# Patient Record
Sex: FEMALE | Race: WHITE | NOT HISPANIC OR LATINO | Employment: OTHER | URBAN - METROPOLITAN AREA
[De-identification: names, ages, dates, MRNs, and addresses within clinical notes are randomized per-mention and may not be internally consistent; named-entity substitution may affect disease eponyms.]

---

## 2017-03-30 ENCOUNTER — TRANSCRIBE ORDERS (OUTPATIENT)
Dept: ADMINISTRATIVE | Facility: HOSPITAL | Age: 72
End: 2017-03-30

## 2017-03-30 DIAGNOSIS — Z12.31 OTHER SCREENING MAMMOGRAM: Primary | ICD-10-CM

## 2017-04-19 ENCOUNTER — HOSPITAL ENCOUNTER (OUTPATIENT)
Dept: RADIOLOGY | Facility: HOSPITAL | Age: 72
Discharge: HOME | End: 2017-04-19
Payer: MEDICARE

## 2017-04-19 DIAGNOSIS — Z12.31 OTHER SCREENING MAMMOGRAM: ICD-10-CM

## 2017-04-19 PROCEDURE — G0202 SCR MAMMO BI INCL CAD: HCPCS

## 2017-05-15 ENCOUNTER — TRANSCRIBE ORDERS (OUTPATIENT)
Dept: LAB | Facility: HOSPITAL | Age: 72
End: 2017-05-15

## 2017-05-15 ENCOUNTER — APPOINTMENT (OUTPATIENT)
Dept: LAB | Facility: HOSPITAL | Age: 72
End: 2017-05-15
Attending: INTERNAL MEDICINE
Payer: MEDICARE

## 2017-05-15 DIAGNOSIS — E03.9 UNSPECIFIED HYPOTHYROIDISM: Primary | ICD-10-CM

## 2017-05-15 DIAGNOSIS — E55.9 UNSPECIFIED VITAMIN D DEFICIENCY: ICD-10-CM

## 2017-05-15 DIAGNOSIS — E78.5 HYPERLIPIDEMIA, UNSPECIFIED HYPERLIPIDEMIA TYPE: ICD-10-CM

## 2017-05-15 DIAGNOSIS — E03.9 UNSPECIFIED HYPOTHYROIDISM: ICD-10-CM

## 2017-05-15 LAB
25(OH)D3 SERPL-MCNC: 34.1 NG/ML (ref 30–100)
ALBUMIN SERPL BCP-MCNC: 3.5 G/DL (ref 3.5–5)
ALP SERPL-CCNC: 85 U/L (ref 46–116)
ALT SERPL W P-5'-P-CCNC: 25 U/L (ref 12–78)
ANION GAP SERPL CALCULATED.3IONS-SCNC: 5 MMOL/L (ref 4–13)
AST SERPL W P-5'-P-CCNC: 18 U/L (ref 5–45)
BILIRUB SERPL-MCNC: 0.54 MG/DL (ref 0.2–1)
BUN SERPL-MCNC: 18 MG/DL (ref 5–25)
CALCIUM SERPL-MCNC: 9.2 MG/DL (ref 8.3–10.1)
CHLORIDE SERPL-SCNC: 104 MMOL/L (ref 100–108)
CHOLEST SERPL-MCNC: 202 MG/DL (ref 50–200)
CO2 SERPL-SCNC: 31 MMOL/L (ref 21–32)
CREAT SERPL-MCNC: 0.76 MG/DL (ref 0.6–1.3)
GFR SERPL CREATININE-BSD FRML MDRD: >60 ML/MIN/1.73SQ M
GLUCOSE P FAST SERPL-MCNC: 94 MG/DL (ref 65–99)
HDLC SERPL-MCNC: 93 MG/DL (ref 40–60)
LDLC SERPL CALC-MCNC: 90 MG/DL (ref 0–100)
POTASSIUM SERPL-SCNC: 4.4 MMOL/L (ref 3.5–5.3)
PROT SERPL-MCNC: 7.4 G/DL (ref 6.4–8.2)
SODIUM SERPL-SCNC: 140 MMOL/L (ref 136–145)
TRIGL SERPL-MCNC: 93 MG/DL
TSH SERPL DL<=0.05 MIU/L-ACNC: 4.85 UIU/ML (ref 0.36–3.74)

## 2017-05-15 PROCEDURE — 82306 VITAMIN D 25 HYDROXY: CPT

## 2017-05-15 PROCEDURE — 36415 COLL VENOUS BLD VENIPUNCTURE: CPT

## 2017-05-15 PROCEDURE — 80053 COMPREHEN METABOLIC PANEL: CPT

## 2017-05-15 PROCEDURE — 80061 LIPID PANEL: CPT

## 2017-05-15 PROCEDURE — 84443 ASSAY THYROID STIM HORMONE: CPT

## 2017-05-30 ENCOUNTER — ALLSCRIPTS OFFICE VISIT (OUTPATIENT)
Dept: OTHER | Facility: OTHER | Age: 72
End: 2017-05-30

## 2017-05-30 ENCOUNTER — APPOINTMENT (OUTPATIENT)
Dept: LAB | Facility: HOSPITAL | Age: 72
End: 2017-05-30
Attending: SURGERY
Payer: MEDICARE

## 2017-05-30 ENCOUNTER — TRANSCRIBE ORDERS (OUTPATIENT)
Dept: LAB | Facility: HOSPITAL | Age: 72
End: 2017-05-30

## 2017-05-30 DIAGNOSIS — E04.1 THYROID NODULE: Primary | ICD-10-CM

## 2017-05-30 PROCEDURE — 88173 CYTOPATH EVAL FNA REPORT: CPT

## 2017-05-30 PROCEDURE — 88172 CYTP DX EVAL FNA 1ST EA SITE: CPT

## 2017-06-02 ENCOUNTER — GENERIC CONVERSION - ENCOUNTER (OUTPATIENT)
Dept: OTHER | Facility: OTHER | Age: 72
End: 2017-06-02

## 2017-11-09 ENCOUNTER — HOSPITAL ENCOUNTER (OUTPATIENT)
Dept: RADIOLOGY | Facility: HOSPITAL | Age: 72
Discharge: HOME | End: 2017-11-09
Attending: INTERNAL MEDICINE
Payer: MEDICARE

## 2017-11-09 ENCOUNTER — TRANSCRIBE ORDERS (OUTPATIENT)
Dept: RADIOLOGY | Facility: HOSPITAL | Age: 72
End: 2017-11-09

## 2017-11-09 DIAGNOSIS — M25.862 IMPINGEMENT OF LEFT KNEE JOINT: Primary | ICD-10-CM

## 2017-11-09 DIAGNOSIS — M25.862 IMPINGEMENT OF LEFT KNEE JOINT: ICD-10-CM

## 2017-11-09 PROCEDURE — 73562 X-RAY EXAM OF KNEE 3: CPT

## 2017-12-05 ENCOUNTER — ALLSCRIPTS OFFICE VISIT (OUTPATIENT)
Dept: OTHER | Facility: OTHER | Age: 72
End: 2017-12-05

## 2017-12-06 NOTE — PROGRESS NOTES
Assessment    1  Primary localized osteoarthritis of knees, bilateral (715 16) (M17 0)  2  Pain in both knees, unspecified chronicity (719 46) (M25 561,M25 562)    Plan  Pain in both knees, unspecified chronicity    · Administered: MethylPREDNISolone Acetate 40 MG/ML Injection Suspension    Discussion/Summary    72-year-old female with bilateral primary knee degenerative arthritis  decrease inflammation we did provide the patient with bilateral knee steroid injections  also provide the patient with strengthening exercises for bilateral knees  The patient will incorporate this into her daily lower back exercises  as neededFollow-up in 3 months time for repeat evaluation and possible injections  Chief Complaint    1  Knee Pain  Bilateral knee pain      History of Present Illness  HPI: 72-year-old female who presents for initial evaluation of bilateral knees  Patient reports having knee pain and arthritis for years  Most recently nearly 3 months ago the patient states she was going up a step and her knees gave out on her patient denied having trauma to her knees but states that exacerbated her pain  Patient is reporting sharp pain worse with going up stairs and after sitting for long periods of time  After that fall the patient did sustain a great toe fracture which is currently being treated by her podiatrist  She has not had any formal treatment for her knees  She denies any numbness or tingling  She does have a history of being on alendronate for osteoporosis which she states she's been on for years  She is not had any formal treatment otherwise she has no other complaints  The patient does report that daily she performs exercises regarding her lower back and is very active she walks weekly      Review of Systems   Constitutional: No fever, no chills, feels well, no tiredness, no recent weight gain or loss  Eyes: No complaints of eyesight problems, no red eyes    ENT: no loss of hearing, no nosebleeds, no sore throat  Cardiovascular: No complaints of chest pain, no palpitations, no leg claudication or lower extremity edema  Respiratory: no compliants of shortness of breath, no wheezing, no cough  Gastrointestinal: no complaints of abdominal pain, no constipation, no nausea or diarrhea, no vomiting, no bloody stools  Musculoskeletal: as noted in HPI  Integumentary: no complaints of skin rash or lesion, no itching or dry skin, no skin wounds  Neurological: no complaints of headache, no confusion, no numbness or tingling, no dizziness  Endocrine: No complaints of muscle weakness, no feelings of weakness, no frequent urination, no excessive thirst   Psychiatric: No suicidal thoughts, no anxiety, no feelings of depression  ROS reviewed  Active Problems  1  Left thyroid nodule (241 0) (E04 1)    Past Medical History   · History of High cholesterol (272 0) (E78 00)   · History of arthritis (V13 4) (Z87 39)    The active problems and past medical history were reviewed and updated today  Surgical History   · History of Hammertoe Operation (Each Toe)   · History of Oral Surgery Tooth Extraction [place] Tooth   · History of Simple Bunion Exostectomy (Silver Procedure)    The surgical history was reviewed and updated today         Family History  Mother    · Family history of arthritis (V17 7) (Z82 61)   · Family history of cerebrovascular accident (CVA) (V17 1) (Z82 3)   · Family history of hypertension (V17 49) (Z82 49)   · Family history of malignant neoplasm (V16 9) (Z80 9)  Father    · Family history of malignant neoplasm (V16 9) (Z80 9)  Maternal Grandmother    · Family history of arthritis (V17 7) (Z82 61)   · Family history of cerebrovascular accident (CVA) (V17 1) (Z82 3)   · Family history of hypertension (V17 49) (Z82 49)   · Family history of malignant neoplasm (V16 9) (Z80 9)  Maternal Aunt    · Family history of arthritis (V17 7) (Z82 61)   · Family history of hypertension (V17 49) (Z82 49)   · Family history of malignant neoplasm (V16 9) (Z80 9)    The family history was reviewed and updated today  Social History     · Denied: History of Alcohol use   ·    · Denied: History of Smoker  The social history was reviewed and is unchanged  Current Meds  1  Alendronate Sodium 70 MG Oral Tablet; TAKE AS DIRECTED; Therapy: (Recorded:30May2017) to Recorded  2  Biotin 1000 MCG Oral Tablet; TAKE AS DIRECTED; Therapy: (Recorded:30May2017) to Recorded  3  Caltrate 600 1500 (600 Ca) MG Oral Tablet; TAKE 1 TABLET DAILY; Therapy: (Recorded:30May2017) to Recorded  4  Cosamin DS CAPS; TAKE AS DIRECTED; Therapy: (Recorded:30May2017) to Recorded  5  Eye Drops SOLN; INSTILL 1-2 DROPS INTO AFFECTED EYE(S)  4 TIMES DAILY AS DIRECTED; Therapy: (Recorded:30May2017) to Recorded  6  Levothroid 25 MCG TABS; TAKE 1 TABLET DAILY; Therapy: (Recorded:30May2017) to Recorded  7  Simvastatin 10 MG Oral Tablet; TAKE 1 TABLET DAILY; Therapy: (Recorded:30May2017) to Recorded  8  Tylenol 325 MG Oral Tablet Recorded    The medication list was reviewed and updated today  Allergies  1  indomethacin  2  OxyCODONE HCl TABS    Vitals  Signs     Heart Rate: 65  Respiration: 20  Systolic: 291  Diastolic: 89  Height: 5 ft   Weight: 132 lb   BMI Calculated: 25 78  BSA Calculated: 1 56    Physical Exam   Constitutional - General appearance: Normal   Musculoskeletal - Gait and station: Normal -- (Minimally Antalgic gait)-- Digits and nails: Normal   Respiratory - Lungs - Clear to auscultation bilaterally, no rales, no rhonci, no wheezes  -- (Nonlabored breathing normal chest excursion)    Skin - Skin and subcutaneous tissue: Normal -- Palpation of skin and subcutaneous tissue: Normal   Neurologic - Sensation: Normal   Psychiatric - Orientation to person, place, and time: Normal -- Mood and affect: Normal   Eyes  Conjunctiva and lids: Normal    Pupils and irises: Normal    Free Text - Bilateral knees have no obvious effusion or increase in warmth  does have slight varus alignment  There is joint line tenderness medially  Patient does have bony enlargement along the medial joint line  There is crepitus with range of motion  Patient has full extension and flexion beyond 120Â° bilaterally  Patient has no khalif instability with collateral testing  Anterior posterior drawer both negative  There is no popliteal fullness  Patient has a warm sensate extremity  Appropriate muscle tone  Palpable pulses distally strength normal       Results/Data  I personally reviewed the films/images/results in the office today  My interpretation follows  X-ray Review X-rays bilateral knees demonstrate mild to moderate tricompartmental arthritis worse in the medial compartment  No evidence of fractures  Procedure    Procedure: Potential complications include bleeding  Risk were discussed with the patient  Verbal consent was obtained prior to the procedure  Alcohol was used to prep the area  ethyl chloride spray was used as a topical anesthetic  Using sterile technique, the aspiration/injection needle was then directed from a Anterolateral aspect  A 22-gauge was used to inject 2 mL of 1% Lidocaine,-- 2 mL of 0 25% Bupivacaine-- and-- 2 mL of 40mg/mL methylprednisolone  A bandage was applied  the patient tolerated the procedure well  Complications: none  Follow-up in the office in 3 month(s)  Attending Note  43-year-old female, very active, who presents at this time with bilateral knee pain over the right greater than left  Patient states the pain over the medial aspect  States most of pain is when she is coming down steps or down the hill  Denies any numbness tingling fevers chills  Denies any blunt trauma    Has not had any treatment for these arthritic changes before  Physical exam significant for medial joint line tenderness, full range of motion, stable to coronal sagittal plane stress, neurologically and vascularly intact distally this is involving bilateral knees  Radiographs do show mild osteoarthritic changes with subchondral sclerosing osteophyte formation and flattening of the condyle  Plan is as follows:  Weightbearing as tolerated  Steroid injections to bilateral knees  Physical therapy exercises which patient was given schematics for  Follow-up in 3 months  Discussed treatment plan with patient and resident and all in agreement treatment plan  Thank you      Future Appointments    Date/Time Provider Specialty Site   03/13/2018 01:10 PM GUS Meza   Orthopedic Surgery [facility]       Signatures   Electronically signed by : GUS Corbin ; Dec  5 2017  6:16PM EST                       (Author)

## 2018-01-10 NOTE — RESULT NOTES
Verified Results  (1) FINE NEEDLE ASPIRATION [id number] 02:39PM Jessica He     Test Name Result Flag Reference   LAB AP CASE REPORT (Report)     Non-gynecologic Cytology             Case: [id number]                  Authorizing Provider: Ulysess Ormond, MD    Collected:      05/30/2017 1439        Ordering Location:   [address] [place] [address]   Received:      05/30/2017 [facility]                              Pathologist:      Neisha Nation MD                              Specimens:  A) - Thyroid, Left                                           B) - Thyroid, Left, slides   LAB AP CYTO FINAL DIAGNOSIS (Report)     A - B  Thyroid, Left (Thin-prep and smear preparations):  Negative for malignancy (Waldorf Category II) - See note  Groups of unremarkable follicular cells  Colloid  Cytologic findings are compatible with a hyperplastic/adenomatoid nodule  Satisfactory for evaluation  Note: As reported in the [facility] for Reporting Thyroid   Cytopathology*, the diagnostic category of benign/negative for   malignancy carries 0% to 3% risk of malignancy being found in subsequent   resections (in the few studies of patients with benign FNA results that   were followed long-term, a falsenegative rate of <1% was reported), with   the usual management being clinical follow-up supplemented by  ultrasonography (US) as indicated  Repeat FNA may be indicated for nodules   showing significant growth or developing US abnormalities  Ultimately,   clinical/imaging correlation for this patient is needed in arriving at the   actual management plan  *The [place] System for Reporting Thyroid Cytopathology, Nancy Woodard ), 2010 (1st ed )    Electronically signed by Neisha Nation MD on 6/1/2017 at 3:49 PM   LAB AP GROSS DESCRIPTION      A  Thyroid, Left, : 20 ml bloody received in CytoLyt    B   Thyroid, Left, slides: 2 diff quik slides   LAB AP NONGYN ADDITIONAL INFORMATION (Report)     [employer]'s FDA approved ,  and ThinPrep Imaging System are   utilized with strict adherence to the 's instruction manual to   prepare gynecologic and non-gynecologic cytology specimens for the   production of ThinPrep slides as well as for gynecologic ThinPrep imaging  These processes have been validated by our laboratory and/or by the     These tests were developed and their performance characteristics   determined by [facility] or [address] [place] [address]  They may not be cleared or approved by the [place]  Food and   Drug Administration  The FDA has determined that such clearance or   approval is not necessary  These tests are used for clinical purposes  They should not be regarded as investigational or for research  This   laboratory has been approved by [facility], designated as a high-complexity   laboratory and is qualified to perform these tests      - Interpretation performed at [address]   [address]

## 2018-01-13 VITALS — BODY MASS INDEX: 25.23 KG/M2 | HEIGHT: 60 IN | WEIGHT: 128.5 LBS

## 2018-01-17 NOTE — MISCELLANEOUS
Message   Recorded as Task   Date: 06/02/2017 11:21 AM, Created By: Daquan Bolton   Task Name: Call Patient with results   Assigned To: Daquan Bolton   Regarding Patient: Maria Snell, Status: Active   CommentNelson Resendiz - 02 Jun 2017 11:21 AM     Patient Phone: [phone]    let her know this is ok   Prabhakar Mitchell - 07 Jun 2017 10:35 AM     TASK EDITED  Patient notified of thyroid biopsy results, will send copy to Dr Henry Alfonso  Active Problems    1  Left thyroid nodule (241 0) (E04 1)    Current Meds   1  Alendronate Sodium 70 MG Oral Tablet; TAKE AS DIRECTED; Therapy: (Recorded:[id number]) to Recorded   2  Biotin 1000 MCG Oral Tablet; TAKE AS DIRECTED; Therapy: (Recorded:19Gzd4280) to Recorded   3  Caltrate 600 1500 (600 Ca) MG Oral Tablet; TAKE 1 TABLET DAILY; Therapy: (Recorded:28Bpd8217) to Recorded   4  Cosamin DS CAPS; TAKE AS DIRECTED; Therapy: (Recorded:74Gli9398) to Recorded   5  Eye Drops SOLN; INSTILL 1-2 DROPS INTO AFFECTED EYE(S)  4 TIMES DAILY AS   DIRECTED; Therapy: (Recorded:32Gvt9144) to Recorded   6  Levothroid 25 MCG TABS; TAKE 1 TABLET DAILY; Therapy: (Recorded:40Qbn1905) to Recorded   7  Simvastatin 10 MG Oral Tablet; TAKE 1 TABLET DAILY; Therapy: (Recorded:87Kux1563) to Recorded    Allergies    1  indomethacin   2   OxyCODONE HCl TABS    Signatures   Electronically signed by : Sid Klein, ; Jun 7 2017 10:36AM EST                       (Author)

## 2018-01-23 VITALS
RESPIRATION RATE: 20 BRPM | SYSTOLIC BLOOD PRESSURE: 168 MMHG | WEIGHT: 132 LBS | HEART RATE: 65 BPM | HEIGHT: 60 IN | BODY MASS INDEX: 25.91 KG/M2 | DIASTOLIC BLOOD PRESSURE: 89 MMHG

## 2018-03-12 RX ORDER — SIMVASTATIN 10 MG
10 TABLET ORAL DAILY
Refills: 3 | COMMUNITY
Start: 2017-12-08 | End: 2018-05-17 | Stop reason: SDUPTHER

## 2018-03-12 RX ORDER — LEVOTHYROXINE SODIUM 0.03 MG/1
1 TABLET ORAL DAILY
COMMUNITY
End: 2021-08-27

## 2018-03-12 RX ORDER — SODIUM PHOSPHATE,MONO-DIBASIC 19G-7G/118
ENEMA (ML) RECTAL
COMMUNITY

## 2018-03-12 RX ORDER — ALENDRONATE SODIUM 70 MG/1
TABLET ORAL
COMMUNITY
End: 2020-09-23

## 2018-03-12 RX ORDER — LEVOTHYROXINE SODIUM 0.03 MG/1
25 TABLET ORAL DAILY
Refills: 3 | COMMUNITY
Start: 2017-12-14 | End: 2018-05-17 | Stop reason: SDUPTHER

## 2018-03-12 RX ORDER — ACETAMINOPHEN 325 MG/1
TABLET ORAL
COMMUNITY

## 2018-03-12 RX ORDER — SIMVASTATIN 10 MG
1 TABLET ORAL DAILY
COMMUNITY
End: 2021-08-27

## 2018-03-12 RX ORDER — BIOTIN 1 MG
TABLET ORAL
COMMUNITY

## 2018-03-13 ENCOUNTER — OFFICE VISIT (OUTPATIENT)
Dept: OBGYN CLINIC | Facility: HOSPITAL | Age: 73
End: 2018-03-13
Payer: MEDICARE

## 2018-03-13 VITALS
BODY MASS INDEX: 26.44 KG/M2 | RESPIRATION RATE: 18 BRPM | DIASTOLIC BLOOD PRESSURE: 83 MMHG | WEIGHT: 135.4 LBS | HEART RATE: 63 BPM | SYSTOLIC BLOOD PRESSURE: 165 MMHG

## 2018-03-13 DIAGNOSIS — M17.11 ARTHRITIS OF RIGHT KNEE: ICD-10-CM

## 2018-03-13 DIAGNOSIS — M17.12 ARTHRITIS OF LEFT KNEE: Primary | ICD-10-CM

## 2018-03-13 PROCEDURE — 99213 OFFICE O/P EST LOW 20 MIN: CPT | Performed by: ORTHOPAEDIC SURGERY

## 2018-03-13 NOTE — PROGRESS NOTES
71 y o female with bilateral knee arthritis presenting for followup  At her last visit in December she was given bilateral knee steroid injections  This gave her significant relief but over the past month she has had recurrence of symptoms  This includes medially based knee pain that is worse with activity and relieved by rest   She also describes pain when going down stairs  Has had no trauma to the area or numbness/tingling  She also complains of mid back pain that radiates around her rib to the abdomen for the past 2 weeks  This occurred without injury and has been improving  Pain is worse when cough or sneezing and relieved by rest       Review of Systems  Review of systems negative unless otherwise specified in HPI    Past Medical History  No past medical history on file  Past Surgical History  No past surgical history on file  Current Medications  No current outpatient prescriptions on file prior to visit  No current facility-administered medications on file prior to visit  Recent Labs [facility])    0  Lab Value Date/Time   GLUCOSE 93 03/31/2016 0857   GLUCOSE 95 04/16/2015 0917         Physical exam  · General: Awake, Alert, Oriented  · Eyes: Pupils equal, round and reactive to light  · Heart: regular rate and rhythm  · Lungs: No audible wheezing  · Abdomen: soft  Bilateral lower extremity  · Varus alignment  · Skin intact  · Extension 0°, flexion full  · Tender palpation over medial joint line  · No knee effusion  · Knee stable in sagittal and coronal planes  · 5 out of 5 quad and hamstring strength  · Sensation intact L1-S1  Imaging  Previous x rays of the knees shows joint space narrowing and osteophytic changes      Assessment/Plan:   71 y  o female with bilateral knee arthritis and an intercostal muscle strain    · Weight bearing as tolerated bilateral lower extremities  · Continue VMO strengthening exercises  · Oral anti inflammatories for muscle strain  · Follow up in  2 months

## 2018-05-08 ENCOUNTER — TRANSCRIBE ORDERS (OUTPATIENT)
Dept: ADMINISTRATIVE | Facility: HOSPITAL | Age: 73
End: 2018-05-08

## 2018-05-08 DIAGNOSIS — R10.9 STOMACH ACHE: Primary | ICD-10-CM

## 2018-05-08 DIAGNOSIS — E03.9 MYXEDEMA HEART DISEASE: ICD-10-CM

## 2018-05-08 DIAGNOSIS — I51.9 MYXEDEMA HEART DISEASE: ICD-10-CM

## 2018-05-11 ENCOUNTER — APPOINTMENT (OUTPATIENT)
Dept: LAB | Facility: HOSPITAL | Age: 73
End: 2018-05-11
Attending: INTERNAL MEDICINE
Payer: MEDICARE

## 2018-05-11 DIAGNOSIS — E03.9 MYXEDEMA HEART DISEASE: ICD-10-CM

## 2018-05-11 DIAGNOSIS — R10.9 STOMACH ACHE: ICD-10-CM

## 2018-05-11 DIAGNOSIS — I51.9 MYXEDEMA HEART DISEASE: ICD-10-CM

## 2018-05-11 LAB
ALBUMIN SERPL BCP-MCNC: 3.5 G/DL (ref 3.5–5)
ALP SERPL-CCNC: 80 U/L (ref 46–116)
ALT SERPL W P-5'-P-CCNC: 19 U/L (ref 12–78)
ANION GAP SERPL CALCULATED.3IONS-SCNC: 5 MMOL/L (ref 4–13)
AST SERPL W P-5'-P-CCNC: 17 U/L (ref 5–45)
BILIRUB SERPL-MCNC: 0.47 MG/DL (ref 0.2–1)
BUN SERPL-MCNC: 20 MG/DL (ref 5–25)
CALCIUM SERPL-MCNC: 9.1 MG/DL (ref 8.3–10.1)
CHLORIDE SERPL-SCNC: 105 MMOL/L (ref 100–108)
CHOLEST SERPL-MCNC: 170 MG/DL (ref 50–200)
CO2 SERPL-SCNC: 30 MMOL/L (ref 21–32)
CREAT SERPL-MCNC: 0.82 MG/DL (ref 0.6–1.3)
GFR SERPL CREATININE-BSD FRML MDRD: 72 ML/MIN/1.73SQ M
GLUCOSE P FAST SERPL-MCNC: 92 MG/DL (ref 65–99)
HDLC SERPL-MCNC: 80 MG/DL (ref 40–60)
LDLC SERPL CALC-MCNC: 75 MG/DL (ref 0–100)
NONHDLC SERPL-MCNC: 90 MG/DL
POTASSIUM SERPL-SCNC: 3.7 MMOL/L (ref 3.5–5.3)
PROT SERPL-MCNC: 7.2 G/DL (ref 6.4–8.2)
SODIUM SERPL-SCNC: 140 MMOL/L (ref 136–145)
T4 FREE SERPL-MCNC: 1.11 NG/DL (ref 0.76–1.46)
TRIGL SERPL-MCNC: 77 MG/DL
TSH SERPL DL<=0.05 MIU/L-ACNC: 3.13 UIU/ML (ref 0.36–3.74)

## 2018-05-11 PROCEDURE — 36415 COLL VENOUS BLD VENIPUNCTURE: CPT

## 2018-05-11 PROCEDURE — 84443 ASSAY THYROID STIM HORMONE: CPT

## 2018-05-11 PROCEDURE — 84439 ASSAY OF FREE THYROXINE: CPT

## 2018-05-11 PROCEDURE — 80053 COMPREHEN METABOLIC PANEL: CPT

## 2018-05-11 PROCEDURE — 80061 LIPID PANEL: CPT

## 2018-05-14 ENCOUNTER — HOSPITAL ENCOUNTER (OUTPATIENT)
Dept: RADIOLOGY | Facility: HOSPITAL | Age: 73
Discharge: HOME | End: 2018-05-14
Attending: INTERNAL MEDICINE
Payer: MEDICARE

## 2018-05-14 ENCOUNTER — HOSPITAL ENCOUNTER (OUTPATIENT)
Dept: RADIOLOGY | Facility: HOSPITAL | Age: 73
Discharge: HOME | End: 2018-05-14
Payer: MEDICARE

## 2018-05-14 DIAGNOSIS — R10.9 STOMACH ACHE: ICD-10-CM

## 2018-05-14 PROCEDURE — 76700 US EXAM ABDOM COMPLETE: CPT

## 2018-05-14 PROCEDURE — 76856 US EXAM PELVIC COMPLETE: CPT

## 2018-05-17 ENCOUNTER — OFFICE VISIT (OUTPATIENT)
Dept: OBGYN CLINIC | Facility: HOSPITAL | Age: 73
End: 2018-05-17
Payer: MEDICARE

## 2018-05-17 VITALS
DIASTOLIC BLOOD PRESSURE: 79 MMHG | HEART RATE: 69 BPM | WEIGHT: 134.6 LBS | SYSTOLIC BLOOD PRESSURE: 129 MMHG | RESPIRATION RATE: 18 BRPM | BODY MASS INDEX: 26.29 KG/M2

## 2018-05-17 DIAGNOSIS — M17.0 PRIMARY OSTEOARTHRITIS OF BOTH KNEES: Primary | ICD-10-CM

## 2018-05-17 PROCEDURE — 99213 OFFICE O/P EST LOW 20 MIN: CPT | Performed by: PHYSICIAN ASSISTANT

## 2018-05-17 PROCEDURE — 20610 DRAIN/INJ JOINT/BURSA W/O US: CPT | Performed by: PHYSICIAN ASSISTANT

## 2018-05-17 RX ORDER — BUPIVACAINE HYDROCHLORIDE 2.5 MG/ML
2 INJECTION, SOLUTION INFILTRATION; PERINEURAL
Status: COMPLETED | OUTPATIENT
Start: 2018-05-17 | End: 2018-05-17

## 2018-05-17 RX ORDER — METHYLPREDNISOLONE ACETATE 40 MG/ML
2 INJECTION, SUSPENSION INTRA-ARTICULAR; INTRALESIONAL; INTRAMUSCULAR; SOFT TISSUE
Status: COMPLETED | OUTPATIENT
Start: 2018-05-17 | End: 2018-05-17

## 2018-05-17 RX ADMIN — BUPIVACAINE HYDROCHLORIDE 2 ML: 2.5 INJECTION, SOLUTION INFILTRATION; PERINEURAL at 14:49

## 2018-05-17 RX ADMIN — BUPIVACAINE HYDROCHLORIDE 2 ML: 2.5 INJECTION, SOLUTION INFILTRATION; PERINEURAL at 14:48

## 2018-05-17 RX ADMIN — METHYLPREDNISOLONE ACETATE 2 ML: 40 INJECTION, SUSPENSION INTRA-ARTICULAR; INTRALESIONAL; INTRAMUSCULAR; SOFT TISSUE at 14:49

## 2018-05-17 RX ADMIN — METHYLPREDNISOLONE ACETATE 2 ML: 40 INJECTION, SUSPENSION INTRA-ARTICULAR; INTRALESIONAL; INTRAMUSCULAR; SOFT TISSUE at 14:48

## 2018-05-17 NOTE — PROGRESS NOTES
Orthopedics          Collin Cullen 71 y o  female MRN: [id number]      Chief Complaint:   bilateral knee pain    HPI:   71 y  o female complaining of bilateral knee pain and osteoarthritis  Patient states she has had increasing pain in her left knee  Patient states the pain is aching nature localized her left knee  She has had significant pain relief following her last injection 5 months ago  However the pain is limiting her function  She is becoming more active in regard over the past 2 weeks time  Review Of Systems:   · Skin: Normal  · Neuro: See HPI  · Musculoskeletal: See HPI  · 14 point review of systems negative except as stated above     Past Medical History:   History reviewed  No pertinent past medical history  Past Surgical History:   History reviewed  No pertinent surgical history  Family History:  Family history reviewed and non-contributory  History reviewed  No pertinent family history  Social History:  Social History     Social History    Marital status: Single     Spouse name: N/A    Number of children: N/A    Years of education: N/A     Social History Main Topics    Smoking status: Never Smoker    Smokeless tobacco: Never Used    Alcohol use None    Drug use: Unknown    Sexual activity: Not Asked     Other Topics Concern    None     Social History Narrative    None       Allergies:    Allergies   Allergen Reactions    Indomethacin      Other reaction(s): GI upset    Oxycodone Dizziness and Nausea Only     Other reaction(s): GI upset       Labs:  No results found for: HCT, HGB, PT, INR, WBC, ESR, CRP    Meds:    Current Outpatient Prescriptions:     acetaminophen (TYLENOL) 325 mg tablet, Take by mouth, Disp: , Rfl:     alendronate (FOSAMAX) 70 mg tablet, Take by mouth, Disp: , Rfl:     Biotin 1000 MCG tablet, Take by mouth, Disp: , Rfl:     Calcium Carbonate (CALTRATE 600) 1500 (600 Ca) MG TABS, Take 1 tablet by mouth daily, Disp: , Rfl:    Glucosamine-Chondroitin (COSAMIN DS) 500-400 MG CAPS, Take by mouth, Disp: , Rfl:     levothyroxine 25 mcg tablet, Take 1 tablet by mouth daily, Disp: , Rfl:     Naphazoline-Polyethyl Glycol 0 012-0 2 % SOLN, Apply 1-2 drops to eye 4 (four) times a day, Disp: , Rfl:     simvastatin (ZOCOR) 10 mg tablet, Take 1 tablet by mouth daily, Disp: , Rfl:       Physical Exam:     General Appearance:    Alert, cooperative, no distress, appears stated age   Head:    Normocephalic, without obvious abnormality, atraumatic   Eyes:    conjunctiva/corneas clear, both eyes        Nose:   Nares normal, septum midline, no drainage    Throat:   Lips normal; teeth and gums normal   Neck:    symmetrical, trachea midline, ;     thyroid:  no enlargement/   Back:     Symmetric, no curvature, ROM normal   Lungs:   No audible wheezing or labored breathing   Chest Wall:    No tenderness or deformity    Heart:    Regular rate and rhythm               Pulses:   2+ and symmetric all extremities   Skin:   Skin color, texture, turgor normal, no rashes or lesions   Neurologic:   normal strength, sensation and reflexes     throughout       Musculoskeletal: bilateral lower extremity  · On examination of the left knee there is no effusion, no erythema  Range of motion to full active extension and flexion to greater than 120°  Pain on palpation medial and lateral joint lines  There is crepitus with range of motion, no warmth to palpation, bony enlargement noted  No pain on palpation pes anserine bursa region or distal iliotibial band  Stable to varus and valgus stress without pain or gapping  Negative anterior and posterior drawer testing  Sensation intact distal pulses present  · On examination of the right knee there is no effusion, no erythema  Range of motion to full active extension and flexion to greater than 120°  Pain on palpation medial and lateral joint lines    There is crepitus with range of motion, no warmth to palpation, bony enlargement noted  No pain on palpation pes anserine bursa region or distal iliotibial band  Stable to varus and valgus stress without pain or gapping  Negative anterior and posterior drawer testing  Sensation intact distal pulses present  Large joint arthrocentesis  Date/Time: 5/17/2018 2:48 PM  Consent given by: patient  Supporting Documentation  Indications: pain   Procedure Details  Location: knee - R knee  Needle size: 22 G  Ultrasound guidance: no  Approach: anterolateral  Medications administered: 2 mL bupivacaine 0 25 %; 2 mL methylPREDNISolone acetate 40 mg/mL      Large joint arthrocentesis  Date/Time: 5/17/2018 2:49 PM  Consent given by: patient  Site marked: site marked  Supporting Documentation  Indications: pain   Procedure Details  Location: knee - L knee  Needle size: 22 G  Ultrasound guidance: no  Approach: anterolateral  Medications administered: 2 mL bupivacaine 0 25 %; 2 mL methylPREDNISolone acetate 40 mg/mL            _*_*_*_*_*_*_*_*_*_*_*_*_*_*_*_*_*_*_*_*_*_*_*_*_*_*_*_*_*_*_*_*_*_*_*_*_*_*_*_*_*    Assessment:  71 y  o female with bilateral knee pain osteoarthritis    Plan:   · Weight bearing as tolerated  bilateral lower extremity  · Patient advised should they develop any increasing pain, redness, drainage, numbness, tingling or swelling surrounding the injection sight, they are to contact our office or present to the emergency department    · Continue home exercises  · Follow up in 3 months or sooner if needed      Wes Mai PA-C

## 2018-06-15 ENCOUNTER — TRANSCRIBE ORDERS (OUTPATIENT)
Dept: ADMINISTRATIVE | Facility: HOSPITAL | Age: 73
End: 2018-06-15

## 2018-06-15 DIAGNOSIS — Z12.39 SCREENING BREAST EXAMINATION: Primary | ICD-10-CM

## 2018-07-09 ENCOUNTER — HOSPITAL ENCOUNTER (OUTPATIENT)
Dept: RADIOLOGY | Facility: HOSPITAL | Age: 73
Discharge: HOME | End: 2018-07-09
Payer: MEDICARE

## 2018-07-09 DIAGNOSIS — Z12.39 SCREENING BREAST EXAMINATION: ICD-10-CM

## 2018-07-09 PROCEDURE — 77067 SCR MAMMO BI INCL CAD: CPT

## 2018-08-23 ENCOUNTER — OFFICE VISIT (OUTPATIENT)
Dept: OBGYN CLINIC | Facility: HOSPITAL | Age: 73
End: 2018-08-23
Payer: MEDICARE

## 2018-08-23 VITALS
WEIGHT: 133 LBS | HEART RATE: 78 BPM | SYSTOLIC BLOOD PRESSURE: 143 MMHG | BODY MASS INDEX: 25.97 KG/M2 | DIASTOLIC BLOOD PRESSURE: 80 MMHG

## 2018-08-23 DIAGNOSIS — M17.0 BILATERAL PRIMARY OSTEOARTHRITIS OF KNEE: Primary | ICD-10-CM

## 2018-08-23 PROCEDURE — 99213 OFFICE O/P EST LOW 20 MIN: CPT | Performed by: ORTHOPAEDIC SURGERY

## 2018-08-23 NOTE — PROGRESS NOTES
Orthopedics          Zohraalan Luis 68 y o  female MRN: [id number]      Chief Complaint:   bilateral knee pain    HPI:   68 y  o female complaining of bilateral knee pain  Patient presents office today regarding bilateral knee pain  Patient has been diagnosed with bilateral knee osteoarthritis  Patient also states having left hip laterally based pain over the past weeks time  She states that her bilateral knee pain has been present for several months he did receive injections 9 months ago with significant pain relief however the injection she received 3 months ago did not give her the same relief  She states the pain is aching in nature worse with stairs worse after sitting for long periods time localized knees aching in nature  She is also limited activities due to her knee pain  She takes anti-inflammatory medication minimally alleviate her pain she is also limited in her bowling activities due to her bilateral knee pain  Over the past weeks time she has noticed left laterally based hip pain  States the pain is worse when laying on her left side and only present at night she denies any numbness tingling lower extremities                Review Of Systems:   · Skin: Normal  · Neuro: See HPI  · Musculoskeletal: See HPI  · All other systems reviewed and are negative    Past Medical History:   History reviewed  No pertinent past medical history  Past Surgical History:   History reviewed  No pertinent surgical history  Family History:  Family history reviewed and non-contributory  History reviewed  No pertinent family history        Social History:  Social History     Social History    Marital status: Single     Spouse name: N/A    Number of children: N/A    Years of education: N/A     Social History Main Topics    Smoking status: Never Smoker    Smokeless tobacco: Never Used    Alcohol use None    Drug use: Unknown    Sexual activity: Not Asked     Other Topics Concern    None     Social History Narrative    None       Allergies:    Allergies   Allergen Reactions    Indomethacin GI Intolerance     Other reaction(s): GI upset  Other reaction(s): GI upset    Oxycodone Dizziness, Nausea Only, GI Intolerance and Other (See Comments)     Other reaction(s): GI upset  Other reaction(s): GI upset       Labs:  No results found for: HCT, HGB, PT, INR, WBC, ESR, CRP    Meds:    Current Outpatient Prescriptions:     acetaminophen (TYLENOL) 325 mg tablet, Take by mouth, Disp: , Rfl:     alendronate (FOSAMAX) 70 mg tablet, Take by mouth, Disp: , Rfl:     Biotin 1000 MCG tablet, Take by mouth, Disp: , Rfl:     Calcium Carbonate (CALTRATE 600) 1500 (600 Ca) MG TABS, Take 1 tablet by mouth daily, Disp: , Rfl:     Glucosamine-Chondroitin (COSAMIN DS) 500-400 MG CAPS, Take by mouth, Disp: , Rfl:     levothyroxine 25 mcg tablet, Take 1 tablet by mouth daily, Disp: , Rfl:     Naphazoline-Polyethyl Glycol 0 012-0 2 % SOLN, Apply 1-2 drops to eye 4 (four) times a day, Disp: , Rfl:     simvastatin (ZOCOR) 10 mg tablet, Take 1 tablet by mouth daily, Disp: , Rfl:       Physical Exam:     General Appearance:    Alert, cooperative, no distress, appears stated age   Head:    Normocephalic, without obvious abnormality, atraumatic   Eyes:    conjunctiva/corneas clear, both eyes        Nose:   Nares normal, septum midline, no drainage    Throat:   Lips normal; teeth and gums normal   Neck:    symmetrical, trachea midline, ;     thyroid:  no enlargement/   Back:     Symmetric, no curvature, ROM normal   Lungs:   No audible wheezing or labored breathing   Chest Wall:    No tenderness or deformity    Heart:    Regular rate and rhythm               Pulses:   2+ and symmetric all extremities   Skin:   Skin color, texture, turgor normal, no rashes or lesions   Neurologic:   normal strength, sensation and reflexes     throughout       Musculoskeletal: bilateral lower extremity  · On examination of the right knee there is no effusion, no erythema  Range of motion to full active extension and flexion to greater than 120°  Pain on palpation medial and lateral joint lines  There is crepitus with range of motion, no warmth to palpation, bony enlargement noted  No pain on palpation pes anserine bursa region or distal iliotibial band  Stable to varus and valgus stress without pain or gapping  Negative anterior and posterior drawer testing  Sensation intact distal pulses present  · On examination of the left knee there is no effusion, no erythema  Range of motion to full active extension and flexion to greater than 120°  Pain on palpation medial and lateral joint lines  There is crepitus with range of motion, no warmth to palpation, bony enlargement noted  No pain on palpation pes anserine bursa region or distal iliotibial band  Stable to varus and valgus stress without pain or gapping  Negative anterior and posterior drawer testing  Sensation intact distal pulses present  · Examination patient's left hip pain palpation over left greater trochanteric bursa region negative Stinchfield test hip flexion greater than 90° no pain with internal-external rotation to 30° hip flexion adduction abduction strength 5/5 sensation intact distal pulses present        _*_*_*_*_*_*_*_*_*_*_*_*_*_*_*_*_*_*_*_*_*_*_*_*_*_*_*_*_*_*_*_*_*_*_*_*_*_*_*_*_*    Assessment:  73 y  o female with bilateral knee pain and osteoarthritis, left hip greater trochanteric bursitis    Plan:   · Weight bearing as tolerated  bilateral lower extremity  · Home range of motion stretching exercises bilateral knees left hip  · Prior authorization bilateral viscosupplementation injections  · Should the patient's left hip bursitis persist we may consider steroid injection  · Follow-up pending prior authorization for viscosupplementation injections      Donna Grover PA-C

## 2018-10-09 ENCOUNTER — OFFICE VISIT (OUTPATIENT)
Dept: OBGYN CLINIC | Facility: HOSPITAL | Age: 73
End: 2018-10-09
Payer: MEDICARE

## 2018-10-09 VITALS
HEART RATE: 63 BPM | SYSTOLIC BLOOD PRESSURE: 155 MMHG | BODY MASS INDEX: 26 KG/M2 | WEIGHT: 132.4 LBS | DIASTOLIC BLOOD PRESSURE: 79 MMHG | HEIGHT: 60 IN

## 2018-10-09 DIAGNOSIS — M17.0 BILATERAL PRIMARY OSTEOARTHRITIS OF KNEE: Primary | ICD-10-CM

## 2018-10-09 PROCEDURE — 20610 DRAIN/INJ JOINT/BURSA W/O US: CPT | Performed by: ORTHOPAEDIC SURGERY

## 2018-10-09 RX ORDER — HYALURONATE SODIUM 10 MG/ML
20 SYRINGE (ML) INTRAARTICULAR
Status: COMPLETED | OUTPATIENT
Start: 2018-10-09 | End: 2018-10-09

## 2018-10-09 RX ORDER — KETOCONAZOLE 20 MG/G
CREAM TOPICAL
Refills: 4 | COMMUNITY
Start: 2018-07-25 | End: 2018-10-09

## 2018-10-09 RX ADMIN — Medication 20 MG: at 12:12

## 2018-10-09 RX ADMIN — Medication 20 MG: at 12:13

## 2018-10-09 NOTE — PROGRESS NOTES
Orthopedics          Linda Ina 68 y o  female MRN: [id number]      Chief Complaint:   bilateral knee pain    HPI:   68 y  o female complaining of bilateral knee pain  Patient has been diagnosed with bilateral knee osteoarthritis  Patient has had steroid injections in the past without significant pain relief with her most recent injections  She states the pain is aching in nature localized to her bilateral knees  States the pain is worse with activity  She states her bowling score suffered due to recent increase in knee pain  She denies any instability clicking popping catching in her bilateral knees  Review Of Systems:   · Skin: Normal  · Neuro: See HPI  · Musculoskeletal: See HPI  · All other systems reviewed and are negative    Past Medical History:   History reviewed  No pertinent past medical history  Past Surgical History:   History reviewed  No pertinent surgical history  Family History:  Family history reviewed and non-contributory  History reviewed  No pertinent family history  Social History:  Social History     Social History    Marital status: Single     Spouse name: N/A    Number of children: N/A    Years of education: N/A     Social History Main Topics    Smoking status: Never Smoker    Smokeless tobacco: Never Used    Alcohol use None    Drug use: Unknown    Sexual activity: Not Asked     Other Topics Concern    None     Social History Narrative    None       Allergies:    Allergies   Allergen Reactions    Indomethacin GI Intolerance     Other reaction(s): GI upset  Other reaction(s): GI upset    Oxycodone Dizziness, Nausea Only, GI Intolerance and Other (See Comments)     Other reaction(s): GI upset  Other reaction(s): GI upset       Labs:  No results found for: HCT, HGB, PT, INR, WBC, ESR, CRP    Meds:    Current Outpatient Prescriptions:     acetaminophen (TYLENOL) 325 mg tablet, Take by mouth, Disp: , Rfl:     alendronate (FOSAMAX) 70 mg tablet, Take by mouth, Disp: , Rfl:     Biotin 1000 MCG tablet, Take by mouth, Disp: , Rfl:     Calcium Carbonate (CALTRATE 600) 1500 (600 Ca) MG TABS, Take 1 tablet by mouth daily, Disp: , Rfl:     Glucosamine-Chondroitin (COSAMIN DS) 500-400 MG CAPS, Take by mouth, Disp: , Rfl:     levothyroxine 25 mcg tablet, Take 1 tablet by mouth daily, Disp: , Rfl:     Naphazoline-Polyethyl Glycol 0 012-0 2 % SOLN, Apply 1-2 drops to eye 4 (four) times a day, Disp: , Rfl:     simvastatin (ZOCOR) 10 mg tablet, Take 1 tablet by mouth daily, Disp: , Rfl:       Physical Exam:     General Appearance:    Alert, cooperative, no distress, appears stated age   Head:    Normocephalic, without obvious abnormality, atraumatic   Eyes:    conjunctiva/corneas clear, both eyes        Nose:   Nares normal, septum midline, no drainage    Throat:   Lips normal; teeth and gums normal   Neck:    symmetrical, trachea midline, ;     thyroid:  no enlargement/   Back:     Symmetric, no curvature, ROM normal   Lungs:   No audible wheezing or labored breathing   Chest Wall:    No tenderness or deformity    Heart:    Regular rate and rhythm               Pulses:   2+ and symmetric all extremities   Skin:   Skin color, texture, turgor normal, no rashes or lesions   Neurologic:   normal strength, sensation and reflexes     throughout       Musculoskeletal: bilateral lower extremity  · On examination of the right knee there is no effusion, no erythema  Range of motion to full active extension and flexion to greater than 120°  Pain on palpation medial and lateral joint lines  There is crepitus with range of motion, no warmth to palpation, bony enlargement noted  No pain on palpation pes anserine bursa region or distal iliotibial band  Stable to varus and valgus stress without pain or gapping  Negative anterior and posterior drawer testing  Sensation intact distal pulses present  · On examination of the left knee there is no effusion, no erythema    Range of motion to full active extension and flexion to greater than 120°  Pain on palpation medial and lateral joint lines  There is crepitus with range of motion, no warmth to palpation, bony enlargement noted  No pain on palpation pes anserine bursa region or distal iliotibial band  Stable to varus and valgus stress without pain or gapping  Negative anterior and posterior drawer testing  Sensation intact distal pulses present  Large joint arthrocentesis  Date/Time: 10/9/2018 12:12 PM  Consent given by: patient  Supporting Documentation  Indications: pain   Procedure Details  Location: knee - R knee  Needle size: 22 G  Approach: anterolateral  Medications administered: 20 mg Sodium Hyaluronate 20 MG/2ML      Large joint arthrocentesis  Date/Time: 10/9/2018 12:13 PM  Consent given by: patient  Supporting Documentation  Indications: pain   Procedure Details  Location: knee - L knee  Needle size: 22 G  Ultrasound guidance: no  Approach: anterolateral  Medications administered: 20 mg Sodium Hyaluronate 20 MG/2ML            _*_*_*_*_*_*_*_*_*_*_*_*_*_*_*_*_*_*_*_*_*_*_*_*_*_*_*_*_*_*_*_*_*_*_*_*_*_*_*_*_*    Assessment:  68 y  o female with bilateral knee pain osteoarthritis    Plan:   · Weight bearing as tolerated  bilateral lower extremity  · Bilateral intra-articular Euflexxa injections given as noted above  · Repeat bilateral intra-articular Euflexxa injections in 1 weeks time  · Advised no significant activity or increased ambulation over the next 24-48 hours and warm compresses to the knee no greater 20 minutes 3-4 times 24 hours following the injection  Patient advised should they develop any increasing pain, redness, drainage, numbness, tingling or swelling surrounding the injection sight, they are to contact our office or present to the emergency department    · Patient may continue home range of motion stretching strengthening exercises 1 day following injection  · Follow up in 1 week or sooner if needed        Kirk Grimaldo PA-C

## 2018-10-16 ENCOUNTER — OFFICE VISIT (OUTPATIENT)
Dept: OBGYN CLINIC | Facility: HOSPITAL | Age: 73
End: 2018-10-16
Payer: MEDICARE

## 2018-10-16 VITALS
BODY MASS INDEX: 26.25 KG/M2 | DIASTOLIC BLOOD PRESSURE: 76 MMHG | SYSTOLIC BLOOD PRESSURE: 147 MMHG | HEART RATE: 67 BPM | WEIGHT: 134.4 LBS

## 2018-10-16 DIAGNOSIS — M17.0 OSTEOARTHRITIS OF BOTH KNEES, UNSPECIFIED OSTEOARTHRITIS TYPE: Primary | ICD-10-CM

## 2018-10-16 PROCEDURE — 20610 DRAIN/INJ JOINT/BURSA W/O US: CPT | Performed by: ORTHOPAEDIC SURGERY

## 2018-10-16 RX ORDER — HYALURONATE SODIUM 10 MG/ML
20 SYRINGE (ML) INTRAARTICULAR
Status: COMPLETED | OUTPATIENT
Start: 2018-10-16 | End: 2018-10-16

## 2018-10-16 RX ADMIN — Medication 20 MG: at 11:54

## 2018-10-16 RX ADMIN — Medication 20 MG: at 11:55

## 2018-10-16 NOTE — PROGRESS NOTES
Orthopedics          Remington Lechuga 68 y o  female MRN: [id number]      Chief Complaint:   bilateral knee pain    HPI:   68 y  o female complaining of bilateral knee pain  Patient presents regarding 2nd of 3 series injections in her bilateral knees of Euflexxa  Patient is noted mild decrease in pain over the past few days time  Patient does have aching pain or bilateral knees  She states the pain is worse with activity mildly relieved with rest   She states she has been doing better at bowling been able to improving her form following her last injections  She denies any clicking popping instability numbness tingling or bilateral lower extremities                Review Of Systems:   · Skin: Normal  · Neuro: See HPI  · Musculoskeletal: See HPI  · All other systems reviewed and are negative    Past Medical History:   History reviewed  No pertinent past medical history  Past Surgical History:   History reviewed  No pertinent surgical history  Family History:  Family history reviewed and non-contributory  History reviewed  No pertinent family history  Social History:  Social History     Social History    Marital status: Single     Spouse name: N/A    Number of children: N/A    Years of education: N/A     Social History Main Topics    Smoking status: Never Smoker    Smokeless tobacco: Never Used    Alcohol use None    Drug use: Unknown    Sexual activity: Not Asked     Other Topics Concern    None     Social History Narrative    None       Allergies:    Allergies   Allergen Reactions    Indomethacin GI Intolerance     Other reaction(s): GI upset  Other reaction(s): GI upset    Oxycodone Dizziness, Nausea Only, GI Intolerance and Other (See Comments)     Other reaction(s): GI upset  Other reaction(s): GI upset       Labs:  No results found for: HCT, HGB, PT, INR, WBC, ESR, CRP    Meds:    Current Outpatient Prescriptions:     acetaminophen (TYLENOL) 325 mg tablet, Take by mouth, Disp: , Rfl:   alendronate (FOSAMAX) 70 mg tablet, Take by mouth, Disp: , Rfl:     Biotin 1000 MCG tablet, Take by mouth, Disp: , Rfl:     Calcium Carbonate (CALTRATE 600) 1500 (600 Ca) MG TABS, Take 1 tablet by mouth daily, Disp: , Rfl:     Glucosamine-Chondroitin (COSAMIN DS) 500-400 MG CAPS, Take by mouth, Disp: , Rfl:     levothyroxine 25 mcg tablet, Take 1 tablet by mouth daily, Disp: , Rfl:     Naphazoline-Polyethyl Glycol 0 012-0 2 % SOLN, Apply 1-2 drops to eye 4 (four) times a day, Disp: , Rfl:     simvastatin (ZOCOR) 10 mg tablet, Take 1 tablet by mouth daily, Disp: , Rfl:       Physical Exam:     General Appearance:    Alert, cooperative, no distress, appears stated age   Head:    Normocephalic, without obvious abnormality, atraumatic   Eyes:    conjunctiva/corneas clear, both eyes        Nose:   Nares normal, septum midline, no drainage    Throat:   Lips normal; teeth and gums normal   Neck:    symmetrical, trachea midline, ;     thyroid:  no enlargement/   Back:     Symmetric, no curvature, ROM normal   Lungs:   No audible wheezing or labored breathing   Chest Wall:    No tenderness or deformity    Heart:    Regular rate and rhythm               Pulses:   2+ and symmetric all extremities   Skin:   Skin color, texture, turgor normal, no rashes or lesions   Neurologic:   normal strength, sensation and reflexes     throughout       Musculoskeletal: bilateral lower extremity  · On examination of the right knee there is no effusion, no erythema  Range of motion to full active extension and flexion to greater than 120°  Pain on palpation medial and lateral joint lines  There is crepitus with range of motion, no warmth to palpation, bony enlargement noted  No pain on palpation pes anserine bursa region or distal iliotibial band  Stable to varus and valgus stress without pain or gapping  Negative anterior and posterior drawer testing  Sensation intact distal pulses present    · On examination of the left knee there is no effusion, no erythema  Range of motion to full active extension and flexion to greater than 120°  Pain on palpation medial and lateral joint lines  There is crepitus with range of motion, no warmth to palpation, bony enlargement noted  No pain on palpation pes anserine bursa region or distal iliotibial band  Stable to varus and valgus stress without pain or gapping  Negative anterior and posterior drawer testing  Sensation intact distal pulses present  Large joint arthrocentesis  Date/Time: 10/16/2018 11:54 AM  Consent given by: patient  Supporting Documentation  Indications: pain   Procedure Details  Location: knee - R knee  Needle size: 22 G  Approach: anterolateral  Medications administered: 20 mg Sodium Hyaluronate 20 MG/2ML      Large joint arthrocentesis  Date/Time: 10/16/2018 11:55 AM  Consent given by: patient  Supporting Documentation  Indications: pain   Procedure Details  Location: knee - L knee  Needle size: 22 G  Ultrasound guidance: no  Approach: anterolateral  Medications administered: 20 mg Sodium Hyaluronate 20 MG/2ML            _*_*_*_*_*_*_*_*_*_*_*_*_*_*_*_*_*_*_*_*_*_*_*_*_*_*_*_*_*_*_*_*_*_*_*_*_*_*_*_*_*    Assessment:  68 y  o female with bilateral knee pain osteoarthritis    Plan:   · Weight bearing as tolerated  bilateral lower extremity  · Advised no significant activity or increased ambulation over the next 24-48 hours and warm compresses to the knee no greater 20 minutes 3-4 times 24 hours following the injection  Patient advised should they develop any increasing pain, redness, drainage, numbness, tingling or swelling surrounding the injection sight, they are to contact our office or present to the emergency department    · Follow-up in 1 weeks time for final bilateral knee Euflexxa injections  · Follow up in 3 months or sooner if needed      Maria Del Carmen Bal PA-C

## 2018-10-23 ENCOUNTER — OFFICE VISIT (OUTPATIENT)
Dept: OBGYN CLINIC | Facility: HOSPITAL | Age: 73
End: 2018-10-23
Payer: MEDICARE

## 2018-10-23 VITALS
HEART RATE: 63 BPM | DIASTOLIC BLOOD PRESSURE: 89 MMHG | BODY MASS INDEX: 26.13 KG/M2 | SYSTOLIC BLOOD PRESSURE: 151 MMHG | WEIGHT: 133.8 LBS

## 2018-10-23 DIAGNOSIS — M17.0 PRIMARY OSTEOARTHRITIS OF BOTH KNEES: Primary | ICD-10-CM

## 2018-10-23 PROCEDURE — 20610 DRAIN/INJ JOINT/BURSA W/O US: CPT | Performed by: ORTHOPAEDIC SURGERY

## 2018-10-23 RX ORDER — HYALURONATE SODIUM 10 MG/ML
20 SYRINGE (ML) INTRAARTICULAR
Status: COMPLETED | OUTPATIENT
Start: 2018-10-23 | End: 2018-10-23

## 2018-10-23 RX ADMIN — Medication 20 MG: at 11:34

## 2018-10-23 RX ADMIN — Medication 20 MG: at 11:33

## 2018-10-23 NOTE — PROGRESS NOTES
Orthopedics          Moises Frey 68 y o  female MRN: [id number]      Chief Complaint:   bilateral knee pain    HPI:   68 y  o female complaining of bilateral knee pain  Review Of Systems:   · Skin: Normal  · Neuro: See HPI  · Musculoskeletal: See HPI  · All other systems reviewed and are negative    Past Medical History:   History reviewed  No pertinent past medical history  Past Surgical History:   History reviewed  No pertinent surgical history  Family History:  Family history reviewed and non-contributory  History reviewed  No pertinent family history  Social History:  Social History     Social History    Marital status: Single     Spouse name: N/A    Number of children: N/A    Years of education: N/A     Social History Main Topics    Smoking status: Never Smoker    Smokeless tobacco: Never Used    Alcohol use None    Drug use: Unknown    Sexual activity: Not Asked     Other Topics Concern    None     Social History Narrative    None       Allergies:    Allergies   Allergen Reactions    Indomethacin GI Intolerance     Other reaction(s): GI upset  Other reaction(s): GI upset    Oxycodone Dizziness, Nausea Only, GI Intolerance and Other (See Comments)     Other reaction(s): GI upset  Other reaction(s): GI upset       Labs:  No results found for: HCT, HGB, PT, INR, WBC, ESR, CRP    Meds:    Current Outpatient Prescriptions:     acetaminophen (TYLENOL) 325 mg tablet, Take by mouth, Disp: , Rfl:     alendronate (FOSAMAX) 70 mg tablet, Take by mouth, Disp: , Rfl:     Biotin 1000 MCG tablet, Take by mouth, Disp: , Rfl:     Calcium Carbonate (CALTRATE 600) 1500 (600 Ca) MG TABS, Take 1 tablet by mouth daily, Disp: , Rfl:     Glucosamine-Chondroitin (COSAMIN DS) 500-400 MG CAPS, Take by mouth, Disp: , Rfl:     levothyroxine 25 mcg tablet, Take 1 tablet by mouth daily, Disp: , Rfl:     Naphazoline-Polyethyl Glycol 0 012-0 2 % SOLN, Apply 1-2 drops to eye 4 (four) times a day, Disp: , Rfl:     simvastatin (ZOCOR) 10 mg tablet, Take 1 tablet by mouth daily, Disp: , Rfl:       Physical Exam:     General Appearance:    Alert, cooperative, no distress, appears stated age   Head:    Normocephalic, without obvious abnormality, atraumatic   Eyes:    conjunctiva/corneas clear, both eyes        Nose:   Nares normal, septum midline, no drainage    Throat:   Lips normal; teeth and gums normal   Neck:    symmetrical, trachea midline, ;     thyroid:  no enlargement/   Back:     Symmetric, no curvature, ROM normal   Lungs:   No audible wheezing or labored breathing   Chest Wall:    No tenderness or deformity    Heart:    Regular rate and rhythm               Pulses:   2+ and symmetric all extremities   Skin:   Skin color, texture, turgor normal, no rashes or lesions   Neurologic:   normal strength, sensation and reflexes     throughout       Musculoskeletal: bilateral lower extremity  · On examination of the right knee there is no effusion, no erythema  Range of motion to full active extension and flexion to greater than 120°  Pain on palpation medial and lateral joint lines  There is crepitus with range of motion, no warmth to palpation, bony enlargement noted  No pain on palpation pes anserine bursa region or distal iliotibial band  Stable to varus and valgus stress without pain or gapping  Negative anterior and posterior drawer testing  Sensation intact distal pulses present  · On examination of the left knee there is no effusion, no erythema  Range of motion to full active extension and flexion to greater than 120°  Pain on palpation medial and lateral joint lines  There is crepitus with range of motion, no warmth to palpation, bony enlargement noted  No pain on palpation pes anserine bursa region or distal iliotibial band  Stable to varus and valgus stress without pain or gapping  Negative anterior and posterior drawer testing  Sensation intact distal pulses present      Large joint arthrocentesis  Date/Time: 10/23/2018 11:33 AM  Consent given by: patient  Site marked: site marked  Supporting Documentation  Indications: pain   Procedure Details  Location: knee - R knee  Needle size: 22 G  Ultrasound guidance: no  Approach: anterolateral  Medications administered: 20 mg Sodium Hyaluronate 20 MG/2ML      Large joint arthrocentesis  Date/Time: 10/23/2018 11:34 AM  Consent given by: patient  Site marked: site marked  Supporting Documentation  Indications: pain   Procedure Details  Location: knee - L knee  Needle size: 22 G  Approach: anterolateral  Medications administered: 20 mg Sodium Hyaluronate 20 MG/2ML            _*_*_*_*_*_*_*_*_*_*_*_*_*_*_*_*_*_*_*_*_*_*_*_*_*_*_*_*_*_*_*_*_*_*_*_*_*_*_*_*_*    Assessment:  68 y  o female with bilateral knee pain, osteoarthritis    Plan:   · Weight bearing as tolerated  bilateral lower extremity  · Final bilateral intra-articular Euflexxa injections given as noted above  · Advised no significant activity or increased ambulation over the next 24-48 hours and warm compresses to the knee no greater 20 minutes 3-4 times 24 hours following the injection  Patient advised should they develop any increasing pain, redness, drainage, numbness, tingling or swelling surrounding the injection sight, they are to contact our office or present to the emergency department    · Continue home range of motion stretching strengthening exercises  · Continue conservative management bilateral knee osteoarthritis  · Follow up in 3 months or sooner if needed      Rosy Song PA-C

## 2019-01-29 ENCOUNTER — OFFICE VISIT (OUTPATIENT)
Dept: OBGYN CLINIC | Facility: HOSPITAL | Age: 74
End: 2019-01-29
Payer: MEDICARE

## 2019-01-29 VITALS
SYSTOLIC BLOOD PRESSURE: 151 MMHG | WEIGHT: 137 LBS | DIASTOLIC BLOOD PRESSURE: 83 MMHG | HEART RATE: 58 BPM | BODY MASS INDEX: 26.76 KG/M2

## 2019-01-29 DIAGNOSIS — G89.29 CHRONIC PAIN OF LEFT KNEE: ICD-10-CM

## 2019-01-29 DIAGNOSIS — M25.561 CHRONIC PAIN OF RIGHT KNEE: Primary | ICD-10-CM

## 2019-01-29 DIAGNOSIS — M25.562 CHRONIC PAIN OF LEFT KNEE: ICD-10-CM

## 2019-01-29 DIAGNOSIS — G89.29 CHRONIC PAIN OF RIGHT KNEE: Primary | ICD-10-CM

## 2019-01-29 PROCEDURE — 99213 OFFICE O/P EST LOW 20 MIN: CPT | Performed by: ORTHOPAEDIC SURGERY

## 2019-01-29 NOTE — PROGRESS NOTES
Assessment:  1  Chronic pain of right knee     2  Chronic pain of left knee         Plan:   Weight Bearing  as Tolerated to Bilateral lower extremities   Continue at home physical therapy  Add the two exercises that were given to in the office into your home routine   Make appointment for 3 months for possible gel injection and will evaluate pain at that time  If patient is feeling well then may cancel her appointment  The above stated was discussed in layman's terms and the patient expressed understanding  All questions were answered to the patient's satisfaction  Subjective:   Senia Espinosa is a 68 y o  female who presents for her bilateral knee pain  She stated that at her last appointment, 3 months ago, that she got the gel injections and is very happy with them  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 able to hand the stance position that you bowl in and her scores were low  Since she had the injections she is able to bowl like before, able to hold the stance and her score improving  She is also able to tolerate stairs again  She stated that her Right is worse than her Left  She stated that she does get intermittent pain that is stabbing in nature in the lateral aspect of her right knee when she has been sitting for long periods of time  But she is not interested in a steroid injection at this time because the knees are over all doing well  She stated that she also does daily physical therapy exercises at home  She preforms a 45 min work out twice a week and a 20 min routine the rest of the days, as well as walking 2 miles daily  She denies the use of over the counter medicines for pain  Review of systems negative unless otherwise specified in HPI    History reviewed  No pertinent past medical history  History reviewed  No pertinent surgical history  History reviewed  No pertinent family history  Social History     Occupational History    Not on file  Social History Main Topics    Smoking status: Never Smoker    Smokeless tobacco: Never Used    Alcohol use Not on file    Drug use: Unknown    Sexual activity: Not on file         Current Outpatient Prescriptions:     acetaminophen (TYLENOL) 325 mg tablet, Take by mouth, Disp: , Rfl:     alendronate (FOSAMAX) 70 mg tablet, Take by mouth, Disp: , Rfl:     Biotin 1000 MCG tablet, Take by mouth, Disp: , Rfl:     Calcium Carbonate (CALTRATE 600) 1500 (600 Ca) MG TABS, Take 1 tablet by mouth daily, Disp: , Rfl:     Glucosamine-Chondroitin (COSAMIN DS) 500-400 MG CAPS, Take by mouth, Disp: , Rfl:     levothyroxine 25 mcg tablet, Take 1 tablet by mouth daily, Disp: , Rfl:     Naphazoline-Polyethyl Glycol 0 012-0 2 % SOLN, Apply 1-2 drops to eye 4 (four) times a day, Disp: , Rfl:     simvastatin (ZOCOR) 10 mg tablet, Take 1 tablet by mouth daily, Disp: , Rfl:     Allergies   Allergen Reactions    Indomethacin GI Intolerance     Other reaction(s): GI upset  Other reaction(s): GI upset    Oxycodone Dizziness, Nausea Only, GI Intolerance and Other (See Comments)     Other reaction(s): GI upset  Other reaction(s): GI upset            Vitals:    01/29/19 1132   BP: 151/83   Pulse: 58       Objective:            Physical Exam  · General: Awake, Alert, Oriented  · Eyes: Pupils equal, round and reactive to light  · Heart: regular rate and rhythm  · Lungs: No audible wheezing                    Ortho Exam   Right Knee:  No obvious deformity, erythema, or effusion present    Mild TTP of the lateral joint line  Full active and passive ROM with mild crepitus   Knee is stable on varus and valgus stress    Left Knee:  No obvious deformity, erythema, or effusion present  Mild TTP of the joint line  Full active and passive ROM with mild crepitus   Knee is stable on varus and valgus stress    Neurovascularly Intact Distally     Diagnostics, reviewed and taken today if performed as documented:    None performed      The attending physician has personally reviewed the pertinent films in PACS and interpretation is as follows:      Procedures, if performed today:    Procedures     None preformed at this time

## 2019-04-01 ENCOUNTER — TRANSCRIBE ORDERS (OUTPATIENT)
Dept: LAB | Facility: HOSPITAL | Age: 74
End: 2019-04-01

## 2019-04-01 ENCOUNTER — APPOINTMENT (OUTPATIENT)
Dept: LAB | Facility: HOSPITAL | Age: 74
End: 2019-04-01
Attending: INTERNAL MEDICINE
Payer: MEDICARE

## 2019-04-01 DIAGNOSIS — E03.9 MYXEDEMA HEART DISEASE: Primary | ICD-10-CM

## 2019-04-01 DIAGNOSIS — I51.9 MYXEDEMA HEART DISEASE: Primary | ICD-10-CM

## 2019-04-01 DIAGNOSIS — E78.5 HYPERLIPIDEMIA, UNSPECIFIED HYPERLIPIDEMIA TYPE: ICD-10-CM

## 2019-04-01 DIAGNOSIS — I10 ESSENTIAL HYPERTENSION, MALIGNANT: ICD-10-CM

## 2019-04-01 LAB
ALBUMIN SERPL BCP-MCNC: 3.6 G/DL (ref 3.5–5)
ALP SERPL-CCNC: 81 U/L (ref 46–116)
ALT SERPL W P-5'-P-CCNC: 19 U/L (ref 12–78)
ANION GAP SERPL CALCULATED.3IONS-SCNC: 1 MMOL/L (ref 4–13)
AST SERPL W P-5'-P-CCNC: 18 U/L (ref 5–45)
BACTERIA UR QL AUTO: NORMAL /HPF
BILIRUB SERPL-MCNC: 0.45 MG/DL (ref 0.2–1)
BILIRUB UR QL STRIP: NEGATIVE
BUN SERPL-MCNC: 16 MG/DL (ref 5–25)
CALCIUM SERPL-MCNC: 9.2 MG/DL (ref 8.3–10.1)
CHLORIDE SERPL-SCNC: 106 MMOL/L (ref 100–108)
CHOLEST SERPL-MCNC: 192 MG/DL (ref 50–200)
CLARITY UR: CLEAR
CO2 SERPL-SCNC: 32 MMOL/L (ref 21–32)
COLOR UR: YELLOW
CREAT SERPL-MCNC: 0.77 MG/DL (ref 0.6–1.3)
ERYTHROCYTE [DISTWIDTH] IN BLOOD BY AUTOMATED COUNT: 14.6 % (ref 11.6–15.1)
GFR SERPL CREATININE-BSD FRML MDRD: 77 ML/MIN/1.73SQ M
GLUCOSE P FAST SERPL-MCNC: 88 MG/DL (ref 65–99)
GLUCOSE UR STRIP-MCNC: NEGATIVE MG/DL
HCT VFR BLD AUTO: 48 % (ref 34.8–46.1)
HDLC SERPL-MCNC: 79 MG/DL (ref 40–60)
HGB BLD-MCNC: 15.2 G/DL (ref 11.5–15.4)
HGB UR QL STRIP.AUTO: NEGATIVE
HYALINE CASTS #/AREA URNS LPF: NORMAL /LPF
KETONES UR STRIP-MCNC: NEGATIVE MG/DL
LDLC SERPL CALC-MCNC: 96 MG/DL (ref 0–100)
LEUKOCYTE ESTERASE UR QL STRIP: ABNORMAL
MCH RBC QN AUTO: 28.4 PG (ref 26.8–34.3)
MCHC RBC AUTO-ENTMCNC: 31.7 G/DL (ref 31.4–37.4)
MCV RBC AUTO: 90 FL (ref 82–98)
NITRITE UR QL STRIP: NEGATIVE
NON-SQ EPI CELLS URNS QL MICRO: NORMAL /HPF
NONHDLC SERPL-MCNC: 113 MG/DL
PH UR STRIP.AUTO: 7 [PH]
PLATELET # BLD AUTO: 283 THOUSANDS/UL (ref 149–390)
PMV BLD AUTO: 12 FL (ref 8.9–12.7)
POTASSIUM SERPL-SCNC: 4.1 MMOL/L (ref 3.5–5.3)
PROT SERPL-MCNC: 7.3 G/DL (ref 6.4–8.2)
PROT UR STRIP-MCNC: NEGATIVE MG/DL
RBC # BLD AUTO: 5.36 MILLION/UL (ref 3.81–5.12)
RBC #/AREA URNS AUTO: NORMAL /HPF
SODIUM SERPL-SCNC: 139 MMOL/L (ref 136–145)
SP GR UR STRIP.AUTO: 1.01 (ref 1–1.03)
T4 FREE SERPL-MCNC: 1.07 NG/DL (ref 0.76–1.46)
TRIGL SERPL-MCNC: 86 MG/DL
TSH SERPL DL<=0.05 MIU/L-ACNC: 2.91 UIU/ML (ref 0.36–3.74)
UROBILINOGEN UR QL STRIP.AUTO: 0.2 E.U./DL
WBC # BLD AUTO: 5.72 THOUSAND/UL (ref 4.31–10.16)
WBC #/AREA URNS AUTO: NORMAL /HPF

## 2019-04-01 PROCEDURE — 36415 COLL VENOUS BLD VENIPUNCTURE: CPT

## 2019-04-01 PROCEDURE — 84443 ASSAY THYROID STIM HORMONE: CPT

## 2019-04-01 PROCEDURE — 80053 COMPREHEN METABOLIC PANEL: CPT

## 2019-04-01 PROCEDURE — 84439 ASSAY OF FREE THYROXINE: CPT

## 2019-04-01 PROCEDURE — 80061 LIPID PANEL: CPT

## 2019-04-01 PROCEDURE — 81001 URINALYSIS AUTO W/SCOPE: CPT

## 2019-04-01 PROCEDURE — 85027 COMPLETE CBC AUTOMATED: CPT

## 2019-04-05 ENCOUNTER — TRANSCRIBE ORDERS (OUTPATIENT)
Dept: ADMINISTRATIVE | Facility: HOSPITAL | Age: 74
End: 2019-04-05

## 2019-04-05 DIAGNOSIS — Z12.11 SPECIAL SCREENING FOR MALIGNANT NEOPLASMS, COLON: Primary | ICD-10-CM

## 2019-04-05 DIAGNOSIS — M85.80 OSTEOPENIA, UNSPECIFIED LOCATION: Primary | ICD-10-CM

## 2019-04-05 DIAGNOSIS — M85.80 OSTEOPENIA, UNSPECIFIED LOCATION: ICD-10-CM

## 2019-04-11 ENCOUNTER — TELEPHONE (OUTPATIENT)
Dept: OBGYN CLINIC | Facility: HOSPITAL | Age: 74
End: 2019-04-11

## 2019-04-12 DIAGNOSIS — M17.0 BILATERAL PRIMARY OSTEOARTHRITIS OF KNEE: Primary | ICD-10-CM

## 2019-04-30 ENCOUNTER — OFFICE VISIT (OUTPATIENT)
Dept: OBGYN CLINIC | Facility: HOSPITAL | Age: 74
End: 2019-04-30
Payer: MEDICARE

## 2019-04-30 VITALS
WEIGHT: 132 LBS | HEART RATE: 62 BPM | DIASTOLIC BLOOD PRESSURE: 88 MMHG | BODY MASS INDEX: 25.78 KG/M2 | SYSTOLIC BLOOD PRESSURE: 178 MMHG

## 2019-04-30 DIAGNOSIS — G89.29 CHRONIC PAIN OF RIGHT KNEE: ICD-10-CM

## 2019-04-30 DIAGNOSIS — M25.562 CHRONIC PAIN OF LEFT KNEE: ICD-10-CM

## 2019-04-30 DIAGNOSIS — M17.12 PRIMARY OSTEOARTHRITIS OF LEFT KNEE: Primary | ICD-10-CM

## 2019-04-30 DIAGNOSIS — M25.561 CHRONIC PAIN OF RIGHT KNEE: ICD-10-CM

## 2019-04-30 DIAGNOSIS — M17.11 PRIMARY OSTEOARTHRITIS OF RIGHT KNEE: ICD-10-CM

## 2019-04-30 DIAGNOSIS — G89.29 CHRONIC PAIN OF LEFT KNEE: ICD-10-CM

## 2019-04-30 PROCEDURE — 20610 DRAIN/INJ JOINT/BURSA W/O US: CPT | Performed by: ORTHOPAEDIC SURGERY

## 2019-04-30 RX ORDER — HYALURONATE SODIUM 10 MG/ML
20 SYRINGE (ML) INTRAARTICULAR
Status: COMPLETED | OUTPATIENT
Start: 2019-04-30 | End: 2019-04-30

## 2019-04-30 RX ORDER — DIMENHYDRINATE 50 MG/1
50 TABLET ORAL
COMMUNITY
End: 2019-05-07

## 2019-04-30 RX ADMIN — Medication 20 MG: at 12:05

## 2019-04-30 RX ADMIN — Medication 20 MG: at 12:06

## 2019-05-03 ENCOUNTER — HOSPITAL ENCOUNTER (OUTPATIENT)
Dept: RADIOLOGY | Age: 74
Discharge: HOME | End: 2019-05-03
Payer: MEDICARE

## 2019-05-03 DIAGNOSIS — M85.80 OSTEOPENIA, UNSPECIFIED LOCATION: ICD-10-CM

## 2019-05-03 PROCEDURE — 77080 DXA BONE DENSITY AXIAL: CPT

## 2019-05-04 ENCOUNTER — APPOINTMENT (OUTPATIENT)
Dept: LAB | Age: 74
End: 2019-05-04
Payer: MEDICARE

## 2019-05-04 DIAGNOSIS — Z12.11 SPECIAL SCREENING FOR MALIGNANT NEOPLASMS, COLON: ICD-10-CM

## 2019-05-04 LAB — HEMOCCULT STL QL IA: POSITIVE

## 2019-05-04 PROCEDURE — G0328 FECAL BLOOD SCRN IMMUNOASSAY: HCPCS

## 2019-05-07 ENCOUNTER — OFFICE VISIT (OUTPATIENT)
Dept: OBGYN CLINIC | Facility: HOSPITAL | Age: 74
End: 2019-05-07
Payer: MEDICARE

## 2019-05-07 VITALS
WEIGHT: 132.8 LBS | HEART RATE: 67 BPM | BODY MASS INDEX: 25.94 KG/M2 | DIASTOLIC BLOOD PRESSURE: 80 MMHG | SYSTOLIC BLOOD PRESSURE: 160 MMHG

## 2019-05-07 DIAGNOSIS — M17.0 PRIMARY OSTEOARTHRITIS OF BOTH KNEES: Primary | ICD-10-CM

## 2019-05-07 PROCEDURE — 20610 DRAIN/INJ JOINT/BURSA W/O US: CPT | Performed by: ORTHOPAEDIC SURGERY

## 2019-05-07 RX ORDER — HYALURONATE SODIUM 10 MG/ML
20 SYRINGE (ML) INTRAARTICULAR
Status: COMPLETED | OUTPATIENT
Start: 2019-05-07 | End: 2019-05-07

## 2019-05-07 RX ADMIN — Medication 20 MG: at 13:46

## 2019-05-14 ENCOUNTER — OFFICE VISIT (OUTPATIENT)
Dept: OBGYN CLINIC | Facility: HOSPITAL | Age: 74
End: 2019-05-14
Payer: MEDICARE

## 2019-05-14 VITALS
DIASTOLIC BLOOD PRESSURE: 84 MMHG | WEIGHT: 134.2 LBS | HEART RATE: 67 BPM | SYSTOLIC BLOOD PRESSURE: 159 MMHG | BODY MASS INDEX: 26.21 KG/M2

## 2019-05-14 DIAGNOSIS — M17.11 PRIMARY OSTEOARTHRITIS OF RIGHT KNEE: ICD-10-CM

## 2019-05-14 DIAGNOSIS — M17.12 PRIMARY OSTEOARTHRITIS OF LEFT KNEE: Primary | ICD-10-CM

## 2019-05-14 PROCEDURE — 20610 DRAIN/INJ JOINT/BURSA W/O US: CPT | Performed by: ORTHOPAEDIC SURGERY

## 2019-05-14 RX ORDER — HYALURONATE SODIUM 10 MG/ML
20 SYRINGE (ML) INTRAARTICULAR
Status: COMPLETED | OUTPATIENT
Start: 2019-05-14 | End: 2019-05-14

## 2019-05-14 RX ADMIN — Medication 20 MG: at 16:38

## 2019-05-14 RX ADMIN — Medication 20 MG: at 16:39

## 2019-06-03 ENCOUNTER — ANESTHESIA EVENT (OUTPATIENT)
Dept: GASTROENTEROLOGY | Facility: HOSPITAL | Age: 74
End: 2019-06-03

## 2019-06-04 ENCOUNTER — HOSPITAL ENCOUNTER (OUTPATIENT)
Dept: GASTROENTEROLOGY | Facility: HOSPITAL | Age: 74
Setting detail: OUTPATIENT SURGERY
Discharge: HOME | End: 2019-06-04
Attending: SURGERY | Admitting: SURGERY
Payer: MEDICARE

## 2019-06-04 ENCOUNTER — ANESTHESIA (OUTPATIENT)
Dept: GASTROENTEROLOGY | Facility: HOSPITAL | Age: 74
End: 2019-06-04

## 2019-06-04 VITALS
RESPIRATION RATE: 16 BRPM | HEIGHT: 61 IN | HEART RATE: 63 BPM | OXYGEN SATURATION: 98 % | DIASTOLIC BLOOD PRESSURE: 89 MMHG | TEMPERATURE: 97 F | BODY MASS INDEX: 24.55 KG/M2 | SYSTOLIC BLOOD PRESSURE: 149 MMHG | WEIGHT: 130 LBS

## 2019-06-04 DIAGNOSIS — K92.1 MELENA: ICD-10-CM

## 2019-06-04 PROCEDURE — G0121 COLON CA SCRN NOT HI RSK IND: HCPCS | Performed by: SURGERY

## 2019-06-04 RX ORDER — SODIUM CHLORIDE 9 MG/ML
125 INJECTION, SOLUTION INTRAVENOUS CONTINUOUS
Status: DISCONTINUED | OUTPATIENT
Start: 2019-06-04 | End: 2019-06-08 | Stop reason: HOSPADM

## 2019-06-04 RX ORDER — PROPOFOL 10 MG/ML
INJECTION, EMULSION INTRAVENOUS AS NEEDED
Status: DISCONTINUED | OUTPATIENT
Start: 2019-06-04 | End: 2019-06-04 | Stop reason: SURG

## 2019-06-04 RX ORDER — PROPOFOL 10 MG/ML
INJECTION, EMULSION INTRAVENOUS CONTINUOUS PRN
Status: DISCONTINUED | OUTPATIENT
Start: 2019-06-04 | End: 2019-06-04 | Stop reason: SURG

## 2019-06-04 RX ORDER — LIDOCAINE HYDROCHLORIDE 10 MG/ML
INJECTION, SOLUTION INFILTRATION; PERINEURAL AS NEEDED
Status: DISCONTINUED | OUTPATIENT
Start: 2019-06-04 | End: 2019-06-04 | Stop reason: SURG

## 2019-06-04 RX ORDER — SODIUM CHLORIDE 9 MG/ML
20 INJECTION, SOLUTION INTRAVENOUS CONTINUOUS
Status: CANCELLED | OUTPATIENT
Start: 2019-06-04

## 2019-06-04 RX ADMIN — LIDOCAINE HYDROCHLORIDE 50 MG: 10 INJECTION, SOLUTION INFILTRATION; PERINEURAL at 09:51

## 2019-06-04 RX ADMIN — SODIUM CHLORIDE: 0.9 INJECTION, SOLUTION INTRAVENOUS at 09:40

## 2019-06-04 RX ADMIN — PROPOFOL 100 MG: 10 INJECTION, EMULSION INTRAVENOUS at 09:51

## 2019-06-04 RX ADMIN — PROPOFOL 110 MCG/KG/MIN: 10 INJECTION, EMULSION INTRAVENOUS at 09:53

## 2019-06-18 ENCOUNTER — TRANSCRIBE ORDERS (OUTPATIENT)
Dept: ADMINISTRATIVE | Facility: HOSPITAL | Age: 74
End: 2019-06-18

## 2019-06-18 DIAGNOSIS — Z12.31 ENCOUNTER FOR SCREENING MAMMOGRAM FOR MALIGNANT NEOPLASM OF BREAST: Primary | ICD-10-CM

## 2019-07-11 ENCOUNTER — HOSPITAL ENCOUNTER (OUTPATIENT)
Dept: RADIOLOGY | Facility: HOSPITAL | Age: 74
Discharge: HOME | End: 2019-07-11
Payer: MEDICARE

## 2019-07-11 VITALS — HEIGHT: 61 IN | BODY MASS INDEX: 24.35 KG/M2 | WEIGHT: 129 LBS

## 2019-07-11 DIAGNOSIS — Z12.31 ENCOUNTER FOR SCREENING MAMMOGRAM FOR MALIGNANT NEOPLASM OF BREAST: ICD-10-CM

## 2019-07-11 PROCEDURE — 77067 SCR MAMMO BI INCL CAD: CPT

## 2020-05-13 ENCOUNTER — TRANSCRIBE ORDERS (OUTPATIENT)
Dept: ADMINISTRATIVE | Age: 75
End: 2020-05-13

## 2020-05-13 ENCOUNTER — LAB (OUTPATIENT)
Dept: LAB | Age: 75
End: 2020-05-13
Payer: MEDICARE

## 2020-05-13 DIAGNOSIS — E03.9 HYPOTHYROIDISM, UNSPECIFIED TYPE: ICD-10-CM

## 2020-05-13 DIAGNOSIS — I10 ESSENTIAL HYPERTENSION: ICD-10-CM

## 2020-05-13 DIAGNOSIS — E03.9 HYPOTHYROIDISM, UNSPECIFIED TYPE: Primary | ICD-10-CM

## 2020-05-13 LAB
ALBUMIN SERPL BCP-MCNC: 3.7 G/DL (ref 3.5–5)
ALP SERPL-CCNC: 77 U/L (ref 46–116)
ALT SERPL W P-5'-P-CCNC: 23 U/L (ref 12–78)
ANION GAP SERPL CALCULATED.3IONS-SCNC: 4 MMOL/L (ref 4–13)
AST SERPL W P-5'-P-CCNC: 19 U/L (ref 5–45)
BACTERIA UR QL AUTO: ABNORMAL /HPF
BILIRUB SERPL-MCNC: 0.57 MG/DL (ref 0.2–1)
BILIRUB UR QL STRIP: NEGATIVE
BUN SERPL-MCNC: 20 MG/DL (ref 5–25)
CALCIUM SERPL-MCNC: 9.4 MG/DL (ref 8.3–10.1)
CHLORIDE SERPL-SCNC: 106 MMOL/L (ref 100–108)
CHOLEST SERPL-MCNC: 215 MG/DL (ref 50–200)
CLARITY UR: CLEAR
CO2 SERPL-SCNC: 29 MMOL/L (ref 21–32)
COLOR UR: YELLOW
CREAT SERPL-MCNC: 0.86 MG/DL (ref 0.6–1.3)
GFR SERPL CREATININE-BSD FRML MDRD: 67 ML/MIN/1.73SQ M
GLUCOSE P FAST SERPL-MCNC: 92 MG/DL (ref 65–99)
GLUCOSE UR STRIP-MCNC: NEGATIVE MG/DL
HCV AB SER QL: NORMAL
HDLC SERPL-MCNC: 82 MG/DL
HGB UR QL STRIP.AUTO: NEGATIVE
HYALINE CASTS #/AREA URNS LPF: ABNORMAL /LPF
KETONES UR STRIP-MCNC: NEGATIVE MG/DL
LDLC SERPL CALC-MCNC: 114 MG/DL (ref 0–100)
LEUKOCYTE ESTERASE UR QL STRIP: ABNORMAL
NITRITE UR QL STRIP: NEGATIVE
NON-SQ EPI CELLS URNS QL MICRO: ABNORMAL /HPF
NONHDLC SERPL-MCNC: 133 MG/DL
PH UR STRIP.AUTO: 7 [PH]
POTASSIUM SERPL-SCNC: 3.8 MMOL/L (ref 3.5–5.3)
PROT SERPL-MCNC: 7.4 G/DL (ref 6.4–8.2)
PROT UR STRIP-MCNC: NEGATIVE MG/DL
RBC #/AREA URNS AUTO: ABNORMAL /HPF
SODIUM SERPL-SCNC: 139 MMOL/L (ref 136–145)
SP GR UR STRIP.AUTO: 1.02 (ref 1–1.03)
T4 FREE SERPL-MCNC: 1.1 NG/DL (ref 0.76–1.46)
TRIGL SERPL-MCNC: 97 MG/DL
TSH SERPL DL<=0.05 MIU/L-ACNC: 3.75 UIU/ML (ref 0.36–3.74)
UROBILINOGEN UR QL STRIP.AUTO: 0.2 E.U./DL
WBC #/AREA URNS AUTO: ABNORMAL /HPF

## 2020-05-13 PROCEDURE — 81001 URINALYSIS AUTO W/SCOPE: CPT | Performed by: INTERNAL MEDICINE

## 2020-05-13 PROCEDURE — 84443 ASSAY THYROID STIM HORMONE: CPT

## 2020-05-13 PROCEDURE — 84439 ASSAY OF FREE THYROXINE: CPT

## 2020-05-13 PROCEDURE — 36415 COLL VENOUS BLD VENIPUNCTURE: CPT

## 2020-05-13 PROCEDURE — 80053 COMPREHEN METABOLIC PANEL: CPT

## 2020-05-13 PROCEDURE — 86803 HEPATITIS C AB TEST: CPT

## 2020-05-13 PROCEDURE — 80061 LIPID PANEL: CPT

## 2020-06-15 ENCOUNTER — TRANSCRIBE ORDERS (OUTPATIENT)
Dept: ADMINISTRATIVE | Facility: HOSPITAL | Age: 75
End: 2020-06-15

## 2020-06-15 DIAGNOSIS — Z12.31 SCREENING MAMMOGRAM FOR HIGH-RISK PATIENT: Primary | ICD-10-CM

## 2020-09-23 DIAGNOSIS — M85.80 OSTEOPENIA AFTER MENOPAUSE: Primary | ICD-10-CM

## 2020-09-23 DIAGNOSIS — Z78.0 OSTEOPENIA AFTER MENOPAUSE: Primary | ICD-10-CM

## 2020-09-23 RX ORDER — ALENDRONATE SODIUM 70 MG/1
TABLET ORAL
Qty: 12 TABLET | Refills: 4 | Status: SHIPPED | OUTPATIENT
Start: 2020-09-23 | End: 2021-12-05

## 2020-10-07 ENCOUNTER — OFFICE VISIT (OUTPATIENT)
Dept: INTERNAL MEDICINE CLINIC | Facility: CLINIC | Age: 75
End: 2020-10-07
Payer: MEDICARE

## 2020-10-07 VITALS
HEIGHT: 63 IN | DIASTOLIC BLOOD PRESSURE: 88 MMHG | WEIGHT: 128 LBS | SYSTOLIC BLOOD PRESSURE: 158 MMHG | BODY MASS INDEX: 22.68 KG/M2 | TEMPERATURE: 97.3 F | HEART RATE: 87 BPM

## 2020-10-07 DIAGNOSIS — R07.9 CHEST PAIN AT REST: Primary | ICD-10-CM

## 2020-10-07 DIAGNOSIS — E78.49 OTHER HYPERLIPIDEMIA: ICD-10-CM

## 2020-10-07 PROCEDURE — 99214 OFFICE O/P EST MOD 30 MIN: CPT | Performed by: INTERNAL MEDICINE

## 2020-10-12 ENCOUNTER — HOSPITAL ENCOUNTER (OUTPATIENT)
Dept: RADIOLOGY | Age: 75
Discharge: HOME | End: 2020-10-12
Payer: MEDICARE

## 2020-10-12 VITALS — BODY MASS INDEX: 24.17 KG/M2 | WEIGHT: 128 LBS | HEIGHT: 61 IN

## 2020-10-12 DIAGNOSIS — Z12.31 SCREENING MAMMOGRAM FOR HIGH-RISK PATIENT: ICD-10-CM

## 2020-10-12 PROCEDURE — 77063 BREAST TOMOSYNTHESIS BI: CPT

## 2020-10-12 PROCEDURE — 77067 SCR MAMMO BI INCL CAD: CPT

## 2020-10-15 ENCOUNTER — HOSPITAL ENCOUNTER (OUTPATIENT)
Dept: NON INVASIVE DIAGNOSTICS | Facility: CLINIC | Age: 75
Discharge: HOME | End: 2020-10-15
Payer: MEDICARE

## 2020-10-15 DIAGNOSIS — E78.49 OTHER HYPERLIPIDEMIA: ICD-10-CM

## 2020-10-15 DIAGNOSIS — R07.9 CHEST PAIN AT REST: ICD-10-CM

## 2020-10-15 LAB
CHEST PAIN STATEMENT: NORMAL
MAX DIASTOLIC BP: 94 MMHG
MAX HEART RATE: 141 BPM
MAX PREDICTED HEART RATE: 146 BPM
MAX. SYSTOLIC BP: 190 MMHG
PROTOCOL NAME: NORMAL
TARGET HR FORMULA: NORMAL
TEST INDICATION: NORMAL
TIME IN EXERCISE PHASE: NORMAL

## 2020-10-15 PROCEDURE — 93018 CV STRESS TEST I&R ONLY: CPT | Performed by: INTERNAL MEDICINE

## 2020-10-15 PROCEDURE — 93017 CV STRESS TEST TRACING ONLY: CPT

## 2020-10-15 PROCEDURE — 93016 CV STRESS TEST SUPVJ ONLY: CPT | Performed by: INTERNAL MEDICINE

## 2020-11-11 ENCOUNTER — TELEPHONE (OUTPATIENT)
Dept: INTERNAL MEDICINE CLINIC | Facility: CLINIC | Age: 75
End: 2020-11-11

## 2021-03-02 DIAGNOSIS — Z23 ENCOUNTER FOR IMMUNIZATION: ICD-10-CM

## 2021-03-05 ENCOUNTER — IMMUNIZATIONS (OUTPATIENT)
Dept: FAMILY MEDICINE CLINIC | Facility: HOSPITAL | Age: 76
End: 2021-03-05

## 2021-03-05 DIAGNOSIS — Z23 ENCOUNTER FOR IMMUNIZATION: Primary | ICD-10-CM

## 2021-03-05 PROCEDURE — 91300 SARS-COV-2 / COVID-19 MRNA VACCINE (PFIZER-BIONTECH) 30 MCG: CPT

## 2021-03-05 PROCEDURE — 0001A SARS-COV-2 / COVID-19 MRNA VACCINE (PFIZER-BIONTECH) 30 MCG: CPT

## 2021-03-26 ENCOUNTER — IMMUNIZATIONS (OUTPATIENT)
Dept: FAMILY MEDICINE CLINIC | Facility: HOSPITAL | Age: 76
End: 2021-03-26

## 2021-03-26 DIAGNOSIS — Z23 ENCOUNTER FOR IMMUNIZATION: Primary | ICD-10-CM

## 2021-03-26 PROCEDURE — 0002A SARS-COV-2 / COVID-19 MRNA VACCINE (PFIZER-BIONTECH) 30 MCG: CPT

## 2021-03-26 PROCEDURE — 91300 SARS-COV-2 / COVID-19 MRNA VACCINE (PFIZER-BIONTECH) 30 MCG: CPT

## 2021-04-07 ENCOUNTER — OFFICE VISIT (OUTPATIENT)
Dept: INTERNAL MEDICINE CLINIC | Facility: CLINIC | Age: 76
End: 2021-04-07
Payer: MEDICARE

## 2021-04-07 VITALS
SYSTOLIC BLOOD PRESSURE: 160 MMHG | DIASTOLIC BLOOD PRESSURE: 82 MMHG | BODY MASS INDEX: 24.55 KG/M2 | TEMPERATURE: 98.3 F | WEIGHT: 130 LBS | HEIGHT: 61 IN | HEART RATE: 88 BPM

## 2021-04-07 DIAGNOSIS — Z00.00 MEDICARE ANNUAL WELLNESS VISIT, INITIAL: ICD-10-CM

## 2021-04-07 DIAGNOSIS — Z00.00 MEDICARE ANNUAL WELLNESS VISIT, SUBSEQUENT: ICD-10-CM

## 2021-04-07 DIAGNOSIS — M25.559 HIP PAIN: ICD-10-CM

## 2021-04-07 DIAGNOSIS — M54.50 CHRONIC BILATERAL LOW BACK PAIN WITHOUT SCIATICA: Primary | ICD-10-CM

## 2021-04-07 DIAGNOSIS — G89.29 CHRONIC BILATERAL LOW BACK PAIN WITHOUT SCIATICA: Primary | ICD-10-CM

## 2021-04-07 DIAGNOSIS — E78.5 HYPERLIPIDEMIA, UNSPECIFIED HYPERLIPIDEMIA TYPE: ICD-10-CM

## 2021-04-07 DIAGNOSIS — M81.0 AGE RELATED OSTEOPOROSIS, UNSPECIFIED PATHOLOGICAL FRACTURE PRESENCE: ICD-10-CM

## 2021-04-07 PROCEDURE — 1123F ACP DISCUSS/DSCN MKR DOCD: CPT | Performed by: INTERNAL MEDICINE

## 2021-04-07 PROCEDURE — 99214 OFFICE O/P EST MOD 30 MIN: CPT | Performed by: INTERNAL MEDICINE

## 2021-04-07 PROCEDURE — G0439 PPPS, SUBSEQ VISIT: HCPCS | Performed by: INTERNAL MEDICINE

## 2021-04-07 NOTE — PROGRESS NOTES
Assessment/Plan:    No problem-specific Assessment & Plan notes found for this encounter  Diagnoses and all orders for this visit:    Hip pain  -     FL arthrogram hip right; Future    Medicare annual wellness visit, initial    Age related osteoporosis, unspecified pathological fracture presence  -     VITAMIN D, 25 HYDROXY, TOTAL; Future    Hyperlipidemia, unspecified hyperlipidemia type  -     Lipid panel; Future  -     Comprehensive metabolic panel; Future    Other orders  -     Calcium Carb-Cholecalciferol (CALTRATE 600+D3 PO); Caltrate 600-D Plus Minerals 600 mg calcium-800 unit-50 mg tablet   Take by oral route  Subjective:      Patient ID: Sonal Giang is a 77 y o  female  Follow up   Hypertension,  Hypothyroidism,  Osteoporosis  The following portions of the patient's history were reviewed and updated as appropriate: allergies, current medications, past family history, past medical history, past social history, past surgical history, and problem list     Review of Systems   Constitutional: Negative  HENT: Negative for dental problem, drooling, ear discharge and ear pain  Eyes: Negative for discharge, redness and itching  Respiratory: Negative for apnea, cough and wheezing  Cardiovascular: Negative for chest pain and palpitations  Gastrointestinal: Negative for abdominal pain, blood in stool, diarrhea and vomiting  Endocrine: Negative for polydipsia, polyphagia and polyuria  Genitourinary: Negative for decreased urine volume, dysuria and frequency  Musculoskeletal: Negative for arthralgias, myalgias and neck stiffness  Skin: Negative for pallor and wound  Allergic/Immunologic: Negative for environmental allergies and food allergies  Neurological: Negative for facial asymmetry, light-headedness, numbness and headaches  Hematological: Negative for adenopathy  Does not bruise/bleed easily     Psychiatric/Behavioral: Negative for agitation, behavioral problems and confusion  Objective:      /82   Pulse 88   Temp 98 3 °F (36 8 °C)   Ht 5' 1" (1 549 m)   Wt 59 kg (130 lb)   BMI 24 56 kg/m²          Physical Exam  Constitutional:       Appearance: Normal appearance  HENT:      Head: Normocephalic  Nose: Nose normal       Mouth/Throat:      Mouth: Mucous membranes are moist    Eyes:      Pupils: Pupils are equal, round, and reactive to light  Neck:      Musculoskeletal: Neck supple  Cardiovascular:      Rate and Rhythm: Regular rhythm  Heart sounds: Normal heart sounds  Pulmonary:      Breath sounds: Normal breath sounds  Abdominal:      Palpations: Abdomen is soft  Musculoskeletal:         General: No swelling  Skin:     General: Skin is warm  Neurological:      General: No focal deficit present  Mental Status: She is alert and oriented to person, place, and time  Psychiatric:         Mood and Affect: Mood normal        Hypothyroidism ,staable on synthroi  Osteoporosis  On  Alendronate  Blood  Work  Ordered  Fup  6 charles Richardson MD

## 2021-04-07 NOTE — PROGRESS NOTES
Assessment and Plan:     Problem List Items Addressed This Visit     None           Preventive health issues were discussed with patient, and age appropriate screening tests were ordered as noted in patient's After Visit Summary  Personalized health advice and appropriate referrals for health education or preventive services given if needed, as noted in patient's After Visit Summary       History of Present Illness:     Patient presents for Medicare Annual Wellness visit    Patient Care Team:  Kriss Hunt MD as PCP - General (Internal Medicine)     Problem List:     Patient Active Problem List   Diagnosis    Primary osteoarthritis of right knee    Primary osteoarthritis of left knee      Past Medical and Surgical History:     Past Medical History:   Diagnosis Date    Disease of thyroid gland     hypothyroid    Glaucoma, bilateral     Hyperlipidemia     Osteoporosis      Past Surgical History:   Procedure Laterality Date    BUNIONECTOMY      COLONOSCOPY  06/04/2019    CORRECTION HAMMER TOE      MAMMO (HISTORICAL)  07/09/2018    NEUROMA EXCISION      OTHER SURGICAL HISTORY      Birth kwasi removed    WISDOM TOOTH EXTRACTION        Family History:     Family History   Problem Relation Age of Onset    No Known Problems Mother     Stomach cancer Father 46    No Known Problems Maternal Grandmother     No Known Problems Maternal Grandfather     No Known Problems Paternal Grandmother     No Known Problems Paternal Grandfather     No Known Problems Maternal Aunt     No Known Problems Maternal Aunt     No Known Problems Paternal Aunt     No Known Problems Brother     Osteoporosis Family     Hyperlipidemia Family     Hypertension Family       Social History:        Social History     Socioeconomic History    Marital status: Single     Spouse name: None    Number of children: None    Years of education: None    Highest education level: None   Occupational History    None   Social Needs    Financial resource strain: None    Food insecurity     Worry: None     Inability: None    Transportation needs     Medical: None     Non-medical: None   Tobacco Use    Smoking status: Never Smoker    Smokeless tobacco: Never Used   Substance and Sexual Activity    Alcohol use: Yes     Frequency: Monthly or less     Comment: No - as per [employer]    Drug use: Yes    Sexual activity: None   Lifestyle    Physical activity     Days per week: None     Minutes per session: None    Stress: None   Relationships    Social connections     Talks on phone: None     Gets together: None     Attends [employer] service: None     Active member of club or organization: None     Attends meetings of clubs or organizations: None     Relationship status: None    Intimate partner violence     Fear of current or ex partner: None     Emotionally abused: None     Physically abused: None     Forced sexual activity: None   Other Topics Concern    None   Social History Narrative    None      Medications and Allergies:     Current Outpatient Medications   Medication Sig Dispense Refill    acetaminophen (TYLENOL) 325 mg tablet Take by mouth      alendronate (FOSAMAX) 70 mg tablet TAKE 1 TABLET BY MOUTH WEEKLY 12 tablet 4    Biotin 1000 MCG tablet Take by mouth      Calcium Carb-Cholecalciferol (CALTRATE 600+D3 PO) Caltrate 600-D Plus Minerals 600 mg calcium-800 unit-50 mg tablet   Take by oral route   Glucosamine-Chondroitin (COSAMIN DS) 500-400 MG CAPS Take by mouth      levothyroxine 25 mcg tablet Take 1 tablet by mouth daily      Naphazoline-Polyethyl Glycol 0 012-0 2 % SOLN Apply 1-2 drops to eye 4 (four) times a day      simvastatin (ZOCOR) 10 mg tablet Take 1 tablet by mouth daily      Calcium Carbonate (CALTRATE 600) 1500 (600 Ca) MG TABS Take 1 tablet by mouth daily       No current facility-administered medications for this visit        Allergies   Allergen Reactions    Indomethacin GI Intolerance     Other reaction(s): GI upset  Other reaction(s): GI upset    Oxycodone Dizziness, Nausea Only, GI Intolerance and Other (See Comments)     Other reaction(s): GI upset  Other reaction(s): GI upset      Immunizations:     Immunization History   Administered Date(s) Administered    DTaP 05/11/2017    INFLUENZA 11/06/2018, 10/08/2019, 10/26/2020    Influenza, high dose seasonal 0 7 mL 10/26/2020    Pneumococcal 04/01/2011    Pneumococcal Conjugate 13-Valent 04/22/2015    Pneumococcal Conjugate PCV 7 04/01/2011    Pneumococcal Polysaccharide PPV23 04/01/2011    SARS-CoV-2 / COVID-19 mRNA IM (Pfizer-BioNTech) 03/05/2021, 03/26/2021    Tdap 05/11/2017    Zoster 05/04/2012      Health Maintenance:         Topic Date Due    MAMMOGRAM  10/12/2021    Colorectal Cancer Screening  06/04/2029    Hepatitis C Screening  Completed         Topic Date Due    Pneumococcal Vaccine: 65+ Years (2 of 2 - PPSV23) 04/22/2016      Medicare Health Risk Assessment:     Ht 5' 1" (1 549 m)   Wt 59 kg (130 lb)   BMI 24 56 kg/m²      Graciela Stubbs is here for her Initial Wellness visit  Health Risk Assessment:   Patient rates overall health as good  Patient feels that their physical health rating is same  Patient is satisfied with their life  Eyesight was rated as slightly worse  Hearing was rated as same  Patient feels that their emotional and mental health rating is same  Patients states they are never, rarely angry  Patient states they are sometimes unusually tired/fatigued  Pain experienced in the last 7 days has been none  Patient states that she has experienced no weight loss or gain in last 6 months  Depression Screening:   PHQ-2 Score: 0      Fall Risk Screening: In the past year, patient has experienced: no history of falling in past year      Urinary Incontinence Screening:   Patient has leaked urine accidently in the last six months       Home Safety:  Patient does not have trouble with stairs inside or outside of their home  Patient has working smoke alarms and has working carbon monoxide detector  Home safety hazards include: none  Nutrition:   Current diet is Regular  Medications:   Patient is currently taking over-the-counter supplements  OTC medications include: see medication list  Patient is able to manage medications  Activities of Daily Living (ADLs)/Instrumental Activities of Daily Living (IADLs):   Walk and transfer into and out of bed and chair?: Yes  Dress and groom yourself?: Yes    Bathe or shower yourself?: Yes    Feed yourself? Yes  Do your laundry/housekeeping?: Yes  Manage your money, pay your bills and track your expenses?: Yes  Make your own meals?: Yes    Do your own shopping?: Yes    Previous Hospitalizations:   Any hospitalizations or ED visits within the last 12 months?: No      Advance Care Planning:   Living will: Yes    Advanced directive: Yes      PREVENTIVE SCREENINGS      Cardiovascular Screening:    General: Screening Not Indicated and History Lipid Disorder      Diabetes Screening:     General: Screening Current      Colorectal Cancer Screening:     General: Screening Current      Breast Cancer Screening:     General: Screening Current      Cervical Cancer Screening:    General: Screening Not Indicated      Lung Cancer Screening:     General: Screening Not Indicated      Hepatitis C Screening:    General: Screening Current    Screening, Brief Intervention, and Referral to Treatment (SBIRT)    Screening  Typical number of drinks in a day: 0  Typical number of drinks in a week: 0  Interpretation: Low risk drinking behavior      Single Item Drug Screening:  How often have you used an illegal drug (including marijuana) or a prescription medication for non-medical reasons in the past year? never    Single Item Drug Screen Score: 0  Interpretation: Negative screen for possible drug use disorder      My Stanford MD

## 2021-04-07 NOTE — PATIENT INSTRUCTIONS
Medicare Preventive Visit Patient Instructions  Thank you for completing your Welcome to Medicare Visit or Medicare Annual Wellness Visit today  Your next wellness visit will be due in one year (4/8/2022)  The screening/preventive services that you may require over the next 5-10 years are detailed below  Some tests may not apply to you based off risk factors and/or age  Screening tests ordered at today's visit but not completed yet may show as past due  Also, please note that scanned in results may not display below  Preventive Screenings:  Service Recommendations Previous Testing/Comments   Colorectal Cancer Screening  * Colonoscopy    * Fecal Occult Blood Test (FOBT)/Fecal Immunochemical Test (FIT)  * Fecal DNA/Cologuard Test  * Flexible Sigmoidoscopy Age: 54-65 years old   Colonoscopy: every 10 years (may be performed more frequently if at higher risk)  OR  FOBT/FIT: every 1 year  OR  Cologuard: every 3 years  OR  Sigmoidoscopy: every 5 years  Screening may be recommended earlier than age 48 if at higher risk for colorectal cancer  Also, an individualized decision between you and your healthcare provider will decide whether screening between the ages of 74-80 would be appropriate  Colonoscopy: 06/04/2019  FOBT/FIT: 05/04/2019  Cologuard: Not on file  Sigmoidoscopy: Not on file    Screening Current     Breast Cancer Screening Age: 36 years old  Frequency: every 1-2 years  Not required if history of left and right mastectomy Mammogram: 10/12/2020    Screening Current   Cervical Cancer Screening Between the ages of 21-29, pap smear recommended once every 3 years  Between the ages of 33-67, can perform pap smear with HPV co-testing every 5 years     Recommendations may differ for women with a history of total hysterectomy, cervical cancer, or abnormal pap smears in past  Pap Smear: Not on file    Screening Not Indicated   Hepatitis C Screening Once for adults born between 1945 and 1965  More frequently in patients at high risk for Hepatitis C Hep C Antibody: 05/13/2020    Screening Current   Diabetes Screening 1-2 times per year if you're at risk for diabetes or have pre-diabetes Fasting glucose: 92 mg/dL   A1C: No results in last 5 years    Screening Current   Cholesterol Screening Once every 5 years if you don't have a lipid disorder  May order more often based on risk factors  Lipid panel: 05/13/2020    Screening Not Indicated  History Lipid Disorder     Other Preventive Screenings Covered by Medicare:  1  Abdominal Aortic Aneurysm (AAA) Screening: covered once if your at risk  You're considered to be at risk if you have a family history of AAA  2  Lung Cancer Screening: covers low dose CT scan once per year if you meet all of the following conditions: (1) Age 50-69; (2) No signs or symptoms of lung cancer; (3) Current smoker or have quit smoking within the last 15 years; (4) You have a tobacco smoking history of at least 30 pack years (packs per day multiplied by number of years you smoked); (5) You get a written order from a healthcare provider  3  Glaucoma Screening: covered annually if you're considered high risk: (1) You have diabetes OR (2) Family history of glaucoma OR (3)  aged 48 and older OR (3)  American aged 72 and older  3  Osteoporosis Screening: covered every 2 years if you meet one of the following conditions: (1) You're estrogen deficient and at risk for osteoporosis based off medical history and other findings; (2) Have a vertebral abnormality; (3) On glucocorticoid therapy for more than 3 months; (4) Have primary hyperparathyroidism; (5) On osteoporosis medications and need to assess response to drug therapy  · Last bone density test (DXA Scan): 05/03/2019   5  HIV Screening: covered annually if you're between the age of 15-65  Also covered annually if you are younger than 13 and older than 72 with risk factors for HIV infection   For pregnant patients, it is covered up to 3 times per pregnancy  Immunizations:  Immunization Recommendations   Influenza Vaccine Annual influenza vaccination during flu season is recommended for all persons aged >= 6 months who do not have contraindications   Pneumococcal Vaccine (Prevnar and Pneumovax)  * Prevnar = PCV13  * Pneumovax = PPSV23   Adults 25-60 years old: 1-3 doses may be recommended based on certain risk factors  Adults 72 years old: Prevnar (PCV13) vaccine recommended followed by Pneumovax (PPSV23) vaccine  If already received PPSV23 since turning 65, then PCV13 recommended at least one year after PPSV23 dose  Hepatitis B Vaccine 3 dose series if at intermediate or high risk (ex: diabetes, end stage renal disease, liver disease)   Tetanus (Td) Vaccine - COST NOT COVERED BY MEDICARE PART B Following completion of primary series, a booster dose should be given every 10 years to maintain immunity against tetanus  Td may also be given as tetanus wound prophylaxis  Tdap Vaccine - COST NOT COVERED BY MEDICARE PART B Recommended at least once for all adults  For pregnant patients, recommended with each pregnancy  Shingles Vaccine (Shingrix) - COST NOT COVERED BY MEDICARE PART B  2 shot series recommended in those aged 48 and above     Health Maintenance Due:      Topic Date Due    MAMMOGRAM  10/12/2021    Colorectal Cancer Screening  06/04/2029    Hepatitis C Screening  Completed     Immunizations Due:      Topic Date Due    Pneumococcal Vaccine: 65+ Years (2 of 2 - PPSV23) 04/22/2016     Advance Directives   What are advance directives? Advance directives are legal documents that state your wishes and plans for medical care  These plans are made ahead of time in case you lose your ability to make decisions for yourself  Advance directives can apply to any medical decision, such as the treatments you want, and if you want to donate organs  What are the types of advance directives?   There are many types of advance directives, and each state has rules about how to use them  You may choose a combination of any of the following:  · Living will: This is a written record of the treatment you want  You can also choose which treatments you do not want, which to limit, and which to stop at a certain time  This includes surgery, medicine, IV fluid, and tube feedings  · Durable power of  for healthcare [facility]): This is a written record that states who you want to make healthcare choices for you when you are unable to make them for yourself  This person, called a proxy, is usually a family member or a friend  You may choose more than 1 proxy  · Do not resuscitate (DNR) order:  A DNR order is used in case your heart stops beating or you stop breathing  It is a request not to have certain forms of treatment, such as CPR  A DNR order may be included in other types of advance directives  · Medical directive: This covers the care that you want if you are in a coma, near death, or unable to make decisions for yourself  You can list the treatments you want for each condition  Treatment may include pain medicine, surgery, blood transfusions, dialysis, IV or tube feedings, and a ventilator (breathing machine)  · Values history: This document has questions about your views, beliefs, and how you feel and think about life  This information can help others choose the care that you would choose  Why are advance directives important? An advance directive helps you control your care  Although spoken wishes may be used, it is better to have your wishes written down  Spoken wishes can be misunderstood, or not followed  Treatments may be given even if you do not want them  An advance directive may make it easier for your family to make difficult choices about your care  Urinary Incontinence   Urinary incontinence (UI)  is when you lose control of your bladder  UI develops because your bladder cannot store or empty urine properly   The 3 most common types of UI are stress incontinence, urge incontinence, or both  Medicines:   · May be given to help strengthen your bladder control  Report any side effects of medication to your healthcare provider  Do pelvic muscle exercises often:  Your pelvic muscles help you stop urinating  Squeeze these muscles tight for 5 seconds, then relax for 5 seconds  Gradually work up to squeezing for 10 seconds  Do 3 sets of 15 repetitions a day, or as directed  This will help strengthen your pelvic muscles and improve bladder control  Train your bladder:  Go to the bathroom at set times, such as every 2 hours, even if you do not feel the urge to go  You can also try to hold your urine when you feel the urge to go  For example, hold your urine for 5 minutes when you feel the urge to go  As that becomes easier, hold your urine for 10 minutes  Self-care:   · Keep a UI record  Write down how often you leak urine and how much you leak  Make a note of what you were doing when you leaked urine  · Drink liquids as directed  You may need to limit the amount of liquid you drink to help control your urine leakage  Do not drink any liquid right before you go to bed  Limit or do not have drinks that contain caffeine or alcohol  · Prevent constipation  Eat a variety of high-fiber foods  Good examples are high-fiber cereals, beans, vegetables, and whole-grain breads  Walking is the best way to trigger your intestines to have a bowel movement  · Exercise regularly and maintain a healthy weight  Weight loss and exercise will decrease pressure on your bladder and help you control your leakage  · Use a catheter as directed  to help empty your bladder  A catheter is a tiny, plastic tube that is put into your bladder to drain your urine  · Go to behavior therapy as directed  Behavior therapy may be used to help you learn to control your urge to urinate         © Copyright [employer] 2018 Information is for End User's use only and may not be sold, redistributed or otherwise used for commercial purposes   All illustrations and images included in CareNotes® are the copyrighted property of A D A M , Inc  or [facility]– [place]

## 2021-04-13 ENCOUNTER — APPOINTMENT (OUTPATIENT)
Dept: LAB | Facility: HOSPITAL | Age: 76
End: 2021-04-13
Attending: INTERNAL MEDICINE
Payer: MEDICARE

## 2021-04-13 ENCOUNTER — TELEPHONE (OUTPATIENT)
Dept: INTERNAL MEDICINE CLINIC | Facility: CLINIC | Age: 76
End: 2021-04-13

## 2021-04-13 ENCOUNTER — HOSPITAL ENCOUNTER (OUTPATIENT)
Dept: RADIOLOGY | Facility: HOSPITAL | Age: 76
Discharge: HOME | End: 2021-04-13
Attending: INTERNAL MEDICINE
Payer: MEDICARE

## 2021-04-13 ENCOUNTER — TRANSCRIBE ORDERS (OUTPATIENT)
Dept: ADMINISTRATIVE | Facility: HOSPITAL | Age: 76
End: 2021-04-13

## 2021-04-13 DIAGNOSIS — E78.5 HYPERLIPIDEMIA, UNSPECIFIED HYPERLIPIDEMIA TYPE: ICD-10-CM

## 2021-04-13 DIAGNOSIS — M54.50 CHRONIC BILATERAL LOW BACK PAIN WITHOUT SCIATICA: ICD-10-CM

## 2021-04-13 DIAGNOSIS — G89.29 CHRONIC BILATERAL LOW BACK PAIN WITHOUT SCIATICA: ICD-10-CM

## 2021-04-13 LAB
25(OH)D3 SERPL-MCNC: 36.9 NG/ML (ref 30–100)
ALBUMIN SERPL BCP-MCNC: 3.5 G/DL (ref 3.5–5)
ALP SERPL-CCNC: 82 U/L (ref 46–116)
ALT SERPL W P-5'-P-CCNC: 19 U/L (ref 12–78)
ANION GAP SERPL CALCULATED.3IONS-SCNC: 3 MMOL/L (ref 4–13)
AST SERPL W P-5'-P-CCNC: 14 U/L (ref 5–45)
BILIRUB SERPL-MCNC: 0.49 MG/DL (ref 0.2–1)
BUN SERPL-MCNC: 16 MG/DL (ref 5–25)
CALCIUM SERPL-MCNC: 9.1 MG/DL (ref 8.3–10.1)
CHLORIDE SERPL-SCNC: 107 MMOL/L (ref 100–108)
CHOLEST SERPL-MCNC: 194 MG/DL (ref 50–200)
CO2 SERPL-SCNC: 31 MMOL/L (ref 21–32)
CREAT SERPL-MCNC: 0.82 MG/DL (ref 0.6–1.3)
GFR SERPL CREATININE-BSD FRML MDRD: 71 ML/MIN/1.73SQ M
GLUCOSE P FAST SERPL-MCNC: 97 MG/DL (ref 65–99)
HDLC SERPL-MCNC: 83 MG/DL
LDLC SERPL CALC-MCNC: 89 MG/DL (ref 0–100)
NONHDLC SERPL-MCNC: 111 MG/DL
POTASSIUM SERPL-SCNC: 3.7 MMOL/L (ref 3.5–5.3)
PROT SERPL-MCNC: 7.3 G/DL (ref 6.4–8.2)
SODIUM SERPL-SCNC: 141 MMOL/L (ref 136–145)
TRIGL SERPL-MCNC: 110 MG/DL

## 2021-04-13 PROCEDURE — 36415 COLL VENOUS BLD VENIPUNCTURE: CPT

## 2021-04-13 PROCEDURE — 80061 LIPID PANEL: CPT

## 2021-04-13 PROCEDURE — 80053 COMPREHEN METABOLIC PANEL: CPT

## 2021-04-13 PROCEDURE — 82306 VITAMIN D 25 HYDROXY: CPT

## 2021-04-13 PROCEDURE — 72100 X-RAY EXAM L-S SPINE 2/3 VWS: CPT

## 2021-05-06 ENCOUNTER — TELEPHONE (OUTPATIENT)
Dept: INTERNAL MEDICINE CLINIC | Facility: CLINIC | Age: 76
End: 2021-05-06

## 2021-05-06 DIAGNOSIS — M81.0 AGE-RELATED OSTEOPOROSIS WITHOUT CURRENT PATHOLOGICAL FRACTURE: Primary | ICD-10-CM

## 2021-07-03 ENCOUNTER — HOSPITAL ENCOUNTER (OUTPATIENT)
Dept: RADIOLOGY | Age: 76
Discharge: HOME | End: 2021-07-03
Payer: MEDICARE

## 2021-07-03 DIAGNOSIS — M81.0 AGE-RELATED OSTEOPOROSIS WITHOUT CURRENT PATHOLOGICAL FRACTURE: ICD-10-CM

## 2021-07-03 PROCEDURE — 77080 DXA BONE DENSITY AXIAL: CPT

## 2021-08-27 DIAGNOSIS — E78.5 HYPERLIPIDEMIA, UNSPECIFIED HYPERLIPIDEMIA TYPE: ICD-10-CM

## 2021-08-27 DIAGNOSIS — E03.9 ACQUIRED HYPOTHYROIDISM: Primary | ICD-10-CM

## 2021-08-27 RX ORDER — SIMVASTATIN 10 MG
TABLET ORAL
Qty: 90 TABLET | Refills: 3 | Status: SHIPPED | OUTPATIENT
Start: 2021-08-27

## 2021-08-27 RX ORDER — LEVOTHYROXINE SODIUM 0.03 MG/1
TABLET ORAL
Qty: 90 TABLET | Refills: 3 | Status: SHIPPED | OUTPATIENT
Start: 2021-08-27

## 2021-09-22 ENCOUNTER — TELEPHONE (OUTPATIENT)
Dept: OBGYN CLINIC | Facility: HOSPITAL | Age: 76
End: 2021-09-22

## 2021-09-22 NOTE — TELEPHONE ENCOUNTER
Patient sees Dr Tamara Iniguez  She called and left a voicemail stating that she would like the process started for Visco injections in both knees

## 2021-09-30 ENCOUNTER — OFFICE VISIT (OUTPATIENT)
Dept: OBGYN CLINIC | Facility: HOSPITAL | Age: 76
End: 2021-09-30
Payer: MEDICARE

## 2021-09-30 VITALS
HEART RATE: 73 BPM | BODY MASS INDEX: 25.34 KG/M2 | SYSTOLIC BLOOD PRESSURE: 158 MMHG | DIASTOLIC BLOOD PRESSURE: 88 MMHG | HEIGHT: 61 IN | WEIGHT: 134.2 LBS

## 2021-09-30 DIAGNOSIS — M17.11 PRIMARY OSTEOARTHRITIS OF RIGHT KNEE: Primary | ICD-10-CM

## 2021-09-30 DIAGNOSIS — M17.12 PRIMARY OSTEOARTHRITIS OF LEFT KNEE: ICD-10-CM

## 2021-09-30 PROCEDURE — 20610 DRAIN/INJ JOINT/BURSA W/O US: CPT | Performed by: PHYSICIAN ASSISTANT

## 2021-09-30 NOTE — PROGRESS NOTES
[address] 77 y o  female MRN: [id number]      Chief Complaint:   bilateral knee pain    HPI:   77 y  o female complaining of bilateral knee pain  Patient presents office today for bilateral knee viscosupplementation injections  Patient has been diagnosed with bilateral knee pain due to osteoarthritis  Patient states the pain has since returned from her previous injections given 2 years ago viscosupplementation  Patient states pain is aching nature worse weight-bearing mildly relieved with rest   She denies instability clicking popping catching bilateral knees  States he is having troubles with bowling this time                  Review Of Systems:   · Skin: Normal  · Neuro: See HPI  · Musculoskeletal: See HPI  · All other systems reviewed and are negative    Past Medical History:   Past Medical History:   Diagnosis Date    Closed nondisplaced fracture of fifth metatarsal bone of right foot with routine healing     Disease of thyroid gland     hypothyroid    Glaucoma, bilateral     Hyperlipidemia     Osteoporosis        Past Surgical History:   Past Surgical History:   Procedure Laterality Date    BUNIONECTOMY      COLONOSCOPY  06/04/2019    CORRECTION HAMMER TOE      MAMMO (HISTORICAL)  07/09/2018    NEUROMA EXCISION      OTHER SURGICAL HISTORY      Birth kwasi removed    WISDOM TOOTH EXTRACTION         Family History:  Family history reviewed and non-contributory  Family History   Problem Relation Age of Onset    No Known Problems Mother     Stomach cancer Father 46    No Known Problems Maternal Grandmother     No Known Problems Maternal Grandfather     No Known Problems Paternal Grandmother     No Known Problems Paternal Grandfather     No Known Problems Maternal Aunt     No Known Problems Maternal Aunt     No Known Problems Paternal Aunt     No Known Problems Brother     Osteoporosis Family     Hyperlipidemia Family     Hypertension Family          Social History:  Social History     Socioeconomic History    Marital status: Single     Spouse name: None    Number of children: None    Years of education: None    Highest education level: None   Occupational History    None   Tobacco Use    Smoking status: Never Smoker    Smokeless tobacco: Never Used   Substance and Sexual Activity    Alcohol use: Yes     Comment: No - as per [employer]    Drug use: Yes    Sexual activity: None   Other Topics Concern    None   Social History Narrative    None     Social Determinants of Health     Financial Resource Strain:     Difficulty of Paying Living Expenses:    Food Insecurity:     Worried About Running Out of Food in the Last Year:     [address] in the Last Year:    Transportation Needs:     Lack of Transportation (Medical):  Lack of Transportation (Non-Medical):    Physical Activity:     Days of Exercise per Week:     Minutes of Exercise per Session:    Stress:     Feeling of Stress :    Social Connections:     Frequency of Communication with Friends and Family:     Frequency of Social Gatherings with Friends and Family:     Attends [employer] Services:     Active Member of Clubs or Organizations:     Attends Club or Organization Meetings:     Marital Status:    Intimate Partner Violence:     Fear of Current or Ex-Partner:     Emotionally Abused:     Physically Abused:     Sexually Abused: Allergies:    Allergies   Allergen Reactions    Indomethacin GI Intolerance     Other reaction(s): GI upset  Other reaction(s): GI upset    Oxycodone Dizziness, Nausea Only, GI Intolerance and Other (See Comments)     Other reaction(s): GI upset  Other reaction(s): GI upset       Labs:  0   Lab Value Date/Time    HCT 48 0 (H) 04/01/2019 0934    HGB 15 2 04/01/2019 0934    WBC 5 72 04/01/2019 0934       Meds:    Current Outpatient Medications:     acetaminophen (TYLENOL) 325 mg tablet, Take by mouth, Disp: , Rfl:     alendronate (FOSAMAX) 70 mg tablet, TAKE 1 TABLET BY MOUTH WEEKLY, Disp: 12 tablet, Rfl: 4    Biotin 1000 MCG tablet, Take by mouth, Disp: , Rfl:     Calcium Carb-Cholecalciferol (CALTRATE 600+D3 PO), Caltrate 600-D Plus Minerals 600 mg calcium-800 unit-50 mg tablet  Take by oral route , Disp: , Rfl:     Glucosamine-Chondroitin (COSAMIN DS) 500-400 MG CAPS, Take by mouth, Disp: , Rfl:     levothyroxine 25 mcg tablet, TAKE 1 TABLET BY MOUTH EVERY DAY, Disp: 90 tablet, Rfl: 3    Naphazoline-Polyethyl Glycol 0 012-0 2 % SOLN, Apply 1-2 drops to eye 4 (four) times a day, Disp: , Rfl:     simvastatin (ZOCOR) 10 mg tablet, TAKE 1 TABLET DAILY ONCE DAILY, Disp: 90 tablet, Rfl: 3    Calcium Carbonate (CALTRATE 600) 1500 (600 Ca) MG TABS, Take 1 tablet by mouth daily, Disp: , Rfl:       Physical Exam:     General Appearance:    Alert, cooperative, no distress, appears stated age   Head:    Normocephalic, without obvious abnormality, atraumatic   Eyes:    conjunctiva/corneas clear, both eyes        Nose:   Nares normal, septum midline, no drainage    Throat:   Lips normal; teeth and gums normal   Neck:    symmetrical, trachea midline, ;     thyroid:  no enlargement/   Back:     Symmetric, no curvature, ROM normal   Lungs:   No audible wheezing or labored breathing   Chest Wall:    No tenderness or deformity    Heart:    Regular rate and rhythm               Pulses:   2+ and symmetric all extremities   Skin:   Skin color, texture, turgor normal, no rashes or lesions   Neurologic:   normal strength, sensation and reflexes     throughout       Musculoskeletal: bilateral lower extremity  · On examination of the right knee there is no effusion, no erythema  Range of motion to full active extension and flexion to greater than 120°  Pain on palpation medial and lateral joint lines  There is crepitus with range of motion, no warmth to palpation, bony enlargement noted  No pain on palpation pes anserine bursa region or distal iliotibial band  Stable to varus and valgus stress without pain or gapping  Negative anterior and posterior drawer testing  Sensation intact distal pulses present  · On examination of the left knee there is no effusion, no erythema  Range of motion to full active extension and flexion to greater than 120°  Pain on palpation medial and lateral joint lines  There is crepitus with range of motion, no warmth to palpation, bony enlargement noted  No pain on palpation pes anserine bursa region or distal iliotibial band  Stable to varus and valgus stress without pain or gapping  Negative anterior and posterior drawer testing  Sensation intact distal pulses present  Large joint arthrocentesis: R knee  Universal Protocol:  Consent given by: patient  Patient understanding: patient states understanding of the procedure being performed  Site marked: the operative site was marked  Patient identity confirmed: verbally with patient    Supporting Documentation  Indications: pain   Procedure Details  Location: knee - R knee  Needle size: 22 G  Medications administered: 3 mL sodium hyaluronate 60 MG/3ML    Patient tolerance: patient tolerated the procedure well with no immediate complications    Large joint arthrocentesis: L knee  Universal Protocol:  Consent given by: patient  Patient understanding: patient states understanding of the procedure being performed  Site marked: the operative site was marked  Patient identity confirmed: verbally with patient    Supporting Documentation  Indications: pain   Procedure Details  Location: knee - L knee  Needle size: 22 G  Approach: superior  Medications administered: 3 mL sodium hyaluronate 60 MG/3ML            _*_*_*_*_*_*_*_*_*_*_*_*_*_*_*_*_*_*_*_*_*_*_*_*_*_*_*_*_*_*_*_*_*_*_*_*_*_*_*_*_*    Assessment:  77 y  o female with bilateral knee   Chronic pain due to osteoarthritis    Plan:   · Weight bearing as tolerated  bilateral lower extremity  · Case discussed with Dr Seema Camarillo   · bilateral knee intra-articular durolane injections given as noted above   · Advised no significant activity or increased ambulation over the next 24-48 hours and warm compresses to the knee no greater 20 minutes 3-4 times 24 hours following the injection  Patient advised should they develop any increasing pain, redness, drainage, numbness, tingling or swelling surrounding the injection sight, they are to contact our office or present to the emergency department    · Follow up in 3 months or sooner if needed      Rita Peter PA-C

## 2021-10-06 ENCOUNTER — OFFICE VISIT (OUTPATIENT)
Dept: INTERNAL MEDICINE CLINIC | Facility: CLINIC | Age: 76
End: 2021-10-06
Payer: MEDICARE

## 2021-10-06 VITALS
BODY MASS INDEX: 24.92 KG/M2 | DIASTOLIC BLOOD PRESSURE: 40 MMHG | HEIGHT: 61 IN | SYSTOLIC BLOOD PRESSURE: 138 MMHG | WEIGHT: 132 LBS | HEART RATE: 76 BPM | TEMPERATURE: 98.1 F | OXYGEN SATURATION: 98 %

## 2021-10-06 DIAGNOSIS — M35.00 SJOGREN'S SYNDROME (HCC): ICD-10-CM

## 2021-10-06 DIAGNOSIS — I10 HYPERTENSION: Primary | ICD-10-CM

## 2021-10-06 DIAGNOSIS — R07.89 CHEST PRESSURE: ICD-10-CM

## 2021-10-06 DIAGNOSIS — Z23 FLU VACCINE NEED: ICD-10-CM

## 2021-10-06 PROCEDURE — 99214 OFFICE O/P EST MOD 30 MIN: CPT | Performed by: INTERNAL MEDICINE

## 2021-10-06 PROCEDURE — 90662 IIV NO PRSV INCREASED AG IM: CPT

## 2021-10-06 PROCEDURE — G0008 ADMIN INFLUENZA VIRUS VAC: HCPCS

## 2021-10-08 ENCOUNTER — APPOINTMENT (OUTPATIENT)
Dept: LAB | Facility: HOSPITAL | Age: 76
End: 2021-10-08
Attending: INTERNAL MEDICINE
Payer: MEDICARE

## 2021-10-08 DIAGNOSIS — I10 HYPERTENSION: ICD-10-CM

## 2021-10-08 LAB
ALBUMIN SERPL BCP-MCNC: 3.4 G/DL (ref 3.5–5)
ALP SERPL-CCNC: 80 U/L (ref 46–116)
ALT SERPL W P-5'-P-CCNC: 20 U/L (ref 12–78)
ANION GAP SERPL CALCULATED.3IONS-SCNC: 3 MMOL/L (ref 4–13)
AST SERPL W P-5'-P-CCNC: 17 U/L (ref 5–45)
BILIRUB SERPL-MCNC: 0.5 MG/DL (ref 0.2–1)
BUN SERPL-MCNC: 19 MG/DL (ref 5–25)
CALCIUM ALBUM COR SERPL-MCNC: 10.1 MG/DL (ref 8.3–10.1)
CALCIUM SERPL-MCNC: 9.6 MG/DL (ref 8.3–10.1)
CHLORIDE SERPL-SCNC: 106 MMOL/L (ref 100–108)
CHOLEST SERPL-MCNC: 203 MG/DL (ref 50–200)
CO2 SERPL-SCNC: 30 MMOL/L (ref 21–32)
CREAT SERPL-MCNC: 0.76 MG/DL (ref 0.6–1.3)
GFR SERPL CREATININE-BSD FRML MDRD: 77 ML/MIN/1.73SQ M
GLUCOSE P FAST SERPL-MCNC: 95 MG/DL (ref 65–99)
HDLC SERPL-MCNC: 74 MG/DL
LDLC SERPL CALC-MCNC: 108 MG/DL (ref 0–100)
NONHDLC SERPL-MCNC: 129 MG/DL
POTASSIUM SERPL-SCNC: 3.8 MMOL/L (ref 3.5–5.3)
PROT SERPL-MCNC: 7.2 G/DL (ref 6.4–8.2)
SODIUM SERPL-SCNC: 139 MMOL/L (ref 136–145)
TRIGL SERPL-MCNC: 107 MG/DL

## 2021-10-08 PROCEDURE — 36415 COLL VENOUS BLD VENIPUNCTURE: CPT

## 2021-10-08 PROCEDURE — 80061 LIPID PANEL: CPT

## 2021-10-08 PROCEDURE — 80053 COMPREHEN METABOLIC PANEL: CPT

## 2021-10-14 ENCOUNTER — HOSPITAL ENCOUNTER (OUTPATIENT)
Dept: RADIOLOGY | Age: 76
Discharge: HOME | End: 2021-10-14
Payer: MEDICARE

## 2021-10-14 VITALS — HEIGHT: 61 IN | WEIGHT: 132 LBS | BODY MASS INDEX: 24.92 KG/M2

## 2021-10-14 DIAGNOSIS — Z12.31 ENCOUNTER FOR SCREENING MAMMOGRAM FOR MALIGNANT NEOPLASM OF BREAST: ICD-10-CM

## 2021-10-14 PROCEDURE — 77067 SCR MAMMO BI INCL CAD: CPT

## 2021-10-14 PROCEDURE — 77063 BREAST TOMOSYNTHESIS BI: CPT

## 2021-10-15 ENCOUNTER — HOSPITAL ENCOUNTER (OUTPATIENT)
Dept: NON INVASIVE DIAGNOSTICS | Facility: CLINIC | Age: 76
Discharge: HOME | End: 2021-10-15
Payer: MEDICARE

## 2021-10-15 DIAGNOSIS — R07.89 CHEST PRESSURE: ICD-10-CM

## 2021-10-15 LAB
CHEST PAIN STATEMENT: NORMAL
MAX DIASTOLIC BP: 78 MMHG
MAX HEART RATE: 148 BPM
MAX HR PERCENT: 102 %
MAX HR: 123 BPM
MAX PREDICTED HEART RATE: 145 BPM
MAX. SYSTOLIC BP: 164 MMHG
PROTOCOL NAME: NORMAL
REASON FOR TERMINATION: NORMAL
SL CV STRESS RECOVERY BP: NORMAL MMHG
SL CV STRESS RECOVERY HR: 89 BPM
SL CV STRESS RECOVERY O2 SAT: 98 %
SL CV STRESS STAGE REACHED: 3
STRESS ANGINA INDEX: 0
STRESS BASELINE BP: NORMAL MMHG
STRESS BASELINE HR: 77 BPM
STRESS O2 SAT REST: 98 %
STRESS PEAK HR: 148 BPM
STRESS PERCENT HR: 102 %
STRESS POST ESTIMATED WORKLOAD: 10.1 METS
STRESS POST EXERCISE DUR MIN: 7 MIN
STRESS POST EXERCISE DUR SEC: 0 SEC
STRESS POST O2 SAT PEAK: 97 %
STRESS POST PEAK BP: NORMAL MMHG
STRESS TARGET HR: 147.9 BPM
TARGET HR FORMULA: NORMAL
TEST INDICATION: NORMAL
TIME IN EXERCISE PHASE: NORMAL

## 2021-10-15 PROCEDURE — 93016 CV STRESS TEST SUPVJ ONLY: CPT | Performed by: INTERNAL MEDICINE

## 2021-10-15 PROCEDURE — 93017 CV STRESS TEST TRACING ONLY: CPT

## 2021-10-15 PROCEDURE — 93018 CV STRESS TEST I&R ONLY: CPT | Performed by: INTERNAL MEDICINE

## 2021-11-06 ENCOUNTER — IMMUNIZATIONS (OUTPATIENT)
Dept: FAMILY MEDICINE CLINIC | Facility: HOSPITAL | Age: 76
End: 2021-11-06

## 2021-11-06 DIAGNOSIS — Z23 ENCOUNTER FOR IMMUNIZATION: Primary | ICD-10-CM

## 2021-11-06 PROCEDURE — 91300 COVID-19 PFIZER VACC 0.3 ML: CPT

## 2021-11-06 PROCEDURE — 0001A COVID-19 PFIZER VACC 0.3 ML: CPT

## 2021-12-05 DIAGNOSIS — Z78.0 OSTEOPENIA AFTER MENOPAUSE: ICD-10-CM

## 2021-12-05 DIAGNOSIS — M85.80 OSTEOPENIA AFTER MENOPAUSE: ICD-10-CM

## 2021-12-05 RX ORDER — ALENDRONATE SODIUM 70 MG/1
TABLET ORAL
Qty: 12 TABLET | Refills: 4 | Status: SHIPPED | OUTPATIENT
Start: 2021-12-05

## 2022-04-06 ENCOUNTER — OFFICE VISIT (OUTPATIENT)
Dept: INTERNAL MEDICINE CLINIC | Facility: CLINIC | Age: 77
End: 2022-04-06
Payer: MEDICARE

## 2022-04-06 VITALS
WEIGHT: 136 LBS | HEART RATE: 94 BPM | TEMPERATURE: 97.5 F | BODY MASS INDEX: 25.68 KG/M2 | SYSTOLIC BLOOD PRESSURE: 148 MMHG | DIASTOLIC BLOOD PRESSURE: 90 MMHG | HEIGHT: 61 IN | OXYGEN SATURATION: 98 %

## 2022-04-06 DIAGNOSIS — E03.9 HYPOTHYROIDISM (ACQUIRED): Primary | ICD-10-CM

## 2022-04-06 DIAGNOSIS — E78.5 HYPERLIPIDEMIA, UNSPECIFIED HYPERLIPIDEMIA TYPE: ICD-10-CM

## 2022-04-06 PROCEDURE — 99214 OFFICE O/P EST MOD 30 MIN: CPT | Performed by: INTERNAL MEDICINE

## 2022-04-06 NOTE — PROGRESS NOTES
Assessment/Plan:    Follow up  Hypothyroidism, Hyperlipidemia     Diagnoses and all orders for this visit:    Hypothyroidism (acquired)  -     TSH + Free T4; Future    Hyperlipidemia, unspecified hyperlipidemia type  -     CBC (Includes Diff/Plt) (Refl); Future          Subjective: Mild  Arthritic  Pains  Patient ID: Crow Hammond is a 77 y o  female  HPI    The following portions of the patient's history were reviewed and updated as appropriate: allergies, current medications, past family history, past medical history, past social history, past surgical history, and problem list     Review of Systems   Constitutional: Negative  HENT: Negative for dental problem, drooling, ear discharge and ear pain  Eyes: Negative for discharge, redness and itching  Respiratory: Negative for apnea, cough and wheezing  Cardiovascular: Negative for chest pain and palpitations  Gastrointestinal: Negative for abdominal pain, blood in stool, diarrhea and vomiting  Endocrine: Negative for polydipsia, polyphagia and polyuria  Genitourinary: Negative for decreased urine volume, dysuria and frequency  Musculoskeletal: Negative for arthralgias, myalgias and neck stiffness  Skin: Negative for pallor and wound  Allergic/Immunologic: Negative for environmental allergies and food allergies  Neurological: Negative for facial asymmetry, light-headedness, numbness and headaches  Hematological: Negative for adenopathy  Does not bruise/bleed easily  Psychiatric/Behavioral: Negative for agitation, behavioral problems and confusion  Objective:      /90 (BP Location: Left arm, Patient Position: Sitting, Cuff Size: Adult)   Pulse 94   Temp 97 5 °F (36 4 °C) (Temporal)   Ht 5' 1" (1 549 m)   Wt 61 7 kg (136 lb)   SpO2 98%   BMI 25 70 kg/m²          Physical Exam  Constitutional:       Appearance: Normal appearance  HENT:      Head: Normocephalic        Nose: Nose normal       Mouth/Throat: Mouth: Mucous membranes are moist    Eyes:      Pupils: Pupils are equal, round, and reactive to light  Cardiovascular:      Rate and Rhythm: Regular rhythm  Heart sounds: Normal heart sounds  Pulmonary:      Breath sounds: Normal breath sounds  Abdominal:      Palpations: Abdomen is soft  Musculoskeletal:         General: No swelling  Cervical back: Neck supple  Skin:     General: Skin is warm  Findings: Erythema:      Neurological:      General: No focal deficit present  Mental Status: She is alert and oriented to person, place, and time  Psychiatric:         Mood and Affect: Mood normal        Problem #1   Hyperlipidemia    Continue  Statin    Problem  #2  Hypothyroidism    Check  TSH and Free T4    To  See if she needs  Any  Dose  Adjustment in the thyroid medication      Fup  6 months     ARIK Richardson MD                  t

## 2022-04-07 ENCOUNTER — APPOINTMENT (OUTPATIENT)
Dept: LAB | Facility: HOSPITAL | Age: 77
End: 2022-04-07
Attending: INTERNAL MEDICINE
Payer: MEDICARE

## 2022-04-07 DIAGNOSIS — E78.5 HYPERLIPIDEMIA, UNSPECIFIED HYPERLIPIDEMIA TYPE: ICD-10-CM

## 2022-04-07 DIAGNOSIS — E03.9 HYPOTHYROIDISM (ACQUIRED): ICD-10-CM

## 2022-04-07 LAB
BASOPHILS # BLD AUTO: 0.04 THOUSANDS/ΜL (ref 0–0.1)
BASOPHILS NFR BLD AUTO: 1 % (ref 0–1)
EOSINOPHIL # BLD AUTO: 0.1 THOUSAND/ΜL (ref 0–0.61)
EOSINOPHIL NFR BLD AUTO: 1 % (ref 0–6)
ERYTHROCYTE [DISTWIDTH] IN BLOOD BY AUTOMATED COUNT: 14.8 % (ref 11.6–15.1)
HCT VFR BLD AUTO: 47.8 % (ref 34.8–46.1)
HGB BLD-MCNC: 15 G/DL (ref 11.5–15.4)
IMM GRANULOCYTES # BLD AUTO: 0.03 THOUSAND/UL (ref 0–0.2)
IMM GRANULOCYTES NFR BLD AUTO: 0 % (ref 0–2)
LYMPHOCYTES # BLD AUTO: 2.68 THOUSANDS/ΜL (ref 0.6–4.47)
LYMPHOCYTES NFR BLD AUTO: 30 % (ref 14–44)
MCH RBC QN AUTO: 28.2 PG (ref 26.8–34.3)
MCHC RBC AUTO-ENTMCNC: 31.4 G/DL (ref 31.4–37.4)
MCV RBC AUTO: 90 FL (ref 82–98)
MONOCYTES # BLD AUTO: 0.89 THOUSAND/ΜL (ref 0.17–1.22)
MONOCYTES NFR BLD AUTO: 10 % (ref 4–12)
NEUTROPHILS # BLD AUTO: 5.11 THOUSANDS/ΜL (ref 1.85–7.62)
NEUTS SEG NFR BLD AUTO: 58 % (ref 43–75)
NRBC BLD AUTO-RTO: 0 /100 WBCS
PLATELET # BLD AUTO: 330 THOUSANDS/UL (ref 149–390)
PMV BLD AUTO: 11.6 FL (ref 8.9–12.7)
RBC # BLD AUTO: 5.31 MILLION/UL (ref 3.81–5.12)
T4 FREE SERPL-MCNC: 1.03 NG/DL (ref 0.76–1.46)
TSH SERPL DL<=0.05 MIU/L-ACNC: 3.72 UIU/ML (ref 0.45–4.5)
WBC # BLD AUTO: 8.85 THOUSAND/UL (ref 4.31–10.16)

## 2022-04-07 PROCEDURE — 85025 COMPLETE CBC W/AUTO DIFF WBC: CPT

## 2022-04-07 PROCEDURE — 84443 ASSAY THYROID STIM HORMONE: CPT

## 2022-04-07 PROCEDURE — 84439 ASSAY OF FREE THYROXINE: CPT

## 2022-04-07 PROCEDURE — 36415 COLL VENOUS BLD VENIPUNCTURE: CPT

## 2022-08-22 DIAGNOSIS — E78.5 HYPERLIPIDEMIA, UNSPECIFIED HYPERLIPIDEMIA TYPE: ICD-10-CM

## 2022-08-22 DIAGNOSIS — E03.9 ACQUIRED HYPOTHYROIDISM: ICD-10-CM

## 2022-08-22 RX ORDER — LEVOTHYROXINE SODIUM 0.03 MG/1
25 TABLET ORAL DAILY
Qty: 90 TABLET | Refills: 3 | Status: SHIPPED | OUTPATIENT
Start: 2022-08-22

## 2022-08-22 RX ORDER — SIMVASTATIN 10 MG
TABLET ORAL
Qty: 90 TABLET | Refills: 3 | Status: SHIPPED | OUTPATIENT
Start: 2022-08-22

## 2022-08-22 NOTE — TELEPHONE ENCOUNTER
Refill Request (90 day supply)     Levothyroxine 25 mcg   Simvastatin 10 mg     Pharmacy: [facility] [address]     [place]: 4/6/22  [place]: 10/12/22

## 2022-10-12 ENCOUNTER — OFFICE VISIT (OUTPATIENT)
Dept: INTERNAL MEDICINE CLINIC | Facility: CLINIC | Age: 77
End: 2022-10-12
Payer: MEDICARE

## 2022-10-12 VITALS
HEART RATE: 83 BPM | SYSTOLIC BLOOD PRESSURE: 148 MMHG | HEIGHT: 61 IN | OXYGEN SATURATION: 98 % | WEIGHT: 131 LBS | TEMPERATURE: 98.1 F | DIASTOLIC BLOOD PRESSURE: 78 MMHG | BODY MASS INDEX: 24.73 KG/M2

## 2022-10-12 DIAGNOSIS — Z23 NEEDS FLU SHOT: ICD-10-CM

## 2022-10-12 DIAGNOSIS — Z00.00 ENCOUNTER FOR MEDICARE ANNUAL WELLNESS EXAM: ICD-10-CM

## 2022-10-12 DIAGNOSIS — M35.00 SJOGREN'S SYNDROME (HCC): ICD-10-CM

## 2022-10-12 DIAGNOSIS — R07.9 CHEST PAIN: Primary | ICD-10-CM

## 2022-10-12 PROCEDURE — 90662 IIV NO PRSV INCREASED AG IM: CPT

## 2022-10-12 PROCEDURE — G0438 PPPS, INITIAL VISIT: HCPCS | Performed by: INTERNAL MEDICINE

## 2022-10-12 PROCEDURE — G0008 ADMIN INFLUENZA VIRUS VAC: HCPCS

## 2022-10-12 NOTE — PROGRESS NOTES
Assessment and Plan:     Problem List Items Addressed This Visit    None     Visit Diagnoses     Encounter for Medicare annual wellness exam    -  Primary           Preventive health issues were discussed with patient, and age appropriate screening tests were ordered as noted in patient's After Visit Summary  Personalized health advice and appropriate referrals for health education or preventive services given if needed, as noted in patient's After Visit Summary       History of Present Illness:     Patient presents for a Medicare Wellness Visit    HPI   Patient Care Team:  Sharri Valentine MD as PCP - General (Internal Medicine)     Review of Systems:     Review of Systems     Problem List:     Patient Active Problem List   Diagnosis   • Primary osteoarthritis of right knee   • Primary osteoarthritis of left knee   • Sjogren's syndrome [facility])      Past Medical and Surgical History:     Past Medical History:   Diagnosis Date   • Arthritis 2005   • Closed nondisplaced fracture of fifth metatarsal bone of right foot with routine healing    • Disease of thyroid gland     hypothyroid   • Glaucoma, bilateral    • Hyperlipidemia    • Osteoporosis    • Scoliosis 12/12/12     Past Surgical History:   Procedure Laterality Date   • BUNIONECTOMY     • COLONOSCOPY  06/04/2019   • CORRECTION HAMMER TOE     • MAMMO (HISTORICAL)  07/09/2018   • NEUROMA EXCISION     • OTHER SURGICAL HISTORY      Birth kwasi removed   • WISDOM TOOTH EXTRACTION        Family History:     Family History   Problem Relation Age of Onset   • Hypertension Mother    • Thyroid disease Mother    • Arthritis Mother    • Glaucoma Mother    • Stomach cancer Father 46   • Cancer Father    • No Known Problems Maternal Grandmother    • No Known Problems Maternal Grandfather    • No Known Problems Paternal Grandmother    • No Known Problems Paternal Grandfather    • Dementia Maternal Aunt    • No Known Problems Maternal Aunt    • No Known Problems Paternal Aunt    • Hypertension Brother    • Osteoporosis Family    • Hyperlipidemia Family    • Hypertension Family    • Dementia Maternal Aunt    • Hypertension Maternal Aunt       Social History:     Social History     Socioeconomic History   • Marital status: Single     Spouse name: Not on file   • Number of children: Not on file   • Years of education: Not on file   • Highest education level: Not on file   Occupational History   • Not on file   Tobacco Use   • Smoking status: Never Smoker   • Smokeless tobacco: Never Used   Vaping Use   • Vaping Use: Never used   Substance and Sexual Activity   • Alcohol use: Yes     Comment: Glass of wine a few times a year   • Drug use: Yes   • Sexual activity: Never   Other Topics Concern   • Not on file   Social History Narrative   • Not on file     Social Determinants of Health     Financial Resource Strain: Low Risk    • Difficulty of Paying Living Expenses: Not hard at all   Food Insecurity: Not on file   Transportation Needs: No Transportation Needs   • Lack of Transportation (Medical): No   • Lack of Transportation (Non-Medical): No   Physical Activity: Not on file   Stress: Not on file   Social Connections: Not on file   Intimate Partner Violence: Not on file   Housing Stability: Not on file      Medications and Allergies:     Current Outpatient Medications   Medication Sig Dispense Refill   • acetaminophen (TYLENOL) 325 mg tablet Take by mouth     • alendronate (FOSAMAX) 70 mg tablet TAKE 1 TABLET BY MOUTH WEEKLY 12 tablet 4   • Biotin 1000 MCG tablet Take by mouth     • Calcium Carb-Cholecalciferol (CALTRATE 600+D3 PO) Caltrate 600-D Plus Minerals 600 mg calcium-800 unit-50 mg tablet   Take by oral route       • Carboxymethylcellul-Glycerin 0 5-0 9 % SOLN Refresh Dry Eye Therapy SOLN  Bid    Refills: 0       Active     • Glucosamine-Chondroitin 500-400 MG CAPS Take by mouth     • levothyroxine 25 mcg tablet Take 1 tablet (25 mcg total) by mouth daily 90 tablet 3   • Naphazoline-Polyethyl Glycol 0 012-0 2 % SOLN Apply 1-2 drops to eye 4 (four) times a day     • simvastatin (ZOCOR) 10 mg tablet 1 tablet once daily 90 tablet 3     No current facility-administered medications for this visit  Allergies   Allergen Reactions   • Indomethacin GI Intolerance     Other reaction(s): GI upset  Other reaction(s): GI upset   • Oxycodone Dizziness, Nausea Only, GI Intolerance and Other (See Comments)     Other reaction(s): GI upset  Other reaction(s): GI upset      Immunizations:     Immunization History   Administered Date(s) Administered   • COVID-19 PFIZER VACCINE 0 3 ML IM 03/05/2021, 03/26/2021, 11/06/2021, 07/29/2022   • COVID-19 Pfizer vac (Gold-sucrose, gray cap) 12 yr+ IM 07/29/2022   • DTaP 05/11/2017, 05/11/2017   • INFLUENZA 11/06/2018, 10/08/2019, 10/26/2020   • Influenza Split High Dose Preservative Free IM 10/26/2020   • Influenza, high dose seasonal 0 7 mL 10/26/2020, 10/06/2021   • Pneumococcal 04/01/2011   • Pneumococcal Conjugate 13-Valent 04/22/2015   • Pneumococcal Conjugate PCV 7 04/01/2011   • Pneumococcal Polysaccharide PPV23 04/01/2011   • Tdap 05/11/2017   • Zoster 05/04/2012   • Zoster Vaccine Recombinant 04/22/2022, 04/22/2022, 08/23/2022   • influenza, trivalent, adjuvanted 10/26/2020, 10/06/2021      Health Maintenance:         Topic Date Due   • Breast Cancer Screening: Mammogram  10/14/2022   • Hepatitis C Screening  Completed         Topic Date Due   • Pneumococcal Vaccine: 65+ Years (2 - PPSV23 or PCV20) 04/22/2016   • Influenza Vaccine (1) 09/01/2022      Medicare Screening Tests and Risk Assessments:     Kasey Hein is here for her Subsequent Wellness visit  Last Medicare Wellness visit information reviewed, patient interviewed and updates made to the record today  Health Risk Assessment:   Patient rates overall health as very good  Patient feels that their physical health rating is same  Patient is very satisfied with their life   Eyesight was rated as same  Hearing was rated as same  Patient feels that their emotional and mental health rating is same  Patients states they are never, rarely angry  Patient states they are never, rarely unusually tired/fatigued  Pain experienced in the last 7 days has been some  Patient's pain rating has been 2/10  Patient states that she has experienced no weight loss or gain in last 6 months  Fall Risk Screening: In the past year, patient has experienced: no history of falling in past year      Urinary Incontinence Screening:   Patient has leaked urine accidently in the last six months  Home Safety:  Patient does not have trouble with stairs inside or outside of their home  Patient has working smoke alarms and has working carbon monoxide detector  Home safety hazards include: none  Nutrition:   Current diet is Regular and Low Saturated Fat  Medications:   Patient is currently taking over-the-counter supplements  OTC medications include: Caltrate, Biotin, Cosamin Ds, Refresh Eye Drops, occasional Tylenol  Patient is able to manage medications  Activities of Daily Living (ADLs)/Instrumental Activities of Daily Living (IADLs):   Walk and transfer into and out of bed and chair?: Yes  Dress and groom yourself?: Yes    Bathe or shower yourself?: Yes    Feed yourself? Yes  Do your laundry/housekeeping?: Yes  Manage your money, pay your bills and track your expenses?: Yes  Make your own meals?: Yes    Do your own shopping?: Yes    Previous Hospitalizations:   Any hospitalizations or ED visits within the last 12 months?: No      Advance Care Planning:   Living will: Yes    Durable POA for healthcare:  Yes    Advanced directive: Yes      PREVENTIVE SCREENINGS      Cardiovascular Screening:    General: Screening Not Indicated and History Lipid Disorder      Breast Cancer Screening:     General: Screening Current      Cervical Cancer Screening:    General: Screening Not Indicated      Lung Cancer Screening: General: Screening Not Indicated      Hepatitis C Screening:    General: Screening Current    Screening, Brief Intervention, and Referral to Treatment (SBIRT)    Screening  Typical number of drinks in a day: 0  Typical number of drinks in a week: 0  Interpretation: Low risk drinking behavior  AUDIT-C Screenin) How often did you have a drink containing alcohol in the past year? monthly or less  2) How many drinks did you have on a typical day when you were drinking in the past year? 1 to 2  3) How often did you have 6 or more drinks on one occasion in the past year? never    AUDIT-C Score: 1  Interpretation: Score 0-2 (female): Negative screen for alcohol misuse    Single Item Drug Screening:  How often have you used an illegal drug (including marijuana) or a prescription medication for non-medical reasons in the past year? never    Single Item Drug Screen Score: 0  Interpretation: Negative screen for possible drug use disorder    No exam data present     Physical Exam:     There were no vitals taken for this visit      Physical Exam     Alexandria Orellana MD

## 2022-10-18 ENCOUNTER — HOSPITAL ENCOUNTER (OUTPATIENT)
Dept: RADIOLOGY | Age: 77
Discharge: HOME | End: 2022-10-18
Payer: MEDICARE

## 2022-10-18 VITALS — BODY MASS INDEX: 24.73 KG/M2 | HEIGHT: 61 IN | WEIGHT: 131 LBS

## 2022-10-18 DIAGNOSIS — Z12.31 ENCOUNTER FOR SCREENING MAMMOGRAM FOR MALIGNANT NEOPLASM OF BREAST: ICD-10-CM

## 2022-10-18 PROCEDURE — 77067 SCR MAMMO BI INCL CAD: CPT

## 2022-10-18 PROCEDURE — 77063 BREAST TOMOSYNTHESIS BI: CPT

## 2022-10-19 ENCOUNTER — TELEPHONE (OUTPATIENT)
Dept: OBGYN CLINIC | Facility: HOSPITAL | Age: 77
End: 2022-10-19

## 2022-10-19 NOTE — TELEPHONE ENCOUNTER
Caller: Patient  Doctor/office: Dali  CB#:       Patient called to start auth for VISCO/EUFLEXXA/Durolane injections  She had good results with Euflexxa and is requesting Euflexxa  Body Part: B/L knees  Date of last Gel Injection: 9/30/2021    Educated patient on Visco procedure   Medications must first be authorized and delivered to our office BEFORE we can schedule

## 2022-10-20 DIAGNOSIS — M17.12 PRIMARY OSTEOARTHRITIS OF LEFT KNEE: ICD-10-CM

## 2022-10-20 DIAGNOSIS — M17.11 PRIMARY OSTEOARTHRITIS OF RIGHT KNEE: Primary | ICD-10-CM

## 2022-10-28 ENCOUNTER — PROCEDURE VISIT (OUTPATIENT)
Dept: OBGYN CLINIC | Facility: CLINIC | Age: 77
End: 2022-10-28

## 2022-10-28 VITALS
BODY MASS INDEX: 24.77 KG/M2 | SYSTOLIC BLOOD PRESSURE: 177 MMHG | DIASTOLIC BLOOD PRESSURE: 75 MMHG | HEART RATE: 66 BPM | WEIGHT: 131.2 LBS | HEIGHT: 61 IN

## 2022-10-28 DIAGNOSIS — M70.61 GREATER TROCHANTERIC BURSITIS OF RIGHT HIP: ICD-10-CM

## 2022-10-28 DIAGNOSIS — M17.12 PRIMARY OSTEOARTHRITIS OF LEFT KNEE: ICD-10-CM

## 2022-10-28 DIAGNOSIS — M17.11 PRIMARY OSTEOARTHRITIS OF RIGHT KNEE: Primary | ICD-10-CM

## 2022-10-28 RX ORDER — BUPIVACAINE HYDROCHLORIDE 2.5 MG/ML
2 INJECTION, SOLUTION INFILTRATION; PERINEURAL
Status: COMPLETED | OUTPATIENT
Start: 2022-10-28 | End: 2022-10-28

## 2022-10-28 RX ORDER — HYALURONATE SODIUM 10 MG/ML
20 SYRINGE (ML) INTRAARTICULAR
Status: COMPLETED | OUTPATIENT
Start: 2022-10-28 | End: 2022-10-28

## 2022-10-28 RX ORDER — METHYLPREDNISOLONE ACETATE 40 MG/ML
2 INJECTION, SUSPENSION INTRA-ARTICULAR; INTRALESIONAL; INTRAMUSCULAR; SOFT TISSUE
Status: COMPLETED | OUTPATIENT
Start: 2022-10-28 | End: 2022-10-28

## 2022-10-28 RX ADMIN — BUPIVACAINE HYDROCHLORIDE 2 ML: 2.5 INJECTION, SOLUTION INFILTRATION; PERINEURAL at 12:29

## 2022-10-28 RX ADMIN — METHYLPREDNISOLONE ACETATE 2 ML: 40 INJECTION, SUSPENSION INTRA-ARTICULAR; INTRALESIONAL; INTRAMUSCULAR; SOFT TISSUE at 12:29

## 2022-10-28 RX ADMIN — Medication 20 MG: at 12:29

## 2022-10-28 NOTE — PROGRESS NOTES
Orthopedics          [place] 69 y o  female MRN: [id number]      Chief Complaint:   bilateral knee pain, right laterally based hip pain    HPI:   69 y  o female complaining of bilateral knee pain  Patient has been diagnosed with bilateral knee pain due to osteoarthritis she is had Euflexxa injections past significant pain relief from presents today for repeat injections bilateral knees  Patient also experiencing pain right hip and lateral aspect  Patient has been diagnosed with right hip greater trochanteric bursitis in the past received injection with significant pain relief however the past few weeks time she notes increasing pain lateral aspect of her hip after sitting for long time laying on her right side    Denies any right hip groin pain denies any radiation of pain past her right knee denies any changes numbness tingling right lower extremity                Review Of Systems:   · Skin: Normal  · Neuro: See HPI  · Musculoskeletal: See HPI  · All other systems reviewed and are negative    Past Medical History:   Past Medical History:   Diagnosis Date   • Arthritis 2005   • Closed nondisplaced fracture of fifth metatarsal bone of right foot with routine healing    • Disease of thyroid gland     hypothyroid   • Glaucoma, bilateral    • Hyperlipidemia    • Osteoporosis    • Scoliosis 12/12/12       Past Surgical History:   Past Surgical History:   Procedure Laterality Date   • BUNIONECTOMY     • COLONOSCOPY  06/04/2019   • CORRECTION HAMMER TOE     • MAMMO (HISTORICAL)  07/09/2018   • NEUROMA EXCISION     • OTHER SURGICAL HISTORY      Birth kwasi removed   • WISDOM TOOTH EXTRACTION         Family History:  Family history reviewed and non-contributory  Family History   Problem Relation Age of Onset   • Hypertension Mother    • Thyroid disease Mother    • Arthritis Mother    • Glaucoma Mother    • Stomach cancer Father 46   • Cancer Father    • No Known Problems Maternal Grandmother    • No Known Problems Maternal Grandfather    • No Known Problems Paternal Grandmother    • No Known Problems Paternal Grandfather    • Dementia Maternal Aunt    • No Known Problems Maternal Aunt    • No Known Problems Paternal Aunt    • Hypertension Brother    • Osteoporosis Family    • Hyperlipidemia Family    • Hypertension Family    • Dementia Maternal Aunt    • Hypertension Maternal Aunt          Social History:  Social History     Socioeconomic History   • Marital status: Single     Spouse name: None   • Number of children: None   • Years of education: None   • Highest education level: None   Occupational History   • None   Tobacco Use   • Smoking status: Never Smoker   • Smokeless tobacco: Never Used   Vaping Use   • Vaping Use: Never used   Substance and Sexual Activity   • Alcohol use: Yes     Comment: Glass of wine a few times a year   • Drug use: Yes   • Sexual activity: Never   Other Topics Concern   • None   Social History Narrative   • None     Social Determinants of Health     Financial Resource Strain: Low Risk    • Difficulty of Paying Living Expenses: Not hard at all   Food Insecurity: Not on file   Transportation Needs: No Transportation Needs   • Lack of Transportation (Medical): No   • Lack of Transportation (Non-Medical): No   Physical Activity: Not on file   Stress: Not on file   Social Connections: Not on file   Intimate Partner Violence: Not on file   Housing Stability: Not on file       Allergies:    Allergies   Allergen Reactions   • Indomethacin GI Intolerance     Other reaction(s): GI upset  Other reaction(s): GI upset   • Oxycodone Dizziness, Nausea Only, GI Intolerance and Other (See Comments)     Other reaction(s): GI upset  Other reaction(s): GI upset       Labs:  0   Lab Value Date/Time    HCT 47 8 (H) 04/07/2022 0837    HCT 48 0 (H) 04/01/2019 0934    HGB 15 0 04/07/2022 0837    HGB 15 2 04/01/2019 0934    WBC 8 85 04/07/2022 0837    WBC 5 72 04/01/2019 0934       Meds:    Current Outpatient Medications: •  acetaminophen (TYLENOL) 325 mg tablet, Take by mouth, Disp: , Rfl:   •  alendronate (FOSAMAX) 70 mg tablet, TAKE 1 TABLET BY MOUTH WEEKLY, Disp: 12 tablet, Rfl: 4  •  Biotin 1000 MCG tablet, Take by mouth, Disp: , Rfl:   •  Calcium Carb-Cholecalciferol (CALTRATE 600+D3 PO), Caltrate 600-D Plus Minerals 600 mg calcium-800 unit-50 mg tablet  Take by oral route , Disp: , Rfl:   •  Carboxymethylcellul-Glycerin 0 5-0 9 % SOLN, Refresh Dry Eye Therapy SOLN Bid   Refills: 0     Active, Disp: , Rfl:   •  Glucosamine-Chondroitin 500-400 MG CAPS, Take by mouth, Disp: , Rfl:   •  levothyroxine 25 mcg tablet, Take 1 tablet (25 mcg total) by mouth daily, Disp: 90 tablet, Rfl: 3  •  Naphazoline-Polyethyl Glycol 0 012-0 2 % SOLN, Apply 1-2 drops to eye 4 (four) times a day, Disp: , Rfl:   •  simvastatin (ZOCOR) 10 mg tablet, 1 tablet once daily, Disp: 90 tablet, Rfl: 3      Physical Exam:   Vitals:    10/28/22 1121   BP: (!) 177/75   Pulse: 66       General Appearance:    Alert, cooperative, no distress, appears stated age   Head:    Normocephalic, without obvious abnormality, atraumatic   Eyes:    conjunctiva/corneas clear, both eyes        Nose:   Nares normal, septum midline, no drainage    Throat:   Lips normal; teeth and gums normal   Neck:    symmetrical, trachea midline, ;     thyroid:  no enlargement/   Back:     Symmetric, no curvature, ROM normal   Lungs:   No audible wheezing or labored breathing   Chest Wall:    No tenderness or deformity    Heart:    Regular rate and rhythm               Pulses:   2+ and symmetric all extremities   Skin:   Skin color, texture, turgor normal, no rashes or lesions   Neurologic:   normal strength, sensation and reflexes     throughout       Musculoskeletal: bilateral lower extremity  · On examination of the right knee there is no effusion, no erythema  Range of motion to full active extension and flexion to greater than 120°  Pain on palpation medial and lateral joint lines  There is crepitus with range of motion, no warmth to palpation, bony enlargement noted  No pain on palpation pes anserine bursa region or distal iliotibial band  Stable to varus and valgus stress without pain or gapping  Negative anterior and posterior drawer testing  Sensation intact distal pulses present  · On examination of the left knee there is no effusion, no erythema  Range of motion to full active extension and flexion to greater than 120°  Pain on palpation medial and lateral joint lines  There is crepitus with range of motion, no warmth to palpation, bony enlargement noted  No pain on palpation pes anserine bursa region or distal iliotibial band  Stable to varus and valgus stress without pain or gapping  Negative anterior and posterior drawer testing  Sensation intact distal pulses present    · Examination patient's right hip pain palpation right hip greater trochanteric region no effusion no erythema active hip flexion 90° no pain with internal-external rotation of the right hip hip flexion adduction abduction strength 5/5 positive Jeromy's test negative straight leg raise test active ankle dorsiflexion sensation intact distal pulses present    Large joint arthrocentesis: R knee  Universal Protocol:  Consent given by: patient  Patient understanding: patient states understanding of the procedure being performed  Site marked: the operative site was marked  Patient identity confirmed: verbally with patient    Supporting Documentation  Indications: pain   Procedure Details  Location: knee - R knee  Needle size: 22 G  Approach: superior  Medications administered: 20 mg Sodium Hyaluronate 20 MG/2ML    Patient tolerance: patient tolerated the procedure well with no immediate complications    Large joint arthrocentesis: L knee  Universal Protocol:  Consent given by: patient  Patient understanding: patient states understanding of the procedure being performed  Site marked: the operative site was marked  Patient identity confirmed: verbally with patient    Supporting Documentation  Indications: pain   Procedure Details  Location: knee - L knee  Needle size: 22 G  Approach: superior  Medications administered: 20 mg Sodium Hyaluronate 20 MG/2ML    Patient tolerance: patient tolerated the procedure well with no immediate complications    Large joint arthrocentesis: R greater trochanteric bursa  Universal Protocol:  Consent given by: patient  Patient understanding: patient states understanding of the procedure being performed  Site marked: the operative site was marked  Patient identity confirmed: verbally with patient    Supporting Documentation  Indications: pain   Procedure Details  Location: hip - R greater trochanteric bursa  Needle size: 22 G  Approach: lateral  Medications administered: 2 mL bupivacaine 0 25 %; 2 mL methylPREDNISolone acetate 40 mg/mL    Patient tolerance: patient tolerated the procedure well with no immediate complications            _*_*_*_*_*_*_*_*_*_*_*_*_*_*_*_*_*_*_*_*_*_*_*_*_*_*_*_*_*_*_*_*_*_*_*_*_*_*_*_*_*    Assessment:  69 y  o female with bilateral knee chronic pain due to osteoarthritis right hip greater trochanteric bursitis    Plan:   · Weight bearing as tolerated  bilateral lower extremity  · Bilateral knee intra-articular Euflexxa 1 in series of 3 injections administered as noted above  · Right hip greater trochanteric corticosteroid injection administered as noted above  · Advised no significant activity or increased ambulation over the next 24-48 hours and warm compresses to the knee no greater 20 minutes 3-4 times 24 hours following the injection  Patient advised should they develop any increasing pain, redness, drainage, numbness, tingling or swelling surrounding the injection sight, they are to contact our office or present to the emergency department    · Follow up in 1 weeks time for 2 and a series of 3 Euflexxa bilateral knee injections      Georges Parkinson LORI

## 2022-10-31 ENCOUNTER — OFFICE VISIT (OUTPATIENT)
Dept: CARDIOLOGY CLINIC | Facility: CLINIC | Age: 77
End: 2022-10-31

## 2022-10-31 VITALS
SYSTOLIC BLOOD PRESSURE: 140 MMHG | WEIGHT: 129.8 LBS | BODY MASS INDEX: 24.53 KG/M2 | DIASTOLIC BLOOD PRESSURE: 90 MMHG | HEART RATE: 63 BPM

## 2022-10-31 DIAGNOSIS — R07.9 CHEST PAIN: ICD-10-CM

## 2022-10-31 DIAGNOSIS — R07.9 CHEST PAIN SYNDROME: Primary | ICD-10-CM

## 2022-10-31 DIAGNOSIS — R94.31 ABNORMAL ELECTROCARDIOGRAM (ECG) (EKG): ICD-10-CM

## 2022-10-31 NOTE — PROGRESS NOTES
Cardiology Follow Up    Ortiz Cordova  1945  [id number]  [address] [facility] [place]  [place] [address]  [phone]    1  Chest pain syndrome     2  Chest pain  Ambulatory Referral to Cardiology       Interval History:  Cardiology consultation  Most pleasant 50-year-old female has no previous cardiac history  Multiple records reviewed, imaging was reviewed when available  Patient complains of episode of chest discomfort, the patient is good historian  She describes a discomfort that starts in the back interscapular area radiates up to the midsternal area sometimes up to the neck and jaw area  Symptoms are short lived for about 10-15 minutes, the longest episode about 20 minutes, in happens intermittently, no particular triggering factors including exercise, she states to be under lot of stress although this does not trigger her symptoms this is been going for 3 years, there is no crescendo pattern  There is no associated dyspnea or diaphoresis      history dyslipidemia, lipids last year total cholesterol 203, HDL 74,   She is on low intensity statin therapy  The patient underwent stress test last year, she did 7 Mets and a John protocol achieving target heart rate 100 2% of maximum predicted heart rate for age, there was no EKG criteria for ischemia or symptoms      Patient Active Problem List   Diagnosis   • Primary osteoarthritis of right knee   • Primary osteoarthritis of left knee   • Sjogren's syndrome ([facility] [address] )   • Chest pain syndrome     Past Medical History:   Diagnosis Date   • Arthritis 2005   • Closed nondisplaced fracture of fifth metatarsal bone of right foot with routine healing    • Disease of thyroid gland     hypothyroid   • Glaucoma, bilateral    • Hyperlipidemia    • Osteoporosis    • Scoliosis 12/12/12     Social History     Socioeconomic History   • Marital status: Single     Spouse name: Not on file   • Number of children: Not on file   • Years of education: Not on file   • Highest education level: Not on file   Occupational History   • Not on file   Tobacco Use   • Smoking status: Never Smoker   • Smokeless tobacco: Never Used   Vaping Use   • Vaping Use: Never used   Substance and Sexual Activity   • Alcohol use: Yes     Comment: Glass of wine a few times a year   • Drug use: Yes   • Sexual activity: Never   Other Topics Concern   • Not on file   Social History Narrative   • Not on file     Social Determinants of Health     Financial Resource Strain: Low Risk    • Difficulty of Paying Living Expenses: Not hard at all   Food Insecurity: Not on file   Transportation Needs: No Transportation Needs   • Lack of Transportation (Medical): No   • Lack of Transportation (Non-Medical):  No   Physical Activity: Not on file   Stress: Not on file   Social Connections: Not on file   Intimate Partner Violence: Not on file   Housing Stability: Not on file      Family History   Problem Relation Age of Onset   • Hypertension Mother    • Thyroid disease Mother    • Arthritis Mother    • Glaucoma Mother    • Stomach cancer Father 46   • Cancer Father    • No Known Problems Maternal Grandmother    • No Known Problems Maternal Grandfather    • No Known Problems Paternal Grandmother    • No Known Problems Paternal Grandfather    • Dementia Maternal Aunt    • No Known Problems Maternal Aunt    • No Known Problems Paternal Aunt    • Hypertension Brother    • Osteoporosis Family    • Hyperlipidemia Family    • Hypertension Family    • Dementia Maternal Aunt    • Hypertension Maternal Aunt      Past Surgical History:   Procedure Laterality Date   • BUNIONECTOMY     • COLONOSCOPY  06/04/2019   • CORRECTION HAMMER TOE     • MAMMO (HISTORICAL)  07/09/2018   • NEUROMA EXCISION     • OTHER SURGICAL HISTORY      Birth kwasi removed   • WISDOM TOOTH EXTRACTION         Current Outpatient Medications:   •  acetaminophen (TYLENOL) 325 mg tablet, Take by mouth, Disp: , Rfl:   •  alendronate (FOSAMAX) 70 mg tablet, TAKE 1 TABLET BY MOUTH WEEKLY, Disp: 12 tablet, Rfl: 4  •  Biotin 1000 MCG tablet, Take by mouth, Disp: , Rfl:   •  Calcium Carb-Cholecalciferol (CALTRATE 600+D3 PO), Caltrate 600-D Plus Minerals 600 mg calcium-800 unit-50 mg tablet  Take by oral route , Disp: , Rfl:   •  Carboxymethylcellul-Glycerin 0 5-0 9 % SOLN, Refresh Dry Eye Therapy SOLN Bid   Refills: 0     Active, Disp: , Rfl:   •  Glucosamine-Chondroitin 500-400 MG CAPS, Take by mouth, Disp: , Rfl:   •  levothyroxine 25 mcg tablet, Take 1 tablet (25 mcg total) by mouth daily, Disp: 90 tablet, Rfl: 3  •  Naphazoline-Polyethyl Glycol 0 012-0 2 % SOLN, Apply 1-2 drops to eye 4 (four) times a day, Disp: , Rfl:   •  simvastatin (ZOCOR) 10 mg tablet, 1 tablet once daily, Disp: 90 tablet, Rfl: 3  Allergies   Allergen Reactions   • Indomethacin GI Intolerance     Other reaction(s): GI upset  Other reaction(s): GI upset   • Oxycodone Dizziness, Nausea Only, GI Intolerance and Other (See Comments)     Other reaction(s): GI upset  Other reaction(s): GI upset       Labs:  No visits with results within 6 Month(s) from this visit     Latest known visit with results is:   Appointment on 04/07/2022   Component Date Value   • WBC 04/07/2022 8 85    • RBC 04/07/2022 5 31 (A)   • Hemoglobin 04/07/2022 15 0    • Hematocrit 04/07/2022 47 8 (A)   • MCV 04/07/2022 90    • MCH 04/07/2022 28 2    • MCHC 04/07/2022 31 4    • RDW 04/07/2022 14 8    • MPV 04/07/2022 11 6    • Platelets 07/78/1188 330    • nRBC 04/07/2022 0    • Neutrophils Relative 04/07/2022 58    • Immat GRANS % 04/07/2022 0    • Lymphocytes Relative 04/07/2022 30    • Monocytes Relative 04/07/2022 10    • Eosinophils Relative 04/07/2022 1    • Basophils Relative 04/07/2022 1    • Neutrophils Absolute 04/07/2022 5 11    • Immature Grans Absolute 04/07/2022 0 03    • Lymphocytes Absolute 04/07/2022 2 68    • Monocytes Absolute 04/07/2022 0 89    • Eosinophils Absolute 04/07/2022 0 10    • Basophils Absolute 04/07/2022 0 04    • TSH 3RD GENERATON 04/07/2022 3 720    • Free T4 04/07/2022 1 03      Imaging: Mammo screening bilateral w 3d & cad    Result Date: 10/21/2022  Narrative: DIAGNOSIS: Encounter for screening mammogram for malignant neoplasm of breast TECHNIQUE: Digital screening mammography was performed  Computer Aided Detection (CAD) analyzed all applicable images  COMPARISONS: Prior breast imaging dated: 10/14/2021, 10/12/2020, 07/11/2019, 07/09/2018, 04/19/2017, 04/15/2016, 04/15/2015, and 04/11/2014 RELEVANT HISTORY: Family Breast Cancer History: No known family history of breast cancer  Family Medical History: No known relevant family medical history  Personal History: No known relevant hormone history  No known relevant surgical history  No known relevant medical history  The patient is scheduled in a reminder system for screening mammography  8-10% of cancers will be missed on mammography  Management of a palpable abnormality must be based on clinical grounds  Patients will be notified of their results via letter from our facility  Accredited by [employer] of [employer] and FDA  RISK ASSESSMENT: 5 Year Tyrer-Cuzick: 1 17 % 10 Year Tyrer-Cuzick: No Score Lifetime Tyrer-Cuzick: 2 2 % TISSUE DENSITY: There are scattered areas of fibroglandular density  INDICATION: Edenilson Choi is a 69 y o  female presenting for screening mammography  FINDINGS: Bilateral There are no suspicious masses, grouped microcalcifications or areas of unexplained architectural distortion  The skin and nipple areolar complex are unremarkable  Stable circumscribed partially obscured nodule right outer lower quadrant CC view     Impression: No mammographic evidence of malignancy   ASSESSMENT/BI-RADS CATEGORY: Left: 2 - Benign Right: 2 - Benign Overall: 2 - Benign RECOMMENDATION:      - Routine screening mammogram in 1 year for both [employer]  Workstation ID: [id number]       Review of Systems:  Review of Systems   Constitutional: Negative for activity change, diaphoresis, fatigue and fever  HENT: Negative for hearing loss and nosebleeds  Eyes: Positive for visual disturbance  Respiratory: Negative for apnea, shortness of breath, wheezing and stridor  Cardiovascular: Positive for chest pain  Negative for palpitations and leg swelling  Gastrointestinal: Negative for abdominal pain, anal bleeding, blood in stool, constipation and diarrhea  Endocrine: Negative for cold intolerance and heat intolerance  Genitourinary: Negative for difficulty urinating, flank pain, frequency and hematuria  Musculoskeletal: Positive for arthralgias  Negative for gait problem and myalgias  Skin: Negative for pallor and rash  Allergic/Immunologic: Negative for immunocompromised state  Neurological: Negative for dizziness, syncope, speech difficulty and weakness  Hematological: Does not bruise/bleed easily  Psychiatric/Behavioral: Negative for sleep disturbance  The patient is not nervous/anxious  Physical Exam:  Physical Exam  Vitals reviewed  Constitutional:       General: She is not in acute distress  Appearance: Normal appearance  She is normal weight  She is not ill-appearing, toxic-appearing or diaphoretic  HENT:      Head: Normocephalic  Eyes:      General: No scleral icterus  Conjunctiva/sclera: Conjunctivae normal    Neck:      Vascular: No carotid bruit  Cardiovascular:      Rate and Rhythm: Normal rate and regular rhythm  Pulses: Normal pulses  Heart sounds: Murmur ( soft systolic ejection murmur at the base) heard  No friction rub  No gallop  Pulmonary:      Effort: Pulmonary effort is normal  No respiratory distress  Breath sounds: Normal breath sounds  No stridor  No wheezing, rhonchi or rales  Abdominal:      General: Abdomen is flat   Bowel sounds are normal       Palpations: Abdomen is soft  Tenderness: There is no abdominal tenderness  Musculoskeletal:      Right lower leg: No edema  Left lower leg: No edema  Skin:     General: Skin is warm and dry  Capillary Refill: Capillary refill takes less than 2 seconds  Coloration: Skin is not jaundiced (Soft systolic ejection murmur at the base) or pale  Findings: No bruising or erythema  Neurological:      General: No focal deficit present  Mental Status: She is alert and oriented to person, place, and time  Psychiatric:         Mood and Affect: Mood normal          Behavior: Behavior normal          Thought Content: Thought content normal          Judgment: Judgment normal          Discussion/Summary:  Chest pain syndrome with some typical and atypical features, EKG is borderline abnormal   distressing to the patient, no crescendo pattern  Stress test last year suggested no ischemia although was a simple stress test, will increase sensitivity to the test pattern stress echocardiogram as well as an echocardiogram to assess her murmur appears to be a aortic sclerosis  No restrictions medication at the present time, further recommendations pending the results of the testing  Consideration for invasive evaluation if symptoms persist or worsen  This note was completed in part utilizing [employer] direct voice recognition software  Grammatical errors, random word insertion, spelling mistakes, and incomplete sentences may be an occasional consequence of the system secondary to software limitations, ambient noise and hardware issues  At the time of dictation, efforts were made to edit, clarify and /or correct errors  Please read the chart carefully and recognize, using context, where substitutions have occurred  If you have any questions or concerns about the context, text or information contained within the body of this dictation, please contact myself, the provider, for further clarification

## 2022-11-04 ENCOUNTER — PROCEDURE VISIT (OUTPATIENT)
Dept: OBGYN CLINIC | Facility: CLINIC | Age: 77
End: 2022-11-04

## 2022-11-04 VITALS — WEIGHT: 129 LBS | HEIGHT: 61 IN | BODY MASS INDEX: 24.35 KG/M2

## 2022-11-04 DIAGNOSIS — M17.11 PRIMARY OSTEOARTHRITIS OF RIGHT KNEE: Primary | ICD-10-CM

## 2022-11-04 DIAGNOSIS — M17.12 PRIMARY OSTEOARTHRITIS OF LEFT KNEE: ICD-10-CM

## 2022-11-04 RX ORDER — HYALURONATE SODIUM 10 MG/ML
20 SYRINGE (ML) INTRAARTICULAR
Status: COMPLETED | OUTPATIENT
Start: 2022-11-04 | End: 2022-11-04

## 2022-11-04 RX ADMIN — Medication 20 MG: at 11:48

## 2022-11-04 NOTE — PROGRESS NOTES
Orthopedics          Sarwat Gains 69 y o  female MRN: [id number]      Chief Complaint:   bilateral knee pain    HPI:   69 y  o female complaining of bilateral knee pain  Patient presents office today for viscosupplementation injections bilateral knees  Patient notes some mild pain relief in her bilateral knees when going up and down stairs otherwise she needs to have aching pain localized her bilateral knees without radiation she has had no issues with her previous injections                  Review Of Systems:   · Skin: Normal  · Neuro: See HPI  · Musculoskeletal: See HPI  · All other systems reviewed and are negative    Past Medical History:   Past Medical History:   Diagnosis Date   • Arthritis 2005   • Closed nondisplaced fracture of fifth metatarsal bone of right foot with routine healing    • Disease of thyroid gland     hypothyroid   • Glaucoma, bilateral    • Hyperlipidemia    • Osteoporosis    • Scoliosis 12/12/12       Past Surgical History:   Past Surgical History:   Procedure Laterality Date   • BUNIONECTOMY     • COLONOSCOPY  06/04/2019   • CORRECTION HAMMER TOE     • MAMMO (HISTORICAL)  07/09/2018   • NEUROMA EXCISION     • OTHER SURGICAL HISTORY      Birth kwasi removed   • WISDOM TOOTH EXTRACTION         Family History:  Family history reviewed and non-contributory  Family History   Problem Relation Age of Onset   • Hypertension Mother    • Thyroid disease Mother    • Arthritis Mother    • Glaucoma Mother    • Stomach cancer Father 46   • Cancer Father    • No Known Problems Maternal Grandmother    • No Known Problems Maternal Grandfather    • No Known Problems Paternal Grandmother    • No Known Problems Paternal Grandfather    • Dementia Maternal Aunt    • No Known Problems Maternal Aunt    • No Known Problems Paternal Aunt    • Hypertension Brother    • Osteoporosis Family    • Hyperlipidemia Family    • Hypertension Family    • Dementia Maternal Aunt    • Hypertension Maternal Aunt Social History:  Social History     Socioeconomic History   • Marital status: Single     Spouse name: None   • Number of children: None   • Years of education: None   • Highest education level: None   Occupational History   • None   Tobacco Use   • Smoking status: Never Smoker   • Smokeless tobacco: Never Used   Vaping Use   • Vaping Use: Never used   Substance and Sexual Activity   • Alcohol use: Yes     Comment: Glass of wine a few times a year   • Drug use: Yes   • Sexual activity: Never   Other Topics Concern   • None   Social History Narrative   • None     Social Determinants of Health     Financial Resource Strain: Low Risk    • Difficulty of Paying Living Expenses: Not hard at all   Food Insecurity: Not on file   Transportation Needs: No Transportation Needs   • Lack of Transportation (Medical): No   • Lack of Transportation (Non-Medical): No   Physical Activity: Not on file   Stress: Not on file   Social Connections: Not on file   Intimate Partner Violence: Not on file   Housing Stability: Not on file       Allergies:    Allergies   Allergen Reactions   • Indomethacin GI Intolerance     Other reaction(s): GI upset  Other reaction(s): GI upset   • Oxycodone Dizziness, Nausea Only, GI Intolerance and Other (See Comments)     Other reaction(s): GI upset  Other reaction(s): GI upset       Labs:  0   Lab Value Date/Time    HCT 47 8 (H) 04/07/2022 0837    HCT 48 0 (H) 04/01/2019 0934    HGB 15 0 04/07/2022 0837    HGB 15 2 04/01/2019 0934    WBC 8 85 04/07/2022 0837    WBC 5 72 04/01/2019 0934       Meds:    Current Outpatient Medications:   •  acetaminophen (TYLENOL) 325 mg tablet, Take by mouth, Disp: , Rfl:   •  alendronate (FOSAMAX) 70 mg tablet, TAKE 1 TABLET BY MOUTH WEEKLY, Disp: 12 tablet, Rfl: 4  •  Biotin 1000 MCG tablet, Take by mouth, Disp: , Rfl:   •  Calcium Carb-Cholecalciferol (CALTRATE 600+D3 PO), Caltrate 600-D Plus Minerals 600 mg calcium-800 unit-50 mg tablet  Take by oral route , Disp: , Rfl:   •  Carboxymethylcellul-Glycerin 0 5-0 9 % SOLN, Refresh Dry Eye Therapy SOLN Bid   Refills: 0     Active, Disp: , Rfl:   •  Glucosamine-Chondroitin 500-400 MG CAPS, Take by mouth, Disp: , Rfl:   •  levothyroxine 25 mcg tablet, Take 1 tablet (25 mcg total) by mouth daily, Disp: 90 tablet, Rfl: 3  •  Naphazoline-Polyethyl Glycol 0 012-0 2 % SOLN, Apply 1-2 drops to eye 4 (four) times a day, Disp: , Rfl:   •  simvastatin (ZOCOR) 10 mg tablet, 1 tablet once daily, Disp: 90 tablet, Rfl: 3      Physical Exam:   Vitals:       General Appearance:    Alert, cooperative, no distress, appears stated age   Head:    Normocephalic, without obvious abnormality, atraumatic   Eyes:    conjunctiva/corneas clear, both eyes        Nose:   Nares normal, septum midline, no drainage    Throat:   Lips normal; teeth and gums normal   Neck:    symmetrical, trachea midline, ;     thyroid:  no enlargement/   Back:     Symmetric, no curvature, ROM normal   Lungs:   No audible wheezing or labored breathing   Chest Wall:    No tenderness or deformity    Heart:    Regular rate and rhythm               Pulses:   2+ and symmetric all extremities   Skin:   Skin color, texture, turgor normal, no rashes or lesions   Neurologic:   normal strength, sensation and reflexes     throughout       Musculoskeletal: bilateral lower extremity  · On examination of the right knee there is no effusion, no erythema  Range of motion to full active extension and flexion to greater than 120°  Pain on palpation medial and lateral joint lines  There is crepitus with range of motion, no warmth to palpation, bony enlargement noted  No pain on palpation pes anserine bursa region or distal iliotibial band  Stable to varus and valgus stress without pain or gapping  Negative anterior and posterior drawer testing  Sensation intact distal pulses present  · On examination of the left knee there is no effusion, no erythema    Range of motion to full active extension and flexion to greater than 120°  Pain on palpation medial and lateral joint lines  There is crepitus with range of motion, no warmth to palpation, bony enlargement noted  No pain on palpation pes anserine bursa region or distal iliotibial band  Stable to varus and valgus stress without pain or gapping  Negative anterior and posterior drawer testing  Sensation intact distal pulses present  Large joint arthrocentesis: R knee  Universal Protocol:  Consent given by: patient  Patient understanding: patient states understanding of the procedure being performed  Site marked: the operative site was marked  Patient identity confirmed: verbally with patient    Supporting Documentation  Indications: pain   Procedure Details  Location: knee - R knee  Needle size: 22 G  Approach: superior  Medications administered: 20 mg Sodium Hyaluronate 20 MG/2ML    Patient tolerance: patient tolerated the procedure well with no immediate complications    Large joint arthrocentesis: L knee  Universal Protocol:  Consent given by: patient  Patient understanding: patient states understanding of the procedure being performed  Site marked: the operative site was marked  Patient identity confirmed: verbally with patient    Supporting Documentation  Indications: pain   Procedure Details  Location: knee - L knee  Needle size: 22 G  Approach: superior  Medications administered: 20 mg Sodium Hyaluronate 20 MG/2ML    Patient tolerance: patient tolerated the procedure well with no immediate complications            _*_*_*_*_*_*_*_*_*_*_*_*_*_*_*_*_*_*_*_*_*_*_*_*_*_*_*_*_*_*_*_*_*_*_*_*_*_*_*_*_*    Assessment:  69 y  o female with bilateral knee chronic pain due to osteoarthritis    Plan:   · Weight bearing as tolerated  bilateral lower extremity  · Bilateral knee Euflexxa 2 and a series of 3 injections administered as noted above  · In Advised no significant activity or increased ambulation over the next 24-48 hours and warm compresses to the knee no greater 20 minutes 3-4 times 24 hours following the injection  Patient advised should they develop any increasing pain, redness, drainage, numbness, tingling or swelling surrounding the injection sight, they are to contact our office or present to the emergency department    · Follow up 1 week for final bilateral knee intra-articular Euflexxa injections      Sharon Zarate PA-C

## 2022-11-11 ENCOUNTER — PROCEDURE VISIT (OUTPATIENT)
Dept: OBGYN CLINIC | Facility: CLINIC | Age: 77
End: 2022-11-11

## 2022-11-11 VITALS
BODY MASS INDEX: 24.17 KG/M2 | SYSTOLIC BLOOD PRESSURE: 160 MMHG | HEART RATE: 66 BPM | HEIGHT: 61 IN | WEIGHT: 128 LBS | DIASTOLIC BLOOD PRESSURE: 74 MMHG

## 2022-11-11 DIAGNOSIS — M17.12 PRIMARY OSTEOARTHRITIS OF LEFT KNEE: ICD-10-CM

## 2022-11-11 DIAGNOSIS — M17.11 PRIMARY OSTEOARTHRITIS OF RIGHT KNEE: Primary | ICD-10-CM

## 2022-11-11 RX ORDER — HYALURONATE SODIUM 10 MG/ML
20 SYRINGE (ML) INTRAARTICULAR
Status: COMPLETED | OUTPATIENT
Start: 2022-11-11 | End: 2022-11-11

## 2022-11-11 RX ADMIN — Medication 20 MG: at 12:43

## 2022-11-11 NOTE — PROGRESS NOTES
Orthopedics          Morena Navarro 69 y o  female MRN: [id number]      Chief Complaint:   bilateral knee pain    HPI:   69 y  o female complaining of bilateral knee pain  Patient presents for her final in series of 3 intra-articular knee injections she states having no change in pain having only aching pain or bilateral knees worse activity mildly relieved with rest denies any changes numbness tingling                  Review Of Systems:   · Skin: Normal  · Neuro: See HPI  · Musculoskeletal: See HPI  · All other systems reviewed and are negative    Past Medical History:   Past Medical History:   Diagnosis Date   • Arthritis 2005   • Closed nondisplaced fracture of fifth metatarsal bone of right foot with routine healing    • Disease of thyroid gland     hypothyroid   • Glaucoma, bilateral    • Hyperlipidemia    • Osteoporosis    • Scoliosis 12/12/12       Past Surgical History:   Past Surgical History:   Procedure Laterality Date   • BUNIONECTOMY     • COLONOSCOPY  06/04/2019   • CORRECTION HAMMER TOE     • MAMMO (HISTORICAL)  07/09/2018   • NEUROMA EXCISION     • OTHER SURGICAL HISTORY      Birth kwasi removed   • WISDOM TOOTH EXTRACTION         Family History:  Family history reviewed and non-contributory  Family History   Problem Relation Age of Onset   • Hypertension Mother    • Thyroid disease Mother    • Arthritis Mother    • Glaucoma Mother    • Stomach cancer Father 46   • Cancer Father    • No Known Problems Maternal Grandmother    • No Known Problems Maternal Grandfather    • No Known Problems Paternal Grandmother    • No Known Problems Paternal Grandfather    • Dementia Maternal Aunt    • No Known Problems Maternal Aunt    • No Known Problems Paternal Aunt    • Hypertension Brother    • Osteoporosis Family    • Hyperlipidemia Family    • Hypertension Family    • Dementia Maternal Aunt    • Hypertension Maternal Aunt          Social History:  Social History     Socioeconomic History   • Marital status: Single     Spouse name: None   • Number of children: None   • Years of education: None   • Highest education level: None   Occupational History   • None   Tobacco Use   • Smoking status: Never Smoker   • Smokeless tobacco: Never Used   Vaping Use   • Vaping Use: Never used   Substance and Sexual Activity   • Alcohol use: Yes     Comment: Glass of wine a few times a year   • Drug use: Yes   • Sexual activity: Never   Other Topics Concern   • None   Social History Narrative   • None     Social Determinants of Health     Financial Resource Strain: Low Risk    • Difficulty of Paying Living Expenses: Not hard at all   Food Insecurity: Not on file   Transportation Needs: No Transportation Needs   • Lack of Transportation (Medical): No   • Lack of Transportation (Non-Medical): No   Physical Activity: Not on file   Stress: Not on file   Social Connections: Not on file   Intimate Partner Violence: Not on file   Housing Stability: Not on file       Allergies:    Allergies   Allergen Reactions   • Indomethacin GI Intolerance     Other reaction(s): GI upset  Other reaction(s): GI upset   • Oxycodone Dizziness, Nausea Only, GI Intolerance and Other (See Comments)     Other reaction(s): GI upset  Other reaction(s): GI upset       Labs:  0   Lab Value Date/Time    HCT 47 8 (H) 04/07/2022 0837    HCT 48 0 (H) 04/01/2019 0934    HGB 15 0 04/07/2022 0837    HGB 15 2 04/01/2019 0934    WBC 8 85 04/07/2022 0837    WBC 5 72 04/01/2019 0934       Meds:    Current Outpatient Medications:   •  acetaminophen (TYLENOL) 325 mg tablet, Take by mouth, Disp: , Rfl:   •  alendronate (FOSAMAX) 70 mg tablet, TAKE 1 TABLET BY MOUTH WEEKLY, Disp: 12 tablet, Rfl: 4  •  Biotin 1000 MCG tablet, Take by mouth, Disp: , Rfl:   •  Calcium Carb-Cholecalciferol (CALTRATE 600+D3 PO), Caltrate 600-D Plus Minerals 600 mg calcium-800 unit-50 mg tablet  Take by oral route , Disp: , Rfl:   •  Carboxymethylcellul-Glycerin 0 5-0 9 % SOLN, Refresh Dry Eye Therapy SOLN Bid   Refills: 0     Active, Disp: , Rfl:   •  Glucosamine-Chondroitin 500-400 MG CAPS, Take by mouth, Disp: , Rfl:   •  levothyroxine 25 mcg tablet, Take 1 tablet (25 mcg total) by mouth daily, Disp: 90 tablet, Rfl: 3  •  Naphazoline-Polyethyl Glycol 0 012-0 2 % SOLN, Apply 1-2 drops to eye 4 (four) times a day, Disp: , Rfl:   •  simvastatin (ZOCOR) 10 mg tablet, 1 tablet once daily, Disp: 90 tablet, Rfl: 3      Physical Exam:   Vitals:    11/11/22 1213   BP: 160/74   Pulse: 66       General Appearance:    Alert, cooperative, no distress, appears stated age   Head:    Normocephalic, without obvious abnormality, atraumatic   Eyes:    conjunctiva/corneas clear, both eyes        Nose:   Nares normal, septum midline, no drainage    Throat:   Lips normal; teeth and gums normal   Neck:    symmetrical, trachea midline, ;     thyroid:  no enlargement/   Back:     Symmetric, no curvature, ROM normal   Lungs:   No audible wheezing or labored breathing   Chest Wall:    No tenderness or deformity    Heart:    Regular rate and rhythm               Pulses:   2+ and symmetric all extremities   Skin:   Skin color, texture, turgor normal, no rashes or lesions   Neurologic:   normal strength, sensation and reflexes     throughout       Musculoskeletal: bilateral lower extremity  · On examination of the right knee there is no effusion, no erythema  Range of motion to full active extension and flexion to greater than 120°  Pain on palpation medial and lateral joint lines  There is crepitus with range of motion, no warmth to palpation, bony enlargement noted  No pain on palpation pes anserine bursa region or distal iliotibial band  Stable to varus and valgus stress without pain or gapping  Negative anterior and posterior drawer testing  Sensation intact distal pulses present  · On examination of the left knee there is no effusion, no erythema  Range of motion to full active extension and flexion to greater than 120°    Pain on palpation medial and lateral joint lines  There is crepitus with range of motion, no warmth to palpation, bony enlargement noted  No pain on palpation pes anserine bursa region or distal iliotibial band  Stable to varus and valgus stress without pain or gapping  Negative anterior and posterior drawer testing  Sensation intact distal pulses present  Large joint arthrocentesis: R knee  Universal Protocol:  Consent given by: patient  Patient understanding: patient states understanding of the procedure being performed  Site marked: the operative site was marked  Patient identity confirmed: verbally with patient    Supporting Documentation  Indications: pain   Procedure Details  Location: knee - R knee  Needle size: 22 G  Approach: superior  Medications administered: 20 mg Sodium Hyaluronate 20 MG/2ML    Patient tolerance: patient tolerated the procedure well with no immediate complications    Large joint arthrocentesis: L knee  Universal Protocol:  Consent given by: patient  Patient understanding: patient states understanding of the procedure being performed  Site marked: the operative site was marked  Patient identity confirmed: verbally with patient    Supporting Documentation  Indications: pain   Procedure Details  Location: knee - L knee  Needle size: 22 G  Approach: superior  Medications administered: 20 mg Sodium Hyaluronate 20 MG/2ML    Patient tolerance: patient tolerated the procedure well with no immediate complications          _*_*_*_*_*_*_*_*_*_*_*_*_*_*_*_*_*_*_*_*_*_*_*_*_*_*_*_*_*_*_*_*_*_*_*_*_*_*_*_*_*    Assessment:  69 y  o female with bilateral knee chronic pain due to osteoarthritis    Plan:   · Weight bearing as tolerated  bilateral lower extremity  · Three in a series of 3 intra-articular bilateral knee Euflexxa injections  · Advised no significant activity or increased ambulation over the next 24-48 hours and warm compresses to the knee no greater 20 minutes 3-4 times 24 hours following the injection  Patient advised should they develop any increasing pain, redness, drainage, numbness, tingling or swelling surrounding the injection sight, they are to contact our office or present to the emergency department    · Follow up in 3 months or sooner if needed      Yousuf Barragan PA-C

## 2022-11-23 ENCOUNTER — HOSPITAL ENCOUNTER (OUTPATIENT)
Dept: NON INVASIVE DIAGNOSTICS | Facility: CLINIC | Age: 77
Discharge: HOME | End: 2022-11-23

## 2022-11-23 VITALS
SYSTOLIC BLOOD PRESSURE: 160 MMHG | WEIGHT: 128 LBS | BODY MASS INDEX: 24.17 KG/M2 | HEIGHT: 61 IN | HEART RATE: 63 BPM | DIASTOLIC BLOOD PRESSURE: 74 MMHG

## 2022-11-23 VITALS
OXYGEN SATURATION: 99 % | DIASTOLIC BLOOD PRESSURE: 88 MMHG | BODY MASS INDEX: 24.17 KG/M2 | HEIGHT: 61 IN | WEIGHT: 128 LBS | SYSTOLIC BLOOD PRESSURE: 150 MMHG | HEART RATE: 73 BPM

## 2022-11-23 DIAGNOSIS — R94.31 ABNORMAL ELECTROCARDIOGRAM (ECG) (EKG): ICD-10-CM

## 2022-11-23 DIAGNOSIS — R07.9 CHEST PAIN: ICD-10-CM

## 2022-11-23 DIAGNOSIS — R07.9 CHEST PAIN SYNDROME: ICD-10-CM

## 2022-11-23 LAB
AORTIC ROOT: 3 CM
APICAL FOUR CHAMBER EJECTION FRACTION: 62 %
ASCENDING AORTA: 3 CM
BASELINE ST DEPRESSION: 0 MM
E WAVE DECELERATION TIME: 212 MS
FRACTIONAL SHORTENING: 33 % (ref 28–44)
INTERVENTRICULAR SEPTUM IN DIASTOLE (PARASTERNAL SHORT AXIS VIEW): 1.2 CM
INTERVENTRICULAR SEPTUM: 1.2 CM (ref 0.6–1.1)
LAAS-AP2: 9.8 CM2
LAAS-AP4: 12.1 CM2
LEFT ATRIUM AREA SYSTOLE SINGLE PLANE A4C: 11.9 CM2
LEFT ATRIUM SIZE: 2.9 CM
LEFT INTERNAL DIMENSION IN SYSTOLE: 2 CM (ref 2.1–4)
LEFT VENTRICLE DIASTOLIC VOLUME (MOD BIPLANE): 68 ML
LEFT VENTRICLE SYSTOLIC VOLUME (MOD BIPLANE): 27 ML
LEFT VENTRICULAR INTERNAL DIMENSION IN DIASTOLE: 3 CM (ref 3.5–6)
LEFT VENTRICULAR POSTERIOR WALL IN END DIASTOLE: 1.1 CM
LEFT VENTRICULAR STROKE VOLUME: 23 ML
LV EF: 61 %
LVSV (TEICH): 23 ML
MAX HR PERCENT: 92 %
MAX HR: 133 BPM
MV E'TISSUE VEL-SEP: 8 CM/S
MV PEAK A VEL: 0.74 M/S
MV PEAK E VEL: 64 CM/S
MV STENOSIS PRESSURE HALF TIME: 61 MS
MV VALVE AREA P 1/2 METHOD: 3.61 CM2
RATE PRESSURE PRODUCT: NORMAL
RIGHT ATRIUM AREA SYSTOLE A4C: 13.6 CM2
RIGHT VENTRICLE ID DIMENSION: 3.1 CM
SL CV LEFT ATRIUM LENGTH A2C: 3.7 CM
SL CV LV EF: 65
SL CV LV EF: 65
SL CV PED ECHO LEFT VENTRICLE DIASTOLIC VOLUME (MOD BIPLANE) 2D: 35 ML
SL CV PED ECHO LEFT VENTRICLE SYSTOLIC VOLUME (MOD BIPLANE) 2D: 12 ML
SL CV STRESS RECOVERY BP: 150 MMHG
SL CV STRESS RECOVERY HR: 78 BPM
SL CV STRESS RECOVERY O2 SAT: 98 %
SL CV STRESS STAGE REACHED: 2
STRESS ANGINA INDEX: 0
STRESS BASELINE BP: NORMAL MMHG
STRESS BASELINE HR: 73 BPM
STRESS DUKE TREADMILL SCORE: 6
STRESS O2 SAT REST: 99 %
STRESS PEAK HR: 129 BPM
STRESS POST ESTIMATED WORKLOAD: 7 METS
STRESS POST EXERCISE DUR MIN: 6 MIN
STRESS POST EXERCISE DUR SEC: 0 SEC
STRESS POST O2 SAT PEAK: 96 %
STRESS POST PEAK BP: 148 MMHG
STRESS ST DEPRESSION: 0 MM
TR MAX PG: 27 MMHG
TR PEAK VELOCITY: 2.6 M/S
TRICUSPID VALVE PEAK REGURGITATION VELOCITY: 2.6 M/S

## 2022-11-25 LAB
CHEST PAIN STATEMENT: NORMAL
MAX DIASTOLIC BP: 90 MMHG
MAX HEART RATE: 133 BPM
MAX PREDICTED HEART RATE: 144 BPM
MAX. SYSTOLIC BP: 160 MMHG
PROTOCOL NAME: NORMAL
REASON FOR TERMINATION: NORMAL
TARGET HR FORMULA: NORMAL
TEST INDICATION: NORMAL
TIME IN EXERCISE PHASE: NORMAL

## 2022-11-29 ENCOUNTER — TELEPHONE (OUTPATIENT)
Dept: INTERNAL MEDICINE CLINIC | Facility: CLINIC | Age: 77
End: 2022-11-29

## 2022-11-29 ENCOUNTER — TELEPHONE (OUTPATIENT)
Dept: CARDIOLOGY CLINIC | Facility: CLINIC | Age: 77
End: 2022-11-29

## 2022-11-29 NOTE — TELEPHONE ENCOUNTER
Patient called to let you know she saw her cardiologist and was sent for a couple tests  She did receive a call back from her cardiologist that her tests came back normal  She wanted to pass this along to you

## 2023-02-02 ENCOUNTER — APPOINTMENT (EMERGENCY)
Dept: RADIOLOGY | Facility: HOSPITAL | Age: 78
End: 2023-02-02

## 2023-02-02 ENCOUNTER — HOSPITAL ENCOUNTER (INPATIENT)
Facility: HOSPITAL | Age: 78
LOS: 7 days | End: 2023-02-09
Attending: EMERGENCY MEDICINE | Admitting: SURGERY

## 2023-02-02 DIAGNOSIS — S72.91XA FEMUR FRACTURE, RIGHT (HCC): Primary | ICD-10-CM

## 2023-02-02 DIAGNOSIS — S72.21XA CLOSED DISPLACED SUBTROCHANTERIC FRACTURE OF RIGHT FEMUR, INITIAL ENCOUNTER (HCC): ICD-10-CM

## 2023-02-02 PROBLEM — S72.90XA FEMUR FRACTURE (HCC): Status: ACTIVE | Noted: 2023-02-02

## 2023-02-02 LAB
ABO GROUP BLD: NORMAL
ABO GROUP BLD: NORMAL
ANION GAP SERPL CALCULATED.3IONS-SCNC: 6 MMOL/L (ref 4–13)
APTT PPP: 18 SECONDS (ref 23–37)
BASOPHILS # BLD AUTO: 0.07 THOUSANDS/ÂΜL (ref 0–0.1)
BASOPHILS NFR BLD AUTO: 0 % (ref 0–1)
BLD GP AB SCN SERPL QL: NEGATIVE
BUN SERPL-MCNC: 25 MG/DL (ref 5–25)
CALCIUM SERPL-MCNC: 9.3 MG/DL (ref 8.3–10.1)
CHLORIDE SERPL-SCNC: 108 MMOL/L (ref 96–108)
CO2 SERPL-SCNC: 27 MMOL/L (ref 21–32)
CREAT SERPL-MCNC: 0.95 MG/DL (ref 0.6–1.3)
EOSINOPHIL # BLD AUTO: 0.04 THOUSAND/ÂΜL (ref 0–0.61)
EOSINOPHIL NFR BLD AUTO: 0 % (ref 0–6)
ERYTHROCYTE [DISTWIDTH] IN BLOOD BY AUTOMATED COUNT: 14.3 % (ref 11.6–15.1)
ERYTHROCYTE [DISTWIDTH] IN BLOOD BY AUTOMATED COUNT: 14.4 % (ref 11.6–15.1)
GFR SERPL CREATININE-BSD FRML MDRD: 58 ML/MIN/1.73SQ M
GLUCOSE SERPL-MCNC: 135 MG/DL (ref 65–140)
HCT VFR BLD AUTO: 41.4 % (ref 34.8–46.1)
HCT VFR BLD AUTO: 43.6 % (ref 34.8–46.1)
HGB BLD-MCNC: 13.5 G/DL (ref 11.5–15.4)
HGB BLD-MCNC: 14.3 G/DL (ref 11.5–15.4)
IMM GRANULOCYTES # BLD AUTO: 0.11 THOUSAND/UL (ref 0–0.2)
IMM GRANULOCYTES NFR BLD AUTO: 1 % (ref 0–2)
INR PPP: 0.95 (ref 0.84–1.19)
LYMPHOCYTES # BLD AUTO: 1.23 THOUSANDS/ÂΜL (ref 0.6–4.47)
LYMPHOCYTES NFR BLD AUTO: 6 % (ref 14–44)
MCH RBC QN AUTO: 28.6 PG (ref 26.8–34.3)
MCH RBC QN AUTO: 28.8 PG (ref 26.8–34.3)
MCHC RBC AUTO-ENTMCNC: 32.6 G/DL (ref 31.4–37.4)
MCHC RBC AUTO-ENTMCNC: 32.8 G/DL (ref 31.4–37.4)
MCV RBC AUTO: 87 FL (ref 82–98)
MCV RBC AUTO: 89 FL (ref 82–98)
MONOCYTES # BLD AUTO: 1.17 THOUSAND/ÂΜL (ref 0.17–1.22)
MONOCYTES NFR BLD AUTO: 6 % (ref 4–12)
NEUTROPHILS # BLD AUTO: 16.62 THOUSANDS/ÂΜL (ref 1.85–7.62)
NEUTS SEG NFR BLD AUTO: 87 % (ref 43–75)
NRBC BLD AUTO-RTO: 0 /100 WBCS
PLATELET # BLD AUTO: 260 THOUSANDS/UL (ref 149–390)
PLATELET # BLD AUTO: 301 THOUSANDS/UL (ref 149–390)
PMV BLD AUTO: 11.3 FL (ref 8.9–12.7)
PMV BLD AUTO: 11.7 FL (ref 8.9–12.7)
POTASSIUM SERPL-SCNC: 4.2 MMOL/L (ref 3.5–5.3)
PROTHROMBIN TIME: 12.8 SECONDS (ref 11.6–14.5)
RBC # BLD AUTO: 4.68 MILLION/UL (ref 3.81–5.12)
RBC # BLD AUTO: 5 MILLION/UL (ref 3.81–5.12)
RH BLD: POSITIVE
RH BLD: POSITIVE
SODIUM SERPL-SCNC: 141 MMOL/L (ref 135–147)
SPECIMEN EXPIRATION DATE: NORMAL
WBC # BLD AUTO: 19.24 THOUSAND/UL (ref 4.31–10.16)
WBC # BLD AUTO: 19.66 THOUSAND/UL (ref 4.31–10.16)

## 2023-02-02 RX ORDER — ONDANSETRON 2 MG/ML
1 INJECTION INTRAMUSCULAR; INTRAVENOUS ONCE
Status: COMPLETED | OUTPATIENT
Start: 2023-02-02 | End: 2023-02-02

## 2023-02-02 RX ORDER — FENTANYL CITRATE 50 UG/ML
1 INJECTION, SOLUTION INTRAMUSCULAR; INTRAVENOUS ONCE
Status: COMPLETED | OUTPATIENT
Start: 2023-02-02 | End: 2023-02-02

## 2023-02-02 RX ORDER — HYDROMORPHONE HCL/PF 1 MG/ML
0.5 SYRINGE (ML) INJECTION
Status: DISCONTINUED | OUTPATIENT
Start: 2023-02-02 | End: 2023-02-09 | Stop reason: HOSPADM

## 2023-02-02 RX ORDER — CEFAZOLIN SODIUM 2 G/50ML
2000 SOLUTION INTRAVENOUS
Status: DISCONTINUED | OUTPATIENT
Start: 2023-02-03 | End: 2023-02-03 | Stop reason: HOSPADM

## 2023-02-02 RX ORDER — ENOXAPARIN SODIUM 100 MG/ML
30 INJECTION SUBCUTANEOUS EVERY 12 HOURS SCHEDULED
Status: DISCONTINUED | OUTPATIENT
Start: 2023-02-02 | End: 2023-02-09 | Stop reason: HOSPADM

## 2023-02-02 RX ORDER — OXYCODONE HYDROCHLORIDE 5 MG/1
2.5 TABLET ORAL EVERY 4 HOURS PRN
Status: DISCONTINUED | OUTPATIENT
Start: 2023-02-02 | End: 2023-02-09 | Stop reason: HOSPADM

## 2023-02-02 RX ORDER — ACETAMINOPHEN 325 MG/1
650 TABLET ORAL EVERY 6 HOURS PRN
Status: DISCONTINUED | OUTPATIENT
Start: 2023-02-02 | End: 2023-02-04

## 2023-02-02 RX ORDER — SODIUM CHLORIDE, SODIUM LACTATE, POTASSIUM CHLORIDE, CALCIUM CHLORIDE 600; 310; 30; 20 MG/100ML; MG/100ML; MG/100ML; MG/100ML
75 INJECTION, SOLUTION INTRAVENOUS CONTINUOUS
Status: DISCONTINUED | OUTPATIENT
Start: 2023-02-03 | End: 2023-02-04

## 2023-02-02 RX ORDER — OXYCODONE HYDROCHLORIDE 5 MG/1
5 TABLET ORAL EVERY 4 HOURS PRN
Status: DISCONTINUED | OUTPATIENT
Start: 2023-02-02 | End: 2023-02-02

## 2023-02-02 RX ORDER — OXYCODONE HYDROCHLORIDE 5 MG/1
5 TABLET ORAL EVERY 4 HOURS PRN
Status: DISCONTINUED | OUTPATIENT
Start: 2023-02-02 | End: 2023-02-09 | Stop reason: HOSPADM

## 2023-02-02 RX ADMIN — ENOXAPARIN SODIUM 30 MG: 30 INJECTION SUBCUTANEOUS at 21:46

## 2023-02-02 RX ADMIN — SODIUM CHLORIDE, SODIUM LACTATE, POTASSIUM CHLORIDE, AND CALCIUM CHLORIDE 75 ML/HR: .6; .31; .03; .02 INJECTION, SOLUTION INTRAVENOUS at 23:42

## 2023-02-02 RX ADMIN — OXYCODONE HYDROCHLORIDE 5 MG: 5 TABLET ORAL at 21:46

## 2023-02-02 NOTE — ED PROVIDER NOTES
History  Chief Complaint   Patient presents with   • Hip Injury     Has one step out of bathroom and felt right hip pop when stepping down, had to crawl to phone to call ems for help  Given 100mcg of fentanyl and 4 of zofran due to 2 episodes of vomiting after pain medication   Did not fall, lowered herself to floor, no head strike or thinners or LOC     43-year-old female with history of osteoarthritis presents with right hip pain  Patient states earlier today she was attempting to step over a landing when she felt a sharp pain and heard a crack in her right hip  Patient was able to lower herself to the ground and dragged herself to the telephone  Denies falls, loss of consciousness, or paresthesias  Denies prior injuries or surgeries to affected hip  Denies chronic steroid use  Prior to Admission Medications   Prescriptions Last Dose Informant Patient Reported? Taking? Biotin 1000 MCG tablet   Yes No   Sig: Take by mouth   Calcium Carb-Cholecalciferol (CALTRATE 600+D3 PO)   Yes No   Sig: Caltrate 600-D Plus Minerals 600 mg calcium-800 unit-50 mg tablet   Take by oral route     Carboxymethylcellul-Glycerin 0 5-0 9 % SOLN   Yes No   Sig: Refresh Dry Eye Therapy SOLN  Bid    Refills: 0       Active   Glucosamine-Chondroitin 500-400 MG CAPS   Yes No   Sig: Take by mouth   Naphazoline-Polyethyl Glycol 0 012-0 2 % SOLN   Yes No   Sig: Apply 1-2 drops to eye 4 (four) times a day   acetaminophen (TYLENOL) 325 mg tablet   Yes No   Sig: Take by mouth   alendronate (FOSAMAX) 70 mg tablet   No No   Sig: TAKE 1 TABLET BY MOUTH WEEKLY   levothyroxine 25 mcg tablet   No No   Sig: Take 1 tablet (25 mcg total) by mouth daily   simvastatin (ZOCOR) 10 mg tablet   No No   Si tablet once daily      Facility-Administered Medications: None       Past Medical History:   Diagnosis Date   • Arthritis    • Closed nondisplaced fracture of fifth metatarsal bone of right foot with routine healing    • Disease of thyroid gland     hypothyroid   • Glaucoma, bilateral    • Hyperlipidemia    • Osteoporosis    • Scoliosis 12/12/12       Past Surgical History:   Procedure Laterality Date   • BUNIONECTOMY     • COLONOSCOPY  06/04/2019   • CORRECTION HAMMER TOE     • MAMMO (HISTORICAL)  07/09/2018   • NEUROMA EXCISION     • OTHER SURGICAL HISTORY      Birth kwasi removed   • WISDOM TOOTH EXTRACTION         Family History   Problem Relation Age of Onset   • Hypertension Mother    • Thyroid disease Mother    • Arthritis Mother    • Glaucoma Mother    • Stomach cancer Father 46   • Cancer Father    • No Known Problems Maternal Grandmother    • No Known Problems Maternal Grandfather    • No Known Problems Paternal Grandmother    • No Known Problems Paternal Grandfather    • Dementia Maternal Aunt    • No Known Problems Maternal Aunt    • No Known Problems Paternal Aunt    • Hypertension Brother    • Osteoporosis Family    • Hyperlipidemia Family    • Hypertension Family    • Dementia Maternal Aunt    • Hypertension Maternal Aunt      I have reviewed and agree with the history as documented  E-Cigarette/Vaping   • E-Cigarette Use Never User      E-Cigarette/Vaping Substances   • Nicotine No    • THC No    • CBD No    • Flavoring No    • Other No    • Unknown No      Social History     Tobacco Use   • Smoking status: Never   • Smokeless tobacco: Never   Vaping Use   • Vaping Use: Never used   Substance Use Topics   • Alcohol use: Yes     Comment: Glass of wine a few times a year   • Drug use: Yes        Review of Systems   Constitutional: Negative for chills and fever  HENT: Negative for ear pain and sore throat  Eyes: Negative for pain and visual disturbance  Respiratory: Negative for cough and shortness of breath  Cardiovascular: Negative for chest pain and palpitations  Gastrointestinal: Negative for abdominal pain and vomiting  Genitourinary: Negative for dysuria and hematuria  Musculoskeletal: Positive for gait problem  Negative for back pain  Skin: Negative for color change and rash  Neurological: Negative for seizures and syncope  All other systems reviewed and are negative  Physical Exam  ED Triage Vitals [02/02/23 1450]   Temperature Pulse Respirations Blood Pressure SpO2   98 2 °F (36 8 °C) 70 18 148/70 96 %      Temp Source Heart Rate Source Patient Position - Orthostatic VS BP Location FiO2 (%)   Oral Monitor Lying Right arm --      Pain Score       2             Orthostatic Vital Signs  Vitals:    02/02/23 1450 02/02/23 1757 02/02/23 1802   BP: 148/70 168/87 168/87   Pulse: 70 74 76   Patient Position - Orthostatic VS: Lying         Physical Exam  Vitals and nursing note reviewed  Constitutional:       General: She is not in acute distress  Appearance: Normal appearance  She is normal weight  She is not ill-appearing, toxic-appearing or diaphoretic  HENT:      Head: Normocephalic and atraumatic  Right Ear: External ear normal       Left Ear: External ear normal       Nose: Nose normal       Mouth/Throat:      Mouth: Mucous membranes are moist       Pharynx: Oropharynx is clear  Eyes:      General:         Right eye: No discharge  Left eye: No discharge  Conjunctiva/sclera: Conjunctivae normal    Cardiovascular:      Rate and Rhythm: Normal rate and regular rhythm  Pulses: Normal pulses  Heart sounds: Normal heart sounds  No murmur heard  No friction rub  Pulmonary:      Effort: Pulmonary effort is normal  No respiratory distress  Breath sounds: Normal breath sounds  No wheezing or rales  Abdominal:      General: Abdomen is flat  Bowel sounds are normal  There is no distension  Palpations: Abdomen is soft  Tenderness: There is no abdominal tenderness  There is no guarding  Musculoskeletal:         General: Tenderness and deformity present  Normal range of motion  Cervical back: Normal range of motion and neck supple        Comments: Right leg shortened, internally rotated  Obvious deformity right proximal femur  No overlying skin changes  Skin:     General: Skin is warm and dry  Capillary Refill: Capillary refill takes less than 2 seconds  Coloration: Skin is not pale  Neurological:      Mental Status: She is alert and oriented to person, place, and time  Sensory: No sensory deficit     Psychiatric:         Mood and Affect: Mood normal          Behavior: Behavior normal          ED Medications  Medications   lactated ringers infusion (has no administration in time range)   tranexamic Acid 580 mg in sodium chloride 0 9 % 100 mL IVPB (has no administration in time range)   acetaminophen (TYLENOL) tablet 650 mg (has no administration in time range)   oxyCODONE (ROXICODONE) IR tablet 2 5 mg (has no administration in time range)   HYDROmorphone (DILAUDID) injection 0 5 mg (has no administration in time range)   enoxaparin (LOVENOX) subcutaneous injection 30 mg (has no administration in time range)   oxyCODONE (ROXICODONE) IR tablet 5 mg (has no administration in time range)   fentanyl citrate (PF) (FOR EMS ONLY) 100 mcg/2 mL injection 100 mcg (0 mcg Does not apply Given to EMS 2/2/23 1455)   ondansetron (FOR EMS ONLY) (ZOFRAN) 4 mg/2 mL injection 4 mg (0 mg Does not apply Given to EMS 2/2/23 1455)       Diagnostic Studies  Results Reviewed     Procedure Component Value Units Date/Time    Basic metabolic panel [[id number]] Collected: 02/02/23 1605    Lab Status: Final result Specimen: Blood from Arm, Right Updated: 02/02/23 1630     Sodium 141 mmol/L      Potassium 4 2 mmol/L      Chloride 108 mmol/L      CO2 27 mmol/L      ANION GAP 6 mmol/L      BUN 25 mg/dL      Creatinine 0 95 mg/dL      Glucose 135 mg/dL      Calcium 9 3 mg/dL      eGFR 58 ml/min/1 73sq m     Narrative:      [place] guidelines for Chronic Kidney Disease (CKD):   •  Stage 1 with normal or high GFR (GFR > 90 mL/min/1 73 square meters)  •  Stage 2 Mild CKD (GFR = 60-89 mL/min/1 73 square meters)  •  Stage 3A Moderate CKD (GFR = 45-59 mL/min/1 73 square meters)  •  Stage 3B Moderate CKD (GFR = 30-44 mL/min/1 73 square meters)  •  Stage 4 Severe CKD (GFR = 15-29 mL/min/1 73 square meters)  •  Stage 5 End Stage CKD (GFR <15 mL/min/1 73 square meters)  Note: GFR calculation is accurate only with a steady state creatinine    CBC and differential [[id number]]  (Abnormal) Collected: 02/02/23 1605    Lab Status: Final result Specimen: Blood from Arm, Right Updated: 02/02/23 1625     WBC 19 24 Thousand/uL      RBC 4 68 Million/uL      Hemoglobin 13 5 g/dL      Hematocrit 41 4 %      MCV 89 fL      MCH 28 8 pg      MCHC 32 6 g/dL      RDW 14 4 %      MPV 11 7 fL      Platelets 538 Thousands/uL      nRBC 0 /100 WBCs      Neutrophils Relative 87 %      Immat GRANS % 1 %      Lymphocytes Relative 6 %      Monocytes Relative 6 %      Eosinophils Relative 0 %      Basophils Relative 0 %      Neutrophils Absolute 16 62 Thousands/µL      Immature Grans Absolute 0 11 Thousand/uL      Lymphocytes Absolute 1 23 Thousands/µL      Monocytes Absolute 1 17 Thousand/µL      Eosinophils Absolute 0 04 Thousand/µL      Basophils Absolute 0 07 Thousands/µL                  XR chest 1 view   ED Interpretation by Ana Holman MD (02/02 3907)   The CXR was interpreted by me independently  On my read, it appears without acute abnormalities:  - The  cardiomediastinal  silhouette  is  unremarkable     - The  lungs  are  clear  - No  pleural  effusions   - No  pneumothorax    - The  pulmonary  vasculature  is  within  normal  limits     - The  trachea  is  midline       - Similar to previous CXR       XR femur 2 views RIGHT   ED Interpretation by Ana Holman MD (02/02 0273)   Displaced midshaft femur fracture      XR pelvis ap only 1 or 2 vw    (Results Pending)   XR hip/pelv 1 vw right if performed    (Results Pending)         Procedures  Procedures      ED Course SBIRT 22yo+    Flowsheet Row Most Recent Value   SBIRT (23 yo +)    In order to provide better care to our patients, we are screening all of our patients for alcohol and drug use  Would it be okay to ask you these screening questions? Unable to answer at this time Filed at: 02/02/2023 1452                Medical Decision Making  71-year-old female presents with signs and symptoms suggestive of right hip dislocation versus femur fracture  No other MSK injuries on exam   Plan: X-rays    X-rays consistent with femur fracture  Admit to trauma with orthopedic consult  Femur fracture, right ([facility] [address] ): acute illness or injury  Amount and/or Complexity of Data Reviewed  Labs: ordered  Radiology: ordered and independent interpretation performed  ECG/medicine tests: ordered  Risk  Prescription drug management  Decision regarding hospitalization  Disposition  Final diagnoses:   Femur fracture, right ([facility] [address] )     Time reflects when diagnosis was documented in both MDM as applicable and the Disposition within this note     Time User Action Codes Description Comment    2/2/2023  4:08 PM uJju Mc Add [S72 [id number]] Femur fracture, right ([facility] [address] )     2/2/2023  4:44 PM Dore Kayser Add [S72 [id number]] Closed displaced subtrochanteric fracture of right femur, initial encounter [facility])       ED Disposition     ED Disposition   Admit    Condition   Stable    Date/Time   u Feb 2, 2023  5:08 PM    Comment   Case was discussed with Dr Tobi Boston and the patient's admission status was agreed to be Admission Status: inpatient status to the service of Dr Tobi Boston              Follow-up Information    None         Current Discharge Medication List      CONTINUE these medications which have NOT CHANGED    Details   acetaminophen (TYLENOL) 325 mg tablet Take by mouth      alendronate (FOSAMAX) 70 mg tablet TAKE 1 TABLET BY MOUTH WEEKLY  Qty: 12 tablet, Refills: 4    Associated Diagnoses: Osteopenia after menopause Biotin 1000 MCG tablet Take by mouth      Calcium Carb-Cholecalciferol (CALTRATE 600+D3 PO) Caltrate 600-D Plus Minerals 600 mg calcium-800 unit-50 mg tablet   Take by oral route  Carboxymethylcellul-Glycerin 0 5-0 9 % SOLN Refresh Dry Eye Therapy SOLN  Bid    Refills: 0       Active      Glucosamine-Chondroitin 500-400 MG CAPS Take by mouth      levothyroxine 25 mcg tablet Take 1 tablet (25 mcg total) by mouth daily  Qty: 90 tablet, Refills: 3    Associated Diagnoses: Acquired hypothyroidism      Naphazoline-Polyethyl Glycol 0 012-0 2 % SOLN Apply 1-2 drops to eye 4 (four) times a day      simvastatin (ZOCOR) 10 mg tablet 1 tablet once daily  Qty: 90 tablet, Refills: 3    Associated Diagnoses: Hyperlipidemia, unspecified hyperlipidemia type           Outpatient Discharge Orders   Comprehensive metabolic panel   Standing Status: Future Standing Exp  Date: 02/02/24     Sedimentation rate, automated   Standing Status: Future Standing Exp  Date: 02/02/24     TSH, 3rd generation   Standing Status: Future Standing Exp  Date: 02/02/24     PTH, intact   Standing Status: Future Standing Exp  Date: 02/02/24       PDMP Review     None           ED Provider  Attending physically available and evaluated Quincy Kyle I managed the patient along with the ED Attending      Electronically Signed by         Guille Armstrong MD  02/02/23 2028

## 2023-02-03 ENCOUNTER — ANESTHESIA EVENT (INPATIENT)
Dept: PERIOP | Facility: HOSPITAL | Age: 78
End: 2023-02-03

## 2023-02-03 ENCOUNTER — ANESTHESIA (INPATIENT)
Dept: PERIOP | Facility: HOSPITAL | Age: 78
End: 2023-02-03

## 2023-02-03 ENCOUNTER — APPOINTMENT (INPATIENT)
Dept: RADIOLOGY | Facility: HOSPITAL | Age: 78
End: 2023-02-03

## 2023-02-03 PROBLEM — G89.11 ACUTE PAIN DUE TO TRAUMA: Status: ACTIVE | Noted: 2023-02-03

## 2023-02-03 PROBLEM — E03.9 HYPOTHYROIDISM: Status: ACTIVE | Noted: 2023-02-03
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
## 2023-02-03 LAB
ANION GAP SERPL CALCULATED.3IONS-SCNC: 8 MMOL/L (ref 4–13)
APTT PPP: 29 SECONDS (ref 23–37)
ATRIAL RATE: 72 BPM
BASOPHILS # BLD AUTO: 0.03 THOUSANDS/ÂΜL (ref 0–0.1)
BASOPHILS NFR BLD AUTO: 0 % (ref 0–1)
BUN SERPL-MCNC: 19 MG/DL (ref 5–25)
CALCIUM SERPL-MCNC: 8.8 MG/DL (ref 8.3–10.1)
CHLORIDE SERPL-SCNC: 107 MMOL/L (ref 96–108)
CO2 SERPL-SCNC: 25 MMOL/L (ref 21–32)
CREAT SERPL-MCNC: 0.73 MG/DL (ref 0.6–1.3)
EOSINOPHIL # BLD AUTO: 0 THOUSAND/ÂΜL (ref 0–0.61)
EOSINOPHIL NFR BLD AUTO: 0 % (ref 0–6)
ERYTHROCYTE [DISTWIDTH] IN BLOOD BY AUTOMATED COUNT: 14.7 % (ref 11.6–15.1)
GFR SERPL CREATININE-BSD FRML MDRD: 80 ML/MIN/1.73SQ M
GLUCOSE SERPL-MCNC: 115 MG/DL (ref 65–140)
HCT VFR BLD AUTO: 39.8 % (ref 34.8–46.1)
HGB BLD-MCNC: 13 G/DL (ref 11.5–15.4)
IMM GRANULOCYTES # BLD AUTO: 0.05 THOUSAND/UL (ref 0–0.2)
IMM GRANULOCYTES NFR BLD AUTO: 0 % (ref 0–2)
INR PPP: 1.05 (ref 0.84–1.19)
LYMPHOCYTES # BLD AUTO: 2.11 THOUSANDS/ÂΜL (ref 0.6–4.47)
LYMPHOCYTES NFR BLD AUTO: 19 % (ref 14–44)
MCH RBC QN AUTO: 28.4 PG (ref 26.8–34.3)
MCHC RBC AUTO-ENTMCNC: 32.7 G/DL (ref 31.4–37.4)
MCV RBC AUTO: 87 FL (ref 82–98)
MONOCYTES # BLD AUTO: 1.59 THOUSAND/ÂΜL (ref 0.17–1.22)
MONOCYTES NFR BLD AUTO: 14 % (ref 4–12)
NEUTROPHILS # BLD AUTO: 7.39 THOUSANDS/ÂΜL (ref 1.85–7.62)
NEUTS SEG NFR BLD AUTO: 67 % (ref 43–75)
NRBC BLD AUTO-RTO: 0 /100 WBCS
P AXIS: 74 DEGREES
PLATELET # BLD AUTO: 279 THOUSANDS/UL (ref 149–390)
PMV BLD AUTO: 11.8 FL (ref 8.9–12.7)
POTASSIUM SERPL-SCNC: 3.8 MMOL/L (ref 3.5–5.3)
PR INTERVAL: 142 MS
PROTHROMBIN TIME: 13.9 SECONDS (ref 11.6–14.5)
QRS AXIS: 205 DEGREES
QRSD INTERVAL: 80 MS
QT INTERVAL: 410 MS
QTC INTERVAL: 448 MS
RBC # BLD AUTO: 4.57 MILLION/UL (ref 3.81–5.12)
SODIUM SERPL-SCNC: 140 MMOL/L (ref 135–147)
T WAVE AXIS: 68 DEGREES
VENTRICULAR RATE: 72 BPM
WBC # BLD AUTO: 11.17 THOUSAND/UL (ref 4.31–10.16)

## 2023-02-03 PROCEDURE — 0QS636Z REPOSITION RIGHT UPPER FEMUR WITH INTRAMEDULLARY INTERNAL FIXATION DEVICE, PERCUTANEOUS APPROACH: ICD-10-PCS | Performed by: ORTHOPAEDIC SURGERY

## 2023-02-03 DEVICE — 5.0MM TI LOCKING SCREW W/T25 STARDRIVE 48MM F/IM NAIL-STER: Type: IMPLANTABLE DEVICE | Status: FUNCTIONAL

## 2023-02-03 DEVICE — 6.5MM TI RECON SCREW WITH T25 STARDRIVE 85MM-STERILE: Type: IMPLANTABLE DEVICE | Status: FUNCTIONAL

## 2023-02-03 DEVICE — 6.5MM TI RECON SCREW WITH T25 STARDRIVE 95MM-STERILE: Type: IMPLANTABLE DEVICE | Status: FUNCTIONAL

## 2023-02-03 DEVICE — 5.0MM TI LOCKING SCREW W/T25 STARDRIVE 42MM F/IM NAIL-STER: Type: IMPLANTABLE DEVICE | Status: FUNCTIONAL

## 2023-02-03 RX ORDER — ONDANSETRON 2 MG/ML
4 INJECTION INTRAMUSCULAR; INTRAVENOUS ONCE AS NEEDED
Status: DISCONTINUED | OUTPATIENT
Start: 2023-02-03 | End: 2023-02-03 | Stop reason: HOSPADM

## 2023-02-03 RX ORDER — CEFAZOLIN SODIUM 2 G/50ML
2000 SOLUTION INTRAVENOUS EVERY 8 HOURS
Status: COMPLETED | OUTPATIENT
Start: 2023-02-03 | End: 2023-02-04

## 2023-02-03 RX ORDER — LABETALOL HYDROCHLORIDE 5 MG/ML
INJECTION, SOLUTION INTRAVENOUS AS NEEDED
Status: DISCONTINUED | OUTPATIENT
Start: 2023-02-03 | End: 2023-02-03

## 2023-02-03 RX ORDER — HYDROMORPHONE HCL IN WATER/PF 6 MG/30 ML
0.2 PATIENT CONTROLLED ANALGESIA SYRINGE INTRAVENOUS
Status: DISCONTINUED | OUTPATIENT
Start: 2023-02-03 | End: 2023-02-03 | Stop reason: HOSPADM

## 2023-02-03 RX ORDER — OXYCODONE HYDROCHLORIDE 5 MG/1
5 TABLET ORAL EVERY 6 HOURS PRN
Qty: 30 TABLET | Refills: 0 | Status: ON HOLD | OUTPATIENT
Start: 2023-02-03 | End: 2023-02-13

## 2023-02-03 RX ORDER — CEFAZOLIN SODIUM 1 G/3ML
INJECTION, POWDER, FOR SOLUTION INTRAMUSCULAR; INTRAVENOUS AS NEEDED
Status: DISCONTINUED | OUTPATIENT
Start: 2023-02-03 | End: 2023-02-03

## 2023-02-03 RX ORDER — SODIUM CHLORIDE, SODIUM LACTATE, POTASSIUM CHLORIDE, CALCIUM CHLORIDE 600; 310; 30; 20 MG/100ML; MG/100ML; MG/100ML; MG/100ML
INJECTION, SOLUTION INTRAVENOUS CONTINUOUS PRN
Status: DISCONTINUED | OUTPATIENT
Start: 2023-02-03 | End: 2023-02-03

## 2023-02-03 RX ORDER — LEVOTHYROXINE SODIUM 0.03 MG/1
25 TABLET ORAL DAILY
Status: DISCONTINUED | OUTPATIENT
Start: 2023-02-03 | End: 2023-02-09 | Stop reason: HOSPADM

## 2023-02-03 RX ORDER — FENTANYL CITRATE 50 UG/ML
INJECTION, SOLUTION INTRAMUSCULAR; INTRAVENOUS AS NEEDED
Status: DISCONTINUED | OUTPATIENT
Start: 2023-02-03 | End: 2023-02-03

## 2023-02-03 RX ORDER — DEXAMETHASONE SODIUM PHOSPHATE 10 MG/ML
INJECTION, SOLUTION INTRAMUSCULAR; INTRAVENOUS AS NEEDED
Status: DISCONTINUED | OUTPATIENT
Start: 2023-02-03 | End: 2023-02-03

## 2023-02-03 RX ORDER — SODIUM CHLORIDE, SODIUM LACTATE, POTASSIUM CHLORIDE, CALCIUM CHLORIDE 600; 310; 30; 20 MG/100ML; MG/100ML; MG/100ML; MG/100ML
20 INJECTION, SOLUTION INTRAVENOUS CONTINUOUS
Status: DISCONTINUED | OUTPATIENT
Start: 2023-02-03 | End: 2023-02-04

## 2023-02-03 RX ORDER — ASPIRIN 81 MG/1
81 TABLET ORAL 2 TIMES DAILY
Qty: 56 TABLET | Refills: 0 | Status: ON HOLD | OUTPATIENT
Start: 2023-02-03 | End: 2023-03-03

## 2023-02-03 RX ORDER — FENTANYL CITRATE/PF 50 MCG/ML
25 SYRINGE (ML) INJECTION
Status: DISCONTINUED | OUTPATIENT
Start: 2023-02-03 | End: 2023-02-03 | Stop reason: HOSPADM

## 2023-02-03 RX ORDER — ONDANSETRON 2 MG/ML
INJECTION INTRAMUSCULAR; INTRAVENOUS AS NEEDED
Status: DISCONTINUED | OUTPATIENT
Start: 2023-02-03 | End: 2023-02-03

## 2023-02-03 RX ORDER — PRAVASTATIN SODIUM 20 MG
20 TABLET ORAL
Status: DISCONTINUED | OUTPATIENT
Start: 2023-02-03 | End: 2023-02-09 | Stop reason: HOSPADM

## 2023-02-03 RX ORDER — KETAMINE HCL IN NACL, ISO-OSM 100MG/10ML
SYRINGE (ML) INJECTION AS NEEDED
Status: DISCONTINUED | OUTPATIENT
Start: 2023-02-03 | End: 2023-02-03

## 2023-02-03 RX ORDER — LIDOCAINE HYDROCHLORIDE 10 MG/ML
INJECTION, SOLUTION EPIDURAL; INFILTRATION; INTRACAUDAL; PERINEURAL AS NEEDED
Status: DISCONTINUED | OUTPATIENT
Start: 2023-02-03 | End: 2023-02-03

## 2023-02-03 RX ORDER — PROPOFOL 10 MG/ML
INJECTION, EMULSION INTRAVENOUS AS NEEDED
Status: DISCONTINUED | OUTPATIENT
Start: 2023-02-03 | End: 2023-02-03

## 2023-02-03 RX ADMIN — Medication 10 MG: at 14:42

## 2023-02-03 RX ADMIN — FENTANYL CITRATE 25 MCG: 50 INJECTION, SOLUTION INTRAMUSCULAR; INTRAVENOUS at 14:16

## 2023-02-03 RX ADMIN — PROPOFOL 100 MG: 10 INJECTION, EMULSION INTRAVENOUS at 13:55

## 2023-02-03 RX ADMIN — FENTANYL CITRATE 50 MCG: 50 INJECTION, SOLUTION INTRAMUSCULAR; INTRAVENOUS at 14:42

## 2023-02-03 RX ADMIN — DEXAMETHASONE SODIUM PHOSPHATE 10 MG: 10 INJECTION, SOLUTION INTRAMUSCULAR; INTRAVENOUS at 13:58

## 2023-02-03 RX ADMIN — SODIUM CHLORIDE, SODIUM LACTATE, POTASSIUM CHLORIDE, AND CALCIUM CHLORIDE 20 ML/HR: .6; .31; .03; .02 INJECTION, SOLUTION INTRAVENOUS at 23:29

## 2023-02-03 RX ADMIN — LEVOTHYROXINE SODIUM 25 MCG: 25 TABLET ORAL at 23:29

## 2023-02-03 RX ADMIN — ENOXAPARIN SODIUM 30 MG: 30 INJECTION SUBCUTANEOUS at 08:29

## 2023-02-03 RX ADMIN — Medication 10 MG: at 15:08

## 2023-02-03 RX ADMIN — ACETAMINOPHEN 325 MG: 325 TABLET ORAL at 08:29

## 2023-02-03 RX ADMIN — SODIUM CHLORIDE, SODIUM LACTATE, POTASSIUM CHLORIDE, AND CALCIUM CHLORIDE 20 ML/HR: .6; .31; .03; .02 INJECTION, SOLUTION INTRAVENOUS at 16:54

## 2023-02-03 RX ADMIN — FENTANYL CITRATE 25 MCG: 50 INJECTION, SOLUTION INTRAMUSCULAR; INTRAVENOUS at 13:55

## 2023-02-03 RX ADMIN — ENOXAPARIN SODIUM 30 MG: 30 INJECTION SUBCUTANEOUS at 20:51

## 2023-02-03 RX ADMIN — CEFAZOLIN 2000 MG: 330 INJECTION, POWDER, FOR SOLUTION INTRAMUSCULAR; INTRAVENOUS at 14:03

## 2023-02-03 RX ADMIN — LABETALOL HYDROCHLORIDE 5 MG: 5 INJECTION, SOLUTION INTRAVENOUS at 15:07

## 2023-02-03 RX ADMIN — SODIUM CHLORIDE, SODIUM LACTATE, POTASSIUM CHLORIDE, AND CALCIUM CHLORIDE 75 ML/HR: .6; .31; .03; .02 INJECTION, SOLUTION INTRAVENOUS at 11:43

## 2023-02-03 RX ADMIN — CEFAZOLIN SODIUM 2000 MG: 2 SOLUTION INTRAVENOUS at 20:51

## 2023-02-03 RX ADMIN — ONDANSETRON 4 MG: 2 INJECTION INTRAMUSCULAR; INTRAVENOUS at 15:20

## 2023-02-03 RX ADMIN — OXYCODONE HYDROCHLORIDE 5 MG: 5 TABLET ORAL at 06:10

## 2023-02-03 RX ADMIN — LIDOCAINE HYDROCHLORIDE 50 MG: 10 INJECTION, SOLUTION EPIDURAL; INFILTRATION; INTRACAUDAL; PERINEURAL at 13:55

## 2023-02-03 RX ADMIN — TRANEXAMIC ACID 580 MG: 100 INJECTION, SOLUTION INTRAVENOUS at 01:19

## 2023-02-03 RX ADMIN — OXYCODONE HYDROCHLORIDE 5 MG: 5 TABLET ORAL at 16:58

## 2023-02-03 RX ADMIN — SODIUM CHLORIDE, SODIUM LACTATE, POTASSIUM CHLORIDE, AND CALCIUM CHLORIDE: .6; .31; .03; .02 INJECTION, SOLUTION INTRAVENOUS at 13:47

## 2023-02-03 NOTE — PLAN OF CARE
Problem: Potential for Falls  Goal: Patient will remain free of falls  Description: INTERVENTIONS:  - Educate patient/family on patient safety including physical limitations  - Instruct patient to call for assistance with activity   - Consult OT/PT to assist with strengthening/mobility   - Keep Call bell within reach  - Keep bed low and locked with side rails adjusted as appropriate  - Keep care items and personal belongings within reach  - Initiate and maintain comfort rounds  - Make Fall Risk Sign visible to staff  - Offer Toileting every  Hours, in advance of need  - Initiate/Maintain alarm  - Obtain necessary fall risk management equipment:   - Apply yellow socks and bracelet for high fall risk patients  - Consider moving patient to room near nurses station  Outcome: Progressing     Problem: MOBILITY - ADULT  Goal: Maintain or return to baseline ADL function  Description: INTERVENTIONS:  -  Assess patient's ability to carry out ADLs; assess patient's baseline for ADL function and identify physical deficits which impact ability to perform ADLs (bathing, care of mouth/teeth, toileting, grooming, dressing, etc )  - Assess/evaluate cause of self-care deficits   - Assess range of motion  - Assess patient's mobility; develop plan if impaired  - Assess patient's need for assistive devices and provide as appropriate  - Encourage maximum independence but intervene and supervise when necessary  - Involve family in performance of ADLs  - Assess for home care needs following discharge   - Consider OT consult to assist with ADL evaluation and planning for discharge  - Provide patient education as appropriate  Outcome: Progressing  Goal: Maintains/Returns to pre admission functional level  Description: INTERVENTIONS:  - Perform BMAT or MOVE assessment daily    - Set and communicate daily mobility goal to care team and patient/family/caregiver     - Collaborate with rehabilitation services on mobility goals if consulted  - Perform Range of Motion  times a day  - Reposition patient every hours    - Dangle patient  times a day  - Stand patient  times a day  - Ambulate patient  times a day  - Out of bed to chair times a day   - Out of bed for meals  times a day  - Out of bed for toileting  - Record patient progress and toleration of activity level   Outcome: Progressing     Problem: Prexisting or High Potential for Compromised Skin Integrity  Goal: Skin integrity is maintained or improved  Description: INTERVENTIONS:  - Identify patients at risk for skin breakdown  - Assess and monitor skin integrity  - Assess and monitor nutrition and hydration status  - Monitor labs   - Assess for incontinence   - Turn and reposition patient  - Assist with mobility/ambulation  - Relieve pressure over bony prominences  - Avoid friction and shearing  - Provide appropriate hygiene as needed including keeping skin clean and dry  - Evaluate need for skin moisturizer/barrier cream  - Collaborate with interdisciplinary team   - Patient/family teaching  - Consider wound care consult   Outcome: Progressing     Problem: PAIN - ADULT  Goal: Verbalizes/displays adequate comfort level or baseline comfort level  Description: Interventions:  - Encourage patient to monitor pain and request assistance  - Assess pain using appropriate pain scale  - Administer analgesics based on type and severity of pain and evaluate response  - Implement non-pharmacological measures as appropriate and evaluate response  - Consider cultural and social influences on pain and pain management  - Notify physician/advanced practitioner if interventions unsuccessful or patient reports new pain  Outcome: Progressing     Problem: SAFETY ADULT  Goal: Patient will remain free of falls  Description: INTERVENTIONS:  - Educate patient/family on patient safety including physical limitations  - Instruct patient to call for assistance with activity   - Consult OT/PT to assist with strengthening/mobility   - Keep Call bell within reach  - Keep bed low and locked with side rails adjusted as appropriate  - Keep care items and personal belongings within reach  - Initiate and maintain comfort rounds  - Make Fall Risk Sign visible to staff  - Offer Toileting every  Hours, in advance of need  - Initiate/Maintain alarm  - Obtain necessary fall risk management equipment:  - Apply yellow socks and bracelet for high fall risk patients  - Consider moving patient to room near nurses station  Outcome: Progressing  Goal: Maintain or return to baseline ADL function  Description: INTERVENTIONS:  -  Assess patient's ability to carry out ADLs; assess patient's baseline for ADL function and identify physical deficits which impact ability to perform ADLs (bathing, care of mouth/teeth, toileting, grooming, dressing, etc )  - Assess/evaluate cause of self-care deficits   - Assess range of motion  - Assess patient's mobility; develop plan if impaired  - Assess patient's need for assistive devices and provide as appropriate  - Encourage maximum independence but intervene and supervise when necessary  - Involve family in performance of ADLs  - Assess for home care needs following discharge   - Consider OT consult to assist with ADL evaluation and planning for discharge  - Provide patient education as appropriate  Outcome: Progressing  Goal: Maintains/Returns to pre admission functional level  Description: INTERVENTIONS:  - Perform BMAT or MOVE assessment daily    - Set and communicate daily mobility goal to care team and patient/family/caregiver  - Collaborate with rehabilitation services on mobility goals if consulted  - Perform Range of Motion  times a day  - Reposition patient every  hours    - Dangle patient times a day  - Stand patient  times a day  - Ambulate patient  times a day  - Out of bed to chair  times a day   - Out of bed for meals  times a day  - Out of bed for toileting  - Record patient progress and toleration of activity level   Outcome: Progressing     Problem: GENITOURINARY - ADULT  Goal: Maintains or returns to baseline urinary function  Description: INTERVENTIONS:  - Assess urinary function  - Encourage oral fluids to ensure adequate hydration if ordered  - Administer IV fluids as ordered to ensure adequate hydration  - Administer ordered medications as needed  - Offer frequent toileting  - Follow urinary retention protocol if ordered  Outcome: Progressing  Goal: Absence of urinary retention  Description: INTERVENTIONS:  - Assess patient’s ability to void and empty bladder  - Monitor I/O  - Bladder scan as needed  - Discuss with physician/AP medications to alleviate retention as needed  - Discuss catheterization for long term situations as appropriate  Outcome: Progressing  Goal: Urinary catheter remains patent  Description: INTERVENTIONS:  - Assess patency of urinary catheter  - If patient has a chronic ferguson, consider changing catheter if non-functioning  - Follow guidelines for intermittent irrigation of non-functioning urinary catheter  Outcome: Progressing     Problem: SKIN/TISSUE INTEGRITY - ADULT  Goal: Skin Integrity remains intact(Skin Breakdown Prevention)  Description: Assess:  -Perform Solitario assessment every  -Clean and moisturize skin every   -Inspect skin when repositioning, toileting, and assisting with ADLS  -Assess under medical devices such as  every   -Assess extremities for adequate circulation and sensation     Bed Management:  -Have minimal linens on bed & keep smooth, unwrinkled  -Change linens as needed when moist or perspiring  -Avoid sitting or lying in one position for more than hours while in bed  -Keep HOB at degrees     Toileting:  -Offer bedside commode  -Assess for incontinence every   -Use incontinent care products after each incontinent episode such as    Activity:  -Mobilize patient  times a day  -Encourage activity and walks on unit  -Encourage or provide ROM exercises -Turn and reposition patient every Hours  -Use appropriate equipment to lift or move patient in bed  -Instruct/ Assist with weight shifting every  when out of bed in chair  -Consider limitation of chair time  hour intervals    Skin Care:  -Avoid use of baby powder, tape, friction and shearing, hot water or constrictive clothing  -Relieve pressure over bony prominences using   -Do not massage red bony areas    Next Steps:  -Teach patient strategies to minimize risks such as   -Consider consults to  interdisciplinary teams such as   Outcome: Progressing  Goal: Incision(s), wounds(s) or drain site(s) healing without S/S of infection  Description: INTERVENTIONS  - Assess and document dressing, incision, wound bed, drain sites and surrounding tissue  - Provide patient and family education  - Perform skin care/dressing changes every   Outcome: Progressing  Goal: Pressure injury heals and does not worsen  Description: Interventions:  - Implement low air loss mattress or specialty surface (Criteria met)  - Apply silicone foam dressing  - Instruct/assist with weight shifting every  minutes when in chair   - Limit chair time to  hour intervals  - Use special pressure reducing interventions such as  when in chair   - Apply fecal or urinary incontinence containment device   - Perform passive or active ROM every  - Turn and reposition patient & offload bony prominences every  hours   - Utilize friction reducing device or surface for transfers   - Consider consults to  interdisciplinary teams such as   - Use incontinent care products after each incontinent episode such as   - Consider nutrition services referral as needed  Outcome: Progressing     Problem: MUSCULOSKELETAL - ADULT  Goal: Maintain or return mobility to safest level of function  Description: INTERVENTIONS:  - Assess patient's ability to carry out ADLs; assess patient's baseline for ADL function and identify physical deficits which impact ability to perform ADLs (bathing, care of mouth/teeth, toileting, grooming, dressing, etc )  - Assess/evaluate cause of self-care deficits   - Assess range of motion  - Assess patient's mobility  - Assess patient's need for assistive devices and provide as appropriate  - Encourage maximum independence but intervene and supervise when necessary  - Involve family in performance of ADLs  - Assess for home care needs following discharge   - Consider OT consult to assist with ADL evaluation and planning for discharge  - Provide patient education as appropriate  Outcome: Progressing  Goal: Maintain proper alignment of affected body part  Description: INTERVENTIONS:  - Support, maintain and protect limb and body alignment  - Provide patient/ family with appropriate education  Outcome: Progressing

## 2023-02-03 NOTE — ASSESSMENT & PLAN NOTE
- R subtrochanteric femur fracture, present on admission   - Status post traction placement on 2/2  - Appreciate Orthopedic surgery evaluation, recommendations and interventions as noted  - Maintain non weightbearing status on the right lower extremity in traction  - OR today, 2/3 for ORIF RLE    - Monitor right lower extremity neurovascular exam   - Continue multimodal analgesic regimen   - Continue DVT prophylaxis with lovenox   - PT and OT evaluation and treatment as indicated  - Outpatient follow up with Orthopedic surgery for re-evaluation

## 2023-02-03 NOTE — ANESTHESIA PREPROCEDURE EVALUATION
Procedure:  INSERTION NAIL IM FEMUR ANTEGRADE (TROCHANTERIC) (Right: Leg Upper)    Relevant Problems   CARDIO   (+) Chest pain      MUSCULOSKELETAL   (+) Primary osteoarthritis of left knee   (+) Primary osteoarthritis of right knee      Musculoskeletal and Integument   (+) Femur fracture (HCC)      Other   (+) Sjogren's syndrome (HCC)        Physical Exam    Airway    Mallampati score: III  TM Distance: >3 FB  Neck ROM: full     Dental       Cardiovascular      Pulmonary      Other Findings  Small torus on the roof of the mouth      Anesthesia Plan  ASA Score- 2     Anesthesia Type- general with ASA Monitors  Additional Monitors:   Airway Plan: ETT and LMA  Plan Factors-Exercise tolerance (METS): >4 METS  Chart reviewed  EKG reviewed  Imaging results reviewed  Existing labs reviewed  Patient summary reviewed  Induction- intravenous  Postoperative Plan- Plan for postoperative opioid use  Planned trial extubation    Informed Consent- Anesthetic plan and risks discussed with patient  I personally reviewed this patient with the CRNA  Discussed and agreed on the Anesthesia Plan with the CRNA  Luci Oliveira       VITALS  /79   Pulse 87   Temp 99 2 °F (37 3 °C)   Resp 17   SpO2 94%   BP Readings from Last 3 Encounters:   02/03/23 150/79   11/23/22 160/74   11/23/22 150/88     LABS  Results from Last 12 Months   Lab Units 02/03/23  0550 02/02/23  1605   SODIUM mmol/L 140 141   POTASSIUM mmol/L 3 8 4 2   CHLORIDE mmol/L 107 108   CO2 mmol/L 25 27   ANION GAP mmol/L 8 6   BUN mg/dL 19 25   CREATININE mg/dL 0 73 0 95   CALCIUM mg/dL 8 8 9 3   GLUCOSE RANDOM mg/dL 115 135     Results from Last 12 Months   Lab Units 02/03/23  0550 02/02/23  1849   WBC Thousand/uL 11 17* 19 66*   HEMOGLOBIN g/dL 13 0 14 3   HEMATOCRIT % 39 8 43 6   PLATELETS Thousands/uL 279 301     Results from Last 12 Months   Lab Units 02/03/23  0550 02/02/23  1849   INR  1 05 0 95   PTT seconds 29 18*       ANESTHESIA RISK-BENEFIT DISCUSSION  • BENEFITS INCLUDE (Darlin Kaur 81 [id number], PMID [id number]): (1) The anesthesia team reduces mortality for major surgeries, (2) The team provides as much sedation/amnesia as is reasonably possible, and (3) The team strives to reduce pain and discomfort  • RISKS INCLUDE THE FOLLOWING (PMID [id number]):  Neurologic Risks: IntraOp awareness (Risk is ~1:1,000 - 1:14,000; PMID [id number]), Stroke (Risk ~<0 1-2% for most cases; PMID 51090616), and POCD  Airway and Pulmonary Risks: Dental or mouth injury, throat pain, critical hypoxia, pneumothorax, prolonged intubation, post-op respiratory compromise  • Airway/Intubation factors: No prior advanced airway notes in [facility]  • Risk of pulmonary complications based on a simplified ARISCAT score (Major RFs: none): Score 0-2= Low, 1 6%  Cardiovascular Risks: Liliam-op cardiac injury (MACE)  If specialized vascular access is needed, risk of bleeding, infection, or injury to adjacent structures  If a bypass circuit is needed, risk of stroke, blood clot, vasoplegia  • EKG:   Encounter Date: 02/02/23   ECG 12 lead   Result Value    Ventricular Rate 72    Atrial Rate 72    KY Interval 142    QRSD Interval 80    QT Interval 410    QTC Interval 448    P Axis 74    QRS Axis 205    T Wave Axis 68    Narrative    Normal sinus rhythm  Indeterminate axis  Borderline ECG  No previous ECGs available  Confirmed by Vanessa Norris ([id number]) on 2/3/2023 8:50:07 AM     No results found for this or any previous visit  • Echo or other cardiac testing:   •  Overall, the patient's exercise capacity was normal for their age  The patient reached stage 2 0 of the protocol after exercising for 6 min and 0 sec and had a maximal HR of 133 bpm (92 % of MPHR) and 7 0 METS  The patient experienced no angina during the test  Blood pressure was elevated at the baseline and demonstrated a normal response to the stress  Heart rate demonstrated a normal response to stress    •  Stress ECG: The stress ECG is negative for ischemia after maximal exercise, without reproduction of symptoms  •  Left Ventricle: Left ventricular cavity size is normal  Wall thickness is mildly increased  The left ventricular ejection fraction is 65%  Systolic function is normal  Wall motion is normal   •  Peak Stress Echo: Left ventricle cavity has normal reduction in size at peak stress  The left ventricle systolic function is normal at peak stress  The peak stress echo showed normal wall motion  •  Echo Post Impression: This study shows a low prognostic risk  The study is normal, after maximal exercise  • Signs of severe cardiac instability: none  • Isidro's RCRI score and estimate risk of MACE (Major RFs: none): A score of 0= 0 4%  • Are hua-op or intra-op beta blockers indicated?: no   FEN/GI Risks: Aspiration (Approximately 0 5% risk per the IRIS trial) and PONV (10-80% depending on Apfel criteria)  • Patient meets ASA NPO guidelines: yes   Adverse drug reaction risks: Allergic reaction, overdose,risk of injury or accident if using machinery or performing physically or mentally hazardous tasks for 24 hrs after anesthesia    • Recent notable medications (including AC medications): see MAR  • Personal or family history of anesthesia complications: no  • Pregnancy Status: Not applicable   Mortality risks associated with anesthesia based on ASA-PS (PMID [id number])  - ASA-PS II: Estimated risk  1:20,000

## 2023-02-03 NOTE — DISCHARGE INSTR - AVS FIRST PAGE
Discharge Instructions - Orthopedics  Blayne Patino 69 y o  female MRN: [id number]  Unit/Bed#: PACU 06    Weight Bearing Status: You may bear weight as tolerated on the right leg  You have no specific restrictions, just whatever activity you can tolerate  DVT prophylaxis  Take aspirin 81 mg twice a day for 28 days after surgery  Pain:  Take over-the-counter Tylenol as instructed on the bottle for mild or moderate pain  Save the oxycodone for severe pain  Dressing Instructions:   Please keep clean, dry and intact until follow up     Appt Instructions: If you do not have your appointment, please call the clinic at [phone]  Follow-up should be with Dr Javi Spicer in 2 weeks  Otherwise followup as scheduled     Contact the office sooner if you experience any increased numbness/tingling in the extremities  Miscellaneous:  None    Please follow up on the incidental findings on your CT scans below:    CTA head and neck w wo contrast: CT brain: No acute intracranial abnormality  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 , CT angiography: There is a 2 5 x 2 0 cm focal outpouching at the expected location of the posterior communicating artery on the right  There is no vessel extending from this abnormality, suspicious for small aneurysm  Alternatively an infundibulum , could have this appearance  Recommend follow-up consultation with the [facility], a division of [address] [place], [place] [address]  for Neuroscience at [phone] , Incidental thyroid nodule(s) for which nonemergent thyroid ultrasound is recommended    Please let your primary physician know about those and ensure appropriate follow up

## 2023-02-03 NOTE — CASE MANAGEMENT
Case Management Assessment & Discharge Planning Note    Patient name Herbert Kennedy  Location [facility] [id number] MRN [id number]  : 1945 Date 2/3/2023       Current Admission Date: 2023  Current Admission Diagnosis:Femur fracture [facility])   Patient Active Problem List    Diagnosis Date Noted   • Femur fracture ([facility] [address] ) 2023   • Chest pain 10/31/2022   • Abnormal electrocardiogram (ECG) (EKG) 10/31/2022   • [facility]'s [facility] ) 10/06/2021   • Primary osteoarthritis of right knee 2019   • Primary osteoarthritis of left knee 2019      LOS (days): 1  Geometric Mean LOS (GMLOS) (days): 2 70  Days to GMLOS:2     OBJECTIVE:    Risk of Unplanned Readmission Score: 8 38     Current admission status: Inpatient       Preferred Pharmacy:   [address] [facility], [address]  [address] [facility]  Phone: [phone] Fax: [phone]    Primary Care Provider: Sacha Schwarz MD    Primary Insurance: MEDICARE  Secondary Insurance: [facility]    ASSESSMENT:  [address] [facility], [address] [facility] - Brother   Primary Phone: [phone] (Mobile)  Home Phone: [phone]               Advance Directives  Does patient have a [address]?: Yes  Does patient have Advance Directives?: Yes  Advance Directives: Living will, Power of  for health care  Primary Contact: Rody Police    Patient Information  Admitted from[de-identified] Home  Mental Status: Alert  During Assessment patient was accompanied by: Not accompanied during assessment  Assessment information provided by[de-identified] Patient  Primary Caregiver: Self  Support Systems: Self, Friend, Family members  [place] of Residence: [address] do you live in?: [facility] entry access options   Select all that apply : Stairs  Number of steps to enter home : 2  Do the steps have railings?: Yes  Type of Current Residence: [address] [place] home  Upon entering residence, is there a bedroom on the main floor (no further steps)?: No  A bedroom is located on the following floor levels of residence (select all that apply):: 2nd Floor  Upon entering residence, is there a bathroom on the main floor (no further steps)?: Yes  Number of steps to 2nd floor from main floor: One Flight  In the last 12 months, was there a time when you were not able to pay the mortgage or rent on time?: No  In the last 12 months, how many places have you lived?: 1  In the last 12 months, was there a time when you did not have a steady place to sleep or slept in a shelter (including now)?: No  Homeless/housing insecurity resource given?: N/A  Living Arrangements: Lives Alone  Is patient a ?: No    Activities of Daily Living Prior to Admission  Functional Status: Independent  Completes ADLs independently?: Yes  Ambulates independently?: Yes  Does patient currently own DME?: Yes  What DME does the patient currently own?: Allie Castillo  Does patient have a history of Outpatient Therapy (PT/OT)?: Yes (OP PT in past when experiencing severe back pain)  Does the patient have a history of Short-Term Rehab?: No  Does patient have a history of HHC?: No  Does patient currently have [address]?: No    Patient Information Continued  Income Source: Pension/prison  Does patient have prescription coverage?: Yes  Within the past 12 months, you worried that your food would run out before you got the money to buy more : Never true  Within the past 12 months, the food you bought just didn't last and you didn't have money to get more : Never true  Food insecurity resource given?: N/A  Does patient receive dialysis treatments?: No  Does patient have a history of substance abuse?: No  Does patient have a history of Mental Health Diagnosis?: No    Means of Transportation  Means of Transport to Appts[de-identified] Drives Self  In the past 12 months, has lack of transportation kept you from medical appointments or from getting medications?: No  In the past 12 months, has lack of transportation kept you from meetings, work, or from getting things needed for daily living?: No  Was application for public transport provided?: N/A        DISCHARGE DETAILS:    Discharge planning discussed with[de-identified] Patient  Freedom of Choice: Yes  Comments - Freedom of Choice: Discussed FOC  CM contacted family/caregiver?: No- see comments (Declined, patient is A & O)     CM reviewed d/c planning process including the following: identifying help at home, patient preference for d/c planning needs, Discharge Lounge, [facility] Meds to Bed program, availability of treatment team to discuss questions or concerns patient and/or family may have regarding understanding medications and recognizing signs and symptoms once discharged  CM also encouraged patient to follow up with all recommended appointments after discharge  Patient advised of importance for patient and family to participate in managing patient’s medical well being  Information obtained from patient  Lives alone in 2 story home, 2 [address], was IADLS, drives self, has walker at home (does not use at this time)  No falls in the past 3 months besides this fall  Has a supportive friend who lives across the street from her  Patient is a retired  for [employer]   Patient belongs to a [employer] league that plays every Monday afternoon, and enjoys walks with friends  Patient is a volunteer at [employer], volunteering 1x monthly  Has has COVID vaccines including 2 boosters, but has not had most recent booster

## 2023-02-03 NOTE — ASSESSMENT & PLAN NOTE
- mechanical in nature - felt a pop in her hip when she took a step and lowered herself to the ground  Did not strike her head     - Sustained the below stated injury  - PT/OT and case management

## 2023-02-03 NOTE — ASSESSMENT & PLAN NOTE
- Acute pain secondary to traumatic injuries  - Multi modal analgesic regimen  - Bowel regimen as long as using opioids   - Continue to monitor pain and adjust regimen as indicated

## 2023-02-03 NOTE — PROGRESS NOTES
Pastoral Care Progress Note    2/3/2023  Patient: Sam Reyes :   Admission Date & Time: 2023 1446  MRN: [id number] [facility]: [id number]         23 1400   Clinical Encounter Type   Visited With Patient   Sacramental Encounters   Sacrament of Sick-Anointing Visiting  anointed patient     Geetha Guzman conducted a pastoral visit with the pt and noted that the pt had been anointed by her [place]   No further needs were expressed at this time  Chaplains still remain available

## 2023-02-03 NOTE — PLAN OF CARE
Problem: Potential for Falls  Goal: Patient will remain free of falls  Description: INTERVENTIONS:  - Educate patient/family on patient safety including physical limitations  - Instruct patient to call for assistance with activity   - Consult OT/PT to assist with strengthening/mobility   - Keep Call bell within reach  - Keep bed low and locked with side rails adjusted as appropriate  - Keep care items and personal belongings within reach  - Initiate and maintain comfort rounds  - Make Fall Risk Sign visible to staff  - Offer Toileting every 1 Hours, in advance of need  - Initiate/Maintain alarm  - Obtain necessary fall risk management equipment  - Apply yellow socks and bracelet for high fall risk patients  - Consider moving patient to room near nurses station  Outcome: Progressing     Problem: MOBILITY - ADULT  Goal: Maintain or return to baseline ADL function  Description: INTERVENTIONS:  -  Assess patient's ability to carry out ADLs; assess patient's baseline for ADL function and identify physical deficits which impact ability to perform ADLs (bathing, care of mouth/teeth, toileting, grooming, dressing, etc )  - Assess/evaluate cause of self-care deficits   - Assess range of motion  - Assess patient's mobility; develop plan if impaired  - Assess patient's need for assistive devices and provide as appropriate  - Encourage maximum independence but intervene and supervise when necessary  - Involve family in performance of ADLs  - Assess for home care needs following discharge   - Consider OT consult to assist with ADL evaluation and planning for discharge  - Provide patient education as appropriate  Outcome: Progressing  Goal: Maintains/Returns to pre admission functional level  Description: INTERVENTIONS:  - Perform BMAT or MOVE assessment daily    - Set and communicate daily mobility goal to care team and patient/family/caregiver     - Collaborate with rehabilitation services on mobility goals if consulted  - Perform Range of Motion 3 times a day  - Reposition patient every 2 hours    - Dangle patient 3 times a day  - Stand patient 3 times a day  - Ambulate patient 3 times a day  - Out of bed to chair 3 times a day   - Out of bed for meals 3 times a day  - Out of bed for toileting  - Record patient progress and toleration of activity level   Outcome: Progressing     Problem: Prexisting or High Potential for Compromised Skin Integrity  Goal: Skin integrity is maintained or improved  Description: INTERVENTIONS:  - Identify patients at risk for skin breakdown  - Assess and monitor skin integrity  - Assess and monitor nutrition and hydration status  - Monitor labs   - Assess for incontinence   - Turn and reposition patient  - Assist with mobility/ambulation  - Relieve pressure over bony prominences  - Avoid friction and shearing  - Provide appropriate hygiene as needed including keeping skin clean and dry  - Evaluate need for skin moisturizer/barrier cream  - Collaborate with interdisciplinary team   - Patient/family teaching  - Consider wound care consult   Outcome: Progressing     Problem: PAIN - ADULT  Goal: Verbalizes/displays adequate comfort level or baseline comfort level  Description: Interventions:  - Encourage patient to monitor pain and request assistance  - Assess pain using appropriate pain scale  - Administer analgesics based on type and severity of pain and evaluate response  - Implement non-pharmacological measures as appropriate and evaluate response  - Consider cultural and social influences on pain and pain management  - Notify physician/advanced practitioner if interventions unsuccessful or patient reports new pain  Outcome: Progressing     Problem: SAFETY ADULT  Goal: Patient will remain free of falls  Description: INTERVENTIONS:  - Educate patient/family on patient safety including physical limitations  - Instruct patient to call for assistance with activity   - Consult OT/PT to assist with strengthening/mobility   - Keep Call bell within reach  - Keep bed low and locked with side rails adjusted as appropriate  - Keep care items and personal belongings within reach  - Initiate and maintain comfort rounds  - Make Fall Risk Sign visible to staff  - Offer Toileting every 1 Hours, in advance of need  - Initiate/Maintain alarm  - Obtain necessary fall risk management equipment  - Apply yellow socks and bracelet for high fall risk patients  - Consider moving patient to room near nurses station  Outcome: Progressing  Goal: Maintain or return to baseline ADL function  Description: INTERVENTIONS:  -  Assess patient's ability to carry out ADLs; assess patient's baseline for ADL function and identify physical deficits which impact ability to perform ADLs (bathing, care of mouth/teeth, toileting, grooming, dressing, etc )  - Assess/evaluate cause of self-care deficits   - Assess range of motion  - Assess patient's mobility; develop plan if impaired  - Assess patient's need for assistive devices and provide as appropriate  - Encourage maximum independence but intervene and supervise when necessary  - Involve family in performance of ADLs  - Assess for home care needs following discharge   - Consider OT consult to assist with ADL evaluation and planning for discharge  - Provide patient education as appropriate  Outcome: Progressing  Goal: Maintains/Returns to pre admission functional level  Description: INTERVENTIONS:  - Perform BMAT or MOVE assessment daily    - Set and communicate daily mobility goal to care team and patient/family/caregiver  - Collaborate with rehabilitation services on mobility goals if consulted  - Perform Range of Motion 3 times a day  - Reposition patient every 2 hours    - Dangle patient 3 times a day  - Stand patient 3 times a day  - Ambulate patient 3 times a day  - Out of bed to chair 3 times a day   - Out of bed for meals 3 times a day  - Out of bed for toileting  - Record patient progress and toleration of activity level   Outcome: Progressing     Problem: GENITOURINARY - ADULT  Goal: Maintains or returns to baseline urinary function  Description: INTERVENTIONS:  - Assess urinary function  - Encourage oral fluids to ensure adequate hydration if ordered  - Administer IV fluids as ordered to ensure adequate hydration  - Administer ordered medications as needed  - Offer frequent toileting  - Follow urinary retention protocol if ordered  Outcome: Progressing  Goal: Absence of urinary retention  Description: INTERVENTIONS:  - Assess patient’s ability to void and empty bladder  - Monitor I/O  - Bladder scan as needed  - Discuss with physician/AP medications to alleviate retention as needed  - Discuss catheterization for long term situations as appropriate  Outcome: Progressing  Goal: Urinary catheter remains patent  Description: INTERVENTIONS:  - Assess patency of urinary catheter  - If patient has a chronic ferguson, consider changing catheter if non-functioning  - Follow guidelines for intermittent irrigation of non-functioning urinary catheter  Outcome: Progressing     Problem: SKIN/TISSUE INTEGRITY - ADULT  Goal: Skin Integrity remains intact(Skin Breakdown Prevention)  Description: Assess:  -Perform Solitario assessment every shift  -Clean and moisturize skin every day  -Inspect skin when repositioning, toileting, and assisting with ADLS  -Assess under medical devices such as buck's traction every shift  -Assess extremities for adequate circulation and sensation     Bed Management:  -Have minimal linens on bed & keep smooth, unwrinkled  -Change linens as needed when moist or perspiring  -Avoid sitting or lying in one position for more than 2 hours while in bed  -Keep HOB at 30 degrees     Toileting:  -Offer bedside commode  -Assess for incontinence  -Use incontinent care products after each incontinent episode     Activity:  -Mobilize patient 3 times a day  -Encourage activity and walks on unit  -Encourage or provide ROM exercises   -Turn and reposition patient every 2 Hours  -Use appropriate equipment to lift or move patient in bed  -Instruct/ Assist with weight shifting every 15 min when out of bed in chair  -Consider limitation of chair time 1 hour intervals    Skin Care:  -Avoid use of baby powder, tape, friction and shearing, hot water or constrictive clothing  -Relieve pressure over bony prominences  -Do not massage red bony areas    Next Steps:  -Teach patient strategies to minimize risks    -Consider consults to  interdisciplinary teams  Outcome: Progressing  Goal: Incision(s), wounds(s) or drain site(s) healing without S/S of infection  Description: INTERVENTIONS  - Assess and document dressing, incision, wound bed, drain sites and surrounding tissue  - Provide patient and family education  - Perform skin care/dressing changes  Outcome: Progressing  Goal: Pressure injury heals and does not worsen  Description: Interventions:  - Implement low air loss mattress or specialty surface (Criteria met)  - Apply silicone foam dressing  - Instruct/assist with weight shifting every 15 minutes when in chair   - Limit chair time to 2 hour intervals  - Use special pressure reducing interventions such as soft-care when in chair   - Apply fecal or urinary incontinence containment device   - Perform passive or active ROM every 2 hours  - Turn and reposition patient & offload bony prominences every 2 hours   - Utilize friction reducing device or surface for transfers   - Consider consults to  interdisciplinary teams such as wound care if needed  - Use incontinent care products after each incontinent episode  - Consider nutrition services referral as needed  Outcome: Progressing     Problem: MUSCULOSKELETAL - ADULT  Goal: Maintain or return mobility to safest level of function  Description: INTERVENTIONS:  - Assess patient's ability to carry out ADLs; assess patient's baseline for ADL function and identify physical deficits which impact ability to perform ADLs (bathing, care of mouth/teeth, toileting, grooming, dressing, etc )  - Assess/evaluate cause of self-care deficits   - Assess range of motion  - Assess patient's mobility  - Assess patient's need for assistive devices and provide as appropriate  - Encourage maximum independence but intervene and supervise when necessary  - Involve family in performance of ADLs  - Assess for home care needs following discharge   - Consider OT consult to assist with ADL evaluation and planning for discharge  - Provide patient education as appropriate  Outcome: Progressing  Goal: Maintain proper alignment of affected body part  Description: INTERVENTIONS:  - Support, maintain and protect limb and body alignment  - Provide patient/ family with appropriate education  Outcome: Progressing

## 2023-02-03 NOTE — PROGRESS NOTES
Progress Note - Orthopedics   Jorge Ruvalcaba 69 y o  female MRN: [id number]  Unit/Bed#: [facility] 625-01      Subjective:    69 y  o female No acute events, no new complaints  Patient doing well  Pain well controlled  Denies fevers, chills, CP, SOB, N/V, numbness or tingling  Labs:  0   Lab Value Date/Time    HCT 43 6 02/02/2023 1849    HCT 41 4 02/02/2023 1605    HCT 47 8 (H) 04/07/2022 0837    HGB 14 3 02/02/2023 1849    HGB 13 5 02/02/2023 1605    HGB 15 0 04/07/2022 0837    INR 0 95 02/02/2023 1849    WBC 19 66 (H) 02/02/2023 1849    WBC 19 24 (H) 02/02/2023 1605    WBC 8 85 04/07/2022 0837       Meds:    Current Facility-Administered Medications:   •  acetaminophen (TYLENOL) tablet 650 mg, 650 mg, Oral, Q6H PRN, Anthony Maurice MD  •  ceFAZolin (ANCEF) IVPB (premix in dextrose) 2,000 mg 50 mL, 2,000 mg, Intravenous, On Call To OR, Shahnaz Mendoza MD  •  enoxaparin (LOVENOX) subcutaneous injection 30 mg, 30 mg, Subcutaneous, Q12H [address], Anthony Maurice MD, 30 mg at 02/02/23 2146  •  HYDROmorphone (DILAUDID) injection 0 5 mg, 0 5 mg, Intravenous, Q3H PRN, Anthony Maurice MD  •  lactated ringers infusion, 75 mL/hr, Intravenous, Continuous, Shahnaz Mendoza MD, Last Rate: 75 mL/hr at 02/02/23 2342, 75 mL/hr at 02/02/23 2342  •  oxyCODONE (ROXICODONE) IR tablet 2 5 mg, 2 5 mg, Oral, Q4H PRN, Anthony Maurice MD  •  oxyCODONE (ROXICODONE) IR tablet 5 mg, 5 mg, Oral, Q4H PRN, Anthony Maurice MD, 5 mg at 02/03/23 0175    Blood Culture:   No results found for: BLOODCX    Wound Culture:   No results found for: WOUNDCULT    Ins and Outs:  I/O last 24 hours:   In: -   Out: 550 [Urine:550]          Physical:  Vitals:    02/02/23 2229   BP: 148/80   Pulse: 90   Resp: 18   Temp: 99 7 °F (37 6 °C)   SpO2: 96%     Musculoskeletal: right Lower Extremity  · Skin intact  · Guffey Traction in place  · TTP over hip  · Sensation intact to saphenous, sural, tibial, superficial peroneal nerve, and deep peroneal  · Motor intact to +FHL/EHL, +ankle dorsi/plantar flexion  · 2+ DP pulse, symmetric bilaterally  · Digits warm and well perfused  · Capillary refill < 2 seconds    Assessment:    69 y  o female with subtrochanteric femur fracture  Plan for OR today       Plan:  · NWB RLE  · Plan for OR today  · PT/OT  · Pain control  · DVT ppx   · Dispo: Ortho will follow    Blossom Keller MD

## 2023-02-03 NOTE — H&P
H&P - Trauma   Varsha Gonzalez 69 y o  female MRN: [id number]  Unit/Bed#: [facility] 625-01 Encounter: [id number]    Trauma Alert: Other consult   Model of Arrival: Ambulance    Trauma Team: Attending Xochitl  Consultants:     Orthopedics: routine consult; Epic consult order placed; Assessment/Plan   Active Problems / Assessment/ Plan:  · Right subtrochanteric femur fracture  · TXA  · lovenox given isolated fracture  · Ortho consult  · Will keep NPO  · Osteoporosis  · Hypothyroid  · History of chest pain  · Worked up by cardiology, stress echo Nov 2022 normal ejection fraction, negative stress EKG  · Able to walk over 2 miles at baseline, over 4 metabolic equivalents   · Very low risk of perioperative cardiac complications per the revised cardiac risk index    History of Present Illness     Chief Complaint: right leg pain  Mechanism:Other: walking     HPI:    Varsha Gonzalez is a 69 y o  female with PMH of osteoporosis and hypothyroidism who took a step at home today and heard a "pop" in her right groin and had significant right leg pain  She was then able to lower herself to the ground with her left leg and called the ambulance  Xray workup significant for right subtrochanteric femur fracture  On my exam patient complaining of right leg pain  Denies head strike, loss of consciousness, or other pain  Review of Systems   Constitutional: Negative for activity change, fatigue and fever  Eyes: Negative for discharge  Respiratory: Negative for apnea, chest tightness and shortness of breath  Cardiovascular: Negative for chest pain  Gastrointestinal: Negative for abdominal distention, abdominal pain, nausea and vomiting  Genitourinary: Negative for difficulty urinating  Musculoskeletal: Positive for joint swelling  Negative for neck stiffness  Skin: Negative for wound  Neurological: Negative for dizziness and numbness       12-point, complete review of systems was reviewed and negative except as stated above      Historical Information     Past Medical History:   Diagnosis Date   • Arthritis 2005   • Closed nondisplaced fracture of fifth metatarsal bone of right foot with routine healing    • Disease of thyroid gland     hypothyroid   • Glaucoma, bilateral    • Hyperlipidemia    • Osteoporosis    • Scoliosis 12/12/12     Past Surgical History:   Procedure Laterality Date   • BUNIONECTOMY     • COLONOSCOPY  06/04/2019   • CORRECTION HAMMER TOE     • MAMMO (HISTORICAL)  07/09/2018   • NEUROMA EXCISION     • OTHER SURGICAL HISTORY      Birth kwasi removed   • WISDOM TOOTH EXTRACTION          Social History     Tobacco Use   • Smoking status: Never   • Smokeless tobacco: Never   Vaping Use   • Vaping Use: Never used   Substance Use Topics   • Alcohol use: Yes     Comment: Glass of wine a few times a year   • Drug use: Yes     Immunization History   Administered Date(s) Administered   • COVID-19 PFIZER VACCINE 0 3 ML IM 03/05/2021, 03/26/2021, 11/06/2021, 07/29/2022   • COVID-19 Pfizer vac (Gold-sucrose, gray cap) 12 yr+ IM 07/29/2022   • DTaP 05/11/2017, 05/11/2017   • INFLUENZA 11/06/2018, 10/08/2019, 10/26/2020   • Influenza Split High Dose Preservative Free IM 10/26/2020   • Influenza, high dose seasonal 0 7 mL 10/26/2020, 10/06/2021, 10/12/2022   • Pneumococcal 04/01/2011   • Pneumococcal Conjugate 13-Valent 04/22/2015   • Pneumococcal Conjugate PCV 7 04/01/2011   • Pneumococcal Polysaccharide PPV23 04/01/2011   • Tdap 05/11/2017   • Zoster 05/04/2012   • Zoster Vaccine Recombinant 04/22/2022, 04/22/2022, 08/23/2022   • influenza, trivalent, adjuvanted 10/26/2020, 10/06/2021     Last Tetanus: n/a  Family History: Non-contributory    1  Before the illness or injury that brought you to the Emergency, did you need someone to help you on a regular basis? 0=No   2  Since the illness or injury that brought you to the Emergency, have you needed more help than usual to take care of yourself? 0=No   3   Have you been hospitalized for one or more nights during the past 6 months (excluding a stay in the Emergency Department)? 0=No   4  In general, do you see well? 0=Yes   5  In general, do you have serious problems with your memory? 0=No   6  Do you take more than three different medications everyday? 1=Yes   TOTAL   1     Did you order a geriatric consult if the score was 2 or greater?: n/a     Meds/Allergies   all current active meds have been reviewed     Allergies   Allergen Reactions   • Indomethacin GI Intolerance     Other reaction(s): GI upset  Other reaction(s): GI upset   • Oxycodone Dizziness, Nausea Only, GI Intolerance and Other (See Comments)     Other reaction(s): GI upset  Other reaction(s): GI upset       Objective   Initial Vitals:   Temperature: 98 2 °F (36 8 °C) (02/02/23 1450)  Pulse: 70 (02/02/23 1450)  Respirations: 18 (02/02/23 1450)  Blood Pressure: 148/70 (02/02/23 1450)    Primary Survey:   Airway:        Status: patent;                  Breathing:                      Right breath sounds: normal       Left breath sounds: normal  Circulation:        Rhythm: regular       Rate: regular   Right Pulses Left Pulses    R radial: 2+  R femoral: 2+       L radial: 2+  L femoral: 2+         Disability:        GCS: Eye: 4; Verbal: 5 Motor: 6 Total: 15       Right Pupil:       Left Pupil:     R Motor Strength L Motor Strength               Exposure:       Completed: Yes      Secondary Survey:  Physical Exam  Constitutional:       General: She is not in acute distress  Appearance: She is not toxic-appearing  HENT:      Head: Normocephalic and atraumatic  Right Ear: External ear normal       Left Ear: External ear normal       Nose: Nose normal       Mouth/Throat:      Mouth: Mucous membranes are moist    Eyes:      Pupils: Pupils are equal, round, and reactive to light  Cardiovascular:      Rate and Rhythm: Normal rate  Pulses: Normal pulses     Pulmonary:      Effort: Pulmonary effort is normal  No respiratory distress  Abdominal:      General: Abdomen is flat  Musculoskeletal:         General: No swelling or tenderness  Cervical back: Normal range of motion  No tenderness  Comments: Obvious deformity right lower extremity, left leg shortened, neuro intact    Skin:     General: Skin is warm  Neurological:      General: No focal deficit present  Mental Status: She is alert and oriented to person, place, and time  Psychiatric:         Mood and Affect: Mood normal          Invasive Devices     Peripheral Intravenous Line  Duration           Peripheral IV 02/02/23 Right Antecubital <1 day          Drain  Duration           Urethral Catheter Straight-tip 16 Fr  <1 day              Lab Results: Results: I have personally reviewed all pertinent laboratory/tests results    Imaging Results: I have personally reviewed pertinent reports  Chest Xray(s): pending   FAST exam(s): N/A   CT Scan(s): N/A   Additional Xray(s): positive for acute findings: see assessment     Other Studies:     Code Status: Level 1 - Full Code  Advance Directive and Living Will:      Power of :    POLST:    I have spent 20 minutes with Patient  today in which greater than 50% of this time was spent in counseling/coordination of care regarding Diagnostic results

## 2023-02-03 NOTE — PHYSICAL THERAPY NOTE
Physical Therapy Cancellation Note    PT orders received chart review completed  Pt is currently off the floor in the OR and not appropriate to participate in skilled PT at this time  PT will follow and eval as medically appropriate  02/03/23 1300   Note Type   Note type Cancelled Session; Evaluation   Cancel Reasons Patient to operating room       Umesh Dacosta, PT

## 2023-02-03 NOTE — PROGRESS NOTES
[address] [facility]  Progress Note - Kirti Wright 1945, 69 y o  female MRN: [id number]  Unit/Bed#: OR POOL Encounter: [id number]  Primary Care Provider: Garfield Robert MD   Date and time admitted to hospital: 2/2/2023  2:46 PM    Fall  Assessment & Plan  - mechanical in nature - felt a pop in her hip when she took a step and lowered herself to the ground  Did not strike her head  - Sustained the below stated injury  - PT/OT and case management     * Femur fracture [facility])  Assessment & Plan  - R subtrochanteric femur fracture, present on admission   - Status post traction placement on 2/2  - Appreciate Orthopedic surgery evaluation, recommendations and interventions as noted  - Maintain non weightbearing status on the right lower extremity in traction  - OR today, 2/3 for ORIF RLE    - Monitor right lower extremity neurovascular exam   - Continue multimodal analgesic regimen   - Continue DVT prophylaxis with lovenox   - PT and OT evaluation and treatment as indicated  - Outpatient follow up with Orthopedic surgery for re-evaluation  Acute pain due to trauma  Assessment & Plan  - Acute pain secondary to traumatic injuries  - Multi modal analgesic regimen  - Bowel regimen as long as using opioids   - Continue to monitor pain and adjust regimen as indicated        Hypothyroidism  Assessment & Plan  - continue home levothyroxine        TRAUMA TERTIARY SURVEY NOTE    VTE Prophylaxis:Sequential compression device (Venodyne)  and Enoxaparin (Lovenox)     Disposition: continue med-surg status    Code status:  Level 1 - Full Code    Consultants: IP CONSULT TO ORTHOPEDIC SURGERY    Subjective   Transfer from: N/A    Mechanism of Injury:Other: Felt a pop in her hip and lowered herself to the ground     Chief Complaint: "I'm feeling sore"    HPI/Last 24 hour events: Patient was found to have a right femur fracture after she felt a pop in her hip at home when she took a step and lowered herself to the ground  She reports that the pain medications are helping  She has no other areas of pain  She did not hit her head and denies pain elsewhere  Objective   Vitals:   Temp:  [99 2 °F (37 3 °C)-99 7 °F (37 6 °C)] 99 2 °F (37 3 °C)  HR:  [74-90] 87  Resp:  [17-18] 17  BP: (148-168)/(79-87) 150/79    I/O       02/01 0701 02/02 0700 02/02 0701 02/03 0700 02/03 0701 02/04 0700    I V    1100    IV Piggyback  100     Total Intake  100 1100    Urine  550 875    Total Output  550 875    Net  -450 +225                  Physical Exam:   GENERAL APPEARANCE: NAD  NEURO: GCS 15,non-focal  HEENT: NCAT  CV: RRR, no MGR  LUNGS: CTA bilaterally  GI: soft,non-tender,non-distended  : voiding  MSK: + R thigh swelling, soft in all compartments  RLE externally rotated in traction  NVI distally in RLE  SKIN: pink, warm, dry    Invasive Devices     Peripheral Intravenous Line  Duration           Peripheral IV 02/02/23 Right Antecubital 1 day          Drain  Duration           Urethral Catheter Straight-tip 16 Fr  <1 day          Airway  Duration           Supraglottic Airway LMA 3 <1 day                   Lab Results:   Results: I have personally reviewed all pertinent laboratory/tests results, BMP/CMP:   Lab Results   Component Value Date    SODIUM 140 02/03/2023    K 3 8 02/03/2023     02/03/2023    CO2 25 02/03/2023    BUN 19 02/03/2023    CREATININE 0 73 02/03/2023    CALCIUM 8 8 02/03/2023    EGFR 80 02/03/2023    and CBC:   Lab Results   Component Value Date    WBC 11 17 (H) 02/03/2023    HGB 13 0 02/03/2023    HCT 39 8 02/03/2023    MCV 87 02/03/2023     02/03/2023    MCH 28 4 02/03/2023    MCHC 32 7 02/03/2023    RDW 14 7 02/03/2023    MPV 11 8 02/03/2023    NRBC 0 02/03/2023       Imaging Results: I have personally reviewed pertinent reports      XR hip/pelv 1 vw right if performed    Result Date: 2/3/2023  Impression: Right hip fracture Workstation performed: [id number]     XR femur 2 views RIGHT    Result Date: 2/3/2023  Impression: Acute transverse substantially displaced proximal femoral shaft fracture Workstation performed: [id number]     XR chest 1 view    Result Date: 2/3/2023  Impression: No acute cardiopulmonary disease   Findings are stable Workstation performed: [id number]     XR pelvis ap only 1 or 2 vw    Result Date: 2/3/2023  Impression: Acute displaced proximal right femoral diaphyseal fracture Workstation performed: [id number]     Other Studies: none

## 2023-02-03 NOTE — ANESTHESIA POSTPROCEDURE EVALUATION
Post-Op Assessment Note    CV Status:  Stable    Pain management: adequate     Mental Status:  Alert and awake   Hydration Status:  Euvolemic   PONV Controlled:  Controlled   Airway Patency:  Patent      Post Op Vitals Reviewed: Yes      Staff: CRNA         No notable events documented      BP   144/62   Temp   99   Pulse  66   Resp   18   SpO2   99

## 2023-02-03 NOTE — OCCUPATIONAL THERAPY NOTE
Occupational Therapy         Patient Name: Saturnino De La Cruz  QVUTK'E Date: 2/3/2023         02/03/23 0800   OT Last Visit   OT Visit Date 02/03/23   Note Type   Note type Evaluation; Cancelled Session   Cancel Reasons Patient to operating room   Additional Comments Pending sx fixation of fx - will defer and address OT needs postop       [facility]

## 2023-02-04 ENCOUNTER — APPOINTMENT (INPATIENT)
Dept: RADIOLOGY | Facility: HOSPITAL | Age: 78
End: 2023-02-04

## 2023-02-04 LAB
ANION GAP SERPL CALCULATED.3IONS-SCNC: 8 MMOL/L (ref 4–13)
BASOPHILS # BLD AUTO: 0.01 THOUSANDS/ÂΜL (ref 0–0.1)
BASOPHILS NFR BLD AUTO: 0 % (ref 0–1)
BUN SERPL-MCNC: 22 MG/DL (ref 5–25)
CALCIUM SERPL-MCNC: 8.3 MG/DL (ref 8.3–10.1)
CHLORIDE SERPL-SCNC: 106 MMOL/L (ref 96–108)
CO2 SERPL-SCNC: 25 MMOL/L (ref 21–32)
CREAT SERPL-MCNC: 0.86 MG/DL (ref 0.6–1.3)
EOSINOPHIL # BLD AUTO: 0 THOUSAND/ÂΜL (ref 0–0.61)
EOSINOPHIL NFR BLD AUTO: 0 % (ref 0–6)
ERYTHROCYTE [DISTWIDTH] IN BLOOD BY AUTOMATED COUNT: 14.8 % (ref 11.6–15.1)
GFR SERPL CREATININE-BSD FRML MDRD: 65 ML/MIN/1.73SQ M
GLUCOSE SERPL-MCNC: 145 MG/DL (ref 65–140)
HCT VFR BLD AUTO: 36 % (ref 34.8–46.1)
HGB BLD-MCNC: 11.9 G/DL (ref 11.5–15.4)
IMM GRANULOCYTES # BLD AUTO: 0.05 THOUSAND/UL (ref 0–0.2)
IMM GRANULOCYTES NFR BLD AUTO: 0 % (ref 0–2)
LYMPHOCYTES # BLD AUTO: 1.03 THOUSANDS/ÂΜL (ref 0.6–4.47)
LYMPHOCYTES NFR BLD AUTO: 9 % (ref 14–44)
MCH RBC QN AUTO: 29.2 PG (ref 26.8–34.3)
MCHC RBC AUTO-ENTMCNC: 33.1 G/DL (ref 31.4–37.4)
MCV RBC AUTO: 89 FL (ref 82–98)
MONOCYTES # BLD AUTO: 1.41 THOUSAND/ÂΜL (ref 0.17–1.22)
MONOCYTES NFR BLD AUTO: 12 % (ref 4–12)
NEUTROPHILS # BLD AUTO: 9.38 THOUSANDS/ÂΜL (ref 1.85–7.62)
NEUTS SEG NFR BLD AUTO: 79 % (ref 43–75)
NRBC BLD AUTO-RTO: 0 /100 WBCS
PLATELET # BLD AUTO: 284 THOUSANDS/UL (ref 149–390)
PMV BLD AUTO: 11.8 FL (ref 8.9–12.7)
POTASSIUM SERPL-SCNC: 4 MMOL/L (ref 3.5–5.3)
RBC # BLD AUTO: 4.07 MILLION/UL (ref 3.81–5.12)
SODIUM SERPL-SCNC: 139 MMOL/L (ref 135–147)
WBC # BLD AUTO: 11.88 THOUSAND/UL (ref 4.31–10.16)

## 2023-02-04 RX ORDER — ONDANSETRON 2 MG/ML
4 INJECTION INTRAMUSCULAR; INTRAVENOUS ONCE
Status: COMPLETED | OUTPATIENT
Start: 2023-02-04 | End: 2023-02-04

## 2023-02-04 RX ORDER — AMOXICILLIN 250 MG
1 CAPSULE ORAL
Status: DISCONTINUED | OUTPATIENT
Start: 2023-02-04 | End: 2023-02-09 | Stop reason: HOSPADM

## 2023-02-04 RX ORDER — ACETAMINOPHEN 325 MG/1
975 TABLET ORAL EVERY 6 HOURS
Status: DISCONTINUED | OUTPATIENT
Start: 2023-02-04 | End: 2023-02-09 | Stop reason: HOSPADM

## 2023-02-04 RX ORDER — MINERAL OIL AND PETROLATUM 150; 830 MG/G; MG/G
OINTMENT OPHTHALMIC
Status: DISCONTINUED | OUTPATIENT
Start: 2023-02-04 | End: 2023-02-09 | Stop reason: HOSPADM

## 2023-02-04 RX ADMIN — SENNOSIDES AND DOCUSATE SODIUM 1 TABLET: 8.6; 5 TABLET ORAL at 21:11

## 2023-02-04 RX ADMIN — LEVOTHYROXINE SODIUM 25 MCG: 25 TABLET ORAL at 21:11

## 2023-02-04 RX ADMIN — ONDANSETRON 4 MG: 2 INJECTION INTRAMUSCULAR; INTRAVENOUS at 19:29

## 2023-02-04 RX ADMIN — ENOXAPARIN SODIUM 30 MG: 30 INJECTION SUBCUTANEOUS at 08:44

## 2023-02-04 RX ADMIN — PRAVASTATIN SODIUM 20 MG: 20 TABLET ORAL at 16:23

## 2023-02-04 RX ADMIN — ACETAMINOPHEN 975 MG: 325 TABLET ORAL at 21:11

## 2023-02-04 RX ADMIN — ACETAMINOPHEN 975 MG: 325 TABLET ORAL at 08:44

## 2023-02-04 RX ADMIN — ACETAMINOPHEN 975 MG: 325 TABLET ORAL at 14:31

## 2023-02-04 RX ADMIN — ENOXAPARIN SODIUM 30 MG: 30 INJECTION SUBCUTANEOUS at 21:11

## 2023-02-04 RX ADMIN — CEFAZOLIN SODIUM 2000 MG: 2 SOLUTION INTRAVENOUS at 04:58

## 2023-02-04 NOTE — PLAN OF CARE
Problem: Potential for Falls  Goal: Patient will remain free of falls  Description: INTERVENTIONS:  - Educate patient/family on patient safety including physical limitations  - Instruct patient to call for assistance with activity   - Consult OT/PT to assist with strengthening/mobility   - Keep Call bell within reach  - Keep bed low and locked with side rails adjusted as appropriate  - Keep care items and personal belongings within reach  - Initiate and maintain comfort rounds  - Make Fall Risk Sign visible to staff  - Offer Toileting every 2 Hours, in advance of need  - Initiate/Maintain bed alarm  - Obtain necessary fall risk management equipment:   - Apply yellow socks and bracelet for high fall risk patients  - Consider moving patient to room near nurses station  Outcome: Progressing     Problem: MOBILITY - ADULT  Goal: Maintain or return to baseline ADL function  Description: INTERVENTIONS:  -  Assess patient's ability to carry out ADLs; assess patient's baseline for ADL function and identify physical deficits which impact ability to perform ADLs (bathing, care of mouth/teeth, toileting, grooming, dressing, etc )  - Assess/evaluate cause of self-care deficits   - Assess range of motion  - Assess patient's mobility; develop plan if impaired  - Assess patient's need for assistive devices and provide as appropriate  - Encourage maximum independence but intervene and supervise when necessary  - Involve family in performance of ADLs  - Assess for home care needs following discharge   - Consider OT consult to assist with ADL evaluation and planning for discharge  - Provide patient education as appropriate  Outcome: Progressing  Goal: Maintains/Returns to pre admission functional level  Description: INTERVENTIONS:  - Perform BMAT or MOVE assessment daily    - Set and communicate daily mobility goal to care team and patient/family/caregiver     - Collaborate with rehabilitation services on mobility goals if consulted  - Perform Range of Motion 3 times a day  - Reposition patient every 2 hours    - Dangle patient 3 times a day  - Stand patient 3 times a day  - Ambulate patient 3 times a day  - Out of bed to chair 3 times a day   - Out of bed for meals 3 times a day  - Out of bed for toileting  - Record patient progress and toleration of activity level   Outcome: Progressing     Problem: Prexisting or High Potential for Compromised Skin Integrity  Goal: Skin integrity is maintained or improved  Description: INTERVENTIONS:  - Identify patients at risk for skin breakdown  - Assess and monitor skin integrity  - Assess and monitor nutrition and hydration status  - Monitor labs   - Assess for incontinence   - Turn and reposition patient  - Assist with mobility/ambulation  - Relieve pressure over bony prominences  - Avoid friction and shearing  - Provide appropriate hygiene as needed including keeping skin clean and dry  - Evaluate need for skin moisturizer/barrier cream  - Collaborate with interdisciplinary team   - Patient/family teaching  - Consider wound care consult   Outcome: Progressing     Problem: PAIN - ADULT  Goal: Verbalizes/displays adequate comfort level or baseline comfort level  Description: Interventions:  - Encourage patient to monitor pain and request assistance  - Assess pain using appropriate pain scale  - Administer analgesics based on type and severity of pain and evaluate response  - Implement non-pharmacological measures as appropriate and evaluate response  - Consider cultural and social influences on pain and pain management  - Notify physician/advanced practitioner if interventions unsuccessful or patient reports new pain  Outcome: Progressing     Problem: SAFETY ADULT  Goal: Patient will remain free of falls  Description: INTERVENTIONS:  - Educate patient/family on patient safety including physical limitations  - Instruct patient to call for assistance with activity   - Consult OT/PT to assist with strengthening/mobility   - Keep Call bell within reach  - Keep bed low and locked with side rails adjusted as appropriate  - Keep care items and personal belongings within reach  - Initiate and maintain comfort rounds  - Make Fall Risk Sign visible to staff  - Offer Toileting every 2 Hours, in advance of need  - Initiate/Maintain bed alarm  - Obtain necessary fall risk management equipment:   - Apply yellow socks and bracelet for high fall risk patients  - Consider moving patient to room near nurses station  Outcome: Progressing  Goal: Maintain or return to baseline ADL function  Description: INTERVENTIONS:  -  Assess patient's ability to carry out ADLs; assess patient's baseline for ADL function and identify physical deficits which impact ability to perform ADLs (bathing, care of mouth/teeth, toileting, grooming, dressing, etc )  - Assess/evaluate cause of self-care deficits   - Assess range of motion  - Assess patient's mobility; develop plan if impaired  - Assess patient's need for assistive devices and provide as appropriate  - Encourage maximum independence but intervene and supervise when necessary  - Involve family in performance of ADLs  - Assess for home care needs following discharge   - Consider OT consult to assist with ADL evaluation and planning for discharge  - Provide patient education as appropriate  Outcome: Progressing  Goal: Maintains/Returns to pre admission functional level  Description: INTERVENTIONS:  - Perform BMAT or MOVE assessment daily    - Set and communicate daily mobility goal to care team and patient/family/caregiver  - Collaborate with rehabilitation services on mobility goals if consulted  - Perform Range of Motion 3 times a day  - Reposition patient every 2 hours    - Dangle patient 3 times a day  - Stand patient 3 times a day  - Ambulate patient 3 times a day  - Out of bed to chair 3 times a day   - Out of bed for meals 3 times a day  - Out of bed for toileting  - Record patient progress and toleration of activity level   Outcome: Progressing     Problem: GENITOURINARY - ADULT  Goal: Maintains or returns to baseline urinary function  Description: INTERVENTIONS:  - Assess urinary function  - Encourage oral fluids to ensure adequate hydration if ordered  - Administer IV fluids as ordered to ensure adequate hydration  - Administer ordered medications as needed  - Offer frequent toileting  - Follow urinary retention protocol if ordered  Outcome: Progressing  Goal: Absence of urinary retention  Description: INTERVENTIONS:  - Assess patient’s ability to void and empty bladder  - Monitor I/O  - Bladder scan as needed  - Discuss with physician/AP medications to alleviate retention as needed  - Discuss catheterization for long term situations as appropriate  Outcome: Progressing  Goal: Urinary catheter remains patent  Description: INTERVENTIONS:  - Assess patency of urinary catheter  - If patient has a chronic ferguson, consider changing catheter if non-functioning  - Follow guidelines for intermittent irrigation of non-functioning urinary catheter  Outcome: Progressing     Goal: Incision(s), wounds(s) or drain site(s) healing without S/S of infection  Description: INTERVENTIONS  - Assess and document dressing, incision, wound bed, drain sites and surrounding tissue  - Provide patient and family education  - Perform skin care/dressing changes every shift  Outcome: Progressing    Problem: MUSCULOSKELETAL - ADULT  Goal: Maintain or return mobility to safest level of function  Description: INTERVENTIONS:  - Assess patient's ability to carry out ADLs; assess patient's baseline for ADL function and identify physical deficits which impact ability to perform ADLs (bathing, care of mouth/teeth, toileting, grooming, dressing, etc )  - Assess/evaluate cause of self-care deficits   - Assess range of motion  - Assess patient's mobility  - Assess patient's need for assistive devices and provide as appropriate  - Encourage maximum independence but intervene and supervise when necessary  - Involve family in performance of ADLs  - Assess for home care needs following discharge   - Consider OT consult to assist with ADL evaluation and planning for discharge  - Provide patient education as appropriate  Outcome: Progressing  Goal: Maintain proper alignment of affected body part  Description: INTERVENTIONS:  - Support, maintain and protect limb and body alignment  - Provide patient/ family with appropriate education  Outcome: Progressing

## 2023-02-04 NOTE — ASSESSMENT & PLAN NOTE
- R subtrochanteric femur fracture, present on admission   - Status post traction placement on 2/2  - Appreciate Orthopedic surgery evaluation, recommendations and interventions as noted  - OR, 2/3 for ORIF RLE    - Monitor right lower extremity neurovascular exam   - Continue multimodal analgesic regimen   - Continue DVT prophylaxis with lovenox   - PT and OT evaluation and treatment as indicated  - Outpatient follow up with Orthopedic surgery for re-evaluation

## 2023-02-04 NOTE — PROGRESS NOTES
[address] [facility]  Progress Note - Merritt Richter 1945, 69 y o  female MRN: [id number]  Unit/Bed#: [facility] 625-01 Encounter: [id number]  Primary Care Provider: Vick Peabody, MD   Date and time admitted to hospital: 2/2/2023  2:46 PM    Hypothyroidism  Assessment & Plan  - continue home levothyroxine    Acute pain due to trauma  Assessment & Plan  - Acute pain secondary to traumatic injuries  - Multi modal analgesic regimen  - Bowel regimen as long as using opioids   - Continue to monitor pain and adjust regimen as indicated  Fall  Assessment & Plan  - mechanical in nature - felt a pop in her hip when she took a step and lowered herself to the ground  Did not strike her head  - Sustained the below stated injury  - PT/OT and case management     * Femur fracture [facility])  Assessment & Plan  - R subtrochanteric femur fracture, present on admission   - Status post traction placement on 2/2  - Appreciate Orthopedic surgery evaluation, recommendations and interventions as noted  - OR, 2/3 for ORIF RLE    - Monitor right lower extremity neurovascular exam   - Continue multimodal analgesic regimen   - Continue DVT prophylaxis with lovenox   - PT and OT evaluation and treatment as indicated  - Outpatient follow up with Orthopedic surgery for re-evaluation  Bowel Regimen: senokot  VTE Prophylaxis:Enoxaparin (Lovenox)     Disposition: pending ortho work up    Subjective   Chief Complaint: Right hip pain    Subjective: Pain controlled this morning        Objective   Vitals:   Temp:  [98 3 °F (36 8 °C)-99 3 °F (37 4 °C)] 99 °F (37 2 °C)  HR:  [68-95] 83  Resp:  [14-18] 18  BP: (123-147)/(62-87) 129/65    I/O       02/02 0701  02/03 0700 02/03 0701  02/04 0700 02/04 0701  02/05 0700    I V   1822 3     IV Piggyback 100      Total Intake 100 1822 3     Urine 550 1325 100    Blood  100     Total Output 550 1425 100    Net -450 +397 3 -100                  Physical Exam: GENERAL APPEARANCE:  NAD, non-toxic appearing  NEURO:  Awake, GCS 15, no focal deficits  HEENT:  Moist mucous membranes, NC/AT  CV:  Regular rate and rhythm  LUNGS:  Clear and equal breath sounds to auscultation bilaterally  GI:  Soft, nontender, and nondistended  MSK:  Dressing dry right hip  SKIN:  Warm, dry, and intact      Invasive Devices     Peripheral Intravenous Line  Duration           Peripheral IV 02/04/23 Left Antecubital <1 day                      Lab Results: Results: I have personally reviewed all pertinent laboratory/tests results  Imaging: I have personally reviewed pertinent reports       Other Studies:

## 2023-02-04 NOTE — PLAN OF CARE
Problem: PHYSICAL THERAPY ADULT  Goal: Performs mobility at highest level of function for planned discharge setting  See evaluation for individualized goals  Description: Treatment/Interventions: Functional transfer training, LE strengthening/ROM, Elevations, Endurance training, Therapeutic exercise, Patient/family training, Equipment eval/education, Bed mobility, Gait training, Spoke to nursing, OT  Equipment Recommended: Selam Hardin       See flowsheet documentation for full assessment, interventions and recommendations  Note: Prognosis: Good  Problem List: Decreased strength, Decreased range of motion, Decreased endurance, Impaired balance, Decreased mobility, Pain  Assessment: Pt is 69 y o  female seen for PT evaluation s/p admit to [facility] on 2/2/2023  Two pt identifiers were used to confirm  Pt presented s/p " feeling a pop in her R hip" as she was walking  Pt noted increased R hip pain  Pt was admitted with a primary dx of: R femur fx, and other active problems including hypothyroidism, acute pain due to trauma  Pt underwent Right - INSERTION NAIL IM FEMUR ANTEGRADE (TROCHANTERIC) which was performed on 2/3/2023  PT now consulted for assessment of mobility and d/c needs  Pts current co morbidities affecting treatment include:  has a past medical history of Arthritis, Closed nondisplaced fracture of fifth metatarsal bone of right foot with routine healing, Disease of thyroid gland, Glaucoma, bilateral, Hyperlipidemia, Osteoporosis, and Scoliosis    Pts current clinical presentation is Unstable/ Unpredictable (high complexity) due to Ongoing medical management for primary dx, Increased reliance on more restrictive AD compared to baseline, Decreased activity tolerance compared to baseline, Fall risk, Increased assistance needed from caregiver at current time, Continuous pulse oximetry monitoring , s/p surgical intervention      Upon evaluation, pt currently is requiring Mod Ax1 for bed mobility; Mod Ax1 for transfers and Min Ax1 for ambulation w/ RW  Pt presents at PT eval functioning below baseline and currently w/ overall mobility deficits 2* to: RLE weakness, decreased ROM, impaired balance, decreased endurance, gait deviations, pain, decreased activity tolerance compared to baseline, fall risk  Pt currently at a fall risk 2* to impairments listed above  Based on the aforementioned PT evaluation, pt will continue to benefit from skilled Acute PT interventions to address stated impairments; to maximize functional mobility; for ongoing pt/ family training; and DME needs  At conclusion of PT session pt returned back in chair with phone and call bell within reach  Pt denies any further questions at this time  PT is currently recommending Rehab  PT will continue to follow during hospital stay  PT Discharge Recommendation: Post acute rehabilitation services    See flowsheet documentation for full assessment

## 2023-02-04 NOTE — PROGRESS NOTES
Progress Note - Orthopedics   Swathi Guerra 69 y o  female MRN: [id number]  Unit/Bed#: [facility] 625-01      Subjective:    69 y  o female No acute events, no new complaints  Patient doing well  Pain well controlled  Denies fevers, chills, CP, SOB, N/V, numbness or tingling  Labs:  0   Lab Value Date/Time    HCT 36 0 02/04/2023 0511    HCT 39 8 02/03/2023 0550    HCT 43 6 02/02/2023 1849    HGB 11 9 02/04/2023 0511    HGB 13 0 02/03/2023 0550    HGB 14 3 02/02/2023 1849    INR 1 05 02/03/2023 0550    WBC 11 88 (H) 02/04/2023 0511    WBC 11 17 (H) 02/03/2023 0550    WBC 19 66 (H) 02/02/2023 1849       Meds:    Current Facility-Administered Medications:   •  acetaminophen (TYLENOL) tablet 650 mg, 650 mg, Oral, Q6H PRN, Olive Espinoza PA-C, 325 mg at 02/03/23 0506  •  enoxaparin (LOVENOX) subcutaneous injection 30 mg, 30 mg, Subcutaneous, Q12H [facility], Mary Ann Hernandez PA-C, 30 mg at 02/03/23 2051  •  HYDROmorphone (DILAUDID) injection 0 5 mg, 0 5 mg, Intravenous, Q3H PRN, Olive Espinoza PA-C  •  lactated ringers infusion, 75 mL/hr, Intravenous, Continuous, Mary Ann Hernandez PA-C, Last Rate: 75 mL/hr at 02/03/23 1143, 75 mL/hr at 02/03/23 1143  •  lactated ringers infusion, 20 mL/hr, Intravenous, Continuous, Cristiana Villela CRNA, Last Rate: 20 mL/hr at 02/03/23 2329, 20 mL/hr at 02/03/23 2329  •  levothyroxine tablet 25 mcg, 25 mcg, Oral, Daily, Olive Espinoza PA-C, 25 mcg at 02/03/23 2329  •  oxyCODONE (ROXICODONE) IR tablet 2 5 mg, 2 5 mg, Oral, Q4H PRN, Olive Espinoza PA-C  •  oxyCODONE (ROXICODONE) IR tablet 5 mg, 5 mg, Oral, Q4H PRN, Olive Espinoza PA-C, 5 mg at 02/03/23 1658  •  pravastatin (PRAVACHOL) tablet 20 mg, 20 mg, Oral, Daily With Dinner, Olive Espinoza PA-C    Blood Culture:   No results found for: BLOODCX    Wound Culture:   No results found for: WOUNDCULT    Ins and Outs:  I/O last 24 hours:   In: 1822 3 [I V :1822 3]  Out: 9454 [Urine:1325; Blood:100]          Physical:  Vitals: 02/04/23 0700   BP: 129/65   Pulse: 83   Resp:    Temp: 99 °F (37 2 °C)   SpO2: 97%     Musculoskeletal: right Lower Extremity  · Skin intact  · Dressings to right hip CDI  · Mild TTP over hip  · Sensation intact to saphenous, sural, tibial, superficial peroneal nerve, and deep peroneal  · Motor intact to +FHL/EHL, +ankle dorsi/plantar flexion  · 2+ DP pulse, symmetric bilaterally  Able to flex extend knee full extension to about 30 deg flexion limited by pain  · Toes warm and well perfused  · Capillary refill < 2 seconds    Assessment:    69 y  o female s/p right FRN for atypical subtrochanteric femur fracture    Plan:  · WBAT RLE  · Multimodal pain control  · PT/OT eval today  · Melo out today  · DVT ppx - 28 days to continue thru discharge  · Leave dressings on until followup  · Patient should not continue bisphosphonate use at this time given radiographic changes to left femur  She needs to follow up immediately w ortho if she develops ambulatory pain in left thigh    · Dispo pending left femur films  · Dispo: Ortho will follow    Goldy Lazcano MD

## 2023-02-04 NOTE — OP NOTE
OPERATIVE REPORT  PATIENT NAME: Quincy Kyle    :    MRN: [id number]  Pt Location:  OR ROOM 08    SURGERY DATE: 2/3/2023    Surgeon(s) and Role:     * Bj Levy MD - Primary     * Nyla Carrion MD - Assisting     * Shola Winston PA-C - Assisting    Preop Diagnosis:  Closed displaced subtrochanteric fracture of right femur, initial encounter ([place] [address] ) Oris Sleight    Post-Op Diagnosis Codes:     * Closed displaced subtrochanteric fracture of right femur, initial encounter ([place] [address] ) Oris Sleight    Procedure(s):  Right - INSERTION NAIL IM FEMUR ANTEGRADE (TROCHANTERIC)    Specimen(s):  * No specimens in log *    Estimated Blood Loss:   Minimal    Drains:  Urethral Catheter Straight-tip 16 Fr  (Active)   Reasons to continue Urinary Catheter  Post-operative urological requirements 23 192   Goal for Removal Remove POD#1 23 1920   Site Assessment Clean;Skin intact 23 192   Melo Care Done 23 0900   Collection Container Standard drainage bag 23 1920   Securement Method Securing device (Describe) 23 192   Securing Device Change Date 23 1806   Output (mL) 875 mL 23 1245   Number of days: 1       Anesthesia Type:   Choice    Operative Indications:  Closed displaced subtrochanteric fracture of right femur, initial encounter ([place] [address] ) [S72 21XA]  Atypical subtrochanteric fracture related to bisphosphonate use  Operative Findings:  Successful reduction and fixation of subtrochanteric femur fracture with femoral recon nail, 10 x 849 mm    Complications:   None    Procedure and Technique: In brief, patient is 23-year-old female with history of bisphosphonate use who sustained an atypical subtrochanteric femur fracture on her right side  Based on the displaced nature of the fracture and to promote early mobility and pain control, patient was recommended for operative fixation    Risks and benefits were discussed with the patient, informed consent was obtained  Patient was brought to the operating room placed lateral on the OR table utilizing a beanbag  Patient was prepped and draped in the usual sterile fashion  A timeout was performed confirming correct patient, location, surgical procedure  A 2 cm incision was made superior to the greater trochanter to facilitate placement of guidewire  Guidewire was placed just medial to the tip of the greater trochanter using fluoroscopic guidance  Opening reamer was used down to the level of the lesser trochanter to open the proximal canal   An intramedullary reduction aid was then placed through the proximal femur past the fracture site into the distal femoral shaft  At this time it was noted there was still some residual deformity, so an anterior to posterior directed Schanz pin was placed in the medial aspect of the proximal fragment     This is used as a joystick to help reduce the fracture, and is also served as a medial blocking screw to prevent valgus deformity  The beaded tip guidewire was then placed through the intramedullary reduction aid down to the level of the physeal scar  Once appropriate position of the guidewire was confirmed on C arm images, the length of the nail was measured, which was found to be 380 mm  The canal was sequentially reamed to accommodate for a 10 mm nail  This was then impacted down the shaft of the femur into appropriate position with fluoroscopy guidance  Utilizing the aiming arm, a 2 cm incision was made over the lateral femur to accommodate for 2 screws going into the femoral head  Guidepins were placed through the aiming arm into appropriate position in the subchondral bone of the femoral head  Position was confirmed on both AP and lateral images  The wires were then overdrilled and 2 screws were then placed into the femoral head, securing the proximal fragment    Rotation of the femur was then assessed, and proper rotation was confirmed based off of C-arm images, and was found to match the inherent anteversion set by the nail  There was a small amount of fracture gap at this point, so the proximal aspect of the nail was impacted, closing down the fracture gap resulting in appropriate reduction of the fracture with good cortical contact  2 distal interlocking screws were then placed utilizing perfect Akhiok technique  Final x-rays were taken confirming appropriate reduction of fracture and placement of orthopedic hardware  Wounds were irrigated and closed in layered fashion  Sterile dressing was applied  Patient was transferred back onto the stretcher and taken to PACU in stable condition  Postoperatively, patient will be weightbearing as tolerated to the right lower extremity  She will require 28 days of DVT prophylaxis  She will follow-up in the office in 2 weeks for staple removal and x-rays of the right femur at that time      Dr Tangela Solomon was present for the entire procedure    Patient Disposition:  PACU         SIGNATURE: Calixto Malave MD  DATE: February 3, 2023  TIME: 8:26 PM

## 2023-02-04 NOTE — PLAN OF CARE
Problem: OCCUPATIONAL THERAPY ADULT  Goal: Performs self-care activities at highest level of function for planned discharge setting  See evaluation for individualized goals  Description: Treatment Interventions: ADL retraining, Functional transfer training, Endurance training, Cognitive reorientation, Patient/family training, Equipment evaluation/education, Compensatory technique education, Energy conservation, Activityengagement          See flowsheet documentation for full assessment, interventions and recommendations  Note: Limitation: Decreased ADL status, Decreased UE strength, Decreased Safe judgement during ADL, Decreased cognition, Decreased endurance, Decreased high-level ADLs, Decreased self-care trans  Prognosis: Good  Assessment: Pt is a 69 y o  female admitted 2/2/23 with R LE pain after hearing a 'pop' in the her R groin  Pt found to have R subtrochanteric femur fracture  Pt underwent ORIF RLE, is WBAT in RLE w active OT eval and treat orders at this time  PMH includes  has a past medical history of Arthritis, Closed nondisplaced fracture of fifth metatarsal bone of right foot with routine healing, Disease of thyroid gland, Glaucoma, bilateral, Hyperlipidemia, Osteoporosis, and Scoliosis  Pt lives alone in a 2  with 2 SHANDA, reports tub shower with standard toilet, has commode  Pta, pt was independent w/ ADL/IADL and functional mobility, was  driving and was not using any DME at baseline  Currently, pt is Min Ax1 for UB ADL, Max Ax1 for LB ADL, and completed transfers/FM w Mod Ax1 w RW  Pt is limited at this time 2* decreased endurance/activtiy tolerance,  decreased ADL/High-level ADL status, decreased self-care trans, decreased safety awareness, limited home support and is a fall risk  This impacts pt's ability to complete UB and LB dressing and bathing, toileting, transfers, functional mobility, community mobility, home and health maintenance, and safe engagement in typical daily routine   The patient's raw score on the AM-PAC Daily Activity inpatient short form is 16, standardized score is 35 96, less than 39 4  Patients at this level are likely to benefit from discharge to post-acute rehabilitation services  Please refer to the recommendation of the Occupational Therapist for safe discharge planning  From OT standpoint, pt should D/C to STR when medically stable  Pt will benefit from continued acute OT services 2-3 x/wk for 10-14 days to meet goals       OT Discharge Recommendation: Post acute rehabilitation services

## 2023-02-04 NOTE — PROGRESS NOTES
Surgery Post-Op Check  Genette Shelter 69 y o  female MRN: [id number]  Unit/Bed#: [facility] 625-01 Encounter: [id number]     S: Pain controlled, no acute complaints    O:   Vitals:    02/03/23 2154   BP: 135/62   Pulse: 81   Resp: 18   Temp: 98 4 °F (36 9 °C)   SpO2: 96%     I/O       02/02 0701 02/03 0700 02/03 0701 02/04 0700    I V   1600    IV Piggyback 100     Total Intake 100 1600    Urine 550 1075    Blood  100    Total Output 550 1175    Net -450 +425              PE:  Gen:  NAD  HENT: MMM  CV:  warm, well-perfused  Lung:  normal effort  Abd:  soft, NT/ND  Ext:  Surgical site CDI RUE  Neuro:  A&Ox3, M/S grossly intact     Lab Results   Component Value Date    WBC 11 17 (H) 02/03/2023    HGB 13 0 02/03/2023    HCT 39 8 02/03/2023    MCV 87 02/03/2023     02/03/2023     Lab Results   Component Value Date    GLUCOSE 95 04/16/2015    CALCIUM 8 8 02/03/2023     04/16/2015    K 3 8 02/03/2023    CO2 25 02/03/2023     02/03/2023    BUN 19 02/03/2023    CREATININE 0 73 02/03/2023         A/P: 69 y o  female Day of Surgery s/p Procedure(s) (LRB):  INSERTION NAIL IM FEMUR ANTEGRADE (TROCHANTERIC) (Right)  • Continue inpatient management for rehab/placement      Abhishek Reynolds MD

## 2023-02-04 NOTE — PHYSICAL THERAPY NOTE
PHYSICAL THERAPY EVALUATION  NAME:  Brandyn Jack  DATE: 02/04/23    AGE:   69 y o  Mrn:   [id number]  ADMIT DX:  Hip injury [S79 919A]  Femur fracture, right ([facility] [address] ) [S72 91XA]  Closed displaced subtrochanteric fracture of right femur, initial encounter ([address] ) [S72 21XA]    Past Medical History:   Diagnosis Date    Arthritis 2005    Closed nondisplaced fracture of fifth metatarsal bone of right foot with routine healing     Disease of thyroid gland     hypothyroid    Glaucoma, bilateral     Hyperlipidemia     Osteoporosis     Scoliosis 12/12/12       Past Surgical History:   Procedure Laterality Date    BUNIONECTOMY      COLONOSCOPY  06/04/2019    CORRECTION HAMMER TOE      MAMMO (HISTORICAL)  07/09/2018    NEUROMA EXCISION      OTHER SURGICAL HISTORY      Birth kwasi removed    WISDOM TOOTH EXTRACTION         Length Of Stay: 2    PHYSICAL THERAPY EVALUATION:        02/04/23 1020   Note Type   Note type Evaluation   Pain Assessment   Pain Assessment Tool 0-10   Pain Score 4   Pain Location/Orientation Orientation: Right;Location: Hip   Pain Onset/Description Onset: Ongoing;Frequency: Constant/Continuous; Descriptor: Aching   Effect of Pain on Daily Activities increased pain with activity   Patient's Stated Pain Goal No pain   Hospital Pain Intervention(s) Ambulation/increased activity;Repositioned   Restrictions/Precautions   Weight Bearing Precautions Per Order Yes   RLE Weight Bearing Per Order WBAT   Other Precautions Cognitive; Chair Alarm; Bed Alarm;WBS;Multiple lines; Fall Risk;Pain   Home Living   Type of [address] [place] [address] Two level;Stairs to enter with rails;Bed/bath upstairs  (2 SHANDA)   Home Equipment Walker   Additional Comments Pt reports living alone, but states she has supportive friends and family who could assist if needed   Prior Function   Level of [place] Independent with functional mobility   Lives With [address] in the last 6 months 1 to 4   Comments Pt denies the use of an AD for ambulation PTA   General   Family/Caregiver Present No   Cognition   Overall Cognitive Status WFL   Arousal/Participation Alert   Attention Within functional limits   Orientation Level Oriented X4   Memory Decreased recall of precautions   Following Commands Follows one step commands without difficulty   RUE Assessment   RUE Assessment WFL   LUE Assessment   LUE Assessment WFL   RLE Assessment   RLE Assessment X   Strength RLE   RLE Overall Strength 3-/5   LLE Assessment   LLE Assessment WFL   Strength LLE   LLE Overall Strength 4/5   Bed Mobility   Supine to Sit 3  Moderate assistance   Additional items Assist x 1; Increased time required;Verbal cues   Transfers   Sit to Stand 3  Moderate assistance   Additional items Assist x 1; Increased time required;Verbal cues   Stand to Sit 3  Moderate assistance   Additional items Assist x 1; Increased time required;Verbal cues   Additional Comments cues needed for hand placement during transfers   Ambulation/Elevation   Gait pattern Excessively slow; Short stride; Foward flexed; Inconsistent grupo   Gait Assistance 4  Minimal assist   Additional items Assist x 1   Assistive Device Rolling walker   Distance 20ft   Balance   Static Sitting Good   Static Standing Fair   Ambulatory Poor +   Endurance Deficit   Endurance Deficit Yes   Endurance Deficit Description fatigue   Activity Tolerance   Activity Tolerance Patient limited by fatigue   Medical Staff Made Aware Didi Harris OT; OT present for co evaluation due to pts current medical presentation   Nurse Made Aware Pt appropriate to be seen and mobilize per nsg   Assessment   Prognosis Good   Problem List Decreased strength;Decreased range of motion;Decreased endurance; Impaired balance;Decreased mobility;Pain   Assessment Pt is 69 y o  female seen for PT evaluation s/p admit to [facility] on 2/2/2023  Two pt identifiers were used to confirm   Pt presented s/p " feeling a pop in her R hip" as she was walking  Pt noted increased R hip pain  Pt was admitted with a primary dx of: R femur fx, and other active problems including hypothyroidism, acute pain due to trauma  Pt underwent Right - INSERTION NAIL IM FEMUR ANTEGRADE (TROCHANTERIC) which was performed on 2/3/2023  PT now consulted for assessment of mobility and d/c needs  Pts current co morbidities affecting treatment include:  has a past medical history of Arthritis, Closed nondisplaced fracture of fifth metatarsal bone of right foot with routine healing, Disease of thyroid gland, Glaucoma, bilateral, Hyperlipidemia, Osteoporosis, and Scoliosis    Pts current clinical presentation is Unstable/ Unpredictable (high complexity) due to Ongoing medical management for primary dx, Increased reliance on more restrictive AD compared to baseline, Decreased activity tolerance compared to baseline, Fall risk, Increased assistance needed from caregiver at current time, Continuous pulse oximetry monitoring , s/p surgical intervention    Upon evaluation, pt currently is requiring Mod Ax1 for bed mobility; Mod Ax1 for transfers and Min Ax1 for ambulation w/ RW  Pt presents at PT eval functioning below baseline and currently w/ overall mobility deficits 2* to: RLE weakness, decreased ROM, impaired balance, decreased endurance, gait deviations, pain, decreased activity tolerance compared to baseline, fall risk  Pt currently at a fall risk 2* to impairments listed above  Based on the aforementioned PT evaluation, pt will continue to benefit from skilled Acute PT interventions to address stated impairments; to maximize functional mobility; for ongoing pt/ family training; and DME needs  At conclusion of PT session pt returned back in chair with phone and call bell within reach  Pt denies any further questions at this time  PT is currently recommending Rehab  PT will continue to follow during hospital stay     Goals   Patient Goals " to get better"   STG Expiration Date 02/14/23   Short Term Goal #1 In 10 days pt will complete: 1) Bed mobility skills with mod I to increase safety and independence as well as decrease caregiver burden  2) Functional transfers with mod I to promote increased independence, safety, and QOL  3) Ambulate 150' using least restrictive AD with mod I without LOB and stable vitals so that pt can negotiate previous living environment safely and promote independence with functional mobility and return to PLOF  4) Stair training up/ down 2 step/s using rail/s with mod I so that pt can enter/negotiate previous living environment safely and decrease fall risk  5) Improve balance grades by 1/2 grade to increase safety with all mobility and decrease fall risk  6) Improve BLE strength by 1/2 grade to help increase overall functional mobility and decrease fall risk  Plan   Treatment/Interventions Functional transfer training;LE strengthening/ROM; Elevations; Endurance training; Therapeutic exercise;Patient/family training;Equipment eval/education; Bed mobility;Gait training;Spoke to nursing;OT   PT Frequency 3-5x/wk   Recommendation   PT Discharge Recommendation Post acute rehabilitation services   Equipment Recommended [facility] Recommended Wheeled walker   AM-PAC Basic Mobility Inpatient   Turning in Flat Bed Without Bedrails 3   Lying on Back to Sitting on Edge of Flat Bed Without Bedrails 2   Moving Bed to Chair 2   Standing Up From Chair Using Arms 2   Walk in Room 3   Climb 3-5 Stairs With Railing 2   Basic Mobility Inpatient Raw Score 14   Basic Mobility Standardized Score 35 55   Highest Level Of Mobility   -[facility] Goal 4: Move to chair/commode   -HLM Achieved 6: Walk 10 steps or more   Modified Neftali Scale   Modified Neftali Scale 4   Barthel Index   Feeding 10   Bathing 0   Grooming Score 5   Dressing Score 5   Bladder Score 10   Bowels Score 10   Toilet Use Score 5   Transfers (Bed/Chair) Score 5   Mobility (Level Surface) Score 0   Stairs Score 0   Barthel Index Score 50   Portions of the documentation may have been created using voice recognition software  Occasional wrong word or sound alike substitutions may have occurred due to the inherent limitations of the voice recognition software  Read the chart carefully and recognize, using context, where substitutions have occurred      [place] Sushil PT, DPT

## 2023-02-04 NOTE — OCCUPATIONAL THERAPY NOTE
Occupational Therapy Evaluation     Patient Name: Pepito Sifuentes  TRJBC'T Date: 2/4/2023  Problem List  Principal Problem:    Femur fracture [facility])  Active Problems:    Fall    Acute pain due to trauma    Hypothyroidism    Past Medical History  Past Medical History:   Diagnosis Date    Arthritis 2005    Closed nondisplaced fracture of fifth metatarsal bone of right foot with routine healing     Disease of thyroid gland     hypothyroid    Glaucoma, bilateral     Hyperlipidemia     Osteoporosis     Scoliosis 12/12/12     Past Surgical History  Past Surgical History:   Procedure Laterality Date    BUNIONECTOMY      COLONOSCOPY  06/04/2019    CORRECTION HAMMER TOE      MAMMO (HISTORICAL)  07/09/2018    NEUROMA EXCISION      OTHER SURGICAL HISTORY      Birth kwasi removed    WISDOM TOOTH EXTRACTION           02/04/23 1015   OT Last Visit   OT Visit Date 02/04/23   Note Type   Note type Evaluation   Pain Assessment   Pain Assessment Tool 0-10   Pain Score 4   Pain Location/Orientation Orientation: Right;Location: Hip   Patient's Stated Pain Goal No pain   Hospital Pain Intervention(s) Emotional support;Repositioned   Restrictions/Precautions   Weight Bearing Precautions Per Order Yes   RLE Weight Bearing Per Order WBAT   Other Precautions Cognitive; Chair Alarm; Bed Alarm;WBS;Multiple lines; Fall Risk;Pain   Home Living   Type of [address] Two level;Stairs to enter with rails;Bed/bath upstairs  (2 yesica)   Bathroom Shower/Tub Tub/shower unit   Bathroom Toilet Standard   Bathroom Equipment Commode   [address] Walker   Prior Function   Level of [address] with ADLs; Independent with functional mobility; Independent with IADLS   Lives With Alone   Receives Help From Family   IADLs Independent with meal prep; Independent with medication management; Independent with driving   Falls in the last 6 months 1 to 4  (1)   Vocational Retired   Lifestyle   Autonomy pta, pt was I W ADL/IADL, no AD Mobility  +    Reciprocal Relationships supportive family who can assist as needed   Service to Others retired   Intrinsic Gratification keeping active   Subjective   Subjective "I'm doing better than I thought"   ADL   Where Assessed Edge of bed   Eating Assistance 6  Modified independent   Grooming Assistance 4  Minimal Assistance   UB Bathing Assistance 2  Maximal Assistance   LB [address] 4  Minimal Assistance   [address] 2  Maximal Assistance    [facility] 2  Maximal [address]  2  Maximal [address] 3  Moderate Assistance   Bed Mobility   Supine to Sit 3  Moderate assistance   Additional items Assist x 1; Increased time required;Verbal cues   Sit to Supine 3  Moderate assistance   Additional items Assist x 1; Increased time required;Verbal cues   Additional Comments c rw, vc for hand placement and inc overall safety awareness   Transfers   Sit to Stand 3  Moderate assistance   Additional items Assist x 1; Increased time required;Verbal cues   Stand to Sit 3  Moderate assistance   Additional items Assist x 1; Increased time required;Verbal cues   Additional Comments c rw vc for hand placement   Functional Mobility   Functional Mobility 4  Minimal assistance   Additional Comments ax1 c rw   Additional items Rolling walker   Balance   Static Sitting Good   Dynamic Sitting Fair +   Static Standing Fair   Dynamic Standing Fair -   Ambulatory Poor +   Activity Tolerance   Activity Tolerance Patient tolerated treatment well   Medical Staff Made Aware DPT Macrina Pleva 2' pts med complexity, comorbidities and regression from baseline   Nurse Made Aware ok per RN   RUE Assessment   RUE Assessment WFL   LUE Assessment   LUE Assessment WFL   Hand Function   Gross Motor Coordination Functional   Fine Motor Coordination Functional   Psychosocial   Psychosocial (WDL) WDL   Cognition   Overall Cognitive Status [facility] Arousal/Participation Alert; Responsive; Cooperative   Attention Within functional limits   Orientation Level Oriented X4   Memory Decreased recall of precautions   Following Commands Follows one step commands without difficulty   Comments pt very pleasant and cooperative, overall G safety awareness and insight to condition  A bit tangential at times  Assessment   Limitation Decreased ADL status; Decreased UE strength;Decreased Safe judgement during ADL;Decreased cognition;Decreased endurance;Decreased high-level ADLs; Decreased self-care trans   Prognosis Good   Assessment Pt is a 69 y o  female admitted 2/2/23 with R LE pain after hearing a 'pop' in the her R groin  Pt found to have R subtrochanteric femur fracture  Pt underwent ORIF RLE, is WBAT in RLE w active OT eval and treat orders at this time  PMH includes  has a past medical history of Arthritis, Closed nondisplaced fracture of fifth metatarsal bone of right foot with routine healing, Disease of thyroid gland, Glaucoma, bilateral, Hyperlipidemia, Osteoporosis, and Scoliosis  Pt lives alone in a 2  with 2 SHANDA, reports tub shower with standard toilet, has commode  Pta, pt was independent w/ ADL/IADL and functional mobility, was  driving and was not using any DME at baseline  Currently, pt is Min Ax1 for UB ADL, Max Ax1 for LB ADL, and completed transfers/FM w Mod Ax1 w RW  Pt is limited at this time 2* decreased endurance/activtiy tolerance,  decreased ADL/High-level ADL status, decreased self-care trans, decreased safety awareness, limited home support and is a fall risk  This impacts pt's ability to complete UB and LB dressing and bathing, toileting, transfers, functional mobility, community mobility, home and health maintenance, and safe engagement in typical daily routine  The patient's raw score on the AM-PAC Daily Activity inpatient short form is 16, standardized score is 35 96, less than 39 4   Patients at this level are likely to benefit from discharge to post-acute rehabilitation services  Please refer to the recommendation of the Occupational Therapist for safe discharge planning  From OT standpoint, pt should D/C to STR when medically stable  Pt will benefit from continued acute OT services 2-3 x/wk for 10-14 days to meet goals  Goals   Patient Goals to get better   LTG Time Frame 10-14   Long Term Goal #1 see below   Plan   Treatment Interventions ADL retraining;Functional transfer training; Endurance training;Cognitive reorientation;Patient/family training;Equipment evaluation/education; Compensatory technique education; Energy conservation; Activityengagement   Goal Expiration Date 02/18/23   OT Frequency 2-3x/wk   Recommendation   OT Discharge Recommendation Post acute rehabilitation services   AM-PAC Daily Activity Inpatient   Lower Body Dressing 2   Bathing 2   Toileting 2   Upper Body Dressing 3   Grooming 3   Eating 4   Daily Activity Raw Score 16   Daily Activity Standardized Score (Calc for Raw Score >=11) 35 96   AM-PAC Applied Cognition Inpatient   Following a Speech/Presentation 4   Understanding Ordinary Conversation 4   Taking Medications 4   Remembering Where Things Are Placed or Put Away 4   Remembering List of 4-5 Errands 3   Taking Care of Complicated Tasks 3   Applied Cognition Raw Score 22   Applied Cognition Standardized Score 47 83   End of Consult   Education Provided Yes   Patient Position at End of Consult Bedside chair;Bed/Chair alarm activated; All needs within reach   Nurse Communication Nurse aware of consult       Pt will complete functional mobility with Mod I using appropriate DME as needed  Pt will complete UB dressing and bathing with Mod I using appropriate DME as needed  Pt will complete LB dressing and bathing with Mod I using appropriate DME as needed  Pt will complete transfers with Mod I using appropriate DME as needed  Pt will complete toileting with Mod I using appropriate DME as needed       Pt will utilize energy conservation techniques throughout functional activity/ADL s/p skilled education  Pt will demonstrate increased safety awareness during functional tasks/ADL's s/p skilled education  Pt will increase activity tolerance to 30 minutes in order to complete ADL's/ functional tasks, using appropriate DME as needed  Pt will engage in ongoing cog assessment prn w/ G participation to assist with safe d/c planning  Pt will inc UE strength +1 MMT in order to increase overall ability to engage in typical daily routine           Yareli Myers, RUTH, OTR/L

## 2023-02-05 PROBLEM — D62 ACUTE BLOOD LOSS ANEMIA: Status: ACTIVE | Noted: 2023-02-05

## 2023-02-05 LAB
ATRIAL RATE: 88 BPM
ATRIAL RATE: 90 BPM
BASE EXCESS BLDA CALC-SCNC: 0 MMOL/L (ref -2–3)
BASOPHILS # BLD AUTO: 0.03 THOUSANDS/ÂΜL (ref 0–0.1)
BASOPHILS NFR BLD AUTO: 0 % (ref 0–1)
CA-I BLD-SCNC: 1.18 MMOL/L (ref 1.12–1.32)
DS:DELIVERY SYSTEM: 23
EOSINOPHIL # BLD AUTO: 0.02 THOUSAND/ÂΜL (ref 0–0.61)
EOSINOPHIL NFR BLD AUTO: 0 % (ref 0–6)
ERYTHROCYTE [DISTWIDTH] IN BLOOD BY AUTOMATED COUNT: 14.7 % (ref 11.6–15.1)
ERYTHROCYTE [DISTWIDTH] IN BLOOD BY AUTOMATED COUNT: 14.7 % (ref 11.6–15.1)
GLUCOSE SERPL-MCNC: 125 MG/DL (ref 65–140)
GLUCOSE SERPL-MCNC: 145 MG/DL (ref 65–140)
HCO3 BLDA-SCNC: 22.6 MMOL/L (ref 22–28)
HCT VFR BLD AUTO: 27.1 % (ref 34.8–46.1)
HCT VFR BLD AUTO: 30.4 % (ref 34.8–46.1)
HCT VFR BLD AUTO: 30.9 % (ref 34.8–46.1)
HCT VFR BLD CALC: 27 % (ref 34.8–46.1)
HGB BLD-MCNC: 10.1 G/DL (ref 11.5–15.4)
HGB BLD-MCNC: 8.6 G/DL (ref 11.5–15.4)
HGB BLD-MCNC: 9.8 G/DL (ref 11.5–15.4)
HGB BLDA-MCNC: 9.2 G/DL (ref 11.5–15.4)
IMM GRANULOCYTES # BLD AUTO: 0.05 THOUSAND/UL (ref 0–0.2)
IMM GRANULOCYTES NFR BLD AUTO: 0 % (ref 0–2)
LYMPHOCYTES # BLD AUTO: 2.75 THOUSANDS/ÂΜL (ref 0.6–4.47)
LYMPHOCYTES NFR BLD AUTO: 22 % (ref 14–44)
MCH RBC QN AUTO: 28.7 PG (ref 26.8–34.3)
MCH RBC QN AUTO: 29 PG (ref 26.8–34.3)
MCHC RBC AUTO-ENTMCNC: 31.7 G/DL (ref 31.4–37.4)
MCHC RBC AUTO-ENTMCNC: 32.2 G/DL (ref 31.4–37.4)
MCV RBC AUTO: 90 FL (ref 82–98)
MCV RBC AUTO: 90 FL (ref 82–98)
MONOCYTES # BLD AUTO: 1.88 THOUSAND/ÂΜL (ref 0.17–1.22)
MONOCYTES NFR BLD AUTO: 15 % (ref 4–12)
NEUTROPHILS # BLD AUTO: 7.98 THOUSANDS/ÂΜL (ref 1.85–7.62)
NEUTS SEG NFR BLD AUTO: 63 % (ref 43–75)
NRBC BLD AUTO-RTO: 0 /100 WBCS
P AXIS: 34 DEGREES
P AXIS: 49 DEGREES
PCO2 BLD: 24 MMOL/L (ref 21–32)
PCO2 BLD: 30.1 MM HG (ref 36–44)
PH BLD: 7.48 [PH] (ref 7.35–7.45)
PLATELET # BLD AUTO: 230 THOUSANDS/UL (ref 149–390)
PLATELET # BLD AUTO: 238 THOUSANDS/UL (ref 149–390)
PMV BLD AUTO: 11.5 FL (ref 8.9–12.7)
PMV BLD AUTO: 11.9 FL (ref 8.9–12.7)
PO2 BLD: 62 MM HG (ref 75–129)
POTASSIUM BLD-SCNC: 3.4 MMOL/L (ref 3.5–5.3)
PR INTERVAL: 120 MS
PR INTERVAL: 120 MS
QRS AXIS: -11 DEGREES
QRS AXIS: -14 DEGREES
QRSD INTERVAL: 76 MS
QRSD INTERVAL: 76 MS
QT INTERVAL: 290 MS
QT INTERVAL: 366 MS
QTC INTERVAL: 342 MS
QTC INTERVAL: 442 MS
RBC # BLD AUTO: 3 MILLION/UL (ref 3.81–5.12)
RBC # BLD AUTO: 3.38 MILLION/UL (ref 3.81–5.12)
SAO2 % BLD FROM PO2: 94 % (ref 60–85)
SODIUM BLD-SCNC: 139 MMOL/L (ref 136–145)
SPECIMEN SOURCE: ABNORMAL
T WAVE AXIS: 34 DEGREES
T WAVE AXIS: 42 DEGREES
VENTRICULAR RATE: 84 BPM
VENTRICULAR RATE: 88 BPM
WBC # BLD AUTO: 12.71 THOUSAND/UL (ref 4.31–10.16)
WBC # BLD AUTO: 13.27 THOUSAND/UL (ref 4.31–10.16)

## 2023-02-05 RX ORDER — ONDANSETRON 2 MG/ML
4 INJECTION INTRAMUSCULAR; INTRAVENOUS ONCE
Status: COMPLETED | OUTPATIENT
Start: 2023-02-05 | End: 2023-02-05

## 2023-02-05 RX ORDER — ONDANSETRON 2 MG/ML
INJECTION INTRAMUSCULAR; INTRAVENOUS
Status: COMPLETED
Start: 2023-02-05 | End: 2023-02-05

## 2023-02-05 RX ADMIN — ENOXAPARIN SODIUM 30 MG: 30 INJECTION SUBCUTANEOUS at 08:37

## 2023-02-05 RX ADMIN — PRAVASTATIN SODIUM 20 MG: 20 TABLET ORAL at 16:18

## 2023-02-05 RX ADMIN — ACETAMINOPHEN 975 MG: 325 TABLET ORAL at 11:23

## 2023-02-05 RX ADMIN — SENNOSIDES AND DOCUSATE SODIUM 1 TABLET: 8.6; 5 TABLET ORAL at 21:26

## 2023-02-05 RX ADMIN — LEVOTHYROXINE SODIUM 25 MCG: 25 TABLET ORAL at 21:26

## 2023-02-05 RX ADMIN — ACETAMINOPHEN 975 MG: 325 TABLET ORAL at 21:26

## 2023-02-05 RX ADMIN — ONDANSETRON 4 MG: 2 INJECTION INTRAMUSCULAR; INTRAVENOUS at 14:52

## 2023-02-05 RX ADMIN — ENOXAPARIN SODIUM 30 MG: 30 INJECTION SUBCUTANEOUS at 21:26

## 2023-02-05 RX ADMIN — ACETAMINOPHEN 975 MG: 325 TABLET ORAL at 05:36

## 2023-02-05 NOTE — PLAN OF CARE
Problem: Potential for Falls  Goal: Patient will remain free of falls  Description: INTERVENTIONS:  - Educate patient/family on patient safety including physical limitations  - Instruct patient to call for assistance with activity   - Consult OT/PT to assist with strengthening/mobility   - Keep Call bell within reach  - Keep bed low and locked with side rails adjusted as appropriate  - Keep care items and personal belongings within reach  - Initiate and maintain comfort rounds  - Make Fall Risk Sign visible to staff  - Offer Toileting every 2 Hours, in advance of need  - Initiate/Maintain bed/chair alarm  - Obtain necessary fall risk management equipment:   - Apply yellow socks and bracelet for high fall risk patients  - Consider moving patient to room near nurses station  Outcome: Progressing     Problem: MOBILITY - ADULT  Goal: Maintain or return to baseline ADL function  Description: INTERVENTIONS:  -  Assess patient's ability to carry out ADLs; assess patient's baseline for ADL function and identify physical deficits which impact ability to perform ADLs (bathing, care of mouth/teeth, toileting, grooming, dressing, etc )  - Assess/evaluate cause of self-care deficits   - Assess range of motion  - Assess patient's mobility; develop plan if impaired  - Assess patient's need for assistive devices and provide as appropriate  - Encourage maximum independence but intervene and supervise when necessary  - Involve family in performance of ADLs  - Assess for home care needs following discharge   - Consider OT consult to assist with ADL evaluation and planning for discharge  - Provide patient education as appropriate  Outcome: Progressing     Problem: PAIN - ADULT  Goal: Verbalizes/displays adequate comfort level or baseline comfort level  Description: Interventions:  - Encourage patient to monitor pain and request assistance  - Assess pain using appropriate pain scale  - Administer analgesics based on type and severity of pain and evaluate response  - Implement non-pharmacological measures as appropriate and evaluate response  - Consider cultural and social influences on pain and pain management  - Notify physician/advanced practitioner if interventions unsuccessful or patient reports new pain  Outcome: Progressing     Problem: SAFETY ADULT  Goal: Patient will remain free of falls  Description: INTERVENTIONS:  - Educate patient/family on patient safety including physical limitations  - Instruct patient to call for assistance with activity   - Consult OT/PT to assist with strengthening/mobility   - Keep Call bell within reach  - Keep bed low and locked with side rails adjusted as appropriate  - Keep care items and personal belongings within reach  - Initiate and maintain comfort rounds  - Make Fall Risk Sign visible to staff  - Offer Toileting every 2 Hours, in advance of need  - Initiate/Maintain bed/chair alarm  - Obtain necessary fall risk management equipment:   - Apply yellow socks and bracelet for high fall risk patients  - Consider moving patient to room near nurses station  Outcome: Progressing     Problem: SKIN/TISSUE INTEGRITY - ADULT  Goal: Skin Integrity remains intact(Skin Breakdown Prevention)  Description: Assess:  -Perform Solitario assessment   -Clean and moisturize skin   -Inspect skin when repositioning, toileting, and assisting with ADLS  -Assess under medical devices such as walker   -Assess extremities for adequate circulation and sensation     Bed Management:  -Have minimal linens on bed & keep smooth, unwrinkled  -Change linens as needed when moist or perspiring        Toileting:  -Offer bedside commode  -Assess for incontinence     Activity:    -Encourage activity and walks on unit  -Encourage or provide ROM exercises   -Turn and reposition patient every 2 Hours  -Use appropriate equipment to lift or move patient in bed  -Instruct/ Assist with weight shifting every when out of bed in chair  -Consider limitation of chair time 2 hour intervals    Skin Care:  -Avoid use of baby powder, tape, friction and shearing, hot water or constrictive clothing  -Relieve pressure over bony prominences using cushion   -Do not massage red bony areas    Next Steps:  -Teach patient strategies to minimize risks such as mobility   -Consider consults to  interdisciplinary teams such as PT/OT  Outcome: Progressing     Problem: MUSCULOSKELETAL - ADULT  Goal: Maintain or return mobility to safest level of function  Description: INTERVENTIONS:  - Assess patient's ability to carry out ADLs; assess patient's baseline for ADL function and identify physical deficits which impact ability to perform ADLs (bathing, care of mouth/teeth, toileting, grooming, dressing, etc )  - Assess/evaluate cause of self-care deficits   - Assess range of motion  - Assess patient's mobility  - Assess patient's need for assistive devices and provide as appropriate  - Encourage maximum independence but intervene and supervise when necessary  - Involve family in performance of ADLs  - Assess for home care needs following discharge   - Consider OT consult to assist with ADL evaluation and planning for discharge  - Provide patient education as appropriate  Outcome: Progressing

## 2023-02-05 NOTE — ASSESSMENT & PLAN NOTE
Hb dropped from 11 9 to 9 8 overnight  Trend H/H  Transfuse if < 7  Reports some light headedness, may be related  Continue to monitor

## 2023-02-05 NOTE — PROGRESS NOTES
Patient finished using the toilet and was  standing at the sink brushing teeth when she stated she felt dizzy  Patient was standing in the bathroom with the walker and this RN when patient LOC  Patient lowered to the floor and a rapid was called

## 2023-02-05 NOTE — PROGRESS NOTES
[facility]  Progress Note - Merritt Richter 1945, 69 y o  female MRN: [id number]  Unit/Bed#: [facility] 625-01 Encounter: [id number]  Primary Care Provider: Vick Peabody, MD   Date and time admitted to hospital: 2/2/2023  2:46 PM    Acute blood loss anemia  Assessment & Plan  Hb dropped from 11 9 to 9 8 overnight  Trend H/H  Transfuse if < 7  Reports some light headedness, may be related  Continue to monitor      Hypothyroidism  Assessment & Plan  - continue home levothyroxine    Acute pain due to trauma  Assessment & Plan  - Acute pain secondary to traumatic injuries  - Multi modal analgesic regimen  - Bowel regimen as long as using opioids   - Continue to monitor pain and adjust regimen as indicated  Fall  Assessment & Plan  - mechanical in nature - felt a pop in her hip when she took a step and lowered herself to the ground  Did not strike her head  - Sustained the below stated injury  - PT/OT and case management     * Femur fracture [facility])  Assessment & Plan  - R subtrochanteric femur fracture, present on admission   - Status post traction placement on 2/2  - Appreciate Orthopedic surgery evaluation, recommendations and interventions as noted  - OR, 2/3 for ORIF RLE    - Monitor right lower extremity neurovascular exam   - Continue multimodal analgesic regimen   - Continue DVT prophylaxis with lovenox   - PT and OT evaluation and treatment as indicated  - Outpatient follow up with Orthopedic surgery for re-evaluation  Bowel Regimen: Senokot  VTE Prophylaxis:Enoxaparin (Lovenox)     Disposition: Continue inpatient care for H/H monitoring    Subjective   Chief Complaint: Lightheadedness    Subjective: Reports some lightheadedness worse with standing  Denies any CP, n/v  No syncope   Pain controled, tolerating PO     Objective   Vitals:   Temp:  [97 8 °F (36 6 °C)-98 7 °F (37 1 °C)] 97 8 °F (36 6 °C)  HR:  [71-89] 84  Resp:  [14-21] 18  BP: (108-134)/(63-69) 108/63    I/O       02/03 0701  02/04 0700 02/04 0701  02/05 0700 02/05 0701  02/06 0700    I V  1822 3      IV Piggyback       Total Intake 1822 3      Urine 1325 100     Blood 100      Total Output 1425 100     Net +397 3 -100            Unmeasured Urine Occurrence  2 x            Physical Exam:   GENERAL APPEARANCE: Alert  NEURO: No focal deficits  HEENT: MMM  CV: RRR  LUNGS: Normal WOB  GI: Soft  : Voiding  MSK: RUE incision CDI  SKIN: Warm, dry    Invasive Devices     Peripheral Intravenous Line  Duration           Peripheral IV 02/04/23 Left Antecubital 1 day                      Lab Results: Results: I have personally reviewed all pertinent laboratory/tests results  Imaging: I have personally reviewed pertinent reports       Other Studies: N/A

## 2023-02-05 NOTE — PROGRESS NOTES
The pt did experience an acute loss of consciousness and a rapid response was called  She was reportedly going to use the bathroom and was straining on the toilet Then she got up, was brushing her teeth, and experienced an acute loss of consciousness, lasting for approximately 1 minute  This was witnessed, she was gently lowered to the ground  There was no head strike or seizure-like activity or rigors  She has been having intermittent lightheadedness over the last couple of days and a consistent history of vertigo which may be related  Currently she is mentating appropriately and her symptoms are resolved  Denies any history of heart dysrhythmias, MIs, chest pain, shortness of breath, nausea, vomiting, severe headache, cough, hemoptysis, or leg swelling  Stat EKG was ordered which shows nonspecific ST changes in addition to a mildly prolonged QTC at 460 ms  Given this, we will start telemetry monitoring, additionally obtaining repeat laboratory testing although last hemoglobin uptrending at 10 1 from 9 8 likely unrelated to acute blood loss  Regardless will obtain repeat H/H for now and several hours from now  Likely related to orthostatic versus vasovagal syncope however we will continue to monitor closely and reassess

## 2023-02-05 NOTE — RAPID RESPONSE
Rapid Response Note  Mortimer Mitten 69 y o  female MRN: [id number]  [facility] Encounter: [id number]    Rapid Response Notification(s):   Response called date/time:  2/5/2023 2:42 PM  Response team arrival date/time:  2/5/2023 2:43 PM  Response end date/time:  2/5/2023 2:50 PM  Level of care:  [facility]  Rapid response location:  [facility] unit  Primary reason for rapid response call:  Acute change in neuro status    Rapid Response Intervention(s):   Airway:  None  Breathing:  None  Circulation:  None  Fluids administered:  None  Medications administered:  None       Assessment:   · Arrived to rapid response  Pt  Lying on floor with nursing staff next to her  She is awake and alert and talking  Per nursing staff, pt  Was up brushing her teeth when she became dizzy and altered  Nursing staff lowered her to the floor, where she became unresponsive briefly, but recovered spontaneously  Pt  States that she has a history of vertigo and dizziness  Vitals stable  Pt  Placed back in bed  12 lead EKG with no signs of ischemia or dysrhythmia  Will check I-stat and trend hemoglobin in light of recent femur surgery  Will place on tele for 24 hours  Yulisa Perez from trauma art bedside and aware of rapid response  Plan:   · 12 lead EKG  · I stat  · 24 hours of Tele     Rapid Response Outcome:   Transfer:  Remain on floor  Primary service notified of transfer: No    Code Status: Level 1 (Full Code)      Family notified of transfer: no  Family member contacted: No     Background/Situation:   Mortimer Mitten is a 69 y o  female with PMH of osteoporosis and hypothyroidism who took a step at home today and heard a "pop" in her right groin and had significant right leg pain  She was then able to lower herself to the ground with her left leg and called the ambulance  Xray workup significant for right subtrochanteric femur fracture    Review of Systems   Constitutional: Negative  HENT: Negative  Eyes: Negative  Respiratory: Negative  Cardiovascular: Negative  Gastrointestinal: Positive for nausea  Endocrine: Negative  Genitourinary: Negative  Musculoskeletal: Negative  Skin: Negative  Allergic/Immunologic: Negative  Neurological: Positive for dizziness  Hematological: Negative  Psychiatric/Behavioral: Negative  Objective:   Vitals:    02/05/23 1445 02/05/23 1445 02/05/23 1448 02/05/23 1452   BP:  151/79  151/81   Pulse: 89  103 100   Resp:       Temp:    98 1 °F (36 7 °C)   TempSrc:       SpO2: 98%  98% 99%     Physical Exam    Portions of the record may have been created with voice recognition software  Occasional wrong word or "sound a like" substitutions may have occurred due to the inherent limitations of voice recognition software  Read the chart carefully and recognize, using context, where substitutions have occurred      Romeo Rain

## 2023-02-05 NOTE — PROGRESS NOTES
Pastoral Care Progress Note    2023  Patient: Kirti Wright :   Admission Date & Time: 2023 1446  MRN: [id number] [facility]: [id number]                     Chaplaincy Interventions Utilized:      23 1400   Clinical Encounter Type   Visited With Patient   Crisis Visit Critical Care  (RRT)     RRT- Patient passed out and currently is fully alert  No family members  Provided emotional support to medical staff

## 2023-02-05 NOTE — PROGRESS NOTES
Progress Note - Orthopedics   Alexey Dickinson 69 y o  female MRN: [id number]  Unit/Bed#: [facility] 625-01      Subjective:    69 y  o female Did have episode of dizziness nausea w/o vomiting yesterday after standing up from commode, right thigh soreness still present with motion and ambulation  Did get up and walk with PT yesterday  Otherwise patient doing well  Pain well controlled  Denies fevers, chills, CP, SOB, Vomiting, numbness or tingling      Labs:  0   Lab Value Date/Time    HCT 30 4 (L) 02/05/2023 0513    HCT 36 0 02/04/2023 0511    HCT 39 8 02/03/2023 0550    HGB 9 8 (L) 02/05/2023 0513    HGB 11 9 02/04/2023 0511    HGB 13 0 02/03/2023 0550    INR 1 05 02/03/2023 0550    WBC 12 71 (H) 02/05/2023 0513    WBC 11 88 (H) 02/04/2023 0511    WBC 11 17 (H) 02/03/2023 0550       Meds:    Current Facility-Administered Medications:   •  acetaminophen (TYLENOL) tablet 975 mg, 975 mg, Oral, Q6H, Virginia Salinas MD, 975 mg at 02/05/23 0536  •  artificial tear (LUBRIFRESH P M ) ophthalmic ointment, , Both Eyes, HS PRN, Devin Davis MD  •  enoxaparin (LOVENOX) subcutaneous injection 30 mg, 30 mg, Subcutaneous, Q12H [address], Mary Ann Hernandez PA-C, 30 mg at 02/04/23 2111  •  HYDROmorphone (DILAUDID) injection 0 5 mg, 0 5 mg, Intravenous, Q3H PRN, Elizabeth Richards PA-C  •  levothyroxine tablet 25 mcg, 25 mcg, Oral, Daily, Elizabeth Richards PA-C, 25 mcg at 02/04/23 2111  •  oxyCODONE (ROXICODONE) IR tablet 2 5 mg, 2 5 mg, Oral, Q4H PRN, Elizabeth Richards PA-C  •  oxyCODONE (ROXICODONE) IR tablet 5 mg, 5 mg, Oral, Q4H PRN, Elizabeth Points, PA-C, 5 mg at 02/03/23 1658  •  pravastatin (PRAVACHOL) tablet 20 mg, 20 mg, Oral, Daily With Dinner, Mary Ann Hernandez PA-C, 20 mg at 02/04/23 1623  •  senna-docusate sodium (SENOKOT S) 8 6-50 mg per tablet 1 tablet, 1 tablet, Oral, HS, Devin Davis MD, 1 tablet at 02/04/23 2111    Blood Culture:   No results found for: BLOODCX    Wound Culture:   No results found for: WOUNDCULT    Ins and Outs:  I/O last 24 hours: In: 222 3 [I V :222 3]  Out: 350 [Urine:350]          Physical:  Vitals:    02/05/23 0203   BP: 134/69   Pulse: 71   Resp: 18   Temp:    SpO2: 95%     Musculoskeletal: right Lower Extremity  · Skin with mild ecchymosis around distal interlocks  No erythema   · Dressings to right hip mild area of strikethrough on superior dressings about the size of a dime  · Mild TTP over right thigh and distal knee  · Sensation intact to saphenous, sural, tibial, superficial peroneal nerve, and deep peroneal  · Motor intact to +FHL/EHL, +ankle dorsi/plantar flexion  · 2+ DP pulse, symmetric bilaterally  Able to flex extend knee full extension to about 30 deg flexion limited by pain  · Toes warm and well perfused  · Capillary refill < 2 seconds    Assessment:    69 y  o female s/p right FRN for atypical subtrochanteric femur fracture    Plan:  · WBAT RLE  · Multimodal pain control  · PT/OT eval today  · DVT ppx - 28 days to continue thru discharge  · Leave dressings on until followup  · Patient should not continue bisphosphonate use at this time given radiographic changes to left femur including lateral cortical thickening without obvious stress fracture/beaking  She needs to follow up immediately w ortho if she develops ambulatory pain in left thigh  I discussed this with her today in person    · Dispo per primary  · OK for discharge from ortho perspective    Moshe Martinez MD

## 2023-02-05 NOTE — QUICK NOTE
[facility] and stated that the patient became diaphoretic, short of breath, and dizzy when she got up from the chair to go to the bathroom  Went to patient's room, patient stated that her dizziness went away however she still feels nauseous  Patient stated that she has had this occur before due to her pain in her right hip  Pain increased with walking to the bathroom  On arrival, patient's pain has decreased and is not as anxious per patient  Exam unremarkable  Ordered EKG, however feel unlikely cause was cardiac  Zofran 4 mg IV ordered for nausea

## 2023-02-05 NOTE — RESTORATIVE TECHNICIAN NOTE
Restorative Technician Note      Patient Name: Dru Simpson     Restorative Tech Visit Date: 02/05/23  Note Type: Mobility  Patient Position Upon Consult: Supine  Activity Performed: Ambulated (to the br)  Assistive Device: Roller walker  Patient Position at End of Consult: Bedside chair; All needs within reach    Pt reported feeling dizzy while walking back from the br  Dizziness resolved once seated and reclined back in the chair; /63      Navneet Sampson, Restorative Technician

## 2023-02-06 ENCOUNTER — APPOINTMENT (INPATIENT)
Dept: RADIOLOGY | Facility: HOSPITAL | Age: 78
End: 2023-02-06

## 2023-02-06 PROBLEM — E78.5 HLD (HYPERLIPIDEMIA): Status: ACTIVE | Noted: 2023-02-06

## 2023-02-06 PROBLEM — R42 LIGHTHEADEDNESS: Status: ACTIVE | Noted: 2023-02-06

## 2023-02-06 LAB
ANION GAP SERPL CALCULATED.3IONS-SCNC: 6 MMOL/L (ref 4–13)
ATRIAL RATE: 79 BPM
ATRIAL RATE: 80 BPM
BASOPHILS # BLD AUTO: 0.04 THOUSANDS/ÂΜL (ref 0–0.1)
BASOPHILS NFR BLD AUTO: 0 % (ref 0–1)
BUN SERPL-MCNC: 22 MG/DL (ref 5–25)
CALCIUM SERPL-MCNC: 8.4 MG/DL (ref 8.3–10.1)
CHLORIDE SERPL-SCNC: 108 MMOL/L (ref 96–108)
CO2 SERPL-SCNC: 26 MMOL/L (ref 21–32)
CREAT SERPL-MCNC: 0.79 MG/DL (ref 0.6–1.3)
EOSINOPHIL # BLD AUTO: 0.07 THOUSAND/ÂΜL (ref 0–0.61)
EOSINOPHIL NFR BLD AUTO: 1 % (ref 0–6)
ERYTHROCYTE [DISTWIDTH] IN BLOOD BY AUTOMATED COUNT: 14.8 % (ref 11.6–15.1)
GFR SERPL CREATININE-BSD FRML MDRD: 72 ML/MIN/1.73SQ M
GLUCOSE SERPL-MCNC: 174 MG/DL (ref 65–140)
HCT VFR BLD AUTO: 26.3 % (ref 34.8–46.1)
HGB BLD-MCNC: 8.5 G/DL (ref 11.5–15.4)
IMM GRANULOCYTES # BLD AUTO: 0.1 THOUSAND/UL (ref 0–0.2)
IMM GRANULOCYTES NFR BLD AUTO: 1 % (ref 0–2)
LYMPHOCYTES # BLD AUTO: 2.27 THOUSANDS/ÂΜL (ref 0.6–4.47)
LYMPHOCYTES NFR BLD AUTO: 19 % (ref 14–44)
MCH RBC QN AUTO: 29.4 PG (ref 26.8–34.3)
MCHC RBC AUTO-ENTMCNC: 32.3 G/DL (ref 31.4–37.4)
MCV RBC AUTO: 91 FL (ref 82–98)
MONOCYTES # BLD AUTO: 1.08 THOUSAND/ÂΜL (ref 0.17–1.22)
MONOCYTES NFR BLD AUTO: 9 % (ref 4–12)
NEUTROPHILS # BLD AUTO: 8.36 THOUSANDS/ÂΜL (ref 1.85–7.62)
NEUTS SEG NFR BLD AUTO: 70 % (ref 43–75)
NRBC BLD AUTO-RTO: 0 /100 WBCS
P AXIS: 53 DEGREES
P AXIS: 54 DEGREES
PLATELET # BLD AUTO: 262 THOUSANDS/UL (ref 149–390)
PMV BLD AUTO: 11.7 FL (ref 8.9–12.7)
POTASSIUM SERPL-SCNC: 3.6 MMOL/L (ref 3.5–5.3)
PR INTERVAL: 116 MS
PR INTERVAL: 118 MS
QRS AXIS: -40 DEGREES
QRS AXIS: -42 DEGREES
QRSD INTERVAL: 78 MS
QRSD INTERVAL: 80 MS
QT INTERVAL: 390 MS
QT INTERVAL: 392 MS
QTC INTERVAL: 447 MS
QTC INTERVAL: 452 MS
RBC # BLD AUTO: 2.89 MILLION/UL (ref 3.81–5.12)
SODIUM SERPL-SCNC: 140 MMOL/L (ref 135–147)
T WAVE AXIS: 60 DEGREES
T WAVE AXIS: 62 DEGREES
VENTRICULAR RATE: 79 BPM
VENTRICULAR RATE: 80 BPM
WBC # BLD AUTO: 11.92 THOUSAND/UL (ref 4.31–10.16)

## 2023-02-06 RX ORDER — MECLIZINE HCL 12.5 MG/1
12.5 TABLET ORAL EVERY 8 HOURS PRN
Status: DISCONTINUED | OUTPATIENT
Start: 2023-02-06 | End: 2023-02-09 | Stop reason: HOSPADM

## 2023-02-06 RX ORDER — BISACODYL 10 MG
10 SUPPOSITORY, RECTAL RECTAL DAILY PRN
Status: DISCONTINUED | OUTPATIENT
Start: 2023-02-06 | End: 2023-02-09 | Stop reason: HOSPADM

## 2023-02-06 RX ORDER — POLYETHYLENE GLYCOL 3350 17 G/17G
17 POWDER, FOR SOLUTION ORAL DAILY
Status: DISCONTINUED | OUTPATIENT
Start: 2023-02-06 | End: 2023-02-09 | Stop reason: HOSPADM

## 2023-02-06 RX ORDER — SODIUM CHLORIDE, SODIUM GLUCONATE, SODIUM ACETATE, POTASSIUM CHLORIDE, MAGNESIUM CHLORIDE, SODIUM PHOSPHATE, DIBASIC, AND POTASSIUM PHOSPHATE .53; .5; .37; .037; .03; .012; .00082 G/100ML; G/100ML; G/100ML; G/100ML; G/100ML; G/100ML; G/100ML
1000 INJECTION, SOLUTION INTRAVENOUS ONCE
Status: COMPLETED | OUTPATIENT
Start: 2023-02-06 | End: 2023-02-06

## 2023-02-06 RX ADMIN — ACETAMINOPHEN 975 MG: 325 TABLET ORAL at 22:48

## 2023-02-06 RX ADMIN — SENNOSIDES AND DOCUSATE SODIUM 1 TABLET: 8.6; 5 TABLET ORAL at 21:46

## 2023-02-06 RX ADMIN — IOHEXOL 85 ML: 350 INJECTION, SOLUTION INTRAVENOUS at 13:12

## 2023-02-06 RX ADMIN — ACETAMINOPHEN 975 MG: 325 TABLET ORAL at 02:21

## 2023-02-06 RX ADMIN — ACETAMINOPHEN 975 MG: 325 TABLET ORAL at 17:22

## 2023-02-06 RX ADMIN — SODIUM CHLORIDE, SODIUM GLUCONATE, SODIUM ACETATE, POTASSIUM CHLORIDE, MAGNESIUM CHLORIDE, SODIUM PHOSPHATE, DIBASIC, AND POTASSIUM PHOSPHATE 1000 ML: .53; .5; .37; .037; .03; .012; .00082 INJECTION, SOLUTION INTRAVENOUS at 12:02

## 2023-02-06 RX ADMIN — POLYETHYLENE GLYCOL 3350 17 G: 17 POWDER, FOR SOLUTION ORAL at 09:43

## 2023-02-06 RX ADMIN — PRAVASTATIN SODIUM 20 MG: 20 TABLET ORAL at 17:22

## 2023-02-06 RX ADMIN — ENOXAPARIN SODIUM 30 MG: 30 INJECTION SUBCUTANEOUS at 09:13

## 2023-02-06 RX ADMIN — ENOXAPARIN SODIUM 30 MG: 30 INJECTION SUBCUTANEOUS at 21:46

## 2023-02-06 RX ADMIN — ACETAMINOPHEN 975 MG: 325 TABLET ORAL at 09:13

## 2023-02-06 RX ADMIN — LEVOTHYROXINE SODIUM 25 MCG: 25 TABLET ORAL at 21:46

## 2023-02-06 NOTE — PROGRESS NOTES
Progress Note - Orthopedics   Alexey Dickinson 69 y o  female MRN: [id number]  Unit/Bed#: [facility] 625-01      Subjective:    69 y  o female POD 3 R fem CMN  Doing well, continues to have occasional episodes of dizziness/light headedness  She states that these were occasionally occurring prior to her fall as well  Labs:  0   Lab Value Date/Time    HCT 27 1 (L) 02/05/2023 1958    HCT 27 (L) 02/05/2023 1503    HCT 30 9 (L) 02/05/2023 1138    HCT 30 4 (L) 02/05/2023 0513    HGB 8 6 (L) 02/05/2023 1958    HGB 9 2 (L) 02/05/2023 1503    HGB 10 1 (L) 02/05/2023 1138    HGB 9 8 (L) 02/05/2023 0513    INR 1 05 02/03/2023 0550    WBC 13 27 (H) 02/05/2023 1958    WBC 12 71 (H) 02/05/2023 0513    WBC 11 88 (H) 02/04/2023 0511       Meds:    Current Facility-Administered Medications:   •  acetaminophen (TYLENOL) tablet 975 mg, 975 mg, Oral, Q6H, Virginia Salinas MD, 975 mg at 02/06/23 0221  •  artificial tear (LUBRIFRESH P M ) ophthalmic ointment, , Both Eyes, HS PRN, Devin Davis MD  •  enoxaparin (LOVENOX) subcutaneous injection 30 mg, 30 mg, Subcutaneous, Q12H [address], Mary Ann Hernandez PA-C, 30 mg at 02/05/23 2126  •  HYDROmorphone (DILAUDID) injection 0 5 mg, 0 5 mg, Intravenous, Q3H PRN, Elizabeth Richards PA-C  •  levothyroxine tablet 25 mcg, 25 mcg, Oral, Daily, Mary Ann Hernandez PA-C, 25 mcg at 02/05/23 2126  •  oxyCODONE (ROXICODONE) IR tablet 2 5 mg, 2 5 mg, Oral, Q4H PRN, Elizabeth Richards PA-C  •  oxyCODONE (ROXICODONE) IR tablet 5 mg, 5 mg, Oral, Q4H PRN, Elizabeth Richards PA-C, 5 mg at 02/03/23 1658  •  pravastatin (PRAVACHOL) tablet 20 mg, 20 mg, Oral, Daily With Dinner, Mary Ann Hernandez PA-C, 20 mg at 02/05/23 1618  •  senna-docusate sodium (SENOKOT S) 8 6-50 mg per tablet 1 tablet, 1 tablet, Oral, HS, Devin Davis MD, 1 tablet at 02/05/23 2126    Blood Culture:   No results found for: BLOODCX    Wound Culture:   No results found for: WOUNDCULT    Ins and Outs:  I/O last 24 hours:   In: 5 [I V :5]  Out: 150 [Urine:150]          Physical:  Vitals:    02/06/23 0646   BP: 117/63   Pulse: 85   Resp: 18   Temp: 98 8 °F (37 1 °C)   SpO2: 98%     Musculoskeletal: right Lower Extremity  · Dressing in place without significant drainage  · TTP hip  · Sensation intact to light touch alvarez/saph/sp/dp/tib  · Motor intact EHL/FHL, ankle DF/PF  · 2+ DP pulse    Assessment:    69 y  o female POD 3 R fem CMN  Doing well        Plan:  · WBAT RLE  · Greater than 2 gram drop which qualifies for diagnosis of acute blood loss anemia, will monitor and administer IVF/prbc as indicated  · PT/OT  · Pain control  · DVT PPX: lovenox x 28 days post op  · Dispo: Ortho will follow    Britt White MD

## 2023-02-06 NOTE — OCCUPATIONAL THERAPY NOTE
Occupational Therapy Progress Note     Patient Name: Tuyet Cortez  RVGDX'B Date: 2/6/2023  Problem List  Principal Problem:    Femur fracture [facility])  Active Problems:    Fall    Acute pain due to trauma    Hypothyroidism    Acute blood loss anemia    HLD (hyperlipidemia)    Lightheadedness        02/06/23 1015   OT Last Visit   OT Visit Date 02/06/23   Note Type   Note Type Treatment   Pain Assessment   Pain Assessment Tool 0-10   Pain Score 4   Pain Location/Orientation Orientation: Right;Location: Hip   Hospital Pain Intervention(s) Repositioned; Ambulation/increased activity; Emotional support;Relaxation technique   Restrictions/Precautions   Weight Bearing Precautions Per Order Yes   RUE Weight Bearing Per Order WBAT   LUE Weight Bearing Per Order WBAT   RLE Weight Bearing Per Order WBAT   LLE Weight Bearing Per Order WBAT   Other Precautions Cognitive; Chair Alarm; Bed Alarm;WBS;Pain   Lifestyle   Autonomy I adls and mobiltiy - i iadls   Reciprocal Relationships supportive family who is able to assist PRN   Service to Others retired   Intrinsic Gratification active pta - reports she walked 2 5 miles several times/wk   ADL   Eating Assistance 7  Independent   Grooming Assistance 5  [address] [place] [address] [address]  Supervision/Setup   LB Bathing Assistance 4  Minimal Assistance   [address] 5  Supervision/Setup    [facility] 4  [facility] [address]  4  Minimal Assistance   Bed Mobility   Supine to Sit 4  Minimal assistance   Transfers   Sit to Stand 4  Minimal assistance   Stand to Sit 4  Minimal assistance   Stand pivot 4  Minimal assistance   Functional Mobility   Functional Mobility 4  Minimal assistance   Additional items Rolling walker   Subjective   Subjective "I feel better"   Cognition   Overall Cognitive Status WFL   Assessment   Assessment Pt seen for OT session focusing on self care, functional transfers and activity tolerance - pt set up on EOB to wash - required min assist in stance for dynamic balance - transfers and mobility with min a - limited tolerance 2* drop in BP with mobility- continue to recommend inpt rehab as pt lives alone  - OT to continue to follow to address goals as stated on eval   Plan   Treatment Interventions ADL retraining;Functional transfer training; Endurance training;Patient/family training;Equipment evaluation/education; Compensatory technique education; Activityengagement   Goal Expiration Date 02/18/23   OT Treatment Day 1   OT Frequency 2-3x/wk   Recommendation   OT Discharge Recommendation Post acute rehabilitation services   AM-PAC Daily Activity Inpatient   Lower Body Dressing 3   Bathing 3   Toileting 3   Upper Body Dressing 3   Grooming 4   Eating 4   Daily Activity Raw Score 20   Daily Activity Standardized Score (Calc for Raw Score >=11) 42 03   AM-PAC Applied Cognition Inpatient   Following a Speech/Presentation 4   Understanding Ordinary Conversation 4   Taking Medications 4   Remembering Where Things Are Placed or Put Away 4   Remembering List of 4-5 Errands 3   Taking Care of Complicated Tasks 3   Applied Cognition Raw Score 22   Applied Cognition Standardized Score 47 83   End of Consult   Education Provided Yes   Patient Position at End of Consult Bedside chair; All needs within reach   Nurse Communication Nurse aware of consult         The patient's raw score on the AM-PAC Daily Activity Inpatient Short Form is 20  A raw score of greater than or equal to 19 suggests the patient may benefit from discharge to home however pt has limited support available as she lives alone  Please refer to the recommendation of the Occupational Therapist for safe discharge planning        [facility]

## 2023-02-06 NOTE — [facility] ADMISSION
Referral received for consideration of patient for inpatient acute rehab  Will review patient's case with [facility] physician and update CM as able

## 2023-02-06 NOTE — CASE MANAGEMENT
Case Management Discharge Planning Note    Patient name Varsha Gonzalez  Location [facility] 625/[facility] 259-14 MRN [id number]  : 1945 Date 2023       Current Admission Date: 2023  Current Admission Diagnosis:Femur fracture [facility])   Patient Active Problem List    Diagnosis Date Noted   • HLD (hyperlipidemia) 2023   • Lightheadedness 2023   • Acute blood loss anemia 2023   • Fall 2023   • Acute pain due to trauma 2023   • Hypothyroidism 2023   • Femur fracture ([facility] [address] ) 2023   • Chest pain 10/31/2022   • Abnormal electrocardiogram (ECG) (EKG) 10/31/2022   • Sjogren's syndrome ([facility] [address] ) 10/06/2021   • Primary osteoarthritis of right knee 2019   • Primary osteoarthritis of left knee 2019      LOS (days): 4  Geometric Mean LOS (GMLOS) (days): 2 70  Days to GMLOS:-1 2     OBJECTIVE:  Risk of Unplanned Readmission Score: 10 41         Current admission status: Inpatient   Preferred Pharmacy:   [address] [facility], [address] [facility]  [address] [facility]  Phone: [phone] Fax: [phone]    Primary Care Provider: Samira Mancilla MD    Primary Insurance: MEDICARE  Secondary Insurance: AARP    DISCHARGE DETAILS:    Discharge planning discussed with[de-identified] Patient  Freedom of Choice: Yes  Comments - Freedom of Choice: Discussed FOC  CM contacted family/caregiver?: No- see comments (Declined)  Were Treatment Team discharge recommendations reviewed with patient/caregiver?: Yes  Did patient/caregiver verbalize understanding of patient care needs?: Yes  Were patient/caregiver advised of the risks associated with not following Treatment Team discharge recommendations?: Yes    Other Referral/Resources/Interventions Provided:  Interventions: Short Term Rehab  Referral Comments: Patient requesting referral to [facility], would like [facility] location, and [address] [place] [address], if [facility] unable to accept    [facility] [address]

## 2023-02-06 NOTE — PROGRESS NOTES
[address] [facility]  Progress Note - Saturnino De La Cruz 1945, 69 y o  female MRN: [id number]  Unit/Bed#: [facility] 625-01 Encounter: [id number]  Primary Care Provider: Jamison Farrell MD   Date and time admitted to hospital: 2/2/2023  2:46 PM    Lightheadedness  Assessment & Plan  - Rapid Response called 2/5 for syncope following episode of lightheadedness  - Orthostatics ordered  - Pt reporting history of lightheadedness with certain movements  - Continue to monitor vitals  - CTA H/N for vertiginous symptoms  - Echo ordered    HLD (hyperlipidemia)  Assessment & Plan  - Continue home statin therapy    Acute blood loss anemia  Assessment & Plan  -Trend H/H  -Transfuse if < 7  -Reports some light headedness, may be related  -Continue to monitor      Hypothyroidism  Assessment & Plan  - continue home levothyroxine    Acute pain due to trauma  Assessment & Plan  - Acute pain secondary to traumatic injuries  - Multi modal analgesic regimen  - Bowel regimen as long as using opioids   - Continue to monitor pain and adjust regimen as indicated  Fall  Assessment & Plan  - mechanical in nature - felt a pop in her hip when she took a step and lowered herself to the ground  Did not strike her head  - Sustained the below stated injury  - PT/OT and case management     * Femur fracture [facility])  Assessment & Plan  - R subtrochanteric femur fracture, present on admission   - Status post traction placement on 2/2  - Appreciate Orthopedic surgery evaluation, recommendations and interventions as noted  - OR, 2/3 for ORIF RLE    - Monitor right lower extremity neurovascular exam   - Continue multimodal analgesic regimen   - Continue DVT prophylaxis with lovenox   - PT and OT evaluation and treatment as indicated  - Outpatient follow up with Orthopedic surgery for re-evaluation          Bowel Regimen: Senakot    VTE Prophylaxis:Enoxaparin (Lovenox)     Disposition: Awaiting placement    Subjective Chief Complaint: Lightheadedness    Subjective: Pt was a Rapid Response yesterday for syncopal episode  EKG and labs were checked that showed acute drop in hgb but not low enough to require transfusion  No other significant overnight events  Pt reports she is feeling okay right now  She does report history of lightheadedness at home with certain movements, particularly turning  She states that this has happened throughout her life but has become more frequent since December  Pt also reporting no BM since surgery  No other complaints this AM       Objective   Vitals:   Temp:  [97 2 °F (36 2 °C)-98 9 °F (37 2 °C)] 97 2 °F (36 2 °C)  HR:  [] 87  Resp:  [16-19] 16  BP: (114-151)/(60-81) 117/61    I/O       02/04 0701  02/05 0700 02/05 0701 02/06 0700 02/06 0701 02/07 0700    I  V   5     Total Intake  5     Urine 100 150     Blood       Total Output 100 150     Net -100 -145            Unmeasured Urine Occurrence 2 x 1 x            Physical Exam:   General: No acute distress  Neuro: A&Ox3; Following commands; Moving all extremities  HEENT: Moist mucus membranes  CV: Regular rate  Pulm: No respiratory distress  GI: No abdominal tenderness  MSK: ROM intact  Skin: Warm and dry    Invasive Devices     Peripheral Intravenous Line  Duration           Peripheral IV 02/04/23 Left Antecubital 2 days                      Lab Results:   BMP/CMP:   Lab Results   Component Value Date    SODIUM 140 02/06/2023    K 3 6 02/06/2023     02/06/2023    CO2 26 02/06/2023    CO2 24 02/05/2023    BUN 22 02/06/2023    CREATININE 0 79 02/06/2023    GLUCOSE 145 (H) 02/05/2023    CALCIUM 8 4 02/06/2023    EGFR 72 02/06/2023    and CBC:   Lab Results   Component Value Date    WBC 11 92 (H) 02/06/2023    HGB 8 5 (L) 02/06/2023    HCT 26 3 (L) 02/06/2023    MCV 91 02/06/2023     02/06/2023    MCH 29 4 02/06/2023    MCHC 32 3 02/06/2023    RDW 14 8 02/06/2023    MPV 11 7 02/06/2023    NRBC 0 02/06/2023     Imaging: I have personally reviewed pertinent reports

## 2023-02-06 NOTE — PLAN OF CARE
Problem: Potential for Falls  Goal: Patient will remain free of falls  Description: INTERVENTIONS:  - Educate patient/family on patient safety including physical limitations  - Instruct patient to call for assistance with activity   - Consult OT/PT to assist with strengthening/mobility   - Keep Call bell within reach  - Keep bed low and locked with side rails adjusted as appropriate  - Keep care items and personal belongings within reach  - Initiate and maintain comfort rounds  - Make Fall Risk Sign visible to staff  - Offer Toileting every 2 Hours, in advance of need  - Initiate/Maintain on alarm  - Obtain necessary fall risk management equipment:   Problem: MOBILITY - ADULT  Goal: Maintain or return to baseline ADL function  Description: INTERVENTIONS:  -  Assess patient's ability to carry out ADLs; assess patient's baseline for ADL function and identify physical deficits which impact ability to perform ADLs (bathing, care of mouth/teeth, toileting, grooming, dressing, etc )  - Assess/evaluate cause of self-care deficits   - Assess range of motion  - Assess patient's mobility; develop plan if impaired  - Assess patient's need for assistive devices and provide as appropriate  - Encourage maximum independence but intervene and supervise when necessary  - Involve family in performance of ADLs  - Assess for home care needs following discharge   - Consider OT consult to assist with ADL evaluation and planning for discharge  - Provide patient education as appropriate  Outcome: Progressing  Goal: Maintains/Returns to pre admission functional level  Description: INTERVENTIONS:  - Perform BMAT or MOVE assessment daily    - Set and communicate daily mobility goal to care team and patient/family/caregiver  - Collaborate with rehabilitation services on mobility goals if consulted  - Perform Range of Motion 3 times a day  - Reposition patient every 2 hours    - Dangle patient 3 times a day  - Stand patient 3 times a day  - Ambulate patient 3 times a day  - Out of bed to chair 3 times a day   - Out of bed for meals 3 times a day  - Out of bed for toileting  - Record patient progress and toleration of activity level   Outcome: Progressing     Problem: Prexisting or High Potential for Compromised Skin Integrity  Goal: Skin integrity is maintained or improved  Description: INTERVENTIONS:  - Identify patients at risk for skin breakdown  - Assess and monitor skin integrity  - Assess and monitor nutrition and hydration status  - Monitor labs   - Assess for incontinence   - Turn and reposition patient  - Assist with mobility/ambulation  - Relieve pressure over bony prominences  - Avoid friction and shearing  - Provide appropriate hygiene as needed including keeping skin clean and dry  - Evaluate need for skin moisturizer/barrier cream  - Collaborate with interdisciplinary team   - Patient/family teaching  - Consider wound care consult   Outcome: Progressing     Problem: PAIN - ADULT  Goal: Verbalizes/displays adequate comfort level or baseline comfort level  Description: Interventions:  - Encourage patient to monitor pain and request assistance  - Assess pain using appropriate pain scale  - Administer analgesics based on type and severity of pain and evaluate response  - Implement non-pharmacological measures as appropriate and evaluate response  - Consider cultural and social influences on pain and pain management  - Notify physician/advanced practitioner if interventions unsuccessful or patient reports new pain  Outcome: Progressing     Problem: SAFETY ADULT  Goal: Patient will remain free of falls  Description: INTERVENTIONS:  - Educate patient/family on patient safety including physical limitations  - Instruct patient to call for assistance with activity   - Consult OT/PT to assist with strengthening/mobility   - Keep Call bell within reach  - Keep bed low and locked with side rails adjusted as appropriate  - Keep care items and personal belongings within reach  - Initiate and maintain comfort rounds  - Make Fall Risk Sign visible to staff  - Offer Toileting every 2 Hours, in advance of need  - Initiate/Maintain on alarm  - Obtain necessary fall risk management equipment:   - Apply yellow socks and bracelet for high fall risk patients  - Consider moving patient to room near nurses station  Outcome: Progressing  Goal: Maintain or return to baseline ADL function  Description: INTERVENTIONS:  -  Assess patient's ability to carry out ADLs; assess patient's baseline for ADL function and identify physical deficits which impact ability to perform ADLs (bathing, care of mouth/teeth, toileting, grooming, dressing, etc )  - Assess/evaluate cause of self-care deficits   - Assess range of motion  - Assess patient's mobility; develop plan if impaired  - Assess patient's need for assistive devices and provide as appropriate  - Encourage maximum independence but intervene and supervise when necessary  - Involve family in performance of ADLs  - Assess for home care needs following discharge   - Consider OT consult to assist with ADL evaluation and planning for discharge  - Provide patient education as appropriate  Outcome: Progressing  Goal: Maintains/Returns to pre admission functional level  Description: INTERVENTIONS:  - Perform BMAT or MOVE assessment daily    - Set and communicate daily mobility goal to care team and patient/family/caregiver  - Collaborate with rehabilitation services on mobility goals if consulted  - Perform Range of Motion 3 times a day  - Reposition patient every 2 hours    - Dangle patient 3 times a day  - Stand patient 3 times a day  - Ambulate patient 3 times a day  - Out of bed to chair 3 times a day   - Out of bed for meals 3 times a day  - Out of bed for toileting  - Record patient progress and toleration of activity level   Outcome: Progressing     - Apply yellow socks and bracelet for high fall risk patients  - Consider moving patient to room near nurses station  Outcome: Progressing

## 2023-02-06 NOTE — PLAN OF CARE
Problem: PHYSICAL THERAPY ADULT  Goal: Performs mobility at highest level of function for planned discharge setting  See evaluation for individualized goals  Description: Treatment/Interventions: Functional transfer training, LE strengthening/ROM, Elevations, Endurance training, Therapeutic exercise, Patient/family training, Equipment eval/education, Bed mobility, Gait training, Spoke to nursing, OT  Equipment Recommended: Doyne Gosselin       See flowsheet documentation for full assessment, interventions and recommendations  Outcome: Progressing  Note: Prognosis: Good  Problem List: Decreased strength, Decreased range of motion, Decreased endurance, Impaired balance, Decreased mobility, Pain  Assessment: Patient resting in bed at time of PT treatment session  Patient reports slight right hip pain however is willing and motivated to participate with PT  Patient is perform all bed ability with min a x1 was improved compared to previous session although increased time still required to complete  Patient performed transfers with min a x1 which is also improved, but slight cueing still needed for proper hand placement  Prior to ambulation patient's BP was taken 124/60  patient was able to tolerate increased ambulation distances with min a x1 and the use of rolling walker which is also improved although distances remain limited by fatigue and lightheadedness  During second ambulation attempt patient reported significant increase in lightheadedness  Patient was assisted into a seated position and BP was taken in sittin/36  Patient's bilateral lower extremities were elevated in recliner chair and patient was instructed to perform ankle pumps  Patient's BP was taken again in seated position 1 minute later with bilateral lower extremity's elevated 92/51  Patient reported feeling much better in seated position    Patient's BP was then again taken 3 minutes later with bilateral lower extremities elevated and was noted to be: 119/61  Nursing and trauma team was made aware of patient's BP changes with ambulation  Once seated in chair patient was able to tolerate perform a lower extremity TherEX without any increase in complaints  At conclusion of PT treatment session patient was assisted back to chair with all needs within reach  PT will continue to follow, D/C recommendation when medically cleared his rehab        PT Discharge Recommendation: Post acute rehabilitation services    See flowsheet documentation for full assessment

## 2023-02-06 NOTE — PLAN OF CARE
Problem: Potential for Falls  Goal: Patient will remain free of falls  Description: INTERVENTIONS:  - Educate patient/family on patient safety including physical limitations  - Instruct patient to call for assistance with activity   - Consult OT/PT to assist with strengthening/mobility   - Keep Call bell within reach  - Keep bed low and locked with side rails adjusted as appropriate  - Keep care items and personal belongings within reach  - Initiate and maintain comfort rounds  - Make Fall Risk Sign visible to staff  - Offer Toileting every 2 Hours, in advance of need  - Initiate/Maintain bed/chair alarm  - Obtain necessary fall risk management equipment:   - Apply yellow socks and bracelet for high fall risk patients  - Consider moving patient to room near nurses station  Outcome: Progressing     Problem: MOBILITY - ADULT  Goal: Maintain or return to baseline ADL function  Description: INTERVENTIONS:  -  Assess patient's ability to carry out ADLs; assess patient's baseline for ADL function and identify physical deficits which impact ability to perform ADLs (bathing, care of mouth/teeth, toileting, grooming, dressing, etc )  - Assess/evaluate cause of self-care deficits   - Assess range of motion  - Assess patient's mobility; develop plan if impaired  - Assess patient's need for assistive devices and provide as appropriate  - Encourage maximum independence but intervene and supervise when necessary  - Involve family in performance of ADLs  - Assess for home care needs following discharge   - Consider OT consult to assist with ADL evaluation and planning for discharge  - Provide patient education as appropriate  Outcome: Progressing     Problem: MOBILITY - ADULT  Goal: Maintains/Returns to pre admission functional level  Description: INTERVENTIONS:  - Perform BMAT or MOVE assessment daily    - Set and communicate daily mobility goal to care team and patient/family/caregiver     - Collaborate with rehabilitation services on mobility goals if consulted  - Record patient progress and toleration of activity level   Outcome: Progressing     Problem: Prexisting or High Potential for Compromised Skin Integrity  Goal: Skin integrity is maintained or improved  Description: INTERVENTIONS:  - Identify patients at risk for skin breakdown  - Assess and monitor skin integrity  - Assess and monitor nutrition and hydration status  - Monitor labs   - Assess for incontinence   - Turn and reposition patient  - Assist with mobility/ambulation  - Relieve pressure over bony prominences  - Avoid friction and shearing  - Provide appropriate hygiene as needed including keeping skin clean and dry  - Evaluate need for skin moisturizer/barrier cream  - Collaborate with interdisciplinary team   - Patient/family teaching  - Consider wound care consult   Outcome: Progressing     Problem: PAIN - ADULT  Goal: Verbalizes/displays adequate comfort level or baseline comfort level  Description: Interventions:  - Encourage patient to monitor pain and request assistance  - Assess pain using appropriate pain scale  - Administer analgesics based on type and severity of pain and evaluate response  - Implement non-pharmacological measures as appropriate and evaluate response  - Consider cultural and social influences on pain and pain management  - Notify physician/advanced practitioner if interventions unsuccessful or patient reports new pain  Outcome: Progressing     Problem: SAFETY ADULT  Goal: Patient will remain free of falls  Description: INTERVENTIONS:  - Educate patient/family on patient safety including physical limitations  - Instruct patient to call for assistance with activity   - Consult OT/PT to assist with strengthening/mobility   - Keep Call bell within reach  - Keep bed low and locked with side rails adjusted as appropriate  - Keep care items and personal belongings within reach  - Initiate and maintain comfort rounds  - Make Fall Risk Sign visible to staff  - Offer Toileting every 2 Hours, in advance of need  - Initiate/Maintain bed/chair alarm  - Obtain necessary fall risk management equipment:  - Apply yellow socks and bracelet for high fall risk patients  - Consider moving patient to room near nurses station  Outcome: Progressing

## 2023-02-06 NOTE — ASSESSMENT & PLAN NOTE
- Rapid Response called 2/5 for syncope following episode of lightheadedness  - Orthostatics ordered  - Pt reporting history of lightheadedness with certain movements  - Continue to monitor vitals  - CTA H/N for vertiginous symptoms  - Echo ordered Encounter Date: 8/15/2022       History     Chief Complaint   Patient presents with    Abscess     Face left side     Patient on doxycycline for several days for a small left cheek infection versus  abscess.  Minimal drainage the present time.  The size of the wound is approximately size of a dime.  Slightly raise left cheek.  She has no complaints otherwise.        Review of patient's allergies indicates:   Allergen Reactions    Penicillins Nausea And Vomiting     Past Medical History:   Diagnosis Date    Allergy     Anxiety     Headache(784.0)     Preeclampsia     Varicella      Past Surgical History:   Procedure Laterality Date     SECTION      face debridment       Family History   Problem Relation Age of Onset    Diabetes Maternal Grandmother     Colon cancer Neg Hx     Breast cancer Neg Hx     Ovarian cancer Neg Hx      Social History     Tobacco Use    Smoking status: Current Every Day Smoker     Packs/day: 0.50     Years: 7.00     Pack years: 3.50     Types: Cigarettes     Last attempt to quit: 2013     Years since quittin.1    Smokeless tobacco: Never Used    Tobacco comment: less than 4 a day per pt   Substance Use Topics    Alcohol use: No     Comment: social    Drug use: Yes     Types: Marijuana     Review of Systems   Constitutional: Negative for chills and fever.   HENT: Negative for ear pain, rhinorrhea and sore throat.    Eyes: Negative for pain and visual disturbance.   Respiratory: Negative for cough and shortness of breath.    Cardiovascular: Negative for chest pain and palpitations.   Gastrointestinal: Negative for abdominal pain, constipation, diarrhea, nausea and vomiting.   Genitourinary: Negative for dysuria, frequency, hematuria and urgency.   Musculoskeletal: Negative for back pain, joint swelling and myalgias.   Skin: Negative for rash.        Facial swelling   Neurological: Negative for dizziness, seizures, weakness and headaches.    Psychiatric/Behavioral: Negative for dysphoric mood. The patient is not nervous/anxious.        Physical Exam     Initial Vitals [08/15/22 0115]   BP Pulse Resp Temp SpO2   (!) 135/93 93 16 98 °F (36.7 °C) 98 %      MAP       --         Physical Exam    Nursing note and vitals reviewed.  Constitutional: She appears well-developed and well-nourished.   HENT:   Head: Normocephalic and atraumatic.   Eyes: Conjunctivae, EOM and lids are normal. Pupils are equal, round, and reactive to light.   Neck: Trachea normal. Neck supple. No thyroid mass and no thyromegaly present.   Normal range of motion.  Cardiovascular: Normal rate, regular rhythm and normal heart sounds.   Pulmonary/Chest: Effort normal and breath sounds normal.   Abdominal: Abdomen is soft. Bowel sounds are normal. There is no abdominal tenderness.   Musculoskeletal:         General: Normal range of motion.      Cervical back: Normal range of motion and neck supple.     Neurological: She is alert and oriented to person, place, and time. She has normal strength and normal reflexes. No cranial nerve deficit or sensory deficit.   Skin: Skin is warm and dry.   Patient with dime-size swelling left cheek firm to touch.   Psychiatric: She has a normal mood and affect. Her speech is normal and behavior is normal. Judgment and thought content normal.         ED Course   I & D - Incision and Drainage    Date/Time: 8/15/2022 2:49 AM  Location procedure was performed: [facility] EMERGENCY DEPARTMENT  Performed by: Mikaela Clinton MD  Authorized by: Mikaela Clinton MD   Comments: Patient given 1 cc of 1% without lidocaine as a local anesthetic.  The wounds cleaned with dilute Betadine prior to giving the anesthetic.  An 18 gauge needle was used to puncture the skin and open the wound and all directions.  I then was able to squeeze out some pus and blood.  With good success.  The patient tolerated the procedure well.  Patient will be discharged on Bactroban.  She is  taking doxycycline at the present time.        Labs Reviewed - No data to display       Imaging Results    None          Medications   mupirocin 2 % ointment ( Topical (Top) Not Given 8/15/22 0300)   LIDOcaine (PF) 10 mg/ml (1%) injection 10 mg (10 mg Other Given 8/15/22 0249)   mupirocin 2 % ointment 22 g (22 g Topical (Top) Given 8/15/22 4965)     Medical Decision Making:   ED Management:  Patient wound opened and drained with an 18 gauge needle.  Patient tolerated the procedure well patient to be discharged on antibiotics as well as pain medicines and follow-up                      Clinical Impression:   Final diagnoses:  [L02.91] Abscess (Primary)          ED Disposition Condition    Discharge Stable        ED Prescriptions     Medication Sig Dispense Start Date End Date Auth. Provider    mupirocin (BACTROBAN) 2 % ointment Apply topically 3 (three) times daily. 30 g 8/15/2022  Mikaela Clinton MD    HYDROcodone-acetaminophen (NORCO)  mg per tablet Take 1 tablet by mouth every 6 (six) hours as needed for Pain. 12 tablet 8/15/2022 8/18/2022 Mikaela Clinton MD        Follow-up Information    None          Mikaela Clinton MD  08/15/22 5778

## 2023-02-06 NOTE — PHYSICAL THERAPY NOTE
PHYSICAL THERAPY NOTE          Patient Name: Jose Briscoe  [id number] Date: 2/6/2023 02/06/23 1040   Note Type   Note Type Treatment   Pain Assessment   Pain Assessment Tool 0-10   Pain Score 4   Pain Location/Orientation Orientation: Right;Location: Hip   Pain Onset/Description Onset: Ongoing;Frequency: Constant/Continuous; Descriptor: Aching   Effect of Pain on Daily Activities increased pain with activity   Patient's Stated Pain Goal No pain   Hospital Pain Intervention(s) Ambulation/increased activity;Repositioned   Restrictions/Precautions   Weight Bearing Precautions Per Order Yes   RLE Weight Bearing Per Order WBAT   Other Precautions Cognitive; Chair Alarm; Bed Alarm;WBS;Multiple lines; Fall Risk;Pain   General   Chart Reviewed Yes   Response to Previous Treatment Patient with no complaints from previous session  Family/Caregiver Present No   Cognition   Overall Cognitive Status WFL   Arousal/Participation Alert   Attention Within functional limits   Orientation Level Oriented X4   Memory Decreased recall of precautions   Following Commands Follows one step commands without difficulty   Subjective   Subjective Pt willing to participate in PT treatment session   Bed Mobility   Supine to Sit 4  Minimal assistance   Additional items Assist x 1; Increased time required;Verbal cues   Transfers   Sit to Stand 4  Minimal assistance   Additional items Assist x 1; Increased time required;Verbal cues   Stand to Sit 4  Minimal assistance   Additional items Assist x 1; Increased time required;Verbal cues   Additional Comments cues needed for hand placement during transfers   Ambulation/Elevation   Gait pattern Excessively slow; Short stride; Foward flexed; Inconsistent grupo   Gait Assistance 4  Minimal assist   Assistive Device Rolling walker   Distance 25ft, 20ft  (seated rest break required)   Balance   Static Sitting Fair +   Static Standing Fair -   Ambulatory Poor +   Endurance Deficit   Endurance Deficit Yes   Endurance Deficit Description fatigue, pain   Activity Tolerance   Activity Tolerance Patient limited by fatigue;Patient limited by pain   Medical Staff Made Aware Rivka Mayorga OT   Nurse Made Aware Pt appropriate to be seen and mobilize per nsg   Exercises   Knee AROM Long Arc Quad Sitting;15 reps;AROM; Right  (x 2 sets)   Ankle Pumps Sitting;15 reps;AROM; Bilateral  (x 2 sets)   Marching Sitting;15 reps;AROM; Right  (x 2 sets)   Assessment   Prognosis Good   Problem List Decreased strength;Decreased range of motion;Decreased endurance; Impaired balance;Decreased mobility;Pain   Assessment Patient resting in bed at time of PT treatment session  Patient reports slight right hip pain however is willing and motivated to participate with PT  Patient is perform all bed ability with min a x1 was improved compared to previous session although increased time still required to complete  Patient performed transfers with min a x1 which is also improved, but slight cueing still needed for proper hand placement  Prior to ambulation patient's BP was taken 124/60  patient was able to tolerate increased ambulation distances with min a x1 and the use of rolling walker which is also improved although distances remain limited by fatigue and lightheadedness  During second ambulation attempt patient reported significant increase in lightheadedness  Patient was assisted into a seated position and BP was taken in sittin/36  Patient's bilateral lower extremities were elevated in recliner chair and patient was instructed to perform ankle pumps  Patient's BP was taken again in seated position 1 minute later with bilateral lower extremity's elevated 92/51  Patient reported feeling much better in seated position  Patient's BP was then again taken 3 minutes later with bilateral lower extremities elevated and was noted to be: 119/61    Nursing and trauma team was made aware of patient's BP changes with ambulation  Once seated in chair patient was able to tolerate perform a lower extremity TherEX without any increase in complaints  At conclusion of PT treatment session patient was assisted back to chair with all needs within reach  PT will continue to follow, D/C recommendation when medically cleared his rehab   Goals   Patient Goals " to get stronger"   [facility] Expiration Date 02/14/23   Plan   Treatment/Interventions Functional transfer training;LE strengthening/ROM; Elevations; Therapeutic exercise; Endurance training;Patient/family training;Equipment eval/education; Bed mobility;Gait training;Spoke to nursing;OT   Progress Progressing toward goals   PT Frequency 3-5x/wk   Recommendation   PT Discharge Recommendation Post acute rehabilitation services   Equipment Recommended [facility] Recommended Wheeled walker   AM-PAC Basic Mobility Inpatient   Turning in Flat Bed Without Bedrails 3   Lying on Back to Sitting on Edge of Flat Bed Without Bedrails 3   Moving Bed to Chair 3   Standing Up From Chair Using Arms 2   Walk in Room 2   Climb 3-5 Stairs With Railing 2   Basic Mobility Inpatient Raw Score 15   Basic Mobility Standardized Score 36 97   Highest Level Of Mobility   JH-HLM Goal 4: Move to chair/commode   JH-HLM Achieved 6: Walk 10 steps or more   Portions of the documentation may have been created using voice recognition software  Occasional wrong word or sound alike substitutions may have occurred due to the inherent limitations of the voice recognition software  Read the chart carefully and recognize, using context, where substitutions have occurred      Nader Low, PT, DPT

## 2023-02-06 NOTE — PROGRESS NOTES
-- Patient: Dorinda Sport  -- MRN: [id number]  -- Aidin Request ID: [id number]  -- Level of care reserved: Koby Yao  -- Partner Reserved: [address] [place] [address] [phone]  -- Clinical needs requested:  -- Geography searched: [place] miles around [facility]  -- Start of Service:  -- Request sent: 2:21pm EST on 2/6/2023 by Danni Moralez  -- Partner reserved: 3:36pm EST on 2/6/2023 by Danni Moralez  -- Choice list shared:

## 2023-02-06 NOTE — PLAN OF CARE
Problem: OCCUPATIONAL THERAPY ADULT  Goal: Performs self-care activities at highest level of function for planned discharge setting  See evaluation for individualized goals  Description: Treatment Interventions: ADL retraining, Functional transfer training, Endurance training, Cognitive reorientation, Patient/family training, Equipment evaluation/education, Compensatory technique education, Energy conservation, Activityengagement          See flowsheet documentation for full assessment, interventions and recommendations     Outcome: Progressing  Note: Limitation: Decreased ADL status, Decreased UE strength, Decreased Safe judgement during ADL, Decreased cognition, Decreased endurance, Decreased high-level ADLs, Decreased self-care trans  Prognosis: Good  Assessment: Pt seen for OT session focusing on self care, functional transfers and activity tolerance - pt set up on EOB to wash - required min assist in stance for dynamic balance - transfers and mobility with min a - limited tolerance 2* drop in BP with mobility- continue to recommend inpt rehab as pt lives alone  - OT to continue to follow to address goals as stated on eval     OT Discharge Recommendation: Post acute rehabilitation services

## 2023-02-07 ENCOUNTER — APPOINTMENT (INPATIENT)
Dept: NON INVASIVE DIAGNOSTICS | Facility: HOSPITAL | Age: 78
End: 2023-02-07

## 2023-02-07 LAB
ANION GAP SERPL CALCULATED.3IONS-SCNC: 5 MMOL/L (ref 4–13)
AORTIC ROOT: 3 CM
AORTIC VALVE MEAN VELOCITY: 11.1 M/S
APICAL FOUR CHAMBER EJECTION FRACTION: 57 %
ASCENDING AORTA: 3.1 CM
AV AREA BY CONTINUOUS VTI: 3 CM2
AV AREA PEAK VELOCITY: 2.8 CM2
AV LVOT MEAN GRADIENT: 5 MMHG
AV LVOT PEAK GRADIENT: 9 MMHG
AV MEAN GRADIENT: 5 MMHG
AV PEAK GRADIENT: 9 MMHG
AV VALVE AREA: 2.95 CM2
AV VELOCITY RATIO: 0.98
BASOPHILS # BLD AUTO: 0.03 THOUSANDS/ÂΜL (ref 0–0.1)
BASOPHILS NFR BLD AUTO: 0 % (ref 0–1)
BUN SERPL-MCNC: 19 MG/DL (ref 5–25)
CALCIUM SERPL-MCNC: 8 MG/DL (ref 8.3–10.1)
CHLORIDE SERPL-SCNC: 110 MMOL/L (ref 96–108)
CO2 SERPL-SCNC: 27 MMOL/L (ref 21–32)
CREAT SERPL-MCNC: 0.5 MG/DL (ref 0.6–1.3)
DOP CALC AO PEAK VEL: 1.52 M/S
DOP CALC AO VTI: 28.45 CM
DOP CALC LVOT AREA: 2.83 CM2
DOP CALC LVOT DIAMETER: 1.9 CM
DOP CALC LVOT PEAK VEL VTI: 29.65 CM
DOP CALC LVOT PEAK VEL: 1.49 M/S
DOP CALC LVOT STROKE INDEX: 51.3 ML/M2
DOP CALC LVOT STROKE VOLUME: 84.02 CM3
E WAVE DECELERATION TIME: 272 MS
EOSINOPHIL # BLD AUTO: 0.2 THOUSAND/ÂΜL (ref 0–0.61)
EOSINOPHIL NFR BLD AUTO: 2 % (ref 0–6)
ERYTHROCYTE [DISTWIDTH] IN BLOOD BY AUTOMATED COUNT: 14.9 % (ref 11.6–15.1)
FRACTIONAL SHORTENING: 43 % (ref 28–44)
GFR SERPL CREATININE-BSD FRML MDRD: 94 ML/MIN/1.73SQ M
GLUCOSE SERPL-MCNC: 105 MG/DL (ref 65–140)
HCT VFR BLD AUTO: 23.6 % (ref 34.8–46.1)
HCT VFR BLD AUTO: 24.4 % (ref 34.8–46.1)
HGB BLD-MCNC: 7.3 G/DL (ref 11.5–15.4)
HGB BLD-MCNC: 7.6 G/DL (ref 11.5–15.4)
IMM GRANULOCYTES # BLD AUTO: 0.17 THOUSAND/UL (ref 0–0.2)
IMM GRANULOCYTES NFR BLD AUTO: 2 % (ref 0–2)
INTERVENTRICULAR SEPTUM IN DIASTOLE (PARASTERNAL SHORT AXIS VIEW): 0.9 CM
INTERVENTRICULAR SEPTUM: 0.9 CM (ref 0.6–1.1)
LAAS-AP2: 14.4 CM2
LAAS-AP4: 14.4 CM2
LEFT ATRIUM SIZE: 3.4 CM
LEFT INTERNAL DIMENSION IN SYSTOLE: 2.3 CM (ref 2.1–4)
LEFT VENTRICULAR INTERNAL DIMENSION IN DIASTOLE: 4 CM (ref 3.5–6)
LEFT VENTRICULAR POSTERIOR WALL IN END DIASTOLE: 0.9 CM
LEFT VENTRICULAR STROKE VOLUME: 51 ML
LVSV (TEICH): 51 ML
LYMPHOCYTES # BLD AUTO: 3.11 THOUSANDS/ÂΜL (ref 0.6–4.47)
LYMPHOCYTES NFR BLD AUTO: 30 % (ref 14–44)
MCH RBC QN AUTO: 29 PG (ref 26.8–34.3)
MCHC RBC AUTO-ENTMCNC: 31.1 G/DL (ref 31.4–37.4)
MCV RBC AUTO: 93 FL (ref 82–98)
MONOCYTES # BLD AUTO: 1.36 THOUSAND/ÂΜL (ref 0.17–1.22)
MONOCYTES NFR BLD AUTO: 13 % (ref 4–12)
MV E'TISSUE VEL-SEP: 7 CM/S
MV PEAK A VEL: 0.85 M/S
MV PEAK E VEL: 77 CM/S
MV STENOSIS PRESSURE HALF TIME: 79 MS
MV VALVE AREA P 1/2 METHOD: 2.78 CM2
NEUTROPHILS # BLD AUTO: 5.46 THOUSANDS/ÂΜL (ref 1.85–7.62)
NEUTS SEG NFR BLD AUTO: 53 % (ref 43–75)
NRBC BLD AUTO-RTO: 1 /100 WBCS
PLATELET # BLD AUTO: 252 THOUSANDS/UL (ref 149–390)
PMV BLD AUTO: 11.3 FL (ref 8.9–12.7)
POTASSIUM SERPL-SCNC: 3.7 MMOL/L (ref 3.5–5.3)
RA PRESSURE ESTIMATED: 3 MMHG
RBC # BLD AUTO: 2.62 MILLION/UL (ref 3.81–5.12)
RIGHT ATRIUM AREA SYSTOLE A4C: 17.2 CM2
RIGHT VENTRICLE ID DIMENSION: 3.5 CM
RV PSP: 22 MMHG
SL CV LEFT ATRIUM LENGTH A2C: 5.3 CM
SL CV LV EF: 65
SL CV PED ECHO LEFT VENTRICLE DIASTOLIC VOLUME (MOD BIPLANE) 2D: 70 ML
SL CV PED ECHO LEFT VENTRICLE SYSTOLIC VOLUME (MOD BIPLANE) 2D: 19 ML
SODIUM SERPL-SCNC: 142 MMOL/L (ref 135–147)
TR MAX PG: 19 MMHG
TR PEAK VELOCITY: 2.2 M/S
TRICUSPID ANNULAR PLANE SYSTOLIC EXCURSION: 3.2 CM
TRICUSPID VALVE PEAK REGURGITATION VELOCITY: 2.18 M/S
WBC # BLD AUTO: 10.33 THOUSAND/UL (ref 4.31–10.16)

## 2023-02-07 RX ADMIN — ACETAMINOPHEN 975 MG: 325 TABLET ORAL at 05:58

## 2023-02-07 RX ADMIN — ACETAMINOPHEN 975 MG: 325 TABLET ORAL at 11:33

## 2023-02-07 RX ADMIN — PRAVASTATIN SODIUM 20 MG: 20 TABLET ORAL at 17:04

## 2023-02-07 RX ADMIN — ACETAMINOPHEN 975 MG: 325 TABLET ORAL at 17:04

## 2023-02-07 RX ADMIN — ENOXAPARIN SODIUM 30 MG: 30 INJECTION SUBCUTANEOUS at 23:11

## 2023-02-07 RX ADMIN — ENOXAPARIN SODIUM 30 MG: 30 INJECTION SUBCUTANEOUS at 08:18

## 2023-02-07 RX ADMIN — LEVOTHYROXINE SODIUM 25 MCG: 25 TABLET ORAL at 23:11

## 2023-02-07 RX ADMIN — ACETAMINOPHEN 975 MG: 325 TABLET ORAL at 23:11

## 2023-02-07 RX ADMIN — POLYETHYLENE GLYCOL 3350 17 G: 17 POWDER, FOR SOLUTION ORAL at 08:18

## 2023-02-07 RX ADMIN — SENNOSIDES AND DOCUSATE SODIUM 1 TABLET: 8.6; 5 TABLET ORAL at 23:11

## 2023-02-07 NOTE — PLAN OF CARE
Problem: Potential for Falls  Goal: Patient will remain free of falls  Description: INTERVENTIONS:  - Educate patient/family on patient safety including physical limitations  - Instruct patient to call for assistance with activity   - Consult OT/PT to assist with strengthening/mobility   - Keep Call bell within reach  - Keep bed low and locked with side rails adjusted as appropriate  - Keep care items and personal belongings within reach  - Initiate and maintain comfort rounds  - Make Fall Risk Sign visible to staff  - Offer Toileting every  Hours, in advance of need  - Initiate/Maintain alarm  - Obtain necessary fall risk management equipment:   - Apply yellow socks and bracelet for high fall risk patients  - Consider moving patient to room near nurses station  Outcome: Progressing     Problem: MOBILITY - ADULT  Goal: Maintain or return to baseline ADL function  Description: INTERVENTIONS:  -  Assess patient's ability to carry out ADLs; assess patient's baseline for ADL function and identify physical deficits which impact ability to perform ADLs (bathing, care of mouth/teeth, toileting, grooming, dressing, etc )  - Assess/evaluate cause of self-care deficits   - Assess range of motion  - Assess patient's mobility; develop plan if impaired  - Assess patient's need for assistive devices and provide as appropriate  - Encourage maximum independence but intervene and supervise when necessary  - Involve family in performance of ADLs  - Assess for home care needs following discharge   - Consider OT consult to assist with ADL evaluation and planning for discharge  - Provide patient education as appropriate  Outcome: Progressing  Goal: Maintains/Returns to pre admission functional level  Description: INTERVENTIONS:  - Perform BMAT or MOVE assessment daily    - Set and communicate daily mobility goal to care team and patient/family/caregiver     - Collaborate with rehabilitation services on mobility goals if consulted  - Perform Range of Motion  times a day  - Reposition patient every  hours    - Dangle patient  times a day  - Stand patient  times a day  - Ambulate patient  times a day  - Out of bed to chair  times a day   - Out of bed for meals  times a day  - Out of bed for toileting  - Record patient progress and toleration of activity level   Outcome: Progressing     Problem: Prexisting or High Potential for Compromised Skin Integrity  Goal: Skin integrity is maintained or improved  Description: INTERVENTIONS:  - Identify patients at risk for skin breakdown  - Assess and monitor skin integrity  - Assess and monitor nutrition and hydration status  - Monitor labs   - Assess for incontinence   - Turn and reposition patient  - Assist with mobility/ambulation  - Relieve pressure over bony prominences  - Avoid friction and shearing  - Provide appropriate hygiene as needed including keeping skin clean and dry  - Evaluate need for skin moisturizer/barrier cream  - Collaborate with interdisciplinary team   - Patient/family teaching  - Consider wound care consult   Outcome: Progressing     Problem: PAIN - ADULT  Goal: Verbalizes/displays adequate comfort level or baseline comfort level  Description: Interventions:  - Encourage patient to monitor pain and request assistance  - Assess pain using appropriate pain scale  - Administer analgesics based on type and severity of pain and evaluate response  - Implement non-pharmacological measures as appropriate and evaluate response  - Consider cultural and social influences on pain and pain management  - Notify physician/advanced practitioner if interventions unsuccessful or patient reports new pain  Outcome: Progressing     Problem: SAFETY ADULT  Goal: Patient will remain free of falls  Description: INTERVENTIONS:  - Educate patient/family on patient safety including physical limitations  - Instruct patient to call for assistance with activity   - Consult OT/PT to assist with strengthening/mobility   - Keep Call bell within reach  - Keep bed low and locked with side rails adjusted as appropriate  - Keep care items and personal belongings within reach  - Initiate and maintain comfort rounds  - Make Fall Risk Sign visible to staff  - Offer Toileting every Hours, in advance of need  - Initiate/Maintain alarm  - Obtain necessary fall risk management equipment:   - Apply yellow socks and bracelet for high fall risk patients  - Consider moving patient to room near nurses station  Outcome: Progressing  Goal: Maintain or return to baseline ADL function  Description: INTERVENTIONS:  -  Assess patient's ability to carry out ADLs; assess patient's baseline for ADL function and identify physical deficits which impact ability to perform ADLs (bathing, care of mouth/teeth, toileting, grooming, dressing, etc )  - Assess/evaluate cause of self-care deficits   - Assess range of motion  - Assess patient's mobility; develop plan if impaired  - Assess patient's need for assistive devices and provide as appropriate  - Encourage maximum independence but intervene and supervise when necessary  - Involve family in performance of ADLs  - Assess for home care needs following discharge   - Consider OT consult to assist with ADL evaluation and planning for discharge  - Provide patient education as appropriate  Outcome: Progressing  Goal: Maintains/Returns to pre admission functional level  Description: INTERVENTIONS:  - Perform BMAT or MOVE assessment daily    - Set and communicate daily mobility goal to care team and patient/family/caregiver  - Collaborate with rehabilitation services on mobility goals if consulted  - Perform Range of Motion  times a day  - Reposition patient every  hours    - Dangle patient  times a day  - Stand patient  times a day  - Ambulate patient  times a day  - Out of bed to chair  times a day   - Out of bed for meals  times a day  - Out of bed for toileting  - Record patient progress and toleration of activity level   Outcome: Progressing     Problem: GENITOURINARY - ADULT  Goal: Maintains or returns to baseline urinary function  Description: INTERVENTIONS:  - Assess urinary function  - Encourage oral fluids to ensure adequate hydration if ordered  - Administer IV fluids as ordered to ensure adequate hydration  - Administer ordered medications as needed  - Offer frequent toileting  - Follow urinary retention protocol if ordered  Outcome: Progressing  Goal: Absence of urinary retention  Description: INTERVENTIONS:  - Assess patient’s ability to void and empty bladder  - Monitor I/O  - Bladder scan as needed  - Discuss with physician/AP medications to alleviate retention as needed  - Discuss catheterization for long term situations as appropriate  Outcome: Progressing  Goal: Urinary catheter remains patent  Description: INTERVENTIONS:  - Assess patency of urinary catheter  - If patient has a chronic ferguson, consider changing catheter if non-functioning  - Follow guidelines for intermittent irrigation of non-functioning urinary catheter  Outcome: Progressing     Problem: SKIN/TISSUE INTEGRITY - ADULT  Goal: Skin Integrity remains intact(Skin Breakdown Prevention)  Description: Assess:  -Perform Soltiario assessment every   -Clean and moisturize skin every   -Inspect skin when repositioning, toileting, and assisting with ADLS  -Assess under medical devices such as  every   -Assess extremities for adequate circulation and sensation     Bed Management:  -Have minimal linens on bed & keep smooth, unwrinkled  -Change linens as needed when moist or perspiring  -Avoid sitting or lying in one position for more than  hours while in bed  -Keep HOB at degrees     Toileting:  -Offer bedside commode  -Assess for incontinence every   -Use incontinent care products after each incontinent episode such as     Activity:  -Mobilize patient  times a day  -Encourage activity and walks on unit  -Encourage or provide ROM exercises   -Turn and reposition patient every  Hours  -Use appropriate equipment to lift or move patient in bed  -Instruct/ Assist with weight shifting every  when out of bed in chair  -Consider limitation of chair time  hour intervals    Skin Care:  -Avoid use of baby powder, tape, friction and shearing, hot water or constrictive clothing  -Relieve pressure over bony prominences using   -Do not massage red bony areas    Next Steps:  -Teach patient strategies to minimize risks such as    -Consider consults to  interdisciplinary teams such as   Outcome: Progressing  Goal: Incision(s), wounds(s) or drain site(s) healing without S/S of infection  Description: INTERVENTIONS  - Assess and document dressing, incision, wound bed, drain sites and surrounding tissue  - Provide patient and family education  - Perform skin care/dressing changes every   Outcome: Progressing  Goal: Pressure injury heals and does not worsen  Description: Interventions:  - Implement low air loss mattress or specialty surface (Criteria met)  - Apply silicone foam dressing  - Instruct/assist with weight shifting every  minutes when in chair   - Limit chair time to  hour intervals  - Use special pressure reducing interventions such as  when in chair   - Apply fecal or urinary incontinence containment device   - Perform passive or active ROM every   - Turn and reposition patient & offload bony prominences every  hours   - Utilize friction reducing device or surface for transfers   - Consider consults to  interdisciplinary teams such as   - Use incontinent care products after each incontinent episode such as   - Consider nutrition services referral as needed  Outcome: Progressing     Problem: MUSCULOSKELETAL - ADULT  Goal: Maintain or return mobility to safest level of function  Description: INTERVENTIONS:  - Assess patient's ability to carry out ADLs; assess patient's baseline for ADL function and identify physical deficits which impact ability to perform ADLs (bathing, care of mouth/teeth, toileting, grooming, dressing, etc )  - Assess/evaluate cause of self-care deficits   - Assess range of motion  - Assess patient's mobility  - Assess patient's need for assistive devices and provide as appropriate  - Encourage maximum independence but intervene and supervise when necessary  - Involve family in performance of ADLs  - Assess for home care needs following discharge   - Consider OT consult to assist with ADL evaluation and planning for discharge  - Provide patient education as appropriate  Outcome: Progressing  Goal: Maintain proper alignment of affected body part  Description: INTERVENTIONS:  - Support, maintain and protect limb and body alignment  - Provide patient/ family with appropriate education  Outcome: Progressing

## 2023-02-07 NOTE — ASSESSMENT & PLAN NOTE
-Trend H/H 7 6 this AM (from 8 5)  -Transfuse if < 7  -Reports some light headedness, may be related  -Continue to closely monitor

## 2023-02-07 NOTE — ASSESSMENT & PLAN NOTE
- mechanical in nature - felt a pop in her hip when she took a step and lowered herself to the ground   Did not strike her head    - PT/OT and case management

## 2023-02-07 NOTE — PROGRESS NOTES
-- Patient: Ino Serrano  -- MRN: [id number]  -- Aidin Request ID: [id number]  -- Level of care reserved: Inpatient [facility]  -- Partner Reserved: [facility] ([place]), [facility] [phone]  -- Clinical needs requested:  -- Geography searched: 10 miles around [facility]  -- Start of Service:  -- Request sent: 2:20pm EST on 2/6/2023 by [employer]  -- Partner reserved: 11:24am EST on 2/7/2023 by [employer]  -- Choice list shared:

## 2023-02-07 NOTE — PROGRESS NOTES
PHYSICAL MEDICINE AND REHABILITATION   PREADMISSION ASSESSMENT     Projected [facility] Diagnoses:  Impairment of mobility, safety and Activities of Daily Living (ADLs) due to Orthopedic Disorders:  08 2  Femur (Shaft) Fracture  Etiologic Dx: Right Proximal Femoral Shaft Fracture  Date of Onset: 2/2/2023   Date of surgery: 2/3/2023 Right - INSERTION NAIL IM FEMUR ANTEGRADE (TROCHANTERIC)    PATIENT INFORMATION  Name: Darren Gonzalez Phone #: [phone] (home)   Address: [address] [place] [id number]  YOB: 1945 Age: 69 y o  SS#   Marital Status: Single  Ethnicity:   Employment Status: retired  Extended Emergency Contact Information  Primary Emergency Contact: [address] [place] [address]  Address: [facility]  [address] [place] [address] Phone: [phone]  Mobile Phone: [phone]  Relation: Brother  Secondary Emergency Contact: C/ Paul David  Phone: [phone]  Mobile Phone: [phone]  Relation: Friend  Advance Directive: Level 1 Full Code - unknown advanced directive    INSURANCE/COVERAGE:     Primary Payor: MEDICARE / Plan: MEDICARE A AND B / Product Type: Medicare A & B Fee for Service /   Secondary Payer: [facility]   Payer Contact:  Payer Contact:   Contact Phone:  Contact Phone:     Authorization #:   Coverage Dates:  LCD:   MEDICARE #: [id number]  Medicare Days: 60/30/60  Medical Record #: [id number]    REFERRAL SOURCE:   Referring provider: Freedom Stubbs MD  Referring facility: [address] [place] [address]  Room: [place] [address]/[place] [address]  PCP: Rosie Arellano MD PCP phone number: [phone]    MEDICAL INFORMATION  HPI: Patient is [facility] 69[facility] with complaints of right hip pain after feeling a "pop" in her right hip when stepping down a stair  She lowered herself to the floor and crawled to phone to call EMS   Exam revealed RLE shortening and rotation  Right femur x-ray showed acute transverse substantially displaced proximal femoral shaft fracture  Orthopedics was consulted, and patient was recommended for NWB to RLE with Mane's traction, with plans for surgical intervention  On 2/3, patient went to the OR with Orthopedics for right insertion IM nail; EBL minimal  Postoperatively, she was cleared for WBAT to RLE, and was initiated on Lovenox SQ for DVT prophylaxis  Patient developed dizziness and diaphoresis with ambulation, requiring IV fluid boluses  Orthostatic blood pressures were negative  On 2/5, RRT was called due to patient being lowered to floor due to dizziness  CTA of head/neck was obtained, which showed no acute findings  However, it did show outpouching of right PCA, and Neurovascular follow-up is to be completed outpatient  ECHO showed an EF of 65%, mild TVR and trace PVR  On 2/8, patient was transfused 1 unit PRBCs for Hgb of 7 3 given ongoing lightheadedness  Hgb improved to 9 4 on 2/9, with patient's symptoms overall improved  Patient is overall hemodynamically stable and medically cleared for discharge to [facility]  PT/OT therapies were consulted, as well as patient's case reviewed with [facility] physician, and they are recommending patient for inpatient Acute Rehab  She has demonstrated that she can tolerate and participate in 3 hours of therapy per day  Fully COVID fransisco Mar; KIRAN test has been requested  Past Medical History:   Past Surgical History:    Allergies:     Past Medical History:   Diagnosis Date   • Arthritis 2005   • Closed nondisplaced fracture of fifth metatarsal bone of right foot with routine healing    • Disease of thyroid gland     hypothyroid   • Glaucoma, bilateral    • Hyperlipidemia    • Osteoporosis    • Scoliosis 12/12/12    Past Surgical History:   Procedure Laterality Date   • BUNIONECTOMY     • COLONOSCOPY  06/04/2019   • CORRECTION HAMMER TOE     • MAMMO (HISTORICAL)  07/09/2018   • NEUROMA EXCISION     • OTHER SURGICAL HISTORY      Birth kwasi removed   • AK OPTX FEM SHFT FX W/INSJ IMED IMPLT W/WO SCREW Right 2/3/2023    Procedure: INSERTION NAIL IM FEMUR ANTEGRADE (TROCHANTERIC);   Surgeon: Ranjan Watkins MD;  Location: [facility];  Service: Orthopedics   • WISDOM TOOTH EXTRACTION       Allergies   Allergen Reactions   • Indomethacin GI Intolerance     Other reaction(s): GI upset  Other reaction(s): GI upset   • Oxycodone Dizziness, Nausea Only, GI Intolerance and Other (See Comments)     Other reaction(s): GI upset  Other reaction(s): GI upset         Medical/functional conditions requiring inpatient rehabilitation: s/p "fall", right proximal femoral shaft fracture s/p insertion IM nail, acute pain, ABLA, leukocytosis, vertigo, impaired mobility and self care    Risk for medical/clinical complications: risk for falls, risk for uncontrolled pain, risk for skin breakdown/wound dehiscence, risk for DVT/PE, risk for infection    Comorbidities/Surgeries in the last 100 days: osteoarthritis, hypothyroid, glaucoma, HLD, osteoporosis, s/p IM nail insertion right on 2/3/2023    CURRENT VITAL SIGNS:   Temp:  [98 3 °F (36 8 °C)-99 2 °F (37 3 °C)] 98 3 °F (36 8 °C)  HR:  [68-89] 68  Resp:  [16-18] 16  BP: (128-137)/(68-73) 128/68   Intake/Output Summary (Last 24 hours) at 2/9/2023 1101  Last data filed at 2/9/2023 0901  Gross per 24 hour   Intake 1195 ml   Output --   Net 1195 ml        LABORATORY RESULTS:      Lab Results   Component Value Date    HGB 9 4 (L) 02/09/2023    HCT 29 1 (L) 02/09/2023    WBC 9 77 02/08/2023     Lab Results   Component Value Date    BUN 19 02/07/2023    BUN 18 04/16/2015     04/16/2015    K 3 7 02/07/2023    K 4 1 04/16/2015     (H) 02/07/2023     04/16/2015    GLUCOSE 145 (H) 02/05/2023    GLUCOSE 95 04/16/2015    CREATININE 0 50 (L) 02/07/2023    CREATININE 0 58 (L) 04/16/2015     Lab Results   Component Value Date    PROTIME 13 9 02/03/2023    INR 1 05 02/03/2023        DIAGNOSTIC STUDIES:  CTA head and neck w wo contrast    Result Date: 2/6/2023  Impression: CT brain: No acute intracranial abnormality  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  CT angiography: There is a 2 5 x 2 0 cm focal outpouching at the expected location of the posterior communicating artery on the right  There is no vessel extending from this abnormality, suspicious for small aneurysm  Alternatively an infundibulum could have this appearance  Recommend follow-up consultation with the [facility], a division of [address] [place] [address] Neuroscience at [phone]  Incidental thyroid nodule(s) for which nonemergent thyroid ultrasound is recommended  This examination was marked "immediate notification" in Epic in order to begin the standard process by which the radiology reading room liaison alerts the referring practitioner  Workstation performed: [id number]     XR hip/pelv 1 vw right if performed    Result Date: 2/3/2023  Impression: Right hip fracture Workstation performed: [id number]     XR femur 2 vw left    Result Date: 2/4/2023  Impression: No acute osseous abnormality  Workstation performed: [id number]     XR femur 2 vw right    Result Date: 2/4/2023  Impression: Intraoperative fluoroscopic study as discussed above  Workstation performed: [id number]     XR femur 2 views RIGHT    Result Date: 2/3/2023  Impression: Acute transverse substantially displaced proximal femoral shaft fracture Workstation performed: [id number]     XR chest 1 view    Result Date: 2/3/2023  Impression: No acute cardiopulmonary disease   Findings are stable Workstation performed: [id number]     XR pelvis ap only 1 or 2 vw    Result Date: 2/3/2023  Impression: Acute displaced proximal right femoral diaphyseal fracture Workstation performed: [id number]       PRECAUTIONS/SPECIAL NEEDS:  Tobacco:   Social History     Tobacco Use   Smoking Status Never   Smokeless Tobacco Never   , Alcohol:    Social History     Substance and Sexual Activity   Alcohol Use Yes    Comment: Glass of wine a few times a year   , Weight Bearing Precautions:  weight bearing as tolerated, Anticoagulation:  Lovenox SQ, Edema Management, Safety Concerns, Pain Management, Language Preference: English and Fall Precautions    MEDICATIONS:     Current Facility-Administered Medications:   •  acetaminophen (TYLENOL) tablet 975 mg, 975 mg, Oral, Q6H, Renetta Larsen MD, 975 mg at 02/09/23 2670  •  artificial tear (LUBRIFRESH P M ) ophthalmic ointment, , Both Eyes, HS PRN, Tone Dickinson MD  •  bisacodyl (DULCOLAX) rectal suppository 10 mg, 10 mg, Rectal, Daily PRN, Ada Ayala MD  •  enoxaparin (LOVENOX) subcutaneous injection 30 mg, 30 mg, Subcutaneous, Q12H [address], Mary Ann Hernandez PA-C, 30 mg at 02/09/23 8450  •  HYDROmorphone (DILAUDID) injection 0 5 mg, 0 5 mg, Intravenous, Q3H PRN, Zachary Chan PA-C  •  levothyroxine tablet 25 mcg, 25 mcg, Oral, Daily, Mary Ann Hernandez PA-C, 25 mcg at 02/08/23 2012  •  meclizine (ANTIVERT) tablet 12 5 mg, 12 5 mg, Oral, Q8H PRN, Ada Ayala MD  •  oxyCODONE (ROXICODONE) IR tablet 2 5 mg, 2 5 mg, Oral, Q4H PRN, Zachary Chan PA-C  •  oxyCODONE (ROXICODONE) IR tablet 5 mg, 5 mg, Oral, Q4H PRN, Zachary Chan PA-C, 5 mg at 02/03/23 1658  •  polyethylene glycol (MIRALAX) packet 17 g, 17 g, Oral, Daily, Ada Ayala MD, 17 g at 02/07/23 0818  •  pravastatin (PRAVACHOL) tablet 20 mg, 20 mg, Oral, Daily With Dinner, Mary Ann Hernandez PA-C, 20 mg at 02/08/23 1632  •  senna-docusate sodium (SENOKOT S) 8 6-50 mg per tablet 1 tablet, 1 tablet, Oral, HS, Tone Dickinson MD, 1 tablet at 02/07/23 9728    SKIN INTEGRITY:   ecchymoses - scattered, Incision: healing well, no significant drainage, no dehiscence, no significant erythema, right hip incision with silver dressing    PRIOR LEVEL OF FUNCTION:  She lives in [facility]) single family home  Teddy Eloisa is single and lives alone  Self Care: Independent, Indoor Mobility: Independent, Stairs (in/outdoor): Independent and Cognition: Independent  Prior to patient's admission, patient was fully Independent with ADLs and IADLs, including driving and volunteering at [facility]  She was Independent without use of AD for mobility  FALLS IN THE LAST 6 MONTHS: none    HOME ENVIRONMENT:  The living area: bedroom on 2nd floor and bathroom on 2nd floor  There are 2 steps to enter the home with full flight to 2nd floor of home  The patient will not have 24 hour supervision/physical assistance available upon discharge  Patient has supportive friends and family that are able to assist as needed  PREVIOUS DME:  Equipment in home (previous DME): Commode and Rolling Walker    FUNCTIONAL STATUS:  Physical Therapy Occupational Therapy Speech Therapy   2/8/2023, per PT    Cognition   Overall Cognitive Status WFL   Arousal/Participation Alert   Attention Within functional limits   Orientation Level Oriented X4   Memory Decreased recall of precautions   Following Commands Follows all commands and directions without difficulty   Subjective   Subjective Pt willing and motivated to participate in PT treatment session   Bed Mobility   Additional Comments NA, Pt seated OOB in chair at time of PT treatment session   Transfers   Sit to Stand 4  Minimal assistance   Additional items Assist x 1; Increased time required;Verbal cues   Stand to Sit 4  Minimal assistance   Additional items Assist x 1; Increased time required;Verbal cues   Additional Comments Cues needed for hand placement   Ambulation/Elevation   Gait pattern Short stride; Foward flexed;Decreased R stance   Gait Assistance 4  Minimal assist   Additional items Assist x 1   Assistive Device Rolling walker   Distance 35ft, 35ft  (seated rest break required)   Balance   Static Sitting Fair +   Static Standing Fair -   Ambulatory Poor +   Endurance Deficit   Endurance Deficit Yes   Endurance Deficit Description fatigue, pain   Activity Tolerance   Activity Tolerance Patient limited by fatigue;Patient limited by pain   Medical Staff Made Aware DIMITRIS Nunes   Nurse Made Aware Pt appropriate to be seen and mobilize per nsg   Exercises   Hip Abduction Sitting;15 reps;AAROM; Right  (x 2 sets)   Knee AROM Long Arc Quad Sitting;15 reps;AROM; Right  (x 2 sets)   Ankle Pumps Sitting;15 reps;AROM; Bilateral   Marching Sitting;15 reps;AROM; Bilateral  (x 2 sets)   Assessment   Prognosis Good   Problem List Decreased strength;Decreased range of motion;Decreased endurance; Impaired balance;Decreased mobility;Pain   Assessment Patient resting out of bed in chair at time of PT treatment session  Patient continues to report slight right hip pain however is very motivated and willing to participate with PT  Patient is able perform all transfers with min a x1 was approximately same level of assistance required compared to previous session  Patient tolerated increase ambulation distances with min a x1 with use of rolling walker which has improved although distances remain limited by fatigue and pain  During ambulation patient reported slight lightheadedness/ feelin "washed out" however patient states that there was no increase in lightheadedness with ambulation compared to sitting  Pt states she feels " much better" than she did during last PT treatment session  Patient required seated rest break during ambulation due to fatigue  No gross loss of balance was noted with gait training however patient was noted to have forward flexed posture, decrease step length bilaterally, decreased stance time on right  Additionally, patient was able to tolerate a perform all lower extremity TherEX seated out of bed in chair without any increased complaints  Slight cueing was required for proper form pacing as well as slight physical assistance to perform some of the exercises    Had conclusion of PT treatment session patient was assisted back to chair with all needs within reach  D/C recommendation when medically cleared his rehab       2/9/2023, per [facility]    ADL   Grooming Assistance 5  Supervision/Setup  (Standing at sink)   [address] 5  Supervision/Setup   LB [address] [facility] 5  Supervision/Setup   LB Dressing Assistance 5  Supervision/Setup   LB Dressing Deficit Increased time to complete   [address]  4  Minimal Assistance   Toileting Deficit Increased time to complete;Grab bar use   Functional Standing Tolerance   Time 5 min   Activity standing at sink, pt uses counter for unilateral support   Comments Fair Balance   Bed Mobility   Supine to Sit 5  Supervision   Additional items HOB elevated   Transfers   Sit to Stand 4  Minimal assistance   Additional items Assist x 1;Verbal cues   Stand to Sit 4  Minimal assistance   Additional items Assist x 1;Verbal cues   Additional Comments cue needed for energy conservation and breathing techniques   Functional Mobility   Functional Mobility 4  Minimal assistance   Additional Comments ax1 c RW   Additional items Rolling walker   Toilet Transfers   Toilet Transfer From Rolling walker   Toilet Transfer Type To and from   Toilet Transfer to Standard toilet  (side rail)   Toilet Transfers Minimal assistance  (x 1)   Cognition   Overall Cognitive Status WFL   Arousal/Participation Alert; Cooperative   Attention Within functional limits   Following Commands Follows all commands and directions without difficulty   Comments Pt very pleasant to work with  Pt needed verbal cues for energy conservation  Activity Tolerance   Activity Tolerance Patient tolerated treatment well   Medical Staff Made Aware Spoke with Nurse, pt didn't need a chair alarm  Assessment   Assessment Pt participated in AM OT session focusing on functional mobiliy, transfers and ADLS  Overall pt is at supervision/set up for grooming and UB ADL tasks   While sitting pt is min assist for LB ADLS and toileting  Pt is overal min assist x1 for transfer and functional mobility w/ RW  Limited ROM on R LE 2* swelling, pain & discomfort when bending  Pt is cooperative and motivated to particpate in therapy session  Pt left in bedside chair, with all needs in reach  N/A     CARE SCORES:  Self Care:  Eatin: Not applicable  Oral hygiene: 04: Supervision or touching  assistance  Toilet hygiene: 04: Supervision or touching  assistance  Shower/bathing self: 04: Supervision or touching  assistance  Upper body dressin: Supervision or touching  assistance  Lower body dressin: Supervision or touching  assistance  Putting on/taking off footwear: 09: Not applicable  Transfers:  Roll left and right: 09: Not applicable  Sit to lyin: Not applicable  Lying to sitting on side of bed: 09: Not applicable  Sit to stand: 04: Supervision or touching  assistance  Chair/bed to chair transfer: 04: Supervision or touching  assistance  Toilet transfer: 09: Not applicable  Mobility:  Walk 10 ft: 04: Supervision or touching  assistance  Walk 50 ft with two turns: 10: Not attempted due to environmental limitations  Walk 150ft: 88: Not attempted due to medical conditions or safety concerns    CURRENT GAP IN FUNCTION  Prior to Admission: Functional Status: Patient was independent with mobility/ambulation, transfers, ADL's, IADL's  Estimated length of stay: 10 to 14 days    Anticipated Post-Discharge Disposition/Treatment  Disposition: Return to previous home/apartment    Outpatient Services: Physical Therapy (PT) and Occupational Therapy (OT)    BARRIERS TO DISCHARGE  Lovenox, Weakness, Pain, Balance Difficulty, Dizziness, Fatigue, Home Accessibility, Financial Resources, Equipment Needs, Resource Availability and Lives Alone    INTERVENTIONS FOR DISCHARGE  Adaptive equipment, Patient/Family/Caregiver Education, [employer], Financial Assistance, Arrange DME needs, Medication Changes as per MD recommendations, Therapy exercises, Center of balance support  and Energy conservation education     REQUIRED THERAPY:  Patient will require PT and OT 90 minutes each per day, five days per week to achieve rehab goals  REQUIRED FUNCTIONAL AND MEDICAL MANAGEMENT FOR INPATIENT REHABILITATION:  Skin:  There are no pressure sores currently, scattered ecchymosis, RLE incision with silver dressing, Pain Management: Overall pain is well controlled, Deep Vein Thrombosis (DVT) Prophylaxis:  Lovenox SQ, further IM management of additional medical conditions while on ARC, she needs PT/OT intervention, patient/family education and training, possible Neuropsych and Orthopedics consults with any other needed consults prn, nursing medication review and management of bowel/bladder function  RECOMMENDED LEVEL OF CARE:   Patient is a 69year old female that presented to [place] [address] on 2/2/2023 with complaints of right hip pain after feeling a "pop" in her right hip when stepping down a stair  She lowered herself to the floor and crawled to phone to call EMS  Exam revealed RLE shortening and rotation  Right femur x-ray showed acute transverse substantially displaced proximal femoral shaft fracture  Orthopedics was consulted, and patient was recommended for NWB to RLE with Mane's traction, with plans for surgical intervention  On 2/3, patient went to the OR with Orthopedics for right insertion IM nail; EBL minimal  Postoperatively, she was cleared for WBAT to RLE, and was initiated on Lovenox SQ for DVT prophylaxis  Patient developed dizziness and diaphoresis with ambulation, requiring IV fluid boluses  Orthostatic blood pressures were negative  On 2/5, RRT was called due to patient being lowered to floor due to dizziness  CTA of head/neck was obtained, which showed no acute findings   However, it did show outpouching of right PCA, and Neurovascular follow-up is to be completed outpatient  ECHO showed an EF of 65%, mild TVR and trace PVR  On 2/8, patient was transfused 1 unit PRBCs for Hgb of 7 3 given ongoing lightheadedness  Hgb improved to 9 4 on 2/9, with patient's symptoms overall improved  Prior to patient's admission, patient was fully Independent with ADLs and IADLs, including driving and volunteering at [facility]  She was Independent without use of AD for mobility  Currently, patient is Min assist with use of RW for gait and transfers, and Supervision for UB ADLs, Min assist for LB ADLs  Close medical management and PM&R management is recommended at this time while patient is on the [facility]  Inpatient acute rehab is recommended for patient to maximize overall strength and mobility upon discharge to home with support of family

## 2023-02-07 NOTE — PLAN OF CARE
Problem: Potential for Falls  Goal: Patient will remain free of falls  Description: INTERVENTIONS:  - Educate patient/family on patient safety including physical limitations  - Instruct patient to call for assistance with activity   - Consult OT/PT to assist with strengthening/mobility   - Keep Call bell within reach  - Keep bed low and locked with side rails adjusted as appropriate  - Keep care items and personal belongings within reach  - Initiate and maintain comfort rounds  - Make Fall Risk Sign visible to staff  - Offer Toileting   - Obtain necessary fall risk management equipment  - Apply yellow socks and bracelet for high fall risk patients  - Consider moving patient to room near nurses station  Outcome: Progressing     Problem: MOBILITY - ADULT  Goal: Maintain or return to baseline ADL function  Description: INTERVENTIONS:  -  Assess patient's ability to carry out ADLs; assess patient's baseline for ADL function and identify physical deficits which impact ability to perform ADLs (bathing, care of mouth/teeth, toileting, grooming, dressing, etc )  - Assess/evaluate cause of self-care deficits   - Assess range of motion  - Assess patient's mobility; develop plan if impaired  - Assess patient's need for assistive devices and provide as appropriate  - Encourage maximum independence but intervene and supervise when necessary  - Involve family in performance of ADLs  - Assess for home care needs following discharge   - Consider OT consult to assist with ADL evaluation and planning for discharge  - Provide patient education as appropriate  Outcome: Progressing  Goal: Maintains/Returns to pre admission functional level  Description: INTERVENTIONS:  - Perform BMAT or MOVE assessment daily    - Set and communicate daily mobility goal to care team and patient/family/caregiver     - Collaborate with rehabilitation services on mobility goals if consulted  - Out of bed for toileting  - Record patient progress and toleration of activity level   Outcome: Progressing     Problem: Prexisting or High Potential for Compromised Skin Integrity  Goal: Skin integrity is maintained or improved  Description: INTERVENTIONS:  - Identify patients at risk for skin breakdown  - Assess and monitor skin integrity  - Assess and monitor nutrition and hydration status  - Monitor labs   - Assess for incontinence   - Turn and reposition patient  - Assist with mobility/ambulation  - Relieve pressure over bony prominences  - Avoid friction and shearing  - Provide appropriate hygiene as needed including keeping skin clean and dry  - Evaluate need for skin moisturizer/barrier cream  - Collaborate with interdisciplinary team   - Patient/family teaching  - Consider wound care consult   Outcome: Progressing     Problem: PAIN - ADULT  Goal: Verbalizes/displays adequate comfort level or baseline comfort level  Description: Interventions:  - Encourage patient to monitor pain and request assistance  - Assess pain using appropriate pain scale  - Administer analgesics based on type and severity of pain and evaluate response  - Implement non-pharmacological measures as appropriate and evaluate response  - Consider cultural and social influences on pain and pain management  - Notify physician/advanced practitioner if interventions unsuccessful or patient reports new pain  Outcome: Progressing     Problem: SAFETY ADULT  Goal: Patient will remain free of falls  Description: INTERVENTIONS:  - Educate patient/family on patient safety including physical limitations  - Instruct patient to call for assistance with activity   - Consult OT/PT to assist with strengthening/mobility   - Keep Call bell within reach  - Keep bed low and locked with side rails adjusted as appropriate  - Keep care items and personal belongings within reach  - Initiate and maintain comfort rounds  - Make Fall Risk Sign visible to staff  - Offer Toileting   - Obtain necessary fall risk management equipment  - Apply yellow socks and bracelet for high fall risk patients  - Consider moving patient to room near nurses station  Outcome: Progressing  Goal: Maintain or return to baseline ADL function  Description: INTERVENTIONS:  -  Assess patient's ability to carry out ADLs; assess patient's baseline for ADL function and identify physical deficits which impact ability to perform ADLs (bathing, care of mouth/teeth, toileting, grooming, dressing, etc )  - Assess/evaluate cause of self-care deficits   - Assess range of motion  - Assess patient's mobility; develop plan if impaired  - Assess patient's need for assistive devices and provide as appropriate  - Encourage maximum independence but intervene and supervise when necessary  - Involve family in performance of ADLs  - Assess for home care needs following discharge   - Consider OT consult to assist with ADL evaluation and planning for discharge  - Provide patient education as appropriate  Outcome: Progressing  Goal: Maintains/Returns to pre admission functional level  Description: INTERVENTIONS:  - Perform BMAT or MOVE assessment daily    - Set and communicate daily mobility goal to care team and patient/family/caregiver     - Collaborate with rehabilitation services on mobility goals if consulted  - Out of bed for toileting  - Record patient progress and toleration of activity level   Outcome: Progressing     Problem: GENITOURINARY - ADULT  Goal: Maintains or returns to baseline urinary function  Description: INTERVENTIONS:  - Assess urinary function  - Encourage oral fluids to ensure adequate hydration if ordered  - Administer IV fluids as ordered to ensure adequate hydration  - Administer ordered medications as needed  - Offer frequent toileting  - Follow urinary retention protocol if ordered  Outcome: Progressing  Goal: Absence of urinary retention  Description: INTERVENTIONS:  - Assess patient’s ability to void and empty bladder  - Monitor I/O  - Bladder scan as needed  - Discuss with physician/AP medications to alleviate retention as needed  - Discuss catheterization for long term situations as appropriate  Outcome: Progressing  Goal: Urinary catheter remains patent  Description: INTERVENTIONS:  - Assess patency of urinary catheter  - If patient has a chronic ferguson, consider changing catheter if non-functioning  - Follow guidelines for intermittent irrigation of non-functioning urinary catheter  Outcome: Progressing     Problem: SKIN/TISSUE INTEGRITY - ADULT  Goal: Skin Integrity remains intact(Skin Breakdown Prevention)  Description: Assess:  -Perform Solitario assessment   -Clean and moisturize skin   -Inspect skin when repositioning, toileting, and assisting with ADLS  -Assess under medical devices   -Assess extremities for adequate circulation and sensation     Bed Management:  -Have minimal linens on bed & keep smooth, unwrinkled  -Change linens as needed when moist or perspiring  -Avoid sitting or lying in one position     Toileting:  -Offer bedside commode  -Assess for incontinence   -Use incontinent care products after each incontinent episode     Activity:  -Mobilize patient   -Encourage activity and walks on unit  -Encourage or provide ROM exercises   -Turn and reposition patient  -Use appropriate equipment to lift or move patient in bed  -Instruct/ Assist with weight shifting   Skin Care:  -Avoid use of baby powder, tape, friction and shearing, hot water or constrictive clothing  -Relieve pressure over bony prominences   -Do not massage red bony areas    Next Steps:  -Teach patient strategies to minimize risks   -Consider consults to  interdisciplinary teams   Outcome: Progressing  Goal: Incision(s), wounds(s) or drain site(s) healing without S/S of infection  Description: INTERVENTIONS  - Assess and document dressing, incision, wound bed, drain sites and surrounding tissue  - Provide patient and family education  - Perform skin care/dressing changes  Outcome: Progressing  Goal: Pressure injury heals and does not worsen  Description: Interventions:  - Implement low air loss mattress or specialty surface (Criteria met)  - Apply silicone foam dressing  - Instruct/assist with weight shifting   - Use special pressure reducing interventions   - Apply fecal or urinary incontinence containment device   - Perform passive or active ROM   - Turn and reposition patient & offload bony prominences   - Utilize friction reducing device or surface for transfers   - Consider consults to  interdisciplinary teams   - Use incontinent care products after each incontinent episode   - Consider nutrition services referral as needed  Outcome: Progressing     Problem: MUSCULOSKELETAL - ADULT  Goal: Maintain or return mobility to safest level of function  Description: INTERVENTIONS:  - Assess patient's ability to carry out ADLs; assess patient's baseline for ADL function and identify physical deficits which impact ability to perform ADLs (bathing, care of mouth/teeth, toileting, grooming, dressing, etc )  - Assess/evaluate cause of self-care deficits   - Assess range of motion  - Assess patient's mobility  - Assess patient's need for assistive devices and provide as appropriate  - Encourage maximum independence but intervene and supervise when necessary  - Involve family in performance of ADLs  - Assess for home care needs following discharge   - Consider OT consult to assist with ADL evaluation and planning for discharge  - Provide patient education as appropriate  Outcome: Progressing  Goal: Maintain proper alignment of affected body part  Description: INTERVENTIONS:  - Support, maintain and protect limb and body alignment  - Provide patient/ family with appropriate education  Outcome: Progressing

## 2023-02-07 NOTE — PROGRESS NOTES
[address] [facility]  Progress Note - Kirti Wright 1945, 69 y o  female MRN: [id number]  Unit/Bed#: [facility] 625-01 Encounter: [id number]  Primary Care Provider: Garfield Robert MD   Date and time admitted to hospital: 2/2/2023  2:46 PM    Lightheadedness  Assessment & Plan  - Rapid Response called 2/5 for syncope following episode of lightheadedness  - Orthostatics ordered  - Pt reporting history of lightheadedness with certain movements  - Continue to monitor vitals  - CTA shows 2 5 x 2 0 cm focal outpouching at the expected location of the posterior communicating artery on the right, aneurysm vs infundibulum (?)  Requires neurovascular follow up  - Echo pending    HLD (hyperlipidemia)  Assessment & Plan  - Continue home statin therapy    Acute blood loss anemia  Assessment & Plan  -Trend H/H 7 6 this AM (from 8 5)  -Transfuse if < 7  -Reports some light headedness, may be related  -Continue to closely monitor      Hypothyroidism  Assessment & Plan  - continue home levothyroxine    Acute pain due to trauma  Assessment & Plan  - Acute pain secondary to traumatic injuries  - Multi modal analgesic regimen  - Bowel regimen as long as using opioids   - Continue to monitor pain and adjust regimen as indicated  Fall  Assessment & Plan  - mechanical in nature - felt a pop in her hip when she took a step and lowered herself to the ground  Did not strike her head    - PT/OT and case management     * Femur fracture ([facility] [address] )  Assessment & Plan  - R subtrochanteric femur fracture, present on admission   - Status post traction placement on 2/2  - Appreciate Orthopedic surgery evaluation, recommendations and interventions as noted  - OR, 2/3 for ORIF RLE    - Monitor right lower extremity neurovascular exam   - Continue multimodal analgesic regimen   - Continue DVT prophylaxis with lovenox   - PT and OT evaluation and treatment as indicated    - Outpatient follow up with Orthopedic surgery for re-evaluation  Bowel Regimen: Senokot  VTE Prophylaxis:Enoxaparin (Lovenox)     Disposition: Continue inpatient care    Subjective   Chief Complaint: Hip pain    Subjective: Reports mild hip pain this AM, and some lightheadedness worse with movement  Mild decreased appetite as well  No events overnight, no other complaints     Objective   Vitals:   Temp:  [97 2 °F (36 2 °C)-99 7 °F (37 6 °C)] 98 3 °F (36 8 °C)  HR:  [71-95] 78  Resp:  [14-18] 14  BP: (114-121)/(60-65) 121/64    I/O       02/05 0701  02/06 0700 02/06 0701  02/07 0700 02/07 0701  02/08 0700    I  V  5 1000     Total Intake 5 1000     Urine 150 400     Total Output 150 400     Net -145 +600            Unmeasured Urine Occurrence 1 x             Physical Exam:   GENERAL APPEARANCE: Ill appearing  NEURO: No focal defcitis  HEENT: MMM  CV: RRR  LUNGS: Normal WOB  GI: Soft  MSK: Mild R hip pain, swelling, incision CDI  SKIN: Warm, dry    Invasive Devices     Peripheral Intravenous Line  Duration           Peripheral IV 02/04/23 Left Antecubital 3 days    Peripheral IV 02/06/23 Right Antecubital <1 day                      Lab Results: Results: I have personally reviewed all pertinent laboratory/tests results  Imaging: I have personally reviewed pertinent reports       Other Studies: CTA - as discussed above

## 2023-02-07 NOTE — PROGRESS NOTES
Pastoral Care Progress Note    2023  Patient: Jorge Ruvalcaba : [id number]  Admission Date & Time: 2023 1446  MRN: [id number] [facility]: [id number]         23 1400   Clinical Encounter Type   Visited With Patient   [facility] Encounters   [facility] Needs Prayer   Sacramental Encounters   Communion Given Indicator Yes       Kenny Gann visited with the pt and provided prayers, blessings, and the Todd BRUCE [address]  Chaplains still remain available

## 2023-02-07 NOTE — ARC ADMISSION
Reviewed patient's case with [facility] physician - patient is approved for [facility] pending medical stability, functional progress and bed availability  CM has been updated  Will continue to follow at this time

## 2023-02-07 NOTE — ASSESSMENT & PLAN NOTE
- Rapid Response called 2/5 for syncope following episode of lightheadedness  - Orthostatics ordered  - Pt reporting history of lightheadedness with certain movements  - Continue to monitor vitals  - CTA shows 2 5 x 2 0 cm focal outpouching at the expected location of the posterior communicating artery on the right, aneurysm vs infundibulum (?)   Requires neurovascular follow up  - Echo pending

## 2023-02-07 NOTE — CASE MANAGEMENT
Case Management Discharge Planning Note    Patient name Pepito Sifuentes  Location [facility] 702[address]64 MRN [id number]  : 1945 Date 2023       Current Admission Date: 2023  Current Admission Diagnosis:Femur fracture [facility])   Patient Active Problem List    Diagnosis Date Noted   • HLD (hyperlipidemia) 2023   • Lightheadedness 2023   • Acute blood loss anemia 2023   • Fall 2023   • Acute pain due to trauma 2023   • Hypothyroidism 2023   • Femur fracture ([facility] [address] ) 2023   • Chest pain 10/31/2022   • Abnormal electrocardiogram (ECG) (EKG) 10/31/2022   • Sjogren's syndrome ([facility] [address] ) 10/06/2021   • Primary osteoarthritis of right knee 2019   • Primary osteoarthritis of left knee 2019      LOS (days): 5  Geometric Mean LOS (GMLOS) (days): 2 70  Days to GMLOS:-2 2     OBJECTIVE:  Risk of Unplanned Readmission Score: 12 38         Current admission status: Inpatient   Preferred Pharmacy:   [address], [address] [facility] [address] [facility] [address]  Phone: [phone] Fax: [phone]    Primary Care Provider: Jessica Jung MD    Primary Insurance: MEDICARE  Secondary Insurance: AAR    DISCHARGE DETAILS:    Pt accepted to both [facility] and  [facility]  Cm met with pt to discuss her preference  Pt prefers to d/c to [address] [facility] as she feels this would be the best option for her

## 2023-02-07 NOTE — RESTORATIVE TECHNICIAN NOTE
Restorative Technician Note      Patient Name: Pamela Mora     Restorative Tech Visit Date: 02/07/23  Note Type: Mobility  Patient Position Upon Consult: -- (seated in the br)  Activity Performed: Ambulated  Assistive Device: Roller walker  Patient Position at End of Consult: Supine;  All needs within reach; Bed/Chair alarm activated      Claudia Alvarez Restorative Technician

## 2023-02-08 LAB
ABO GROUP BLD: NORMAL
BLD GP AB SCN SERPL QL: NEGATIVE
ERYTHROCYTE [DISTWIDTH] IN BLOOD BY AUTOMATED COUNT: 15.3 % (ref 11.6–15.1)
HCT VFR BLD AUTO: 23 % (ref 34.8–46.1)
HCT VFR BLD AUTO: 29.6 % (ref 34.8–46.1)
HGB BLD-MCNC: 7.3 G/DL (ref 11.5–15.4)
HGB BLD-MCNC: 9.6 G/DL (ref 11.5–15.4)
MCH RBC QN AUTO: 29.2 PG (ref 26.8–34.3)
MCHC RBC AUTO-ENTMCNC: 31.7 G/DL (ref 31.4–37.4)
MCV RBC AUTO: 92 FL (ref 82–98)
PLATELET # BLD AUTO: 266 THOUSANDS/UL (ref 149–390)
PMV BLD AUTO: 11.3 FL (ref 8.9–12.7)
RBC # BLD AUTO: 2.5 MILLION/UL (ref 3.81–5.12)
RH BLD: POSITIVE
SPECIMEN EXPIRATION DATE: NORMAL
WBC # BLD AUTO: 9.77 THOUSAND/UL (ref 4.31–10.16)

## 2023-02-08 RX ADMIN — LEVOTHYROXINE SODIUM 25 MCG: 25 TABLET ORAL at 20:12

## 2023-02-08 RX ADMIN — ACETAMINOPHEN 975 MG: 325 TABLET ORAL at 11:31

## 2023-02-08 RX ADMIN — ACETAMINOPHEN 975 MG: 325 TABLET ORAL at 05:34

## 2023-02-08 RX ADMIN — ENOXAPARIN SODIUM 30 MG: 30 INJECTION SUBCUTANEOUS at 08:41

## 2023-02-08 RX ADMIN — ACETAMINOPHEN 975 MG: 325 TABLET ORAL at 22:04

## 2023-02-08 RX ADMIN — ACETAMINOPHEN 325 MG: 325 TABLET ORAL at 16:32

## 2023-02-08 RX ADMIN — ENOXAPARIN SODIUM 30 MG: 30 INJECTION SUBCUTANEOUS at 20:12

## 2023-02-08 RX ADMIN — PRAVASTATIN SODIUM 20 MG: 20 TABLET ORAL at 16:32

## 2023-02-08 NOTE — PLAN OF CARE
Problem: Potential for Falls  Goal: Patient will remain free of falls  Description: INTERVENTIONS:  - Educate patient/family on patient safety including physical limitations  - Instruct patient to call for assistance with activity   - Consult OT/PT to assist with strengthening/mobility   - Keep Call bell within reach  - Keep bed low and locked with side rails adjusted as appropriate  - Keep care items and personal belongings within reach  - Initiate and maintain comfort rounds  - Make Fall Risk Sign visible to staff  - Offer Toileting every  Hours, in advance of need  - Initiate/Maintain alarm  - Obtain necessary fall risk management equipment:   - Apply yellow socks and bracelet for high fall risk patients  - Consider moving patient to room near nurses station  Outcome: Progressing     Problem: MOBILITY - ADULT  Goal: Maintain or return to baseline ADL function  Description: INTERVENTIONS:  -  Assess patient's ability to carry out ADLs; assess patient's baseline for ADL function and identify physical deficits which impact ability to perform ADLs (bathing, care of mouth/teeth, toileting, grooming, dressing, etc )  - Assess/evaluate cause of self-care deficits   - Assess range of motion  - Assess patient's mobility; develop plan if impaired  - Assess patient's need for assistive devices and provide as appropriate  - Encourage maximum independence but intervene and supervise when necessary  - Involve family in performance of ADLs  - Assess for home care needs following discharge   - Consider OT consult to assist with ADL evaluation and planning for discharge  - Provide patient education as appropriate  Outcome: Progressing  Goal: Maintains/Returns to pre admission functional level  Description: INTERVENTIONS:  - Perform BMAT or MOVE assessment daily    - Set and communicate daily mobility goal to care team and patient/family/caregiver     - Collaborate with rehabilitation services on mobility goals if consulted  - Perform Range of Motion  times a day  - Reposition patient every  hours    - Dangle patient  times a day  - Stand patient  times a day  - Ambulate patient  times a day  - Out of bed to chair  times a day   - Out of bed for meals  times a day  - Out of bed for toileting  - Record patient progress and toleration of activity level   Outcome: Progressing     Problem: Prexisting or High Potential for Compromised Skin Integrity  Goal: Skin integrity is maintained or improved  Description: INTERVENTIONS:  - Identify patients at risk for skin breakdown  - Assess and monitor skin integrity  - Assess and monitor nutrition and hydration status  - Monitor labs   - Assess for incontinence   - Turn and reposition patient  - Assist with mobility/ambulation  - Relieve pressure over bony prominences  - Avoid friction and shearing  - Provide appropriate hygiene as needed including keeping skin clean and dry  - Evaluate need for skin moisturizer/barrier cream  - Collaborate with interdisciplinary team   - Patient/family teaching  - Consider wound care consult   Outcome: Progressing     Problem: PAIN - ADULT  Goal: Verbalizes/displays adequate comfort level or baseline comfort level  Description: Interventions:  - Encourage patient to monitor pain and request assistance  - Assess pain using appropriate pain scale  - Administer analgesics based on type and severity of pain and evaluate response  - Implement non-pharmacological measures as appropriate and evaluate response  - Consider cultural and social influences on pain and pain management  - Notify physician/advanced practitioner if interventions unsuccessful or patient reports new pain  Outcome: Progressing     Problem: SAFETY ADULT  Goal: Patient will remain free of falls  Description: INTERVENTIONS:  - Educate patient/family on patient safety including physical limitations  - Instruct patient to call for assistance with activity   - Consult OT/PT to assist with strengthening/mobility   - Keep Call bell within reach  - Keep bed low and locked with side rails adjusted as appropriate  - Keep care items and personal belongings within reach  - Initiate and maintain comfort rounds  - Make Fall Risk Sign visible to staff  - Offer Toileting every  Hours, in advance of need  - Initiate/Maintain alarm  - Obtain necessary fall risk management equipment:   - Apply yellow socks and bracelet for high fall risk patients  - Consider moving patient to room near nurses station  Outcome: Progressing  Goal: Maintain or return to baseline ADL function  Description: INTERVENTIONS:  -  Assess patient's ability to carry out ADLs; assess patient's baseline for ADL function and identify physical deficits which impact ability to perform ADLs (bathing, care of mouth/teeth, toileting, grooming, dressing, etc )  - Assess/evaluate cause of self-care deficits   - Assess range of motion  - Assess patient's mobility; develop plan if impaired  - Assess patient's need for assistive devices and provide as appropriate  - Encourage maximum independence but intervene and supervise when necessary  - Involve family in performance of ADLs  - Assess for home care needs following discharge   - Consider OT consult to assist with ADL evaluation and planning for discharge  - Provide patient education as appropriate  Outcome: Progressing  Goal: Maintains/Returns to pre admission functional level  Description: INTERVENTIONS:  - Perform BMAT or MOVE assessment daily    - Set and communicate daily mobility goal to care team and patient/family/caregiver  - Collaborate with rehabilitation services on mobility goals if consulted  - Perform Range of Motion  times a day  - Reposition patient every  hours    - Dangle patient  times a day  - Stand patient  times a day  - Ambulate patient  times a day  - Out of bed to chair  times a day   - Out of bed for meals  times a day  - Out of bed for toileting  - Record patient progress and toleration of activity level   Outcome: Progressing     Problem: GENITOURINARY - ADULT  Goal: Maintains or returns to baseline urinary function  Description: INTERVENTIONS:  - Assess urinary function  - Encourage oral fluids to ensure adequate hydration if ordered  - Administer IV fluids as ordered to ensure adequate hydration  - Administer ordered medications as needed  - Offer frequent toileting  - Follow urinary retention protocol if ordered  Outcome: Progressing  Goal: Absence of urinary retention  Description: INTERVENTIONS:  - Assess patient’s ability to void and empty bladder  - Monitor I/O  - Bladder scan as needed  - Discuss with physician/AP medications to alleviate retention as needed  - Discuss catheterization for long term situations as appropriate  Outcome: Progressing  Goal: Urinary catheter remains patent  Description: INTERVENTIONS:  - Assess patency of urinary catheter  - If patient has a chronic ferguson, consider changing catheter if non-functioning  - Follow guidelines for intermittent irrigation of non-functioning urinary catheter  Outcome: Progressing     Problem: SKIN/TISSUE INTEGRITY - ADULT  Goal: Skin Integrity remains intact(Skin Breakdown Prevention)  Description: Assess:  -Perform Solitario assessment every   -Clean and moisturize skin every   -Inspect skin when repositioning, toileting, and assisting with ADLS  -Assess under medical devices such as  every   -Assess extremities for adequate circulation and sensation     Bed Management:  -Have minimal linens on bed & keep smooth, unwrinkled  -Change linens as needed when moist or perspiring  -Avoid sitting or lying in one position for more than  hours while in bed  -Keep HOB at degrees     Toileting:  -Offer bedside commode  -Assess for incontinence every   -Use incontinent care products after each incontinent episode such as     Activity:  -Mobilize patient  times a day  -Encourage activity and walks on unit  -Encourage or provide ROM exercises   -Turn and reposition patient every  Hours  -Use appropriate equipment to lift or move patient in bed  -Instruct/ Assist with weight shifting every  when out of bed in chair  -Consider limitation of chair time  hour intervals    Skin Care:  -Avoid use of baby powder, tape, friction and shearing, hot water or constrictive clothing  -Relieve pressure over bony prominences using   -Do not massage red bony areas    Next Steps:  -Teach patient strategies to minimize risks such as    -Consider consults to  interdisciplinary teams such as   Outcome: Progressing  Goal: Incision(s), wounds(s) or drain site(s) healing without S/S of infection  Description: INTERVENTIONS  - Assess and document dressing, incision, wound bed, drain sites and surrounding tissue  - Provide patient and family education  - Perform skin care/dressing changes every   Outcome: Progressing  Goal: Pressure injury heals and does not worsen  Description: Interventions:  - Implement low air loss mattress or specialty surface (Criteria met)  - Apply silicone foam dressing  - Instruct/assist with weight shifting every  minutes when in chair   - Limit chair time t hour intervals  - Use special pressure reducing interventions such as  when in chair   - Apply fecal or urinary incontinence containment device   - Perform passive or active ROM every   - Turn and reposition patient & offload bony prominences every  hours   - Utilize friction reducing device or surface for transfers   - Consider consults to  interdisciplinary teams such as   - Use incontinent care products after each incontinent episode such as   - Consider nutrition services referral as needed  Outcome: Progressing

## 2023-02-08 NOTE — PLAN OF CARE
Problem: Potential for Falls  Goal: Patient will remain free of falls  Description: INTERVENTIONS:  - Educate patient/family on patient safety including physical limitations  - Instruct patient to call for assistance with activity   - Consult OT/PT to assist with strengthening/mobility   - Keep Call bell within reach  - Keep bed low and locked with side rails adjusted as appropriate  - Keep care items and personal belongings within reach  - Initiate and maintain comfort rounds  - Make Fall Risk Sign visible to staff  - Offer Toileting every 2 Hours, in advance of need  - Initiate/Maintain alarm  - Obtain necessary fall risk management equipment:   - Apply yellow socks and bracelet for high fall risk patients  - Consider moving patient to room near nurses station  Outcome: Progressing     Problem: MOBILITY - ADULT  Goal: Maintain or return to baseline ADL function  Description: INTERVENTIONS:  -  Assess patient's ability to carry out ADLs; assess patient's baseline for ADL function and identify physical deficits which impact ability to perform ADLs (bathing, care of mouth/teeth, toileting, grooming, dressing, etc )  - Assess/evaluate cause of self-care deficits   - Assess range of motion  - Assess patient's mobility; develop plan if impaired  - Assess patient's need for assistive devices and provide as appropriate  - Encourage maximum independence but intervene and supervise when necessary  - Involve family in performance of ADLs  - Assess for home care needs following discharge   - Consider OT consult to assist with ADL evaluation and planning for discharge  - Provide patient education as appropriate  Outcome: Progressing  Goal: Maintains/Returns to pre admission functional level  Description: INTERVENTIONS:  - Perform BMAT or MOVE assessment daily    - Set and communicate daily mobility goal to care team and patient/family/caregiver     - Collaborate with rehabilitation services on mobility goals if consulted  - Perform Range of Motion  times a day  - Reposition patient every  hours    - Dangle patient  times a day  - Stand patient  times a day  - Ambulate patient  times a day  - Out of bed to chair  times a day   - Out of bed for meals  times a day  - Out of bed for toileting  - Record patient progress and toleration of activity level   Outcome: Progressing     Problem: Prexisting or High Potential for Compromised Skin Integrity  Goal: Skin integrity is maintained or improved  Description: INTERVENTIONS:  - Identify patients at risk for skin breakdown  - Assess and monitor skin integrity  - Assess and monitor nutrition and hydration status  - Monitor labs   - Assess for incontinence   - Turn and reposition patient  - Assist with mobility/ambulation  - Relieve pressure over bony prominences  - Avoid friction and shearing  - Provide appropriate hygiene as needed including keeping skin clean and dry  - Evaluate need for skin moisturizer/barrier cream  - Collaborate with interdisciplinary team   - Patient/family teaching  - Consider wound care consult   Outcome: Progressing     Problem: PAIN - ADULT  Goal: Verbalizes/displays adequate comfort level or baseline comfort level  Description: Interventions:  - Encourage patient to monitor pain and request assistance  - Assess pain using appropriate pain scale  - Administer analgesics based on type and severity of pain and evaluate response  - Implement non-pharmacological measures as appropriate and evaluate response  - Consider cultural and social influences on pain and pain management  - Notify physician/advanced practitioner if interventions unsuccessful or patient reports new pain  Outcome: Progressing     Problem: SAFETY ADULT  Goal: Patient will remain free of falls  Description: INTERVENTIONS:  - Educate patient/family on patient safety including physical limitations  - Instruct patient to call for assistance with activity   - Consult OT/PT to assist with strengthening/mobility   - Keep Call bell within reach  - Keep bed low and locked with side rails adjusted as appropriate  - Keep care items and personal belongings within reach  - Initiate and maintain comfort rounds  - Make Fall Risk Sign visible to staff  - Offer Toileting every  Hours, in advance of need  - Initiate/Maintain alarm  - Obtain necessary fall risk management equipment:   - Apply yellow socks and bracelet for high fall risk patients  - Consider moving patient to room near nurses station  Outcome: Progressing  Goal: Maintain or return to baseline ADL function  Description: INTERVENTIONS:  -  Assess patient's ability to carry out ADLs; assess patient's baseline for ADL function and identify physical deficits which impact ability to perform ADLs (bathing, care of mouth/teeth, toileting, grooming, dressing, etc )  - Assess/evaluate cause of self-care deficits   - Assess range of motion  - Assess patient's mobility; develop plan if impaired  - Assess patient's need for assistive devices and provide as appropriate  - Encourage maximum independence but intervene and supervise when necessary  - Involve family in performance of ADLs  - Assess for home care needs following discharge   - Consider OT consult to assist with ADL evaluation and planning for discharge  - Provide patient education as appropriate  Outcome: Progressing  Goal: Maintains/Returns to pre admission functional level  Description: INTERVENTIONS:  - Perform BMAT or MOVE assessment daily    - Set and communicate daily mobility goal to care team and patient/family/caregiver  - Collaborate with rehabilitation services on mobility goals if consulted  - Perform Range of Motion  times a day  - Reposition patient every  hours    - Dangle patient  times a day  - Stand patient  times a day  - Ambulate patient times a day  - Out of bed to chair 2 times a day   - Out of bed for meals 3 times a day  - Out of bed for toileting  - Record patient progress and toleration of activity level   Outcome: Progressing     Problem: GENITOURINARY - ADULT  Goal: Maintains or returns to baseline urinary function  Description: INTERVENTIONS:  - Assess urinary function  - Encourage oral fluids to ensure adequate hydration if ordered  - Administer IV fluids as ordered to ensure adequate hydration  - Administer ordered medications as needed  - Offer frequent toileting  - Follow urinary retention protocol if ordered  Outcome: Progressing  Goal: Absence of urinary retention  Description: INTERVENTIONS:  - Assess patient’s ability to void and empty bladder  - Monitor I/O  - Bladder scan as needed  - Discuss with physician/AP medications to alleviate retention as needed  - Discuss catheterization for long term situations as appropriate  Outcome: Progressing  Goal: Urinary catheter remains patent  Description: INTERVENTIONS:  - Assess patency of urinary catheter  - If patient has a chronic ferguson, consider changing catheter if non-functioning  - Follow guidelines for intermittent irrigation of non-functioning urinary catheter  Outcome: Progressing     Problem: SKIN/TISSUE INTEGRITY - ADULT  Goal: Skin Integrity remains intact(Skin Breakdown Prevention)  Description: Assess:  -Perform Solitario assessment every   -Clean and moisturize skin every   -Inspect skin when repositioning, toileting, and assisting with ADLS  -Assess under medical devices such as  every   -Assess extremities for adequate circulation and sensation     Bed Management:  -Have minimal linens on bed & keep smooth, unwrinkled  -Change linens as needed when moist or perspiring  -Avoid sitting or lying in one position for more than hours while in bed  -Keep HOB at 30 degrees     Toileting:  -Offer bedside commode  -Assess for incontinence every   -Use incontinent care products after each incontinent episode such as     Activity:  -Mobilize patient  times a day  -Encourage activity and walks on unit  -Encourage or provide ROM exercises   -Turn and reposition patient every  Hours  -Use appropriate equipment to lift or move patient in bed  -Instruct/ Assist with weight shifting every  when out of bed in chair  -Consider limitation of chair time  hour intervals    Skin Care:  -Avoid use of baby powder, tape, friction and shearing, hot water or constrictive clothing  -Relieve pressure over bony prominences using   -Do not massage red bony areas    Next Steps:  -Teach patient strategies to minimize risks such as   -Consider consults to  interdisciplinary teams such as   Outcome: Progressing  Goal: Incision(s), wounds(s) or drain site(s) healing without S/S of infection  Description: INTERVENTIONS  - Assess and document dressing, incision, wound bed, drain sites and surrounding tissue  - Provide patient and family education  - Perform skin care/dressing changes every  Outcome: Progressing  Goal: Pressure injury heals and does not worsen  Description: Interventions:  - Implement low air loss mattress or specialty surface (Criteria met)  - Apply silicone foam dressing  - Instruct/assist with weight shifting every  minutes when in chair   - Limit chair time to  hour intervals  - Use special pressure reducing interventions such as  when in chair   - Apply fecal or urinary incontinence containment device   - Perform passive or active ROM every   - Turn and reposition patient & offload bony prominences every hours   - Utilize friction reducing device or surface for transfers   - Consider consults to  interdisciplinary teams such as   - Use incontinent care products after each incontinent episode such as   - Consider nutrition services referral as needed  Outcome: Progressing     Problem: MUSCULOSKELETAL - ADULT  Goal: Maintain or return mobility to safest level of function  Description: INTERVENTIONS:  - Assess patient's ability to carry out ADLs; assess patient's baseline for ADL function and identify physical deficits which impact ability to perform ADLs (bathing, care of mouth/teeth, toileting, grooming, dressing, etc )  - Assess/evaluate cause of self-care deficits   - Assess range of motion  - Assess patient's mobility  - Assess patient's need for assistive devices and provide as appropriate  - Encourage maximum independence but intervene and supervise when necessary  - Involve family in performance of ADLs  - Assess for home care needs following discharge   - Consider OT consult to assist with ADL evaluation and planning for discharge  - Provide patient education as appropriate  Outcome: Progressing  Goal: Maintain proper alignment of affected body part  Description: INTERVENTIONS:  - Support, maintain and protect limb and body alignment  - Provide patient/ family with appropriate education  Outcome: Progressing

## 2023-02-08 NOTE — PLAN OF CARE
Problem: PHYSICAL THERAPY ADULT  Goal: Performs mobility at highest level of function for planned discharge setting  See evaluation for individualized goals  Description: Treatment/Interventions: Functional transfer training, LE strengthening/ROM, Elevations, Endurance training, Therapeutic exercise, Patient/family training, Equipment eval/education, Bed mobility, Gait training, Spoke to nursing, OT  Equipment Recommended: Tonie Norwood       See flowsheet documentation for full assessment, interventions and recommendations  Outcome: Progressing  Note: Prognosis: Good  Problem List: Decreased strength, Decreased range of motion, Decreased endurance, Impaired balance, Decreased mobility, Pain  Assessment: Patient resting out of bed in chair at time of PT treatment session  Patient continues to report slight right hip pain however is very motivated and willing to participate with PT  Patient is able perform all transfers with min a x1 was approximately same level of assistance required compared to previous session  Patient tolerated increase ambulation distances with min a x1 with use of rolling walker which has improved although distances remain limited by fatigue and pain  During ambulation patient reported slight lightheadedness/ feelin "washed out" however patient states that there was no increase in lightheadedness with ambulation compared to sitting  Pt states she feels " much better" than she did during last PT treatment session  Patient required seated rest break during ambulation due to fatigue  No gross loss of balance was noted with gait training however patient was noted to have forward flexed posture, decrease step length bilaterally, decreased stance time on right  Additionally, patient was able to tolerate a perform all lower extremity TherEX seated out of bed in chair without any increased complaints    Slight cueing was required for proper form pacing as well as slight physical assistance to perform some of the exercises  Had conclusion of PT treatment session patient was assisted back to chair with all needs within reach  D/C recommendation when medically cleared his rehab  PT Discharge Recommendation: Post acute rehabilitation services    See flowsheet documentation for full assessment

## 2023-02-08 NOTE — PHYSICAL THERAPY NOTE
PHYSICAL THERAPY NOTE          Patient Name: Sam Reyes  DUNJQ'[id number] Date: 2/8/2023 02/08/23 1140   Note Type   Note Type Treatment   Pain Assessment   Pain Assessment Tool 0-10   Pain Score 2   Pain Location/Orientation Orientation: Right;Location: Leg   Pain Onset/Description Onset: Ongoing;Frequency: Constant/Continuous; Descriptor: Aching   Effect of Pain on Daily Activities increased pain with activity   Patient's Stated Pain Goal No pain   Hospital Pain Intervention(s) Ambulation/increased activity;Repositioned   Restrictions/Precautions   Weight Bearing Precautions Per Order Yes   RLE Weight Bearing Per Order WBAT   LLE Weight Bearing Per Order WBAT   Other Precautions Cognitive; Chair Alarm; Bed Alarm; Fall Risk;Pain   General   Chart Reviewed Yes   Response to Previous Treatment Patient with no complaints from previous session  Family/Caregiver Present No   Cognition   Overall Cognitive Status WFL   Arousal/Participation Alert   Attention Within functional limits   Orientation Level Oriented X4   Memory Decreased recall of precautions   Following Commands Follows all commands and directions without difficulty   Subjective   Subjective Pt willing and motivated to participate in PT treatment session   Bed Mobility   Additional Comments NA, Pt seated OOB in chair at time of PT treatment session   Transfers   Sit to Stand 4  Minimal assistance   Additional items Assist x 1; Increased time required;Verbal cues   Stand to Sit 4  Minimal assistance   Additional items Assist x 1; Increased time required;Verbal cues   Additional Comments Cues needed for hand placement   Ambulation/Elevation   Gait pattern Short stride; Foward flexed;Decreased R stance   Gait Assistance 4  Minimal assist   Additional items Assist x 1   Assistive Device Rolling walker   Distance 35ft, 35ft  (seated rest break required)   Balance   Static Sitting Fair +   Static Standing Fair -   Ambulatory Poor +   Endurance Deficit   Endurance Deficit Yes   Endurance Deficit Description fatigue, pain   Activity Tolerance   Activity Tolerance Patient limited by fatigue;Patient limited by pain   Medical Staff Made Aware DIMITRIS Nunes   Nurse Made Aware Pt appropriate to be seen and mobilize per nsg   Exercises   Hip Abduction Sitting;15 reps;AAROM; Right  (x 2 sets)   Knee AROM Long Arc Quad Sitting;15 reps;AROM; Right  (x 2 sets)   Ankle Pumps Sitting;15 reps;AROM; Bilateral   Marching Sitting;15 reps;AROM; Bilateral  (x 2 sets)   Assessment   Prognosis Good   Problem List Decreased strength;Decreased range of motion;Decreased endurance; Impaired balance;Decreased mobility;Pain   Assessment Patient resting out of bed in chair at time of PT treatment session  Patient continues to report slight right hip pain however is very motivated and willing to participate with PT  Patient is able perform all transfers with min a x1 was approximately same level of assistance required compared to previous session  Patient tolerated increase ambulation distances with min a x1 with use of rolling walker which has improved although distances remain limited by fatigue and pain  During ambulation patient reported slight lightheadedness/ feelin "washed out" however patient states that there was no increase in lightheadedness with ambulation compared to sitting  Pt states she feels " much better" than she did during last PT treatment session  Patient required seated rest break during ambulation due to fatigue  No gross loss of balance was noted with gait training however patient was noted to have forward flexed posture, decrease step length bilaterally, decreased stance time on right  Additionally, patient was able to tolerate a perform all lower extremity TherEX seated out of bed in chair without any increased complaints    Slight cueing was required for proper form pacing as well as slight physical assistance to perform some of the exercises  Had conclusion of PT treatment session patient was assisted back to chair with all needs within reach  D/C recommendation when medically cleared his rehab  Goals   Patient Goals " to go to rehab"   STG Expiration Date 02/14/23   PT Treatment Day 2   Plan   Treatment/Interventions Functional transfer training;LE strengthening/ROM; Therapeutic exercise; Endurance training;Elevations; Patient/family training;Equipment eval/education; Bed mobility;Gait training;Spoke to nursing;OT   Progress Progressing toward goals   PT Frequency 3-5x/wk   Recommendation   PT Discharge Recommendation Post acute rehabilitation services   Equipment Recommended [facility] Recommended Wheeled walker   AM-PAC Basic Mobility Inpatient   Turning in Flat Bed Without Bedrails 3   Lying on Back to Sitting on Edge of Flat Bed Without Bedrails 3   Moving Bed to Chair 3   Standing Up From Chair Using Arms 3   Walk in Room 2   Climb 3-5 Stairs With Railing 2   Basic Mobility Inpatient Raw Score 16   Basic Mobility Standardized Score 38 32   Highest Level Of Mobility   JH-HLM Goal 5: Stand one or more mins   JH-HLM Achieved 7: Walk 25 feet or more   Portions of the documentation may have been created using voice recognition software  Occasional wrong word or sound alike substitutions may have occurred due to the inherent limitations of the voice recognition software  Read the chart carefully and recognize, using context, where substitutions have occurred      Jill Rueda, PT, DPT

## 2023-02-08 NOTE — RESTORATIVE TECHNICIAN NOTE
Restorative Technician Note      Patient Name: [facility] Tech Visit Date: 02/08/23  Note Type: Mobility  Patient Position Upon Consult: Bedside chair  Activity Performed: Ambulated (to the br)  Assistive Device: Roller walker  Patient Position at End of Consult: Bedside chair;  All needs within reach    Marielle Peña Restorative Technician

## 2023-02-08 NOTE — ASSESSMENT & PLAN NOTE
-Trend H/H   -7 3 this AM, will transfuse 1U PRBC given her lightheadedness which is likely related to her anemia  -Transfuse if < 7  -Continue to closely monitor

## 2023-02-08 NOTE — PROGRESS NOTES
[facility]  Progress Note - Yue Coleman 1945, 69 y o  female MRN: [id number]  Unit/Bed#: [facility] 625-01 Encounter: [id number]  Primary Care Provider: Fran Kehr, MD   Date and time admitted to hospital: 2/2/2023  2:46 PM    Lightheadedness  Assessment & Plan  - Rapid Response called 2/5 for syncope following episode of lightheadedness  - Orthostatics ordered  - Pt reporting history of lightheadedness with certain movements  - Continue to monitor vitals  - CTA shows 2 5 x 2 0 mm focal outpouching at the expected location of the posterior communicating artery on the right, aneurysm vs infundibulum (?)  Requires neurovascular follow up  - Echo pending    HLD (hyperlipidemia)  Assessment & Plan  - Continue home statin therapy    Acute blood loss anemia  Assessment & Plan  -Trend H/H   -7 3 this AM, will transfuse 1U PRBC given her lightheadedness which is likely related to her anemia  -Transfuse if < 7  -Continue to closely monitor      Hypothyroidism  Assessment & Plan  - continue home levothyroxine    Acute pain due to trauma  Assessment & Plan  - Acute pain secondary to traumatic injuries  - Multi modal analgesic regimen  - Bowel regimen as long as using opioids   - Continue to monitor pain and adjust regimen as indicated  Fall  Assessment & Plan  - mechanical in nature - felt a pop in her hip when she took a step and lowered herself to the ground  Did not strike her head    - PT/OT and case management     * Femur fracture ([facility] [address] )  Assessment & Plan  - R subtrochanteric femur fracture, present on admission   - Status post traction placement on 2/2  - Appreciate Orthopedic surgery evaluation, recommendations and interventions as noted  - OR, 2/3 for ORIF RLE    - Monitor right lower extremity neurovascular exam   - Continue multimodal analgesic regimen   - Continue DVT prophylaxis with lovenox   - PT and OT evaluation and treatment as indicated    - Outpatient follow up with Orthopedic surgery for re-evaluation  Bowel Regimen: Sennokot  VTE Prophylaxis:Enoxaparin (Lovenox)     Disposition: Continue inpatient care    Subjective   Chief Complaint: Lightheaded    Subjective: Still reports some light headedness, worse with standing  Decreased appetite as well however still tolerating PO, no other complaints, NAEO     Objective   Vitals:   Temp:  [98 °F (36 7 °C)-99 2 °F (37 3 °C)] 98 3 °F (36 8 °C)  HR:  [] 79  Resp:  [16-18] 16  BP: (117-137)/(60-68) 137/68    I/O       02/06 0701  02/07 0700 02/07 0701  02/08 0700 02/08 0701  02/09 0700    P  O   720     I V  (mL/kg) 1000      Total Intake(mL/kg) 1000 720 (12 4)     Urine (mL/kg/hr) 400 800 (0 6)     Stool  0     Total Output 400 800     Net +600 -80            Unmeasured Urine Occurrence  4 x     Unmeasured Stool Occurrence  2 x            Physical Exam:   GENERAL APPEARANCE: Ill appearing  NEURO: No focal defcitis  HEENT: MMM  CV: RRR  LUNGS: Normal WOB  GI: Soft  MSK: Moving extremities, pain in RLE, incision CDI  SKIN: Warm, dry    Invasive Devices     Peripheral Intravenous Line  Duration           Peripheral IV 02/04/23 Left Antecubital 4 days    Peripheral IV 02/06/23 Right Antecubital 1 day                      Lab Results: Results: I have personally reviewed all pertinent laboratory/tests results  Imaging: I have personally reviewed pertinent reports       Other Studies:

## 2023-02-08 NOTE — ASSESSMENT & PLAN NOTE
- Rapid Response called 2/5 for syncope following episode of lightheadedness  - Orthostatics ordered  - Pt reporting history of lightheadedness with certain movements  - Continue to monitor vitals  - CTA shows 2 5 x 2 0 mm focal outpouching at the expected location of the posterior communicating artery on the right, aneurysm vs infundibulum (?)   Requires neurovascular follow up  - Echo pending

## 2023-02-08 NOTE — ARC ADMISSION
Notified by CM that patient is currently preferring [address] [facility] for slower paced program  However, [facility] continues to follow patient's case at this time, per CM request  Will update as able

## 2023-02-09 ENCOUNTER — HOSPITAL ENCOUNTER (INPATIENT)
Facility: HOSPITAL | Age: 78
LOS: 14 days | Discharge: HOME | End: 2023-02-23
Attending: PHYSICAL MEDICINE & REHABILITATION | Admitting: PHYSICAL MEDICINE & REHABILITATION

## 2023-02-09 VITALS
SYSTOLIC BLOOD PRESSURE: 132 MMHG | RESPIRATION RATE: 16 BRPM | BODY MASS INDEX: 24.17 KG/M2 | HEIGHT: 61 IN | HEART RATE: 74 BPM | OXYGEN SATURATION: 97 % | DIASTOLIC BLOOD PRESSURE: 60 MMHG | TEMPERATURE: 98.2 F | WEIGHT: 128 LBS

## 2023-02-09 DIAGNOSIS — E78.5 HYPERLIPIDEMIA, UNSPECIFIED HYPERLIPIDEMIA TYPE: ICD-10-CM

## 2023-02-09 DIAGNOSIS — G47.00 INSOMNIA, UNSPECIFIED TYPE: ICD-10-CM

## 2023-02-09 DIAGNOSIS — L60.9 NAIL ABNORMALITIES: ICD-10-CM

## 2023-02-09 DIAGNOSIS — S72.90XA FEMUR FRACTURE (HCC): ICD-10-CM

## 2023-02-09 DIAGNOSIS — E03.9 HYPOTHYROIDISM, UNSPECIFIED TYPE: Primary | ICD-10-CM

## 2023-02-09 DIAGNOSIS — S72.21XA CLOSED DISPLACED SUBTROCHANTERIC FRACTURE OF RIGHT FEMUR, INITIAL ENCOUNTER (HCC): ICD-10-CM

## 2023-02-09 DIAGNOSIS — S72.91XA FEMUR FRACTURE, RIGHT (HCC): ICD-10-CM

## 2023-02-09 DIAGNOSIS — I72.9 ANEURYSM (HCC): ICD-10-CM

## 2023-02-09 PROBLEM — M81.0 OSTEOPOROSIS: Status: ACTIVE | Noted: 2023-02-09

## 2023-02-09 LAB
ABO GROUP BLD BPU: NORMAL
BPU ID: NORMAL
CROSSMATCH: NORMAL
FLUAV RNA RESP QL NAA+PROBE: NEGATIVE
FLUBV RNA RESP QL NAA+PROBE: NEGATIVE
HCT VFR BLD AUTO: 29.1 % (ref 34.8–46.1)
HGB BLD-MCNC: 9.4 G/DL (ref 11.5–15.4)
RSV RNA RESP QL NAA+PROBE: NEGATIVE
SARS-COV-2 RNA RESP QL NAA+PROBE: NEGATIVE
UNIT DISPENSE STATUS: NORMAL
UNIT PRODUCT CODE: NORMAL
UNIT PRODUCT VOLUME: 350 ML
UNIT RH: NORMAL

## 2023-02-09 RX ORDER — ENOXAPARIN SODIUM 100 MG/ML
30 INJECTION SUBCUTANEOUS EVERY 12 HOURS SCHEDULED
Qty: 12.6 ML | Refills: 0 | Status: ON HOLD
Start: 2023-02-09 | End: 2023-03-02

## 2023-02-09 RX ORDER — MECLIZINE HCL 12.5 MG/1
12.5 TABLET ORAL EVERY 8 HOURS PRN
Qty: 30 TABLET | Refills: 0 | Status: ON HOLD
Start: 2023-02-09 | End: 2023-02-23

## 2023-02-09 RX ORDER — ROSUVASTATIN CALCIUM 10 MG/1
10 TABLET, COATED ORAL DAILY
COMMUNITY
End: 2023-03-08

## 2023-02-09 RX ORDER — BISACODYL 10 MG
10 SUPPOSITORY, RECTAL RECTAL DAILY PRN
Status: DISCONTINUED | OUTPATIENT
Start: 2023-02-09 | End: 2023-02-10

## 2023-02-09 RX ORDER — ENOXAPARIN SODIUM 100 MG/ML
30 INJECTION SUBCUTANEOUS EVERY 12 HOURS SCHEDULED
Status: DISCONTINUED | OUTPATIENT
Start: 2023-02-09 | End: 2023-02-16

## 2023-02-09 RX ORDER — MINERAL OIL AND PETROLATUM 150; 830 MG/G; MG/G
OINTMENT OPHTHALMIC
Qty: 3.5 G | Refills: 0 | Status: ON HOLD
Start: 2023-02-09 | End: 2023-02-23

## 2023-02-09 RX ORDER — PRAVASTATIN SODIUM 20 MG
20 TABLET ORAL
Status: DISCONTINUED | OUTPATIENT
Start: 2023-02-09 | End: 2023-02-23 | Stop reason: HOSPADM

## 2023-02-09 RX ORDER — AMOXICILLIN 250 MG
1 CAPSULE ORAL
Status: DISCONTINUED | OUTPATIENT
Start: 2023-02-09 | End: 2023-02-09

## 2023-02-09 RX ORDER — OXYCODONE HYDROCHLORIDE 5 MG/1
5 TABLET ORAL EVERY 4 HOURS PRN
Status: DISCONTINUED | OUTPATIENT
Start: 2023-02-09 | End: 2023-02-23 | Stop reason: HOSPADM

## 2023-02-09 RX ORDER — POLYETHYLENE GLYCOL 3350 17 G/17G
17 POWDER, FOR SOLUTION ORAL DAILY
Qty: 238 G | Refills: 0 | Status: ON HOLD
Start: 2023-02-10 | End: 2023-02-24

## 2023-02-09 RX ORDER — OXYCODONE HYDROCHLORIDE 5 MG/1
2.5 TABLET ORAL EVERY 4 HOURS PRN
Status: DISCONTINUED | OUTPATIENT
Start: 2023-02-09 | End: 2023-02-23 | Stop reason: HOSPADM

## 2023-02-09 RX ORDER — AMOXICILLIN 250 MG
1 CAPSULE ORAL
Status: DISCONTINUED | OUTPATIENT
Start: 2023-02-09 | End: 2023-02-10

## 2023-02-09 RX ORDER — ACETAMINOPHEN 325 MG/1
975 TABLET ORAL EVERY 8 HOURS SCHEDULED
Qty: 126 TABLET | Refills: 0 | Status: ON HOLD
Start: 2023-02-09 | End: 2023-02-23

## 2023-02-09 RX ORDER — LEVOTHYROXINE SODIUM 0.03 MG/1
25 TABLET ORAL DAILY
Status: DISCONTINUED | OUTPATIENT
Start: 2023-02-09 | End: 2023-02-23 | Stop reason: HOSPADM

## 2023-02-09 RX ORDER — BISACODYL 10 MG
10 SUPPOSITORY, RECTAL RECTAL DAILY PRN
Status: DISCONTINUED | OUTPATIENT
Start: 2023-02-09 | End: 2023-02-09

## 2023-02-09 RX ORDER — POLYETHYLENE GLYCOL 3350 17 G/17G
17 POWDER, FOR SOLUTION ORAL DAILY
Status: DISCONTINUED | OUTPATIENT
Start: 2023-02-10 | End: 2023-02-10

## 2023-02-09 RX ORDER — PRAVASTATIN SODIUM 20 MG
20 TABLET ORAL
Qty: 14 TABLET | Refills: 0 | Status: ON HOLD
Start: 2023-02-09 | End: 2023-02-23

## 2023-02-09 RX ORDER — MECLIZINE HCL 12.5 MG/1
12.5 TABLET ORAL EVERY 8 HOURS PRN
Status: DISCONTINUED | OUTPATIENT
Start: 2023-02-09 | End: 2023-02-23 | Stop reason: HOSPADM

## 2023-02-09 RX ORDER — BISACODYL 10 MG
10 SUPPOSITORY, RECTAL RECTAL DAILY PRN
Qty: 12 SUPPOSITORY | Refills: 0 | Status: ON HOLD
Start: 2023-02-09

## 2023-02-09 RX ORDER — MINERAL OIL AND PETROLATUM 150; 830 MG/G; MG/G
OINTMENT OPHTHALMIC
Status: DISCONTINUED | OUTPATIENT
Start: 2023-02-09 | End: 2023-02-23 | Stop reason: HOSPADM

## 2023-02-09 RX ORDER — AMOXICILLIN 250 MG
1 CAPSULE ORAL
Qty: 14 TABLET | Refills: 0 | Status: ON HOLD
Start: 2023-02-09 | End: 2023-02-23

## 2023-02-09 RX ORDER — ACETAMINOPHEN 325 MG/1
975 TABLET ORAL EVERY 6 HOURS
Status: DISCONTINUED | OUTPATIENT
Start: 2023-02-09 | End: 2023-02-12

## 2023-02-09 RX ORDER — OXYCODONE HYDROCHLORIDE 5 MG/1
5 TABLET ORAL EVERY 4 HOURS PRN
Qty: 30 TABLET | Refills: 0 | Status: SHIPPED | OUTPATIENT
Start: 2023-02-09 | End: 2023-02-09

## 2023-02-09 RX ADMIN — ACETAMINOPHEN 975 MG: 325 TABLET, FILM COATED ORAL at 23:32

## 2023-02-09 RX ADMIN — PRAVASTATIN SODIUM 20 MG: 20 TABLET ORAL at 17:46

## 2023-02-09 RX ADMIN — ACETAMINOPHEN 975 MG: 325 TABLET ORAL at 11:48

## 2023-02-09 RX ADMIN — LEVOTHYROXINE SODIUM 25 MCG: 25 TABLET ORAL at 21:12

## 2023-02-09 RX ADMIN — GLYCERIN 1 DROP: .002; .002; .01 SOLUTION/ DROPS OPHTHALMIC at 17:55

## 2023-02-09 RX ADMIN — ACETAMINOPHEN 975 MG: 325 TABLET ORAL at 06:21

## 2023-02-09 RX ADMIN — ENOXAPARIN SODIUM 30 MG: 30 INJECTION SUBCUTANEOUS at 21:12

## 2023-02-09 RX ADMIN — ACETAMINOPHEN 975 MG: 325 TABLET, FILM COATED ORAL at 17:45

## 2023-02-09 RX ADMIN — ENOXAPARIN SODIUM 30 MG: 30 INJECTION SUBCUTANEOUS at 08:59

## 2023-02-09 RX ADMIN — WHITE PETROLATUM 57.7 %-MINERAL OIL 31.9 % EYE OINTMENT: at 17:46

## 2023-02-09 NOTE — ASSESSMENT & PLAN NOTE
-acute post-op pain right hip/leg  -tylenol 975 mg scheduled, oxycodone IR 2 5-5 mg prn (pt not taking), Lidoderm patch   -monitor pain with therapy  -adjust medication as needed  -for home: continue with tylenol 975mg q8h prn, Lidoderm patch on 12 hours then off 12 hours, cold applications, elevation

## 2023-02-09 NOTE — CASE MANAGEMENT
Case Management Discharge Planning Note    Patient name Pamela Mora  Location [facility] 625/[facility] 298-23 MRN [id number]  : 1945 Date 2023       Current Admission Date: 2023  Current Admission Diagnosis:Femur fracture [facility])   Patient Active Problem List    Diagnosis Date Noted   • HLD (hyperlipidemia) 2023   • Lightheadedness 2023   • Acute blood loss anemia 2023   • Fall 2023   • Acute pain due to trauma 2023   • Hypothyroidism 2023   • Femur fracture ([facility] [address] ) 2023   • Chest pain 10/31/2022   • Abnormal electrocardiogram (ECG) (EKG) 10/31/2022   • Sjogren's syndrome ([facility] [address] ) 10/06/2021   • Primary osteoarthritis of right knee 2019   • Primary osteoarthritis of left knee 2019      LOS (days): 7  Geometric Mean LOS (GMLOS) (days): 2 70  Days to GMLOS:-4     OBJECTIVE:  Risk of Unplanned Readmission Score: 12 74         Current admission status: Inpatient   Preferred Pharmacy:   [address] [facility], [address] [facility]  [address] [facility] [address]  Phone: [phone] Fax: [phone]    Primary Care Provider: Chiara Love MD    Primary Insurance: MEDICARE  Secondary Insurance: AARP    DISCHARGE DETAILS:      Accepting Facility Name, [address] : [facility]  Receiving Facility/Agency Phone Number:       Pt accepted to [facility]   Pt in agreement with d/c there today @3485  Pt's nurse aware

## 2023-02-09 NOTE — CONSULTS
Internal Medicine Consultation Note    Patient: Jefry Patterson  Age/sex: 69 y o  female  Medical Record #: [id number]      Assessment/Plan:    Right displaced proximal femoral shaft fracture  · S/p IMN 2/3/23  · WBAT  · Per Ortho:  "Patient should not continue bisphosphonate use at this time given radiographic changes to left femur including lateral cortical thickening without obvious stress fracture/beaking  She needs to follow up immediately w ortho if she develops ambulatory pain in left thigh"  They discussed this with her  ABLA  · S/p 1 U PRBCs on 2/8/23 for hemoglobin of 7 3    Dizziness/syncope  · Related to orthostasis and blood loss  · Was given IVF and blood  · Will watch for orthostasis while here    Hypothyroidism   · Continue levothyroxine  · Has a thyroid nodule found incidentally for which an US will need to be done as an OP    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 vs infundibulum  · To see as a followup in Neurovascular center    Old lacunar infarcts   · Found incidentally on imaging      Discharge date:  Team      Subjective/HPI:    Morales Sanchez is a 69year old patient with a history of osteoporosis and hypothyroidism who presented with right groin and leg pain  She was at home and while walking, she felt a "pop" in her right groin and then pain in her leg  She was found to have an acute right transverse substantially displaced proximal femoral shaft fracture  On 2/3/23, she underwent an IM nailing  Post-operatively on 2/4 23, she was walking to the bathroom and became diaphoretic, SOB, dizzy and nauseated  EKG did not show acute changes  On 2/5/23, a rapid response was called after she became dizzy while standing brushing her teeth,  she did then briefly lose consciousness and was lowered to the floor  EKG was without changes  She was felt to be orthostatic and given an IV fluid bolus    CTH/CTA of the head and neck for vertiginous symptoms and that the dizziness was reproducible with flexion/extension of the neck  It showed no acute intracranial abnormality  There was suspected small old lacunar infarcts within the basal ganglia  CT angiography showed a "2 5 x 2 0 cm focal outpouching at the expected location of the posterior communicating artery on the right"  There was no vessel extending from this abnormality, suspicious for small aneurysm  Alternatively, an infundibulum could have this appearance  She will need to be seen in follow-up consultation with the Neurovascular Center  There was an incidental thyroid nodule(s)  She is to have a nonemergent thyroid ultrasound as an outpatient  Patient was referred to [facility] for inpatient rehabilitation  We are asked to assist with medical management  Currently, does not have any complaints of CP, SOB, dizziness, N/V/D     ROS:   A 10 point ROS was performed; negative except as noted above       Social History:    Substance Use History:   Social History     Substance and Sexual Activity   Alcohol Use Yes    Comment: Glass of wine a few times a year     Social History     Tobacco Use   Smoking Status Never   Smokeless Tobacco Never     Social History     Substance and Sexual Activity   Drug Use Yes       Family History:    Family History   Problem Relation Age of Onset   • Hypertension Mother    • Thyroid disease Mother    • Arthritis Mother    • Glaucoma Mother    • Stomach cancer Father 46   • Cancer Father    • No Known Problems Maternal Grandmother    • No Known Problems Maternal Grandfather    • No Known Problems Paternal Grandmother    • No Known Problems Paternal Grandfather    • Dementia Maternal Aunt    • No Known Problems Maternal Aunt    • No Known Problems Paternal Aunt    • Hypertension Brother    • Osteoporosis Family    • Hyperlipidemia Family    • Hypertension Family    • Dementia Maternal Aunt    • Hypertension Maternal Aunt          Review of Scheduled Meds:  Current Facility-Administered Medications   Medication Dose Route Frequency Provider Last Rate   • acetaminophen  975 mg Oral Q6H SANTI Bobo     • artificial tear   Both Eyes HS PRN Glenys Drilling, SANTI     • bisacodyl  10 mg Rectal Daily PRN Glenys Drilling, SANTI     • enoxaparin  30 mg Subcutaneous Q12H [address] SANTI Bobo     • glycerin-hypromellose-  1 drop Both Eyes TID Glenys Drilling, SANTI     • levothyroxine  25 mcg Oral Daily SANTI Bboo     • meclizine  12 5 mg Oral Q8H PRN SANTI Bobo     • oxyCODONE  2 5 mg Oral Q4H PRN Glenys Drilling, SANTI     • oxyCODONE  5 mg Oral Q4H PRN SANTI Bobo     • [START ON 2/10/2023] polyethylene glycol  17 g Oral Daily SANTI Bobo     • pravastatin  20 mg Oral Daily With SANTI Sneed     • senna-docusate sodium  1 tablet Oral HS PRN Glenys Drilling, SANTI         Labs:     Results from last 7 days   Lab Units 02/09/23  0621 02/08/23  2009 02/08/23  0438 02/07/23  2331 02/07/23  0537   WBC Thousand/uL  --   --  9 77  --  10 33*   HEMOGLOBIN g/dL 9 4* 9 6* 7 3*   < > 7 6*   HEMATOCRIT % 29 1* 29 6* 23 0*   < > 24 4*   PLATELETS Thousands/uL  --   --  266  --  252    < > = values in this interval not displayed       Results from last 7 days   Lab Units 02/07/23  0537 02/06/23  0933 02/05/23  1503   SODIUM mmol/L 142 140  --    POTASSIUM mmol/L 3 7 3 6  --    CHLORIDE mmol/L 110* 108  --    CO2 mmol/L 27 26  --    CO2, I-STAT mmol/L  --   --  24   BUN mg/dL 19 22  --    CREATININE mg/dL 0 50* 0 79  --    GLUCOSE, ISTAT mg/dl  --   --  145*   CALCIUM mg/dL 8 0* 8 4  --          Results from last 7 days   Lab Units 02/03/23  0550 02/02/23  1849   INR  1 05 0 95        Results from last 7 days   Lab Units 02/05/23  1444   POC GLUCOSE mg/dl 125       No results found for: Jian Gomez SPUTUMCULTGINNY    Input and Output Summary (last 24 hours):     No intake or output data in the 24 hours ending 02/09/23 1708    Imaging:     No orders to display       *Labs /Radiology studies reviewed  *Medications reviewed and reconciled as needed  *Please refer to order section for additional ordered labs studies  *Case discussed with primary attending during morning huddle case rounds    Vitals:   Temp (24hrs), Av 9 °F (37 2 °C), Min:98 2 °F (36 8 °C), Max:99 8 °F (37 7 °C)    Temp:  [98 2 °F (36 8 °C)-99 8 °F (37 7 °C)] 99 8 °F (37 7 °C)  HR:  [68-89] 83  Resp:  [16-20] 20  BP: (128-178)/(60-77) 178/77  SpO2:  [94 %-97 %] 97 %  Body mass index is 24 56 kg/m²  Physical Exam:   General Appearance: no distress, conversive  HEENT: PERRLA, conjuctiva normal; oropharynx clear; mucous membranes moist   Neck:  Supple, normal ROM  Lungs: BBS with crackles LLL, normal respiratory effort, no retractions, expiratory effort normal  CV: regular rate and rhythm; no rubs/murmurs/gallops, PMI normal   ABD: soft; ND/NT; +BS  EXT: very mild RLE edema  Skin: normal turgor, normal texture, no rashes  Psych: affect normal, mood normal  Neuro: AAO          Invasive Devices     Peripheral Intravenous Line  Duration           Peripheral IV 23 Right Antecubital 3 days                 VTE Pharmacologic Prophylaxis: Enoxaparin (Lovenox)  Code Status: Level 1 - Full Code  Current Length of Stay: 0 day(s)    Total floor / unit time spent today 1 hour with more than 50% spent counseling/coordinating care  Counseling includes discussion with patient re: progress  and discussion with patient of his/her current medical state/information  Coordination of patient's care was performed in conjunction with primary service  Time invested included review of patient's labs, vitals, and management of their comorbidities with continued monitoring  In addition, this patient was discussed with medical team including physician and advanced extenders   The care of the patient was extensively discussed and appropriate treatment plan was formulated unique for this patient by supervising physician unless stated otherwise in their attestation statement  ** Please Note: voice to text software may have been used in the creation of this document   Audio transcription errors may occur**

## 2023-02-09 NOTE — ARC ADMISSION
Notified by CM that patient is medically cleared for discharge and agreeable to [facility] admission  She will admit to [facility] ARC room 961 with pickup at 11:30am  Report can be called to   She will require COVID testing  CM has been updated

## 2023-02-09 NOTE — ASSESSMENT & PLAN NOTE
- R subtrochanteric femur fracture, present on admission   - Status post traction placement on 2/2  - Appreciate Orthopedic surgery evaluation, recommendations and interventions as noted  - OR, 2/3 for ORIF RLE    - Continue multimodal analgesic regimen   - Continue DVT prophylaxis with lovenox   - PT and OT evaluation and treatment as indicated  - Outpatient follow up with Orthopedic surgery for re-evaluation

## 2023-02-09 NOTE — ASSESSMENT & PLAN NOTE
-took bisphosphonate <10 years  -per Ortho: pt should stop bisphosphonate at this time d/t left femur lateral cortical thickening without obvious stress fracture/break  -needs immediate f/u if develops pain in left leg  -follow-up with Orthopedic  -follow-up with endocrinologist

## 2023-02-09 NOTE — OCCUPATIONAL THERAPY NOTE
Occupational Therapy Treatment Note       02/09/23 0900   OT Last Visit   OT Visit Date 02/09/23   Note Type   Note Type Treatment   Pain Assessment   Pain Assessment Tool FLACC   Pain Rating: FLACC (Rest) - Face 0   Pain Rating: FLACC (Rest) - Legs 0   Pain Rating: FLACC (Rest) - Activity 0   Pain Rating: FLACC (Rest) - Cry 0   Pain Rating: FLACC (Rest) - Consolability 0   Score: FLACC (Rest) 0   Pain Rating: FLACC (Activity) - Face 0   Pain Rating: FLACC (Activity) - Legs 1  (R LE)   Pain Rating: FLACC (Activity) - Activity 0   Pain Rating: FLACC (Activity) - Cry 0   Pain Rating: FLACC (Activity) - Consolability 0   Score: FLACC (Activity) 1   Restrictions/Precautions   Weight Bearing Precautions Per Order Yes   RLE Weight Bearing Per Order WBAT   Other Precautions Bed Alarm;WBS;Fall Risk   Lifestyle   Reciprocal Relationships supportive brother, friend and Buddhist community   ADL   Grooming Assistance 5  Supervision/Setup  (Standing at sink)   UB Bathing Assistance 5  Supervision/Setup   LB Bathing Assistance 4  Minimal Assistance   UB Dressing Assistance 5  Supervision/Setup   LB Dressing Assistance 5  Supervision/Setup   LB Dressing Deficit Increased time to complete   Toileting Assistance  4  Minimal Assistance   Toileting Deficit Increased time to complete;Grab bar use   Functional Standing Tolerance   Time 5 min   Activity standing at sink, pt uses counter for unilateral support   Comments Fair Balance   Bed Mobility   Supine to Sit 5  Supervision   Additional items HOB elevated   Transfers   Sit to Stand 4  Minimal assistance   Additional items Assist x 1;Verbal cues   Stand to Sit 4  Minimal assistance   Additional items Assist x 1;Verbal cues   Additional Comments cue needed for energy conservation and breathing techniques   Functional Mobility   Functional Mobility 4  Minimal assistance   Additional Comments ax1 c RW   Additional items Rolling walker   Toilet Transfers   Toilet Transfer From Rolling walker   Toilet Transfer Type To and from   Toilet Transfer to Standard toilet  (side rail)   Toilet Transfers Minimal assistance  (x 1)   Cognition   Overall Cognitive Status [facility]   Arousal/Participation Alert; Cooperative   Attention Within functional limits   Following Commands Follows all commands and directions without difficulty   Comments Pt very pleasant to work with  Pt needed verbal cues for energy conservation  Activity Tolerance   Activity Tolerance Patient tolerated treatment well   Medical Staff Made Aware Spoke with Nurse, pt didn't need a chair alarm  Assessment   Assessment Pt participated in AM OT session focusing on functional mobiliy, transfers and ADLS  Overall pt is at supervision/set up for grooming and UB ADL tasks  While sitting pt is min assist for LB ADLS and toileting  Pt is overal min assist x1 for transfer and functional mobility w/ RW  Limited ROM on R LE 2* swelling, pain & discomfort when bending  Pt is cooperative and motivated to particpate in therapy session  Pt left in bedside chair, with all needs in reach  Plan   Treatment Interventions ADL retraining;Energy conservation; Activityengagement; Endurance training   Goal Expiration Date 02/18/23   OT Treatment Day 2   OT Frequency 2-3x/wk   Recommendation   OT Discharge Recommendation Post acute rehabilitation services   AM-PAC Daily Activity Inpatient   Lower Body Dressing 3   Bathing 3   Toileting 3   Upper Body Dressing 4   Grooming 4   Eating 4   Daily Activity Raw Score 21   Daily Activity Standardized Score (Calc for Raw Score >=11) 44 27   AM-PAC Applied Cognition Inpatient   Following a Speech/Presentation 4   Understanding Ordinary Conversation 4   Taking Medications 4   Remembering Where Things Are Placed or Put Away 4   Remembering List of 4-5 Errands 3   Taking Care of Complicated Tasks 3   Applied Cognition Raw Score 22   Applied Cognition Standardized Score 47 83     Kenisha YATES

## 2023-02-09 NOTE — TREATMENT PLAN
Individualized Plan of [address] 69 y o  female MRN: [id number]  Unit/Bed#: -01 Encounter: [id number]     PATIENT INFORMATION  ADMISSION DATE: 2/9/2023 12:14 PM FARIDE[facility] CATEGORY:Orthopedic Disorders:  08 2  Femur (Shaft) Fracture   ADMISSION DIAGNOSIS: Right femoral shaft fracture ([address] ) [[address]]  EXPECTED LOS: 10 to 14 days     MEDICAL/FUNCTIONAL PROGNOSIS  Based on my assessment of the patient's medical conditions and current functional status, the prognosis for attaining medical and functional goals or the IRF stay is:  Good    Medical Goals: Patient will be medically stable for discharge to [facility] upon completion of rehab program and Patient will be able to manage medical conditions and comorbid conditions with medications and follow up upon completion of rehab program    [address]: Home - independent/modified independent  INSTITUTIONAL SETTING: Intermediate care  Is a 24-hr caregiver available? No  Has discharge plan been discussed with primary caregiver? No  Date of Discussion: unknown date    ANTICIPATED FOLLOW-UP SERVICE:   Outpatient Therapy Services: PT and OT          DISCIPLINE SPECIFIC PLANS:  Required Disciplines & Services: Rehabillitation Nursing, Case Management and Dietay/Nutrition    REQUIRED THERAPY:  Therapy Hours per Day Days per Week Total Days   Physical Therapy 1 5 5 10-14   Occupational Therapy 1 5 5 10-14   NOTE: Additional therapy time(s) or changes to allocation of therapies as appropriate to meet patient needs and to achieve functional goals      Patient will participate in above therapy regimen consisting of PT and OT due to the following medical procedure/condition:Orthopedic Disorders:  08 2  Femur (Shaft) Fracture  08 3  Pelvic Fracture    ANTICIPATED FUNCTIONAL OUTCOMES:  ADL:  modified indpendent   Bladder/Bowel:  modified independent   Transfers: modified independent   Locomotion:  modified independent   Cognitive:  modified independent     DISCHARGE PLANNING NEEDS  Equipment needs: Discharge needs to be reviewed with team    REHAB ANTICIPATED PARTICIPATION RESTRICTIONS:  Assist with Bathing Shower, Assist with Bathing in Tub, Inability to Drive, Inaccessible Bathroom, [employer], Requires Assist with Heavy Homemaking, Requires Assit with Homemaking and Requires Assit with Steps    Medical Necessity Criteria for AR[facility] Admission:  The preadmission screen, post-admission physical evaluation, overall plan of care and admissions order demonstrate a reasonable expectation that the following criteria were met at the time of admission to the [facility]  (See "Specific areas of management and oversight in ARC setting" for additional details on medical necessity as outlined below)  1  The patient requires active and ongoing therapeutic intervention of multiple therapy disciplines (physical therapy, occupational therapy, speech-language pathology, or prosthetics/orthotics), one of which is physical or occupational therapy  2  Patient requires an intensive rehabilitation therapy program, as defined in Chapter 1, section 110 2 2 of the CMS Medicare Policy Manual  This intensive rehabilitation therapy program will consist of at least 3 hours of therapy per day at least 5 days per week or at least 15 hours of intensive rehabilitation therapy within a 7 consecutive day period, beginning with the date of admission to the [facility]  3  The patient is reasonably expected to actively participate in, and benefit significantly from, the intensive rehabilitation therapy program as defined in Chapter 1, section 110 2 2 of the CMS Medicare Policy Manual at this time of admission to the [facility]   She can reasonably be expected to make measurable improvement (that will be of practical value to improve the patient’s functional capacity or adaptation to impairments) as a result of the rehabilitation treatment, as defined in section 110 3, and such improvement can be expected to be made within the prescribed period of time  As noted in the CMS Medicare Policy Manual, the patient need not be expected to achieve complete independence in the domain of self-care nor be expected to return to his or her prior level of functioning in order to meet this standard  4  The patient must require physician supervision by a rehabilitation physician  As such, a rehabilitation physician will conduct face-to-face visits with the patient at least 3 days per week throughout the patient’s stay in the [facility] to assess the patient both medically and functionally, as well as to modify the course of treatment as needed to maximize the patient’s capacity to benefit from the rehabilitation process  5  The patient requires an intensive and coordinated interdisciplinary approach to providing rehabilitation, as defined in Chapter 1, section 110 2 5 of the CMS Medicare Policy Manual  This will be achieved through periodic team conferences, conducted at least once in a 7-day period, and comprising of an interdisciplinary team of medical professionals consisting of: a rehabilitation physician, registered nurse,  and/or , and a licensed/certified therapist from each therapy discipline involved in treating the patient  Changes Since Pre-admission Assessment: None -This patient's participation in rehab continues to be reasonable, necessary and appropriate  CMS Required Post-Admission Physician Evaluation Elements  History and Physical, including medical history, functional history and active comorbidities as in above text  Post-Admission Physician Evaluation:  The patient has the potential to make improvement and is in need of physical, occupational, and/or therapy services  The patient may also need nutritional services   Given the patient's complex medical condition and risk of further medical complications, rehabilitative services cannot be safely provided at a lower level of care, such as a skilled nursing facility  I have reviewed the patient's functional and medical status at the time of the preadmission screening and they are the same as on the day of this admission  I acknowledge that I have personally performed a full physical examination on this patient within 24 hours of admission  The patient demonstrated understanding the rehabilitation program and the discharge process after we discussed them  Agree in entirety: yes  Minor adaptions: none    Major changes: none    Specific areas of management and oversight in ARC setting:  Orthopedic Disorder: right femur fracture s/p IMN causing impaired mobility, ADLs, and gait:  intensive skilled therapies with physical therapy and occupational therapy with close oversight and management by rehab specialized physician in acute rehabilitation setting to most expeditiously and effectively improve functional mobility, transfers, upper and lower body strengthening, conditioning, balance, and gait training with appropriate assistive device  Patient will have optimal supervision and management of patient's underlying orthopedic disorder with specialized rehabilitation physician during this period of recovery to ensure most expeditious and optimal recovery with decreased risks of fall/injury and other complications including acute worsening of ortho disorder, decrease risk of VTE, PNA, and skin ulceration  Inpatient rehabilitation education/teaching: To be provided to patient and typically family/caregiver (if able to be identified) by all skilled therapists, rehab nursing, case management, and rehab specialized physician to ensure optimal recovery and decrease risks of complications in both acute rehabilitation setting as well as after discharge     Skin wounds: Appropriate skin checks for wound/skin evaluation including evaluation of healing, worsening of wounds, or signs of infection  Wound care management from rehab nursing, wound care nursing, physicians  Ensure frequent appropriate turning, positioning in bed, in chair, when mobilizing, and when appropriate with use of appropriate devices to optimize healing and decrease risk of worsening or new skin breakdown        Sonja Merino, [facility]

## 2023-02-09 NOTE — ASSESSMENT & PLAN NOTE
-S/p 1U PRBC yesterday  -H/H improved and stable (9 4 this AM)  -Symptomatically much improved as well  -Continue to monitor

## 2023-02-09 NOTE — ASSESSMENT & PLAN NOTE
- Pt felt "pop" when descending stairs with inability to ambulate  -X-ray: acute transverse substantially displaced proximal femoral shaft fracture  -2/3/23 right IMN by Dr Chin Pérez  -WBAT right lower extremity  - Lovenox for 28 days (3/3)  S/p PRBC x 1 unit    Plan at [facility]:  -Continue WBAT RLE  - Patient developed swelling at right lateral knee evaluated by orthopedics - x-ray completed - per ortho distal incision with hematoma; ace wrap daily and continue therapies as tolerated  -Monitor incision for signs of infection -incision very stable  -Ortho removed staples, 02/16/23  -Continue acute rehabilitation program  -Follow-up with Orthopedic Surgery (Dr Chin Pérez) in 4 weeks (3/23)  For home: Continue Lovenox injections until 03/03, [facility] outpatient PT at home, follow-up with Dr Chin Pérez ~03/23

## 2023-02-09 NOTE — H&P
PHYSICAL MEDICINE AND REHABILITATION H&P/ADMISSION NOTE  Baljinder Bring 69 y o  female MRN: [id number]  Unit/Bed#: -01 Encounter: [id number]     Rehab Diagnosis: Impairment of mobility, safety and Activities of Daily Living (ADLs) due to Orthopedic Disorders:  08 2  Femur (Shaft) Fracture  Etiologic Dx: Right Proximal Femoral Shaft Fracture  Date of Onset: 2/2/2023     Date of surgery: 2/3/2023 Right - INSERTION NAIL IM FEMUR ANTEGRADE (TROCHANTERIC)    HPI: Mahendra Diaz is a 69year old female with a history of hypothyroid, osteoporosis, scoliosis, and hyperlipidemia that presented to [address] [place] [address] on 2/2/23 with complaints of right hip pain after feeling a "pop" in her right hip when descending stairs  She lowered herself to the floor and crawled to phone to call EMS  Exam revealed RLE shortening and external rotation  Right femur x-ray showed acute transverse substantially displaced proximal femoral shaft fracture  Orthopedics was consulted, and patient was recommended for NWB to RLE with Mane's traction, with plans for surgical intervention  On 2/3, patient underwent right insertion IM nailing with Dr Antonia Toledo ; EBL minimal  Postoperatively, she was cleared for WBAT to RLE, and initiated on Lovenox SQ for DVT prophylaxis for 28 days (through 3/3)  Patient takes bisphosphonate for greater than 10 years for osteoporosis  Per Orthopedics, "patient should not continue bisphosphonate use at this time given radiographic changes to left femur including lateral cortical thickening without obvious stress fracture/beaking  She needs to follow up immediately with orthopedics if she develops ambulatory pain in left thigh"  Patient developed dizziness and diaphoresis with ambulation, requiring IV fluid boluses  Orthostatic blood pressures were negative  On 2/5, RRT was called due to patient being lowered to floor due to dizziness   CTA of head/neck was obtained, which showed no acute findings  However, it did show outpouching of right PCA, and Neurovascular follow-up is to be completed outpatient  ECHO showed an EF of 65%, mild TVR and trace PVR  On 2/8, patient was transfused 1 unit PRBCs for Hgb of 7 3 given ongoing lightheadedness  Hgb improved to 9 4 on 2/9, with patient's symptoms overall improved  Patient is overall hemodynamically stable and medically cleared for discharge to [facility]  PT/OT therapies were consulted, as well as patient's case reviewed with [facility] physician, and they are recommending patient for inpatient Acute Rehab  She has demonstrated that she can tolerate and participate in 3 hours of therapy per day  Incidental findings: The following findings require follow up:  Radiographic finding  -Finding: CTA head and neck w wo contrast: CT brain: No acute intracranial abnormality  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 , CT angiography: There is a 2 5 x 2 0 cm focal outpouching at the expected location of the posterior communicating artery on the right  There is no vessel extending from this abnormality, suspicious for small aneurysm  Alternatively an infundibulum , could have this appearance  Recommend follow-up consultation with the [facility], a division of [address] [place] [address] Neuroscience at [phone] , Incidental thyroid nodule(s) for which nonemergent thyroid ultrasound is recommended     Please let your primary physician know about those and ensure appropriate follow up  Follow up required: Neurovascular Clinic, Radiology (outpatient ultrasound)              Follow up should be done within 8 week(s)    Rehabilitation  • Functional deficits: impaired mobility, self care  • Begin PT/OT/SLP  Rehabilitation goals are to achieve a modified independent level with mobility and self care  Prognosis is good  ELOS is 10-14 days  Estimated discharge is home       • DVT prophylaxis: Lovenox  • Pain: tylenol, oxycodone  • Bladder plan: voiding  • Bowel plan: Miralax  • Code Status: Full code     * Femur fracture ([address] )  Assessment & Plan  - Pt felt "pop" when descending stairs with inability to ambulate  -X-ray: acute transverse substantially displaced proximal femoral shaft fracture  -NWB with bucks traction  -2/3 right IMN  -pain control  -WBAT right lowe extremity  - Lovenox for 28 days (3/3)  -po dizziness and diaphtetic; orthostatics normal, IVF, Hbg 7 3 pRBC x1  -Monitor incision for signs of infection  -Participate in comprehensive therapies 3 hr day, 5-6 days a week  -Follow-up with Orthopedic Surgery (Dr Nigel Piña)    Acute pain due to trauma  Assessment & Plan  -acute post-op pain right hip/leg  -tylenol 975 mg scheduled, oxycodone IR 2 5-5 mg prn  -monitor pain with therapy  -adjust medication as needed    Acute blood loss anemia  Assessment & Plan  -post op dizziness, diaphoresis, lowered to floor  -IVF  -Hbg 7 3, received 1 unit pRBC  -current Hbg 9 4  -monitor hemoglobin  -consult IM for co-mgmt    Lightheadedness  Assessment & Plan  -post-op with ambulation  - orthostatic BP normal  -Hbg 7 3  -received IVF, 1 unit PRBC  -Stable    HLD (hyperlipidemia)  Assessment & Plan  -  -continue Pravachol 20 mg    Hypothyroidism  Assessment & Plan  -continue levothyroxine    Subjective/Interval Events:       Review of Systems   Constitutional: Negative for chills and fever  HENT: Negative for congestion and sore throat  Eyes: Negative for photophobia and visual disturbance  Respiratory: Negative for cough and shortness of breath  Cardiovascular: Negative for chest pain and palpitations  Gastrointestinal: Negative for constipation, diarrhea, nausea and vomiting  Endocrine: Negative for cold intolerance and heat intolerance  Genitourinary: Negative for difficulty urinating and dysuria  Musculoskeletal: Negative for arthralgias and back pain  Skin: Negative for color change and rash  Allergic/Immunologic: Negative for environmental allergies and food allergies  Neurological: Positive for weakness  Negative for dizziness, light-headedness and headaches  Psychiatric/Behavioral: Negative for agitation and behavioral problems  All other systems reviewed and are negative  Level of function and living situation:   PRIOR LEVEL OF FUNCTION:  She lives in [facility] single family home  Hi Giordano is single and lives alone  Self Care: Independent, Indoor Mobility: Independent, Stairs (in/outdoor): Independent and Cognition: Independent  Prior to patient's admission, patient was fully Independent with ADLs and IADLs, including driving and volunteering at [facility]  She was Independent without use of AD for mobility  FALLS IN THE LAST 6 MONTHS: none     HOME ENVIRONMENT:  The living area: bedroom on 2nd floor and bathroom on 2nd floor  There are 2 steps to enter the home with full flight to 2nd floor of home  The patient will not have 24 hour supervision/physical assistance available upon discharge  Patient has supportive friends and family that are able to assist as needed  PREVIOUS DME:  Equipment in home (previous DME): Commode and Rolling Walker    Current level of Function:  Physical therapy: mobility- not assessed, transfers- min A with increased time and verbal cues, ambulation- min A RW 35'  Occupational therapy: Overall pt is at supervision/set up for grooming and UB ADL tasks  While sitting pt is min assist for LB ADLS and toileting  Pt is overal min assist x1 for transfer and functional mobility w/ RW  Limited ROM on R LE 2* swelling, pain & discomfort when bending  Speech therapy: regular diet/thin liquids    Physical Exam:  There were no vitals taken for this visit  No intake or output data in the 24 hours ending 02/09/23 1258    There is no height or weight on file to calculate BMI  Physical Exam  Constitutional:       Appearance: Normal appearance     HENT: Head: Normocephalic and atraumatic  Mouth/Throat:      Mouth: Mucous membranes are moist    Cardiovascular:      Rate and Rhythm: Normal rate and regular rhythm  Pulses: Normal pulses  Heart sounds: Normal heart sounds  Pulmonary:      Effort: Pulmonary effort is normal       Breath sounds: Normal breath sounds  Abdominal:      General: Bowel sounds are normal       Palpations: Abdomen is soft  Musculoskeletal:         General: Normal range of motion  Right lower leg: Edema present  Skin:     General: Skin is warm and dry  Capillary Refill: Capillary refill takes less than 2 seconds  Findings: Bruising present  Comments: Incision with staples, mepilex dressing intact  Right leg, groin bruising   Neurological:      Mental Status: She is alert and oriented to person, place, and time  Motor: Weakness present  Gait: Gait abnormal    Psychiatric:         Mood and Affect: Mood normal          Judgment: Judgment normal         Labs, medications, and imaging personally reviewed      Laboratory:    Lab Results   Component Value Date    SODIUM 142 02/07/2023    K 3 7 02/07/2023     (H) 02/07/2023    CO2 27 02/07/2023    BUN 19 02/07/2023    CREATININE 0 50 (L) 02/07/2023    GLUC 105 02/07/2023    CALCIUM 8 0 (L) 02/07/2023     Lab Results   Component Value Date    WBC 9 77 02/08/2023    HGB 9 4 (L) 02/09/2023    HCT 29 1 (L) 02/09/2023    MCV 92 02/08/2023     02/08/2023     Lab Results   Component Value Date    INR 1 05 02/03/2023    INR 0 95 02/02/2023    PROTIME 13 9 02/03/2023    PROTIME 12 8 02/02/2023         Current Facility-Administered Medications:   •  acetaminophen (TYLENOL) tablet 975 mg, 975 mg, Oral, Q6H, SANTI Bobo  •  artificial tear (LUBRIFRESH P M ) ophthalmic ointment, , Both Eyes, HS PRN, SANTI Bobo  •  bisacodyl (DULCOLAX) rectal suppository 10 mg, 10 mg, Rectal, Daily PRN, SANTI Bobo  •  enoxaparin (LOVENOX) subcutaneous injection 30 mg, 30 mg, Subcutaneous, Q12H JAQUAN, SANTI Bobo  •  levothyroxine tablet 25 mcg, 25 mcg, Oral, Daily, SANTI Bobo  •  meclizine (ANTIVERT) tablet 12 5 mg, 12 5 mg, Oral, Q8H PRN, SANTI Bobo  •  oxyCODONE (ROXICODONE) IR tablet 2 5 mg, 2 5 mg, Oral, Q4H PRN, SANTI Bobo  •  oxyCODONE (ROXICODONE) IR tablet 5 mg, 5 mg, Oral, Q4H PRN, SANTI Bobo  •  [START ON 2/10/2023] polyethylene glycol (MIRALAX) packet 17 g, 17 g, Oral, Daily, SANTI Bobo  •  pravastatin (PRAVACHOL) tablet 20 mg, 20 mg, Oral, Daily With Dinner, SANTI Guthrie  •  senna-docusate sodium (SENOKOT S) 8 6-50 mg per tablet 1 tablet, 1 tablet, Oral, HS, SANTI Bobo  Allergies   Allergen Reactions   • Indomethacin GI Intolerance     Other reaction(s): GI upset  Other reaction(s): GI upset   • Oxycodone Dizziness, Nausea Only, GI Intolerance and Other (See Comments)     Other reaction(s): GI upset  Other reaction(s): GI upset      Patient Active Problem List    Diagnosis Date Noted   • Femur fracture ([facility] ) 02/02/2023   • Acute pain due to trauma 02/03/2023   • Acute blood loss anemia 02/05/2023   • Osteoporosis 02/09/2023   • HLD (hyperlipidemia) 02/06/2023   • Lightheadedness 02/06/2023   • Fall 02/03/2023   • Hypothyroidism 02/03/2023   • Chest pain 10/31/2022   • Abnormal electrocardiogram (ECG) (EKG) 10/31/2022   • Sjogren's syndrome ([facility] ) 10/06/2021   • Primary osteoarthritis of right knee 05/14/2019   • Primary osteoarthritis of left knee 05/14/2019     Past Medical History:   Diagnosis Date   • Arthritis 2005   • Closed nondisplaced fracture of fifth metatarsal bone of right foot with routine healing    • Disease of thyroid gland     hypothyroid   • Glaucoma, bilateral    • Hyperlipidemia    • Osteoporosis    • Scoliosis 12/12/12     Past Surgical History:   Procedure Laterality Date   • BUNIONECTOMY     • COLONOSCOPY  06/04/2019   • CORRECTION HAMMER TOE     • MAMMO (HISTORICAL)  07/09/2018   • NEUROMA EXCISION     • OTHER SURGICAL HISTORY      Birth kwasi removed   • ID OPTX FEM SHFT FX W/INSJ IMED IMPLT W/WO SCREW Right 2/3/2023    Procedure: INSERTION NAIL IM FEMUR ANTEGRADE (TROCHANTERIC); Surgeon: Antoinette Fuentes MD;  Location: [facility];  Service: Orthopedics   • WISDOM TOOTH EXTRACTION       Social History     Socioeconomic History   • Marital status: Single     Spouse name: Not on file   • Number of children: Not on file   • Years of education: Not on file   • Highest education level: Not on file   Occupational History   • Not on file   Tobacco Use   • Smoking status: Never   • Smokeless tobacco: Never   Vaping Use   • Vaping Use: Never used   Substance and Sexual Activity   • Alcohol use: Yes     Comment: Glass of wine a few times a year   • Drug use: Yes   • Sexual activity: Never   Other Topics Concern   • Not on file   Social History Narrative   • Not on file     Social Determinants of Health     Financial Resource Strain: Low Risk    • Difficulty of Paying Living Expenses: Not hard at all   Food Insecurity: No Food Insecurity   • Worried About [employer] in the Last Year: Never true   • Ran Out of Food in the Last Year: Never true   Transportation Needs: No Transportation Needs   • Lack of Transportation (Medical): No   • Lack of Transportation (Non-Medical):  No   Physical Activity: Not on file   Stress: Not on file   Social Connections: Not on file   Intimate Partner Violence: Not on file   Housing Stability: Low Risk    • Unable to Pay for Housing in the Last Year: No   • Number of Places Lived in the Last Year: 1   • Unstable Housing in the Last Year: No     Social History     Tobacco Use   Smoking Status Never   Smokeless Tobacco Never     Social History     Substance and Sexual Activity   Alcohol Use Yes    Comment: Glass of wine a few times a year     Family History   Problem Relation Age of Onset   • Hypertension Mother    • Thyroid disease Mother    • Arthritis Mother    • Glaucoma Mother    • Stomach cancer Father 46   • Cancer Father    • No Known Problems Maternal Grandmother    • No Known Problems Maternal Grandfather    • No Known Problems Paternal Grandmother    • No Known Problems Paternal Grandfather    • Dementia Maternal Aunt    • No Known Problems Maternal Aunt    • No Known Problems Paternal Aunt    • Hypertension Brother    • Osteoporosis Family    • Hyperlipidemia Family    • Hypertension Family    • Dementia Maternal Aunt    • Hypertension Maternal Aunt      Medical Necessity Criteria for [facility] Admission: Hypertension, Incision/Wound care and Urinary retention  In addition, the preadmission screen, post-admission physical evaluation, overall plan of care and admissions order demonstrate a reasonable expectation that the following criteria were met at the time of admission to the [facility]  1  The patient requires active and ongoing therapeutic intervention of multiple therapy disciplines (physical therapy, occupational therapy, speech-language pathology, or prosthetics/orthotics), one of which is physical or occupational therapy  2  Patient requires an intensive rehabilitation therapy program, as defined in Chapter 1, section 110 2 2 of the CMS Medicare Policy Manual  This intensive rehabilitation therapy program will consist of at least 3 hours of therapy per day at least 5 days per week or at least 15 hours of intensive rehabilitation therapy within a 7 consecutive day period, beginning with the date of admission to the [facility]  3  The patient is reasonably expected to actively participate in, and benefit significantly from, the intensive rehabilitation therapy program as defined in Chapter 1, section 110 2 2 of the CMS Medicare Policy Manual at this time of admission to the [facility]   She can reasonably be expected to make measurable improvement (that will be of practical value to improve the patient’s functional capacity or adaptation to impairments) as a result of the rehabilitation treatment, as defined in section 110 3, and such improvement can be expected to be made within the prescribed period of time  As noted in the CMS Medicare Policy Manual, the patient need not be expected to achieve complete independence in the domain of self-care nor be expected to return to his or her prior level of functioning in order to meet this standard  4  The patient must require physician supervision by a rehabilitation physician  As such, a rehabilitation physician will conduct face-to-face visits with the patient at least 3 days per week throughout the patient’s stay in the [facility] to assess the patient both medically and functionally, as well as to modify the course of treatment as needed to maximize the patient’s capacity to benefit from the rehabilitation process  5  The patient requires an intensive and coordinated interdisciplinary approach to providing rehabilitation, as defined in Chapter 1, section 110 2 5 of the CMS Medicare Policy Manual  This will be achieved through periodic team conferences, conducted at least once in a 7-day period, and comprising of an interdisciplinary team of medical professionals consisting of: a rehabilitation physician, registered nurse,  and/or , and a licensed/certified therapist from each therapy discipline involved in treating the patient  Changes Since Pre-admission Assessment: None -This patient's participation in rehab continues to be reasonable, necessary and appropriate  CMS Required Post-Admission Physician Evaluation Elements  History and Physical, including medical history, functional history and active comorbidities as in above text  Post-Admission Physician Evaluation:  The patient has the potential to make improvement and is in need of physical, occupational, and/or therapy services  The patient may also need nutritional services   Given the patient's complex medical condition and risk of further medical complications, rehabilitative services cannot be safely provided at a lower level of care, such as a skilled nursing facility  I have reviewed the patient's functional and medical status at the time of the preadmission screening and they are the same as on the day of this admission  I acknowledge that I have personally performed a full physical examination on this patient within 24 hours of admission  The patient and/or family demonstrated understanding the rehabilitation program and the discharge process after we discussed them  Agree in entirety: yes  Minor adaptions: none    Major changes: none    SANTI Yen  Physical Medicine and Mesha Espinoza    Total visit time: 65 minutes, with more than 50% spent counseling/coordinating care  Counseling includes discussion with patient re: progress in therapies, functional issues observed by therapy staff, and discussion with patient regarding their current medical state and wellbeing  Coordination of patient's care was performed in conjunction with Internal Medicine service to monitor patient's labs, vitals, and management of their comorbidities

## 2023-02-09 NOTE — ASSESSMENT & PLAN NOTE
In acute care post op dizziness, diaphoresis, lowered to floor  -IVF  -Hbg 7 3, received 1 unit pRBC    Here at [facility]:  -current Hbg 9 9 (2/16)  -monitor hemoglobin  -No further diaphoretic episodes  -for home:  Follow-up with PCP in 1 week  to obtain order for CBC to monitor hemoglobin and continued treatment

## 2023-02-09 NOTE — INCIDENTAL FINDINGS
The following findings require follow up:  Radiographic finding   Finding: CTA head and neck w wo contrast: CT brain: No acute intracranial abnormality  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 , CT angiography: There is a 2 5 x 2 0 cm focal outpouching at the expected location of the posterior communicating artery on the right  There is no vessel extending from this abnormality, suspicious for small aneurysm  Alternatively an infundibulum , could have this appearance  Recommend follow-up consultation with the [facility], a division of [address] [place] [address] Neuroscience at [phone] , Incidental thyroid nodule(s) for which nonemergent thyroid ultrasound is recommended    Please let your primary physician know about those and ensure appropriate follow up     Follow up required: Neurovascular Clinic, Radiology (outpatient ultrasound)   Follow up should be done within 8 week(s)

## 2023-02-09 NOTE — ASSESSMENT & PLAN NOTE
-Much improved this AM, likely related to acute blood loss  - CTA shows 2 5 x 2 0 mm focal outpouching at the expected location of the posterior communicating artery on the right, aneurysm vs infundibulum (?)   Requires neurovascular follow up, pt aware of this  - Echo unremarkable

## 2023-02-09 NOTE — PLAN OF CARE
Problem: PAIN - ADULT  Goal: Verbalizes/displays adequate comfort level or baseline comfort level  Description: Interventions:  - Encourage patient to monitor pain and request assistance  - Assess pain using appropriate pain scale  - Administer analgesics based on type and severity of pain and evaluate response  - Implement non-pharmacological measures as appropriate and evaluate response  - Consider cultural and social influences on pain and pain management  - Notify physician/advanced practitioner if interventions unsuccessful or patient reports new pain  Outcome: Progressing     Problem: INFECTION - ADULT  Goal: Absence or prevention of progression during hospitalization  Description: INTERVENTIONS:  - Assess and monitor for signs and symptoms of infection  - Monitor lab/diagnostic results  - Monitor all insertion sites, i e  indwelling lines, tubes, and drains  - Monitor endotracheal if appropriate and nasal secretions for changes in amount and color  - [place] appropriate cooling/warming therapies per order  - Administer medications as ordered  - Instruct and encourage patient and family to use good hand hygiene technique  - Identify and instruct in appropriate isolation precautions for identified infection/condition  Outcome: Progressing  Goal: Absence of fever/infection during neutropenic period  Description: INTERVENTIONS:  - Monitor WBC    Outcome: Progressing     Problem: SAFETY ADULT  Goal: Patient will remain free of falls  Description: INTERVENTIONS:  - Educate patient/family on patient safety including physical limitations  - Instruct patient to call for assistance with activity   - Consult OT/PT to assist with strengthening/mobility   - Keep Call bell within reach  - Keep bed low and locked with side rails adjusted as appropriate  - Keep care items and personal belongings within reach  - Initiate and maintain comfort rounds  - Make Fall Risk Sign visible to staff  - Offer Toileting every 2 Hours, in advance of need  - Initiate/Maintain bed/chair alarm  - Obtain necessary fall risk management equipment: non skid footwear  - Apply yellow socks and bracelet for high fall risk patients  - Consider moving patient to room near nurses station  Outcome: Progressing  Goal: Maintain or return to baseline ADL function  Description: INTERVENTIONS:  -  Assess patient's ability to carry out ADLs; assess patient's baseline for ADL function and identify physical deficits which impact ability to perform ADLs (bathing, care of mouth/teeth, toileting, grooming, dressing, etc )  - Assess/evaluate cause of self-care deficits   - Assess range of motion  - Assess patient's mobility; develop plan if impaired  - Assess patient's need for assistive devices and provide as appropriate  - Encourage maximum independence but intervene and supervise when necessary  - Involve family in performance of ADLs  - Assess for home care needs following discharge   - Consider OT consult to assist with ADL evaluation and planning for discharge  - Provide patient education as appropriate  Outcome: Progressing  Goal: Maintains/Returns to pre admission functional level  Description: INTERVENTIONS:  - Perform BMAT or MOVE assessment daily    - Set and communicate daily mobility goal to care team and patient/family/caregiver  - Collaborate with rehabilitation services on mobility goals if consulted  - Perform Range of Motion 3 times a day  - Reposition patient every 2 hours    - Dangle patient 3 times a day  - Stand patient 3 times a day  - Ambulate patient 3 times a day  - Out of bed to chair 3 times a day   - Out of bed for meals 3 times a day  - Out of bed for toileting  - Record patient progress and toleration of activity level   Outcome: Progressing     Problem: DISCHARGE PLANNING  Goal: Discharge to home or other facility with appropriate resources  Description: INTERVENTIONS:  - Identify barriers to discharge w/patient and caregiver  - Arrange for needed discharge resources and transportation as appropriate  - Identify discharge learning needs (meds, wound care, etc )  - Arrange for interpretive services to assist at discharge as needed  - Refer to Case Management Department for coordinating discharge planning if the patient needs post-hospital services based on physician/advanced practitioner order or complex needs related to functional status, cognitive ability, or social support system  Outcome: Progressing

## 2023-02-09 NOTE — ASSESSMENT & PLAN NOTE
- Acute pain secondary to traumatic injuries  - Multi modal analgesic regimen  - Bowel regimen  - Continue to monitor pain and adjust regimen as indicated

## 2023-02-09 NOTE — ASSESSMENT & PLAN NOTE
-S/p 1U PRBC yesterday  -H/H improved and stable (9 4 this AM)  -Symptomatically much improved as well  -Cleared for ARC

## 2023-02-09 NOTE — ASSESSMENT & PLAN NOTE
-post-op with ambulation  - orthostatic BP normal  -Hbg 7 3 (2/7)  -received IVF, 1 unit PRBC in acute care    No further episodes in acute rehabilitation

## 2023-02-09 NOTE — PLAN OF CARE
Problem: OCCUPATIONAL THERAPY ADULT  Goal: Performs self-care activities at highest level of function for planned discharge setting  See evaluation for individualized goals  Description: Treatment Interventions: ADL retraining, Functional transfer training, Endurance training, Cognitive reorientation, Patient/family training, Equipment evaluation/education, Compensatory technique education, Energy conservation, Activityengagement          See flowsheet documentation for full assessment, interventions and recommendations  Outcome: Progressing  Note: Limitation: Decreased ADL status, Decreased UE strength, Decreased Safe judgement during ADL, Decreased cognition, Decreased endurance, Decreased high-level ADLs, Decreased self-care trans  Prognosis: Good  Assessment: Pt participated in AM OT session focusing on functional mobiliy, transfers and ADLS  Overall pt is at supervision/set up for grooming and UB ADL tasks  While sitting pt is min assist for LB ADLS and toileting  Pt is overal min assist x1 for transfer and functional mobility w/ RW  Limited ROM on R LE 2* swelling, pain & discomfort when bending  Pt is cooperative and motivated to particpate in therapy session  Pt left in bedside chair, with all needs in reach       OT Discharge Recommendation: Post acute rehabilitation services

## 2023-02-09 NOTE — DISCHARGE SUMMARY
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 MRN: [id number]  Unit/Bed#: [facility] 625-01 Encounter: [id number]  Primary Care Provider: Albaro Partida MD   Date and time admitted to hospital: 2/2/2023  2:46 PM    Acute blood loss anemia  Assessment & Plan  -S/p 1U PRBC yesterday  -H/H improved and stable (9 4 this AM)  -Symptomatically much improved as well  -Cleared for ARC    Lightheadedness  Assessment & Plan  -Much improved this AM, likely related to acute blood loss  - CTA shows 2 5 x 2 0 mm focal outpouching at the expected location of the posterior communicating artery on the right, aneurysm vs infundibulum (?)  Requires neurovascular follow up, pt aware of this  - Echo unremarkable    Acute pain due to trauma  Assessment & Plan  - Acute pain secondary to traumatic injuries  - Multi modal analgesic regimen  - Bowel regimen  - Continue to monitor pain and adjust regimen as indicated  Fall  Assessment & Plan  - mechanical in nature - felt a pop in her hip when she took a step and lowered herself to the ground  Did not strike her head    - PT/OT and case management     * Femur fracture ([address] )  Assessment & Plan  - R subtrochanteric femur fracture, present on admission   - Status post traction placement on 2/2  - Appreciate Orthopedic surgery evaluation, recommendations and interventions as noted  - OR, 2/3 for ORIF RLE    - Continue multimodal analgesic regimen   - Continue DVT prophylaxis with lovenox   - PT and OT evaluation and treatment as indicated  - Outpatient follow up with Orthopedic surgery for re-evaluation                  Medical Problems     Resolved Problems  Date Reviewed: 2/4/2023   None         Admission Date:   Admission Orders (From admission, onward)     Ordered        02/02/23 1709  INPATIENT ADMISSION  Once                        Admitting Diagnosis: Hip injury [S79 919A]  Femur fracture, right ([address] ) [S72 91XA]  Closed displaced subtrochanteric fracture of right femur, initial encounter [facility]) [facility] Finch    HPI: From Dr Malika Bowen on 2/2/23:  "Blyane Patino is a 69 y o  female with PMH of osteoporosis and hypothyroidism who took a step at home today and heard a "pop" in her right groin and had significant right leg pain  She was then able to lower herself to the ground with her left leg and called the ambulance  Xray workup significant for right subtrochanteric femur fracture  On my exam patient complaining of right leg pain  Denies head strike, loss of consciousness, or other pain "    Procedures Performed: No orders of the defined types were placed in this encounter  Summary of Hospital Course: The patient was admitted on February 2 for a ORIF of subtrochanteric hip fracture on the right extremity  Her operative fixation was done without complication  Over the ensuing days, she did experience an acute blood loss anemia, her hemoglobin dropping as low as 7 3  He was ultimately transfused 1 packed unit of RBCs  Initially she did experience a work-up for lightheadedness overall which was only notable for a small 2 5 mm aneurysm of which she was recommended neurovascular follow-up  She participated in physical therapy and Occupational Therapy without complication  Ultimately was recommended to go to [facility] for rehabilitation services  She was transferred to [facility] without complication on 6/0/9210 after being cleared medically and by orthopedic surgery for rehab  Significant Findings, Care, Treatment and Services Provided: Ortho, PT, OT, Trauma    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 incidental thyroid nodule both of which the patient are aware and recommended outpatient follow-up    Complications: Acute Blood Loss Anemia    Condition at Discharge: fair         Discharge instructions/Information to patient and family:   See after visit summary for information provided to patient and family        Provisions for Follow-Up Care:  See after visit summary for information related to follow-up care and any pertinent home health orders  PCP: Angel Jacinto MD    Disposition: Short-term rehab at [facility]    Planned Readmission: No    Discharge Statement   I spent 30 minutes discharging the patient  This time was spent on the day of discharge  I had direct contact with the patient on the day of discharge  Additional documentation is required if more than 30 minutes were spent on discharge  Discharge Medications:  See after visit summary for reconciled discharge medications provided to patient and family

## 2023-02-09 NOTE — PROGRESS NOTES
[facility]  Progress Note - Concepcion Bullard 1945, 69 y o  female MRN: [id number]  Unit/Bed#: [facility] 625-01 Encounter: [id number]  Primary Care Provider: Korina Hall MD   Date and time admitted to hospital: 2/2/2023  2:46 PM    Acute blood loss anemia  Assessment & Plan  -S/p 1U PRBC yesterday  -H/H improved and stable (9 4 this AM)  -Symptomatically much improved as well  -Continue to monitor    Lightheadedness  Assessment & Plan  -Much improved this AM, likely related to acute blood loss  - CTA shows 2 5 x 2 0 mm focal outpouching at the expected location of the posterior communicating artery on the right, aneurysm vs infundibulum (?)  Requires neurovascular follow up, pt aware of this  - Echo unremarkable    HLD (hyperlipidemia)  Assessment & Plan  - Continue home statin therapy    Hypothyroidism  Assessment & Plan  - continue home levothyroxine    Acute pain due to trauma  Assessment & Plan  - Acute pain secondary to traumatic injuries  - Multi modal analgesic regimen  - Bowel regimen  - Continue to monitor pain and adjust regimen as indicated  Fall  Assessment & Plan  - mechanical in nature - felt a pop in her hip when she took a step and lowered herself to the ground  Did not strike her head    - PT/OT and case management     * Femur fracture ([address] )  Assessment & Plan  - R subtrochanteric femur fracture, present on admission   - Status post traction placement on 2/2  - Appreciate Orthopedic surgery evaluation, recommendations and interventions as noted  - OR, 2/3 for ORIF RLE    - Continue multimodal analgesic regimen   - Continue DVT prophylaxis with lovenox   - PT and OT evaluation and treatment as indicated  - Outpatient follow up with Orthopedic surgery for re-evaluation            Bowel Regimen: [place]  VTE Prophylaxis:Enoxaparin (Lovenox)     Disposition: Cleared to go to [facility] for rehab    Subjective   Chief Complaint: "I'm feeling much better"    Subjective: Much improved s/p PRBCs yesterday  Ambulating with PT and walker  NAEO  No current complaints     Objective   Vitals:   Temp:  [98 3 °F (36 8 °C)-99 2 °F (37 3 °C)] 98 3 °F (36 8 °C)  HR:  [68-89] 68  Resp:  [16-18] 16  BP: (128-137)/(68-73) 128/68    I/O       02/07 0701  02/08 0700 02/08 0701  02/09 0700 02/09 0701  02/10 0700    P  O  720 600     I V  (mL/kg)       Blood  475     Total Intake(mL/kg) 720 (12 4) 1075 (18 5)     Urine (mL/kg/hr) 800 (0 6)      Stool 0      Total Output 800      Net -80 +1075            Unmeasured Urine Occurrence 4 x 2 x     Unmeasured Stool Occurrence 2 x 1 x            Physical Exam:   GENERAL APPEARANCE: Alert  NEURO: No deficits  HEENT: MMM  CV: RRR  LUNGS: Normal WOB  GI: Soft  MSK: Moving extremities  SKIN: Warm, dry    Invasive Devices     Peripheral Intravenous Line  Duration           Peripheral IV 02/06/23 Right Antecubital 2 days                 Lab Results: Results: I have personally reviewed all pertinent laboratory/tests results  Imaging: I have personally reviewed pertinent reports       Other Studies: N/A

## 2023-02-10 RX ORDER — POLYETHYLENE GLYCOL 3350 17 G/17G
17 POWDER, FOR SOLUTION ORAL DAILY PRN
Status: DISCONTINUED | OUTPATIENT
Start: 2023-02-10 | End: 2023-02-10 | Stop reason: SDUPTHER

## 2023-02-10 RX ORDER — BISACODYL 10 MG
10 SUPPOSITORY, RECTAL RECTAL DAILY PRN
Status: DISCONTINUED | OUTPATIENT
Start: 2023-02-10 | End: 2023-02-23 | Stop reason: HOSPADM

## 2023-02-10 RX ORDER — AMOXICILLIN 250 MG
1 CAPSULE ORAL
Status: DISCONTINUED | OUTPATIENT
Start: 2023-02-10 | End: 2023-02-23 | Stop reason: HOSPADM

## 2023-02-10 RX ORDER — POLYETHYLENE GLYCOL 3350 17 G/17G
17 POWDER, FOR SOLUTION ORAL DAILY PRN
Status: DISCONTINUED | OUTPATIENT
Start: 2023-02-10 | End: 2023-02-23 | Stop reason: HOSPADM

## 2023-02-10 RX ORDER — POLYETHYLENE GLYCOL 3350 17 G/17G
17 POWDER, FOR SOLUTION ORAL DAILY PRN
Status: DISCONTINUED | OUTPATIENT
Start: 2023-02-10 | End: 2023-02-10

## 2023-02-10 RX ADMIN — ENOXAPARIN SODIUM 30 MG: 30 INJECTION SUBCUTANEOUS at 08:29

## 2023-02-10 RX ADMIN — GLYCERIN 1 DROP: .002; .002; .01 SOLUTION/ DROPS OPHTHALMIC at 21:22

## 2023-02-10 RX ADMIN — ACETAMINOPHEN 975 MG: 325 TABLET, FILM COATED ORAL at 06:10

## 2023-02-10 RX ADMIN — ACETAMINOPHEN 975 MG: 325 TABLET, FILM COATED ORAL at 11:27

## 2023-02-10 RX ADMIN — GLYCERIN 1 DROP: .002; .002; .01 SOLUTION/ DROPS OPHTHALMIC at 17:02

## 2023-02-10 RX ADMIN — ENOXAPARIN SODIUM 30 MG: 30 INJECTION SUBCUTANEOUS at 21:23

## 2023-02-10 RX ADMIN — ACETAMINOPHEN 975 MG: 325 TABLET, FILM COATED ORAL at 17:02

## 2023-02-10 RX ADMIN — LEVOTHYROXINE SODIUM 25 MCG: 25 TABLET ORAL at 21:23

## 2023-02-10 RX ADMIN — ACETAMINOPHEN 975 MG: 325 TABLET, FILM COATED ORAL at 23:36

## 2023-02-10 RX ADMIN — PRAVASTATIN SODIUM 20 MG: 20 TABLET ORAL at 17:02

## 2023-02-10 RX ADMIN — GLYCERIN 1 DROP: .002; .002; .01 SOLUTION/ DROPS OPHTHALMIC at 08:29

## 2023-02-10 NOTE — PLAN OF CARE
Problem: PAIN - ADULT  Goal: Verbalizes/displays adequate comfort level or baseline comfort level  Description: Interventions:  - Encourage patient to monitor pain and request assistance  - Assess pain using appropriate pain scale  - Administer analgesics based on type and severity of pain and evaluate response  - Implement non-pharmacological measures as appropriate and evaluate response  - Consider cultural and social influences on pain and pain management  - Notify physician/advanced practitioner if interventions unsuccessful or patient reports new pain  Outcome: Progressing     Problem: INFECTION - ADULT  Goal: Absence or prevention of progression during hospitalization  Description: INTERVENTIONS:  - Assess and monitor for signs and symptoms of infection  - Monitor lab/diagnostic results  - Monitor all insertion sites, i e  indwelling lines, tubes, and drains  - Monitor endotracheal if appropriate and nasal secretions for changes in amount and color  - [place] appropriate cooling/warming therapies per order  - Administer medications as ordered  - Instruct and encourage patient and family to use good hand hygiene technique  - Identify and instruct in appropriate isolation precautions for identified infection/condition  Outcome: Progressing  Goal: Absence of fever/infection during neutropenic period  Description: INTERVENTIONS:  - Monitor WBC    Outcome: Progressing     Problem: SAFETY ADULT  Goal: Patient will remain free of falls  Description: INTERVENTIONS:  - Educate patient/family on patient safety including physical limitations  - Instruct patient to call for assistance with activity   - Consult OT/PT to assist with strengthening/mobility   - Keep Call bell within reach  - Keep bed low and locked with side rails adjusted as appropriate  - Keep care items and personal belongings within reach  - Initiate and maintain comfort rounds  - Make Fall Risk Sign visible to staff  - Apply yellow socks and bracelet for high fall risk patients  - Consider moving patient to room near nurses station  Outcome: Progressing  Goal: Maintain or return to baseline ADL function  Description: INTERVENTIONS:  -  Assess patient's ability to carry out ADLs; assess patient's baseline for ADL function and identify physical deficits which impact ability to perform ADLs (bathing, care of mouth/teeth, toileting, grooming, dressing, etc )  - Assess/evaluate cause of self-care deficits   - Assess range of motion  - Assess patient's mobility; develop plan if impaired  - Assess patient's need for assistive devices and provide as appropriate  - Encourage maximum independence but intervene and supervise when necessary  - Involve family in performance of ADLs  - Assess for home care needs following discharge   - Consider OT consult to assist with ADL evaluation and planning for discharge  - Provide patient education as appropriate  Outcome: Progressing  Goal: Maintains/Returns to pre admission functional level  Description: INTERVENTIONS:  - Perform BMAT or MOVE assessment daily    - Set and communicate daily mobility goal to care team and patient/family/caregiver     - Collaborate with rehabilitation services on mobility goals if consulted  - Out of bed for toileting  - Record patient progress and toleration of activity level   Outcome: Progressing     Problem: DISCHARGE PLANNING  Goal: Discharge to home or other facility with appropriate resources  Description: INTERVENTIONS:  - Identify barriers to discharge w/patient and caregiver  - Arrange for needed discharge resources and transportation as appropriate  - Identify discharge learning needs (meds, wound care, etc )  - Arrange for interpretive services to assist at discharge as needed  - Refer to Case Management Department for coordinating discharge planning if the patient needs post-hospital services based on physician/advanced practitioner order or complex needs related to functional status, cognitive ability, or social support system  Outcome: Progressing     Problem: Prexisting or High Potential for Compromised Skin Integrity  Goal: Skin integrity is maintained or improved  Description: INTERVENTIONS:  - Identify patients at risk for skin breakdown  - Assess and monitor skin integrity  - Assess and monitor nutrition and hydration status  - Monitor labs   - Assess for incontinence   - Turn and reposition patient  - Assist with mobility/ambulation  - Relieve pressure over bony prominences  - Avoid friction and shearing  - Provide appropriate hygiene as needed including keeping skin clean and dry  - Evaluate need for skin moisturizer/barrier cream  - Collaborate with interdisciplinary team   - Patient/family teaching  - Consider wound care consult   Outcome: Progressing

## 2023-02-10 NOTE — CASE MANAGEMENT
Met w/pt and reviewed rehab routine and cm role  Pt resides alone in a multi level home with 2 yesica  Pt has a brother that lives in [place] and a supportive friend across the street  Pt was completely ind pta, participating in a bowling league on mondays  Pt drives and maintains all household tasks  Pt also volunteers at [address] [facility]  Pt has a roller walker and experience with outpt physical therapy  Pt uses [facility] [address] [place]  Cm explained team mtg process and potential los  Cm explained the potential need for contd services on dc such as [facility] or physical therapy services  Following to assist w/dc planning recommendations

## 2023-02-10 NOTE — PROGRESS NOTES
Occupational Therapy Initial Evaluation     02/10/23 0700   Patient Data   Rehab Impairment Impairment of mobility, safety and Activities of Daily Living (ADLs) due to Orthopedic Disorders:  08 2  Femur (Shaft) Fracture   Etiologic Diagnosis Right Proximal Femoral Shaft Fracture   Date of Onset 02/02/23   Support System   Name Pt reports having supportive brother and neighbors who can A w/ partial IADL management at time of d/c  Able to provide 24 hour supervision No   Able to provide physical help? No   Home Setup   Type of Home Multi Level   Method of Entry Stairs   Number of Stairs 2  (1+1 curb steps)   Number of Stairs in Home 12   In Home Hand Rail Right   First Floor Bathroom Half   Second Floor Bathroom Full;Tub;Combo;Grab Bars   Second Floor Bathroom Accessibility Grab bars in tub/shower  [facility])   First Floor Setup Available No   Home Modifications Necessary? No   Home Modification Comment pt reports having wooden ramp which was used when pt was NWB in 2010, stored in garage  Available Equipment Roller Walker;Single Point Cane;Handheld Shower; Bedside Commode   Baseline Information   Vocation Volunteer   Transportation    Prior Device(s) Used   (none)   Prior IADL Participation   Money Management Identify Money;Estimate Costs;Estimate Change;Combine Bills;Manage Checkbook   Meal Preparation Full Participation   Laundry Full Participation   Home Cleaning Full Participation   Prior Level of Function   Self-Care 3  Independent - Patient completed the activities by him/herself, with or without an assistive device, with no assistance from a helper  Indoor-Mobility (Ambulation) 3  Independent - Patient completed the activities by him/herself, with or without an assistive device, with no assistance from a helper  Stairs 3  Independent - Patient completed the activities by him/herself, with or without an assistive device, with no assistance from a helper  Functional Cognition 3  Independent - Patient completed the activities by him/herself, with or without an assistive device, with no assistance from a helper  Prior Device Used Z  None of the above   Falls in the Last Year   Number of falls in the past 12 months 1   Type of Injury Associated with Fall Major injury  (causing current admission)   Patient Preference   Carina (Patient Preference) Tammie   Psychosocial   Psychosocial (WDL) WDL   Patient Behaviors/Mood Appropriate for age; Appropriate for situation;Brightens with approach;Calm; Cooperative;Pleasant   Restrictions/Precautions   Precautions Fall Risk;Pain   RLE Weight Bearing Per Order WBAT   LLE Weight Bearing Per Order (S)    (notify MD and ortho with any reported LLE pain)   ROM Restrictions No   Braces or Orthoses   (b/l TEDs)   Pain Assessment   Pain Assessment Tool 0-10   Pain Score 3   Pain Location/Orientation Orientation: Right;Location: Hip;Location: Leg   Eating Assessment   Type of Assistance Needed Set-up / clean-up   Physical Assistance Level No physical assistance   Eating CARE Score 5   Oral Hygiene   Type of Assistance Needed Physical assistance   Physical Assistance Level 25% or less   Comment in stance at sink without UE support  Oral Hygiene CARE Score 3   Tub/Shower Transfer   Reason Not Assessed Sponge Bath;Medical  Pt w/ active shower orders, however w/ RLE mepilex  Shower/Bathe Self   Type of Assistance Needed Physical assistance   Physical Assistance Level 51%-75%   Comment pt reports standing to bathe at baseline, would req Mod A to if completed in stance  Bathed BUE and upper legs while seated, crossed leg technique to bathe L lower leg  A to bathe R lower leg  Min A to steady in stance to bathe hua and buttocks     Shower/Bathe Self CARE Score 2   Dressing/Undressing Clothing   Type of Assistance Needed Physical assistance;Set-up / clean-up   Physical Assistance Level 25% or less   Comment Min A to steady in stance to don bra  seated to don shirt while seated w/ set-up  Upper Body Dressing CARE Score 3   Type of Assistance Needed Physical assistance   Physical Assistance Level 76% or more   Comment reports threading BLE while in stance at baseline  Would req Max A if completed in stance  A to fully thread RLE while seated, able to thread LLE  Min A in stance for clothing management  Lower Body Dressing CARE Score 2   Putting On/Taking Off Footwear   Type of Assistance Needed Physical assistance;Set-up / clean-up   Physical Assistance Level 51%-75%   Comment while seated w/ crossed leg technique pt able to don/doff L sock/shoe  A to don/doff R sock/shoe  Putting On/Taking Off Footwear CARE Score 2   Toileting Hygiene   Type of Assistance Needed Physical assistance   Physical Assistance Level 51%-75%   Comment Mod A to steady in stance for hygiene and clothing management without UE support  With occ unilateral support of RW, pt req Min A to steady  Toileting Hygiene CARE Score 2   Toilet Transfer   Type of Assistance Needed Physical assistance;Verbal cues; Adaptive equipment   Physical Assistance Level 76% or more   Comment req boost and lower to xfer to standard toilet without grab bars to simulate home context at baseline  With b/l grab bars, pt req Min A and inc time  Toilet Transfer CARE Score 2   Transfer Bed/Chair/Wheelchair   Type of Assistance Needed Physical assistance;Verbal cues; Perez Fess / Trula Boom; Adaptive equipment   Physical Assistance Level 51%-75%   Comment Mod A stand pivot HHA, inc time  Min A w/ RW, ambulated<>bathroom     Chair/Bed-to-Chair Transfer CARE Score 2   Lying to Sitting on Side of Bed   Type of Assistance Needed Physical assistance   Physical Assistance Level 26%-50%   Comment RLE management, cues and inc time   Lying to Sitting on Side of Bed CARE Score 3   Sit to Stand   Type of Assistance Needed Physical assistance   Physical Assistance Level 26%-50%   Comment without vc's req a boost to stand  w/ cues for proper hand placement pt Min A  Sit to Stand CARE Score 3   Comprehension   QI: Comprehension 4  Undestands: Clear comprehension without cues or repetitions   Expression   QI: Expression 4  Express complex messages without difficulty and with speech that is clear and easy to [place]   RUE Assessment   RUE Assessment WFL  (R-hand dominant)   LUE Assessment   LUE Assessment WFL   Cognition   Overall Cognitive Status WFL   Arousal/Participation Alert; Cooperative   Attention Within functional limits   Orientation Level Oriented X4;Oriented to person;Oriented to place;Oriented to time;Oriented to situation   Memory Within functional limits   Following Commands Follows all commands and directions without difficulty   Comments Jimmy BARTLETT understanding of recent hospital course, will cont to assess fxnl cognition during course of OT  Objective Measure   OT Measure(s) Automatic BP supine 135/66 HR 70, seated 140/67 HR 75, standing 153/63 HR 95    OT Findings Provided w/ soft-care cushion for recliner to promote sacral offloading and dec risk of pressure injury  Discharge Information   Vocational Plan Return to volunteer   Patient's Rehab Expectations "To get back to doing the things I have to at home "   Barriers to Discharge Home Limited Family Support; Unsafe Home Setup; Decreased Strength;Decreased Endurance;Pain; Safety Considerations   Impressions Pt is a 70 y/o female presenting to [facility] on 2/3/23 w/ c/o R hip pain after feeling her hip pop when descending stairs  XR of R femur revealed acute transverse substantially displaced proximal femoral shaft fracture, req IMN on 2/3 and now WBAT  Course complicated by ABLA and lightheadedness w/ RR on 2/5, req IVF and blood product  PMH includes hypothyroid, osteoporosis, scoliosis, arthritis, and HLD  Pt reports completing ADLs and fxnl mobility independently without AD  Pt is currently functioning at overall Min A for ADLs and Min A for fxnl mobility w/ RW   Pt is limited by dec strength, dec endurance, impaired balance, RLE pain, RLE edema, unsafe home set-up w/ 2STE, limited social support, and ADL dysfunction  Pt will benefit from skilled OT services w/ focus on above barriers, assess need for DME, provide pt/family edu, and maximize fxnl indep to return to PLOF in 10-14 day ELOS to meet overall independent ADL/IADL goals  Of note, please plan to notify MD and ortho w/ any c/o LLE/hip pain     OT Therapy Minutes   OT Time In 0700   OT Time Out 0830   OT Total Time (minutes) 90   OT Mode of treatment - Individual (minutes) 90   OT Mode of treatment - Concurrent (minutes) 0   OT Mode of treatment - Group (minutes) 0   OT Mode of treatment - Co-treat (minutes) 0   OT Mode of Treatment - Total time(minutes) 90 minutes   OT Cumulative Minutes 90   Cumulative Minutes   Cumulative therapy minutes 90     Magalie Soto MS, OTR/L

## 2023-02-10 NOTE — PROGRESS NOTES
ARC PT EVAL     02/10/23 1000   Patient Data   Rehab Impairment Impairment of mobility, safety and Activities of Daily Living (ADLs) due to Orthopedic Disorders:  08 2  Femur (Shaft) Fracture   Etiologic Diagnosis Right Proximal Femoral Shaft Fracture   Date of Onset 02/02/23   Support System   Name Pt lives alone but reports having a friend that lives across the street and also a brother that lives nearby that can assist with transportation, grocery shopping, and other IADLs as needed  Reports friend may be able to provide intermittent supervision/assist if needed upon DC   Able to provide 24 hour supervision No   Home Setup   Type of Home Multi Level   Method of Entry Stairs   Number of Stairs 2  (2STE on preferred entrance, no handrail  Pt reports having 5STE through front with single rail )   Number of Stairs in Home 12   In [address] Full;Tub;Grab Bars   Second Floor Bathroom Accessibility Commode;Grab bars in tub/shower   First Floor Setup Available Yes  (pt reports having recliner and half bath available on first floor)   Home Modification Comment pt reports having temporary wooden ramp stored in her garage from when she was [facility] in 2010   [place] [address]; Bedside Commode;Single Point Cane;Roller Walker  (RW belonged to her mother, unsure if in good condition)   Baseline Information   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[employer])   Transportation    Prior Level of Function   Self-Care 3  Independent - Patient completed the activities by him/herself, with or without an assistive device, with no assistance from a helper  Indoor-Mobility (Ambulation) 3  Independent - Patient completed the activities by him/herself, with or without an assistive device, with no assistance from a helper  Stairs 3   Independent - Patient completed the activities by him/herself, with or without an assistive device, with no assistance from a helper  Functional Cognition 3  Independent - Patient completed the activities by him/herself, with or without an assistive device, with no assistance from a helper  Prior Device Used Z  None of the above   Falls in the Last Year   Number of falls in the past 12 months 1  (admitting fall- pt reports controlling herself to floor following hearing "pop" and RLE pain)   Type of Injury Associated with Fall Major injury   Patient Preference   Carina (Patient Preference) Tammie   Psychosocial   Psychosocial (WDL) WDL   Patient Behaviors/Mood Cooperative;Calm   Restrictions/Precautions   Precautions Fall Risk   RLE Weight Bearing Per Order WBAT   LLE Weight Bearing Per Order (S)  Other  (no weightbearing restrictions; however, if pt reports LLE pain, notifiy ortho ASAP)   Pain Assessment   Pain Assessment Tool 0-10   Pain Score 3   Pain Location/Orientation Orientation: Right;Location: Hip   Pain Onset/Description Onset: Ongoing; Descriptor: Aching   Effect of Pain on Daily Activities increased pain with activity   Patient's Stated Pain Goal No pain   Hospital Pain Intervention(s) Repositioned   Toilet Transfer   Transfer Technique Standard   Type of Assistance Needed Physical assistance   Physical Assistance Level 25% or less   Comment min A when not using grab bar, CGA with use of grab bar   Toilet Transfer CARE Score 3   Transfer Bed/Chair/Wheelchair   Positioning Concerns Skin Integrity   Limitations Noted In LE Strength;Pain Management   Adaptive Equipment Walker   Type of Assistance Needed Verbal cues; Physical assistance   Physical Assistance Level 25% or less   Comment pt does not use AD at baseline  pt requires min A to perform functional transfers without AD due to RLE pain and impaired BLE strength   Shes able to progress to CGA when using a RW   Chair/Bed-to-Chair Transfer CARE Score 3   Roll Left and Right   Type of Assistance Needed Incidental touching   Comment able to roll to L without difficulty, requires CGA to roll to right with increased pain   Roll Left and Right CARE Score 4   Sit to Lying   Type of Assistance Needed Incidental touching   Comment increased time required   Sit to Lying CARE Score 4   Lying to Sitting on Side of Bed   Type of Assistance Needed Physical assistance   Physical Assistance Level 25% or less   Comment min A for RLE management   Lying to Sitting on Side of Bed CARE Score 3   Sit to Stand   Type of Assistance Needed Physical assistance   Physical Assistance Level 25% or less   Comment pt does not use AD at baseline  pt requires min A to perform functional transfers without AD due to RLE pain and impaired BLE strength  Shes able to progress to CGA when using a RW   Sit to Stand CARE Score 3   Picking Up Object   Comment unable to safely perform without AD  provided RW and reacher and pt able to retrieve marker from floor with supervisiin   Reason if not Attempted Safety concerns   Picking Up Object CARE Score 88   Car Transfer   Type of Assistance Needed Physical assistance   Physical Assistance Level 25% or less   Comment assist for RLE management and cueing required for improved technique to increase safety   Car Transfer CARE Score 3   Ambulation   Primary Mode of Locomotion Prior to Admission Walk   Distance Walked (feet) 75 ft  (x 3 trials with RW on even surface, 10 ft x 2 with RW on uneven (floor mat))   Assist Device Walker   Gait Pattern Antalgic; Inconsistant Radha; Step through; Decreased R stance; Improper weight shift  (decreased R heel strike)   Limitations Noted In Balance; Endurance; Heel Strike; Safety;Strength;Device Management   Provided Assistance with: Balance;Weight Shift   Walk Assist Level Contact Guard   Walk 10 Feet   Comment   (unable to perform without AD (pts baseline)   performed with RW and pt able to complete with CGA)   Reason if not Attempted Safety concerns   Walk 10 Feet CARE Score 88   Walk 50 Feet with Two Turns   Comment unable to perform without AD (pts baseline)  performed with RW and pt able to complete with CGA   Reason if not Attempted Safety concerns   Walk 50 Feet with Two Turns CARE Score 88   Walk 150 Feet   Reason if not Attempted Safety concerns   Walk 150 Feet CARE Score 88   Walking 10 Feet on Uneven Surfaces   Comment unable to perform without AD (pts baseline)  performed with RW and pt able to complete with CGA x 2 trials over floor mat   Reason if not Attempted Safety concerns   Walking 10 Feet on Uneven Surfaces CARE Score 88   Wheel 50 Feet with Two Turns   Reason if not Attempted Activity not applicable   Wheel 50 Feet with Two Turns CARE Score 9   Wheel 150 Feet   Reason if not Attempted Activity not applicable   Wheel 617 Feet CARE Score 9   Curb or Single Stair   Comment pt does not have railing to enter home, unable to complete without railings due to pain RLE and safety precautions re: bone integrity LLE  Pt able to perform with Bilateral railings and min A  pt states she may be able to have temporary ramp installed for DC   Reason if not Attempted Safety concerns   1 Step (Curb) CARE Score 88   4 Steps   Reason if not Attempted Safety concerns   4 Steps CARE Score 88   12 Steps   Reason if not Attempted Safety concerns   12 Steps CARE Score 88   Stairs   Type Stairs   # of Steps 2  (three 4-inch steps and two 6-inch steps)   Weight Bearing Precautions WBAT   Assist Devices Bilateral Rail   Findings min A to ascend/descend 6inch steps with bilateral rails  cues for sequence  step to pattern   Comprehension   QI: Comprehension 4  Undestands: Clear comprehension without cues or repetitions   Expression   QI: Expression 4   Express complex messages without difficulty and with speech that is clear and easy to [facility]   Strength RLE   R Hip Flexion 3+/5   R Hip ABduction 3+/5   R Hip ADduction 4-/5   R Knee Flexion 4-/5   R Knee Extension 4-/5   R Ankle Dorsiflexion 4+/5   R Ankle Plantar Flexion 4+/5   LLE Assessment LLE Assessment WFL   Strength LLE   LLE Overall Strength 4+/5   RUE Assessment   RUE Assessment WFL   LUE Assessment   LUE Assessment WFL   Coordination   Movements are Fluid and Coordinated 1   Sensation   Light Touch No apparent deficits   Cognition   Overall Cognitive Status WFL   Arousal/Participation Alert; Responsive; Cooperative   Attention Within functional limits   Orientation Level Oriented X4   Objective Measure   PT Measure(s) TUG: Avg score of 3 trials 39 75 seconds with use of RW and CGA  Self selected gait speed 0 15m/s   Therapeutic Exercise   Therapeutic Exercise/Activity seated: ankle pf/df, knee ext AROM 3 sets of 10 BLEs   Discharge Information   Vocational Plan Return to volunteer   Patient's Discharge Plan to return home with intermittent family/friend support   Patient's Rehab Expectations to return to OF including household mobility independently and 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   Barriers to Discharge [address]; Unsafe Home Setup; Decreased Strength;Pain;Decreased Endurance; Safety Considerations  (2STE without railing, pt has bed/bath on 2nd floor but may be able to have 1st floor setup)   Impressions Pt is a 70 yo female with PMH significant for osteoporosis, scoliosis, hypothyroid, and hypotension is admitted to [facility] on 2/2/23 with right hip pain after hearing a "pop" when walking out of her bathroom  Pt lowered herself to floor and called ambulance  Pt found to have displaced proximal femur shaft fx to RLE, went to OR on 2/3/22 for IMN and is now WBAT RLE  Hospital stay complicated by syncopal episode; pt refused blood transfusion and is now admitted to [facility]  She presents with impaired BLE strength, standing balance, and activity tolerance as well as RLE pain resulting in decreased (I) with functional mobility skills   She was (I) with all mobility without use of AD prior to admission; however she is now reliant on use of RW to offload RLE and improve balance and tolerance to mobility  RW is also recommended at this time as per ortho, LLE should be monitored closely and to notify ortho ASAP if she develops LLE pain during ambulation  Pts gait speed viaTUG is 0 15m/s with use of RW, placing her at increased risk for falls  Pt will benefit from skilled PT interventions to address deficits, reduce fall risk, and maximize functional independence  Barriers to DC home are stairs to enter without railings and lack of social support  Per pt report, she may have temporary ramp placed to enter home and has option for first floor set up as she has recliner and half bath on main floor  Pt will likely be dependent on use of RW upon DC home and anticipate intermittent S/assist for ADL/IADL tasks and outpatient PT services     PT Therapy Minutes   PT Time In 1000   PT Time Out 1130   PT Total Time (minutes) 90   PT Mode of treatment - Individual (minutes) 90   PT Mode of treatment - Concurrent (minutes) 0   PT Mode of treatment - Group (minutes) 0   PT Mode of treatment - Co-treat (minutes) 0   PT Mode of Treatment - Total time(minutes) 90 minutes   PT Cumulative Minutes 90   Cumulative Minutes   Cumulative therapy minutes 90

## 2023-02-10 NOTE — PROGRESS NOTES
OT LTGs: 10-14 day ELOS     02/10/23 0700   Rehab Team Goals   ADL Team Goal Patient will be independent with ADLs with least restrictive device upon completion of rehab program   Rehab Team Interventions   OT Interventions Self Care;Home Management; Therapeutic Exercise; Energy Conservation;Patient/Family Education   Eating Goal   Eating Goal 06  Independent - Patient completes the activity by him/herself with no assistance from a helper  Meal Complete All meals   Status Target goal - two weeks  (10-14 days)   Interventions Optimal Position   Grooming Goal   Oral Hygiene Goal 06  Independent - Patient completes the activity by him/herself with no assistance from a helper  Task Wash/Dry Face;Wash/Dry Hands;Brush Teeth;Comb Hair;Acquire Items; Initiate Task;Complete Groom   Environment Stand at [employer] sit   Status Target goal - two weeks  (10-14 days)   Intervention Assistive Device;Balance Work;Neuromuscular Education; Therapeutic Exercise; Tolerance Work   Tub/Shower Transfer Goal   Method Tub Shower  (GOAL: set-up)   Assist Device Seat with Back;Tub Bench;Grab Bar;Hand Held Shower   Status Target goal - two weeks  (10-14 days)   Interventions ADL Training;Assistive Device; Neuromuscular Education   Bathing Goal   Shower/bathe self Goal 05  Setup or clean-up assistance - [place] SETS UP or CLEANS UP, patient completes activity  [place] assists only prior to or following the activity  Environment Seated;Standing;Sponge Bath;Tub   Adaptive Equipment Reacher;Long Handle Sponge;Seat with back;Transfer bench;Grab Bar;Hand Held Shower   Status Target goal - two weeks  (10-14 days)   Intervention ADL Training;Assistive Device; Neuromuscular Education; Therapeutic Exercise   Upper Body Dressing Goal   Upper body dressing Goal 06  Independent - Patient completes the activity by him/herself with no assistance from a helper  Task Upper Body;Arms in/out; Over Head;Bra   Environment Seated   Status Target goal - two weeks  (10-14 days)   Intervention Assistive Device;Balance Work; Therapeutic Exercise;Neuromuscular Education; Tolerance Work   Lower Body Dressing Goal   Lower body dressing Goal 06  Independent - Patient completes the activity by him/herself with no assistance from a helper  Putting on/taking off footwear Goal 06  Independent - Patient completes the activity by him/herself with no assistance from a helper  Task Lower Body; Anti-Embolic;Shoe/Slipper;Socks;Pants; Undergarment; Fasteners   Adaptive Equipment Dressing Stick; Elastic Laces; Shoe [place];Sock Aide;Reacher   Environment Seated;Standing   Status Target goal - two weeks  (10-14 days)   Intervention Assistive Device; Neuromuscular Education;Balance Work; Therapeutic Exercise; Tolerance Work   Toileting Transfer Goal   Toilet transfer Goal 06  Independent - Patient completes the activity by him/herself with no assistance from a helper  Assistive Device Grab Bar;Roller Walker;Single Point Cane;Bedside Commode;Raised Toilet Seat   Status Target goal - two weeks  (10-14 days)   Intervention ADL Training;Balance Work;Assistive Device   Toileting Goal   Toileting hygiene Goal 06  Independent - Patient completes the activity by him/herself with no assistance from a helper     Task Pants Up;Pants Down;Hygiene   Safety Balance;Use a Bedside Commode at Night;Use a Bedside Commode during day   Status Target goal - two weeks  (10-14 days)   Intervention ADL Training;Balance Work;Assistive Device   Meal Prep and Kitchen Mobility   Assist Level Independent  (light meal prep)   Status Target goal - two weeks  (10-14 days)   Medication Management   Assist Level Independent   Status Target goal - two weeks  (10-14 days)   Laundry   Assist Level Independent   Status Target goal - two weeks     Lily Duncan MS, OTR/L

## 2023-02-10 NOTE — PROGRESS NOTES
PM&R PROGRESS NOTE:  Remington Lechuga 69 y o  female MRN: [id number]  Unit/Bed#: -01 Encounter: [id number]        Rehabilitation Diagnosis: Impairment of mobility, safety and Activities of Daily Living (ADLs) due to Orthopedic Disorders:  08 2  Femur (Shaft) Fracture    HPI: Liliya Ricketts is a 69 y o  female with a history of hypothyroid, osteoporosis, scoliosis, and hyperlipidemia that presented to [facility] on 2/2/23 with c/o right hip pain after feeling a pop in her right hip when descending stairs  She was unable to ambulate, lowered herself to the floor and crawled to phone to call EMS  On exam right lower extremity shortening and external rotation  Imaging showed acute transverse substantially displaced proximal femoral shaft fracture  Orthopedics was consulted and recommended NWB with Mane's traction, with surgical intervention  On 2/3, patient underwent right insertion IM nailing with Dr Hortencia Spann  Patient is WBAT with Lovenox for 28 days for DVT prophylaxis  Post-op complicated by dizziness and an episode requiring her to be lowered to floor  CTA of head/neck was obtained, which showed no acute findings; did show outpouching of PCA  Neurovascular recommended follow-up as outpatient  Echo EF 65%, mild TVR and trace PVR  Patient's hemoglobin 7 3 which she received 1 unit pRBC improving to 9 4  Patient was deemed hemodynamically stable, PT/OT recommend inpatient acute rehabilitation  Patient was admitted to [address] [place] [address] on 02/09/23  SUBJECTIVE: Patient seen face to face  No acute issues overnight  Progressing as expected in rehabilitation  Reports pain is well controlled with tylenol  Did not sleep very well, she is a light sleeper and room mate snores  LBM 2/10, she reports frequent bowel movements  No chest pain, shortness of breath, fever, chills, abdominal pain  ASSESSMENT: Stable, progressing    PLAN:  1  Frequent bowel movements: bowel medication prn  2   Pain: continue with current scheduled tylenol  3  Incision: remove mepilex, apply DSD/ABD, secure with tape  4  Lovenox- nursing to start Lovenox teaching    Rehabilitation  • Functional deficits:  Self care, impaired mobility  • Continue current rehabilitation plan of care to maximize function      • Functional update:   o PT: Awaiting evaluation  o OT: Awaiting evaluation  o SLP: Awaiting evaluation  • Estimated Discharge:       Pain  • tylenol    DVT prophylaxis  • Lovenox    Bladder plan  • Continent    Bowel plan  • Continent      * Femur fracture ([address] )  Assessment & Plan  - Pt felt "pop" when descending stairs with inability to ambulate  -X-ray: acute transverse substantially displaced proximal femoral shaft fracture  -NWB with bucks traction  -2/3 right IMN  -pain control  -WBAT right lowe extremity  - Lovenox for 28 days (3/3)  -po dizziness and diaphtetic; orthostatics normal, IVF, Hbg 7 3 pRBC x1  -Monitor incision for signs of infection  -2 week f/u 02/17  -Participate in comprehensive therapies 3 hr day, 5-6 days a week  -Follow-up with Orthopedic Surgery (Dr Marisa Fu)    Acute pain due to trauma  Assessment & Plan  -acute post-op pain right hip/leg  -tylenol 975 mg scheduled, oxycodone IR 2 5-5 mg prn  -monitor pain with therapy  -adjust medication as needed    Acute blood loss anemia  Assessment & Plan  -post op dizziness, diaphoresis, lowered to floor  -IVF  -Hbg 7 3, received 1 unit pRBC  -current Hbg 9 4  -monitor hemoglobin  -consult IM for co-mgmt    Lightheadedness  Assessment & Plan  -post-op with ambulation  - orthostatic BP normal  -Hbg 7 3  -received IVF, 1 unit PRBC  -Stable    Osteoporosis  Assessment & Plan  -took bisphosphonate <10 years  -per Ortho: pt should stop bisphosphonate at this time d/t left femur lateral cortical thickening without obvious stress fracture/break  -needs immediate f/u if develops pain in left leg  -follow-up with Orthopedics    HLD (hyperlipidemia)  Assessment & Plan  -  -home: Crestor 10 mg  -continue Pravachol 20 mg    Hypothyroidism  Assessment & Plan  -continue levothyroxine      Appreciate IM consultants medical co-management  Labs, medications, and imaging personally reviewed  ROS:  A ten point review of systems was completed on 02/10/23 and pertinent positives are listed in subjective section  All other systems reviewed were negative  Review of Systems     OBJECTIVE:   /67 (BP Location: Left arm)   Pulse 75   Temp 98 5 °F (36 9 °C) (Oral)   Resp 20   Ht 5' 1" (1 549 m)   Wt 59 kg (130 lb)   SpO2 95%   BMI 24 56 kg/m²     Physical Exam  Constitutional:       Appearance: Normal appearance  HENT:      Head: Normocephalic and atraumatic  Mouth/Throat:      Mouth: Mucous membranes are moist    Cardiovascular:      Rate and Rhythm: Normal rate and regular rhythm  Pulses: Normal pulses  Heart sounds: Normal heart sounds  Pulmonary:      Effort: Pulmonary effort is normal       Breath sounds: Normal breath sounds  Abdominal:      General: Bowel sounds are normal       Palpations: Abdomen is soft  Musculoskeletal:         General: Normal range of motion  Right lower leg: Edema present  Skin:     General: Skin is warm and dry  Capillary Refill: Capillary refill takes less than 2 seconds  Findings: Bruising present  Comments: Incisions x4 with staples, ecchymosis right leg   Neurological:      Mental Status: She is alert and oriented to person, place, and time     Psychiatric:         Mood and Affect: Mood normal          Judgment: Judgment normal         Lab Results   Component Value Date    WBC 9 77 02/08/2023    HGB 9 4 (L) 02/09/2023    HCT 29 1 (L) 02/09/2023    MCV 92 02/08/2023     02/08/2023     Lab Results   Component Value Date    SODIUM 142 02/07/2023    K 3 7 02/07/2023     (H) 02/07/2023    CO2 27 02/07/2023    BUN 19 02/07/2023    CREATININE 0 50 (L) 02/07/2023    GLUC 105 02/07/2023    CALCIUM 8 0 (L) 02/07/2023 Lab Results   Component Value Date    INR 1 05 02/03/2023    INR 0 95 02/02/2023    PROTIME 13 9 02/03/2023    PROTIME 12 8 02/02/2023       Current Facility-Administered Medications:   •  acetaminophen (TYLENOL) tablet 975 mg, 975 mg, Oral, Q6H, SANTI Bobo, 975 mg at 02/10/23 3311  •  artificial tear (LUBRIFRESH P M ) ophthalmic ointment, , Both Eyes, HS PRN, SANTI Roe, Given at 02/09/23 1746  •  bisacodyl (DULCOLAX) rectal suppository 10 mg, 10 mg, Rectal, Daily PRN, SANTI Bobo  •  enoxaparin (LOVENOX) subcutaneous injection 30 mg, 30 mg, Subcutaneous, Q12H JAQUAN, SANTI Bobo, 30 mg at 02/10/23 7205  •  glycerin-hypromellose- (ARTIFICIAL TEARS) ophthalmic solution 1 drop, 1 drop, Both Eyes, TID, SANTI Bobo, 1 drop at 02/10/23 5814  •  levothyroxine tablet 25 mcg, 25 mcg, Oral, Daily, SANTI Bobo, 25 mcg at 02/09/23 2112  •  meclizine (ANTIVERT) tablet 12 5 mg, 12 5 mg, Oral, Q8H PRN, SANTI Bobo  •  oxyCODONE (ROXICODONE) IR tablet 2 5 mg, 2 5 mg, Oral, Q4H PRN, SANTI Bobo  •  oxyCODONE (ROXICODONE) IR tablet 5 mg, 5 mg, Oral, Q4H PRN, SANTI Bobo  •  polyethylene glycol (MIRALAX) packet 17 g, 17 g, Oral, Daily PRN, SANTI Bobo  •  pravastatin (PRAVACHOL) tablet 20 mg, 20 mg, Oral, Daily With Dinner, SANTI Roe, 20 mg at 02/09/23 1746  •  senna-docusate sodium (SENOKOT S) 8 6-50 mg per tablet 1 tablet, 1 tablet, Oral, HS PRN, SANTI Roe    Past Medical History:   Diagnosis Date   • Arthritis 2005   • Closed nondisplaced fracture of fifth metatarsal bone of right foot with routine healing    • Disease of thyroid gland     hypothyroid   • Glaucoma, bilateral    • Hyperlipidemia    • Osteoporosis    • Scoliosis 12/12/12       Patient Active Problem List    Diagnosis Date Noted   • Femur fracture ([facility] [address] ) 02/02/2023   • Acute pain due to trauma 02/03/2023   • Acute blood loss anemia 02/05/2023   • Lightheadedness 02/06/2023   • Osteoporosis 02/09/2023   • HLD (hyperlipidemia) 02/06/2023   • Fall 02/03/2023   • Hypothyroidism 02/03/2023   • Chest pain 10/31/2022   • Abnormal electrocardiogram (ECG) (EKG) 10/31/2022   • Sjogren's syndrome ([address] ) 10/06/2021   • Primary osteoarthritis of right knee 05/14/2019   • Primary osteoarthritis of left knee 05/14/2019      SANTI Maria  Physical Medicine and Mes[facility]     Total visit time: 30 minutes, with more than 50% spent counseling/coordinating care  Counseling includes discussion with patient re: progress in therapies, functional issues observed by therapy staff, and discussion with patient regarding their current medical state and wellbeing  Coordination of patient's care was performed in conjunction with Internal Medicine service to monitor patient's labs, vitals, and management of their comorbidities

## 2023-02-10 NOTE — PROGRESS NOTES
Internal Medicine Progress Note  Patient: Maria Teresa Vogel  Age/sex: 69 y o  female  Medical Record #: [id number]      ASSESSMENT/PLAN: (Interval History)  Maria Teresa Vogel is seen and examined and management for following issues:    Right displaced proximal femoral shaft fracture  • S/p IMN 2/3/23  • WBAT  • Per Ortho:  "Patient should not continue bisphosphonate use at this time given radiographic changes to left femur including lateral cortical thickening without obvious stress fracture/beaking  She needs to follow up immediately w ortho if she develops ambulatory pain in left thigh"  They discussed this with her      ABLA  • S/p 1 U PRBCs on 2/8/23 for hemoglobin of 7 3  • Hemoglobin stable 2/9/23     Dizziness/syncope  • Related to orthostasis and blood loss  • Was given IVF and blood  • Will watch for orthostasis while here     Hypothyroidism   • Continue levothyroxine  • Has a thyroid nodule found incidentally for which an US will need to be done as an OP     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 vs infundibulum  • To see as a followup in Neurovascular center     Old lacunar infarcts   • Found incidentally on imaging  • On statin        Discharge date:  Team       The above assessment and plan was reviewed and updated as determined by my evaluation of the patient on 2/10/2023  Labs:   Results from last 7 days   Lab Units 02/09/23  0621 02/08/23 2009 02/08/23  0438 02/07/23  2331 02/07/23  0537   WBC Thousand/uL  --   --  9 77  --  10 33*   HEMOGLOBIN g/dL 9 4* 9 6* 7 3*   < > 7 6*   HEMATOCRIT % 29 1* 29 6* 23 0*   < > 24 4*   PLATELETS Thousands/uL  --   --  266  --  252    < > = values in this interval not displayed       Results from last 7 days   Lab Units 02/07/23  0537 02/06/23  0933 02/05/23  1503   SODIUM mmol/L 142 140  --    POTASSIUM mmol/L 3 7 3 6  --    CHLORIDE mmol/L 110* 108  --    CO2 mmol/L 27 26  --    CO2, I-STAT mmol/L  --   --  24   BUN mg/dL 19 22  --    CREATININE mg/dL 0 50* 0 79  -- GLUCOSE, ISTAT mg/dl  --   --  145*   CALCIUM mg/dL 8 0* 8 4  --              Results from last 7 days   Lab Units 02/05/23  1444   POC GLUCOSE mg/dl 125       Review of Scheduled Meds:  Current Facility-Administered Medications   Medication Dose Route Frequency Provider Last Rate   • acetaminophen  975 mg Oral Q6H SANTI Bobo     • artificial tear   Both Eyes HS PRN SANTI Echavarria     • bisacodyl  10 mg Rectal Daily PRN SANTI Echavarria     • enoxaparin  30 mg Subcutaneous Q12H [address] SANTI Bobo     • glycerin-hypromellose-  1 drop Both Eyes TID SANTI Bobo     • levothyroxine  25 mcg Oral Daily SANTI Bobo     • meclizine  12 5 mg Oral Q8H PRN SANTI Bobo     • oxyCODONE  2 5 mg Oral Q4H PRN SANTI Bobo     • oxyCODONE  5 mg Oral Q4H PRN SANTI Bobo     • polyethylene glycol  17 g Oral Daily PRN SANTI Bobo     • pravastatin  20 mg Oral Daily With SANTI Sneed     • senna-docusate sodium  1 tablet Oral HS PRN SANTI Bobo         Subjective/ HPI: Patient seen and examined  Patients overnight issues or events were reviewed with nursing or staff during rounds or morning huddle session  New or overnight issues include the following:     No new or overnight issues  Offers no complaints    ROS:   A 10 point ROS was performed; negative except as noted above         Imaging:     No orders to display       *Labs /Radiology studies reviewed  *Medications reviewed and reconciled as needed  *Please refer to order section for additional ordered labs studies  *Case discussed with primary attending during morning huddle case rounds    Physical Examination:  Vitals:   Vitals:    02/09/23 1300 02/09/23 2046 02/10/23 0530   BP: (!) 178/77 134/63 139/67   BP Location: Left arm Left arm Left arm   Pulse: 83 82 75   Resp: 20 19 20   Temp: 99 8 °F (37 7 °C) 98 9 °F (37 2 °C) 98 5 °F (36 9 °C)   TempSrc: Oral Oral Oral   SpO2: 97% 97% 95%   Weight: 59 kg (130 lb)     Height: 5' 1" (1 549 m)         General Appearance: no distress, conversive  HEENT:  External ear normal   Nose normal w/o drainage  Mucous membranes are moist  Oropharynx is clear  Conjunctiva clear w/o icterus or redness  Neck:  Supple, normal ROM  Lungs: BBS without crackles/wheeze/rhonchi; respirations unlabored with normal inspiratory/expiratory effort  No retractions noted  On RA  CV: regular rate and rhythm; no rubs/murmurs/gallops, PMI normal   ABD: Abdomen is soft  Bowel sounds all quadrants  Nontender with no distention  EXT: tr-mild lower right leg edema  Skin: normal turgor, normal texture, no rashes  Psych: affect normal, mood normal  Neuro: AAO      The above physical exam was reviewed and updated as determined by my evaluation of the patient on 2/10/2023  Invasive Devices     Peripheral Intravenous Line  Duration           Peripheral IV 02/06/23 Right Antecubital 3 days                   VTE Pharmacologic Prophylaxis: Enoxaparin  Code Status: Level 1 - Full Code  Current Length of Stay: 1 day(s)      Total time spent:  30 minutes with more than 50% spent counseling/coordinating care  Counseling includes discussion with patient re: progress  and discussion with patient of his/her current medical state/information  Coordination of patient's care was performed in conjunction with primary service  Time invested included review of patient's labs, vitals, and management of their comorbidities with continued monitoring  In addition, this patient was discussed with medical team including physician and advanced extenders  The care of the patient was extensively discussed and appropriate treatment plan was formulated unique for this patient  Medical decision making for the day was made by supervising physician unless otherwise noted in their attestation statement  ** Please Note:  voice to text software may have been used in the creation of this document   Although proof errors in transcription or interpretation are a potential of such software**

## 2023-02-11 RX ADMIN — ENOXAPARIN SODIUM 30 MG: 30 INJECTION SUBCUTANEOUS at 08:37

## 2023-02-11 RX ADMIN — PRAVASTATIN SODIUM 20 MG: 20 TABLET ORAL at 17:00

## 2023-02-11 RX ADMIN — ACETAMINOPHEN 975 MG: 325 TABLET, FILM COATED ORAL at 11:16

## 2023-02-11 RX ADMIN — ACETAMINOPHEN 975 MG: 325 TABLET, FILM COATED ORAL at 18:39

## 2023-02-11 RX ADMIN — ENOXAPARIN SODIUM 30 MG: 30 INJECTION SUBCUTANEOUS at 20:54

## 2023-02-11 RX ADMIN — GLYCERIN 1 DROP: .002; .002; .01 SOLUTION/ DROPS OPHTHALMIC at 17:00

## 2023-02-11 RX ADMIN — ACETAMINOPHEN 975 MG: 325 TABLET, FILM COATED ORAL at 06:14

## 2023-02-11 RX ADMIN — GLYCERIN 1 DROP: .002; .002; .01 SOLUTION/ DROPS OPHTHALMIC at 08:37

## 2023-02-11 RX ADMIN — GLYCERIN 1 DROP: .002; .002; .01 SOLUTION/ DROPS OPHTHALMIC at 20:58

## 2023-02-11 RX ADMIN — LEVOTHYROXINE SODIUM 25 MCG: 25 TABLET ORAL at 20:54

## 2023-02-11 NOTE — PROGRESS NOTES
02/11/23 1000   Pain Assessment   Pain Assessment Tool 0-10   Pain Score 1   Pain Location/Orientation Orientation: Right;Location: Knee   Restrictions/Precautions   Precautions Fall Risk   RUE Weight Bearing Per Order WBAT   LUE Weight Bearing Per Order WBAT   RLE Weight Bearing Per Order WBAT   LLE Weight Bearing Per Order Other  (no weightbearing restrictions; however, if pt reports LLE pain, notifiy ortho ASAP)   ROM Restrictions No   Braces or Orthoses   (b/l ortho)   Cognition   Overall Cognitive Status WFL   Arousal/Participation Alert; Cooperative   Attention Within functional limits   Orientation Level Oriented X4   Memory Within functional limits   Following Commands Follows all commands and directions without difficulty   Subjective   Subjective Patient reports mild pain today  Sit to Stand   Type of Assistance Needed Physical assistance;Verbal cues   Physical Assistance Level 25% or less   Comment VC for hand placement when standing   Sit to Stand CARE Score 3   Bed-Chair Transfer   Type of Assistance Needed Verbal cues; Physical assistance   Physical Assistance Level 25% or less   Comment VC for hand placement when standing   Chair/Bed-to-Chair Transfer CARE Score 3   Walk 10 Feet   Type of Assistance Needed Physical assistance   Physical Assistance Level 25% or less   Comment RW, landing in Mid-stane/forefoot on R, monitoring for L hip pain   Walk 10 Feet CARE Score 3   Walk 50 Feet with Two Turns   Type of Assistance Needed Physical assistance   Physical Assistance Level 25% or less   Comment RW, landing in Mid-stane/forefoot on R, monitoring for L hip pain   Walk 50 Feet with Two Turns CARE Score 3   Walk 150 Feet   Type of Assistance Needed Physical assistance   Physical Assistance Level 25% or less   Comment RW, landing in Mid-stane/forefoot on R, monitoring for L hip pain   Walk 150 Feet CARE Score 3   Walking 10 Feet on Uneven Surfaces   Reason if not Attempted Safety concerns   Walking 10 Feet on Uneven Surfaces CARE Score 88   Ambulation   Primary Mode of Locomotion Prior to Admission Walk   Distance Walked (feet) 350 ft  (150, 100, 100)   Assist Device Walker   Gait Pattern Antalgic; Inconsistant Radha; Step through; Decreased R stance; Improper weight shift   Limitations Noted In Balance; Endurance; Heel Strike; Safety;Strength;Device Management   Provided Assistance with: Balance;Weight Shift   Walk Assist Level Contact Guard   Does the patient walk? 2  Yes   Wheel 50 Feet with Two Turns   Reason if not Attempted Activity not applicable   Wheel 50 Feet with Two Turns CARE Score 9   Wheel 150 Feet   Reason if not Attempted Activity not applicable   Wheel 445 Feet CARE Score 9   Wheelchair mobility   Does the patient use a wheelchair? 0  No   Curb or Single Stair   Reason if not Attempted Safety concerns   1 Step (Curb) CARE Score 88   4 Steps   Reason if not Attempted Safety concerns   4 Steps CARE Score 88   12 Steps   Reason if not Attempted Safety concerns   12 Steps CARE Score 88   Therapeutic Interventions   Strengthening Seated, performed bilaterally 20 reps, 3 second holds: LAQ, heel/toe raises, seated hip adduction, seated hip abduction, seated marches; Standing heel raises- 20 reps, 3 second holds   Flexibility Seated Hamstring and gastroc stretching- 10'   Balance Sit stands throughout session- 20 reps 2 UE support   Neuromuscular Re-Education Step taps- 6" step, 20 reps each   Other Ambulation, transfers, mobility, and functional activities per objective   Assessment   Treatment Assessment Patient tolerated session well, noted no L hip pain during session  Patient tolerated all strengthening interventions well today, no adverse signs noted  Patient ambulates with a mid-foot strike on the R foot, patient educated on adequate heel-toe pattern to assist with restoration of adequate gait pattern   Patient given HEP of LAQ, seated heel raises, seated toe raises, seated marches, seated hip abduction, and ankle pumps to perform within pain tolerance and orthopedic restrictions, patient demonstrated adequate and pain free performance today and verbalized understanding with precautions  Patient requires skilled PT to maximize function  Family/Caregiver Present No   PT Family training done with: No   Problem List Decreased strength;Decreased range of motion;Decreased endurance;Decreased mobility; Impaired balance;Decreased coordination;Orthopedic restrictions;Pain   Barriers to Discharge Decreased caregiver support; Inaccessible home environment   PT Barriers   Physical Impairment Decreased strength;Decreased range of motion; Impaired balance;Decreased endurance;Decreased coordination;Decreased mobility;Orthopedic restrictions;Pain   Functional Limitation Car transfers; Ramp negotiation;Stair negotiation;Standing;Transfers; Walking   Plan   Treatment/Interventions Functional transfer training;ADL retraining;LE strengthening/ROM; Elevations; Therapeutic exercise; Endurance training;Patient/family training;Bed mobility;Gait training   Progress Progressing toward goals   PT Therapy Minutes   PT Time In 1000   PT Time Out 1130   PT Total Time (minutes) 90   PT Mode of treatment - Individual (minutes) 90   PT Mode of treatment - Concurrent (minutes) 0   PT Mode of treatment - Group (minutes) 0   PT Mode of treatment - Co-treat (minutes) 0   PT Mode of Treatment - Total time(minutes) 90 minutes   PT Cumulative Minutes 180   Therapy Time missed   Time missed?  No

## 2023-02-11 NOTE — PROGRESS NOTES
02/11/23 0700   Pain Assessment   Pain Assessment Tool 0-10   Pain Score 2   Pain Location/Orientation Orientation: Right;Location: Hip;Location: Leg   Pain Onset/Description Onset: Ongoing; Descriptor: [address] Pain Intervention(s) Repositioned; Rest   Restrictions/Precautions   Precautions Fall Risk;Pain   Weight Bearing Restrictions Yes   RUE Weight Bearing Per Order WBAT   LUE Weight Bearing Per Order WBAT   RLE Weight Bearing Per Order WBAT   LLE Weight Bearing Per Order WBAT  (no WBing restrictions, however, if pt reports LLE pain, notify ortho and nsg ASAP)   ROM Restrictions No   Braces or Orthoses   (B/L knee-high TEDs)   Eating   Type of Assistance Needed Set-up / clean-up   Physical Assistance Level No physical assistance   Comment S/U A for breakfast meal while seated in recliner at end of OT session   Eating CARE Score 5   Oral Hygiene   Type of Assistance Needed Set-up / clean-up   Physical Assistance Level No physical assistance   Comment S/U A for partial denture care while seated at sink 2* BLE fatigue   Oral Hygiene CARE Score 5   Grooming   Able To Comb/Brush Hair;Wash/Dry Face;Brush/Clean Teeth;Wash/Dry Hands   Limitation Noted In Strength;Timeliness   Findings S/U A for all grooming tasks while seated at sink 2* BLE fatigue with prolonged stance   Shower/Bathe Self   Type of Assistance Needed Physical assistance   Physical Assistance Level 25% or less   Comment Pt engaged in sponge bath at sink, able to wash 9/10 parts, requiring A to wash R lower leg/foot 2* RLE pain with dynamic reach  Pt washed UB, B/L upper legs, and LLE while seated, using xleg technique to wash L foot  Nicolle in stance with unilateral UE support on sink ledge while pt washed hua/rear, no LOB     Shower/Bathe Self CARE Score 3   Bathing   Assessed Bath Style Sponge Bath   Able to Gather/Transport No   Able to [employer] Temperature Yes   Able to Wash/Rinse/Dry (body part) Left Arm;Right Arm;L Upper Leg;R Upper Leg;L Lower Leg/Foot;Chest;Abdomen;Perineal Area; Buttocks   Limitations Noted in Balance; Endurance;ROM;Strength;Timeliness   Positioning Seated;Standing   Tub/Shower Transfer   Reason Not Assessed Sponge Bath;Medical   Upper Body Dressing   Type of Assistance Needed Set-up / clean-up   Physical Assistance Level No physical assistance   Comment seated to don bra and [place] shirt   Upper Body Dressing CARE Score 5   Lower Body Dressing   Type of Assistance Needed Physical assistance   Physical Assistance Level 25% or less   Comment seated with increased time, pt able to thread BLEs through underwear and pants using dynamic reach, with pt demo G carryover of prior session's edu to thread RLE first (without cueing)  Nicolle in stance for balance with unilateral UE support on sink ledge while pt completed CM up over hips, no LOB   Lower Body Dressing CARE Score 3   Putting On/Taking Off Footwear   Type of Assistance Needed Physical assistance   Physical Assistance Level 51%-75%   Comment seated using xleg technique to don L shoe, continues to require A to don R shoe  Pt dependent to don B/L knee-high TEDs   Putting On/Taking Off Footwear CARE Score 2   Dressing/Undressing Clothing   Remove UB Clothes   (hospital gown)   Don UB Clothes Pullover Shirt;Bra   Don LB Clothes Pants; Undergarment;TEDs; Shoes   Limitations Noted In Balance; Endurance;Strength;ROM; Timeliness   Positioning Supported Sit;Standing   Lying to Sitting on Side of Bed   Type of Assistance Needed Supervision   Physical Assistance Level No physical assistance   Comment increased time required for LE mgmt   Lying to Sitting on Side of Bed CARE Score 4   Sit to Stand   Type of Assistance Needed Physical assistance;Verbal cues   Physical Assistance Level 25% or less   Comment vc's for hand placement w/RW   Sit to Stand CARE Score 3   Bed-Chair Transfer   Type of Assistance Needed Physical assistance;Verbal cues   Physical Assistance Level 25% or less   Comment short-distance fxl mob w/RW, vc's for hand placement and RW mgmt   Chair/Bed-to-Chair Transfer CARE Score 3   Cognition   Overall Cognitive Status WFL   Arousal/Participation Alert; Cooperative   Attention Within functional limits   Orientation Level Oriented X4   Memory Within functional limits   Following Commands Follows all commands and directions without difficulty   Activity Tolerance   Activity Tolerance Patient tolerated treatment well   Assessment   Treatment Assessment Pt seen for 90min skilled OT session focused on ADL skills retraining (sponge bath and dressing at sink), RW mgmt, short-distance fxl mob w/RW, and grooming tasks, for increased independence w/ADLs/IADLs and decreased caregiver burden  See detailed descriptions of fxl performance above  Pt tolerated session well but did require increased time for bed mobility and STS/transfers 2* RLE pain and aching/discomfort  Pt completed portions of sponge bathing and dressing seated in w/c at sink 2* RLE pain and BLE fatigue  Pt denied LLE pain t/o session  BP monitored supine 115/58 HR 70bpm, seated /67 HR 75bpm, and in stance 140/72 HR 75bpm  Pt continues to be limited by decreased strength, endurance, balance, RLE pain, RLE edema, and activity tolerance  Pt would benefit from continued skilled OT focused on ADL retraining (vinny LB ADLs/ footwear mgmt), LHAE training as needed, DME edu, carryover of hand placement during fxl transfers, RW mgmt/safety, UE strengthening, endurance work, and dynamic standing balance  Prognosis Good   Problem List Decreased strength;Decreased range of motion;Decreased endurance; Impaired balance;Decreased mobility; Decreased coordination;Orthopedic restrictions;Pain   Barriers to Discharge Inaccessible home environment;Decreased caregiver support   Plan   Treatment/Interventions ADL retraining;Functional transfer training; Therapeutic exercise; Endurance training;Patient/family training;Equipment eval/education; Bed mobility; Compensatory technique education   Progress Progressing toward goals   Recommendation   OT Discharge Recommendation   (pending pt progress)   OT Therapy Minutes   OT Time In 0700   OT Time Out 0830   OT Total Time (minutes) 90   OT Mode of treatment - Individual (minutes) 90   OT Mode of treatment - Concurrent (minutes) 0   OT Mode of treatment - Group (minutes) 0   OT Mode of treatment - Co-treat (minutes) 0   OT Mode of Treatment - Total time(minutes) 90 minutes   OT Cumulative Minutes 180   Therapy Time missed   Time missed?  No

## 2023-02-11 NOTE — PLAN OF CARE
Problem: PAIN - ADULT  Goal: Verbalizes/displays adequate comfort level or baseline comfort level  Description: Interventions:  - Encourage patient to monitor pain and request assistance  - Assess pain using appropriate pain scale  - Administer analgesics based on type and severity of pain and evaluate response  - Implement non-pharmacological measures as appropriate and evaluate response  - Consider cultural and social influences on pain and pain management  - Notify physician/advanced practitioner if interventions unsuccessful or patient reports new pain  Outcome: Progressing     Problem: INFECTION - ADULT  Goal: Absence or prevention of progression during hospitalization  Description: INTERVENTIONS:  - Assess and monitor for signs and symptoms of infection  - Monitor lab/diagnostic results  - Monitor all insertion sites, i e  indwelling lines, tubes, and drains  - Monitor endotracheal if appropriate and nasal secretions for changes in amount and color  - [place] appropriate cooling/warming therapies per order  - Administer medications as ordered  - Instruct and encourage patient and family to use good hand hygiene technique  - Identify and instruct in appropriate isolation precautions for identified infection/condition  Outcome: Progressing  Goal: Absence of fever/infection during neutropenic period  Description: INTERVENTIONS:  - Monitor WBC    Outcome: Progressing     Problem: SAFETY ADULT  Goal: Patient will remain free of falls  Description: INTERVENTIONS:  - Educate patient/family on patient safety including physical limitations  - Instruct patient to call for assistance with activity   - Consult OT/PT to assist with strengthening/mobility   - Keep Call bell within reach  - Keep bed low and locked with side rails adjusted as appropriate  - Keep care items and personal belongings within reach  - Initiate and maintain comfort rounds  - Make Fall Risk Sign visible to staff  - Offer Toileting every 2 Hours, in advance of need  - Initiate/Maintain bed/chair alarm  - Obtain necessary fall risk management equipment: non skid footwear  - Apply yellow socks and bracelet for high fall risk patients  - Consider moving patient to room near nurses station  Outcome: Progressing  Goal: Maintain or return to baseline ADL function  Description: INTERVENTIONS:  -  Assess patient's ability to carry out ADLs; assess patient's baseline for ADL function and identify physical deficits which impact ability to perform ADLs (bathing, care of mouth/teeth, toileting, grooming, dressing, etc )  - Assess/evaluate cause of self-care deficits   - Assess range of motion  - Assess patient's mobility; develop plan if impaired  - Assess patient's need for assistive devices and provide as appropriate  - Encourage maximum independence but intervene and supervise when necessary  - Involve family in performance of ADLs  - Assess for home care needs following discharge   - Consider OT consult to assist with ADL evaluation and planning for discharge  - Provide patient education as appropriate  Outcome: Progressing  Goal: Maintains/Returns to pre admission functional level  Description: INTERVENTIONS:  - Perform BMAT or MOVE assessment daily    - Set and communicate daily mobility goal to care team and patient/family/caregiver  - Collaborate with rehabilitation services on mobility goals if consulted  - Perform Range of Motion 3 times a day  - Reposition patient every 2 hours    - Dangle patient 3 times a day  - Stand patient 3 times a day  - Ambulate patient 3 times a day  - Out of bed to chair 3 times a day   - Out of bed for meals 3 times a day  - Out of bed for toileting  - Record patient progress and toleration of activity level   Outcome: Progressing     Problem: DISCHARGE PLANNING  Goal: Discharge to home or other facility with appropriate resources  Description: INTERVENTIONS:  - Identify barriers to discharge w/patient and caregiver  - Arrange for needed discharge resources and transportation as appropriate  - Identify discharge learning needs (meds, wound care, etc )  - Arrange for interpretive services to assist at discharge as needed  - Refer to Case Management Department for coordinating discharge planning if the patient needs post-hospital services based on physician/advanced practitioner order or complex needs related to functional status, cognitive ability, or social support system  Outcome: Progressing     Problem: Prexisting or High Potential for Compromised Skin Integrity  Goal: Skin integrity is maintained or improved  Description: INTERVENTIONS:  - Identify patients at risk for skin breakdown  - Assess and monitor skin integrity  - Assess and monitor nutrition and hydration status  - Monitor labs   - Assess for incontinence   - Turn and reposition patient  - Assist with mobility/ambulation  - Relieve pressure over bony prominences  - Avoid friction and shearing  - Provide appropriate hygiene as needed including keeping skin clean and dry  - Evaluate need for skin moisturizer/barrier cream  - Collaborate with interdisciplinary team   - Patient/family teaching  - Consider wound care consult   Outcome: Progressing

## 2023-02-11 NOTE — PROGRESS NOTES
Internal Medicine Progress Note  Patient: Leslie Sofia  Age/sex: 69 y o  female  Medical Record #: [id number]      ASSESSMENT/PLAN: (Interval History)  Leslie Sofia is seen and examined and management for following issues:    Right displaced proximal femoral shaft fracture  • S/p IMN 2/3/23  • WBAT  • Per Ortho:  "Patient should not continue bisphosphonate use at this time given radiographic changes to left femur including lateral cortical thickening without obvious stress fracture/beaking  She needs to follow up immediately w ortho if she develops ambulatory pain in left thigh"   They discussed this with her  Left femur xray was negative fx 2/4     ABLA  • S/p 1 U PRBCs on 2/8/23 for hemoglobin of 7 3  • Hemoglobin stable 2/9/23  • BMP 2/13/23     Dizziness/syncope  • Related to orthostasis and blood loss  • Was given IVF and blood  • Will watch for orthostasis while here = so far no sx     Hypothyroidism   • Continue levothyroxine  • Has a thyroid nodule found incidentally for which an US will need to be done as an OP     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 vs infundibulum  • To see as a followup in Neurovascular center     Old lacunar infarcts   • Found incidentally on imaging  • On statin        Discharge date:  Team    The above assessment and plan was reviewed and updated as determined by my evaluation of the patient on 2/11/2023  Labs:   Results from last 7 days   Lab Units 02/09/23  0621 02/08/23 2009 02/08/23  0438 02/07/23  2331 02/07/23  0537   WBC Thousand/uL  --   --  9 77  --  10 33*   HEMOGLOBIN g/dL 9 4* 9 6* 7 3*   < > 7 6*   HEMATOCRIT % 29 1* 29 6* 23 0*   < > 24 4*   PLATELETS Thousands/uL  --   --  266  --  252    < > = values in this interval not displayed       Results from last 7 days   Lab Units 02/07/23  0537 02/06/23  0933 02/05/23  1503   SODIUM mmol/L 142 140  --    POTASSIUM mmol/L 3 7 3 6  --    CHLORIDE mmol/L 110* 108  --    CO2 mmol/L 27 26  --    CO2, I-STAT mmol/L  --   --  24 BUN mg/dL 19 22  --    CREATININE mg/dL 0 50* 0 79  --    GLUCOSE, ISTAT mg/dl  --   --  145*   CALCIUM mg/dL 8 0* 8 4  --              Results from last 7 days   Lab Units 02/05/23  1444   POC GLUCOSE mg/dl 125       Review of Scheduled Meds:  Current Facility-Administered Medications   Medication Dose Route Frequency Provider Last Rate   • acetaminophen  975 mg Oral Q6H SANTI Bobo     • artificial tear   Both Eyes HS PRN SANTI Mendiola     • bisacodyl  10 mg Rectal Daily PRN SANTI Bobo     • enoxaparin  30 mg Subcutaneous Q12H [address] SANTI Bobo     • glycerin-hypromellose-  1 drop Both Eyes TID SANTI Bobo     • levothyroxine  25 mcg Oral Daily SANTI Bobo     • meclizine  12 5 mg Oral Q8H PRN SANTI Bobo     • oxyCODONE  2 5 mg Oral Q4H PRN SANTI Bobo     • oxyCODONE  5 mg Oral Q4H PRN SANTI Bobo     • polyethylene glycol  17 g Oral Daily PRN SANTI Bobo     • pravastatin  20 mg Oral Daily With SANTI Sneed     • senna-docusate sodium  1 tablet Oral HS PRN SANTI Bobo         Subjective/ HPI: Patient seen and examined  Patients overnight issues or events were reviewed with nursing or staff during rounds or morning huddle session  New or overnight issues include the following:     No new or overnight issues  Offers no complaints    ROS:   A 10 point ROS was performed; negative except as noted above         Imaging:     No orders to display       *Labs /Radiology studies reviewed  *Medications reviewed and reconciled as needed  *Please refer to order section for additional ordered labs studies  *Case discussed with primary attending during morning huddle case rounds    Physical Examination:  Vitals:   Vitals:    02/10/23 1015 02/10/23 1359 02/10/23 2024 02/11/23 0521   BP: 129/63 117/69 143/69 141/65   BP Location: Left arm Right arm Left arm Left arm   Pulse:  83 72 73   Resp:  19 17 16   Temp:  98 5 °F (36 9 °C) 98 5 °F (36 9 °C) 98 8 °F (37 1 °C)   TempSrc:  Oral Oral Oral   SpO2:  98% 96% 96%   Weight:       Height:           General Appearance: no distress, conversive  HEENT: PERRLA, conjuctiva normal; oropharynx clear; mucous membranes moist   Neck:  Supple, normal ROM  Lungs: CTA, normal respiratory effort, no retractions, expiratory effort normal  CV: regular rate and rhythm; no rubs/murmurs/gallops, PMI normal   ABD: soft; ND/NT; +BS  EXT: very mild lower right leg edema  Skin: normal turgor, normal texture, no rashes  Psych: affect normal, mood normal  Neuro: AAO      The above physical exam was reviewed and updated as determined by my evaluation of the patient on 2/11/2023  Invasive Devices     None                    VTE Pharmacologic Prophylaxis: Enoxaparin  Code Status: Level 1 - Full Code  Current Length of Stay: 2 day(s)      Total time spent:  30 minutes with more than 50% spent counseling/coordinating care  Counseling includes discussion with patient re: progress  and discussion with patient of his/her current medical state/information  Coordination of patient's care was performed in conjunction with primary service  Time invested included review of patient's labs, vitals, and management of their comorbidities with continued monitoring  In addition, this patient was discussed with medical team including physician and advanced extenders  The care of the patient was extensively discussed and appropriate treatment plan was formulated unique for this patient  Medical decision making for the day was made by supervising physician unless otherwise noted in their attestation statement  ** Please Note:  voice to text software may have been used in the creation of this document   Although proof errors in transcription or interpretation are a potential of such software**

## 2023-02-11 NOTE — PLAN OF CARE
Problem: PAIN - ADULT  Goal: Verbalizes/displays adequate comfort level or baseline comfort level  Description: Interventions:  - Encourage patient to monitor pain and request assistance  - Assess pain using appropriate pain scale  - Administer analgesics based on type and severity of pain and evaluate response  - Implement non-pharmacological measures as appropriate and evaluate response  - Consider cultural and social influences on pain and pain management  - Notify physician/advanced practitioner if interventions unsuccessful or patient reports new pain  Outcome: Progressing     Problem: INFECTION - ADULT  Goal: Absence or prevention of progression during hospitalization  Description: INTERVENTIONS:  - Assess and monitor for signs and symptoms of infection  - Monitor lab/diagnostic results  - Monitor all insertion sites, i e  indwelling lines, tubes, and drains  - Monitor endotracheal if appropriate and nasal secretions for changes in amount and color  - [place] appropriate cooling/warming therapies per order  - Administer medications as ordered  - Instruct and encourage patient and family to use good hand hygiene technique  - Identify and instruct in appropriate isolation precautions for identified infection/condition  Outcome: Progressing     Problem: Prexisting or High Potential for Compromised Skin Integrity  Goal: Skin integrity is maintained or improved  Description: INTERVENTIONS:  - Identify patients at risk for skin breakdown  - Assess and monitor skin integrity  - Assess and monitor nutrition and hydration status  - Monitor labs   - Assess for incontinence   - Turn and reposition patient  - Assist with mobility/ambulation  - Relieve pressure over bony prominences  - Avoid friction and shearing  - Provide appropriate hygiene as needed including keeping skin clean and dry  - Evaluate need for skin moisturizer/barrier cream  - Collaborate with interdisciplinary team   - Patient/family teaching  - Consider wound care consult   Outcome: Progressing

## 2023-02-12 ENCOUNTER — APPOINTMENT (OUTPATIENT)
Dept: RADIOLOGY | Facility: HOSPITAL | Age: 78
End: 2023-02-12

## 2023-02-12 RX ORDER — ACETAMINOPHEN 325 MG/1
975 TABLET ORAL 3 TIMES DAILY
Status: DISCONTINUED | OUTPATIENT
Start: 2023-02-12 | End: 2023-02-23 | Stop reason: HOSPADM

## 2023-02-12 RX ADMIN — GLYCERIN 1 DROP: .002; .002; .01 SOLUTION/ DROPS OPHTHALMIC at 08:36

## 2023-02-12 RX ADMIN — ACETAMINOPHEN 975 MG: 325 TABLET, FILM COATED ORAL at 13:06

## 2023-02-12 RX ADMIN — ACETAMINOPHEN 975 MG: 325 TABLET, FILM COATED ORAL at 20:09

## 2023-02-12 RX ADMIN — PRAVASTATIN SODIUM 20 MG: 20 TABLET ORAL at 16:07

## 2023-02-12 RX ADMIN — GLYCERIN 1 DROP: .002; .002; .01 SOLUTION/ DROPS OPHTHALMIC at 16:08

## 2023-02-12 RX ADMIN — ACETAMINOPHEN 975 MG: 325 TABLET, FILM COATED ORAL at 00:17

## 2023-02-12 RX ADMIN — ENOXAPARIN SODIUM 30 MG: 30 INJECTION SUBCUTANEOUS at 08:36

## 2023-02-12 RX ADMIN — GLYCERIN 1 DROP: .002; .002; .01 SOLUTION/ DROPS OPHTHALMIC at 20:09

## 2023-02-12 RX ADMIN — ACETAMINOPHEN 975 MG: 325 TABLET, FILM COATED ORAL at 06:20

## 2023-02-12 RX ADMIN — ENOXAPARIN SODIUM 30 MG: 30 INJECTION SUBCUTANEOUS at 20:09

## 2023-02-12 RX ADMIN — LEVOTHYROXINE SODIUM 25 MCG: 25 TABLET ORAL at 20:09

## 2023-02-12 NOTE — PCC NURSING
Right displaced proximal femoral shaft fracture  S/p IMN 2/3/23  WBAT  Per Ortho:  "Patient should not continue bisphosphonate use at this time given radiographic changes to left femur including lateral cortical thickening without obvious stress fracture/beaking  She needs to follow up immediately w ortho if she develops ambulatory pain in left thigh"  They discussed this with her  Left femur xray was negative fx 2/4/23     ABLA  S/p 1 U PRBCs on 2/8/23 for hemoglobin of 7 3 with improvement to 9 9  stable     Dizziness/syncope  Related to orthostasis and blood loss  Was given IVF and blood  Watching for orthostasis while here = so far no sx of such and BPs have been stable  She reports that she gets dizziness at home when she is on her right side in bed and sits up = will d/w Dr Joselyn Farmer     Hypothyroidism   Continue levothyroxine  Has a thyroid nodule found incidentally for which an US will need to be done as an OP     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 vs infundibulum  To see as a followup in Neurovascular center     Old lacunar infarcts   Found incidentally on imaging  On statin    Right knee pain  Xray negative 2/12/23  Ortho saw = ACE wrap and ice  Pain and swelling improved      Discharge date:  2/23/23    This week we will continue to encourage independence with ADLs  We will continue to monitor labs and vital signs  We will continue to educate pt about repositioning to prevent skin breakdown  We will continue to assist with repositioning and perform routine skin checks  We will continue to monitor for constipation and medicate as ordered  We will continue to monitor for adequate pain control  We will continue to increase safety awareness with transfers and keep pt free from falls, pt now IRP

## 2023-02-12 NOTE — CONSULTS
Progress Note - Orthopedics   Baljinder Bring 69 y o  female MRN: [id number]  Unit/Bed#: -01      Subjective:    69 y  o female s/p R FRN for atypical subtrochanteric femur fx (DOS 2/3/23)  She is currently admitted to [facility] for postoperative rehab  She is ambulating with a walker  Ortho was reconsulted for swelling about the lateral knee  She says is been swollen since surgery  Otherwise patient doing well  Pain well controlled  Denies fevers, chills, CP, SOB, Vomiting, numbness or tingling      Labs:  0   Lab Value Date/Time    HCT 29 1 (L) 02/09/2023 0621    HCT 29 6 (L) 02/08/2023 2009    HCT 23 0 (L) 02/08/2023 0438    HGB 9 4 (L) 02/09/2023 0621    HGB 9 6 (L) 02/08/2023 2009    HGB 7 3 (L) 02/08/2023 0438    INR 1 05 02/03/2023 0550    WBC 9 77 02/08/2023 0438    WBC 10 33 (H) 02/07/2023 0537    WBC 11 92 (H) 02/06/2023 0933       Meds:    Current Facility-Administered Medications:   •  acetaminophen (TYLENOL) tablet 975 mg, 975 mg, Oral, TID, Lucy Mari MD, 975 mg at 02/12/23 1306  •  artificial tear (LUBRIFRESH P M ) ophthalmic ointment, , Both Eyes, HS PRN, SANTI Saenz, Given at 02/09/23 1746  •  bisacodyl (DULCOLAX) rectal suppository 10 mg, 10 mg, Rectal, Daily PRN, SANTI Bobo  •  enoxaparin (LOVENOX) subcutaneous injection 30 mg, 30 mg, Subcutaneous, Q12H JAQUAN, SANTI Bobo, 30 mg at 02/12/23 0836  •  glycerin-hypromellose- (ARTIFICIAL TEARS) ophthalmic solution 1 drop, 1 drop, Both Eyes, TID, SANTI Bobo, 1 drop at 02/12/23 1769  •  levothyroxine tablet 25 mcg, 25 mcg, Oral, Daily, SANTI Bobo, 25 mcg at 02/11/23 2054  •  meclizine (ANTIVERT) tablet 12 5 mg, 12 5 mg, Oral, Q8H PRN, SANTI Bobo  •  oxyCODONE (ROXICODONE) IR tablet 2 5 mg, 2 5 mg, Oral, Q4H PRN, SANTI Bobo  •  oxyCODONE (ROXICODONE) IR tablet 5 mg, 5 mg, Oral, Q4H PRN, SANTI Bobo  •  polyethylene glycol (MIRALAX) packet 17 g, 17 g, Oral, Daily PRN, SANTI Bobo  •  pravastatin (PRAVACHOL) tablet 20 mg, 20 mg, Oral, Daily With Dinner, SANTI Guevara, 20 mg at 02/11/23 1700  •  senna-docusate sodium (SENOKOT S) 8 6-50 mg per tablet 1 tablet, 1 tablet, Oral, HS PRN, SANTI Bobo    Blood Culture:   No results found for: BLOODCX    Wound Culture:   No results found for: WOUNDCULT    Ins and Outs:  I/O last 24 hours: In: 780 [P O :780]  Out: -           Physical:  Vitals:    02/12/23 1400   BP: 130/66   Pulse: 62   Resp: 16   Temp:    SpO2: 97%     Musculoskeletal: right Lower Extremity  · Skin with ecchymosis about the lateral thigh and knee  No erythema or drainage  · Dressings removed, staples intact  · NTTP    · FROM of hip and knee with no pain  · Mild knee effusion  · Sensation intact to saphenous, sural, tibial, superficial peroneal nerve, and deep peroneal  · Motor intact to +FHL/EHL, +ankle dorsi/plantar flexion  · 2+ DP pulse, symmetric bilaterally  Toes warm and well perfused  · Capillary refill < 2 seconds    Labs: hgb 9 4, INR 1, PTT 29    Assessment:    69 y  o female s/p right FRN for atypical subtrochanteric femur fracture (DOS 2/3) with subcutaneous hematoma over distal incision    Plan:  · ACE and ice over knee  · WBAT RLE  · Multimodal pain control  · Continue PT/OT    · DVT ppx with lovenox for 28 days postop  · No dsg needed  · Will remove staples friday  · Dispo ortho will follow    Kenn Anton MD

## 2023-02-12 NOTE — PROGRESS NOTES
02/12/23 0830   Pain Assessment   Pain Assessment Tool 0-10   Pain Score No Pain   Restrictions/Precautions   Precautions Fall Risk   RLE Weight Bearing Per Order WBAT   Cognition   Overall Cognitive Status WFL   Arousal/Participation Alert; Cooperative   Attention Within functional limits   Orientation Level Oriented X4   Memory Within functional limits   Following Commands Follows all commands and directions without difficulty   Comments Talkative, storytells, need redirection at times   Pleasant and highly cooperative   Subjective   Subjective Pt reports minimal pain and having tylenol at 6am    Sit to Lying   Type of Assistance Needed Incidental touching   Physical Assistance Level No physical assistance   Comment self A R LE   Sit to Lying CARE Score 4   Sit to Stand   Type of Assistance Needed Incidental touching   Physical Assistance Level No physical assistance   Comment with RW   Sit to Stand CARE Score 4   Bed-Chair Transfer   Type of Assistance Needed Incidental touching   Physical Assistance Level No physical assistance   Comment with RW   Chair/Bed-to-Chair Transfer CARE Score 4   Walk 10 Feet   Type of Assistance Needed Incidental touching   Physical Assistance Level No physical assistance   Walk 10 Feet CARE Score 4   Walk 50 Feet with Two Turns   Type of Assistance Needed Incidental touching   Physical Assistance Level No physical assistance   Walk 50 Feet with Two Turns CARE Score 4   Walk 150 Feet   Type of Assistance Needed Incidental touching   Physical Assistance Level No physical assistance   Walk 150 Feet CARE Score 4   Walking 10 Feet on Uneven Surfaces   Type of Assistance Needed Incidental touching   Physical Assistance Level No physical assistance   Comment with RW over mat on floor   Walking 10 Feet on Uneven Surfaces CARE Score 4   Ambulation   Primary Mode of Locomotion Prior to Admission Walk   Distance Walked (feet) 100 ft  (150)   Assist Device Roller Walker   Gait Pattern Antalgic; Slow Grupo;Decreased R stance; Improper weight shift   Limitations Noted In Endurance; Heel Strike;Speed;Strength;Swing   Walk Assist Level Contact Guard;Close Supervision   Findings mildly antalgic however able to perform step through grupo with RW support  Does the patient walk? 2  Yes   Wheelchair mobility   Does the patient use a wheelchair? 0  No   Curb or Single Stair   Style negotiated Curb   Type of Assistance Needed Physical assistance;Verbal cues   Physical Assistance Level 26%-50%   Comment mod A with RW for 8inch curb step  1 Step (Curb) CARE Score 3   4 Steps   Type of Assistance Needed Physical assistance   Physical Assistance Level 26%-50%   Comment x4 6inch step, B HR, min/modA for balance and safety descending  4 Steps CARE Score 3   12 Steps   Comment pt does have FF at home, hopeful to sleep in bed upstairs at DC, R HR (split into 2 small rails)   Stairs   Type Stairs   # of Steps 4   Weight Bearing Precautions WBAT   Assist Devices Bilateral Rail   Findings min A for balance, cues for sequence  Picking Up Object   Type of Assistance Needed Incidental touching; Adaptive equipment   Physical Assistance Level No physical assistance   Comment CG with RW, and reacher for functional item pickup  Picking Up Object CARE Score 4   Therapeutic Interventions   Strengthening Supine R LE TE: SAQ, mod bridges, abd and heel slides with slide board, AA SLR x20 each  Flexibility seated manual passive DF/knee ext stretch B LE 5x15 sec each  Balance amb over mat on floor withRW; reacher and RW for item pickup  Assessment   Treatment Assessment Pt engaged in 90 min skilled PT intervention with focus on gait and  L LE strengthening as pt lives alone and needs to be mod I for DC home  Pt denies pain at rest  Dressing reinforced due to tape loosening, RN aware  Pt talkative and needs some redirection, however is highly pleasant and cooperative   Demonstrating good progress with gait and transfers including curb/stair trial  Pt hopeful to be able to go upstairs to her bed at DC  No c/o L hip pain t/o session  Use of cold pack to R hip at end of session in recliner, Quad sets added to bedside HEP  Continue per POC to maximize fucntional I to reach set mod I goals  Brother to check on pts RW at home, it was her mothers  Problem List Decreased strength;Decreased range of motion;Decreased endurance;Decreased mobility; Impaired balance;Decreased coordination;Orthopedic restrictions;Pain   Barriers to Discharge Decreased caregiver support; Inaccessible home environment   Barriers to Discharge Comments Lives alone, 2SH, 5 SHANDA   PT Barriers   Physical Impairment Decreased strength;Decreased range of motion; Impaired balance;Decreased endurance;Decreased coordination;Decreased mobility;Orthopedic restrictions;Pain   Functional Limitation Car transfers; Ramp negotiation;Stair negotiation;Standing;Transfers; Walking   Plan   Treatment/Interventions Functional transfer training;LE strengthening/ROM; Elevations; Therapeutic exercise; Endurance training;Patient/family training;Bed mobility;Gait training   Progress Progressing toward goals   Recommendation   PT Discharge Recommendation Home with home health rehabilitation   PT Equipment ordered pt has RW was mothers, to have brother get it out of attic  PT Therapy Minutes   PT Time In 0830   PT Time Out 1000   PT Total Time (minutes) 90   PT Mode of treatment - Individual (minutes) 90   PT Mode of treatment - Concurrent (minutes) 0   PT Mode of treatment - Group (minutes) 0   PT Mode of treatment - Co-treat (minutes) 0   PT Mode of Treatment - Total time(minutes) 90 minutes   PT Cumulative Minutes 270   Therapy Time missed   Time missed?  No

## 2023-02-12 NOTE — PLAN OF CARE
Reviewed    Problem: PAIN - ADULT  Goal: Verbalizes/displays adequate comfort level or baseline comfort level  Description: Interventions:  - Encourage patient to monitor pain and request assistance  - Assess pain using appropriate pain scale  - Administer analgesics based on type and severity of pain and evaluate response  - Implement non-pharmacological measures as appropriate and evaluate response  - Consider cultural and social influences on pain and pain management  - Notify physician/advanced practitioner if interventions unsuccessful or patient reports new pain  Outcome: Progressing     Problem: INFECTION - ADULT  Goal: Absence or prevention of progression during hospitalization  Description: INTERVENTIONS:  - Assess and monitor for signs and symptoms of infection  - Monitor lab/diagnostic results  - Monitor all insertion sites, i e  indwelling lines, tubes, and drains  - Monitor endotracheal if appropriate and nasal secretions for changes in amount and color  - [place] appropriate cooling/warming therapies per order  - Administer medications as ordered  - Instruct and encourage patient and family to use good hand hygiene technique  - Identify and instruct in appropriate isolation precautions for identified infection/condition  Outcome: Progressing  Goal: Absence of fever/infection during neutropenic period  Description: INTERVENTIONS:  - Monitor WBC    Outcome: Progressing     Problem: SAFETY ADULT  Goal: Patient will remain free of falls  Description: INTERVENTIONS:  - Educate patient/family on patient safety including physical limitations  - Instruct patient to call for assistance with activity   - Consult OT/PT to assist with strengthening/mobility   - Keep Call bell within reach  - Keep bed low and locked with side rails adjusted as appropriate  - Keep care items and personal belongings within reach  - Initiate and maintain comfort rounds  - Make Fall Risk Sign visible to staff  - Offer Toileting every 4 Hours, in advance of need  - Apply yellow socks and bracelet for high fall risk patients  - Consider moving patient to room near nurses station  Outcome: Progressing  Goal: Maintain or return to baseline ADL function  Description: INTERVENTIONS:  -  Assess patient's ability to carry out ADLs; assess patient's baseline for ADL function and identify physical deficits which impact ability to perform ADLs (bathing, care of mouth/teeth, toileting, grooming, dressing, etc )  - Assess/evaluate cause of self-care deficits   - Assess range of motion  - Assess patient's mobility; develop plan if impaired  - Assess patient's need for assistive devices and provide as appropriate  - Encourage maximum independence but intervene and supervise when necessary  - Involve family in performance of ADLs  - Assess for home care needs following discharge   - Consider OT consult to assist with ADL evaluation and planning for discharge  - Provide patient education as appropriate  Outcome: Progressing  Goal: Maintains/Returns to pre admission functional level  Description: INTERVENTIONS:  - Set and communicate daily mobility goal to care team and patient/family/caregiver     - Collaborate with rehabilitation services on mobility goals if consulted  - Out of bed for toileting  - Record patient progress and toleration of activity level   Outcome: Progressing     Problem: DISCHARGE PLANNING  Goal: Discharge to home or other facility with appropriate resources  Description: INTERVENTIONS:  - Identify barriers to discharge w/patient and caregiver  - Arrange for needed discharge resources and transportation as appropriate  - Identify discharge learning needs (meds, wound care, etc )  - Arrange for interpretive services to assist at discharge as needed  - Refer to Case Management Department for coordinating discharge planning if the patient needs post-hospital services based on physician/advanced practitioner order or complex needs related to functional status, cognitive ability, or social support system  Outcome: Progressing     Problem: Prexisting or High Potential for Compromised Skin Integrity  Goal: Skin integrity is maintained or improved  Description: INTERVENTIONS:  - Identify patients at risk for skin breakdown  - Assess and monitor skin integrity  - Assess and monitor nutrition and hydration status  - Monitor labs   - Assess for incontinence   - Turn and reposition patient  - Assist with mobility/ambulation  - Relieve pressure over bony prominences  - Avoid friction and shearing  - Provide appropriate hygiene as needed including keeping skin clean and dry  - Evaluate need for skin moisturizer/barrier cream  - Collaborate with interdisciplinary team   - Patient/family teaching  - Consider wound care consult   Outcome: Progressing

## 2023-02-12 NOTE — PROGRESS NOTES
Internal Medicine Progress Note  Patient: Sofiya Oconnell  Age/sex: 69 y o  female  Medical Record #: [id number]      ASSESSMENT/PLAN: (Interval History)  Sofiya Oconnell is seen and examined and management for following issues:    Right displaced proximal femoral shaft fracture  • S/p IMN 2/3/23  • WBAT  • Per Ortho:  "Patient should not continue bisphosphonate use at this time given radiographic changes to left femur including lateral cortical thickening without obvious stress fracture/beaking  She needs to follow up immediately w ortho if she develops ambulatory pain in left thigh"   They discussed this with her  Left femur xray was negative fx 2/4     ABLA  • S/p 1 U PRBCs on 2/8/23 for hemoglobin of 7 3  • Hemoglobin stable 2/9/23  • BMP 2/13/23     Dizziness/syncope  • Related to orthostasis and blood loss  • Was given IVF and blood  • Watching for orthostasis while here = so far no sx of such and BPs have been stable     Hypothyroidism   • Continue levothyroxine  • Has a thyroid nodule found incidentally for which an US will need to be done as an OP     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 vs infundibulum  • To see as a followup in Neurovascular center     Old lacunar infarcts   • Found incidentally on imaging  • On statin        Discharge date:  Team    The above assessment and plan was reviewed and updated as determined by my evaluation of the patient on 2/12/2023  Labs:   Results from last 7 days   Lab Units 02/09/23  0621 02/08/23 2009 02/08/23  0438 02/07/23  2331 02/07/23  0537   WBC Thousand/uL  --   --  9 77  --  10 33*   HEMOGLOBIN g/dL 9 4* 9 6* 7 3*   < > 7 6*   HEMATOCRIT % 29 1* 29 6* 23 0*   < > 24 4*   PLATELETS Thousands/uL  --   --  266  --  252    < > = values in this interval not displayed       Results from last 7 days   Lab Units 02/07/23  0537 02/06/23  0933 02/05/23  1503   SODIUM mmol/L 142 140  --    POTASSIUM mmol/L 3 7 3 6  --    CHLORIDE mmol/L 110* 108  --    CO2 mmol/L 27 26  -- CO2, I-STAT mmol/L  --   --  24   BUN mg/dL 19 22  --    CREATININE mg/dL 0 50* 0 79  --    GLUCOSE, ISTAT mg/dl  --   --  145*   CALCIUM mg/dL 8 0* 8 4  --              Results from last 7 days   Lab Units 02/05/23  1444   POC GLUCOSE mg/dl 125       Review of Scheduled Meds:  Current Facility-Administered Medications   Medication Dose Route Frequency Provider Last Rate   • acetaminophen  975 mg Oral TID Josse Franklin MD     • artificial tear   Both Eyes HS PRN SANTI Ravi     • bisacodyl  10 mg Rectal Daily PRN SANTI Ravi     • enoxaparin  30 mg Subcutaneous Q12H [facility] SANTI Bobo     • glycerin-hypromellose-  1 drop Both Eyes TID SANTI Ravi     • levothyroxine  25 mcg Oral Daily SANTI Bobo     • meclizine  12 5 mg Oral Q8H PRN SANTI Ravi     • oxyCODONE  2 5 mg Oral Q4H PRN SANTI Ravi     • oxyCODONE  5 mg Oral Q4H PRN SANTI Bobo     • polyethylene glycol  17 g Oral Daily PRN SANTI Bobo     • pravastatin  20 mg Oral Daily With SANTI Sneed     • senna-docusate sodium  1 tablet Oral HS PRN SANTI Bobo         Subjective/ HPI: Patient seen and examined  Patients overnight issues or events were reviewed with nursing or staff during rounds or morning huddle session  New or overnight issues include the following:     No new or overnight issues  Offers no complaints    ROS:   A 10 point ROS was performed; negative except as noted above         Imaging:     No orders to display       *Labs /Radiology studies reviewed  *Medications reviewed and reconciled as needed  *Please refer to order section for additional ordered labs studies  *Case discussed with primary attending during morning huddle case rounds    Physical Examination:  Vitals:   Vitals:    02/10/23 2024 02/11/23 0521 02/11/23 1350 02/12/23 0446   BP: 143/69 141/65 136/63 137/64   BP Location: Left arm Left arm Left arm Left arm   Pulse: 72 73 82 63   Resp: 17 16 20 18   Temp: 98 5 °F (36 9 °C) 98 8 °F (37 1 °C) 99 °F (37 2 °C) 98 9 °F (37 2 °C)   TempSrc: Oral Oral Oral Oral   SpO2: 96% 96%  97%   Weight:       Height:           General Appearance: no distress, conversive  HEENT:  External ear normal   Nose normal w/o drainage  Mucous membranes are moist  Conjunctiva clear w/o icterus or redness  Neck:  Supple, normal ROM  Lungs: BBS without crackles/wheeze/rhonchi; respirations unlabored with normal inspiratory/expiratory effort  No retractions noted  On RA  CV: regular rate and rhythm; no rubs/murmurs/gallops, PMI normal   ABD: Abdomen is soft  Bowel sounds all quadrants  Nontender with no distention  EXT: tr RLE edema  Skin: normal turgor, normal texture, no rashes  Psych: affect normal, mood normal  Neuro: AAO      The above physical exam was reviewed and updated as determined by my evaluation of the patient on 2/12/2023  Invasive Devices     None                    VTE Pharmacologic Prophylaxis: Enoxaparin  Code Status: Level 1 - Full Code  Current Length of Stay: 3 day(s)      Total time spent:  30 minutes with more than 50% spent counseling/coordinating care  Counseling includes discussion with patient re: progress  and discussion with patient of his/her current medical state/information  Coordination of patient's care was performed in conjunction with primary service  Time invested included review of patient's labs, vitals, and management of their comorbidities with continued monitoring  In addition, this patient was discussed with medical team including physician and advanced extenders  The care of the patient was extensively discussed and appropriate treatment plan was formulated unique for this patient  Medical decision making for the day was made by supervising physician unless otherwise noted in their attestation statement  ** Please Note:  voice to text software may have been used in the creation of this document   Although proof errors in transcription or interpretation are a potential of such software**

## 2023-02-13 LAB
ANION GAP SERPL CALCULATED.3IONS-SCNC: 6 MMOL/L (ref 4–13)
BASOPHILS # BLD AUTO: 0.06 THOUSANDS/ÂΜL (ref 0–0.1)
BASOPHILS NFR BLD AUTO: 1 % (ref 0–1)
BUN SERPL-MCNC: 21 MG/DL (ref 5–25)
CALCIUM SERPL-MCNC: 8.6 MG/DL (ref 8.3–10.1)
CHLORIDE SERPL-SCNC: 111 MMOL/L (ref 96–108)
CO2 SERPL-SCNC: 25 MMOL/L (ref 21–32)
CREAT SERPL-MCNC: 0.52 MG/DL (ref 0.6–1.3)
EOSINOPHIL # BLD AUTO: 0.23 THOUSAND/ÂΜL (ref 0–0.61)
EOSINOPHIL NFR BLD AUTO: 3 % (ref 0–6)
ERYTHROCYTE [DISTWIDTH] IN BLOOD BY AUTOMATED COUNT: 18.6 % (ref 11.6–15.1)
GFR SERPL CREATININE-BSD FRML MDRD: 93 ML/MIN/1.73SQ M
GLUCOSE SERPL-MCNC: 88 MG/DL (ref 65–140)
HCT VFR BLD AUTO: 30.5 % (ref 34.8–46.1)
HGB BLD-MCNC: 9.4 G/DL (ref 11.5–15.4)
IMM GRANULOCYTES # BLD AUTO: 0.1 THOUSAND/UL (ref 0–0.2)
IMM GRANULOCYTES NFR BLD AUTO: 1 % (ref 0–2)
LYMPHOCYTES # BLD AUTO: 1.97 THOUSANDS/ÂΜL (ref 0.6–4.47)
LYMPHOCYTES NFR BLD AUTO: 23 % (ref 14–44)
MCH RBC QN AUTO: 29.8 PG (ref 26.8–34.3)
MCHC RBC AUTO-ENTMCNC: 30.8 G/DL (ref 31.4–37.4)
MCV RBC AUTO: 97 FL (ref 82–98)
MONOCYTES # BLD AUTO: 1.15 THOUSAND/ÂΜL (ref 0.17–1.22)
MONOCYTES NFR BLD AUTO: 14 % (ref 4–12)
NEUTROPHILS # BLD AUTO: 4.91 THOUSANDS/ÂΜL (ref 1.85–7.62)
NEUTS SEG NFR BLD AUTO: 58 % (ref 43–75)
NRBC BLD AUTO-RTO: 0 /100 WBCS
PLATELET # BLD AUTO: 462 THOUSANDS/UL (ref 149–390)
PMV BLD AUTO: 10.2 FL (ref 8.9–12.7)
POTASSIUM SERPL-SCNC: 3.6 MMOL/L (ref 3.5–5.3)
RBC # BLD AUTO: 3.15 MILLION/UL (ref 3.81–5.12)
SODIUM SERPL-SCNC: 142 MMOL/L (ref 135–147)
WBC # BLD AUTO: 8.42 THOUSAND/UL (ref 4.31–10.16)

## 2023-02-13 RX ORDER — LANOLIN ALCOHOL/MO/W.PET/CERES
3 CREAM (GRAM) TOPICAL
Status: DISCONTINUED | OUTPATIENT
Start: 2023-02-13 | End: 2023-02-23 | Stop reason: HOSPADM

## 2023-02-13 RX ADMIN — MELATONIN 3 MG: at 21:14

## 2023-02-13 RX ADMIN — GLYCERIN 1 DROP: .002; .002; .01 SOLUTION/ DROPS OPHTHALMIC at 21:19

## 2023-02-13 RX ADMIN — ENOXAPARIN SODIUM 30 MG: 30 INJECTION SUBCUTANEOUS at 08:51

## 2023-02-13 RX ADMIN — GLYCERIN 1 DROP: .002; .002; .01 SOLUTION/ DROPS OPHTHALMIC at 08:52

## 2023-02-13 RX ADMIN — GLYCERIN 1 DROP: .002; .002; .01 SOLUTION/ DROPS OPHTHALMIC at 16:05

## 2023-02-13 RX ADMIN — LEVOTHYROXINE SODIUM 25 MCG: 25 TABLET ORAL at 21:14

## 2023-02-13 RX ADMIN — ACETAMINOPHEN 975 MG: 325 TABLET, FILM COATED ORAL at 06:04

## 2023-02-13 RX ADMIN — ACETAMINOPHEN 975 MG: 325 TABLET, FILM COATED ORAL at 13:59

## 2023-02-13 RX ADMIN — ACETAMINOPHEN 975 MG: 325 TABLET, FILM COATED ORAL at 21:14

## 2023-02-13 RX ADMIN — ENOXAPARIN SODIUM 30 MG: 30 INJECTION SUBCUTANEOUS at 21:15

## 2023-02-13 RX ADMIN — PRAVASTATIN SODIUM 20 MG: 20 TABLET ORAL at 16:06

## 2023-02-13 NOTE — PROGRESS NOTES
PM&R PROGRESS NOTE:  Smith Pinto 69 y o  female MRN: [id number]  Unit/Bed#: -01 Encounter: [id number]        Rehabilitation Diagnosis: Impairment of mobility, safety and Activities of Daily Living (ADLs) due to Orthopedic Disorders:  08 2  Femur (Shaft) Fracture    HPI: Luke Layne is a 69 y o  female with a history of hypothyroid, osteoporosis, scoliosis, and hyperlipidemia that presented to [facility] on 2/2/23 with c/o right hip pain after feeling a pop in her right hip when descending stairs  She was unable to ambulate, lowered herself to the floor and crawled to phone to call EMS  On exam right lower extremity shortening and external rotation  Imaging showed acute transverse substantially displaced proximal femoral shaft fracture  Orthopedics was consulted and recommended NWB with Mane's traction, with surgical intervention  On 2/3, patient underwent right insertion IM nailing with Dr Claire Ryan  Patient is WBAT with Lovenox for 28 days for DVT prophylaxis  Post-op complicated by dizziness and an episode requiring her to be lowered to floor  CTA of head/neck was obtained, which showed no acute findings; did show outpouching of PCA  Neurovascular recommended follow-up as outpatient  Echo EF 65%, mild TVR and trace PVR  Patient's hemoglobin 7 3 which she received 1 unit pRBC improving to 9 4  Patient was deemed hemodynamically stable, PT/OT recommend inpatient acute rehabilitation  Patient was admitted to [address] [place] [address] on 02/09/23  SUBJECTIVE: Patient seen face to face  No acute issues overnight  Progressing as expected in rehabilitation  Reports intermittent sleeping, she is a light sleeper and awakens frequently as room mate snores and has frequent visitors through out the day  LBM 02/12, bowels have normalized per patient  Right knee swelling  No chest pain, shortness of breath, fever, chills, nausea, abdominal pain  ASSESSMENT: Stable, progressing    PLAN:  1   Monitor pain with therapy  2  Right knee- hematoma- light ace wrap, ice, and elevation  3  Lovenox- nursing to start teaching    Rehabilitation  • Functional deficits:  Self care, impaired mobility  • Continue current rehabilitation plan of care to maximize function      • Functional update:   o PT: mobility- incidental touching, transfers- incidental touching, ambulation- '  o OT: ADL- set-up UB, incidental touching LB; footwear- mod A; toileting CGA    • Estimated Discharge: anticipated 10-14 days    Pain  • tylenol    DVT prophylaxis  • Lovenox    Bladder plan  • Continent    Bowel plan  • Continent      * Femur fracture (HCC)  Assessment & Plan  - Pt felt "pop" when descending stairs with inability to ambulate  -X-ray: acute transverse substantially displaced proximal femoral shaft fracture  -NWB with bucks traction  -2/3 right IMN  -pain control  -WBAT right lowe extremity  - Lovenox for 28 days (3/3)  -po dizziness and diaphtetic; orthostatics normal, IVF, Hbg 7 3 pRBC x1  -Monitor incision for signs of infection  -2 week f/u 02/17  -Participate in comprehensive therapies 3 hr day, 5-6 days a week  -Follow-up with Orthopedic Surgery (Dr Taj Collier)    Acute pain due to trauma  Assessment & Plan  -acute post-op pain right hip/leg  -tylenol 975 mg scheduled, oxycodone IR 2 5-5 mg prn  -monitor pain with therapy  -adjust medication as needed    Acute blood loss anemia  Assessment & Plan  -post op dizziness, diaphoresis, lowered to floor  -IVF  -Hbg 7 3, received 1 unit pRBC  -current Hbg 9 4  -monitor hemoglobin  -consult IM for co-mgmt    Lightheadedness  Assessment & Plan  -post-op with ambulation  - orthostatic BP normal  -Hbg 7 3  -received IVF, 1 unit PRBC  -Stable    Osteoporosis  Assessment & Plan  -took bisphosphonate <10 years  -per Ortho: pt should stop bisphosphonate at this time d/t left femur lateral cortical thickening without obvious stress fracture/break  -needs immediate f/u if develops pain in left leg  -follow-up with Orthopedics    HLD (hyperlipidemia)  Assessment & Plan  -  -home: Crestor 10 mg  -continue Pravachol 20 mg    Hypothyroidism  Assessment & Plan  -continue levothyroxine    Appreciate IM consultants medical co-management  Labs, medications, and imaging reviewed  ROS:  Review of Systems   A 10 point review of systems was negative except for what is noted in the HPI  OBJECTIVE:   /67 (BP Location: Left arm)   Pulse 73   Temp 98 2 °F (36 8 °C) (Oral)   Resp 16   Ht 5' 1" (1 549 m)   Wt 59 kg (130 lb)   SpO2 94%   BMI 24 56 kg/m²     Physical Exam  Constitutional:       Appearance: Normal appearance  HENT:      Head: Normocephalic and atraumatic  Mouth/Throat:      Mouth: Mucous membranes are moist    Cardiovascular:      Rate and Rhythm: Normal rate and regular rhythm  Pulses: Normal pulses  Heart sounds: Normal heart sounds  Pulmonary:      Effort: Pulmonary effort is normal       Breath sounds: Normal breath sounds  Abdominal:      General: Bowel sounds are normal       Palpations: Abdomen is soft  Musculoskeletal:         General: Normal range of motion  Cervical back: Normal range of motion  Right lower leg: Edema present  Skin:     General: Skin is warm and dry  Capillary Refill: Capillary refill takes less than 2 seconds  Findings: Bruising present  Comments: Right lower extremity   Neurological:      Mental Status: She is alert and oriented to person, place, and time     Psychiatric:         Mood and Affect: Mood normal          Judgment: Judgment normal         Lab Results   Component Value Date    WBC 8 42 02/13/2023    HGB 9 4 (L) 02/13/2023    HCT 30 5 (L) 02/13/2023    MCV 97 02/13/2023     (H) 02/13/2023     Lab Results   Component Value Date    SODIUM 142 02/13/2023    K 3 6 02/13/2023     (H) 02/13/2023    CO2 25 02/13/2023    BUN 21 02/13/2023    CREATININE 0 52 (L) 02/13/2023    GLUC 88 02/13/2023    CALCIUM 8 6 02/13/2023     Lab Results   Component Value Date    INR 1 05 02/03/2023    INR 0 95 02/02/2023    PROTIME 13 9 02/03/2023    PROTIME 12 8 02/02/2023       Current Facility-Administered Medications:   •  acetaminophen (TYLENOL) tablet 975 mg, 975 mg, Oral, TID, Gisela Maldonado MD, 975 mg at 02/13/23 5905  •  artificial tear (LUBRIFRESH P M ) ophthalmic ointment, , Both Eyes, HS PRN, SANTI Espino, Given at 02/09/23 1746  •  bisacodyl (DULCOLAX) rectal suppository 10 mg, 10 mg, Rectal, Daily PRN, SANTI Bobo  •  enoxaparin (LOVENOX) subcutaneous injection 30 mg, 30 mg, Subcutaneous, Q12H JAQUAN, SANTI Bobo, 30 mg at 02/13/23 2426  •  glycerin-hypromellose- (ARTIFICIAL TEARS) ophthalmic solution 1 drop, 1 drop, Both Eyes, TID, SANTI Bobo, 1 drop at 02/13/23 3369  •  levothyroxine tablet 25 mcg, 25 mcg, Oral, Daily, SANTI Bobo, 25 mcg at 02/12/23 2009  •  meclizine (ANTIVERT) tablet 12 5 mg, 12 5 mg, Oral, Q8H PRN, SANTI oBbo  •  oxyCODONE (ROXICODONE) IR tablet 2 5 mg, 2 5 mg, Oral, Q4H PRN, SANTI Bobo  •  oxyCODONE (ROXICODONE) IR tablet 5 mg, 5 mg, Oral, Q4H PRN, SANTI Bobo  •  polyethylene glycol (MIRALAX) packet 17 g, 17 g, Oral, Daily PRN, SANTI Bobo  •  pravastatin (PRAVACHOL) tablet 20 mg, 20 mg, Oral, Daily With Dinner, SANTI Espino, 20 mg at 02/12/23 1607  •  senna-docusate sodium (SENOKOT S) 8 6-50 mg per tablet 1 tablet, 1 tablet, Oral, HS PRN, SANTI Espino    Past Medical History:   Diagnosis Date   • Arthritis 2005   • Closed nondisplaced fracture of fifth metatarsal bone of right foot with routine healing    • Disease of thyroid gland     hypothyroid   • Glaucoma, bilateral    • Hyperlipidemia    • Osteoporosis    • Scoliosis 12/12/12       Patient Active Problem List    Diagnosis Date Noted   • Femur fracture ([facility] [address] ) 02/02/2023   • Acute pain due to trauma 02/03/2023   • Acute blood loss anemia 02/05/2023   • Lightheadedness 02/06/2023   • Osteoporosis 02/09/2023   • HLD (hyperlipidemia) 02/06/2023   • Fall 02/03/2023   • Hypothyroidism 02/03/2023   • Chest pain 10/31/2022   • Abnormal electrocardiogram (ECG) (EKG) 10/31/2022   • Sjogren's syndrome ([facility] [address] ) 10/06/2021   • Primary osteoarthritis of right knee 05/14/2019   • Primary osteoarthritis of left knee 05/14/2019      SANTI Murphy  Physical Medicine and Mesha Espinoza    Total visit time: 30 minutes, with more than 50% spent counseling/coordinating care  Counseling includes discussion with patient re: progress in therapies, functional issues observed by therapy staff, and discussion with patient regarding their current medical state and wellbeing  Coordination of patient's care was performed in conjunction with Internal Medicine service to monitor patient's labs, vitals, and management of their comorbidities

## 2023-02-13 NOTE — PROGRESS NOTES
02/13/23 0700   Pain Assessment   Pain Assessment Tool 0-10   Pain Score No Pain   Restrictions/Precautions   Precautions Fall Risk;Pain   RLE Weight Bearing Per Order WBAT   LLE Weight Bearing Per Order Other  (no precautions, however, notifiy MD immediately if inc pain to LLE )   Eating   Type of Assistance Needed Independent   Physical Assistance Level No physical assistance   Eating CARE Score 6   Oral Hygiene   Type of Assistance Needed Set-up / clean-up   Physical Assistance Level No physical assistance   Comment while seated due to fatigue  Oral Hygiene CARE Score 5   Shower/Bathe Self   Type of Assistance Needed Incidental touching; Adaptive equipment   Physical Assistance Level No physical assistance   Comment Pt completes bathing while seated on shower bench, able to bathe 10/10 parts with inc time and CGA while in stance during rear/hua hygiene  Utilized unilateral UE support on GB  Incisions covered with gauze/tegaderm and remained dry throughout  Shower/Bathe Self CARE Score 4   Bathing   Assessed Bath Style Shower   Anticipated D/C Bath Style Tub   Able to Gather/Transport No   Able to [employer] Temperature Yes   Able to Wash/Rinse/Dry (body part) Left Arm;Right Arm;L Upper Leg;R Upper Leg;L Lower Leg/Foot;R Lower Leg/Foot;Chest;Abdomen;Perineal Area; Buttocks   Limitations Noted in Balance; Endurance;Strength;ROM   Positioning Seated;Standing   Adaptive Equipment Tub Bench; Shower [employer]   Limitations Noted In Balance; Endurance;LE Strength;ROM   Adaptive Equipment Grab Bars;Transfer Bench   Assessed Shower   Findings Pt completes shower stall txfer at an overall Essence level this session with BUE support on GB  Will benefit from trialing tub txfers in future sessions as that is pt's home context     Upper Body Dressing   Type of Assistance Needed Set-up / clean-up   Physical Assistance Level No physical assistance   Upper Body Dressing CARE Score 5   Lower Body Dressing   Type of Assistance Needed Incidental touching; Adaptive equipment;Verbal cues   Physical Assistance Level No physical assistance   Comment Inc time, VC's to dress affected RLE first and CGA overall while in stance during CM up/down over hips  Lower Body Dressing CARE Score 4   Putting On/Taking Off Footwear   Type of Assistance Needed Physical assistance   Physical Assistance Level 26%-50%   Comment Pt was able to don/doff b/l socks with inc time and cross leg technique  Dependent for MATTHEW mgmt  Putting On/Taking Off Footwear CARE Score 3   Lying to Sitting on Side of Bed   Type of Assistance Needed Supervision; Adaptive equipment   Physical Assistance Level No physical assistance   Comment Inc time for RLE mgmt  Lying to Sitting on Side of Bed CARE Score 4   Sit to Stand   Type of Assistance Needed Incidental touching   Physical Assistance Level No physical assistance   Comment CGA with RW  Sit to Stand CARE Score 4   Bed-Chair Transfer   Type of Assistance Needed Incidental touching; Adaptive equipment   Physical Assistance Level No physical assistance   Comment RW   Chair/Bed-to-Chair Transfer CARE Score 4   Toileting Hygiene   Type of Assistance Needed Incidental touching; Adaptive equipment   Physical Assistance Level No physical assistance   Comment CGA with RW in stance with unilateral UE support during CM  Toileting Hygiene CARE Score 4   Toilet Transfer   Type of Assistance Needed Incidental touching; Adaptive equipment   Physical Assistance Level No physical assistance   Comment Utilized GBs  Toilet Transfer CARE Score 4   Exercise Tools   Theraband Pt engaged in UB strengthening with use of yellow theraband moving through chest pull, bicep/tricep, and diagonal pulls completing 3 x 10 each to inc UB strength and endurance for inc independence with ADL tasks and functional txfers  Cognition   Overall Cognitive Status WFL   Arousal/Participation Alert; Cooperative   Attention Within functional limits   Orientation Level Oriented X4   Memory Within functional limits   Following Commands Follows all commands and directions without difficulty   Activity Tolerance   Activity Tolerance Patient tolerated treatment well   Assessment   Treatment Assessment Pt participated in skilled OT services with focus on ADL retraining, functional txfers, and strengthening  Pt continues to be limited by dec higher level standing balance, dec standing tolerance, pain, RLE weakness/dec ROM, dec endurance  Educated pt on life alert system or other fall detection/safety techniques  Pt receptive and states "my friend has been talking to me a lot about that, I think it's a good idea "  Pt identifies a neighbor and local brother who can A with transportation/grocery shopping, and higher level IADL tasks if needed  Pt will continue to benefit from skilled OT services with focus on tub txfers, kitchen mobility/hot meal prep to increase safety and independence with I/ADL tasks  Prognosis Good   Problem List Decreased strength;Decreased range of motion;Decreased endurance;Decreased mobility; Impaired balance;Decreased coordination;Orthopedic restrictions;Pain   Plan   Treatment/Interventions ADL retraining;Functional transfer training; Therapeutic exercise; Endurance training;Patient/family training;Equipment eval/education; Bed mobility; Compensatory technique education   Progress Progressing toward goals   Recommendation   OT Discharge Recommendation   (pending progress)   OT Therapy Minutes   OT Time In 0700   OT Time Out 0830   OT Total Time (minutes) 90   OT Mode of treatment - Individual (minutes) 90   OT Mode of treatment - Concurrent (minutes) 0   OT Mode of treatment - Group (minutes) 0   OT Mode of treatment - Co-treat (minutes) 0   OT Mode of Treatment - Total time(minutes) 90 minutes   OT Cumulative Minutes 270   Therapy Time missed   Time missed?  No

## 2023-02-13 NOTE — PROGRESS NOTES
Physical Medicine and Rehabilitation Progress Note   Call Coverage  Gianni Cox 69 y o  female MRN: [id number]  Unit/Bed#: -01 Encounter: [id number]      Assessment & Plan:        Osteoporosis  Assessment & Plan  -took bisphosphonate <10 years  -per Ortho: pt should stop bisphosphonate at this time d/t left femur lateral cortical thickening without obvious stress fracture/break  -needs immediate f/u if develops pain in left leg  -follow-up with Orthopedics    Lightheadedness  Assessment & Plan  Denies currently - tolerating tx; continue to monitor   -post-op with ambulation  - orthostatic BP normal  -Hbg 7 3  -received IVF, 1 unit PRBC  -Stable    HLD (hyperlipidemia)  Assessment & Plan  -  -home: Crestor 10 mg  -continue Pravachol 20 mg    Acute blood loss anemia  Assessment & Plan  -post op dizziness, diaphoresis, lowered to floor  -IVF  -Hbg 7 3, received 1 unit pRBC  -current Hbg 9 4  -monitor hemoglobin - Monday CBC   -consult IM for co-mgmt    Hypothyroidism  Assessment & Plan  -continue levothyroxine    Acute pain due to trauma  Assessment & Plan  -Acceptable control   -acute post-op pain right hip/leg  -tylenol 975 mg scheduled > change to TID to avoid excess APAP, oxycodone IR 2 5-5 mg prn  -monitor pain with therapy  -adjust medication as needed    * Femur fracture (HCC)  Assessment & Plan  - Pt felt "pop" when descending stairs with inability to ambulate  -X-ray: acute transverse substantially displaced proximal femoral shaft fracture  -NWB with bucks traction  -2/3 right IMN  -pain control  -WBAT right lowe extremity  - Lovenox for 28 days (3/3)  -Monitor incision for signs of infection  -2 week f/u 02/17  -Participate in comprehensive therapies 3 hr day, 5-6 days a week  -Follow-up with Orthopedic Surgery (Dr Amira Ball)  -Made aware of R lateral knee fluid collection that may be new - not particularly warm; subtle erythema; no significant drainage - recommend ortho c/s to eval; pt afebrile; CBC tomorrow unless develops fever or increased symptoms overnight  Objective: Allergies per EMR  Diagnostic Studies: Reviewed, no new imaging  XR femur 2 vw right    (Results Pending)     See above as well    Laboratory: Labs reviewed  Results from last 7 days   Lab Units 02/09/23  0621 02/08/23 2009 02/08/23  0438 02/07/23  2331 02/07/23  0537 02/06/23  0933   HEMOGLOBIN g/dL 9 4* 9 6* 7 3*   < > 7 6* 8 5*   HEMATOCRIT % 29 1* 29 6* 23 0*   < > 24 4* 26 3*   WBC Thousand/uL  --   --  9 77  --  10 33* 11 92*    < > = values in this interval not displayed  Results from last 7 days   Lab Units 02/07/23  0537 02/06/23  0933   BUN mg/dL 19 22   SODIUM mmol/L 142 140   POTASSIUM mmol/L 3 7 3 6   CHLORIDE mmol/L 110* 108   CREATININE mg/dL 0 50* 0 79            Drug regimen reviewed, all potential adverse effects identified and addressed:    Scheduled Meds:  Current Facility-Administered Medications   Medication Dose Route Frequency Provider Last Rate   • acetaminophen  975 mg Oral TID Joshua Melo MD     • artificial tear   Both Eyes HS PRN Cyrus Saltness, CRNP     • bisacodyl  10 mg Rectal Daily PRN Cyrus Saltness, CRNP     • enoxaparin  30 mg Subcutaneous Q12H [address] SANTI Bobo     • glycerin-hypromellose-  1 drop Both Eyes TID Cyrus Saltness, CRNP     • levothyroxine  25 mcg Oral Daily SANTI Bobo     • meclizine  12 5 mg Oral Q8H PRN Cyrus Saltness, SANTI     • oxyCODONE  2 5 mg Oral Q4H PRN Cyrus SANTI Nuñez     • oxyCODONE  5 mg Oral Q4H PRN SANTI Bobo     • polyethylene glycol  17 g Oral Daily PRN SANTI Bobo     • pravastatin  20 mg Oral Daily With SANTI Sneed     • senna-docusate sodium  1 tablet Oral HS PRN Cyrus Savannah, SANTI         Chief Complaints:  R knee swelling     Subjective:      On eval, patient reports some possible R lateral knee swelling that may be new; she notes pain in R lateral leg and knee about the same overall without fever, chills, sweats, SOB, calf pain, lightheadedness, or other new complaints  ROS: A 10 point ROS was performed; negative except as noted above  Physical Exam:  02/12/23 0446 98 9 °F (37 2 °C) 63 18 137/64 92 97 % None (Room air     Vitals above reviewed on date of encounter    GEN:  Sitting in NAD   HEENT/NECK: Normocephalic, atraumatic, moist mucous membranes   CARDIAC: Regular rate rhythm, no murmers, no rubs, no gallops  LUNGS:  clear to auscultation, no wheezes, rales, or rhonchi  ABDOMEN: Soft, non-tender, non-distended, normal active bowel sounds  EXTREMITIES/SKIN:  RLE edema; incisions intact; R lateral knee incision without significant drainage; lateral knee fluid collection without significant warmth, tenderness, drainage but with mild erythema  NEURO:   MENTAL STATUS: awake, oriented to person, place, time, and situation and MENTAL STATUS:  Appropriate wakefulness and interaction   PSYCH:  Affect:  Euthymic       ** Please Note: Fluency Direct voice to text software may have been used in the creation of this document  **    I personally performed the required components and examined the patient myself in person on 2/12/23

## 2023-02-13 NOTE — PROGRESS NOTES
Internal Medicine Progress Note  Patient: David Lockhart  Age/sex: 69 y o  female  Medical Record #: [id number]      ASSESSMENT/PLAN: (Interval History)  David Lockhart is seen and examined and management for following issues:    Right displaced proximal femoral shaft fracture  • S/p IMN 2/3/23  • WBAT  • Per Ortho:  "Patient should not continue bisphosphonate use at this time given radiographic changes to left femur including lateral cortical thickening without obvious stress fracture/beaking  She needs to follow up immediately w ortho if she develops ambulatory pain in left thigh"   They discussed this with her   Left femur xray was negative fx 2/4     ABLA  • S/p 1 U PRBCs on 2/8/23 for hemoglobin of 7 3  • stable     Dizziness/syncope  • Related to orthostasis and blood loss  • Was given IVF and blood  • Watching for orthostasis while here = so far no sx of such and BPs have been stable     Hypothyroidism   • Continue levothyroxine  • Has a thyroid nodule found incidentally for which an US will need to be done as an OP     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 vs infundibulum  • To see as a followup in Neurovascular center     Old lacunar infarcts   • Found incidentally on imaging  • On statin     Right knee pain  · Xray negative  · Ortho saw = for ACE wrap and ice       Discharge date:  Team    The above assessment and plan was reviewed and updated as determined by my evaluation of the patient on 2/13/2023  Labs:   Results from last 7 days   Lab Units 02/13/23  0531 02/09/23  0621 02/08/23 2009 02/08/23  0438   WBC Thousand/uL 8 42  --   --  9 77   HEMOGLOBIN g/dL 9 4* 9 4*   < > 7 3*   HEMATOCRIT % 30 5* 29 1*   < > 23 0*   PLATELETS Thousands/uL 462*  --   --  266    < > = values in this interval not displayed       Results from last 7 days   Lab Units 02/13/23  0531 02/07/23  0537   SODIUM mmol/L 142 142   POTASSIUM mmol/L 3 6 3 7   CHLORIDE mmol/L 111* 110*   CO2 mmol/L 25 27   BUN mg/dL 21 19   CREATININE mg/dL 0 52* 0 50*   CALCIUM mg/dL 8 6 8 0*                   Review of Scheduled Meds:  Current Facility-Administered Medications   Medication Dose Route Frequency Provider Last Rate   • acetaminophen  975 mg Oral TID Selena Gibbs MD     • artificial tear   Both Eyes HS PRN SANTI Peña     • bisacodyl  10 mg Rectal Daily PRN SANTI Peña     • enoxaparin  30 mg Subcutaneous Q12H [address] SANTI Bobo     • glycerin-hypromellose-  1 drop Both Eyes TID SANTI Peña     • levothyroxine  25 mcg Oral Daily SANTI Bobo     • meclizine  12 5 mg Oral Q8H PRN SANTI Peña     • oxyCODONE  2 5 mg Oral Q4H PRN SANTI Peña     • oxyCODONE  5 mg Oral Q4H PRN SANTI Bobo     • polyethylene glycol  17 g Oral Daily PRN SANTI Bobo     • pravastatin  20 mg Oral Daily With SANTI Sneed     • senna-docusate sodium  1 tablet Oral HS PRN SANTI Bobo         Subjective/ HPI: Patient seen and examined  Patients overnight issues or events were reviewed with nursing or staff during rounds or morning huddle session  New or overnight issues include the following:     No new or overnight issues  Offers no complaints except mild right knee pain  ROS:   A 10 point ROS was performed; negative except as noted above  Imaging:     XR femur 2 vw right   Final Result by Mychal Beltre MD (02/13 0825)      Stable alignment of right femoral fracture status post ORIF              Workstation performed: [id number]             *Labs /Radiology studies reviewed  *Medications reviewed and reconciled as needed  *Please refer to order section for additional ordered labs studies  *Case discussed with primary attending during morning huddle case rounds    Physical Examination:  Vitals:   Vitals:    02/12/23 0446 02/12/23 1400 02/12/23 2100 02/13/23 0528   BP: 137/64 130/66 123/63 142/67   BP Location: Left arm Right arm Left arm Left arm   Pulse: 63 62 73 73   Resp: 18 16 16 16   Temp: 98 9 °F (37 2 °C)  98 2 °F (36 8 °C) 98 2 °F (36 8 °C)   TempSrc: Oral Oral Oral Oral   SpO2: 97% 97% 96% 94%   Weight:       Height:           General Appearance: no distress, conversive  HEENT: PERRLA, conjuctiva normal; oropharynx clear; mucous membranes moist   Neck:  Supple, normal ROM  Lungs: CTA, normal respiratory effort, no retractions, expiratory effort normal  CV: regular rate and rhythm; no rubs/murmurs/gallops, PMI normal   ABD: soft; ND/NT; +BS  EXT: mild right thigh edema/+ right knee swelling  Skin: normal turgor, normal texture, no rashes  Psych: affect normal, mood normal  Neuro: AAO      The above physical exam was reviewed and updated as determined by my evaluation of the patient on 2/13/2023  Invasive Devices     None                    VTE Pharmacologic Prophylaxis: Enoxaparin  Code Status: Level 1 - Full Code  Current Length of Stay: 4 day(s)      Total time spent:  30 minutes with more than 50% spent counseling/coordinating care  Counseling includes discussion with patient re: progress  and discussion with patient of his/her current medical state/information  Coordination of patient's care was performed in conjunction with primary service  Time invested included review of patient's labs, vitals, and management of their comorbidities with continued monitoring  In addition, this patient was discussed with medical team including physician and advanced extenders  The care of the patient was extensively discussed and appropriate treatment plan was formulated unique for this patient  Medical decision making for the day was made by supervising physician unless otherwise noted in their attestation statement  ** Please Note:  voice to text software may have been used in the creation of this document   Although proof errors in transcription or interpretation are a potential of such software**

## 2023-02-13 NOTE — PROGRESS NOTES
Pastoral Care Progress Note    2023  Patient: Licha Verdin :   Admission Date & Time: 2023 1214  MRN: [id number] CSN: [id number]         23 0900   Clinical Encounter Type   Visited With Patient   Routine Visit Follow-up   Mosque Encounters   Mosque Needs Prayer   Sacramental Encounters   Communion Given Indicator Yes     Yomaira Kimbrough followed-up w the pt and conducted a pastoral visit  The pt also received prayers, blessings, and the [address] [employer]  No further needs were expressed at this time  Chaplains still remain available

## 2023-02-13 NOTE — PROGRESS NOTES
02/13/23 1230   Pain Assessment   Pain Assessment Tool 0-10   Pain Score 3   Pain Location/Orientation Orientation: Right;Orientation: Upper; Location: Leg   Hospital Pain Intervention(s) Ambulation/increased activity;Repositioned; Rest   Restrictions/Precautions   Precautions Fall Risk;Pain   RLE Weight Bearing Per Order WBAT   LLE Weight Bearing Per Order   (notify MD immediately of pain in LLE)   Braces or Orthoses   (bilateral TEDs)   Cognition   Overall Cognitive Status [facility]   Arousal/Participation Alert; Cooperative   Attention Within functional limits   Orientation Level Oriented X4   Memory Within functional limits   Following Commands Follows all commands and directions without difficulty   Subjective   Subjective "I think I will be able to get better and do everything at home"   Sit to Stand   Type of Assistance Needed Incidental touching   Physical Assistance Level No physical assistance   Sit to Stand CARE Score 4   Bed-Chair Transfer   Type of Assistance Needed Incidental touching   Physical Assistance Level No physical assistance   Comment RW   Chair/Bed-to-Chair Transfer CARE Score 4   Transfer Bed/Chair/Wheelchair   Limitations Noted In UE Strength;LE Strength   Adaptive Equipment Roller Walker   Stand Pivot Contact Guard   Sit to [facility]   Stand to [facility]   Walk 10 Feet   Type of Assistance Needed Incidental touching   Physical Assistance Level No physical assistance   Walk 10 Feet CARE Score 4   Walk 50 Feet with Two Turns   Type of Assistance Needed Incidental touching   Physical Assistance Level No physical assistance   Walk 50 Feet with Two Turns CARE Score 4   Walk 150 Feet   Type of Assistance Needed Incidental touching   Physical Assistance Level No physical assistance   Walk 150 Feet CARE Score 4   Ambulation   Primary Mode of Locomotion Prior to Admission Walk   Distance Walked (feet) 150 ft  (x1, 100x2, 50x2)   Assist Device Roller Walker   Gait Pattern Antalgic; Inconsistant Radha; Slow Radha;Narrow DEB;Step to; Step through; Decreased R stance; Improper weight shift   Limitations Noted In Heel Strike;Speed;Strength;Swing   Wheel 50 Feet with Two Turns   Reason if not Attempted Activity not applicable   Wheel 50 Feet with Two Turns CARE Score 9   Wheel 150 Feet   Reason if not Attempted Activity not applicable   Wheel 377 Feet CARE Score 9   Wheelchair mobility   Does the patient use a wheelchair? 0  No   Curb or Single Stair   Style negotiated Curb   Type of Assistance Needed Physical assistance   Physical Assistance Level 26%-50%   Comment min A with RW   1 Step (Curb) CARE Score 3   4 Steps   Type of Assistance Needed Physical assistance   Physical Assistance Level 26%-50%   Comment 6"  steps   4 Steps CARE Score 3   12 Steps   Type of Assistance Needed Physical assistance   Physical Assistance Level 26%-50%   Comment 6"  steps   12 Steps CARE Score 3   Stairs   Type Stairs   # of Steps 12   Weight Bearing Precautions WBAT;RLE   Assist Devices Bilateral Rail   Findings up forward and down backwards to assess endurance for stair negotiation   Therapeutic Interventions   Strengthening LAQ 8x2 with manual resistance to LLE and no resistance to RLE, hip flexion 8x2 with manual resistance to LLE and AAROM for RLE, hip ext manually resisted bilateral 8x2, STS 5x2   Other education on healing and typical progression to help the pt have appropriate expectations; spoke with physician about the pt reporting she has some trouble sleeping at night   Assessment   Treatment Assessment Pt demonstrates limitations relating to endurance and strength impairments  She does need intermittent rests, seated or standing, before continuing the activity  Discussed how she can safely enter her home and how she can safely mobilize inside  She demonstrates consistent safety awareness for using her hands to transfer and she uses the RW appropriately   Her ambulation is antalgic, which limits her step length being equal and she has decreased heel strike on her R  Stair negotiation was to assess endurance to be able to complete 12 stairs without a seated rest  She did need standing breaks x2 to allow her to continue  She can benefit from skilled PT interventions to increase her strength and endurance to maximize her function and promote the highest level of independence  Continue POC as per PT  Problem List Decreased strength;Decreased range of motion;Decreased endurance;Decreased mobility; Impaired balance;Orthopedic restrictions;Pain   Barriers to Discharge Decreased caregiver support; Inaccessible home environment   PT Barriers   Physical Impairment Decreased strength;Decreased range of motion; Impaired balance;Decreased endurance;Decreased coordination;Decreased mobility;Orthopedic restrictions;Pain   Functional Limitation Car transfers; Ramp negotiation;Stair negotiation;Standing;Transfers; Walking   Plan   Treatment/Interventions ADL retraining;Functional transfer training;LE strengthening/ROM; Elevations; Therapeutic exercise; Endurance training;Bed mobility;Gait training   Progress Progressing toward goals   Recommendation   PT Discharge Recommendation Home with home health rehabilitation   Equipment Recommended Walker   PT Therapy Minutes   PT Time In 1230   PT Time Out 1400   PT Total Time (minutes) 90   PT Mode of treatment - Individual (minutes) 90   PT Mode of treatment - Concurrent (minutes) 0   PT Mode of treatment - Group (minutes) 0   PT Mode of treatment - Co-treat (minutes) 0   PT Mode of Treatment - Total time(minutes) 90 minutes   PT Cumulative Minutes 360   Therapy Time missed   Time missed?  No

## 2023-02-13 NOTE — PLAN OF CARE
Problem: PAIN - ADULT  Goal: Verbalizes/displays adequate comfort level or baseline comfort level  Description: Interventions:  - Encourage patient to monitor pain and request assistance  - Assess pain using appropriate pain scale  - Administer analgesics based on type and severity of pain and evaluate response  - Implement non-pharmacological measures as appropriate and evaluate response  - Consider cultural and social influences on pain and pain management  - Notify physician/advanced practitioner if interventions unsuccessful or patient reports new pain  Outcome: Progressing     Problem: INFECTION - ADULT  Goal: Absence or prevention of progression during hospitalization  Description: INTERVENTIONS:  - Assess and monitor for signs and symptoms of infection  - Monitor lab/diagnostic results  - Monitor all insertion sites, i e  indwelling lines, tubes, and drains  - Monitor endotracheal if appropriate and nasal secretions for changes in amount and color  - [place] appropriate cooling/warming therapies per order  - Administer medications as ordered  - Instruct and encourage patient and family to use good hand hygiene technique  - Identify and instruct in appropriate isolation precautions for identified infection/condition  Outcome: Progressing  Goal: Absence of fever/infection during neutropenic period  Description: INTERVENTIONS:  - Monitor WBC    Outcome: Progressing     Problem: SAFETY ADULT  Goal: Patient will remain free of falls  Description: INTERVENTIONS:  - Educate patient/family on patient safety including physical limitations  - Instruct patient to call for assistance with activity   - Consult OT/PT to assist with strengthening/mobility   - Keep Call bell within reach  - Keep bed low and locked with side rails adjusted as appropriate  - Keep care items and personal belongings within reach  - Initiate and maintain comfort rounds  - Make Fall Risk Sign visible to staff  - Offer Toileting every 2 Hours, in advance of need  - Initiate/Maintain bed/chair alarm  - Obtain necessary fall risk management equipment: non skid footwear  - Apply yellow socks and bracelet for high fall risk patients  - Consider moving patient to room near nurses station  Outcome: Progressing  Goal: Maintain or return to baseline ADL function  Description: INTERVENTIONS:  -  Assess patient's ability to carry out ADLs; assess patient's baseline for ADL function and identify physical deficits which impact ability to perform ADLs (bathing, care of mouth/teeth, toileting, grooming, dressing, etc )  - Assess/evaluate cause of self-care deficits   - Assess range of motion  - Assess patient's mobility; develop plan if impaired  - Assess patient's need for assistive devices and provide as appropriate  - Encourage maximum independence but intervene and supervise when necessary  - Involve family in performance of ADLs  - Assess for home care needs following discharge   - Consider OT consult to assist with ADL evaluation and planning for discharge  - Provide patient education as appropriate  Outcome: Progressing  Goal: Maintains/Returns to pre admission functional level  Description: INTERVENTIONS:  - Perform BMAT or MOVE assessment daily    - Set and communicate daily mobility goal to care team and patient/family/caregiver  - Collaborate with rehabilitation services on mobility goals if consulted  - Perform Range of Motion 3 times a day  - Reposition patient every 2 hours    - Dangle patient 3 times a day  - Stand patient 3 times a day  - Ambulate patient 3 times a day  - Out of bed to chair 3 times a day   - Out of bed for meals 3 times a day  - Out of bed for toileting  - Record patient progress and toleration of activity level   Outcome: Progressing     Problem: DISCHARGE PLANNING  Goal: Discharge to home or other facility with appropriate resources  Description: INTERVENTIONS:  - Identify barriers to discharge w/patient and caregiver  - Arrange for needed discharge resources and transportation as appropriate  - Identify discharge learning needs (meds, wound care, etc )  - Arrange for interpretive services to assist at discharge as needed  - Refer to Case Management Department for coordinating discharge planning if the patient needs post-hospital services based on physician/advanced practitioner order or complex needs related to functional status, cognitive ability, or social support system  Outcome: Progressing     Problem: Prexisting or High Potential for Compromised Skin Integrity  Goal: Skin integrity is maintained or improved  Description: INTERVENTIONS:  - Identify patients at risk for skin breakdown  - Assess and monitor skin integrity  - Assess and monitor nutrition and hydration status  - Monitor labs   - Assess for incontinence   - Turn and reposition patient  - Assist with mobility/ambulation  - Relieve pressure over bony prominences  - Avoid friction and shearing  - Provide appropriate hygiene as needed including keeping skin clean and dry  - Evaluate need for skin moisturizer/barrier cream  - Collaborate with interdisciplinary team   - Patient/family teaching  - Consider wound care consult   Outcome: Progressing

## 2023-02-13 NOTE — CASE MANAGEMENT
Patient inquired about life alert info  Brochure provided on [employer] through the [employer] [employer] [facility]  As well as the sales number for [employer] for pt to contact as they have multiple plans and pt is already a subscriber

## 2023-02-14 RX ORDER — LIDOCAINE 50 MG/G
1 PATCH TOPICAL DAILY
Status: DISCONTINUED | OUTPATIENT
Start: 2023-02-15 | End: 2023-02-23 | Stop reason: HOSPADM

## 2023-02-14 RX ADMIN — LEVOTHYROXINE SODIUM 25 MCG: 25 TABLET ORAL at 21:14

## 2023-02-14 RX ADMIN — ACETAMINOPHEN 975 MG: 325 TABLET, FILM COATED ORAL at 05:48

## 2023-02-14 RX ADMIN — GLYCERIN 1 DROP: .002; .002; .01 SOLUTION/ DROPS OPHTHALMIC at 21:15

## 2023-02-14 RX ADMIN — GLYCERIN 1 DROP: .002; .002; .01 SOLUTION/ DROPS OPHTHALMIC at 08:00

## 2023-02-14 RX ADMIN — PRAVASTATIN SODIUM 20 MG: 20 TABLET ORAL at 15:36

## 2023-02-14 RX ADMIN — MELATONIN 3 MG: at 21:14

## 2023-02-14 RX ADMIN — GLYCERIN 1 DROP: .002; .002; .01 SOLUTION/ DROPS OPHTHALMIC at 15:36

## 2023-02-14 RX ADMIN — ACETAMINOPHEN 975 MG: 325 TABLET, FILM COATED ORAL at 13:12

## 2023-02-14 RX ADMIN — ENOXAPARIN SODIUM 30 MG: 30 INJECTION SUBCUTANEOUS at 21:15

## 2023-02-14 RX ADMIN — ENOXAPARIN SODIUM 30 MG: 30 INJECTION SUBCUTANEOUS at 08:00

## 2023-02-14 RX ADMIN — ACETAMINOPHEN 975 MG: 325 TABLET, FILM COATED ORAL at 21:14

## 2023-02-14 NOTE — DISCHARGE INSTR - AVS FIRST PAGE
DISCHARGE INSTRUCTIONS: Eduin Pena [age]    Bring these instructions with you to your Outpatient Physician appointments so they can order and follow-up any additional lab work or imaging recommended at time of discharge  Resume follow-up with all your prior providers that you have established care prior to this hospitalization  Discuss with primary care physician (PCP) if you have additional questions  It  is you or your caregivers responsibility to obtain follow-up MEDICATION REFILLS  As indicated through your Primary Care Physician (PCP) and other outpatient specialty provider(s) after discharge  Please follow-up with your PCP as soon as possible after discharge to set-up follow-up management and when appropriate refills  You remain a fall and injury risk which could be severe  - Your risk of fall has decreased however since admission to acute rehab  Caregiver training has been completed with our staff  - Appropriate supervision +/- assistance as instructed during your rehab course is recommended to decrease risk of fall and injury  - Continue skilled therapy as discussed after discharge to further decrease this risk    If you (or your health care proxy) have any questions or concerns regarding your acute rehabilitation stay including issues with medications, rehabilitation, and follow-up plan, please call:          [address] [place] [address] at [phone] or [phone]  Should you develop fevers, chills, new weakness, changes in sensation, difficulty speaking, facial weakness, confusion, shortness of breath, chest pain, or other concerning symptoms please call 911 and/or obtain transportation to nearest ER immediately  Should you develop worsening pain, swelling, or drainage notify your surgeon right away or obtain transportation to nearest ER for evaluation      Should you develop any LEFT THIGH PAIN OR DIFFICULTY WITH WALKING - GO TO THE ER TO HAVE THIS EVALUATED AS YOU ARE AT HIGH RISK FOR FRACTURE    PHYSICIANS to see:  Please see your doctors listed in the follow up providers section of your discharge paperwork, and take the discharge paperwork with you to your appointments  LAB WORK recommended after discharge: Follow-up lab work at discretion of your outpatient physicians to be determined at time of your future appointments  Also, obtain the following lab orders and follow-up management through primary care physician and outpatient specialists  CBC and BMP to monitor hemoglobin, white blood cell count, electrolytes and kidney function at next visit within 1 week - 2 week     Excessive delay in labs and appropriate follow-up could potentially increase your risk of complications which could be severe and even life-threatening  It is you or your caregiver's responsibility to ensure these tests are ordered by your outpatient providers and followed up with accordingly  Call [facility] to locate the closest open outpatient lab center where you can have your lab work drawn  Contact them at [phone]    IMAGING to follow-up:  Follow-up imaging as discussed with your recent physicians or at discretion of your outpatient physicians to be determined at time of your appointments  IMAGING, ADDITIONAL FINDINGS and ISSUES to follow-up:  Check under the "DISCHARGE PROVIDER" section of these DISCHARGE INSTRUCTIONS for provider contact information and specific issues as well  These tests are ordered by your outpatient providers and followed up with accordingly  Incidental findings: The following findings require follow up:  Radiographic finding     Finding: CTA head and neck w wo contrast: CT brain: No acute intracranial abnormality    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 , CT angiography: There is a 2 5 x 2 0 cm focal outpouching at the expected location of the posterior communicating artery on the right  There is no vessel extending from this abnormality, suspicious for small aneurysm  Alternatively an infundibulum , could have this appearance  Recommend follow-up consultation with the [facility], a division of [facility] at [phone]  YOU HAVE BEEN STARTED ON ASPIRIN 81 mg DAILY STARTING 3/4/23  DISCUSS DURATION WITH NEUROLOGY WHEN YOU SEE THEM IN FOLLOW-UP    2  Incidental thyroid nodule(s) for which nonemergent thyroid ultrasound is recommended     Please let your primary physician know about those and ensure appropriate follow up  Follow up required: [facility], Radiology (outpatient ultrasound)              Follow up should be done within 8 week(s)    SKIN CARE INSTRUCTIONS to follow:  Monitor incision(s) for increased redness, swelling, pain/tenderness, discharge/pus and promptly notify your surgeon should these develop  - Should you develop significant pain, swelling, or drainage obtain transportation to nearest emergency room for immediate evaluation if unable to reach your surgeon promptly   - Should you develop uncontrolled pain, fever, chills, sweats, changes in strength, sensation, or color of this area obtain transportation to nearest emergency room for immediate evaluation  DO NOT SUBMERGE INCISION IN WATER - AVOID TUB BATHS, POOLS, OCEAN, HOT TUBS until cleared by your orthopedic surgeon  Continue ace wrapping your right knee for another 5-7 days for support     WEIGHTBEARING/ACTIVITY PRECAUTIONS to follow:  Maintain Weightbearing as tolerated in right leg/lower extremity  Follow post-surgical restrictions as outlined by your surgeon  Driving restrictions: You are recommended against driving until cleared by Orthopedics    Work restrictions: You should NOT return to work (if working) until cleared by an outpatient physician      You should not operate heavy machinery (if applicable) until cleared by an outpatient physician  Alcohol restrictions: You are recommended to not drink alcohol at this time unless cleared by an outpatient physician  Combining alcohol with your current medications can increase your risk of injury which could be severe  Smoking restrictions: You are recommended to not smoke nicotine  Smoking increases your risk of heart attack, stroke, emphysema/COPD, and lung cancer  MEDICATIONS:  Please see a full list of your medications outlined in the After Visit Summary that is attached to these Discharge Instructions  Please note changes may have been made to your medications please refer to your discharge paperwork for your current medications and take this list with you to all your doctors appointments for your doctors to review  Please do not resume a home medication unless the medication reconciliation sheet indicates to do so, please do not assume that a medication that you were given a prescription for is the same as a medication you have at home based on both medications having the same name as dosages and frequency may have changed  Unless specifically noted in your medication list provided to you in your discharge paper work do not resume prior vitamins, minerals, or supplements you may have been taking prior to your hospitalization unless instructed by an outpatient physician in the future  Discuss with your primary care at next visit if applicable  Lovenox (Enoxaparin) Instructions: You have 8 days remaining of the current prescription  It will be completed on 03/03/2023  You have been prescribed Lovenox (Enoxaparin)  This medication is used to prevent clots which can cause severe disability and even death    This medications was started on 02/03 prior to your acute rehabilitation course as recommended by your Orthopedic surgeon  It was continued during your acute rehabilitation course    This medication will need to be managed by your orthopedic surgeon after discharge  Follow-up with this provider as soon as possible to ensure appropriate use  This is a strong anticoagulant (blood thinner)  It is being recommended for use by you (or your family) to decrease the risk of clotting which can be severely disabling and even life-threatening  Even when provided as recommended it can cause severe disabling and even life-threatening bleeding  Too much or too little of this blood thinner further increases your risk of this medication causing serious and even life-threatening complications such as severe bleeding, clots, strokes, and death  With that said, at this time based on best available evidence and consensus agreement, your physicians recommend you take Lovenox (Enoxaparin) medication based on your overall risks and benefits in your specific medical situation  If you (or your family/caregiver) notice black stools, bloody stools, vomit blood, develop new weakness, slurred speech, confusion or have any other concerning symptoms call 911 or obtain transportation to nearest emergency room immediately  Please complete the remainder of your course of lovenox (enoxaparin) as prescribed  Anemia management:  Follow-up with primary care physician at next visit  Follow-up management at discretion of PCP   >If you develop increased lightheadedness, shortness of breath, chest pain, confusion, difficulty staying awake, or other concerning signs or symptoms obtain transport to nearest emergency room as soon as possible  For pain - try to use over the counter topicals (creams/gels) as discussed or acetaminophen 1-2 regular or extra-strength Tylenol up to 2-3 times per day  Could consider ice as well maximum 20 minutes at a time  Do not use ice if using topicals at the same time  Notify primary care and/or orthopedics for poorly controlled pain for additional recommendations        NSAID Warning:  Please avoid NSAID (including but not limited to advil, aleve, motrin, naproxen, ibuprofen, mobic, meloxicam, diclofenac etc) medications as NSAID medications may increase your risk of bleeding (which can be life-threatening), stroke, heart attack, developing/worsening GI ulcers, potentially delay bone healing  Please note a summary of your hospital stay with relevant information for your doctors will try to be sent to them  Please confirm with your doctors at your follow up visits that they have received this summary and have them contact [address] [place] [address] if they have not received them along with any other medical records they may require       [facility] Phone Number:  [phone]

## 2023-02-14 NOTE — NURSING NOTE
Patient provided Lovenox education  Pt was able to self inject Lovenox with minimal cues    Will continue lovenox education

## 2023-02-14 NOTE — PROGRESS NOTES
PM&R PROGRESS NOTE:  Hi Giordano 69 y o  female MRN: [id number]  Unit/Bed#: -01 Encounter: [id number]        Rehabilitation Diagnosis: Impairment of mobility, safety and Activities of Daily Living (ADLs) due to Orthopedic Disorders:  08 2  Femur (Shaft) Fracture    HPI: Lauren Ambrose is a 69 y o  female with a history of hypothyroid, osteoporosis, scoliosis, and hyperlipidemia that presented to [facility] on 2/2/23 with c/o right hip pain after feeling a pop in her right hip when descending stairs  She was unable to ambulate, lowered herself to the floor and crawled to phone to call EMS  On exam right lower extremity shortening and external rotation  Imaging showed acute transverse substantially displaced proximal femoral shaft fracture  Orthopedics was consulted and recommended NWB with Mane's traction, with surgical intervention  On 2/3, patient underwent right insertion IM nailing with Dr Derrick Lord  Patient is WBAT with Lovenox for 28 days for DVT prophylaxis  Post-op complicated by dizziness and an episode requiring her to be lowered to floor  CTA of head/neck was obtained, which showed no acute findings; did show outpouching of PCA  Neurovascular recommended follow-up as outpatient  Echo EF 65%, mild TVR and trace PVR  Patient's hemoglobin 7 3 which she received 1 unit pRBC improving to 9 4  Patient was deemed hemodynamically stable, PT/OT recommend inpatient acute rehabilitation  Patient was admitted to [address] [place] [address] on 02/09/23  SUBJECTIVE: Patient seen face to face  No acute issues overnight  Patient progressing as expected in rehabilitation  Reports increased right leg pain after therapy, she is happy with progress in therapy  Slept better with start of melatonin last evening  Visualized right knee and incisions  LBM 02/13  No chest pain, shortness of breath, fever, chills, nausea, abdominal pain  ASSESSMENT: Stable, progressing    PLAN:  1   Continue ace wrap to right knee to prevent edema  2  Continue with current rehabilitation program   3  Right leg pain- Lidoderm patch starting 2/15  4  Insomnia- continue melatonin 3mg hs  5  Incision- staples intact, continue with DSD    Rehabilitation  • Functional deficits:  Self care, impaired mobility  • Continue current rehabilitation plan of care to maximize function      • Functional update:   o PT: mobility- incidental touching, transfers- incidental touching, ambulation- '  o OT: ADL- set-up UB, incidental touching LB; footwear- mod A; toileting CGA    • Estimated Discharge: anticipated 10-14 days    Pain  • tylenol    DVT prophylaxis  • Lovenox    Bladder plan  • Continent    Bowel plan  • Continent      * Femur fracture (HCC)  Assessment & Plan  - Pt felt "pop" when descending stairs with inability to ambulate  -X-ray: acute transverse substantially displaced proximal femoral shaft fracture  -NWB with bucks traction  -2/3 right IMN  -pain control  -WBAT right lowe extremity  - Lovenox for 28 days (3/3)  -po dizziness and diaphtetic; orthostatics normal, IVF, Hbg 7 3 pRBC x1  -Monitor incision for signs of infection  -2 week f/u 02/17  -Participate in comprehensive therapies 3 hr day, 5-6 days a week  -Follow-up with Orthopedic Surgery (Dr Taya Burgess)    Acute pain due to trauma  Assessment & Plan  -acute post-op pain right hip/leg  -tylenol 975 mg scheduled, oxycodone IR 2 5-5 mg prn  -monitor pain with therapy  -adjust medication as needed    Acute blood loss anemia  Assessment & Plan  -post op dizziness, diaphoresis, lowered to floor  -IVF  -Hbg 7 3, received 1 unit pRBC  -current Hbg 9 4  -monitor hemoglobin  -consult IM for co-mgmt    Lightheadedness  Assessment & Plan  -post-op with ambulation  - orthostatic BP normal  -Hbg 7 3 (2/7)  -received IVF, 1 unit PRBC  -Stable    Osteoporosis  Assessment & Plan  -took bisphosphonate <10 years  -per Ortho: pt should stop bisphosphonate at this time d/t left femur lateral cortical thickening without obvious stress fracture/break  -needs immediate f/u if develops pain in left leg  -follow-up with Orthopedics    HLD (hyperlipidemia)  Assessment & Plan  -  -home: Crestor 10 mg  -continue Pravachol 20 mg    Hypothyroidism  Assessment & Plan  -continue levothyroxine    Appreciate IM consultants medical co-management  Labs, medications, and imaging reviewed  ROS:  Review of Systems   A 10 point review of systems was negative except for what is noted in the HPI  OBJECTIVE:   /58 (BP Location: Right arm)   Pulse 105   Temp 98 1 °F (36 7 °C) (Oral)   Resp 20   Ht 5' 1" (1 549 m)   Wt 59 kg (130 lb)   SpO2 98%   BMI 24 56 kg/m²     Physical Exam  Constitutional:       Appearance: Normal appearance  HENT:      Head: Normocephalic and atraumatic  Mouth/Throat:      Mouth: Mucous membranes are moist    Cardiovascular:      Rate and Rhythm: Normal rate and regular rhythm  Pulses: Normal pulses  Heart sounds: Normal heart sounds  Pulmonary:      Effort: Pulmonary effort is normal       Breath sounds: Normal breath sounds  Abdominal:      General: Bowel sounds are normal       Palpations: Abdomen is soft  Musculoskeletal:         General: Tenderness present  Normal range of motion  Cervical back: Normal range of motion  Right lower leg: Edema present  Skin:     General: Skin is warm and dry  Capillary Refill: Capillary refill takes less than 2 seconds  Findings: Bruising present  Neurological:      Mental Status: She is alert and oriented to person, place, and time     Psychiatric:         Mood and Affect: Mood normal          Judgment: Judgment normal         Lab Results   Component Value Date    WBC 8 42 02/13/2023    HGB 9 4 (L) 02/13/2023    HCT 30 5 (L) 02/13/2023    MCV 97 02/13/2023     (H) 02/13/2023     Lab Results   Component Value Date    SODIUM 142 02/13/2023    K 3 6 02/13/2023     (H) 02/13/2023    CO2 25 02/13/2023    BUN 21 02/13/2023    CREATININE 0 52 (L) 02/13/2023    GLUC 88 02/13/2023    CALCIUM 8 6 02/13/2023     Lab Results   Component Value Date    INR 1 05 02/03/2023    INR 0 95 02/02/2023    PROTIME 13 9 02/03/2023    PROTIME 12 8 02/02/2023       Current Facility-Administered Medications:   •  acetaminophen (TYLENOL) tablet 975 mg, 975 mg, Oral, TID, Kate Martinez MD, 975 mg at 02/14/23 1312  •  artificial tear (LUBRIFRESH P M ) ophthalmic ointment, , Both Eyes, HS PRN, SANTI Connors, Given at 02/09/23 1746  •  bisacodyl (DULCOLAX) rectal suppository 10 mg, 10 mg, Rectal, Daily PRN, SANTI Bobo  •  enoxaparin (LOVENOX) subcutaneous injection 30 mg, 30 mg, Subcutaneous, Q12H JAQUAN, SANTI Bobo, 30 mg at 02/14/23 0800  •  glycerin-hypromellose- (ARTIFICIAL TEARS) ophthalmic solution 1 drop, 1 drop, Both Eyes, TID, SANTI Bobo, 1 drop at 02/14/23 1536  •  levothyroxine tablet 25 mcg, 25 mcg, Oral, Daily, SANTI Bobo, 25 mcg at 02/13/23 2114  •  [START ON 2/15/2023] lidocaine (LIDODERM) 5 % patch 1 patch, 1 patch, Topical, Daily, SANTI Bobo  •  meclizine (ANTIVERT) tablet 12 5 mg, 12 5 mg, Oral, Q8H PRN, SANTI Bobo  •  melatonin tablet 3 mg, 3 mg, Oral, HS, Ludivina Morgan MD, 3 mg at 02/13/23 2114  •  oxyCODONE (ROXICODONE) IR tablet 2 5 mg, 2 5 mg, Oral, Q4H PRN, SANTI Bobo  •  oxyCODONE (ROXICODONE) IR tablet 5 mg, 5 mg, Oral, Q4H PRN, SANTI Bobo  •  polyethylene glycol (MIRALAX) packet 17 g, 17 g, Oral, Daily PRN, SANTI Bobo  •  pravastatin (PRAVACHOL) tablet 20 mg, 20 mg, Oral, Daily With Dinner, SANTI Connors, 20 mg at 02/14/23 1536  •  senna-docusate sodium (SENOKOT S) 8 6-50 mg per tablet 1 tablet, 1 tablet, Oral, HS PRN, SANTI Connors    Past Medical History:   Diagnosis Date   • Arthritis 2005   • Closed nondisplaced fracture of fifth metatarsal bone of right foot with routine healing    • Disease of thyroid gland     hypothyroid   • Glaucoma, bilateral    • Hyperlipidemia    • Osteoporosis    • Scoliosis 12/12/12       Patient Active Problem List    Diagnosis Date Noted   • Femur fracture ([facility] ) 02/02/2023   • Acute pain due to trauma 02/03/2023   • Acute blood loss anemia 02/05/2023   • Lightheadedness 02/06/2023   • Osteoporosis 02/09/2023   • HLD (hyperlipidemia) 02/06/2023   • Fall 02/03/2023   • Hypothyroidism 02/03/2023   • Chest pain 10/31/2022   • Abnormal electrocardiogram (ECG) (EKG) 10/31/2022   • Sjogren's syndrome ([facility] ) 10/06/2021   • Primary osteoarthritis of right knee 05/14/2019   • Primary osteoarthritis of left knee 05/14/2019      SANTI Koo  Physical Medicine and Mesha Espinoza    Total visit time: 30 minutes, with more than 50% spent counseling/coordinating care  Counseling includes discussion with patient re: progress in therapies, functional issues observed by therapy staff, and discussion with patient regarding their current medical state and wellbeing  Coordination of patient's care was performed in conjunction with Internal Medicine service to monitor patient's labs, vitals, and management of their comorbidities

## 2023-02-14 NOTE — PLAN OF CARE
Problem: PAIN - ADULT  Goal: Verbalizes/displays adequate comfort level or baseline comfort level  Description: Interventions:  - Encourage patient to monitor pain and request assistance  - Assess pain using appropriate pain scale  - Administer analgesics based on type and severity of pain and evaluate response  - Implement non-pharmacological measures as appropriate and evaluate response  - Consider cultural and social influences on pain and pain management  - Notify physician/advanced practitioner if interventions unsuccessful or patient reports new pain  Outcome: Progressing     Problem: INFECTION - ADULT  Goal: Absence or prevention of progression during hospitalization  Description: INTERVENTIONS:  - Assess and monitor for signs and symptoms of infection  - Monitor lab/diagnostic results  - Monitor all insertion sites, i e  indwelling lines, tubes, and drains  - Monitor endotracheal if appropriate and nasal secretions for changes in amount and color  - [place] appropriate cooling/warming therapies per order  - Administer medications as ordered  - Instruct and encourage patient and family to use good hand hygiene technique  - Identify and instruct in appropriate isolation precautions for identified infection/condition  Outcome: Progressing  Goal: Absence of fever/infection during neutropenic period  Description: INTERVENTIONS:  - Monitor WBC    Outcome: Progressing     Problem: SAFETY ADULT  Goal: Patient will remain free of falls  Description: INTERVENTIONS:  - Educate patient/family on patient safety including physical limitations  - Instruct patient to call for assistance with activity   - Consult OT/PT to assist with strengthening/mobility   - Keep Call bell within reach  - Keep bed low and locked with side rails adjusted as appropriate  - Keep care items and personal belongings within reach  - Initiate and maintain comfort rounds  - Make Fall Risk Sign visible to staff  - Apply yellow socks and bracelet for high fall risk patients  - Consider moving patient to room near nurses station  Outcome: Progressing  Goal: Maintain or return to baseline ADL function  Description: INTERVENTIONS:  -  Assess patient's ability to carry out ADLs; assess patient's baseline for ADL function and identify physical deficits which impact ability to perform ADLs (bathing, care of mouth/teeth, toileting, grooming, dressing, etc )  - Assess/evaluate cause of self-care deficits   - Assess range of motion  - Assess patient's mobility; develop plan if impaired  - Assess patient's need for assistive devices and provide as appropriate  - Encourage maximum independence but intervene and supervise when necessary  - Involve family in performance of ADLs  - Assess for home care needs following discharge   - Consider OT consult to assist with ADL evaluation and planning for discharge  - Provide patient education as appropriate  Outcome: Progressing  Goal: Maintains/Returns to pre admission functional level  Description: INTERVENTIONS:  - Perform BMAT or MOVE assessment daily    - Set and communicate daily mobility goal to care team and patient/family/caregiver     - Collaborate with rehabilitation services on mobility goals if consulted  - Out of bed for toileting  - Record patient progress and toleration of activity level   Outcome: Progressing     Problem: DISCHARGE PLANNING  Goal: Discharge to home or other facility with appropriate resources  Description: INTERVENTIONS:  - Identify barriers to discharge w/patient and caregiver  - Arrange for needed discharge resources and transportation as appropriate  - Identify discharge learning needs (meds, wound care, etc )  - Arrange for interpretive services to assist at discharge as needed  - Refer to Case Management Department for coordinating discharge planning if the patient needs post-hospital services based on physician/advanced practitioner order or complex needs related to functional status, cognitive ability, or social support system  Outcome: Progressing     Problem: Prexisting or High Potential for Compromised Skin Integrity  Goal: Skin integrity is maintained or improved  Description: INTERVENTIONS:  - Identify patients at risk for skin breakdown  - Assess and monitor skin integrity  - Assess and monitor nutrition and hydration status  - Monitor labs   - Assess for incontinence   - Turn and reposition patient  - Assist with mobility/ambulation  - Relieve pressure over bony prominences  - Avoid friction and shearing  - Provide appropriate hygiene as needed including keeping skin clean and dry  - Evaluate need for skin moisturizer/barrier cream  - Collaborate with interdisciplinary team   - Patient/family teaching  - Consider wound care consult   Outcome: Progressing

## 2023-02-14 NOTE — PCC OCCUPATIONAL THERAPY
Occupational Therapy Weekly Team Note    Pt continues to make good progress with skilled occupational therapy intervention and is progressing toward long term goals for ADL, IADL's, and functional transfers/mobility  Pts long term goals for ADLs are Independent with Rolling Walker  Pt continues to present with impairments in activity tolerance, endurance, and standing balance/tolerance  Occupational performance remains limited by pain and orthopedic restricitions   Family training/education will not be required prior to D/C  Pt will continue to benefit from skilled acute rehab OT services to address above mentioned barriers and maximize functional independence in baseline areas of occupation to meet established treatment goals with overall decreased burden of care  Plan of care to continue to focus on LB Dressing, Meal preparation, Medication management , Functional Transfers, Standing tolerance, Standing balance , DME training/education, Family training/education, and Energy conservation training/education  Goals for the upcoming week are: continue to progress assess for progression to independent goals in the room    D/c set for Thursday 2/23

## 2023-02-14 NOTE — PCC PHYSICAL THERAPY
Pt is a 77yo female admitted to [address] [place] [address] s/p RLE IMN nailing after dx of femur fx  Pts PLOF is independent, lives alone, and has 5STE with L sided railing and flight of stairs with R sided railing to her second floor  Her first floor has 1/2 bath and "recliner bed"  With use of RW, pt performs transfers and gait with CGA  Trialed ambulating with one UE support but pt required Essence with increased antlagic gait pattern noted; hence, still recommend use of RW at this time  Pt negotiates 6 six-inch  steps with BUE support on L railing with CGA ascending and min A to descend  Barriers to DC are her RLE pain impacting her mobility as well as limited social support  Pt reports her friend/neighbor will assist with all IADLs and assist her to appointments as needed  She has a brother that can help intermittently  pt also reports she is open to first floor set up although she will have to sponge bathe  Pt will continue to benefit from skilled PT interventions to address deficits, reduce fall risk, and maximize functional independence  Anticipate DC home with intermittent social support, RW, and home PT     2/21/23  Pt lives alone and has met goals of being (I) except (S) on FF  Pt received IRP 2/20/23 & hallway privileges 2/21/23  Pt is to DC home 2/23/23 with "[address]" Pt was provided a HEP & everything was reviewed  Pt states she decided to get her RW, walker skis & walker tray from the pharmacy before she leaves incase she needs another AD within the next 5 years  Pt declined having neighbor/friend come in to observe therapy, she feels it is unnecessary d/t being (I)  Pt brother brought in [address] [place] [address] that will be used to amb upstairs & SPC that will be used for stair management  Pt has curb step to enter vs 5 steps into home with L rail ascending  Recommended pt used 5 steps with HR over curb step with no HR  Pt will stay on 1st floor till home PT deems she is safe to manage stairs   Pt brought up concern that brother tried to amb in upstairs bathroom with RW & was unable  Pt was (I) with side stepping with RW & holding onto counter  Pt will continue to benefit from home PT to address barriers to stair management & maximize independence

## 2023-02-14 NOTE — PROGRESS NOTES
02/14/23 1300   Pain Assessment   Pain Assessment Tool 0-10   Pain Score 6   Pain Location/Orientation Orientation: Right;Location: Groin; Location: Hip;Location: Knee   Pain Onset/Description Onset: Ongoing; Descriptor: Aching   Effect of Pain on Daily Activities reliance on RW for activities   Patient's Stated Pain Goal No pain   Hospital Pain Intervention(s) Cold applied;Repositioned   Cognition   Overall Cognitive Status WFL   Arousal/Participation Alert; Cooperative   Subjective   Subjective pt agreeable to participate in PT tx  Reports increased soreness in RLE into groin area, medical team is aware  Pt also  not due for medication until 13:15 and RN administered at that time   Roll Left and Right   Type of Assistance Needed Incidental touching;Verbal cues   Comment on flat mat without railings   Roll Left and Right CARE Score 4   Sit to Lying   Type of Assistance Needed Supervision;Verbal cues   Comment increased time required  practice on both sides of mat table  pt uses hands to assist RLE onto mat   Sit to Lying CARE Score 4   Lying to Sitting on Side of Bed   Type of Assistance Needed Supervision;Verbal cues   Comment increased time required  practice on both sides of mat table  pt first comes to long sitting and then uses hands to assist RLE off of mat   Lying to Sitting on Side of Bed CARE Score 4   Sit to Stand   Type of Assistance Needed Verbal cues; Adaptive equipment; Incidental touching   Comment CGA with RW with intermittent cueing for proper hand placement and controlled lowering to chair   Sit to Stand CARE Score 4   Bed-Chair Transfer   Type of Assistance Needed Verbal cues; Adaptive equipment; Incidental touching   Chair/Bed-to-Chair Transfer CARE Score 4   Car Transfer   Type of Assistance Needed Incidental touching;Verbal cues; Adaptive equipment   Comment use of RW, cues for hand placement; pt able to get RLE in/out of car with increased time and useof her hands to lift   Car Transfer CARE Score 4   Walk 10 Feet   Type of Assistance Needed Verbal cues; Physical assistance; Adaptive equipment   Physical Assistance Level 25% or less   Comment trialed with one sided wall rail to assess with one UE support and pt required min A with increased pain  pt able to complete distance with RW and supervision   Walk 10 Feet CARE Score 3   Walk 50 Feet with Two Turns   Type of Assistance Needed Physical assistance;Verbal cues; Adaptive equipment   Physical Assistance Level 25% or less   Walk 50 Feet with Two Turns CARE Score 3   Walk 150 Feet   Type of Assistance Needed Adaptive equipment;Verbal cues; Physical assistance   Physical Assistance Level 25% or less   Walk 150 Feet CARE Score 3   Walking 10 Feet on Uneven Surfaces   Type of Assistance Needed Adaptive equipment;Verbal cues; Physical assistance   Physical Assistance Level 25% or less   Comment use of RW over floor mat   Walking 10 Feet on Uneven Surfaces CARE Score 3   Ambulation   Distance Walked (feet) 150 ft  (x 3, 75 ft x 2, 10 ft x 4)   Assist Device Roller Walker  (trialed 10 ft with single wall rail on L side; rest of trials with RW)   Gait Pattern Antalgic; Inconsistant Radha;Decreased R stance; Improper weight shift  (reduced R heel strike)   Limitations Noted In Endurance; Heel Strike; Safety; Sequencing;Speed;Strength;Swing; Other  (pain)   Provided Assistance with: Weight Shift   Walk Assist Level Assist x 1  (close S to CGA with use of RW; min A with use of single L railing)   Does the patient walk? 2  Yes   4 Steps   Type of Assistance Needed Verbal cues; Physical assistance   Physical Assistance Level 25% or less   Comment trialed with L handrail ascending to simulate her front entrance to home (5STE with iron railing on L)  cued her to use BUE support on single rail, cued for LE sequencing  completed 2 trials of 6" steps consecutively   4 Steps CARE Score 3   Toilet Transfer   Type of Assistance Needed Adaptive equipment;Verbal cues; Incidental touching Comment use of RW and grab bar   Toilet Transfer CARE Score 4   Therapeutic Interventions   Strengthening All ther ex 3 sets of 10 with rest breaks provided prn  supine: RLE heel slides and SAQs, LLE straight leg raises  seated: knee ext AROM RLE and 2lb wt LLE, hip add ball squeezes  standing: marches, heel raises with BUE support on walker   Assessment   Treatment Assessment Pt participated in 90 minute PT tx session focusing on BLE strengthening exercises and functional mobility training  pt with increased RLE pain today but still able to tolerate session  provided cold pack to R thigh and groin at end of session  Trialed ambulating with one UE support but pt required Essence with increased antlagic gait pattern noted; hence, still recommend use of RW at this time  Also trialed negotiating stairs with one sided rail per home set up  Discussed DC planning further and pt reports her friend/neighbor will assist with all IADLs and assist her to appointments as needed  pt also reports she is open to first floor set up although she will have to sponge bathe  Pt will continue to benefit from skilled PT interventions to address deficits, reduce fall risk, and maximize functional independence  Problem List Decreased strength;Decreased range of motion;Decreased endurance;Orthopedic restrictions;Pain   Barriers to Discharge Decreased caregiver support; Inaccessible home environment   Plan   Treatment/Interventions Functional transfer training;LE strengthening/ROM; Therapeutic exercise;Elevations; Endurance training;Patient/family training;Bed mobility;Gait training   Progress Progressing toward goals   PT Therapy Minutes   PT Time In 1300   PT Time Out 1430   PT Total Time (minutes) 90   PT Mode of treatment - Individual (minutes) 90   PT Mode of treatment - Concurrent (minutes) 0   PT Mode of treatment - Group (minutes) 0   PT Mode of treatment - Co-treat (minutes) 0   PT Mode of Treatment - Total time(minutes) 90 minutes PT Cumulative Minutes 450   Therapy Time missed   Time missed?  No

## 2023-02-14 NOTE — PROGRESS NOTES
Internal Medicine Progress Note  Patient: Katerina Swift  Age/sex: 69 y o  female  Medical Record #: [id number]      ASSESSMENT/PLAN: (Interval History)  Katerina Swift is seen and examined and management for following issues:    Right displaced proximal femoral shaft fracture  • S/p IMN 2/3/23  • WBAT  • Per Ortho:  "Patient should not continue bisphosphonate use at this time given radiographic changes to left femur including lateral cortical thickening without obvious stress fracture/beaking  She needs to follow up immediately w ortho if she develops ambulatory pain in left thigh"   They discussed this with her   Left femur xray was negative fx 2/4     ABLA  • S/p 1 U PRBCs on 2/8/23 for hemoglobin of 7 3  • stable     Dizziness/syncope  • Related to orthostasis and blood loss  • Was given IVF and blood  • Watching for orthostasis while here = so far no sx of such and BPs have been stable     Hypothyroidism   • Continue levothyroxine  • Has a thyroid nodule found incidentally for which an US will need to be done as an OP     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 vs infundibulum  • To see as a followup in Neurovascular center     Old lacunar infarcts   • Found incidentally on imaging  • On statin     Right knee pain  • Xray negative 2/12/23  • Ortho saw = for ACE wrap and ice  • improved        Discharge date:  Team       The above assessment and plan was reviewed and updated as determined by my evaluation of the patient on 2/14/2023  Labs:   Results from last 7 days   Lab Units 02/13/23  0531 02/09/23  0621 02/08/23 2009 02/08/23  0438   WBC Thousand/uL 8 42  --   --  9 77   HEMOGLOBIN g/dL 9 4* 9 4*   < > 7 3*   HEMATOCRIT % 30 5* 29 1*   < > 23 0*   PLATELETS Thousands/uL 462*  --   --  266    < > = values in this interval not displayed       Results from last 7 days   Lab Units 02/13/23  0531   SODIUM mmol/L 142   POTASSIUM mmol/L 3 6   CHLORIDE mmol/L 111*   CO2 mmol/L 25   BUN mg/dL 21   CREATININE mg/dL 0 52* CALCIUM mg/dL 8 6                   Review of Scheduled Meds:  Current Facility-Administered Medications   Medication Dose Route Frequency Provider Last Rate   • acetaminophen  975 mg Oral TID Seferino Kerr MD     • artificial tear   Both Eyes HS PRN Rodolph Marker, CRNP     • bisacodyl  10 mg Rectal Daily PRN Rodolph Marker, CRNP     • enoxaparin  30 mg Subcutaneous Q12H [facility] & halfway SANTI Bobo     • glycerin-hypromellose-  1 drop Both Eyes TID Rodolph Marker, CRNP     • levothyroxine  25 mcg Oral Daily SANTI Bobo     • meclizine  12 5 mg Oral Q8H PRN KELLIE BoboNP     • melatonin  3 mg Oral HS Heather Westbrook MD     • oxyCODONE  2 5 mg Oral Q4H PRN Vamsiolph Marker, CRNP     • oxyCODONE  5 mg Oral Q4H PRN KELLIE BoboNP     • polyethylene glycol  17 g Oral Daily PRN Vamsiolph Marker, CRNP     • pravastatin  20 mg Oral Daily With [place] SANTI Rodriguez     • senna-docusate sodium  1 tablet Oral HS PRN SANTI Bobo         Subjective/ HPI: Patient seen and examined  Patients overnight issues or events were reviewed with nursing or staff during rounds or morning huddle session  New or overnight issues include the following:     No new or overnight issues  Offers no complaints  ROS:   A 10 point ROS was performed; negative except as noted above  Imaging:     XR femur 2 vw right   Final Result by Suzanna Dunaway MD (02/13 0825)      Stable alignment of right femoral fracture status post ORIF              Workstation performed: [id number]             *Labs /Radiology studies reviewed  *Medications reviewed and reconciled as needed  *Please refer to order section for additional ordered labs studies  *Case discussed with primary attending during morning huddle case rounds    Physical Examination:  Vitals:   Vitals:    02/13/23 0528 02/13/23 1416 02/13/23 2102 02/14/23 0538   BP: 142/67 141/69 143/72 149/71   BP Location: Left arm Left arm Right arm Left arm   Pulse: 73 72 70 74   Resp: 16 18 18 18   Temp: 98 2 °F (36 8 °C) 98 4 °F (36 9 °C) 98 4 °F (36 9 °C) 97 8 °F (36 6 °C)   TempSrc: Oral Rectal Oral Oral   SpO2: 94% 95% 98% 97%   Weight:       Height:           General Appearance: no distress, conversive  HEENT:  External ear normal   Nose normal w/o drainage  Mucous membranes are moist  Oropharynx is clear  Conjunctiva clear w/o icterus or redness  Neck:  Supple, normal ROM  Lungs: BBS without crackles/wheeze/rhonchi; respirations unlabored with normal inspiratory/expiratory effort  No retractions noted  On RA  CV: regular rate and rhythm; no rubs/murmurs/gallops, PMI normal   ABD: Abdomen is soft  Bowel sounds all quadrants  Nontender with no distention  EXT: mild right thigh edema  Skin: normal turgor, normal texture, no rashes  Psych: affect normal, mood normal  Neuro: AAO      The above physical exam was reviewed and updated as determined by my evaluation of the patient on 2/14/2023  Invasive Devices     None                    VTE Pharmacologic Prophylaxis: Enoxaparin  Code Status: Level 1 - Full Code  Current Length of Stay: 5 day(s)      Total time spent:  30 minutes with more than 50% spent counseling/coordinating care  Counseling includes discussion with patient re: progress  and discussion with patient of his/her current medical state/information  Coordination of patient's care was performed in conjunction with primary service  Time invested included review of patient's labs, vitals, and management of their comorbidities with continued monitoring  In addition, this patient was discussed with medical team including physician and advanced extenders  The care of the patient was extensively discussed and appropriate treatment plan was formulated unique for this patient  Medical decision making for the day was made by supervising physician unless otherwise noted in their attestation statement      ** Please Note:  voice to text software may have been used in the creation of this document   Although proof errors in transcription or interpretation are a potential of such software**

## 2023-02-14 NOTE — PROGRESS NOTES
02/14/23 0830   Pain Assessment   Pain Assessment Tool 0-10   Pain Score 3   Pain Location/Orientation Orientation: Right;Orientation: Lower; Location: Groin; Location: Hip   Restrictions/Precautions   Precautions Bed/chair alarms;Pain; Fall Risk   Weight Bearing Restrictions Yes   RLE Weight Bearing Per Order WBAT   LLE Weight Bearing Per Order WBAT  (notify MD immediately with inc pain )   ROM Restrictions No   Braces or Orthoses   (B/L TEDS)   Putting On/Taking Off Footwear   Type of Assistance Needed Physical assistance   Physical Assistance Level 51%-75%   Comment Dependent for b/l knee high TEDs, doffed socks, donned L shoe Essence to don R shoe  Limited by RLE edema  Putting On/Taking Off Footwear CARE Score 2   Sit to Stand   Type of Assistance Needed Incidental touching   Physical Assistance Level No physical assistance   Comment CGA with RW   Sit to Stand CARE Score 4   Bed-Chair Transfer   Type of Assistance Needed Incidental touching   Physical Assistance Level No physical assistance   Comment RW to recliner   Chair/Bed-to-Chair Transfer CARE Score 4   Transfer Bed/Chair/Wheelchair   Limitations Noted In Balance; Endurance;Pain Management;LE Strength   Adaptive Equipment Walker   Meal Prep   Meal Prep Level Walker   Meal Prep Level of Assistance Contact guard   Meal Preparation Pt engaged light meal prep activity utilizing RW with folding rw tray  Pt demo overall understanding of RW tray appropriateness and RW postioning when opening draws, refrigerator door, over head cabinets and using toaster  Pt tolerated cooking toast and transporting items to table  Finished activity without c/o pain or fatigue overall CGA     Kitchen Mobility   Kitchen-Mobility Level Walker   Kitchen Activity Retrieve items;Transport items   Kitchen Mobility Comments Refer above   Light Housekeeping   Light Housekeeping Level Walker   Light Housekeeping Level of Assistance Contact guard   Light Housekeeping Pt completed folding laundry activity in stance at table to promote dynamic/static standing balance, activity tolerance, LB weight bearing  Pt demo G item retrieval of clothing from acoss room to table by draping clothes over roller walker, completed without c/o pain  Exercise Tools   Exercise Tools Yes   Theraband Pt participated in ther ex utilizing yellow theraband  Completed 2 sets of 10 ex  Targeted muscles: biceps, pectorals, deltoids, triceps, shoulder  Pt completed activity seated to promote activity tolerance, endurance and UB strength  Theraputty Pt participated in ther ex utilizing yellow theraputty  Completed 3 sets of 10 ex 4 variations  Targeted grasp: Lateral pincer, finger opposition, three jaw todd, digit extension  Pt completed activity seated to promote activity tolerance, endurance and BUE strength  Pt reported baseline arthritis deficits limiting hand strength and overall I/ADL independence  Cognition   Overall Cognitive Status WFL   Arousal/Participation Alert; Cooperative   Attention Within functional limits   Orientation Level Oriented X4   Memory Within functional limits   Following Commands Follows all commands and directions without difficulty   Activity Tolerance   Activity Tolerance Patient tolerated treatment well   Assessment   Treatment Assessment Pt participated in skilled OT that focused on ADL retraining, Functional mobility, UB strengthening, static/dynamic standing balance, endurance  Pt cont  To be limited by dec strength, dec mobility, impaired dynamic/static balance, dec activity tolerance, BUE strength, RLE weakness and fatigue  Pt engaged kitchen mobility activity to promote LE  endurance, strength, functional mobility, static/dynamic balance, item retrieval and transportation using RW tray  G dynamic standing balance as well as dynamic reach when reaching in/out of refrigerator, overhead cabinets, utensil drawer while using RW  Pt tolerated activity without c/o pain or fatigue   Overall CGA Pt continues to be limited by dec act robin, dec endurance, dec strength, dec static/dynmaic standing balance  Pt will continue to benefit from skilled OT services with focus on above mentioned deficits to inc ADL's, I ADL's ind with adaptive device  Prognosis Good   Problem List Decreased strength;Decreased range of motion;Decreased endurance;Decreased mobility; Impaired balance;Orthopedic restrictions;Pain   Barriers to Discharge Decreased caregiver support; Inaccessible home environment   Plan   Treatment/Interventions ADL retraining;Functional transfer training;LE strengthening/ROM; Endurance training; Therapeutic exercise; Bed mobility; Compensatory technique education   Progress Progressing toward goals   Recommendation   OT Discharge Recommendation   (Pending Progress)   OT Therapy Minutes   OT Time In 0830   OT Time Out 1000   OT Total Time (minutes) 90   OT Mode of treatment - Individual (minutes) 90   OT Mode of treatment - Concurrent (minutes) 0   OT Mode of treatment - Group (minutes) 0   OT Mode of treatment - Co-treat (minutes) 0   OT Mode of Treatment - Total time(minutes) 90 minutes   OT Cumulative Minutes 360   Therapy Time missed   Time missed?  No

## 2023-02-14 NOTE — PCC CARE MANAGEMENT
Pt is making good progress and is scheduled to return home later this week  Pt is referred to [facility] for continued physical therapy at home  Lovenox teaching kit provided to nursing to review with patient

## 2023-02-14 NOTE — PROGRESS NOTES
Access Code: [email or web address]  URL: [email or web address]  Date: 02/14/2023  Prepared by: Melissa Kidd  • Putty Squeezes - 1 x daily - 5 x weekly - 3 sets - 10 reps  • Tip Pinch with Putty - 1 x daily - 5 x weekly - 3 sets - 10 reps  • Seated Finger Extension with Putty - 1 x daily - 5 x weekly - 3 sets - 10 reps  • Key Pinch with Putty - 1 x daily - 5 x weekly - 3 sets - 10 reps  • 3-Point Pinch with Putty - 1 x daily - 5 x weekly - 3 sets - 10 reps  • Finger Lumbricals with Putty - 1 x daily - 5 x weekly - 3 sets - 10 reps  • Finger Adduction with Putty - 1 x daily - 5 x weekly - 3 sets - 10 reps

## 2023-02-15 RX ADMIN — PRAVASTATIN SODIUM 20 MG: 20 TABLET ORAL at 15:31

## 2023-02-15 RX ADMIN — GLYCERIN 1 DROP: .002; .002; .01 SOLUTION/ DROPS OPHTHALMIC at 08:00

## 2023-02-15 RX ADMIN — MELATONIN 3 MG: at 21:13

## 2023-02-15 RX ADMIN — LEVOTHYROXINE SODIUM 25 MCG: 25 TABLET ORAL at 21:14

## 2023-02-15 RX ADMIN — ENOXAPARIN SODIUM 30 MG: 30 INJECTION SUBCUTANEOUS at 21:13

## 2023-02-15 RX ADMIN — ACETAMINOPHEN 975 MG: 325 TABLET, FILM COATED ORAL at 05:58

## 2023-02-15 RX ADMIN — ACETAMINOPHEN 975 MG: 325 TABLET, FILM COATED ORAL at 13:01

## 2023-02-15 RX ADMIN — GLYCERIN 1 DROP: .002; .002; .01 SOLUTION/ DROPS OPHTHALMIC at 15:31

## 2023-02-15 RX ADMIN — LIDOCAINE 5% 1 PATCH: 700 PATCH TOPICAL at 08:00

## 2023-02-15 RX ADMIN — GLYCERIN 1 DROP: .002; .002; .01 SOLUTION/ DROPS OPHTHALMIC at 21:16

## 2023-02-15 RX ADMIN — ENOXAPARIN SODIUM 30 MG: 30 INJECTION SUBCUTANEOUS at 08:00

## 2023-02-15 RX ADMIN — ACETAMINOPHEN 975 MG: 325 TABLET, FILM COATED ORAL at 21:13

## 2023-02-15 NOTE — PLAN OF CARE
Problem: PAIN - ADULT  Goal: Verbalizes/displays adequate comfort level or baseline comfort level  Description: Interventions:  - Encourage patient to monitor pain and request assistance  - Assess pain using appropriate pain scale  - Administer analgesics based on type and severity of pain and evaluate response  - Implement non-pharmacological measures as appropriate and evaluate response  - Consider cultural and social influences on pain and pain management  - Notify physician/advanced practitioner if interventions unsuccessful or patient reports new pain  Outcome: Progressing     Problem: INFECTION - ADULT  Goal: Absence or prevention of progression during hospitalization  Description: INTERVENTIONS:  - Assess and monitor for signs and symptoms of infection  - Monitor lab/diagnostic results  - Monitor all insertion sites, i e  indwelling lines, tubes, and drains  - Monitor endotracheal if appropriate and nasal secretions for changes in amount and color  - [place] appropriate cooling/warming therapies per order  - Administer medications as ordered  - Instruct and encourage patient and family to use good hand hygiene technique  - Identify and instruct in appropriate isolation precautions for identified infection/condition  Outcome: Progressing  Goal: Absence of fever/infection during neutropenic period  Description: INTERVENTIONS:  - Monitor WBC    Outcome: Progressing     Problem: SAFETY ADULT  Goal: Patient will remain free of falls  Description: INTERVENTIONS:  - Educate patient/family on patient safety including physical limitations  - Instruct patient to call for assistance with activity   - Consult OT/PT to assist with strengthening/mobility   - Keep Call bell within reach  - Keep bed low and locked with side rails adjusted as appropriate  - Keep care items and personal belongings within reach  - Initiate and maintain comfort rounds  - Make Fall Risk Sign visible to staff  - Apply yellow socks and bracelet for high fall risk patients  - Consider moving patient to room near nurses station  Outcome: Progressing  Goal: Maintain or return to baseline ADL function  Description: INTERVENTIONS:  -  Assess patient's ability to carry out ADLs; assess patient's baseline for ADL function and identify physical deficits which impact ability to perform ADLs (bathing, care of mouth/teeth, toileting, grooming, dressing, etc )  - Assess/evaluate cause of self-care deficits   - Assess range of motion  - Assess patient's mobility; develop plan if impaired  - Assess patient's need for assistive devices and provide as appropriate  - Encourage maximum independence but intervene and supervise when necessary  - Involve family in performance of ADLs  - Assess for home care needs following discharge   - Consider OT consult to assist with ADL evaluation and planning for discharge  - Provide patient education as appropriate  Outcome: Progressing  Goal: Maintains/Returns to pre admission functional level  Description: INTERVENTIONS:  - Perform BMAT or MOVE assessment daily    - Set and communicate daily mobility goal to care team and patient/family/caregiver     - Collaborate with rehabilitation services on mobility goals if consulted  - Out of bed for toileting  - Record patient progress and toleration of activity level   Outcome: Progressing     Problem: DISCHARGE PLANNING  Goal: Discharge to home or other facility with appropriate resources  Description: INTERVENTIONS:  - Identify barriers to discharge w/patient and caregiver  - Arrange for needed discharge resources and transportation as appropriate  - Identify discharge learning needs (meds, wound care, etc )  - Arrange for interpretive services to assist at discharge as needed  - Refer to Case Management Department for coordinating discharge planning if the patient needs post-hospital services based on physician/advanced practitioner order or complex needs related to functional status, cognitive ability, or social support system  Outcome: Progressing     Problem: Prexisting or High Potential for Compromised Skin Integrity  Goal: Skin integrity is maintained or improved  Description: INTERVENTIONS:  - Identify patients at risk for skin breakdown  - Assess and monitor skin integrity  - Assess and monitor nutrition and hydration status  - Monitor labs   - Assess for incontinence   - Turn and reposition patient  - Assist with mobility/ambulation  - Relieve pressure over bony prominences  - Avoid friction and shearing  - Provide appropriate hygiene as needed including keeping skin clean and dry  - Evaluate need for skin moisturizer/barrier cream  - Collaborate with interdisciplinary team   - Patient/family teaching  - Consider wound care consult   Outcome: Progressing

## 2023-02-15 NOTE — PROGRESS NOTES
02/15/23 0830   Pain Assessment   Pain Assessment Tool 0-10   Pain Score 2   Pain Location/Orientation Orientation: Right;Orientation: Lower; Location: Groin   Hospital Pain Intervention(s) Repositioned; Emotional support   Restrictions/Precautions   Precautions Bed/chair alarms   Weight Bearing Restrictions Yes   RLE Weight Bearing Per Order WBAT   LLE Weight Bearing Per Order WBAT   ROM Restrictions No   Lifestyle   Autonomy "I enjoy bowling "   Upper Body Dressing   Type of Assistance Needed Set-up / clean-up   Physical Assistance Level No physical assistance   Comment Seated at EOB to don bra/ pullover shirt overal CS   Upper Body Dressing CARE Score 5   Lower Body Dressing   Type of Assistance Needed Incidental touching   Physical Assistance Level No physical assistance   Comment From EOB pt donned undergarments/pants using leg crossing method to above knee  In stance to pull over hips overall CGA while in stance with RW  Lower Body Dressing CARE Score 4   Putting On/Taking Off Footwear   Type of Assistance Needed Physical assistance   Physical Assistance Level 26%-50%   Comment Pt donned bilateral socks at EOB using leg crossing method, required min A to don RLE due to mild swelling  TA for donning bilateral MATTHEW stocking  Putting On/Taking Off Footwear CARE Score 3   Dressing/Undressing Clothing   Don UB Clothes Pullover Shirt;Bra   Remove LB Clothes Socks  (Partial shoe )   Don LB Clothes Undergarment;Socks   Limitations Noted In Balance; Endurance; Safety;Strength;Timeliness   Positioning Sit Edge Of Bed;Standing   Sit to Stand   Type of Assistance Needed Verbal cues; Adaptive equipment; Incidental touching   Physical Assistance Level No physical assistance   Comment CGA with RW min vc's for proper hand placement  Sit to Stand CARE Score 4   Bed-Chair Transfer   Type of Assistance Needed Adaptive equipment; Incidental touching   Physical Assistance Level No physical assistance   Comment RW to recliner Overall CGA   Chair/Bed-to-Chair Transfer CARE Score 4   Health Management   Health Management Level of Assistance Independent   Health Management Pt engaged in medication management task  At baseline pt reports only being on a medication for her osteoporosis, cholesterol medication, and thyroid medication  Pt well aware of all names, doses, and frequency  Pt able to identify baseline medications vs new medications here and is very insightful into purposes  The only new daily medication is "melatonin" at HS for sleep but pt states she may not continue to take that at d/c  Pt was able to self identify G method of timing taking medications with routine daily tasks including dinner for cholesterol medication and at HS on empty stomach for thyroid  Pt states she would keep PRN pain meds separate as if she didn't need them she wouldn't take them  Not recommending use of pill organizer at this time  Educated on different styles for future use if inc medications at varying times of day  Pt receptive  Independent with task and not recommending continued practice  Functional Standing Tolerance   Time 15 mins   Activity Pt participated in clothing pin resistance activity to promote dexterity, [address] Présmaeve Foster, following instructions, memory recall, static standing balance for duration of 15 mins required 1 rest break to manage fatigue, finished activity seated in standard chair  Comments Overall G balance  CGA while in stance  Exercise Tools   Exercise Tools Yes   UE Ergometer Pt participated in table top ergo meter activity for total of 15 mins  5 min prograde/ 5 min retrograde  Required rest break at 3 min kwasi  Finished retrograde for full 5 mins  Activity objective to promote UB strength, core strengthening, activity tolerance  Cognition   Overall Cognitive Status WFL   Arousal/Participation Alert; Cooperative   Attention Within functional limits   Orientation Level Oriented X4   Memory Within functional limits   Following Commands Follows all commands and directions without difficulty   Activity Tolerance   Activity Tolerance Patient tolerated treatment well   Assessment   Treatment Assessment Pt participated in skilled OT that focused on ADL retraining, Functional mobility, UB strengthening, static/dynamic standing balance, endurance, STM, dexterity  Pt cont  To be limited by dec strength, dec mobility, impaired dynamic/static standing balance, LE weakness,  edema management  Pt participated in word search while in stance using rw for support prn to promote endurance, static standing balance, activity tolerance and LLE strengthening  Pt completed without c/o pain or fatigue, required min VC's to encourage taking a rest break after 8 mins  Overall pt tolerated activity well for total of 15 mins  Pt will continue to benefit from skilled OT services with focus on above mentioned deficits to inc Static standing balance, UB/LE strength, ADL/IADL ind, endurance, dexterity  Prognosis Good   Problem List Decreased strength;Decreased range of motion;Decreased endurance;Orthopedic restrictions;Pain   Barriers to Discharge Decreased caregiver support; Inaccessible home environment   Plan   Treatment/Interventions ADL retraining;Functional transfer training;LE strengthening/ROM; Therapeutic exercise; Endurance training;Cognitive reorientation; Bed mobility; Compensatory technique education;Patient/family training   Progress Progressing toward goals   Recommendation   OT Discharge Recommendation   ([place] progress )   OT Therapy Minutes   OT Time In 0830   OT Time Out 1000   OT Total Time (minutes) 90   OT Mode of treatment - Individual (minutes) 90   OT Mode of treatment - Concurrent (minutes) 0   OT Mode of treatment - Group (minutes) 0   OT Mode of treatment - Co-treat (minutes) 0   OT Mode of Treatment - Total time(minutes) 90 minutes   OT Cumulative Minutes 450   Therapy Time missed   Time missed?  No

## 2023-02-15 NOTE — PROGRESS NOTES
Progress Note - Orthopedics   Kelli Ordaz 69 y o  female MRN: [id number]  Unit/Bed#: -01      Subjective:    69 y  o female w PMHx s/f hypothyroidism, glaucoma, HLD, osteoporosis, and scoliosis who is now POD14 from [facility] for a [facility] fx  No acute events, no new complaints  Patient doing well  Pain present but patient adamantly refusing any opioid pain medications  Denies fevers, chills, CP, SOB, N/V, numbness or tingling  Patient reports no issues with urination or bowel movements  Patient states that she would like to get back to her old lifestyle      Labs:  0   Lab Value Date/Time    HCT 32 3 (L) 02/16/2023 0536    HCT 30 5 (L) 02/13/2023 0531    HCT 29 1 (L) 02/09/2023 0621    HGB 9 9 (L) 02/16/2023 0536    HGB 9 4 (L) 02/13/2023 0531    HGB 9 4 (L) 02/09/2023 0621    INR 1 05 02/03/2023 0550    WBC 7 57 02/16/2023 0536    WBC 8 42 02/13/2023 0531    WBC 9 77 02/08/2023 0438       Meds:    Current Facility-Administered Medications:   •  acetaminophen (TYLENOL) tablet 975 mg, 975 mg, Oral, TID, Selena Gibbs MD, 975 mg at 02/16/23 0525  •  artificial tear (LUBRIFRESH P M ) ophthalmic ointment, , Both Eyes, HS PRN, SANTI Peña, Given at 02/09/23 1746  •  bisacodyl (DULCOLAX) rectal suppository 10 mg, 10 mg, Rectal, Daily PRN, SANTI Bobo  •  enoxaparin (LOVENOX) subcutaneous injection 30 mg, 30 mg, Subcutaneous, Q12H JAQUAN, SANTI Bobo, 30 mg at 02/16/23 0818  •  glycerin-hypromellose- (ARTIFICIAL TEARS) ophthalmic solution 1 drop, 1 drop, Both Eyes, TID, SANTI Bobo, 1 drop at 02/16/23 0818  •  levothyroxine tablet 25 mcg, 25 mcg, Oral, Daily, SANTI Bobo, 25 mcg at 02/15/23 2114  •  lidocaine (LIDODERM) 5 % patch 1 patch, 1 patch, Topical, Daily, SANTI Bobo, 1 patch at 02/16/23 0818  •  meclizine (ANTIVERT) tablet 12 5 mg, 12 5 mg, Oral, Q8H PRN, SANTI Bobo  •  melatonin tablet 3 mg, 3 mg, Oral, HS, Amada Ovalle MD, 3 mg at 02/15/23 2113  •  oxyCODONE (ROXICODONE) IR tablet 2 5 mg, 2 5 mg, Oral, Q4H PRN, SANTI Bobo  •  oxyCODONE (ROXICODONE) IR tablet 5 mg, 5 mg, Oral, Q4H PRN, SANTI Bobo  •  polyethylene glycol (MIRALAX) packet 17 g, 17 g, Oral, Daily PRN, SANTI Bobo  •  pravastatin (PRAVACHOL) tablet 20 mg, 20 mg, Oral, Daily With Dinner, SANTI Gaona, 20 mg at 02/15/23 1531  •  senna-docusate sodium (SENOKOT S) 8 6-50 mg per tablet 1 tablet, 1 tablet, Oral, HS PRN, SANTI Bobo    Blood Culture:   No results found for: BLOODCX    Wound Culture:   No results found for: WOUNDCULT    Ins and Outs:  I/O last 24 hours: In: 840 [P O :840]  Out: -           Physical:  Vitals:    02/16/23 0538   BP: 116/68   Pulse: 72   Resp: 16   Temp: 98 3 °F (36 8 °C)   SpO2: 97%     Musculoskeletal: right Lower Extremity  · Skin intact  Incisions well healed  No erythema or ecchymosis  Hematoma palpable and stable  · Dressing c/d/i  · TTP R groin  · Sensation intact to saphenous, sural, tibial, superficial peroneal nerve, and deep peroneal  · Motor intact to +FHL/EHL, +ankle dorsi/plantar flexion  · 2+ DP pulse, symmetric bilaterally  · Digits warm and well perfused  · Capillary refill < 2 seconds  · Musculature soft and compressible, no pain with passive stretch    Assessment:    69 y  o female POD14 from [facility]  Patient doing well overall  Hematoma stable  Improving with PT daily  Staples removed today      Plan:  · WBAT RLE  · Greater than 2 gram drop which qualifies for diagnosis of acute blood loss anemia, will monitor and administer IVF/prbc as indicated   · PT/OT  · Pain control  · DVT ppx - lovenox for 28 days post operatively  · Continue to trend hgb  · Medical co-morbidities include hypothyroidism, HLD, glaucoma, osteoporosis, which are being managed per primary team  · Dispo: Ortho will follow    Martina Mcdonnell MD

## 2023-02-15 NOTE — PROGRESS NOTES
PM&R PROGRESS NOTE:  Sonal Giang 69 y o  female MRN: [id number]  Unit/Bed#: -01 Encounter: [id number]        Rehabilitation Diagnosis: Impairment of mobility, safety and Activities of Daily Living (ADLs) due to Orthopedic Disorders:  08 2  Femur (Shaft) Fracture    HPI: Joel Reese is a 69 y o  female with a history of hypothyroid, osteoporosis, scoliosis, and hyperlipidemia that presented to [facility] on 2/2/23 with c/o right hip pain after feeling a pop in her right hip when descending stairs  She was unable to ambulate, lowered herself to the floor and crawled to phone to call EMS  On exam right lower extremity shortening and external rotation  Imaging showed acute transverse substantially displaced proximal femoral shaft fracture  Orthopedics was consulted and recommended NWB with Mane's traction, with surgical intervention  On 2/3, patient underwent right insertion IM nailing with Dr Brian Randall  Patient is WBAT with Lovenox for 28 days for DVT prophylaxis  Post-op complicated by dizziness and an episode requiring her to be lowered to floor  CTA of head/neck was obtained, which showed no acute findings; did show outpouching of PCA  Neurovascular recommended follow-up as outpatient  Echo EF 65%, mild TVR and trace PVR  Patient's hemoglobin 7 3 which she received 1 unit pRBC improving to 9 4  Patient was deemed hemodynamically stable, PT/OT recommend inpatient acute rehabilitation  Patient was admitted to [address] [place] [address] on 02/09/23  SUBJECTIVE: Patient seen face to face  No acute issues overnight  Patient reports right groin and leg pain, Lidoderm patch started today, scheduled tylenol, and prn oxycodone available for increased pain  Patient continues to refuse opioid medications at this time  Slept well last evening  Visualized incisions; no drainage, dressings intact  Right distal incision hematoma stable, images taken, reapplied ace wrap  LB 02/15   No chest pain, shortness of breath, fever, chills, nausea, abdominal pain  ASSESSMENT: Stable, progressing    PLAN:  -pain: continue ice,elevation, Lidoderm patch and tylenol; prn oxycodone as needed   -Insomnia- continue melatonin 3 mg  -Continue current rehabilitation program     Rehabilitation  Team conference: The team discussed patient's functional status, goals, and barriers  • Functional deficits:  Self care, impaired mobility  • Continue current rehabilitation plan of care to maximize function      • Functional update:   Physical Therapy Occupational Therapy Speech Therapy   Weight Bearing Status: Weight Bearing as Tolerated (RLE)  Transfers: Incidental Touching (with use of RW)  Bed Mobility: Supervision  Amulation Distance (ft): 150 feet  Ambulation: Incidental Touching (with use of RW  trialed with one sided wallrail and pt only able to compelte 10 ft with min A due to pain)  Assistive Device for Ambulation: Roller Walker  Wheelchair Mobility Distance:  (N/A)  Number of Stairs: 6  Assistive Device for Stairs: Lehft Hand Rail (BUE support on L railing ascending)  Stair Assistance: Minimal Assistance  Ramp:  (not required for entry to home)  Discharge Recommendations: Home with:  [address] [facility] [place] laura with[de-identified] Family Support, First Floor Setup, Home Physical Therapy   Eating: Independent  Grooming: Supervision  Bathing: Incidental Touching  Bathing: Incidental Touching  Upper Body Dressing: Supervision  Lower Body Dressing: Minimal Assistance  Toileting: Incidental Touching  Tub/Shower Transfer: Minimal Assistance  Toilet Transfer: Incidental Touching  Cognition: Within Defined Limits  Orientation: Person, Place, Time, Situation               • Estimated Discharge: anticipated 10-14 days    Pain  • tylenol    DVT prophylaxis  • Lovenox    Bladder plan  • Continent    Bowel plan  • Continent      * Femur fracture ([facility] [address] )  Assessment & Plan  - Pt felt "pop" when descending stairs with inability to ambulate  -X-ray: acute transverse substantially displaced proximal femoral shaft fracture  -NWB with bucks traction  -2/3 right IMN  -pain control  -WBAT right lowe extremity  - Lovenox for 28 days (3/3)  -po dizziness and diaphtetic; orthostatics normal, IVF, Hbg 7 3 pRBC x1  -Monitor incision for signs of infection  -2 week f/u 02/17  -Participate in comprehensive therapies 3 hr day, 5-6 days a week  -Follow-up with Orthopedic Surgery (Dr Derrick Lord)    Acute pain due to trauma  Assessment & Plan  -acute post-op pain right hip/leg  -tylenol 975 mg scheduled, oxycodone IR 2 5-5 mg prn  -monitor pain with therapy  -adjust medication as needed    Acute blood loss anemia  Assessment & Plan  -post op dizziness, diaphoresis, lowered to floor  -IVF  -Hbg 7 3, received 1 unit pRBC  -current Hbg 9 4  -monitor hemoglobin  -consult IM for co-mgmt    Lightheadedness  Assessment & Plan  -post-op with ambulation  - orthostatic BP normal  -Hbg 7 3 (2/7)  -received IVF, 1 unit PRBC  -Stable    Osteoporosis  Assessment & Plan  -took bisphosphonate <10 years  -per Ortho: pt should stop bisphosphonate at this time d/t left femur lateral cortical thickening without obvious stress fracture/break  -needs immediate f/u if develops pain in left leg  -follow-up with Orthopedics    HLD (hyperlipidemia)  Assessment & Plan  -  -home: Crestor 10 mg  -continue Pravachol 20 mg    Hypothyroidism  Assessment & Plan  -continue levothyroxine    Appreciate IM consultants medical co-management  Labs, medications, and imaging reviewed  ROS:  Review of Systems   A 10 point review of systems was negative except for what is noted in the HPI  OBJECTIVE:   /66 (BP Location: Left arm)   Pulse 85   Temp 98 4 °F (36 9 °C) (Oral)   Resp 16   Ht 5' 1" (1 549 m)   Wt 58 7 kg (129 lb 6 4 oz)   SpO2 95%   BMI 24 45 kg/m²     Physical Exam  Constitutional:       Appearance: Normal appearance  HENT:      Head: Normocephalic and atraumatic        Mouth/Throat:      Mouth: Mucous membranes are moist  Cardiovascular:      Rate and Rhythm: Normal rate and regular rhythm  Pulses: Normal pulses  Heart sounds: Normal heart sounds  Pulmonary:      Effort: Pulmonary effort is normal       Breath sounds: Normal breath sounds  Abdominal:      General: Bowel sounds are normal       Palpations: Abdomen is soft  Musculoskeletal:         General: Normal range of motion  Cervical back: Normal range of motion  Right lower leg: Edema present  Skin:     General: Skin is warm and dry  Capillary Refill: Capillary refill takes less than 2 seconds  Findings: Bruising present  Comments: Right groin, leg   Neurological:      Mental Status: She is alert and oriented to person, place, and time     Psychiatric:         Mood and Affect: Mood normal          Judgment: Judgment normal         Lab Results   Component Value Date    WBC 8 42 02/13/2023    HGB 9 4 (L) 02/13/2023    HCT 30 5 (L) 02/13/2023    MCV 97 02/13/2023     (H) 02/13/2023     Lab Results   Component Value Date    SODIUM 142 02/13/2023    K 3 6 02/13/2023     (H) 02/13/2023    CO2 25 02/13/2023    BUN 21 02/13/2023    CREATININE 0 52 (L) 02/13/2023    GLUC 88 02/13/2023    CALCIUM 8 6 02/13/2023     Lab Results   Component Value Date    INR 1 05 02/03/2023    INR 0 95 02/02/2023    PROTIME 13 9 02/03/2023    PROTIME 12 8 02/02/2023       Current Facility-Administered Medications:   •  acetaminophen (TYLENOL) tablet 975 mg, 975 mg, Oral, TID, Randolph Blanca MD, 975 mg at 02/15/23 0558  •  artificial tear (LUBRIFRESH P M ) ophthalmic ointment, , Both Eyes, HS PRN, SANTI Myers, Given at 02/09/23 1746  •  bisacodyl (DULCOLAX) rectal suppository 10 mg, 10 mg, Rectal, Daily PRN, SANTI Bobo  •  enoxaparin (LOVENOX) subcutaneous injection 30 mg, 30 mg, Subcutaneous, Q12H JAQUAN, SANTI Bobo, 30 mg at 02/15/23 0800  •  glycerin-hypromellose- (ARTIFICIAL TEARS) ophthalmic solution 1 drop, 1 drop, Both Eyes, TID, SANTI Bobo, 1 drop at 02/15/23 0800  •  levothyroxine tablet 25 mcg, 25 mcg, Oral, Daily, SANTI Bobo, 25 mcg at 02/14/23 2114  •  lidocaine (LIDODERM) 5 % patch 1 patch, 1 patch, Topical, Daily, SANTI Bobo, 1 patch at 02/15/23 0800  •  meclizine (ANTIVERT) tablet 12 5 mg, 12 5 mg, Oral, Q8H PRN, SANTI Bobo  •  melatonin tablet 3 mg, 3 mg, Oral, HS, Heather Westbrook MD, 3 mg at 02/14/23 2114  •  oxyCODONE (ROXICODONE) IR tablet 2 5 mg, 2 5 mg, Oral, Q4H PRN, SANTI Bobo  •  oxyCODONE (ROXICODONE) IR tablet 5 mg, 5 mg, Oral, Q4H PRN, SANTI Bobo  •  polyethylene glycol (MIRALAX) packet 17 g, 17 g, Oral, Daily PRN, SANTI Bobo  •  pravastatin (PRAVACHOL) tablet 20 mg, 20 mg, Oral, Daily With Dinner, SANTI Garzon, 20 mg at 02/14/23 1536  •  senna-docusate sodium (SENOKOT S) 8 6-50 mg per tablet 1 tablet, 1 tablet, Oral, HS PRN, SANTI Garzon    Past Medical History:   Diagnosis Date   • Arthritis 2005   • Closed nondisplaced fracture of fifth metatarsal bone of right foot with routine healing    • Disease of thyroid gland     hypothyroid   • Glaucoma, bilateral    • Hyperlipidemia    • Osteoporosis    • Scoliosis 12/12/12       Patient Active Problem List    Diagnosis Date Noted   • Femur fracture ([facility] [address] ) 02/02/2023   • Acute pain due to trauma 02/03/2023   • Acute blood loss anemia 02/05/2023   • Lightheadedness 02/06/2023   • Osteoporosis 02/09/2023   • HLD (hyperlipidemia) 02/06/2023   • Fall 02/03/2023   • Hypothyroidism 02/03/2023   • Chest pain 10/31/2022   • Abnormal electrocardiogram (ECG) (EKG) 10/31/2022   • Sjogren's syndrome ([address] ) 10/06/2021   • Primary osteoarthritis of right knee 05/14/2019   • Primary osteoarthritis of left knee 05/14/2019      SANTI Garzon  Physical Medicine and Mesha     Total time spent:  35 minutes, with more than 50% spent counseling/coordinating care  Counseling includes discussion with patient re: progress in therapies, functional issues observed by therapy staff, and discussion with patient his/her current medical state/wellbeing  Coordination of patient's care was performed in conjunction with Internal Medicine service to monitor patient's labs, vitals, and management of their comorbidities  In addition, this patient was discussed by the interdisciplinary team in weekly case conference today  The care of the patient was extensively discussed with all care providers and an appropriate rehabilitation plan was formulated unique for this patient  Barriers were identified preventing progression of therapy and appropriate interventions were discussed with each discipline  Please see the team note for input from all disciplines regarding barriers, intervention, and discharge planning

## 2023-02-15 NOTE — PROGRESS NOTES
02/15/23 1230   Pain Assessment   Pain Assessment Tool 0-10   Pain Score 5   Pain Location/Orientation Orientation: Right;Location: Hip   Pain Onset/Description Onset: Ongoing; Descriptor: Aching;Descriptor: Discomfort   Patient's Stated Pain Goal No pain   Hospital Pain Intervention(s) Ambulation/increased activity; Emotional support;Repositioned   Cognition   Overall Cognitive Status WFL   Orientation Level Oriented X4   Subjective   Subjective pt agreeable to participate in PT tx  reports reduced pain with new lidocaine patch   Sit to Stand   Type of Assistance Needed Verbal cues; Incidental touching; Adaptive equipment   Comment RW , intermittent cueing for controlled descent when sitting   Sit to Stand CARE Score 4   Walk 10 Feet   Type of Assistance Needed Supervision;Verbal cues   Comment cues for improved RLE heel strike and increased R stance time   Walk 10 Feet CARE Score 4   Walk 50 Feet with Two Turns   Type of Assistance Needed Verbal cues; Incidental touching; Adaptive equipment   Comment RW   Walk 50 Feet with Two Turns CARE Score 4   Walk 150 Feet   Type of Assistance Needed Verbal cues; Incidental touching; Adaptive equipment   Comment RW   Walk 150 Feet CARE Score 4   Ambulation   Does the patient walk? 2  Yes   4 Steps   Type of Assistance Needed Verbal cues; Incidental touching   Comment four 6"  steps with BUE support on L handrail ascending  cues for proper sequencing as she tends to favor going down with LLE "good leg"   4 Steps CARE Score 4   Therapeutic Interventions   Strengthening standing: heel raises x 30reps   Assessment   Treatment Assessment 30 minute PT tx session focusing on hallway ambulation and stair negotiation  pt with improved performance on stairs today using single railing  discussed DME as pt now has DC date set for 2/23  Pt had standard walker ordered in 2011, also has old RW from her mom unsure of if in good condition  pt agreeable to have new RW ordered     Problem List Decreased strength;Decreased range of motion;Decreased endurance;Decreased mobility;Pain;Orthopedic restrictions   Barriers to Discharge Decreased caregiver support; Inaccessible home environment   Plan   Treatment/Interventions Functional transfer training;LE strengthening/ROM; Elevations; Therapeutic exercise; Endurance training;Patient/family training;Bed mobility;Gait training   Progress Progressing toward goals   PT Therapy Minutes   PT Time In 1230   PT Time Out 1300   PT Total Time (minutes) 30   PT Mode of treatment - Individual (minutes) 30   PT Mode of treatment - Concurrent (minutes) 0   PT Mode of treatment - Group (minutes) 0   PT Mode of treatment - Co-treat (minutes) 0   PT Mode of Treatment - Total time(minutes) 30 minutes   PT Cumulative Minutes 480   Therapy Time missed   Time missed?  No

## 2023-02-15 NOTE — PROGRESS NOTES
Pastoral Care Progress Note    2/15/2023  Patient: Moises Frey : [birth year]  Admission Date & Time: 2023 1214  MRN: [id number] CSN: [id number]         02/15/23 1000   Clinical Encounter Type   Visited With Patient   Routine Visit Follow-up   Christian Encounters   Christian Needs Prayer   Sacramental Encounters   Communion Given Indicator Yes     Deena Padilla followed-up with the pt and conducted a long pastoral visit  Fr also provided prayers, blessings, and the Sacrament of [employer]  No further needs were expressed at this time  Chaplains still remain available

## 2023-02-15 NOTE — PROGRESS NOTES
02/15/23 1430   Pain Assessment   Pain Assessment Tool 0-10   Pain Score 4   Pain Location/Orientation Orientation: Right;Location: Hip   Pain Onset/Description Onset: Ongoing; Descriptor: Aching;Descriptor: Discomfort   Patient's Stated Pain Goal No pain   Hospital Pain Intervention(s) Repositioned; Ambulation/increased activity   Cognition   Overall Cognitive Status WFL   Orientation Level Oriented X4   Subjective   Subjective pt agreeable to participate in PT tx  Roll Left and Right   Type of Assistance Needed Verbal cues; Supervision   Roll Left and Right CARE Score 4   Sit to Lying   Type of Assistance Needed Supervision;Verbal cues   Comment increased time required for RLE management   Sit to Lying CARE Score 4   Lying to Sitting on Side of Bed   Type of Assistance Needed Supervision;Verbal cues   Comment supine > long sitting> sitting  pt uses hands to assist with RLE management   Lying to Sitting on Side of Bed CARE Score 4   Sit to Stand   Type of Assistance Needed Adaptive equipment;Verbal cues; Incidental touching   Comment RW   Sit to Stand CARE Score 4   Walk 10 Feet   Type of Assistance Needed Supervision;Verbal cues   Walk 10 Feet CARE Score 4   Walk 50 Feet with Two Turns   Type of Assistance Needed Adaptive equipment;Verbal cues; Incidental touching   Comment RW   Walk 50 Feet with Two Turns CARE Score 4   Walk 150 Feet   Type of Assistance Needed Adaptive equipment;Verbal cues; Incidental touching   Comment RW  therapist advanced RW for pt to facilitate increased grupo to promote equal weight shift, increased step length and reduce antalgic gait pattern   Walk 150 Feet CARE Score 4   4 Steps   Type of Assistance Needed Verbal cues; Incidental touching; Adaptive equipment   Comment six 6"  steps with BUE support on L handrail ascending   cues for advancing BUEs forward on railing when descendign to improve center of gravity to improve tehcnique when decending   4 Steps CARE Score 4   Toilet Transfer   Type of Assistance Needed Verbal cues; Incidental touching; Adaptive equipment   Comment use of RW   Toilet Transfer CARE Score 4   Therapeutic Interventions   Strengthening seated: hip add ball squeezes, hip abd with red theraband, ankle pf/df x 30reps  standing: marches 30reps with BUE support on waler   Equipment Use   NuStep BUEs/BLEs x 5 minutes at level 0, x 5 minutes on level 1  pt denies BLE pain throughout  Assessment   Treatment Assessment 60 mintue PT tx session focusing on strategies to improve gait mechanics, additional stair training, and BLE strengthening exercises  pt reports improved confidence on stairs compared to earlier session  also introduced NuStep which pt enjoyed and tolerated well without painn  Pt will continue to benefit from skilled PT interventions to address deficits, reduce fall risk, and maximize functional independence   Problem List Decreased strength;Decreased range of motion;Decreased endurance; Impaired balance;Decreased skin integrity;Orthopedic restrictions;Decreased mobility   Barriers to Discharge Inaccessible home environment;Decreased caregiver support   Plan   Treatment/Interventions Functional transfer training;LE strengthening/ROM; Elevations; Therapeutic exercise; Endurance training;Patient/family training;Bed mobility;Gait training   Progress Progressing toward goals   PT Therapy Minutes   PT Time In 1430   PT Time Out 1530   PT Total Time (minutes) 60   PT Mode of treatment - Individual (minutes) 60   PT Mode of treatment - Concurrent (minutes) 0   PT Mode of treatment - Group (minutes) 0   PT Mode of treatment - Co-treat (minutes) 0   PT Mode of Treatment - Total time(minutes) 60 minutes   PT Cumulative Minutes 540   Therapy Time missed   Time missed?  No

## 2023-02-15 NOTE — TEAM CONFERENCE
Acute RehabilitationTeam Conference Note  Date: 2/15/2023   Time: 10:50 AM       Patient Name:  Katerina Swift       Medical Record Number: [id number]   YOB: 1945  Sex: Female          Room/Bed:  [facility]-01  Payor Info:  Payor: Nicole Winchesterluis / Plan: MEDICARE A AND B / Product Type: Medicare A & B Fee for Service /      Admitting Diagnosis: Right femoral shaft fracture ([facility] ) Preethi Valentin   Admit Date/Time:  2/9/2023 12:14 PM  Admission Comments: No comment available     Primary Diagnosis:  Femur fracture ([facility] )  Principal Problem: Femur fracture ([facility] )    Patient Active Problem List    Diagnosis Date Noted   • Osteoporosis 02/09/2023   • HLD (hyperlipidemia) 02/06/2023   • Lightheadedness 02/06/2023   • Acute blood loss anemia 02/05/2023   • Fall 02/03/2023   • Acute pain due to trauma 02/03/2023   • Hypothyroidism 02/03/2023   • Femur fracture ([facility] [address] ) 02/02/2023   • Chest pain 10/31/2022   • Abnormal electrocardiogram (ECG) (EKG) 10/31/2022   • Sjogren's syndrome ([facility] ) 10/06/2021   • Primary osteoarthritis of right knee 05/14/2019   • Primary osteoarthritis of left knee 05/14/2019       Physical Therapy:    Weight Bearing Status: Weight Bearing as Tolerated (RLE)  Transfers: Incidental Touching (with use of RW)  Bed Mobility: Supervision  Amulation Distance (ft): 150 feet  Ambulation: Incidental Touching (with use of RW  trialed with one sided wallrail and pt only able to compelte 10 ft with min A due to pain)  Assistive Device for Ambulation: Roller Walker  Wheelchair Mobility Distance:  (N/A)  Number of Stairs: 6  Assistive Device for Stairs: Lehft Hand Rail (BUE support on L railing ascending)  Stair Assistance: Minimal Assistance  Ramp:  (not required for entry to home)  Discharge Recommendations: Home with:  [address] with[de-identified] Family Support, First Floor Setup, Home Physical Therapy    Pt is a 75yo female admitted to [address] [place] [address] s/p RLE IMN nailing after dx of femur fx   Pts PLOF is independent, lives alone, and has 5STE with L sided railing and flight of stairs with R sided railing to her second floor  Her first floor has 1/2 bath and "recliner bed"  With use of RW, pt performs transfers and gait with CGA  Trialed ambulating with one UE support but pt required Essence with increased antlagic gait pattern noted; hence, still recommend use of RW at this time  Pt negotiates 6 six-inch  steps with BUE support on L railing with CGA ascending and min A to descend  Barriers to DC are her RLE pain impacting her mobility as well as limited social support  Pt reports her friend/neighbor will assist with all IADLs and assist her to appointments as needed  She has a brother that can help intermittently  pt also reports she is open to first floor set up although she will have to sponge bathe  Pt will continue to benefit from skilled PT interventions to address deficits, reduce fall risk, and maximize functional independence  Anticipate DC home with intermittent social support, RW, and home PT  Occupational Therapy:  Eating: Independent  Grooming: Supervision  Bathing: Incidental Touching  Bathing: Incidental Touching  Upper Body Dressing: Supervision  Lower Body Dressing: Minimal Assistance  Toileting: Incidental Touching  Tub/Shower Transfer: Minimal Assistance  Toilet Transfer: Incidental Touching  Cognition: Within Defined Limits  Orientation: Person, Place, Time, Situation  Discharge Recommendations: Home with:  [address] with[de-identified] Family Support       Pt making gradual progress towards achieving OT goals  Pt is currently functioning at overall Essence/CGA for ADLs and Essence/CGA level for fxnl mobility w/ RW  Pt is limited by dec strength, dec endurance, impaired balance, RLE pain, RLE edema  Pt will benefit from skilled OT services w/ focus on above barriers, assess need for DME, provide pt/family edu, and maximize fxnl indep to return to PLOF in 10-14 day ELOS to meet overall independent ADL/IADL goals         Speech Therapy:           No notes on file    Nursing Notes:     Diet Type: Regular/House                      Diet Patient/Family Education Complete: No                         Type of Wound Patient/Family Education: Yes  Bladder: Continent     Bladder Patient/Family Education: Yes  Bowel: Continent     Bowel Patient/Family Education: Yes  Pain Location/Orientation: Orientation: Right, Location: Leg  Pain Score: 0                       Hospital Pain Intervention(s): Medication (See MAR)  Pain Patient/Family Education: Yes  Medication Management/Safety  Injectable: Lovenox  Safe Administration: Yes (by staff)  Medication Patient/Family Education Complete: No (ongoing)        Right displaced proximal femoral shaft fracture  • S/p IMN 2/3/23  • WBAT  • Per Ortho:  "Patient should not continue bisphosphonate use at this time given radiographic changes to left femur including lateral cortical thickening without obvious stress fracture/beaking  She needs to follow up immediately w ortho if she develops ambulatory pain in left thigh"  They discussed this with her  Left femur xray was negative fx 2/4     ABLA  • S/p 1 U PRBCs on 2/8/23 for hemoglobin of 7 3  • stable     Dizziness/syncope  • Related to orthostasis and blood loss  • Was given IVF and blood  • Watching for orthostasis while here = so far no sx of such and BPs have been stable     Hypothyroidism   • Continue levothyroxine  • Has a thyroid nodule found incidentally for which an US will need to be done as an OP     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 vs infundibulum  • To see as a followup in Neurovascular center     Old lacunar infarcts   • Found incidentally on imaging  • On statin     Right knee pain  • Xray negative 2/12/23  • Ortho saw = for ACE wrap and ice  • Improved    This week we will encourage independence with ADLs  We will monitor labs and vital signs  We will educate pt/family about repositioning to prevent skin breakdown    We will assist with repositioning and perform routine skin checks  We will monitor for constipation and medicate as ordered  We will monitor for adequate pain control  We will increase safety awareness with transfers and keep pt free from falls  Case Management:     Discharge Planning  Living Arrangements: Lives Alone  Support Systems: Self, Friend, Family members  Assistance Needed: unknown  Type of Current Residence: Other (Comment) (2 story home)  Current Home Care Services: No  Pt admitted s/p fall resulting in hip fracture  Pt resides alone and expects to return home  Pt has a supportive friend across the street who pt stated will be able to assist w/errands and appmts etc  Following to assist w/dc planning needs and recommendations  Is the patient actively participating in therapies? yes  List any modifications to the treatment plan:     Barriers Interventions   Lives alone Progress to safest goal for dc to home   r le pain/weakness effecting progress to ind goal Therapy exercises, use of a rw                 Is the patient making expected progress toward goals?  yes  List any update or changes to goals:     Medical Goals: Patient will be medically stable for discharge to [facility] restrictive envrionment upon completion of rehab program and Patient will be able to manage medical conditions and comorbid conditions with medications and follow up upon completion of rehab program    Weekly Team Goals:   Rehab Team Goals  ADL Team Goal: Patient will be independent with ADLs with least restrictive device upon completion of rehab program  Transfer Team Goal: Patient will be independent with transfers with least restrictive device upon completion of rehab program  Locomotion Team Goal: Patient will be independent with locomotion with least restrictive device upon completion of rehab program    Discussion: pt presents with the above barriers and is min a overall with adls, pt has limited standing tolerance and is benefiting from contd therapy services   Pt is using a rw for ambulation at contact guardand is contact guard for tx, supervision bed mobility , min a on stairs  Goals are for independent to return home alone  recommedations are for contd outpt physical therapy at  Home  Anticipated Discharge Date:  2/23/2023  [facility] Team Members Present: The following team members are supervising care for this patient and were present during this Weekly Team Conference      Physician: Dr Lizbeth Collier MD  : COMFORT Diallo  Registered Nurse: Romulo Estrada RN  Physical Therapist: Pressley Harada, DPT  Occupational Therapist: Floyd Rodgers MS, OTR/L  Speech Therapist:

## 2023-02-15 NOTE — PROGRESS NOTES
Internal Medicine Progress Note  Patient: Claire Lee  Age/sex: 69 y o  female  Medical Record #: [id number]      ASSESSMENT/PLAN: (Interval History)  Claire Lee is seen and examined and management for following issues:    Right displaced proximal femoral shaft fracture  • S/p IMN 2/3/23  • WBAT  • Per Ortho:  "Patient should not continue bisphosphonate use at this time given radiographic changes to left femur including lateral cortical thickening without obvious stress fracture/beaking  She needs to follow up immediately w ortho if she develops ambulatory pain in left thigh"   They discussed this with her   Left femur xray was negative fx 2/4     ABLA  • S/p 1 U PRBCs on 2/8/23 for hemoglobin of 7 3  • stable     Dizziness/syncope  • Related to orthostasis and blood loss  • Was given IVF and blood  • Watching for orthostasis while here = so far no sx of such and BPs have been stable     Hypothyroidism   • Continue levothyroxine  • Has a thyroid nodule found incidentally for which an US will need to be done as an OP     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 vs infundibulum  • To see as a followup in Neurovascular center     Old lacunar infarcts   • Found incidentally on imaging  • On statin     Right knee pain  • Xray negative 2/12/23  • Ortho saw = ACE wrap and ice  • improved        Discharge date:  Team       The above assessment and plan was reviewed and updated as determined by my evaluation of the patient on 2/15/2023      Labs:   Results from last 7 days   Lab Units 02/13/23  0531 02/09/23  0621   WBC Thousand/uL 8 42  --    HEMOGLOBIN g/dL 9 4* 9 4*   HEMATOCRIT % 30 5* 29 1*   PLATELETS Thousands/uL 462*  --      Results from last 7 days   Lab Units 02/13/23  0531   SODIUM mmol/L 142   POTASSIUM mmol/L 3 6   CHLORIDE mmol/L 111*   CO2 mmol/L 25   BUN mg/dL 21   CREATININE mg/dL 0 52*   CALCIUM mg/dL 8 6                   Review of Scheduled Meds:  Current Facility-Administered Medications   Medication Dose Route Frequency Provider Last Rate   • acetaminophen  975 mg Oral TID Zoya Jefferson MD     • artificial tear   Both Eyes HS PRN SANTI Agustin     • bisacodyl  10 mg Rectal Daily PRN SANTI Agustin     • enoxaparin  30 mg Subcutaneous Q12H [address] SANTI Bobo     • glycerin-hypromellose-  1 drop Both Eyes TID SANTI Agustin     • levothyroxine  25 mcg Oral Daily SANTI Bobo     • lidocaine  1 patch Topical Daily SANTI Bobo     • meclizine  12 5 mg Oral Q8H PRN SANTI Bobo     • melatonin  3 mg Oral HS Perry Ziegler MD     • oxyCODONE  2 5 mg Oral Q4H PRN SANTI Agustin     • oxyCODONE  5 mg Oral Q4H PRN SANTI Bobo     • polyethylene glycol  17 g Oral Daily PRN SANTI Agustin     • pravastatin  20 mg Oral Daily With SANTI Sneed     • senna-docusate sodium  1 tablet Oral HS PRN SANTI Bobo         Subjective/ HPI: Patient seen and examined  Patients overnight issues or events were reviewed with nursing or staff during rounds or morning huddle session  New or overnight issues include the following:     No new or overnight issues  Offers no complaints    ROS:   A 10 point ROS was performed; negative except as noted above  Imaging:     XR femur 2 vw right   Final Result by Luna Cochran MD (02/13 0825)      Stable alignment of right femoral fracture status post ORIF              Workstation performed: [id number]             *Labs /Radiology studies reviewed  *Medications reviewed and reconciled as needed  *Please refer to order section for additional ordered labs studies  *Case discussed with primary attending during morning huddle case rounds    Physical Examination:  Vitals:   Vitals:    02/14/23 1312 02/14/23 1936 02/15/23 0500 02/15/23 0619   BP: 122/58 125/59 146/66    BP Location: Right arm Right arm Left arm    Pulse: 105 85     Resp: 20 18 16    Temp: 98 1 °F (36 7 °C) 98 5 °F (36 9 °C) 98 4 °F (36 9 °C)    TempSrc: Oral Oral Oral    SpO2: 98% 97% 95%    Weight:    58 7 kg (129 lb 6 4 oz)   Height:           General Appearance: no distress, conversive  HEENT: PERRLA, conjuctiva normal; oropharynx clear; mucous membranes moist   Neck:  Supple, normal ROM  Lungs: CTA, normal respiratory effort, no retractions, expiratory effort normal  CV: regular rate and rhythm; no rubs/murmurs/gallops, PMI normal   ABD: soft; ND/NT; +BS  EXT: mild upper right thigh edema  Skin: normal turgor, normal texture, no rashes  Psych: affect normal, mood normal  Neuro: AAO      The above physical exam was reviewed and updated as determined by my evaluation of the patient on 2/15/2023  Invasive Devices     None                    VTE Pharmacologic Prophylaxis: Enoxaparin  Code Status: Level 1 - Full Code  Current Length of Stay: 6 day(s)      Total time spent:  30 minutes with more than 50% spent counseling/coordinating care  Counseling includes discussion with patient re: progress  and discussion with patient of his/her current medical state/information  Coordination of patient's care was performed in conjunction with primary service  Time invested included review of patient's labs, vitals, and management of their comorbidities with continued monitoring  In addition, this patient was discussed with medical team including physician and advanced extenders  The care of the patient was extensively discussed and appropriate treatment plan was formulated unique for this patient  Medical decision making for the day was made by supervising physician unless otherwise noted in their attestation statement  ** Please Note:  voice to text software may have been used in the creation of this document   Although proof errors in transcription or interpretation are a potential of such software**

## 2023-02-16 LAB
ANION GAP SERPL CALCULATED.3IONS-SCNC: 4 MMOL/L (ref 4–13)
BASOPHILS # BLD AUTO: 0.02 THOUSANDS/ÂΜL (ref 0–0.1)
BASOPHILS NFR BLD AUTO: 0 % (ref 0–1)
BUN SERPL-MCNC: 16 MG/DL (ref 5–25)
CALCIUM SERPL-MCNC: 8.8 MG/DL (ref 8.3–10.1)
CHLORIDE SERPL-SCNC: 112 MMOL/L (ref 96–108)
CO2 SERPL-SCNC: 26 MMOL/L (ref 21–32)
CREAT SERPL-MCNC: 0.51 MG/DL (ref 0.6–1.3)
EOSINOPHIL # BLD AUTO: 0.17 THOUSAND/ÂΜL (ref 0–0.61)
EOSINOPHIL NFR BLD AUTO: 2 % (ref 0–6)
ERYTHROCYTE [DISTWIDTH] IN BLOOD BY AUTOMATED COUNT: 19 % (ref 11.6–15.1)
GFR SERPL CREATININE-BSD FRML MDRD: 93 ML/MIN/1.73SQ M
GLUCOSE P FAST SERPL-MCNC: 87 MG/DL (ref 65–99)
GLUCOSE SERPL-MCNC: 87 MG/DL (ref 65–140)
HCT VFR BLD AUTO: 32.3 % (ref 34.8–46.1)
HGB BLD-MCNC: 9.9 G/DL (ref 11.5–15.4)
IMM GRANULOCYTES # BLD AUTO: 0.05 THOUSAND/UL (ref 0–0.2)
IMM GRANULOCYTES NFR BLD AUTO: 1 % (ref 0–2)
LYMPHOCYTES # BLD AUTO: 1.71 THOUSANDS/ÂΜL (ref 0.6–4.47)
LYMPHOCYTES NFR BLD AUTO: 23 % (ref 14–44)
MCH RBC QN AUTO: 29.6 PG (ref 26.8–34.3)
MCHC RBC AUTO-ENTMCNC: 30.7 G/DL (ref 31.4–37.4)
MCV RBC AUTO: 96 FL (ref 82–98)
MONOCYTES # BLD AUTO: 0.87 THOUSAND/ÂΜL (ref 0.17–1.22)
MONOCYTES NFR BLD AUTO: 12 % (ref 4–12)
NEUTROPHILS # BLD AUTO: 4.75 THOUSANDS/ÂΜL (ref 1.85–7.62)
NEUTS SEG NFR BLD AUTO: 62 % (ref 43–75)
NRBC BLD AUTO-RTO: 0 /100 WBCS
PLATELET # BLD AUTO: 520 THOUSANDS/UL (ref 149–390)
PMV BLD AUTO: 10.3 FL (ref 8.9–12.7)
POTASSIUM SERPL-SCNC: 3.8 MMOL/L (ref 3.5–5.3)
RBC # BLD AUTO: 3.35 MILLION/UL (ref 3.81–5.12)
SODIUM SERPL-SCNC: 142 MMOL/L (ref 135–147)
WBC # BLD AUTO: 7.57 THOUSAND/UL (ref 4.31–10.16)

## 2023-02-16 RX ORDER — ENOXAPARIN SODIUM 100 MG/ML
40 INJECTION SUBCUTANEOUS DAILY
Status: DISCONTINUED | OUTPATIENT
Start: 2023-02-17 | End: 2023-02-23 | Stop reason: HOSPADM

## 2023-02-16 RX ADMIN — GLYCERIN 1 DROP: .002; .002; .01 SOLUTION/ DROPS OPHTHALMIC at 08:18

## 2023-02-16 RX ADMIN — GLYCERIN 1 DROP: .002; .002; .01 SOLUTION/ DROPS OPHTHALMIC at 15:27

## 2023-02-16 RX ADMIN — ACETAMINOPHEN 975 MG: 325 TABLET, FILM COATED ORAL at 05:25

## 2023-02-16 RX ADMIN — PRAVASTATIN SODIUM 20 MG: 20 TABLET ORAL at 15:30

## 2023-02-16 RX ADMIN — ACETAMINOPHEN 975 MG: 325 TABLET, FILM COATED ORAL at 21:34

## 2023-02-16 RX ADMIN — GLYCERIN 1 DROP: .002; .002; .01 SOLUTION/ DROPS OPHTHALMIC at 21:34

## 2023-02-16 RX ADMIN — ACETAMINOPHEN 975 MG: 325 TABLET, FILM COATED ORAL at 15:27

## 2023-02-16 RX ADMIN — LEVOTHYROXINE SODIUM 25 MCG: 25 TABLET ORAL at 21:34

## 2023-02-16 RX ADMIN — ENOXAPARIN SODIUM 30 MG: 30 INJECTION SUBCUTANEOUS at 08:18

## 2023-02-16 RX ADMIN — MELATONIN 3 MG: at 21:34

## 2023-02-16 RX ADMIN — LIDOCAINE 5% 1 PATCH: 700 PATCH TOPICAL at 08:18

## 2023-02-16 NOTE — PROGRESS NOTES
02/16/23 1235   Pain Assessment   Pain Assessment Tool 0-10   Pain Score 2   Pain Location/Orientation Orientation: Lower; Location: Groin   Pain Onset/Description Descriptor: Sharp   Restrictions/Precautions   Precautions Fall Risk;Pain   Weight Bearing Restrictions Yes   RUE Weight Bearing Per Order WBAT   LUE Weight Bearing Per Order WBAT   RLE Weight Bearing Per Order WBAT   LLE Weight Bearing Per Order WBAT   ROM Restrictions No   Lifestyle   Autonomy [place] "   Tub/Shower Transfer   Limitations Noted In Balance; Endurance;UE Strength;LE Strength   Adaptive Equipment Transfer Bench   Assessed Tub/shower combo   Findings Edu pt on tub txfer using tub bench and sit swivel method, safety, pricing and demo how to complete  Trailed dry tub trx with RW in ADL suite, pt demo G carryover by demonstrating entry, hand placement, lifting injured leg over slowly and exiting overall CGA with inc time  States neighbor can purchase off [address] independently if needed  Sit to Stand   Type of Assistance Needed Supervision; Adaptive equipment   Physical Assistance Level No physical assistance   Sit to Stand CARE Score 4   Bed-Chair Transfer   Type of Assistance Needed Supervision; Adaptive equipment   Physical Assistance Level No physical assistance   Comment CS RW   Chair/Bed-to-Chair Transfer CARE Score 4   Transfer Bed/Chair/Wheelchair   Limitations Noted In Balance;LE Strength;UE Strength;Pain Management   Adaptive Equipment Walker   Toilet Transfer   Comment Previously pt had utilized BSC over standard toilet for UE support in downstairs powder room  Stated brother currently is checking attic for [facility] to reuse, will f/u  Suggested to cont  To use when toileting to A when raising and prevent fatigue/pain in LE  Noted pt has limited space in bedroom (upstairs) bathroom and possibly will not have access with RW, pt desires to trial removing clothing hamper to free up space   Recommended BSC at EOB if needed or possibly installing GB near toilet in bedroom bathroom and downstairs toilet if needed, pt stated frequently used  could assess possible space and assist with installation  Light Housekeeping   Light Housekeeping Level Walker   Light Housekeeping Level of Assistance Contact guard   Light Housekeeping Pt completed bed making mgmt using RW at [facility]  Stated bed is a larger in size and room has less space than ADL suite to move around  Overall demo G functional mobility w/ Rw  Cognition   Overall Cognitive Status WFL   Arousal/Participation Alert; Cooperative   Attention Within functional limits   Orientation Level Oriented X4   Memory Within functional limits   Following Commands Follows all commands and directions without difficulty   Additional Activities   Additional Activities Comments   (Pt participated in bowling activity to promote static standing balance, activity tolerance, and weight bearing bilateral LE overall CGA  Pt completed in stance with seated rest breaks in between each bowling toss for total duration of 20mins )   Activity Tolerance   Activity Tolerance Patient tolerated treatment well   Assessment   Treatment Assessment Pt participated in skilled OT that focused on ADL retraining, Functional mobility, UB strengthening, static/dynamic standing balance, endurance training,adaptive equipment edu, mod environment edu  activity tolerance  Pt cont  To be limited by dec BUE/BLE strength, dec functional mobility, impaired static/dynamic standing balance  Pt will continue to benefit from skilled OT services with focus on above mentioned deficits to inc ADL /IADL ind, functional mobility, static standing balance, activity tolerance, RW safety precautions, UBE/LBE strengthening  Prognosis Good   Problem List Decreased strength;Decreased range of motion;Decreased endurance; Impaired balance;Decreased skin integrity;Orthopedic restrictions;Decreased mobility   Plan   Treatment/Interventions ADL retraining;Functional transfer training; Therapeutic exercise; Endurance training;Cognitive reorientation;Patient/family training;Equipment eval/education; Bed mobility; Compensatory technique education   Progress Progressing toward goals   Recommendation   OT Discharge Recommendation No rehabilitation needs   OT Therapy Minutes   OT Time In 1235   OT Time Out 1405   OT Total Time (minutes) 90   OT Mode of treatment - Individual (minutes) 90   OT Mode of treatment - Concurrent (minutes) 0   OT Mode of treatment - Group (minutes) 0   OT Mode of treatment - Co-treat (minutes) 0   OT Mode of Treatment - Total time(minutes) 90 minutes   OT Cumulative Minutes 540   Therapy Time missed   Time missed?  No

## 2023-02-16 NOTE — PROGRESS NOTES
02/16/23 0800   Pain Assessment   Pain Assessment Tool 0-10   Pain Score 3   Pain Location/Orientation Orientation: Right;Location: Hip;Location: Leg   Pain Onset/Description Onset: Ongoing   Effect of Pain on Daily Activities offloads with RW   Patient's Stated Pain Goal No pain   Hospital Pain Intervention(s) Rest   Restrictions/Precautions   Precautions Fall Risk;Pain   Weight Bearing Restrictions Yes   RLE Weight Bearing Per Order WBAT   Braces or Orthoses   (BLE TEDs; RLE ACE wrap)   Cognition   Overall Cognitive Status WFL   Subjective   Subjective Patient ready and eager to participate in PT session   Sit to Stand   Type of Assistance Needed Supervision; Adaptive equipment   Physical Assistance Level No physical assistance   Comment CS with RW   Sit to Stand CARE Score 4   Bed-Chair Transfer   Type of Assistance Needed Supervision; Adaptive equipment   Physical Assistance Level No physical assistance   Comment CS with RW   Chair/Bed-to-Chair Transfer CARE Score 4   Transfer Bed/Chair/Wheelchair   Limitations Noted In UE Strength;LE Strength   Adaptive Equipment Roller Walker   Walk 10 Feet   Type of Assistance Needed Supervision; Adaptive equipment   Physical Assistance Level No physical assistance   Comment CS with RW   Walk 10 Feet CARE Score 4   Walk 50 Feet with Two Turns   Type of Assistance Needed Supervision; Adaptive equipment   Physical Assistance Level No physical assistance   Comment CS with RW   Walk 50 Feet with Two Turns CARE Score 4   Walk 150 Feet   Type of Assistance Needed Incidental touching; Adaptive equipment   Physical Assistance Level No physical assistance   Comment CS/CGA with RW   Walk 150 Feet CARE Score 4   Walking 10 Feet on Uneven Surfaces   Comment Please take outside prior to discharge   Ambulation   Primary Mode of Locomotion Prior to Admission Walk   Distance Walked (feet) 150 ft  (x2)   Assist Device Roller Walker   Gait Pattern Antalgic; Inconsistant Radha;Decreased L stance; Improper weight shift   Limitations Noted In Sequencing;Speed;Strength;Swing   Does the patient walk? 2  Yes   Wheel 50 Feet with Two Turns   Reason if not Attempted Activity not applicable   Wheel 50 Feet with Two Turns CARE Score 9   Wheel 150 Feet   Reason if not Attempted Activity not applicable   Wheel 539 Feet CARE Score 9   Wheelchair mobility   Does the patient use a wheelchair? 0  No   Curb or Single Stair   Style negotiated Curb   Type of Assistance Needed Incidental touching;Verbal cues; Adaptive equipment   Physical Assistance Level No physical assistance   Comment CGA, verbal cues for 6" curb step negotiation; from description, patient has curb type steps to enter into back, continue to focus; trial 8" curb step in upcoming sessions   1 Step (Curb) CARE Score 4   4 Steps   Type of Assistance Needed Incidental touching   Physical Assistance Level No physical assistance   Comment CGA on PPH9 steps using L rail up and R rail down- cues for LE sequencing needed, performed 7 steps  This is the approach patient will need to do to get into her home if not taking the back curb type steps  Then performed 3 steps using R rail up and L rail down sideways approach  Patient would need to be able to do appx 12 using this approach to access her second floor  4 Steps CARE Score 4   Stairs   Type Stairs   # of Steps 7   Weight Bearing Precautions WBAT;Fall Risk   Assist Devices Single Rail   Toilet Transfer   Type of Assistance Needed Supervision; Adaptive equipment   Physical Assistance Level No physical assistance   Comment CS with RW   Toilet Transfer CARE Score 4   Therapeutic Interventions   Strengthening Devised HEP but unable to complete 2* time constraints  See Below  Please complete and provide in handout form     Equipment Use   NuStep BLE only L1x5 minutes + L1x5 minutes (had staples removed in the middle of activity by Ortho MD   Assessment   Treatment Assessment Patient engaged in PT treatment session focused on safe discharge planning  Patient's discharge date set for Thursday with '[facility] at home"  Order placed for RW this session (provided to OT for final completion); patient recommended to keep 1 walker on the first floor and 1 walker on the second floor  Patient can stay on the first floor but anticipate by Thursday, she will be able to access the second floor at an (I) level  She has a FF to access this level using a R rail only  Completed 3 today (post other approach) using the sideways method leading with LLE for ascension and RLE for descent  She also eluded to the back entry having only curb steps (no consecutive steps) for her to access the first floor  Continue this method as this will increase her (I) of entry and exiting the home (she would need someone to carry the walk up/down the stairs if accessing the front steps, 5 SHANDA with L rail only)  Additionally, devised HEP which needs to be completed in upcoming sessions and provided to her in full  At this time, patient continues to benefit from skilled PT intervention to maximize her functional mobility (I) and safety  PT Barriers   Physical Impairment Decreased strength;Decreased range of motion;Orthopedic restrictions;Pain   Functional Limitation Stair negotiation   Plan   Treatment/Interventions LE strengthening/ROM; Functional transfer training;Elevations; Therapeutic exercise;Equipment eval/education; Endurance training;Patient/family training;Bed mobility   Progress Progressing toward goals   Recommendation   PT Discharge Recommendation Home with outpatient rehabilitation  ([facility] at home)   Equipment Recommended [facility] Recommended Wheeled walker   Date ordered 02/16/23   PT Therapy Minutes   PT Time In 0800   PT Time Out 0930   PT Total Time (minutes) 90   PT Mode of treatment - Individual (minutes) 90   PT Mode of treatment - Concurrent (minutes) 0   PT Mode of treatment - Group (minutes) 0   PT Mode of treatment - Co-treat (minutes) 0   PT Mode of Treatment - Total time(minutes) 90 minutes   PT Cumulative Minutes 630         Access Code: [email or web address]  URL: [email or web address]  Date: 02/16/2023  Prepared by: Nedrow America    Exercises  Supine Ankle Pumps - 3 sets - 20 reps  Supine Quad Set - 3 sets - 20 reps - 5 hold  Supine Gluteal Sets - 3 sets - 10 reps - 5 hold  Supine Heel Slide - 3 sets - 8-12 reps  Small Range Straight Leg Raise - 3 sets - 8-12 reps  Supine Hip Adduction Isometric with Ball - 3 sets - 10-15 reps - 5 hold  Hooklying Clamshell with Resistance - 3 sets - 10-15 reps - 5 hold  Seated Heel Raise - 3 sets - 20 reps  Seated Long Arc Quad - 3 sets - 10-15 reps  Seated March - 3 sets - 10-15 reps  Seated Hip Adduction Isometrics with Ball - 3 sets - 10-15 reps - 5 hold  Seated Hip Abduction with Resistance - 3 sets - 10-15 reps - 5 hold  Heel Raises with Counter Support - 3 sets - 10 reps  Mini Squat with Counter Support - 3 sets - 10 reps  Standing March with Counter Support - 3 sets - 10 reps  Standing Hip Abduction with Counter Support - 3 sets - 10 reps  Side Stepping with Counter Support - 3 sets - 10 reps

## 2023-02-16 NOTE — PLAN OF CARE
Problem: PAIN - ADULT  Goal: Verbalizes/displays adequate comfort level or baseline comfort level  Description: Interventions:  - Encourage patient to monitor pain and request assistance  - Assess pain using appropriate pain scale  - Administer analgesics based on type and severity of pain and evaluate response  - Implement non-pharmacological measures as appropriate and evaluate response  - Consider cultural and social influences on pain and pain management  - Notify physician/advanced practitioner if interventions unsuccessful or patient reports new pain  Outcome: Progressing     Problem: INFECTION - ADULT  Goal: Absence or prevention of progression during hospitalization  Description: INTERVENTIONS:  - Assess and monitor for signs and symptoms of infection  - Monitor lab/diagnostic results  - Monitor all insertion sites, i e  indwelling lines, tubes, and drains  - Monitor endotracheal if appropriate and nasal secretions for changes in amount and color  - [place] appropriate cooling/warming therapies per order  - Administer medications as ordered  - Instruct and encourage patient and family to use good hand hygiene technique  - Identify and instruct in appropriate isolation precautions for identified infection/condition  Outcome: Progressing  Goal: Absence of fever/infection during neutropenic period  Description: INTERVENTIONS:  - Monitor WBC    Outcome: Progressing     Problem: SAFETY ADULT  Goal: Patient will remain free of falls  Description: INTERVENTIONS:  - Educate patient/family on patient safety including physical limitations  - Instruct patient to call for assistance with activity   - Consult OT/PT to assist with strengthening/mobility   - Keep Call bell within reach  - Keep bed low and locked with side rails adjusted as appropriate  - Keep care items and personal belongings within reach  - Initiate and maintain comfort rounds  - Make Fall Risk Sign visible to staff  - Offer Toileting every 2 Hours, in advance of need  - Initiate/Maintain bed/chair alarm  - Obtain necessary fall risk management equipment: non skid footwear  - Apply yellow socks and bracelet for high fall risk patients  - Consider moving patient to room near nurses station  Outcome: Progressing  Goal: Maintain or return to baseline ADL function  Description: INTERVENTIONS:  -  Assess patient's ability to carry out ADLs; assess patient's baseline for ADL function and identify physical deficits which impact ability to perform ADLs (bathing, care of mouth/teeth, toileting, grooming, dressing, etc )  - Assess/evaluate cause of self-care deficits   - Assess range of motion  - Assess patient's mobility; develop plan if impaired  - Assess patient's need for assistive devices and provide as appropriate  - Encourage maximum independence but intervene and supervise when necessary  - Involve family in performance of ADLs  - Assess for home care needs following discharge   - Consider OT consult to assist with ADL evaluation and planning for discharge  - Provide patient education as appropriate  Outcome: Progressing  Goal: Maintains/Returns to pre admission functional level  Description: INTERVENTIONS:  - Perform BMAT or MOVE assessment daily    - Set and communicate daily mobility goal to care team and patient/family/caregiver  - Collaborate with rehabilitation services on mobility goals if consulted  - Perform Range of Motion 3 times a day  - Reposition patient every 2 hours    - Dangle patient 3 times a day  - Stand patient 3 times a day  - Ambulate patient 3 times a day  - Out of bed to chair 3 times a day   - Out of bed for meals 3 times a day  - Out of bed for toileting  - Record patient progress and toleration of activity level   Outcome: Progressing     Problem: DISCHARGE PLANNING  Goal: Discharge to home or other facility with appropriate resources  Description: INTERVENTIONS:  - Identify barriers to discharge w/patient and caregiver  - Arrange for needed discharge resources and transportation as appropriate  - Identify discharge learning needs (meds, wound care, etc )  - Arrange for interpretive services to assist at discharge as needed  - Refer to Case Management Department for coordinating discharge planning if the patient needs post-hospital services based on physician/advanced practitioner order or complex needs related to functional status, cognitive ability, or social support system  Outcome: Progressing     Problem: Prexisting or High Potential for Compromised Skin Integrity  Goal: Skin integrity is maintained or improved  Description: INTERVENTIONS:  - Identify patients at risk for skin breakdown  - Assess and monitor skin integrity  - Assess and monitor nutrition and hydration status  - Monitor labs   - Assess for incontinence   - Turn and reposition patient  - Assist with mobility/ambulation  - Relieve pressure over bony prominences  - Avoid friction and shearing  - Provide appropriate hygiene as needed including keeping skin clean and dry  - Evaluate need for skin moisturizer/barrier cream  - Collaborate with interdisciplinary team   - Patient/family teaching  - Consider wound care consult   Outcome: Progressing

## 2023-02-16 NOTE — PLAN OF CARE
Problem: PAIN - ADULT  Goal: Verbalizes/displays adequate comfort level or baseline comfort level  Description: Interventions:  - Encourage patient to monitor pain and request assistance  - Assess pain using appropriate pain scale  - Administer analgesics based on type and severity of pain and evaluate response  - Implement non-pharmacological measures as appropriate and evaluate response  - Consider cultural and social influences on pain and pain management  - Notify physician/advanced practitioner if interventions unsuccessful or patient reports new pain  Outcome: Progressing     Problem: INFECTION - ADULT  Goal: Absence or prevention of progression during hospitalization  Description: INTERVENTIONS:  - Assess and monitor for signs and symptoms of infection  - Monitor lab/diagnostic results  - Monitor all insertion sites, i e  indwelling lines, tubes, and drains  - Monitor endotracheal if appropriate and nasal secretions for changes in amount and color  - [place] appropriate cooling/warming therapies per order  - Administer medications as ordered  - Instruct and encourage patient and family to use good hand hygiene technique  - Identify and instruct in appropriate isolation precautions for identified infection/condition  Outcome: Progressing  Goal: Absence of fever/infection during neutropenic period  Description: INTERVENTIONS:  - Monitor WBC    Outcome: Progressing     Problem: SAFETY ADULT  Goal: Patient will remain free of falls  Description: INTERVENTIONS:  - Educate patient/family on patient safety including physical limitations  - Instruct patient to call for assistance with activity   - Consult OT/PT to assist with strengthening/mobility   - Keep Call bell within reach  - Keep bed low and locked with side rails adjusted as appropriate  - Keep care items and personal belongings within reach  - Initiate and maintain comfort rounds  - Make Fall Risk Sign visible to staff  - Apply yellow socks and bracelet for high fall risk patients  - Consider moving patient to room near nurses station  Outcome: Progressing  Goal: Maintain or return to baseline ADL function  Description: INTERVENTIONS:  -  Assess patient's ability to carry out ADLs; assess patient's baseline for ADL function and identify physical deficits which impact ability to perform ADLs (bathing, care of mouth/teeth, toileting, grooming, dressing, etc )  - Assess/evaluate cause of self-care deficits   - Assess range of motion  - Assess patient's mobility; develop plan if impaired  - Assess patient's need for assistive devices and provide as appropriate  - Encourage maximum independence but intervene and supervise when necessary  - Involve family in performance of ADLs  - Assess for home care needs following discharge   - Consider OT consult to assist with ADL evaluation and planning for discharge  - Provide patient education as appropriate  Outcome: Progressing  Goal: Maintains/Returns to pre admission functional level  Description: INTERVENTIONS:  - Perform BMAT or MOVE assessment daily    - Set and communicate daily mobility goal to care team and patient/family/caregiver     - Collaborate with rehabilitation services on mobility goals if consulted  - Out of bed for toileting  - Record patient progress and toleration of activity level   Outcome: Progressing     Problem: DISCHARGE PLANNING  Goal: Discharge to home or other facility with appropriate resources  Description: INTERVENTIONS:  - Identify barriers to discharge w/patient and caregiver  - Arrange for needed discharge resources and transportation as appropriate  - Identify discharge learning needs (meds, wound care, etc )  - Arrange for interpretive services to assist at discharge as needed  - Refer to Case Management Department for coordinating discharge planning if the patient needs post-hospital services based on physician/advanced practitioner order or complex needs related to functional status, cognitive ability, or social support system  Outcome: Progressing     Problem: Prexisting or High Potential for Compromised Skin Integrity  Goal: Skin integrity is maintained or improved  Description: INTERVENTIONS:  - Identify patients at risk for skin breakdown  - Assess and monitor skin integrity  - Assess and monitor nutrition and hydration status  - Monitor labs   - Assess for incontinence   - Turn and reposition patient  - Assist with mobility/ambulation  - Relieve pressure over bony prominences  - Avoid friction and shearing  - Provide appropriate hygiene as needed including keeping skin clean and dry  - Evaluate need for skin moisturizer/barrier cream  - Collaborate with interdisciplinary team   - Patient/family teaching  - Consider wound care consult   Outcome: Progressing

## 2023-02-16 NOTE — PROGRESS NOTES
Internal Medicine Progress Note  Patient: Moises Frey  Age/sex: 69 y o  female  Medical Record #: [id number]      ASSESSMENT/PLAN: (Interval History)  Moises Frey is seen and examined and management for following issues:    Right displaced proximal femoral shaft fracture  • S/p IMN 2/3/23  • WBAT  • Per Ortho:  "Patient should not continue bisphosphonate use at this time given radiographic changes to left femur including lateral cortical thickening without obvious stress fracture/beaking  She needs to follow up immediately w ortho if she develops ambulatory pain in left thigh"   They discussed this with her   Left femur xray was negative fx 2/4     ABLA  • S/p 1 U PRBCs on 2/8/23 for hemoglobin of 7 3  • stable     Dizziness/syncope  • Related to orthostasis and blood loss  • Was given IVF and blood  • Watching for orthostasis while here = so far no sx of such and BPs have been stable     Hypothyroidism   • Continue levothyroxine  • Has a thyroid nodule found incidentally for which an US will need to be done as an OP     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 vs infundibulum  • To see as a followup in [facility]vascular center     Old lacunar infarcts   • Found incidentally on imaging  • On statin     Right knee pain  • Xray negative 2/12/23  • Ortho saw = ACE wrap and ice  • improved        Discharge date:  Team          The above assessment and plan was reviewed and updated as determined by my evaluation of the patient on 2/16/2023      Labs:   Results from last 7 days   Lab Units 02/16/23  0536 02/13/23  0531   WBC Thousand/uL 7 57 8 42   HEMOGLOBIN g/dL 9 9* 9 4*   HEMATOCRIT % 32 3* 30 5*   PLATELETS Thousands/uL 520* 462*     Results from last 7 days   Lab Units 02/16/23  0536 02/13/23  0531   SODIUM mmol/L 142 142   POTASSIUM mmol/L 3 8 3 6   CHLORIDE mmol/L 112* 111*   CO2 mmol/L 26 25   BUN mg/dL 16 21   CREATININE mg/dL 0 51* 0 52*   CALCIUM mg/dL 8 8 8 6                   Review of Scheduled Meds:  Current Facility-Administered Medications   Medication Dose Route Frequency Provider Last Rate   • acetaminophen  975 mg Oral TID Rody Sweeney MD     • artificial tear   Both Eyes HS PRN SANTI Tilley     • bisacodyl  10 mg Rectal Daily PRN SANTI Tilley     • [START ON 2/17/2023] enoxaparin  40 mg Subcutaneous Daily SANTI Bobo     • glycerin-hypromellose-  1 drop Both Eyes TID SANTI Tilley     • levothyroxine  25 mcg Oral Daily SANTI Bobo     • lidocaine  1 patch Topical Daily SANTI Bobo     • meclizine  12 5 mg Oral Q8H PRN SANTI Bobo     • melatonin  3 mg Oral HS Brian Zabala MD     • oxyCODONE  2 5 mg Oral Q4H PRN SANTI Tilley     • oxyCODONE  5 mg Oral Q4H PRN SANTI Bobo     • polyethylene glycol  17 g Oral Daily PRN SANTI Tilley     • pravastatin  20 mg Oral Daily With SANTI Sneed     • senna-docusate sodium  1 tablet Oral HS PRN SANTI Bobo         Subjective/ HPI: Patient seen and examined  Patients overnight issues or events were reviewed with nursing or staff during rounds or morning huddle session  New or overnight issues include the following:     No new or overnight issues  Offers no complaints    ROS:   A 10 point ROS was performed; negative except as noted above       *Labs /Radiology studies reviewed  *Medications reviewed and reconciled as needed  *Please refer to order section for additional ordered labs studies  *Case discussed with primary attending during morning huddle case rounds    Physical Examination:  Vitals:   Vitals:    02/15/23 0619 02/15/23 1434 02/15/23 2036 02/16/23 0538   BP:  113/58 139/63 116/68   BP Location:  Right arm Right arm Left arm   Pulse:  69 74 72   Resp:  19 18 16   Temp:  98 3 °F (36 8 °C) 98 4 °F (36 9 °C) 98 3 °F (36 8 °C)   TempSrc:  Oral Oral Oral   SpO2:  97% 96% 97%   Weight: 58 7 kg (129 lb 6 4 oz)      Height:           General Appearance: no distress, conversive  HEENT:  External ear normal   Nose normal w/o drainage  Mucous membranes are moist  Oropharynx is clear  Conjunctiva clear w/o icterus or redness  Neck:  Supple, normal ROM  Lungs: BBS without crackles/wheeze/rhonchi; respirations unlabored with normal inspiratory/expiratory effort  No retractions noted  On RA  CV: regular rate and rhythm; no rubs/murmurs/gallops, PMI normal   ABD: Abdomen is soft  Bowel sounds all quadrants  Nontender with no distention  EXT: +right thigh edema which is stable  Skin: normal turgor, normal texture, no rashes  Psych: affect normal, mood normal  Neuro: AAO      The above physical exam was reviewed and updated as determined by my evaluation of the patient on 2/16/2023  Invasive Devices     None                    VTE Pharmacologic Prophylaxis: Enoxaparin  Code Status: Level 1 - Full Code  Current Length of Stay: 7 day(s)      Total time spent:  30 minutes with more than 50% spent counseling/coordinating care  Counseling includes discussion with patient re: progress  and discussion with patient of his/her current medical state/information  Coordination of patient's care was performed in conjunction with primary service  Time invested included review of patient's labs, vitals, and management of their comorbidities with continued monitoring  In addition, this patient was discussed with medical team including physician and advanced extenders  The care of the patient was extensively discussed and appropriate treatment plan was formulated unique for this patient  Medical decision making for the day was made by supervising physician unless otherwise noted in their attestation statement  ** Please Note:  voice to text software may have been used in the creation of this document   Although proof errors in transcription or interpretation are a potential of such software**

## 2023-02-16 NOTE — PROGRESS NOTES
Pastoral Care Progress Note    2023  Patient: Guerrero Mendez :   Admission Date & Time: 2023 1214  MRN: [id number] CSN: [id number]      I visited this Pt for the first time today, and listened and asked questions as she related her health challenges of the past while  She has a gracious, positive attitude and takes challenges in stride   provided support, fellowship, and prayer, and remains available                 Chaplaincy Interventions Utilized:   Empowerment: Clarified, confirmed, or reviewed information from treatment team     Exploration: Explored emotional needs & resources and Explored spiritual needs & resources    Collaboration: Encouraged adherence to treatment plan    Relationship Building: Cultivated a relationship of care and support and Listened empathically    Ritual: Provided prayer    Chaplaincy Outcomes Achieved:  Emotional resources utilized    Spiritual Coping Strategies Utilized:   Spiritual comfort       23 1300   Clinical Encounter Type   Visited With Patient   Routine Visit Follow-up

## 2023-02-16 NOTE — PROGRESS NOTES
PM&R PROGRESS NOTE:  Artem Chu 69 y o  female MRN: [id number]  Unit/Bed#: -01 Encounter: [id number]        Rehabilitation Diagnosis: Impairment of mobility, safety and Activities of Daily Living (ADLs) due to Orthopedic Disorders:  08 2  Femur (Shaft) Fracture    HPI: Lady Taylor is a 69 y o  female with a history of hypothyroid, osteoporosis, scoliosis, and hyperlipidemia that presented to [facility] on 2/2/23 with c/o right hip pain after feeling a pop in her right hip when descending stairs  She was unable to ambulate, lowered herself to the floor and crawled to phone to call EMS  On exam right lower extremity shortening and external rotation  Imaging showed acute transverse substantially displaced proximal femoral shaft fracture  Orthopedics was consulted and recommended NWB with Mane's traction, with surgical intervention  On 2/3, patient underwent right insertion IM nailing with Dr Sims  Patient is WBAT with Lovenox for 28 days for DVT prophylaxis  Post-op complicated by dizziness and an episode requiring her to be lowered to floor  CTA of head/neck was obtained, which showed no acute findings; did show outpouching of PCA  Neurovascular recommended follow-up as outpatient  Echo EF 65%, mild TVR and trace PVR  Patient's hemoglobin 7 3 which she received 1 unit pRBC improving to 9 4  Patient was deemed hemodynamically stable, PT/OT recommend inpatient acute rehabilitation  Patient was admitted to [address] [place] [address] on 02/09/23  SUBJECTIVE: Patient seen face to face  No acute issues overnight  Patient reports slept well, pain in right leg well controlled with lidoderm patch and scheduled tylenol  Patient performing Lovenox injections with guidance of nursing, per nursing and patient at times bilateral thumb arthritis prevents her from self injecting total dose  Discussed with patient, friend/neighbor assisting with injections   She states friend needed to self inject in the past and will reach out to her for times that would work for training with nursing  Orthopedics removed staples today, incision edges well approximated with no drainage, AMBERLY  Distal incision and hematoma covered with ace wrap, removed hs  ASSESSMENT: Stable, progressing    PLAN:  -Pain is well controlled with current regimen of ice, elevation, tylenol and lidoerm patch no changes  pain: continue ice,elevation, Lidoderm patch and tylenol; prn oxycodone as needed   -Insomnia- continue with melatonin hs, environmental interventions for good sleep hygiene   -Continue current rehabilitation program with anticipated discharge on 02/23    Rehabilitation  • Functional deficits:  Self care, impaired mobility  • Continue current rehabilitation plan of care to maximize function      • Functional update:   • PT: mobility- supervision, transfers- incidental touching with RW ambulation- incidental touching with ', stairs- 6 LHR  • OT: ADL- bathing- incidental touching, dressing UB supervision, LB min A, footwear- min A, toileting- incidental touching  • Estimated Discharge: anticipated 02/23 with Outpatient at home PT    Pain  • tylenol    DVT prophylaxis  • Lovenox    Bladder plan  • Continent    Bowel plan  • Continent    * Femur fracture ([address] )  Assessment & Plan  - Pt felt "pop" when descending stairs with inability to ambulate  -X-ray: acute transverse substantially displaced proximal femoral shaft fracture  -NWB with bucks traction  -2/3 right IMN  -pain control  -WBAT right lowe extremity  - Lovenox for 28 days (3/3)  -po dizziness and diaphtetic; orthostatics normal, IVF, Hbg 7 3 pRBC x1  -distal incision with hematoma; ace wrap daily  -Monitor incision for signs of infection  -Ortho removed staples, 02/16  -Participate in comprehensive therapies 3 hr day, 5-6 days a week  -Follow-up with Orthopedic Surgery (Dr Jacob Wooten)    Acute pain due to trauma  Assessment & Plan  -acute post-op pain right hip/leg  -tylenol 975 mg scheduled, oxycodone IR 2 5-5 mg prn (pt not taking) added Lidoderm patch   -monitor pain with therapy  -adjust medication as needed    Acute blood loss anemia  Assessment & Plan  -post op dizziness, diaphoresis, lowered to floor  -IVF  -Hbg 7 3, received 1 unit pRBC  -current Hbg 9 9  -monitor hemoglobin  -consult IM for co-mgmt    Lightheadedness  Assessment & Plan  -post-op with ambulation  - orthostatic BP normal  -Hbg 7 3 (2/7)  -received IVF, 1 unit PRBC  -Stable    Osteoporosis  Assessment & Plan  -took bisphosphonate <10 years  -per Ortho: pt should stop bisphosphonate at this time d/t left femur lateral cortical thickening without obvious stress fracture/break  -needs immediate f/u if develops pain in left leg  -follow-up with Orthopedics    HLD (hyperlipidemia)  Assessment & Plan  -  -home: Crestor 10 mg  -continue Pravachol 20 mg    Hypothyroidism  Assessment & Plan  -continue levothyroxine    Appreciate IM consultants medical co-management  Labs, medications, and imaging reviewed  ROS:  Review of Systems   A 10 point review of systems was negative except for what is noted in the HPI  OBJECTIVE:   /58 (BP Location: Right arm)   Pulse 80   Temp 98 3 °F (36 8 °C) (Oral)   Resp 18   Ht 5' 1" (1 549 m)   Wt 58 7 kg (129 lb 6 4 oz)   SpO2 97%   BMI 24 45 kg/m²     Physical Exam  Constitutional:       Appearance: Normal appearance  HENT:      Head: Normocephalic and atraumatic  Mouth/Throat:      Mouth: Mucous membranes are moist    Cardiovascular:      Rate and Rhythm: Normal rate and regular rhythm  Pulses: Normal pulses  Heart sounds: Murmur heard  Pulmonary:      Effort: Pulmonary effort is normal       Breath sounds: Normal breath sounds  Abdominal:      General: Bowel sounds are normal       Palpations: Abdomen is soft  Musculoskeletal:         General: Normal range of motion  Right lower leg: Edema present  Skin:     General: Skin is warm and dry  Capillary Refill: Capillary refill takes less than 2 seconds  Comments: Ecchymosis to right lower extremity with distal incision hematoma   Neurological:      Mental Status: She is alert and oriented to person, place, and time  Motor: Weakness present     Psychiatric:         Mood and Affect: Mood normal          Judgment: Judgment normal         Lab Results   Component Value Date    WBC 7 57 02/16/2023    HGB 9 9 (L) 02/16/2023    HCT 32 3 (L) 02/16/2023    MCV 96 02/16/2023     (H) 02/16/2023     Lab Results   Component Value Date    SODIUM 142 02/16/2023    K 3 8 02/16/2023     (H) 02/16/2023    CO2 26 02/16/2023    BUN 16 02/16/2023    CREATININE 0 51 (L) 02/16/2023    GLUC 87 02/16/2023    CALCIUM 8 8 02/16/2023     Lab Results   Component Value Date    INR 1 05 02/03/2023    INR 0 95 02/02/2023    PROTIME 13 9 02/03/2023    PROTIME 12 8 02/02/2023       Current Facility-Administered Medications:   •  acetaminophen (TYLENOL) tablet 975 mg, 975 mg, Oral, TID, Lucy Mari MD, 975 mg at 02/16/23 1958  •  artificial tear (LUBRIFRESH P M ) ophthalmic ointment, , Both Eyes, HS PRN, SANTI Saenz, Given at 02/09/23 1746  •  bisacodyl (DULCOLAX) rectal suppository 10 mg, 10 mg, Rectal, Daily PRN, SANTI Saenz  •  [START ON 2/17/2023] enoxaparin (LOVENOX) subcutaneous injection 40 mg, 40 mg, Subcutaneous, Daily, SANTI Bobo  •  glycerin-hypromellose- (ARTIFICIAL TEARS) ophthalmic solution 1 drop, 1 drop, Both Eyes, TID, SANTI Bobo, 1 drop at 02/16/23 0818  •  levothyroxine tablet 25 mcg, 25 mcg, Oral, Daily, SANTI Bobo, 25 mcg at 02/15/23 2114  •  lidocaine (LIDODERM) 5 % patch 1 patch, 1 patch, Topical, Daily, SANTI Bobo, 1 patch at 02/16/23 0818  •  meclizine (ANTIVERT) tablet 12 5 mg, 12 5 mg, Oral, Q8H PRN, SANTI Bobo  •  melatonin tablet 3 mg, 3 mg, Oral, HS, Anastasia Reyes MD, 3 mg at 02/15/23 4488  •  oxyCODONE (ROXICODONE) IR tablet 2 5 mg, 2 5 mg, Oral, Q4H PRN, SANTI Bobo  •  oxyCODONE (ROXICODONE) IR tablet 5 mg, 5 mg, Oral, Q4H PRN, SANTI Bobo  •  polyethylene glycol (MIRALAX) packet 17 g, 17 g, Oral, Daily PRN, SANTI Bobo  •  pravastatin (PRAVACHOL) tablet 20 mg, 20 mg, Oral, Daily With Dinner, SANTI Cornejo, 20 mg at 02/15/23 1531  •  senna-docusate sodium (SENOKOT S) 8 6-50 mg per tablet 1 tablet, 1 tablet, Oral, HS PRN, SANTI Cornejo    Past Medical History:   Diagnosis Date   • Arthritis 2005   • Closed nondisplaced fracture of fifth metatarsal bone of right foot with routine healing    • Disease of thyroid gland     hypothyroid   • Glaucoma, bilateral    • Hyperlipidemia    • Osteoporosis    • Scoliosis 12/12/12       Patient Active Problem List    Diagnosis Date Noted   • Femur fracture ([facility] ) 02/02/2023   • Acute pain due to trauma 02/03/2023   • Acute blood loss anemia 02/05/2023   • Lightheadedness 02/06/2023   • Osteoporosis 02/09/2023   • HLD (hyperlipidemia) 02/06/2023   • Fall 02/03/2023   • Hypothyroidism 02/03/2023   • Chest pain 10/31/2022   • Abnormal electrocardiogram (ECG) (EKG) 10/31/2022   • Sjogren's syndrome ([facility] ) 10/06/2021   • Primary osteoarthritis of right knee 05/14/2019   • Primary osteoarthritis of left knee 05/14/2019      SANTI Cornejo  [facility]     Total visit time: 30 minutes, with more than 50% spent counseling/coordinating care  Counseling includes discussion with patient re: progress in therapies, functional issues observed by therapy staff, and discussion with patient regarding their current medical state and wellbeing  Coordination of patient's care was performed in conjunction with Internal Medicine service to monitor patient's labs, vitals, and management of their comorbidities

## 2023-02-16 NOTE — CASE MANAGEMENT
Met w/pt and reviewed team update and confirmed dc for 2/23  Pt will s/w her neighbor re: transport for dc  Cm reviewed recommendations for contd physical therapy at home and pt is in agreement  Pt stated her brother found the commode in the attic  Pt asked if she needed to have OT come to the home to assess her ability to get in the tub, cm explained that can be simulated in therapy here prior to dc  OT made aware  Referral made to [facility] for outpt physical therapy at home

## 2023-02-16 NOTE — Clinical Note
Pt participated in skilled OT that focused on ADL retraining, Functional mobility, UB strengthening, static/dynamic standing balance, endurance training,adaptive equipment edu, mod environment edu  activity tolerance  Pt cont  To be limited by dec UBE/LBE strength, dec functional mobility, impaired static/dynamic standing balance  During session spoke with pt regarding toileting safety and home space concerns  Pt stated removing BSC pan and using BSC GB arms for standard toilet, stated brother currently is checking attic for BSC GB arms to reuse  Suggested to cont  To use when toileting to A when raising and prevent fatigue/pain in LE  Noted pt has limited space in bedroom (upstairs) bathroom and possibly will not have access with RW, pt desires to trial removing clothing hamper to free up space  Recommended BSC altogether or possibly installing GB near toilet in bedroom bathroom and downstairs toilet if needed, pt stated frequently used  could assess possible space and assist with installation  Edu pt of tub bench trx, safety, pricing and demo how to enter using RW  Trailed dry tub trx with RW in ADL suite, pt demo G carryover by demostating entry, hand placement, lifting injured leg over slowly and exiting overall CGA with inc time  Pt completed bed tidiness mgmt using RW at [facility]  Stated bed is a larger in size and room has less space than ADL suite to move around  Overall demo G functional mobility w/ Rw  Pt will continue to benefit from skilled OT services with focus on above mentioned deficits to inc ADL /IADL ind, functional mobility, static standing balance, activity tolerance, RW safety precautions, UBE/LBE strengthening

## 2023-02-17 PROBLEM — L60.9 NAIL ABNORMALITIES: Status: ACTIVE | Noted: 2023-02-17

## 2023-02-17 PROCEDURE — 0HBRXZZ EXCISION OF TOE NAIL, EXTERNAL APPROACH: ICD-10-PCS | Performed by: PODIATRIST

## 2023-02-17 RX ADMIN — ACETAMINOPHEN 975 MG: 325 TABLET, FILM COATED ORAL at 13:26

## 2023-02-17 RX ADMIN — ENOXAPARIN SODIUM 40 MG: 40 INJECTION SUBCUTANEOUS at 13:26

## 2023-02-17 RX ADMIN — ACETAMINOPHEN 975 MG: 325 TABLET, FILM COATED ORAL at 22:00

## 2023-02-17 RX ADMIN — PRAVASTATIN SODIUM 20 MG: 20 TABLET ORAL at 15:47

## 2023-02-17 RX ADMIN — GLYCERIN 1 DROP: .002; .002; .01 SOLUTION/ DROPS OPHTHALMIC at 08:38

## 2023-02-17 RX ADMIN — MELATONIN 3 MG: at 22:00

## 2023-02-17 RX ADMIN — GLYCERIN 1 DROP: .002; .002; .01 SOLUTION/ DROPS OPHTHALMIC at 22:01

## 2023-02-17 RX ADMIN — GLYCERIN 1 DROP: .002; .002; .01 SOLUTION/ DROPS OPHTHALMIC at 15:47

## 2023-02-17 RX ADMIN — ACETAMINOPHEN 975 MG: 325 TABLET, FILM COATED ORAL at 06:00

## 2023-02-17 RX ADMIN — LIDOCAINE 5% 1 PATCH: 700 PATCH TOPICAL at 08:38

## 2023-02-17 RX ADMIN — LEVOTHYROXINE SODIUM 25 MCG: 25 TABLET ORAL at 22:00

## 2023-02-17 NOTE — PLAN OF CARE
Problem: PAIN - ADULT  Goal: Verbalizes/displays adequate comfort level or baseline comfort level  Description: Interventions:  - Encourage patient to monitor pain and request assistance  - Assess pain using appropriate pain scale  - Administer analgesics based on type and severity of pain and evaluate response  - Implement non-pharmacological measures as appropriate and evaluate response  - Consider cultural and social influences on pain and pain management  - Notify physician/advanced practitioner if interventions unsuccessful or patient reports new pain  Outcome: Progressing     Problem: INFECTION - ADULT  Goal: Absence or prevention of progression during hospitalization  Description: INTERVENTIONS:  - Assess and monitor for signs and symptoms of infection  - Monitor lab/diagnostic results  - Monitor all insertion sites, i e  indwelling lines, tubes, and drains  - Monitor endotracheal if appropriate and nasal secretions for changes in amount and color  - Warren appropriate cooling/warming therapies per order  - Administer medications as ordered  - Instruct and encourage patient and family to use good hand hygiene technique  - Identify and instruct in appropriate isolation precautions for identified infection/condition  Outcome: Progressing  Goal: Absence of fever/infection during neutropenic period  Description: INTERVENTIONS:  - Monitor WBC    Outcome: Progressing     Problem: SAFETY ADULT  Goal: Patient will remain free of falls  Description: INTERVENTIONS:  - Educate patient/family on patient safety including physical limitations  - Instruct patient to call for assistance with activity   - Consult OT/PT to assist with strengthening/mobility   - Keep Call bell within reach  - Keep bed low and locked with side rails adjusted as appropriate  - Keep care items and personal belongings within reach  - Initiate and maintain comfort rounds  - Make Fall Risk Sign visible to staff  - Offer Toileting every 2 Hours, in advance of need  - Initiate/Maintain bed/chair alarm  - Obtain necessary fall risk management equipment: non skid footwear  - Apply yellow socks and bracelet for high fall risk patients  - Consider moving patient to room near nurses station  Outcome: Progressing  Goal: Maintain or return to baseline ADL function  Description: INTERVENTIONS:  -  Assess patient's ability to carry out ADLs; assess patient's baseline for ADL function and identify physical deficits which impact ability to perform ADLs (bathing, care of mouth/teeth, toileting, grooming, dressing, etc )  - Assess/evaluate cause of self-care deficits   - Assess range of motion  - Assess patient's mobility; develop plan if impaired  - Assess patient's need for assistive devices and provide as appropriate  - Encourage maximum independence but intervene and supervise when necessary  - Involve family in performance of ADLs  - Assess for home care needs following discharge   - Consider OT consult to assist with ADL evaluation and planning for discharge  - Provide patient education as appropriate  Outcome: Progressing  Goal: Maintains/Returns to pre admission functional level  Description: INTERVENTIONS:  - Perform BMAT or MOVE assessment daily    - Set and communicate daily mobility goal to care team and patient/family/caregiver  - Collaborate with rehabilitation services on mobility goals if consulted  - Perform Range of Motion 3 times a day  - Reposition patient every 2 hours    - Dangle patient 3 times a day  - Stand patient 3 times a day  - Ambulate patient 3 times a day  - Out of bed to chair 3 times a day   - Out of bed for meals 3 times a day  - Out of bed for toileting  - Record patient progress and toleration of activity level   Outcome: Progressing     Problem: DISCHARGE PLANNING  Goal: Discharge to home or other facility with appropriate resources  Description: INTERVENTIONS:  - Identify barriers to discharge w/patient and caregiver  - Arrange for needed discharge resources and transportation as appropriate  - Identify discharge learning needs (meds, wound care, etc )  - Arrange for interpretive services to assist at discharge as needed  - Refer to Case Management Department for coordinating discharge planning if the patient needs post-hospital services based on physician/advanced practitioner order or complex needs related to functional status, cognitive ability, or social support system  Outcome: Progressing     Problem: Prexisting or High Potential for Compromised Skin Integrity  Goal: Skin integrity is maintained or improved  Description: INTERVENTIONS:  - Identify patients at risk for skin breakdown  - Assess and monitor skin integrity  - Assess and monitor nutrition and hydration status  - Monitor labs   - Assess for incontinence   - Turn and reposition patient  - Assist with mobility/ambulation  - Relieve pressure over bony prominences  - Avoid friction and shearing  - Provide appropriate hygiene as needed including keeping skin clean and dry  - Evaluate need for skin moisturizer/barrier cream  - Collaborate with interdisciplinary team   - Patient/family teaching  - Consider wound care consult   Outcome: Progressing

## 2023-02-17 NOTE — PROGRESS NOTES
02/17/23 1230   Pain Assessment   Pain Score 2   Pain Location/Orientation Orientation: Right;Location: Groin; Location: Hip   Hospital Pain Intervention(s) Repositioned   Restrictions/Precautions   Precautions Fall Risk;Pain   RLE Weight Bearing Per Order WBAT   Braces or Orthoses Other (Comment)  (B DAINA TEDS, R LE ace wrap)   Cognition   Arousal/Participation Alert; Cooperative   Attention Within functional limits   Orientation Level Oriented X4   Memory Within functional limits   Following Commands Follows all commands and directions without difficulty   Sit to Lying   Type of Assistance Needed Supervision   Comment at mat   Sit to Lying CARE Score 4   Lying to Sitting on Side of Bed   Type of Assistance Needed Supervision   Comment at mat   Lying to Sitting on Side of Bed CARE Score 4   Sit to Stand   Type of Assistance Needed Supervision; Adaptive equipment   Sit to Stand CARE Score 4   Bed-Chair Transfer   Type of Assistance Needed Supervision; Adaptive equipment   Comment with RW   Chair/Bed-to-Chair Transfer CARE Score 4   Transfer Bed/Chair/Wheelchair   Adaptive Equipment Roller Walker   Walk 10 Feet   Type of Assistance Needed Supervision; Adaptive equipment   Comment with RW   Walk 10 Feet CARE Score 4   Walk 50 Feet with Two Turns   Type of Assistance Needed Supervision; Adaptive equipment   Comment CS with RW   Walk 50 Feet with Two Turns CARE Score 4   Walk 150 Feet   Type of Assistance Needed Supervision; Adaptive equipment   Comment CS with RW   Walk 150 Feet CARE Score 4   Ambulation   Primary Mode of Locomotion Prior to Admission Walk   Distance Walked (feet)   (to and from room)   Assist Device Roller Walker   Gait Pattern Antalgic;Decreased R stance; Improper weight shift   Limitations Noted In Endurance   Walk Assist Level Close Supervision;Supervision   Does the patient walk? 2   Yes   Wheel 50 Feet with Two Turns   Reason if not Attempted Activity not applicable   Wheel 50 Feet with Two Turns CARE Score 9   Wheel 150 Feet   Reason if not Attempted Activity not applicable   Wheel 099 Feet CARE Score 9   Wheelchair mobility   Does the patient use a wheelchair? 0  No   Therapeutic Interventions   Strengthening (S)  Instruction of HEP in supine with return demo including ankle pumps, gluteal squeeze, quads sets, heel slides  All exercises completed today were all highlighted in printed handout  Please review other exercises this weekend that were not covered today  Assessment   Treatment Assessment Pt  engaged in short 30 mins session and focused on HEP instruction and ambulation to and from room  Pt  able to demonstrate good understanding of exercises with good performance  Pt  had some inc of pain at end of session to 4/10 but otherwise no other complaints reported  Cont with POC as tolerated and progress to IRP possibly monday  Problem List Decreased strength;Decreased range of motion;Decreased endurance; Impaired balance;Pain;Decreased mobility   Barriers to Discharge Inaccessible home environment;Decreased caregiver support   PT Barriers   Functional Limitation Car transfers;Stair negotiation;Standing;Transfers; Walking   Plan   Treatment/Interventions Functional transfer training;LE strengthening/ROM; Elevations; Therapeutic exercise; Endurance training;Patient/family training;Equipment eval/education; Bed mobility;Gait training   Recommendation   PT Discharge Recommendation Home with home health rehabilitation   Equipment Recommended [facility] for stairs)   PT Therapy Minutes   PT Time In 1230   PT Time Out 1300   PT Total Time (minutes) 30   PT Mode of treatment - Individual (minutes) 30   PT Mode of treatment - Concurrent (minutes) 0   PT Mode of treatment - Group (minutes) 0   PT Mode of treatment - Co-treat (minutes) 0   PT Mode of Treatment - Total time(minutes) 30 minutes   PT Cumulative Minutes 720   Therapy Time missed   Time missed?  No

## 2023-02-17 NOTE — PROGRESS NOTES
Internal Medicine Progress Note  Patient: Cynthia Mendiola  Age/sex: 69 y o  female  Medical Record #: [id number]      ASSESSMENT/PLAN: (Interval History)  Cynthia Mendiola is seen and examined and management for following issues:    Right displaced proximal femoral shaft fracture  • S/p IMN 2/3/23  • WBAT  • Per Ortho:  "Patient should not continue bisphosphonate use at this time given radiographic changes to left femur including lateral cortical thickening without obvious stress fracture/beaking  She needs to follow up immediately w ortho if she develops ambulatory pain in left thigh"   They discussed this with her   Left femur xray was negative fx 2/4/23     ABLA  • S/p 1 U PRBCs on 2/8/23 for hemoglobin of 7 3  • stable     Dizziness/syncope  • Related to orthostasis and blood loss  • Was given IVF and blood  • Watching for orthostasis while here = so far no sx of such and BPs have been stable     Hypothyroidism   • Continue levothyroxine  • Has a thyroid nodule found incidentally for which an US will need to be done as an OP     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 vs infundibulum  • To see as a followup in Neurovascular center     Old lacunar infarcts   • Found incidentally on imaging  • On statin     Right knee pain  • Xray negative 2/12/23  • Ortho saw = ACE wrap and ice  • improved        Discharge date:  Team          The above assessment and plan was reviewed and updated as determined by my evaluation of the patient on 2/17/2023      Labs:   Results from last 7 days   Lab Units 02/16/23  0536 02/13/23  0531   WBC Thousand/uL 7 57 8 42   HEMOGLOBIN g/dL 9 9* 9 4*   HEMATOCRIT % 32 3* 30 5*   PLATELETS Thousands/uL 520* 462*     Results from last 7 days   Lab Units 02/16/23  0536 02/13/23  0531   SODIUM mmol/L 142 142   POTASSIUM mmol/L 3 8 3 6   CHLORIDE mmol/L 112* 111*   CO2 mmol/L 26 25   BUN mg/dL 16 21   CREATININE mg/dL 0 51* 0 52*   CALCIUM mg/dL 8 8 8 6                   Review of Scheduled Meds:  Current Facility-Administered Medications   Medication Dose Route Frequency Provider Last Rate   • acetaminophen  975 mg Oral TID Nelia Lewis MD     • artificial tear   Both Eyes HS PRN SANTI Ramires     • bisacodyl  10 mg Rectal Daily PRN SANTI Ramires     • enoxaparin  40 mg Subcutaneous Daily SANTI Bobo     • glycerin-hypromellose-  1 drop Both Eyes TID SANTI Ramires     • levothyroxine  25 mcg Oral Daily SANTI Bobo     • lidocaine  1 patch Topical Daily SANTI Bobo     • meclizine  12 5 mg Oral Q8H PRN SANTI Bobo     • melatonin  3 mg Oral HS Deven Bhagat MD     • oxyCODONE  2 5 mg Oral Q4H PRN SANTI Ramires     • oxyCODONE  5 mg Oral Q4H PRN SANTI Bobo     • polyethylene glycol  17 g Oral Daily PRN SANTI Ramires     • pravastatin  20 mg Oral Daily With SANTI Sneed     • senna-docusate sodium  1 tablet Oral HS PRN SANTI Bobo         Subjective/ HPI: Patient seen and examined  Patients overnight issues or events were reviewed with nursing or staff during rounds or morning huddle session  New or overnight issues include the following:     No new or overnight issues  Offers no complaints    ROS:   A 10 point ROS was performed; negative except as noted above       *Labs /Radiology studies reviewed  *Medications reviewed and reconciled as needed  *Please refer to order section for additional ordered labs studies  *Case discussed with primary attending during morning huddle case rounds    Physical Examination:  Vitals:   Vitals:    02/16/23 0538 02/16/23 1410 02/16/23 2127 02/17/23 0558   BP: 116/68 127/58 132/62 134/63   BP Location: Left arm Right arm Left arm Right arm   Pulse: 72 80 71 68   Resp: 16 18 18 18   Temp: 98 3 °F (36 8 °C) 98 3 °F (36 8 °C) 98 5 °F (36 9 °C) 98 1 °F (36 7 °C)   TempSrc: Oral Oral Oral Oral   SpO2: 97% 97% 95% 97%   Weight:       Height:           General Appearance: no distress, conversive  HEENT: PERRLA, conjuctiva normal; oropharynx clear; mucous membranes moist   Neck:  Supple, normal ROM  Lungs: CTA, normal respiratory effort, no retractions, expiratory effort normal  CV: regular rate and rhythm; no rubs/murmurs/gallops, PMI normal   ABD: soft; ND/NT; +BS  EXT: mild right thigh edema/knee edema but overall is less  Skin: normal turgor, normal texture, no rashes  Psych: affect normal, mood normal  Neuro: AAO      The above physical exam was reviewed and updated as determined by my evaluation of the patient on 2/17/2023  Invasive Devices     None                    VTE Pharmacologic Prophylaxis: Enoxaparin  Code Status: Level 1 - Full Code  Current Length of Stay: 8 day(s)      Total time spent:  30 minutes with more than 50% spent counseling/coordinating care  Counseling includes discussion with patient re: progress  and discussion with patient of his/her current medical state/information  Coordination of patient's care was performed in conjunction with primary service  Time invested included review of patient's labs, vitals, and management of their comorbidities with continued monitoring  In addition, this patient was discussed with medical team including physician and advanced extenders  The care of the patient was extensively discussed and appropriate treatment plan was formulated unique for this patient  Medical decision making for the day was made by supervising physician unless otherwise noted in their attestation statement  ** Please Note:  voice to text software may have been used in the creation of this document   Although proof errors in transcription or interpretation are a potential of such software**

## 2023-02-17 NOTE — PROGRESS NOTES
02/17/23 1000   Pain Assessment   Pain Score 2   Pain Location/Orientation Orientation: Right;Location: Hip;Location: Groin   Hospital Pain Intervention(s) Repositioned  (pain patch applied by nurse at start of session  Reported to have tylenol earlier)   Restrictions/Precautions   Precautions Fall Risk;Pain   RLE Weight Bearing Per Order WBAT   Braces or Orthoses Other (Comment)  (B LE TEDS, R LE ace wrap)   Cognition   Arousal/Participation Alert; Cooperative   Attention Within functional limits   Memory Within functional limits   Following Commands Follows all commands and directions without difficulty   Subjective   Subjective Pt  has no new complaints  Is ready to participate in therapy   Sit to Stand   Type of Assistance Needed Supervision; Adaptive equipment   Comment with RW   Sit to Stand CARE Score 4   Bed-Chair Transfer   Type of Assistance Needed Supervision; Adaptive equipment   Comment with RW   Chair/Bed-to-Chair Transfer CARE Score 4   Transfer Bed/Chair/Wheelchair   Adaptive Equipment Roller Walker   Walk 10 Feet   Type of Assistance Needed Supervision; Adaptive equipment   Comment CS/ S with RW   Walk 10 Feet CARE Score 4   Walk 50 Feet with Two Turns   Type of Assistance Needed Supervision; Adaptive equipment   Comment CS with RW   Walk 50 Feet with Two Turns CARE Score 4   Walk 150 Feet   Type of Assistance Needed Supervision; Adaptive equipment   Comment CS with RW   Walk 150 Feet CARE Score 4   Ambulation   Primary Mode of Locomotion Prior to Admission Walk   Distance Walked (feet) 150 ft   Assist Device Roller Walker   Gait Pattern Decreased R stance; Improper weight shift; Antalgic   Limitations Noted In Endurance   Walk Assist Level Close Supervision;Supervision   Does the patient walk? 2   Yes   Wheel 50 Feet with Two Turns   Reason if not Attempted Activity not applicable   Wheel 50 Feet with Two Turns CARE Score 9   Wheel 150 Feet   Reason if not Attempted Activity not applicable   Wheel 170 Feet CARE Score 9   Wheelchair mobility   Does the patient use a wheelchair? 0  No   Curb or Single Stair   Style negotiated Curb   Type of Assistance Needed Incidental touching; Adaptive equipment;Verbal cues   Comment 8" curb step X 2   1 Step (Curb) CARE Score 4   4 Steps   Type of Assistance Needed Supervision;Verbal cues; Adaptive equipment; Incidental touching   Comment using L rail + SPC X 7 steps, and B hansds on R railing X 7 steps and R rail + SPC X 7 steps   4 Steps CARE Score 4   12 Steps   Type of Assistance Needed Incidental touching; Adaptive equipment;Verbal cues   Comment using L rail + SPC X 7 steps, seated rest break  and B hansds on R railing X 7 steps and R rail + SPC X 7 steps   12 Steps CARE Score 4   Stairs   Type Stairs;Curb   # of Steps 14   Weight Bearing Precautions RLE;WBAT   Assist Devices Single Rail;Cane   Findings using L rail + SPC X 7 steps, seated rest break  and B hansds on R railing X 7 steps and R rail + SPC X 7 steps   Toilet Transfer   Type of Assistance Needed Supervision; Adaptive equipment   Toilet Transfer CARE Score 4   Toilet Transfer   Surface Assessed Standard Toilet   Adaptive Equipment   (RW)   Therapeutic Interventions   Strengthening Instructed with HEP with printed handout provided  pt  perform seated LAQ, hip flex, add squeeze with ball, hip abd with red TB X 10-15 reps; 3 sets   Flexibility B hamstring and gastroc stretching   Other Comments   Comments recommended for patient to stay on first floor and to try stairs to 2nd floor only when home PT starts seeing her   Assessment   Treatment Assessment First part of session foused a lot on stairs management  trialed using SPC this session and pt  did well with CS/ CGA   Pt  stated that it feels much better having SPC going up and down using L rail as compared to just using B hands on rail  However pt  does well using R railing either B hands on rail or with SPC   Pt  stated that using cane is a good idea to get inside the house and her aunt marie have a cane stored in her attic and will have brother look for it  Pt  did some HEP sitting and will review supine thera ex in PM session  Problem List Decreased strength;Decreased range of motion;Decreased endurance; Impaired balance;Pain;Decreased mobility   Barriers to Discharge Inaccessible home environment;Decreased caregiver support   PT Barriers   Functional Limitation Car transfers;Stair negotiation;Standing;Transfers; Walking   Plan   Treatment/Interventions Functional transfer training;LE strengthening/ROM; Elevations; Therapeutic exercise; Endurance training;Patient/family training;Equipment eval/education; Bed mobility;Gait training   Recommendation   PT Discharge Recommendation Home with home health rehabilitation   Equipment Recommended [facility] for steps)   PT Therapy Minutes   PT Time In 1000   PT Time Out 1100   PT Total Time (minutes) 60   PT Mode of treatment - Individual (minutes) 60   PT Mode of treatment - Concurrent (minutes) 0   PT Mode of treatment - Group (minutes) 0   PT Mode of treatment - Co-treat (minutes) 0   PT Mode of Treatment - Total time(minutes) 60 minutes   PT Cumulative Minutes 690   Therapy Time missed   Time missed?  No

## 2023-02-17 NOTE — QUICK NOTE
Podiatry Quick Note   -Patient was just trying to eat dinner when I entered the room to evaluate her for nail debridement  Patient was well mannered and I will return tomorrow morning to trim toenails  Discussed with patient that if he is at Occupational Therapy I will try and return again on Sunday    Patient was very kind was amenable to this plan

## 2023-02-17 NOTE — CONSULTS
Podiatry - Consultation    Patient Information:   Leslie Sofia 69 y o  female MRN: [id number]  Unit/Bed#: -01 Encounter: [id number]  PCP: Nissa Hernandez MD  Date of Admission:  2/9/2023  Date of Consultation: 02/17/23  Requesting Physician: Gallo Mcduffie MD      ASSESSMENT:    Leslie Sofia is a 69 y o  female with:    1  Hypertrophic, thickened elongated toenails  2  Osteoporosis    PLAN:    · Patient's nails were trimmed x10 with a large nail nipper with no incident  · Patient to follow-up with outpatient podiatrist she is already established with  · Podiatry to sign off at this time  · Rest of care per primary team   · Will discuss this plan with my attending and update as needed  Weightbearing status: Weightbearing as tolerated    SUBJECTIVE:    History of Present Illness:    Leslie Sofia is a 69 y o  female who is originally admitted 2/9/2023 due to femoral fracture  Patient has a past medical history of elongated thickened toenails, osteoporosis, hyperlipidemia  We are consulted for thickened elongated toenails  Patient reports that her nails are painful and she has difficulty ambulating with them  Patient reports that due to her insurance she is unable to cut them on her own  Patient reports that she is already established with outpatient podiatry to trim her nails every 10 weeks    Review of Systems:    Constitutional: Negative  HENT: Negative  Eyes: Negative  Respiratory: Negative  Cardiovascular: Negative  Gastrointestinal: Negative  Musculoskeletal: negative   Skin:thickened elongated toe nails    Neurological: negative    Psych: Negative       Past Medical and Surgical History:     Past Medical History:   Diagnosis Date   • Arthritis 2005   • Closed nondisplaced fracture of fifth metatarsal bone of right foot with routine healing    • Disease of thyroid gland     hypothyroid   • Glaucoma, bilateral    • Hyperlipidemia    • Osteoporosis    • Scoliosis 13       Past Surgical History:   Procedure Laterality Date   • BUNIONECTOMY     • COLONOSCOPY  2019   • CORRECTION HAMMER TOE     • MAMMO (HISTORICAL)  2018   • NEUROMA EXCISION     • OTHER SURGICAL HISTORY      Birth kwasi removed   • ND OPTX FEM SHFT FX W/INSJ IMED IMPLT W/WO SCREW Right 2/3/2023    Procedure: INSERTION NAIL IM FEMUR ANTEGRADE (TROCHANTERIC);   Surgeon: Lisette Del Toro MD;  Location: [facility];  Service: Orthopedics   • WISDOM TOOTH EXTRACTION         Meds/Allergies:    Medications Prior to Admission   Medication   • acetaminophen (TYLENOL) 325 mg tablet   • Calcium Carb-Cholecalciferol (CALTRATE 600+D3 PO)   • enoxaparin (LOVENOX) 30 mg/0 3 mL   • Glucosamine-Chondroitin 500-400 MG CAPS   • levothyroxine 25 mcg tablet   • Naphazoline-Polyethyl Glycol 0 012-0 2 % SOLN   • rosuvastatin (CRESTOR) 10 MG tablet   • artificial tear (LUBRIFRESH P M ) 83-15 % ophthalmic ointment   • aspirin (ECOTRIN LOW STRENGTH) 81 mg EC tablet   • Biotin 1000 MCG tablet   • bisacodyl (DULCOLAX) 10 mg suppository   • meclizine (ANTIVERT) 12 5 MG tablet   • [] oxyCODONE (Roxicodone) 5 immediate release tablet   • polyethylene glycol (MIRALAX) 17 g packet   • pravastatin (PRAVACHOL) 20 mg tablet   • senna-docusate sodium (SENOKOT S) 8 6-50 mg per tablet       Allergies   Allergen Reactions   • Indomethacin GI Intolerance     Other reaction(s): GI upset  Other reaction(s): GI upset   • Oxycodone Dizziness, Nausea Only, GI Intolerance and Other (See Comments)     Other reaction(s): GI upset  Other reaction(s): GI upset       Social History:     Marital Status: Single    Substance Use History:   Social History     Substance and Sexual Activity   Alcohol Use Yes    Comment: Glass of wine a few times a year     Social History     Tobacco Use   Smoking Status Never   Smokeless Tobacco Never     Social History     Substance and Sexual Activity   Drug Use Yes       Family History:    Family History Problem Relation Age of Onset   • Hypertension Mother    • Thyroid disease Mother    • Arthritis Mother    • Glaucoma Mother    • Stomach cancer Father 46   • Cancer Father    • No Known Problems Maternal Grandmother    • No Known Problems Maternal Grandfather    • No Known Problems Paternal Grandmother    • No Known Problems Paternal Grandfather    • Dementia Maternal Aunt    • No Known Problems Maternal Aunt    • No Known Problems Paternal Aunt    • Hypertension Brother    • Osteoporosis Family    • Hyperlipidemia Family    • Hypertension Family    • Dementia Maternal Aunt    • Hypertension Maternal Aunt          OBJECTIVE:    Vitals:   Blood Pressure: 134/63 (02/17/23 0558)  Pulse: 68 (02/17/23 0558)  Temperature: 98 1 °F (36 7 °C) (02/17/23 0558)  Temp Source: Oral (02/17/23 0558)  Respirations: 18 (02/17/23 0558)  Height: 5' 1" (154 9 cm) (02/09/23 1300)  Weight - Scale: 58 7 kg (129 lb 6 4 oz) (02/15/23 0619)  SpO2: 97 % (02/17/23 0558)    Physical Exam:    General Appearance: Alert, cooperative, no distress  HEENT: Head normocephalic, atraumatic, without obvious abnormality  Heart: Normal rate and rhythm  Lungs: Non-labored breathing  No respiratory distress  Abdomen: Without distension  Psychiatric: AAOx3  Lower Extremity:    Vascular:   DP: Right: 1+ Left: 1+  PT: Right: 1+ Left: 1+  CRT < 3 seconds at the digits  +1/4 edema noted at bilateral lower extremities  Pedal hair is absent  Skin temperature is WNL bilaterally  Musculoskeletal:  MMT is 4/5 in all muscle compartments bilaterally  ROM at the 1st MPJ and ankle joint are reduced bilaterally with the leg extended  No Pain on palpation of foot and ankle  No gross deformities noted  Dermatological:  No xerosis noted  No interdigital maceration  Thickened elongated toenails x4 noted with yellowed subungual debris    Neurological:  Gross sensation is intact  Light touch is intact  Protective sensation is intact        Additional data:     Lab Results: I have personally reviewed pertinent labs including:    Results from last 7 days   Lab Units 02/16/23  0536   WBC Thousand/uL 7 57   HEMOGLOBIN g/dL 9 9*   HEMATOCRIT % 32 3*   PLATELETS Thousands/uL 520*   NEUTROS PCT % 62   LYMPHS PCT % 23   MONOS PCT % 12   EOS PCT % 2     Results from last 7 days   Lab Units 02/16/23  0536   POTASSIUM mmol/L 3 8   CHLORIDE mmol/L 112*   CO2 mmol/L 26   BUN mg/dL 16   CREATININE mg/dL 0 51*   CALCIUM mg/dL 8 8           Cultures: I have personally reviewed pertinent cultures including:              Imaging: I have personally reviewed pertinent reports in PACS  EKG, Pathology, and Other Studies: I have personally reviewed pertinent reports  Time Spent for Care: 30 minutes  More than 50% of total time spent on counseling and coordination of care as described above  ** Please Note: Portions of the record may have been created with voice recognition software  Occasional wrong word or "sound a like" substitutions may have occurred due to the inherent limitations of voice recognition software  Read the chart carefully and recognize, using context, where substitutions have occurred   **

## 2023-02-17 NOTE — PROGRESS NOTES
PM&R PROGRESS NOTE:  Mary Stewart 69 y o  female MRN: [id number]  Unit/Bed#: -01 Encounter: [id number]        Rehabilitation Diagnosis: Impairment of mobility, safety and Activities of Daily Living (ADLs) due to Orthopedic Disorders:  08 2  Femur (Shaft) Fracture    HPI: Mayito Champion is a 69 y o  female with a history of hypothyroid, osteoporosis, scoliosis, and hyperlipidemia that presented to [facility] on 2/2/23 with c/o right hip pain after feeling a pop in her right hip when descending stairs  She was unable to ambulate, lowered herself to the floor and crawled to phone to call EMS  On exam right lower extremity shortening and external rotation  Imaging showed acute transverse substantially displaced proximal femoral shaft fracture  Orthopedics was consulted and recommended NWB with Mane's traction, with surgical intervention  On 2/3, patient underwent right insertion IM nailing with Dr Twan Kamara  Patient is WBAT with Lovenox for 28 days for DVT prophylaxis  Post-op complicated by dizziness and an episode requiring her to be lowered to floor  CTA of head/neck was obtained, which showed no acute findings; did show outpouching of PCA  Neurovascular recommended follow-up as outpatient  Echo EF 65%, mild TVR and trace PVR  Patient's hemoglobin 7 3 which she received 1 unit pRBC improving to 9 4  Patient was deemed hemodynamically stable, PT/OT recommend inpatient acute rehabilitation  Patient was admitted to [address] [place] [address] on 02/09/23  SUBJECTIVE: Patient seen face-to-face today during lunch watching the Price is right on TV  Patient reports increased soreness in her leg last night prior to sleeping but none today  Patient happy that she is learning different techniques for stair negotiation including sidestepping and utilizing a single-point cane  She hopes she can feel confident for discharge Thursday home  She is practicing her Lovenox injections      ASSESSMENT: Stable, progressing    PLAN:    Rehabilitation  • Functional deficits:  Self care, impaired mobility  • Continue current rehabilitation plan of care to maximize function      • Functional update:   • Estimated Discharge: anticipated Thursday 2/23/23 with Outpatient at home PT    Physical Therapy Occupational Therapy Speech Therapy   Weight Bearing Status: Weight Bearing as Tolerated (RLE)  Transfers: Incidental Touching (with use of RW)  Bed Mobility: Supervision  Amulation Distance (ft): 150 feet  Ambulation: Incidental Touching (with use of RW  trialed with one sided wallrail and pt only able to compelte 10 ft with min A due to pain)  Assistive Device for Ambulation: Roller Walker  Wheelchair Mobility Distance:  (N/A)  Number of Stairs: 6  Assistive Device for Stairs: Lehft Hand Rail (BUE support on L railing ascending)  Stair Assistance: Minimal Assistance  Ramp:  (not required for entry to home)  Discharge Recommendations: Home with:  [address] with[de-identified] Family Support, First Floor Setup, Home Physical Therapy   Eating: Independent  Grooming: Supervision  Bathing: Incidental Touching  Bathing: Incidental Touching  Upper Body Dressing: Supervision  Lower Body Dressing: Minimal Assistance  Toileting: Incidental Touching  Tub/Shower Transfer: Minimal Assistance  Toilet Transfer: Incidental Touching  Cognition: Within Defined Limits  Orientation: Person, Place, Time, Situation                 DVT prophylaxis  • Managed on subcutaneous Lovenox - patient is learning self injections and her friend will be assisting her when needed    Bladder plan  • Continent    Bowel plan  • Continent    * Femur fracture ([facility] [address] )  Assessment & Plan  - Pt felt "pop" when descending stairs with inability to ambulate  -X-ray: acute transverse substantially displaced proximal femoral shaft fracture  -2/3/23 right IMN by Dr Greenberg Part  -WBAT right lower extremity  - Lovenox for 28 days (3/3)  S/p PRBC x 1 unit    Plan at [facility]:  -Continue WBAT RLE  - Patient developed swelling at right lateral knee evaluated by orthopedics - x-ray completed - per ortho distal incision with hematoma; ace wrap daily and continue therapies as tolerated  -Monitor incision for signs of infection -incision very stable  -Ortho removed staples, 02/16/23  -Continue acute rehabilitation program  -Follow-up with Orthopedic Surgery (Dr Tricia Monzon)    Nail abnormalities  Assessment & Plan  Patient requesting podiatry consult for nail abnormalities - consult placed 2/17/23    Osteoporosis  Assessment & Plan  -took bisphosphonate <10 years  -per Ortho: pt should stop bisphosphonate at this time d/t left femur lateral cortical thickening without obvious stress fracture/break  -needs immediate f/u if develops pain in left leg  -follow-up with Orthopedics    Lightheadedness  Assessment & Plan  -post-op with ambulation  - orthostatic BP normal  -Hbg 7 3 (2/7)  -received IVF, 1 unit PRBC in acute care    No further episodes in acute rehabilitation    HLD (hyperlipidemia)  Assessment & Plan  -  -home: Crestor 10 mg  -continue Pravachol 20 mg    Acute blood loss anemia  Assessment & Plan  In acute care post op dizziness, diaphoresis, lowered to floor  -IVF  -Hbg 7 3, received 1 unit pRBC    Here at [facility]:  -current Hbg 9 9  -monitor hemoglobin  -No further diaphoretic episodes    Hypothyroidism  Assessment & Plan  -continue levothyroxine    Acute pain due to trauma  Assessment & Plan  -acute post-op pain right hip/leg  -tylenol 975 mg scheduled, oxycodone IR 2 5-5 mg prn (pt not taking) added Lidoderm patch   -monitor pain with therapy  -adjust medication as needed    Appreciate IM consultants medical co-management  Labs, medications, and imaging reviewed  ROS:  Review of Systems   Constitutional: Negative  HENT: Negative  Eyes: Negative  Respiratory: Negative  Cardiovascular: Negative  Gastrointestinal: Negative  Endocrine: Negative  Genitourinary: Negative      Musculoskeletal: Negative  Skin: Negative  Allergic/Immunologic: Negative  Neurological: Negative  Hematological: Negative  Psychiatric/Behavioral: Negative  OBJECTIVE:   /63 (BP Location: Right arm)   Pulse 68   Temp 98 1 °F (36 7 °C) (Oral)   Resp 18   Ht 5' 1" (1 549 m)   Wt 58 7 kg (129 lb 6 4 oz)   SpO2 97%   BMI 24 45 kg/m²     Physical Exam  Vitals and nursing note reviewed  Constitutional:       General: She is not in acute distress  HENT:      Head: Normocephalic and atraumatic  Nose: Nose normal       Mouth/Throat:      Mouth: Mucous membranes are moist    Eyes:      Conjunctiva/sclera: Conjunctivae normal    Cardiovascular:      Rate and Rhythm: Normal rate and regular rhythm  Pulses: Normal pulses  Pulmonary:      Effort: Pulmonary effort is normal       Breath sounds: Normal breath sounds  No wheezing or rales  Abdominal:      General: Bowel sounds are normal  There is no distension  Palpations: Abdomen is soft  Tenderness: There is no abdominal tenderness  Musculoskeletal:         General: No swelling  Cervical back: Neck supple  Skin:     General: Skin is warm  Findings: Bruising: right lateral knee hematoma present  Neurological:      Mental Status: She is alert and oriented to person, place, and time  Sensory: No sensory deficit  Motor: Weakness (RLE graded as 3/5 proximally and 4+/5 distally) present        Gait: Gait abnormal    Psychiatric:         Mood and Affect: Mood normal         Lab Results   Component Value Date    WBC 7 57 02/16/2023    HGB 9 9 (L) 02/16/2023    HCT 32 3 (L) 02/16/2023    MCV 96 02/16/2023     (H) 02/16/2023     Lab Results   Component Value Date    SODIUM 142 02/16/2023    K 3 8 02/16/2023     (H) 02/16/2023    CO2 26 02/16/2023    BUN 16 02/16/2023    CREATININE 0 51 (L) 02/16/2023    GLUC 87 02/16/2023    CALCIUM 8 8 02/16/2023     Lab Results   Component Value Date    INR 1 05 02/03/2023    INR 0 95 02/02/2023    PROTIME 13 9 02/03/2023    PROTIME 12 8 02/02/2023       Current Facility-Administered Medications:   •  acetaminophen (TYLENOL) tablet 975 mg, 975 mg, Oral, TID, Laurent Duffy MD, 975 mg at 02/17/23 1326  •  artificial tear (LUBRIFRESH P M ) ophthalmic ointment, , Both Eyes, HS PRN, SANTI Jo, Given at 02/09/23 1746  •  bisacodyl (DULCOLAX) rectal suppository 10 mg, 10 mg, Rectal, Daily PRN, SANTI Bobo  •  enoxaparin (LOVENOX) subcutaneous injection 40 mg, 40 mg, Subcutaneous, Daily, SANTI Bobo, 40 mg at 02/17/23 1326  •  glycerin-hypromellose- (ARTIFICIAL TEARS) ophthalmic solution 1 drop, 1 drop, Both Eyes, TID, SANTI Bobo, 1 drop at 02/17/23 3248  •  levothyroxine tablet 25 mcg, 25 mcg, Oral, Daily, SANTI Bobo, 25 mcg at 02/16/23 2134  •  lidocaine (LIDODERM) 5 % patch 1 patch, 1 patch, Topical, Daily, SANTI Bobo, 1 patch at 02/17/23 5808  •  meclizine (ANTIVERT) tablet 12 5 mg, 12 5 mg, Oral, Q8H PRN, SANTI Bobo  •  melatonin tablet 3 mg, 3 mg, Oral, HS, Anjelica Alvarado MD, 3 mg at 02/16/23 2134  •  oxyCODONE (ROXICODONE) IR tablet 2 5 mg, 2 5 mg, Oral, Q4H PRN, SANTI Bobo  •  oxyCODONE (ROXICODONE) IR tablet 5 mg, 5 mg, Oral, Q4H PRN, SANTI Bobo  •  polyethylene glycol (MIRALAX) packet 17 g, 17 g, Oral, Daily PRN, SANTI Bobo  •  pravastatin (PRAVACHOL) tablet 20 mg, 20 mg, Oral, Daily With Dinner, SANTI Jo, 20 mg at 02/16/23 1530  •  senna-docusate sodium (SENOKOT S) 8 6-50 mg per tablet 1 tablet, 1 tablet, Oral, HS Celso FIERRO CRNP    Past Medical History:   Diagnosis Date   • Arthritis 2005   • Closed nondisplaced fracture of fifth metatarsal bone of right foot with routine healing    • Disease of thyroid gland     hypothyroid   • Glaucoma, bilateral    • Hyperlipidemia    • Osteoporosis    • Scoliosis 12/12/12       Patient Active Problem List Diagnosis Date Noted   • Femur fracture ([facility] ) 02/02/2023   • Nail abnormalities 02/17/2023   • Osteoporosis 02/09/2023   • HLD (hyperlipidemia) 02/06/2023   • Lightheadedness 02/06/2023   • Acute blood loss anemia 02/05/2023   • Fall 02/03/2023   • Acute pain due to trauma 02/03/2023   • Hypothyroidism 02/03/2023   • Chest pain 10/31/2022   • Abnormal electrocardiogram (ECG) (EKG) 10/31/2022   • Sjogren's syndrome ([facility] ) 10/06/2021   • Primary osteoarthritis of right knee 05/14/2019   • Primary osteoarthritis of left knee 05/14/2019        I have spent a total time of 40 minutes on 02/17/23 in caring for this patient   including Documenting in the medical record, Reviewing / ordering tests, medicine, procedures  , Communicating with other healthcare professionals  and Patient seen in therapy today      Nena Royal MD  PM&R

## 2023-02-17 NOTE — PLAN OF CARE
Problem: PAIN - ADULT  Goal: Verbalizes/displays adequate comfort level or baseline comfort level  Description: Interventions:  - Encourage patient to monitor pain and request assistance  - Assess pain using appropriate pain scale  - Administer analgesics based on type and severity of pain and evaluate response  - Implement non-pharmacological measures as appropriate and evaluate response  - Consider cultural and social influences on pain and pain management  - Notify physician/advanced practitioner if interventions unsuccessful or patient reports new pain  Outcome: Progressing     Problem: INFECTION - ADULT  Goal: Absence or prevention of progression during hospitalization  Description: INTERVENTIONS:  - Assess and monitor for signs and symptoms of infection  - Monitor lab/diagnostic results  - Monitor all insertion sites, i e  indwelling lines, tubes, and drains  - Monitor endotracheal if appropriate and nasal secretions for changes in amount and color  - [place] appropriate cooling/warming therapies per order  - Administer medications as ordered  - Instruct and encourage patient and family to use good hand hygiene technique  - Identify and instruct in appropriate isolation precautions for identified infection/condition  Outcome: Progressing  Goal: Absence of fever/infection during neutropenic period  Description: INTERVENTIONS:  - Monitor WBC    Outcome: Progressing     Problem: SAFETY ADULT  Goal: Patient will remain free of falls  Description: INTERVENTIONS:  - Educate patient/family on patient safety including physical limitations  - Instruct patient to call for assistance with activity   - Consult OT/PT to assist with strengthening/mobility   - Keep Call bell within reach  - Keep bed low and locked with side rails adjusted as appropriate  - Keep care items and personal belongings within reach  - Initiate and maintain comfort rounds  - Make Fall Risk Sign visible to staff  - Apply yellow socks and bracelet for high fall risk patients  - Consider moving patient to room near nurses station  Outcome: Progressing  Goal: Maintain or return to baseline ADL function  Description: INTERVENTIONS:  -  Assess patient's ability to carry out ADLs; assess patient's baseline for ADL function and identify physical deficits which impact ability to perform ADLs (bathing, care of mouth/teeth, toileting, grooming, dressing, etc )  - Assess/evaluate cause of self-care deficits   - Assess range of motion  - Assess patient's mobility; develop plan if impaired  - Assess patient's need for assistive devices and provide as appropriate  - Encourage maximum independence but intervene and supervise when necessary  - Involve family in performance of ADLs  - Assess for home care needs following discharge   - Consider OT consult to assist with ADL evaluation and planning for discharge  - Provide patient education as appropriate  Outcome: Progressing  Goal: Maintains/Returns to pre admission functional level  Description: INTERVENTIONS:  - Perform BMAT or MOVE assessment daily    - Set and communicate daily mobility goal to care team and patient/family/caregiver     - Collaborate with rehabilitation services on mobility goals if consulted  - Out of bed for toileting  - Record patient progress and toleration of activity level   Outcome: Progressing     Problem: DISCHARGE PLANNING  Goal: Discharge to home or other facility with appropriate resources  Description: INTERVENTIONS:  - Identify barriers to discharge w/patient and caregiver  - Arrange for needed discharge resources and transportation as appropriate  - Identify discharge learning needs (meds, wound care, etc )  - Arrange for interpretive services to assist at discharge as needed  - Refer to Case Management Department for coordinating discharge planning if the patient needs post-hospital services based on physician/advanced practitioner order or complex needs related to functional status, cognitive ability, or social support system  Outcome: Progressing     Problem: Prexisting or High Potential for Compromised Skin Integrity  Goal: Skin integrity is maintained or improved  Description: INTERVENTIONS:  - Identify patients at risk for skin breakdown  - Assess and monitor skin integrity  - Assess and monitor nutrition and hydration status  - Monitor labs   - Assess for incontinence   - Turn and reposition patient  - Assist with mobility/ambulation  - Relieve pressure over bony prominences  - Avoid friction and shearing  - Provide appropriate hygiene as needed including keeping skin clean and dry  - Evaluate need for skin moisturizer/barrier cream  - Collaborate with interdisciplinary team   - Patient/family teaching  - Consider wound care consult   Outcome: Progressing

## 2023-02-17 NOTE — PROGRESS NOTES
02/17/23 0700   Pain Assessment   Pain Assessment Tool 0-10   Pain Score 2   Pain Location/Orientation Orientation: Right;Orientation: Lower; Location: Hip;Location: Groin   Pain Onset/Description Descriptor: [facility] Pain Intervention(s) Shower/Bath  (Ace wrapped knee, donned MATTHEW stockings)   Restrictions/Precautions   Precautions Fall Risk   Weight Bearing Restrictions Yes   RLE Weight Bearing Per Order WBAT   LLE Weight Bearing Per Order WBAT   ROM Restrictions No   Braces or Orthoses   (BLE TEDS and RLE (knee) Ace Wrap)   Eating   Type of Assistance Needed Independent   Physical Assistance Level No physical assistance   Comment Set up for pt after session, pt seated in recliner  Eating CARE Score 6   Shower/Bathe Self   Type of Assistance Needed Supervision   Physical Assistance Level No physical assistance   Comment Pt completed bathing while seated in tub bench, chris to bathe UB/LB with inc time overall CS  CS when bathing hua/rear utilizing GB  Shower/Bathe Self CARE Score 4   Bathing   Assessed Bath Style Shower   Anticipated D/C Bath Style Shower   Able to [place] Deniz No   Able to [employer] Temperature Yes   Able to Wash/Rinse/Dry (body part) Left Arm;Right Arm;L Upper Leg;R Upper Leg;L Lower Leg/Foot;R Lower Leg/Foot;Chest;Abdomen;Perineal Area; Buttocks   Limitations Noted in Balance; Endurance;ROM   Positioning Seated;Standing   Adaptive Equipment Tub Bench; Shower Bars   Tub/Shower Transfer   Limitations Noted In Balance;ROM;LE Strength   Adaptive Equipment Transfer Bench   Assessed Tub/shower combo   Findings   (Pt demo G carryover from previous session G RW positioning upon approach, utilized swivel method and demo G sitting balance when lifting BLE over tub lip )   Upper Body Dressing   Type of Assistance Needed Set-up / clean-up   Physical Assistance Level No physical assistance   Comment Seated at edge of tub bench for dressing, able to don bra and pullover shirt     Upper Body Dressing CARE Score 5   Lower Body Dressing   Type of Assistance Needed Set-up / Joel Bachelor; Adaptive equipment   Physical Assistance Level No physical assistance   Comment From edge of tub bench, utilizing leg crossing method for donning/doffing undergarment,pants socks, CGA in stance  Lower Body Dressing CARE Score 5   Putting On/Taking Off Footwear   Type of Assistance Needed Adaptive equipment;Physical assistance   Physical Assistance Level 26%-50%   Comment Required shoe horn for RLE weakness to don shoe seated at edge of tub bench  A for TEDs  Putting On/Taking Off Footwear CARE Score 3   Dressing/Undressing Clothing   Remove UB Clothes Pullover Shirt;Bra   Don UB Clothes Pullover Shirt;Bra   Remove LB Clothes Pants; Undergarment;Socks; Shoes  (Requiring TA for donning bilateral TEDS )   Don LB Clothes Pants; Undergarment;Socks; Shoes   Limitations Noted In Balance; Endurance;ROM; Timeliness   Positioning Supported Sit;Standing   Lying to Sitting on Side of Bed   Type of Assistance Needed Supervision; Adaptive equipment   Physical Assistance Level No physical assistance   Comment Supine to sitting at EOB, no c/o pain or fatigue  Lying to Sitting on Side of Bed CARE Score 4   Sit to Stand   Type of Assistance Needed Supervision; Adaptive equipment   Physical Assistance Level No physical assistance   Comment CS with RW   Sit to Stand CARE Score 4   Bed-Chair Transfer   Type of Assistance Needed Supervision; Adaptive equipment   Physical Assistance Level No physical assistance   Comment CS using RW   Chair/Bed-to-Chair Transfer CARE Score 4   Transfer Bed/Chair/Wheelchair   Positioning Concerns Skin Integrity   Limitations Noted In Confidence;LE Strength   Adaptive Equipment [address] Hygiene   Type of Assistance Needed Incidental touching   Physical Assistance Level No physical assistance   Comment CGA with RW  In stance to pull undergarments above hips     Toileting Hygiene CARE Score 4   Toileting   Able to Pull Clothing down yes, up yes  Able to Manage Clothing Closures Yes   Manage Hygiene Bladder; Bowel   Limitations Noted In Balance;LE Strength   Adaptive Equipment Grab Bar   Toilet Transfer   Type of Assistance Needed Supervision; Adaptive equipment   Physical Assistance Level No physical assistance   Comment CS using RW, utilzied GB  Toilet Transfer CARE Score 4   Exercise Tools   Theraputty Pt participated in thera ex utilizing yellow theraputty with beads tolerated activity for duration of 10 mins without c/o pain  Completed seated theraex digit opposition 2 x 10 noted minimal soreness after completion  Pt completed activity to promote activity tolerance, endurance,FMC  Cognition   Overall Cognitive Status WFL   Arousal/Participation Alert; Cooperative   Attention Within functional limits   Orientation Level Oriented X4   Memory Within functional limits   Following Commands Follows all commands and directions without difficulty   Activity Tolerance   Activity Tolerance Patient tolerated treatment well   Assessment   Treatment Assessment Pt participated in skilled OT that focused on ADL retraining, Functional mobility, UB strengthening, static/dynamic standing balance, endurance, activity tolerance,FMC, functional trx  Pt cont  To be limited by dec strength, dec mobility, impaired dynamic/static standing balance, dec LE weakness, pain and fatigue  Pt demo G carryover from previous session when transferring into shower/tub combo  Reports neighborhood possibly assisting in purchase and assembling of tub bench  Informed pt of general price differences, pt overall receptive and cooperative in receiving contact via e-mail of the neighbor  Waiting to follow up to send [place] link to email address once received  Pt will continue to benefit from skilled OT services with focus on toilet trx wtihout use of GB, rising from lower level surfaces, practice of ACE wrapping ind  TEDs stocking mgmt     Prognosis Good Problem List Decreased strength;Decreased range of motion;Decreased endurance; Impaired balance;Decreased skin integrity;Orthopedic restrictions;Decreased mobility   Barriers to Discharge Inaccessible home environment;Decreased caregiver support   Plan   Treatment/Interventions ADL retraining;Functional transfer training;LE strengthening/ROM; Therapeutic exercise; Endurance training;Bed mobility; Compensatory technique education   Progress Progressing toward goals   OT Therapy Minutes   OT Time In 0700   OT Time Out 0830   OT Total Time (minutes) 90   OT Mode of treatment - Individual (minutes) 90   OT Mode of treatment - Concurrent (minutes) 0   OT Mode of treatment - Group (minutes) 0   OT Mode of treatment - Co-treat (minutes) 0   OT Mode of Treatment - Total time(minutes) 90 minutes   OT Cumulative Minutes 630   Therapy Time missed   Time missed?  No

## 2023-02-17 NOTE — PHYSICAL THERAPY NOTE
Update to PT POC:    Plan is still for d/c home on Thursday  To practice stairs with 1 HR and SPC, and also practice 1 HR and SBQC  Pt reported that her aunt had a cane, but she can't remember which one  Her brother is to find the cane and the RW that she has at home and bring it in here so she can practice with them prior to dc, but she is unsure when he will be able to bring these items in  In the mean time, PT to practice with both canes for preparation  Pt identified that her bathroom has a small space she may need tos sarina step in with the RW or keep both hands on the counter for a couple of spaces  PT to practice side stepping with RW and a couple steps with hands on the counter to prep for this  Additionally, on 2/18 and 2/19, PT/OT to work on pt practicing mobility in room moving equipment etc to prepare for IRP on Monday with use of RW  To problem solve through where she would like equipment, how to move the tray table and RW, and managing the call bell  If needed to move equipment/furniture around in prep for this  Discussed this plan with her today and she demonstrated understanding  RW order for her to have 2 at home was placed

## 2023-02-18 RX ADMIN — GLYCERIN 1 DROP: .002; .002; .01 SOLUTION/ DROPS OPHTHALMIC at 15:34

## 2023-02-18 RX ADMIN — LIDOCAINE 5% 1 PATCH: 700 PATCH TOPICAL at 08:11

## 2023-02-18 RX ADMIN — GLYCERIN 1 DROP: .002; .002; .01 SOLUTION/ DROPS OPHTHALMIC at 08:12

## 2023-02-18 RX ADMIN — LEVOTHYROXINE SODIUM 25 MCG: 25 TABLET ORAL at 21:18

## 2023-02-18 RX ADMIN — ACETAMINOPHEN 975 MG: 325 TABLET, FILM COATED ORAL at 13:33

## 2023-02-18 RX ADMIN — MELATONIN 3 MG: at 23:40

## 2023-02-18 RX ADMIN — PRAVASTATIN SODIUM 20 MG: 20 TABLET ORAL at 15:33

## 2023-02-18 RX ADMIN — ACETAMINOPHEN 975 MG: 325 TABLET, FILM COATED ORAL at 21:18

## 2023-02-18 RX ADMIN — ENOXAPARIN SODIUM 40 MG: 40 INJECTION SUBCUTANEOUS at 13:33

## 2023-02-18 RX ADMIN — GLYCERIN 1 DROP: .002; .002; .01 SOLUTION/ DROPS OPHTHALMIC at 21:19

## 2023-02-18 RX ADMIN — ACETAMINOPHEN 975 MG: 325 TABLET, FILM COATED ORAL at 05:39

## 2023-02-18 NOTE — PLAN OF CARE
Problem: PAIN - ADULT  Goal: Verbalizes/displays adequate comfort level or baseline comfort level  Description: Interventions:  - Encourage patient to monitor pain and request assistance  - Assess pain using appropriate pain scale  - Administer analgesics based on type and severity of pain and evaluate response  - Implement non-pharmacological measures as appropriate and evaluate response  - Consider cultural and social influences on pain and pain management  - Notify physician/advanced practitioner if interventions unsuccessful or patient reports new pain  Outcome: Progressing     Problem: INFECTION - ADULT  Goal: Absence or prevention of progression during hospitalization  Description: INTERVENTIONS:  - Assess and monitor for signs and symptoms of infection  - Monitor lab/diagnostic results  - Monitor all insertion sites, i e  indwelling lines, tubes, and drains  - Monitor endotracheal if appropriate and nasal secretions for changes in amount and color  - [place] appropriate cooling/warming therapies per order  - Administer medications as ordered  - Instruct and encourage patient and family to use good hand hygiene technique  - Identify and instruct in appropriate isolation precautions for identified infection/condition  Outcome: Progressing  Goal: Absence of fever/infection during neutropenic period  Description: INTERVENTIONS:  - Monitor WBC    Outcome: Progressing     Problem: SAFETY ADULT  Goal: Patient will remain free of falls  Description: INTERVENTIONS:  - Educate patient/family on patient safety including physical limitations  - Instruct patient to call for assistance with activity   - Consult OT/PT to assist with strengthening/mobility   - Keep Call bell within reach  - Keep bed low and locked with side rails adjusted as appropriate  - Keep care items and personal belongings within reach  - Initiate and maintain comfort rounds  - Make Fall Risk Sign visible to staff  - Offer Toileting every 2 Hours, in advance of need  - Initiate/Maintain bed alarm  - Obtain necessary fall risk management equipment: bed alarm  - Apply yellow socks and bracelet for high fall risk patients  - Consider moving patient to room near nurses station  Outcome: Progressing  Goal: Maintain or return to baseline ADL function  Description: INTERVENTIONS:  -  Assess patient's ability to carry out ADLs; assess patient's baseline for ADL function and identify physical deficits which impact ability to perform ADLs (bathing, care of mouth/teeth, toileting, grooming, dressing, etc )  - Assess/evaluate cause of self-care deficits   - Assess range of motion  - Assess patient's mobility; develop plan if impaired  - Assess patient's need for assistive devices and provide as appropriate  - Encourage maximum independence but intervene and supervise when necessary  - Involve family in performance of ADLs  - Assess for home care needs following discharge   - Consider OT consult to assist with ADL evaluation and planning for discharge  - Provide patient education as appropriate  Outcome: Progressing  Goal: Maintains/Returns to pre admission functional level  Description: INTERVENTIONS:  - Perform BMAT or MOVE assessment daily    - Set and communicate daily mobility goal to care team and patient/family/caregiver  - Collaborate with rehabilitation services on mobility goals if consulted  - Perform Range of Motion 3 times a day  - Reposition patient every 2 hours    - Dangle patient 3 times a day  - Stand patient 3 times a day  - Ambulate patient 3 times a day  - Out of bed to chair 3 times a day   - Out of bed for meals 3 times a day  - Out of bed for toileting  - Record patient progress and toleration of activity level   Outcome: Progressing     Problem: DISCHARGE PLANNING  Goal: Discharge to home or other facility with appropriate resources  Description: INTERVENTIONS:  - Identify barriers to discharge w/patient and caregiver  - Arrange for needed discharge resources and transportation as appropriate  - Identify discharge learning needs (meds, wound care, etc )  - Arrange for interpretive services to assist at discharge as needed  - Refer to Case Management Department for coordinating discharge planning if the patient needs post-hospital services based on physician/advanced practitioner order or complex needs related to functional status, cognitive ability, or social support system  Outcome: Progressing     Problem: Prexisting or High Potential for Compromised Skin Integrity  Goal: Skin integrity is maintained or improved  Description: INTERVENTIONS:  - Identify patients at risk for skin breakdown  - Assess and monitor skin integrity  - Assess and monitor nutrition and hydration status  - Monitor labs   - Assess for incontinence   - Turn and reposition patient  - Assist with mobility/ambulation  - Relieve pressure over bony prominences  - Avoid friction and shearing  - Provide appropriate hygiene as needed including keeping skin clean and dry  - Evaluate need for skin moisturizer/barrier cream  - Collaborate with interdisciplinary team   - Patient/family teaching  - Consider wound care consult   Outcome: Progressing     Problem: Nutrition/Hydration-ADULT  Goal: Nutrient/Hydration intake appropriate for improving, restoring or maintaining nutritional needs  Description: Monitor and assess patient's nutrition/hydration status for malnutrition  Collaborate with interdisciplinary team and initiate plan and interventions as ordered  Monitor patient's weight and dietary intake as ordered or per policy  Utilize nutrition screening tool and intervene as necessary  Determine patient's food preferences and provide high-protein, high-caloric foods as appropriate       INTERVENTIONS:  - Monitor oral intake, urinary output, labs, and treatment plans  - Assess nutrition and hydration status and recommend course of action  - Evaluate amount of meals eaten  - Assist patient with eating if necessary   - Allow adequate time for meals  - Recommend/ encourage appropriate diets, oral nutritional supplements, and vitamin/mineral supplements  - Order, calculate, and assess calorie counts as needed  - Recommend, monitor, and adjust tube feedings and TPN/PPN based on assessed needs  - Assess need for intravenous fluids  - Provide specific nutrition/hydration education as appropriate  - Include patient/family/caregiver in decisions related to nutrition  Outcome: Progressing

## 2023-02-18 NOTE — PROGRESS NOTES
02/18/23 1400   Pain Assessment   Pain Assessment Tool 0-10   Pain Score 3   Pain Location/Orientation Orientation: Right;Location: Hip   Pain Onset/Description Descriptor: Aching   Effect of Pain on Daily Activities participates in all activities   Patient's Stated Pain Goal No pain   Hospital Pain Intervention(s) Ambulation/increased activity;Repositioned   Cognition   Arousal/Participation Alert; Cooperative   Orientation Level Oriented X4   Subjective   Subjective pt agreeable to participate in PT tx  reports she does not feel groin pain today and hence thinks she is walking better   Sit to Lying   Type of Assistance Needed Independent   Comment increased time required for BLE management; performed on flat hospital bed without bedrail   Sit to Lying CARE Score 6   Lying to Sitting on Side of Bed   Type of Assistance Needed Independent   Comment increased time required for BLE management; performed on flat hospital bed without bedrail   Lying to Sitting on Side of Bed CARE Score 6   Sit to Stand   Type of Assistance Needed Adaptive equipment;Supervision   Sit to Stand CARE Score 4   Car Transfer   Type of Assistance Needed Supervision;Verbal cues   Comment x 2 trials, once on standard height and once with added cushion to simulate SUV  pt performed both with supervision with increased time required for BLE management   Car Transfer CARE Score 4   Walk 10 Feet   Type of Assistance Needed Adaptive equipment;Verbal cues; Set-up / clean-up   Walk 10 Feet CARE Score 4   Walk 50 Feet with Two Turns   Type of Assistance Needed Supervision; Adaptive equipment;Verbal cues   Walk 50 Feet with Two Turns CARE Score 4   Walk 150 Feet   Type of Assistance Needed Supervision; Adaptive equipment;Verbal cues   Walk 150 Feet CARE Score 4   Ambulation   Distance Walked (feet) 180 ft  (x 1, 90 ft x 3, additional short distances in room and gym)   Assist Device Walker   Gait Pattern Antalgic; Inconsistant Radha;Decreased R stance; Improper weight shift   Limitations Noted In Heel Strike;Speed;Strength   Provided Assistance with: Weight Shift   Walk Assist Level Supervision   Findings (S)  practiced ambulating in room and bathroom to initiate planning for progessing pt to mod I in room; pt able to walk around room, move call bell light/phone to prevent trippizing hazard, open/close  bathroom door with supervision   Does the patient walk? 2  Yes   4 Steps   Type of Assistance Needed Verbal cues; Supervision; Adaptive equipment   Comment L sided handrail and SPC in RUE   4 Steps CARE Score 4   Stairs   Type Stairs   # of Steps 8  (two 6-inch  steps x 4 consecutive trials)   Assist Devices Cane;Single Rail  (L sided handrail and SPC in RUE)   Findings improved confidence and stability when using SPC vs BUE support on single railing  intermittent cues to correct sequencign when descending   Toilet Transfer   Type of Assistance Needed Supervision;Verbal cues   Comment cued pt to avoid using grab bar to simulate home set up; pt able to push up from lap to stand from standard toilet   Toilet Transfer CARE Score 4   Therapeutic Interventions   Strengthening reviewed additional exercises in HEP including standing: heel raises, marches, hip abd, partial squats, and sidestepping all 3 sets of 10 with seated rest breaks betwee  pt will benefit from additional training with squats to improve technique  Assessment   Treatment Assessment Pt participated in 90 minute PT tx session focusing on initiating assessment of in room independence and continuing review of HEP  Pt demosntrates good safety awareness in room and was able to manage recliner chair, tray table, call bell, and bathroom door  She will benefit from continued practice prior to beign cleared for IRP  Reviewed standing ther ex of HEP  Pt will continue to benefit from skilled PT interventuions to address deficits, reduce fall risk, and maximize functional independence     Problem List Decreased strength;Decreased range of motion;Decreased endurance;Decreased mobility;Pain   Barriers to Discharge Decreased caregiver support   Plan   Treatment/Interventions Functional transfer training;LE strengthening/ROM; Elevations; Therapeutic exercise; Endurance training;Bed mobility; Patient/family training;Gait training   Progress Progressing toward goals   PT Therapy Minutes   PT Time In 1400   PT Time Out 1530   PT Total Time (minutes) 90   PT Mode of treatment - Individual (minutes) 90   PT Mode of treatment - Concurrent (minutes) 0   PT Mode of treatment - Group (minutes) 0   PT Mode of treatment - Co-treat (minutes) 0   PT Mode of Treatment - Total time(minutes) 90 minutes   PT Cumulative Minutes 810   Therapy Time missed   Time missed?  No

## 2023-02-18 NOTE — OCCUPATIONAL THERAPY NOTE
Pt participated in skilled OT that focused on ADL retraining, Functional mobility with use of RW,  static/dynamic standing balance, ind  Ace wrap mgmt  Functional trx, [address], UB strengthening  Pt cont  To be limited by LE weakness, impaired balance, bilateral hand strength, edema mgmt  During session pt noted progress in mobility due to no present pain in groin  Edu  Pt on edema mgmt an cont  utilizing ACE wrapping   Trialed ind ACE wrapping at EOB which pt was able to demo back G carryover with Min VC, stated first attempt

## 2023-02-18 NOTE — PLAN OF CARE
Problem: PAIN - ADULT  Goal: Verbalizes/displays adequate comfort level or baseline comfort level  Description: Interventions:  - Encourage patient to monitor pain and request assistance  - Assess pain using appropriate pain scale  - Administer analgesics based on type and severity of pain and evaluate response  - Implement non-pharmacological measures as appropriate and evaluate response  - Consider cultural and social influences on pain and pain management  - Notify physician/advanced practitioner if interventions unsuccessful or patient reports new pain  Outcome: Progressing     Problem: INFECTION - ADULT  Goal: Absence or prevention of progression during hospitalization  Description: INTERVENTIONS:  - Assess and monitor for signs and symptoms of infection  - Monitor lab/diagnostic results  - Monitor all insertion sites, i e  indwelling lines, tubes, and drains  - Monitor endotracheal if appropriate and nasal secretions for changes in amount and color  - [place] appropriate cooling/warming therapies per order  - Administer medications as ordered  - Instruct and encourage patient and family to use good hand hygiene technique  - Identify and instruct in appropriate isolation precautions for identified infection/condition  Outcome: Progressing  Goal: Absence of fever/infection during neutropenic period  Description: INTERVENTIONS:  - Monitor WBC    Outcome: Progressing     Problem: SAFETY ADULT  Goal: Patient will remain free of falls  Description: INTERVENTIONS:  - Educate patient/family on patient safety including physical limitations  - Instruct patient to call for assistance with activity   - Consult OT/PT to assist with strengthening/mobility   - Keep Call bell within reach  - Keep bed low and locked with side rails adjusted as appropriate  - Keep care items and personal belongings within reach  - Initiate and maintain comfort rounds  - Make Fall Risk Sign visible to staff  - Offer Toileting every 2 Hours, in advance of need  - Apply yellow socks and bracelet for high fall risk patients  - Consider moving patient to room near nurses station  Outcome: Progressing  Goal: Maintain or return to baseline ADL function  Description: INTERVENTIONS:  -  Assess patient's ability to carry out ADLs; assess patient's baseline for ADL function and identify physical deficits which impact ability to perform ADLs (bathing, care of mouth/teeth, toileting, grooming, dressing, etc )  - Assess/evaluate cause of self-care deficits   - Assess range of motion  - Assess patient's mobility; develop plan if impaired  - Assess patient's need for assistive devices and provide as appropriate  - Encourage maximum independence but intervene and supervise when necessary  - Involve family in performance of ADLs  - Assess for home care needs following discharge   - Consider OT consult to assist with ADL evaluation and planning for discharge  - Provide patient education as appropriate  Outcome: Progressing  Goal: Maintains/Returns to pre admission functional level  Description: INTERVENTIONS:  - Perform BMAT or MOVE assessment daily    - Set and communicate daily mobility goal to care team and patient/family/caregiver  - Collaborate with rehabilitation services on mobility goals if consulted  - Perform Range of Motion 3 times a day  - Reposition patient every 2 hours    - Dangle patient 3 times a day  - Stand patient 3 times a day  - Ambulate patient 3 times a day  - Out of bed to chair 3 times a day   - Out of bed for meals 3 times a day  - Out of bed for toileting  - Record patient progress and toleration of activity level   Outcome: Progressing     Problem: DISCHARGE PLANNING  Goal: Discharge to home or other facility with appropriate resources  Description: INTERVENTIONS:  - Identify barriers to discharge w/patient and caregiver  - Arrange for needed discharge resources and transportation as appropriate  - Identify discharge learning needs (meds, wound care, etc )  - Arrange for interpretive services to assist at discharge as needed  - Refer to Case Management Department for coordinating discharge planning if the patient needs post-hospital services based on physician/advanced practitioner order or complex needs related to functional status, cognitive ability, or social support system  Outcome: Progressing     Problem: Prexisting or High Potential for Compromised Skin Integrity  Goal: Skin integrity is maintained or improved  Description: INTERVENTIONS:  - Identify patients at risk for skin breakdown  - Assess and monitor skin integrity  - Assess and monitor nutrition and hydration status  - Monitor labs   - Assess for incontinence   - Turn and reposition patient  - Assist with mobility/ambulation  - Relieve pressure over bony prominences  - Avoid friction and shearing  - Provide appropriate hygiene as needed including keeping skin clean and dry  - Evaluate need for skin moisturizer/barrier cream  - Collaborate with interdisciplinary team   - Patient/family teaching  - Consider wound care consult   Outcome: Progressing     Problem: Nutrition/Hydration-ADULT  Goal: Nutrient/Hydration intake appropriate for improving, restoring or maintaining nutritional needs  Description: Monitor and assess patient's nutrition/hydration status for malnutrition  Collaborate with interdisciplinary team and initiate plan and interventions as ordered  Monitor patient's weight and dietary intake as ordered or per policy  Utilize nutrition screening tool and intervene as necessary  Determine patient's food preferences and provide high-protein, high-caloric foods as appropriate       INTERVENTIONS:  - Monitor oral intake, urinary output, labs, and treatment plans  - Assess nutrition and hydration status and recommend course of action  - Evaluate amount of meals eaten  - Assist patient with eating if necessary   - Allow adequate time for meals  - Recommend/ encourage appropriate diets, oral nutritional supplements, and vitamin/mineral supplements  - Order, calculate, and assess calorie counts as needed  - Recommend, monitor, and adjust tube feedings and TPN/PPN based on assessed needs  - Assess need for intravenous fluids  - Provide specific nutrition/hydration education as appropriate  - Include patient/family/caregiver in decisions related to nutrition  Outcome: Progressing

## 2023-02-18 NOTE — PROGRESS NOTES
02/18/23 0830   Pain Assessment   Pain Assessment Tool 0-10   Pain Score No Pain   Restrictions/Precautions   Precautions Fall Risk;Pain   RLE Weight Bearing Per Order WBAT   LLE Weight Bearing Per Order WBAT   ROM Restrictions No   Braces or Orthoses   (RLE Ace wrap)   Lifestyle   Autonomy "I cant believe that I have no pain in my groin "   Grooming   Able To Comb/Brush Hair   Findings Brushed her hair while in supine with HOB elevated  (Completed in supine in Bed )   Lying to Sitting on Side of Bed   Type of Assistance Needed Supervision   Physical Assistance Level No physical assistance   Comment Utilized Bedrails  Lying to Sitting on Side of Bed CARE Score 4   Sit to Stand   Type of Assistance Needed Supervision; Adaptive equipment   Physical Assistance Level No physical assistance   Comment From EOB with RW   Sit to Stand CARE Score 4   Bed-Chair Transfer   Type of Assistance Needed Supervision; Adaptive equipment   Physical Assistance Level No physical assistance   Comment From standard chair using RW, Supervision to assure carryover/safety   Chair/Bed-to-Chair Transfer CARE Score 4   Toilet Transfer   Type of Assistance Needed Supervision   Physical Assistance Level No physical assistance   Comment From standard height toilet without using GB trail sitting and rising 3x able to complete without signs of impairement of balance or c/o pain/fatigue  Supervision to assure carryover/safety  Toilet Transfer CARE Score 4   Functional Standing Tolerance   Activity Retrieving clothes pins scattered around room in various planes and around obstacles to promote safety and independence with functional mobility with RW and obstacle negotiation  Pt overall CS throughout activity and tolerated well  Tolerates standing for ~15 minutes  Exercise Tools   Theraband Pt participated in thera ex utilizing yellow theraband  Completed 2 sets of 10 ex  3 variations within 15 mins   Targeted muscles: biceps, pectorals, deltoids, triceps, shoulder  Pt completed activity seated to promote activity tolerance, endurance and UB strength  Cognition   Overall Cognitive Status WFL   Arousal/Participation Alert; Cooperative   Attention Within functional limits   Orientation Level Oriented X4   Memory Within functional limits   Following Commands Follows all commands and directions without difficulty   Activity Tolerance   Activity Tolerance Patient tolerated treatment well   Assessment   Treatment Assessment Pt participated in skilled OT that focused on ADL retraining, Functional mobility with use of RW,  static/dynamic standing balance, ind  Ace wrap mgmt  Functional trx, [address], UB strengthening  Pt cont  To be limited by LE weakness, impaired balance, bilateral hand strength, edema mgmt  During session pt noted progress in mobility due to no present pain in groin  Edu  Pt on edema mgmt and cont  utilizing ACE wrapping  Trialed ind ACE wrapping at EOB which pt was able to demo back with G carryover requiring Min VC for figure 8 wrapping tech  Cont  To follow up for wrapping consistency  Trialed  toilet trx using RW without utilizing GB to simulate bedroom bathroom  Pt demo being able to sit on standard height toilet and raise self without A other than RW for support of balance  Pt will cont  to Benefit from skilled OT services with focus on Edema mgmt, UB strengthening, static/dynamic standing balance, [address], functional trx from lower surfaces and RW placement/positioning   Prognosis Good   Problem List Decreased strength;Decreased range of motion;Decreased endurance; Impaired balance;Pain;Decreased mobility   Barriers to Discharge Inaccessible home environment;Decreased caregiver support   Plan   Treatment/Interventions ADL retraining;Functional transfer training;LE strengthening/ROM; Therapeutic exercise; Endurance training; Compensatory technique education;Patient/family training;Bed mobility; Equipment eval/education   Progress Progressing toward goals   Recommendation   OT Discharge Recommendation No rehabilitation needs   OT Therapy Minutes   OT Time In 0830   OT Time Out 1000   OT Total Time (minutes) 90   OT Mode of treatment - Individual (minutes) 0   OT Mode of treatment - Concurrent (minutes) 90   OT Mode of treatment - Group (minutes) 0   OT Mode of treatment - Co-treat (minutes) 0   OT Mode of Treatment - Total time(minutes) 90 minutes   OT Cumulative Minutes 720   Therapy Time missed   Time missed?  No

## 2023-02-18 NOTE — PROGRESS NOTES
Internal Medicine Progress Note  Patient: Lani Warren  Age/sex: 69 y o  female  Medical Record #: [id number]      ASSESSMENT/PLAN: (Interval History)  Lani Warren is seen and examined and management for following issues:    Right displaced proximal femoral shaft fracture  • S/p IMN 2/3/23  • WBAT  • Per Ortho:  "Patient should not continue bisphosphonate use at this time given radiographic changes to left femur including lateral cortical thickening without obvious stress fracture/beaking  She needs to follow up immediately w ortho if she develops ambulatory pain in left thigh"   They discussed this with her   Left femur xray was negative fx 2/4/23     ABLA  • S/p 1 U PRBCs on 2/8/23 for hemoglobin of 7 3  • stable     Dizziness/syncope  • Related to orthostasis and blood loss  • Was given IVF and blood  • Watching for orthostasis while here = so far no sx of such and BPs have been stable     Hypothyroidism   • Continue levothyroxine  • Has a thyroid nodule found incidentally for which an US will need to be done as an OP     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 vs infundibulum  • To see as a followup in Neurovascular center     Old lacunar infarcts   • Found incidentally on imaging  • On statin     Right knee pain  • Xray negative 2/12/23  • Ortho saw = ACE wrap and ice  • improved        Discharge date:  Team          The above assessment and plan was reviewed and updated as determined by my evaluation of the patient on 2/18/2023      Labs:   Results from last 7 days   Lab Units 02/16/23  0536 02/13/23  0531   WBC Thousand/uL 7 57 8 42   HEMOGLOBIN g/dL 9 9* 9 4*   HEMATOCRIT % 32 3* 30 5*   PLATELETS Thousands/uL 520* 462*     Results from last 7 days   Lab Units 02/16/23  0536 02/13/23  0531   SODIUM mmol/L 142 142   POTASSIUM mmol/L 3 8 3 6   CHLORIDE mmol/L 112* 111*   CO2 mmol/L 26 25   BUN mg/dL 16 21   CREATININE mg/dL 0 51* 0 52*   CALCIUM mg/dL 8 8 8 6                   Review of Scheduled Meds:  Current Facility-Administered Medications   Medication Dose Route Frequency Provider Last Rate   • acetaminophen  975 mg Oral TID Collette Coma, MD     • artificial tear   Both Eyes HS PRN SANTI Mendiola     • bisacodyl  10 mg Rectal Daily PRN SANTI Mendiola     • enoxaparin  40 mg Subcutaneous Daily SANTI Bobo     • glycerin-hypromellose-  1 drop Both Eyes TID SANTI Mendiola     • levothyroxine  25 mcg Oral Daily SANTI Bobo     • lidocaine  1 patch Topical Daily SANTI Bobo     • meclizine  12 5 mg Oral Q8H PRN SANTI Bobo     • melatonin  3 mg Oral HS Harmeet Villanueva MD     • oxyCODONE  2 5 mg Oral Q4H PRN SANTI Mendiola     • oxyCODONE  5 mg Oral Q4H PRN SANTI Bobo     • polyethylene glycol  17 g Oral Daily PRN SANTI Mendiola     • pravastatin  20 mg Oral Daily With SANTI Sneed     • senna-docusate sodium  1 tablet Oral HS PRN SANTI Bobo         Subjective/ HPI: Patient seen and examined  Patients overnight issues or events were reviewed with nursing or staff during rounds or morning huddle session  New or overnight issues include the following:     Pt seen in her room  She denies any current complaints  ROS:   A 10 point ROS was performed; negative except as noted above       *Labs /Radiology studies reviewed  *Medications reviewed and reconciled as needed  *Please refer to order section for additional ordered labs studies  *Case discussed with primary attending during morning huddle case rounds    Physical Examination:  Vitals:   Vitals:    02/17/23 0558 02/17/23 1542 02/17/23 2055 02/18/23 0455   BP: 134/63 139/67 138/65 148/67   BP Location: Right arm Right arm Left arm Left arm   Pulse: 68 70 61 69   Resp: 18 16 16 20   Temp: 98 1 °F (36 7 °C) 97 9 °F (36 6 °C) 98 °F (36 7 °C) 97 8 °F (36 6 °C)   TempSrc: Oral Oral Oral Oral   SpO2: 97% 100% 96% 94%   Weight:       Height:           General Appearance: no distress, conversive  HEENT: PERRLA, conjuctiva normal; oropharynx clear; mucous membranes moist   Neck:  Supple, normal ROM  Lungs: CTA, normal respiratory effort, no retractions, expiratory effort normal  CV: regular rate and rhythm; no rubs/murmurs/gallops, PMI normal   ABD: soft; ND/NT; +BS  EXT: mild right thigh edema/knee edema  Skin: normal turgor, normal texture, no rashes  Psych: affect normal, mood normal  Neuro: AAO      The above physical exam was reviewed and updated as determined by my evaluation of the patient on 2/18/2023  Invasive Devices     None                    VTE Pharmacologic Prophylaxis: Enoxaparin  Code Status: Level 1 - Full Code  Current Length of Stay: 9 day(s)      Total time spent:  30 minutes with more than 50% spent counseling/coordinating care  Counseling includes discussion with patient re: progress  and discussion with patient of his/her current medical state/information  Coordination of patient's care was performed in conjunction with primary service  Time invested included review of patient's labs, vitals, and management of their comorbidities with continued monitoring  In addition, this patient was discussed with medical team including physician and advanced extenders  The care of the patient was extensively discussed and appropriate treatment plan was formulated unique for this patient  Medical decision making for the day was made by supervising physician unless otherwise noted in their attestation statement  ** Please Note:  voice to text software may have been used in the creation of this document   Although proof errors in transcription or interpretation are a potential of such software**

## 2023-02-19 RX ADMIN — GLYCERIN 1 DROP: .002; .002; .01 SOLUTION/ DROPS OPHTHALMIC at 08:08

## 2023-02-19 RX ADMIN — GLYCERIN 1 DROP: .002; .002; .01 SOLUTION/ DROPS OPHTHALMIC at 15:42

## 2023-02-19 RX ADMIN — PRAVASTATIN SODIUM 20 MG: 20 TABLET ORAL at 15:41

## 2023-02-19 RX ADMIN — MELATONIN 3 MG: at 23:21

## 2023-02-19 RX ADMIN — LEVOTHYROXINE SODIUM 25 MCG: 25 TABLET ORAL at 21:31

## 2023-02-19 RX ADMIN — ENOXAPARIN SODIUM 40 MG: 40 INJECTION SUBCUTANEOUS at 13:49

## 2023-02-19 RX ADMIN — GLYCERIN 1 DROP: .002; .002; .01 SOLUTION/ DROPS OPHTHALMIC at 21:32

## 2023-02-19 RX ADMIN — ACETAMINOPHEN 975 MG: 325 TABLET, FILM COATED ORAL at 13:49

## 2023-02-19 RX ADMIN — ACETAMINOPHEN 975 MG: 325 TABLET, FILM COATED ORAL at 21:31

## 2023-02-19 RX ADMIN — LIDOCAINE 5% 1 PATCH: 700 PATCH TOPICAL at 08:06

## 2023-02-19 RX ADMIN — ACETAMINOPHEN 975 MG: 325 TABLET, FILM COATED ORAL at 06:16

## 2023-02-19 NOTE — PLAN OF CARE
Problem: PAIN - ADULT  Goal: Verbalizes/displays adequate comfort level or baseline comfort level  Description: Interventions:  - Encourage patient to monitor pain and request assistance  - Assess pain using appropriate pain scale  - Administer analgesics based on type and severity of pain and evaluate response  - Implement non-pharmacological measures as appropriate and evaluate response  - Consider cultural and social influences on pain and pain management  - Notify physician/advanced practitioner if interventions unsuccessful or patient reports new pain  Outcome: Progressing     Problem: INFECTION - ADULT  Goal: Absence or prevention of progression during hospitalization  Description: INTERVENTIONS:  - Assess and monitor for signs and symptoms of infection  - Monitor lab/diagnostic results  - Monitor all insertion sites, i e  indwelling lines, tubes, and drains  - Monitor endotracheal if appropriate and nasal secretions for changes in amount and color  - [place] appropriate cooling/warming therapies per order  - Administer medications as ordered  - Instruct and encourage patient and family to use good hand hygiene technique  - Identify and instruct in appropriate isolation precautions for identified infection/condition  Outcome: Progressing  Goal: Absence of fever/infection during neutropenic period  Description: INTERVENTIONS:  - Monitor WBC    Outcome: Progressing     Problem: SAFETY ADULT  Goal: Patient will remain free of falls  Description: INTERVENTIONS:  - Educate patient/family on patient safety including physical limitations  - Instruct patient to call for assistance with activity   - Consult OT/PT to assist with strengthening/mobility   - Keep Call bell within reach  - Keep bed low and locked with side rails adjusted as appropriate  - Keep care items and personal belongings within reach  - Initiate and maintain comfort rounds  - Make Fall Risk Sign visible to staff  - Offer Toileting every 2 Hours, in advance of need  - Apply yellow socks and bracelet for high fall risk patients  - Consider moving patient to room near nurses station  Outcome: Progressing  Goal: Maintain or return to baseline ADL function  Description: INTERVENTIONS:  -  Assess patient's ability to carry out ADLs; assess patient's baseline for ADL function and identify physical deficits which impact ability to perform ADLs (bathing, care of mouth/teeth, toileting, grooming, dressing, etc )  - Assess/evaluate cause of self-care deficits   - Assess range of motion  - Assess patient's mobility; develop plan if impaired  - Assess patient's need for assistive devices and provide as appropriate  - Encourage maximum independence but intervene and supervise when necessary  - Involve family in performance of ADLs  - Assess for home care needs following discharge   - Consider OT consult to assist with ADL evaluation and planning for discharge  - Provide patient education as appropriate  Outcome: Progressing  Goal: Maintains/Returns to pre admission functional level  Description: INTERVENTIONS:  - Perform BMAT or MOVE assessment daily    - Set and communicate daily mobility goal to care team and patient/family/caregiver  - Collaborate with rehabilitation services on mobility goals if consulted  - Perform Range of Motion 3 times a day  - Reposition patient every 2 hours    - Dangle patient 3 times a day  - Stand patient 3 times a day  - Ambulate patient 3 times a day  - Out of bed to chair 3 times a day   - Out of bed for meals 3 times a day  - Out of bed for toileting  - Record patient progress and toleration of activity level   Outcome: Progressing     Problem: DISCHARGE PLANNING  Goal: Discharge to home or other facility with appropriate resources  Description: INTERVENTIONS:  - Identify barriers to discharge w/patient and caregiver  - Arrange for needed discharge resources and transportation as appropriate  - Identify discharge learning needs (meds, wound care, etc )  - Arrange for interpretive services to assist at discharge as needed  - Refer to Case Management Department for coordinating discharge planning if the patient needs post-hospital services based on physician/advanced practitioner order or complex needs related to functional status, cognitive ability, or social support system  Outcome: Progressing     Problem: Prexisting or High Potential for Compromised Skin Integrity  Goal: Skin integrity is maintained or improved  Description: INTERVENTIONS:  - Identify patients at risk for skin breakdown  - Assess and monitor skin integrity  - Assess and monitor nutrition and hydration status  - Monitor labs   - Assess for incontinence   - Turn and reposition patient  - Assist with mobility/ambulation  - Relieve pressure over bony prominences  - Avoid friction and shearing  - Provide appropriate hygiene as needed including keeping skin clean and dry  - Evaluate need for skin moisturizer/barrier cream  - Collaborate with interdisciplinary team   - Patient/family teaching  - Consider wound care consult   Outcome: Progressing     Problem: Nutrition/Hydration-ADULT  Goal: Nutrient/Hydration intake appropriate for improving, restoring or maintaining nutritional needs  Description: Monitor and assess patient's nutrition/hydration status for malnutrition  Collaborate with interdisciplinary team and initiate plan and interventions as ordered  Monitor patient's weight and dietary intake as ordered or per policy  Utilize nutrition screening tool and intervene as necessary  Determine patient's food preferences and provide high-protein, high-caloric foods as appropriate       INTERVENTIONS:  - Monitor oral intake, urinary output, labs, and treatment plans  - Assess nutrition and hydration status and recommend course of action  - Evaluate amount of meals eaten  - Assist patient with eating if necessary   - Allow adequate time for meals  - Recommend/ encourage appropriate diets, oral nutritional supplements, and vitamin/mineral supplements  - Order, calculate, and assess calorie counts as needed  - Recommend, monitor, and adjust tube feedings and TPN/PPN based on assessed needs  - Assess need for intravenous fluids  - Provide specific nutrition/hydration education as appropriate  - Include patient/family/caregiver in decisions related to nutrition  Outcome: Progressing

## 2023-02-19 NOTE — PROGRESS NOTES
02/19/23 0830   Pain Assessment   Pain Assessment Tool 0-10   Pain Score 3   Pain Location/Orientation Orientation: Right;Location: Hip   Pain Onset/Description Frequency: Intermittent   Patient's Stated Pain Goal No pain   Restrictions/Precautions   Precautions Fall Risk;Pain   Weight Bearing Restrictions Yes   RLE Weight Bearing Per Order WBAT   ROM Restrictions No   Cognition   Arousal/Participation Cooperative   Subjective   Subjective Pt pleasant and agreeable to participate in PT session  Pt reporting reduced pain in groin  Roll Left and Right   Type of Assistance Needed Independent   Roll Left and Right CARE Score 6   Sit to Lying   Type of Assistance Needed Independent   Sit to Lying CARE Score 6   Lying to Sitting on Side of Bed   Type of Assistance Needed Independent   Lying to Sitting on Side of Bed CARE Score 6   Sit to Stand   Type of Assistance Needed Supervision; Adaptive equipment; Independent   Sit to Stand CARE Score -   Bed-Chair Transfer   Type of Assistance Needed Supervision; Adaptive equipment; Independent   Chair/Bed-to-Chair Transfer CARE Score -   Transfer Bed/Chair/Wheelchair   Stand Pivot Supervision;Modified Independent   Sit to Stand Supervision;Modified Independent   Walk 10 Feet   Type of Assistance Needed Supervision; Adaptive equipment; Independent   Comment Pt will likely benefit from upgrading to IRP with RW this week  Walk 10 Feet CARE Score -   Walk 50 Feet with Two Turns   Type of Assistance Needed Supervision; Adaptive equipment   Walk 50 Feet with Two Turns CARE Score 4   Walk 150 Feet   Type of Assistance Needed Supervision; Adaptive equipment   Walk 150 Feet CARE Score 4   Ambulation   Primary Mode of Locomotion Prior to Admission Walk   Distance Walked (feet) 180 ft  (75)   Assist Device Roller Walker   Gait Pattern Antalgic;Decreased R stance; Inconsistant Radha   Limitations Noted In Heel Strike;Speed;Strength   Provided Assistance with: Weight Shift   Walk Assist Level Supervision   Findings Focused session on safety with navigating room environment to possibly upgrade to IRP early this week  pt ambulated from bed <> bathroom and recliner chair <> bathroom with excellent carryover with management of tray table, call bell, and phone  pt also able to carry her trash to the trash can to safely dispose with supervision nearing modified independent level  Initial cues only for shoe and RW placement depending on pt's location in her room  Does the patient walk? 2  Yes   Wheelchair mobility   Does the patient use a wheelchair? 0  No   Curb or Single Stair   Style negotiated Single stair   Type of Assistance Needed Supervision; Adaptive equipment   1 Step (Curb) CARE Score 4   4 Steps   Type of Assistance Needed Supervision; Adaptive equipment   4 Steps CARE Score 4   12 Steps   Type of Assistance Needed Supervision; Adaptive equipment   12 Steps CARE Score 4   Stairs   Type Stairs   # of Steps 12  (12-6"  steps with R HR and L SPC asc and L HR and R SPC desc to mimic stairs in home with sup  4-6" steps with L HR and R SPC asc and R HR and L SPC desc to mimic SHAN[facility] home with supervision )   Findings Reciprocal gait with confidence and stability noted when asc/desc steps with unilateral HR and SPC with excllent carryover with recalling correct sequencing  Toilet Transfer   Type of Assistance Needed Supervision; Independent   Toilet Transfer CARE Score -   Therapeutic Interventions   Strengthening Reviewed standing HEP: heel raises, marches, hip abd, mini squats 10x b/l supported at RW  Pt requires cues for proper sequencing with mini squats  Assessment   Treatment Assessment Pt participating in 60 min PT session focusing on assessing safe navigation of room environment to upgrade to IRP early this week  Pt demonstrated excellent carryover managing tray table, call bell, phone, RW, and recliner chair   Pt negotiated up to 12 steps with unilateral HR and SPC with supervision with excellent carryover recalling correct sequencing with increased time  Reviewed standing HEP with cues required for appropriate sequencing of mini squats  Pt continuing to progress towards goals to maximize functional mobility independence to promote safe d/c home  Problem List Decreased strength;Decreased range of motion;Decreased endurance;Decreased mobility;Pain   Barriers to Discharge Inaccessible home environment;Decreased caregiver support   PT Barriers   Physical Impairment Decreased strength;Decreased range of motion;Decreased endurance; Impaired balance;Pain   Functional Limitation Car transfers;Stair negotiation;Standing;Transfers; Walking   Plan   Treatment/Interventions Functional transfer training;LE strengthening/ROM; Elevations; Therapeutic exercise; Endurance training;Patient/family training;Equipment eval/education;Gait training   Progress Progressing toward goals   Recommendation   PT Discharge Recommendation Home with outpatient rehabilitation   PT Therapy Minutes   PT Time In 0830   PT Time Out 0930   PT Total Time (minutes) 60   PT Mode of treatment - Individual (minutes) 60   PT Mode of treatment - Concurrent (minutes) 0   PT Mode of treatment - Group (minutes) 0   PT Mode of treatment - Co-treat (minutes) 0   PT Mode of Treatment - Total time(minutes) 60 minutes   PT Cumulative Minutes 870   Therapy Time missed   Time missed?  No

## 2023-02-19 NOTE — PROGRESS NOTES
Internal Medicine Progress Note  Patient: Senia Espinosa  Age/sex: 69 y o  female  Medical Record #: [id number]      ASSESSMENT/PLAN: (Interval History)  Senia Espinosa is seen and examined and management for following issues:    Right displaced proximal femoral shaft fracture  • S/p IMN 2/3/23  • WBAT  • Per Ortho:  "Patient should not continue bisphosphonate use at this time given radiographic changes to left femur including lateral cortical thickening without obvious stress fracture/beaking  She needs to follow up immediately w ortho if she develops ambulatory pain in left thigh"   They discussed this with her   Left femur xray was negative fx 2/4/23     ABLA  • S/p 1 U PRBCs on 2/8/23 for hemoglobin of 7 3  • stable     Dizziness/syncope  • Related to orthostasis and blood loss  • Was given IVF and blood  • Watching for orthostasis while here = so far no sx of such and BPs have been stable     Hypothyroidism   • Continue levothyroxine  • Has a thyroid nodule found incidentally for which an US will need to be done as an OP     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 vs infundibulum  • To see as a followup in Neurovascular center     Old lacunar infarcts   • Found incidentally on imaging  • On statin     Right knee pain  • Xray negative 2/12/23  • Ortho saw = ACE wrap and ice  • improved        Discharge date:  Team          The above assessment and plan was reviewed and updated as determined by my evaluation of the patient on 2/19/2023      Labs:   Results from last 7 days   Lab Units 02/16/23  0536 02/13/23  0531   WBC Thousand/uL 7 57 8 42   HEMOGLOBIN g/dL 9 9* 9 4*   HEMATOCRIT % 32 3* 30 5*   PLATELETS Thousands/uL 520* 462*     Results from last 7 days   Lab Units 02/16/23  0536 02/13/23  0531   SODIUM mmol/L 142 142   POTASSIUM mmol/L 3 8 3 6   CHLORIDE mmol/L 112* 111*   CO2 mmol/L 26 25   BUN mg/dL 16 21   CREATININE mg/dL 0 51* 0 52*   CALCIUM mg/dL 8 8 8 6                   Review of Scheduled Meds:  Current Facility-Administered Medications   Medication Dose Route Frequency Provider Last Rate   • acetaminophen  975 mg Oral TID Sohan Avila MD     • artificial tear   Both Eyes HS PRN SANTI Guevara     • bisacodyl  10 mg Rectal Daily PRN SANTI Guevara     • enoxaparin  40 mg Subcutaneous Daily SANTI Bobo     • glycerin-hypromellose-  1 drop Both Eyes TID SANTI Guevara     • levothyroxine  25 mcg Oral Daily SANTI Bobo     • lidocaine  1 patch Topical Daily SANTI Bobo     • meclizine  12 5 mg Oral Q8H PRN SANTI Bobo     • melatonin  3 mg Oral HS Alecia Ingram MD     • oxyCODONE  2 5 mg Oral Q4H PRN SANTI Guevara     • oxyCODONE  5 mg Oral Q4H PRN SANTI Bobo     • polyethylene glycol  17 g Oral Daily PRN SANTI Guevara     • pravastatin  20 mg Oral Daily With SANTI Sneed     • senna-docusate sodium  1 tablet Oral HS PRN SANTI Bobo         Subjective/ HPI: Patient seen and examined  Patients overnight issues or events were reviewed with nursing or staff during rounds or morning huddle session  New or overnight issues include the following:     Pt seen in her room with a friend visiting  She denies any current complaints  ROS:   A 10 point ROS was performed; negative except as noted above       *Labs /Radiology studies reviewed  *Medications reviewed and reconciled as needed  *Please refer to order section for additional ordered labs studies  *Case discussed with primary attending during morning huddle case rounds    Physical Examination:  Vitals:   Vitals:    02/18/23 0455 02/18/23 1342 02/18/23 2004 02/19/23 0532   BP: 148/67 132/60 156/70 137/64   BP Location: Left arm Right arm Right arm Right arm   Pulse: 69 72 66 65   Resp: 20 18 16 18   Temp: 97 8 °F (36 6 °C) 98 °F (36 7 °C) 98 9 °F (37 2 °C) 98 °F (36 7 °C)   TempSrc: Oral Oral Oral Oral   SpO2: 94% 98% 95% 97%   Weight:       Height: General Appearance: no distress, conversive  HEENT: PERRLA, conjuctiva normal; oropharynx clear; mucous membranes moist   Neck:  Supple, normal ROM  Lungs: CTA, normal respiratory effort, no retractions, expiratory effort normal  CV: regular rate and rhythm; no rubs/murmurs/gallops, PMI normal   ABD: soft; ND/NT; +BS  EXT: mild right thigh edema/knee edema - stable  Skin: normal turgor, normal texture, no rashes  Psych: affect normal, mood normal  Neuro: AAO      The above physical exam was reviewed and updated as determined by my evaluation of the patient on 2/19/2023  Invasive Devices     None                    VTE Pharmacologic Prophylaxis: Enoxaparin  Code Status: Level 1 - Full Code  Current Length of Stay: 10 day(s)      Total time spent:  30 minutes with more than 50% spent counseling/coordinating care  Counseling includes discussion with patient re: progress  and discussion with patient of his/her current medical state/information  Coordination of patient's care was performed in conjunction with primary service  Time invested included review of patient's labs, vitals, and management of their comorbidities with continued monitoring  In addition, this patient was discussed with medical team including physician and advanced extenders  The care of the patient was extensively discussed and appropriate treatment plan was formulated unique for this patient  Medical decision making for the day was made by supervising physician unless otherwise noted in their attestation statement  ** Please Note:  voice to text software may have been used in the creation of this document   Although proof errors in transcription or interpretation are a potential of such software**

## 2023-02-19 NOTE — PLAN OF CARE
Problem: PAIN - ADULT  Goal: Verbalizes/displays adequate comfort level or baseline comfort level  Description: Interventions:  - Encourage patient to monitor pain and request assistance  - Assess pain using appropriate pain scale  - Administer analgesics based on type and severity of pain and evaluate response  - Implement non-pharmacological measures as appropriate and evaluate response  - Consider cultural and social influences on pain and pain management  - Notify physician/advanced practitioner if interventions unsuccessful or patient reports new pain  Outcome: Progressing     Problem: INFECTION - ADULT  Goal: Absence or prevention of progression during hospitalization  Description: INTERVENTIONS:  - Assess and monitor for signs and symptoms of infection  - Monitor lab/diagnostic results  - Monitor all insertion sites, i e  indwelling lines, tubes, and drains  - Monitor endotracheal if appropriate and nasal secretions for changes in amount and color  - [place] appropriate cooling/warming therapies per order  - Administer medications as ordered  - Instruct and encourage patient and family to use good hand hygiene technique  - Identify and instruct in appropriate isolation precautions for identified infection/condition  Outcome: Progressing  Goal: Absence of fever/infection during neutropenic period  Description: INTERVENTIONS:  - Monitor WBC    Outcome: Progressing     Problem: SAFETY ADULT  Goal: Patient will remain free of falls  Description: INTERVENTIONS:  - Educate patient/family on patient safety including physical limitations  - Instruct patient to call for assistance with activity   - Consult OT/PT to assist with strengthening/mobility   - Keep Call bell within reach  - Keep bed low and locked with side rails adjusted as appropriate  - Keep care items and personal belongings within reach  - Initiate and maintain comfort rounds  - Make Fall Risk Sign visible to staff  - Offer Toileting every 2 Hours, in advance of need  - Initiate/Maintain bed alarm  - Obtain necessary fall risk management equipment: bed alarm  - Apply yellow socks and bracelet for high fall risk patients  - Consider moving patient to room near nurses station  Outcome: Progressing  Goal: Maintain or return to baseline ADL function  Description: INTERVENTIONS:  -  Assess patient's ability to carry out ADLs; assess patient's baseline for ADL function and identify physical deficits which impact ability to perform ADLs (bathing, care of mouth/teeth, toileting, grooming, dressing, etc )  - Assess/evaluate cause of self-care deficits   - Assess range of motion  - Assess patient's mobility; develop plan if impaired  - Assess patient's need for assistive devices and provide as appropriate  - Encourage maximum independence but intervene and supervise when necessary  - Involve family in performance of ADLs  - Assess for home care needs following discharge   - Consider OT consult to assist with ADL evaluation and planning for discharge  - Provide patient education as appropriate  Outcome: Progressing  Goal: Maintains/Returns to pre admission functional level  Description: INTERVENTIONS:  - Perform BMAT or MOVE assessment daily    - Set and communicate daily mobility goal to care team and patient/family/caregiver  - Collaborate with rehabilitation services on mobility goals if consulted  - Perform Range of Motion 3 times a day  - Reposition patient every 2 hours    - Dangle patient 3 times a day  - Stand patient 3 times a day  - Ambulate patient 3 times a day  - Out of bed to chair 3 times a day   - Out of bed for meals 3 times a day  - Out of bed for toileting  - Record patient progress and toleration of activity level   Outcome: Progressing     Problem: DISCHARGE PLANNING  Goal: Discharge to home or other facility with appropriate resources  Description: INTERVENTIONS:  - Identify barriers to discharge w/patient and caregiver  - Arrange for needed discharge resources and transportation as appropriate  - Identify discharge learning needs (meds, wound care, etc )  - Arrange for interpretive services to assist at discharge as needed  - Refer to Case Management Department for coordinating discharge planning if the patient needs post-hospital services based on physician/advanced practitioner order or complex needs related to functional status, cognitive ability, or social support system  Outcome: Progressing     Problem: Prexisting or High Potential for Compromised Skin Integrity  Goal: Skin integrity is maintained or improved  Description: INTERVENTIONS:  - Identify patients at risk for skin breakdown  - Assess and monitor skin integrity  - Assess and monitor nutrition and hydration status  - Monitor labs   - Assess for incontinence   - Turn and reposition patient  - Assist with mobility/ambulation  - Relieve pressure over bony prominences  - Avoid friction and shearing  - Provide appropriate hygiene as needed including keeping skin clean and dry  - Evaluate need for skin moisturizer/barrier cream  - Collaborate with interdisciplinary team   - Patient/family teaching  - Consider wound care consult   Outcome: Progressing     Problem: Nutrition/Hydration-ADULT  Goal: Nutrient/Hydration intake appropriate for improving, restoring or maintaining nutritional needs  Description: Monitor and assess patient's nutrition/hydration status for malnutrition  Collaborate with interdisciplinary team and initiate plan and interventions as ordered  Monitor patient's weight and dietary intake as ordered or per policy  Utilize nutrition screening tool and intervene as necessary  Determine patient's food preferences and provide high-protein, high-caloric foods as appropriate       INTERVENTIONS:  - Monitor oral intake, urinary output, labs, and treatment plans  - Assess nutrition and hydration status and recommend course of action  - Evaluate amount of meals eaten  - Assist patient with eating if necessary   - Allow adequate time for meals  - Recommend/ encourage appropriate diets, oral nutritional supplements, and vitamin/mineral supplements  - Order, calculate, and assess calorie counts as needed  - Recommend, monitor, and adjust tube feedings and TPN/PPN based on assessed needs  - Assess need for intravenous fluids  - Provide specific nutrition/hydration education as appropriate  - Include patient/family/caregiver in decisions related to nutrition  Outcome: Progressing

## 2023-02-20 RX ADMIN — GLYCERIN 1 DROP: .002; .002; .01 SOLUTION/ DROPS OPHTHALMIC at 21:30

## 2023-02-20 RX ADMIN — MELATONIN 3 MG: at 21:29

## 2023-02-20 RX ADMIN — ACETAMINOPHEN 975 MG: 325 TABLET, FILM COATED ORAL at 21:29

## 2023-02-20 RX ADMIN — LEVOTHYROXINE SODIUM 25 MCG: 25 TABLET ORAL at 21:29

## 2023-02-20 RX ADMIN — ENOXAPARIN SODIUM 40 MG: 40 INJECTION SUBCUTANEOUS at 13:55

## 2023-02-20 RX ADMIN — ACETAMINOPHEN 975 MG: 325 TABLET, FILM COATED ORAL at 13:55

## 2023-02-20 RX ADMIN — ACETAMINOPHEN 975 MG: 325 TABLET, FILM COATED ORAL at 06:01

## 2023-02-20 RX ADMIN — GLYCERIN 1 DROP: .002; .002; .01 SOLUTION/ DROPS OPHTHALMIC at 15:34

## 2023-02-20 RX ADMIN — LIDOCAINE 5% 1 PATCH: 700 PATCH TOPICAL at 08:02

## 2023-02-20 RX ADMIN — PRAVASTATIN SODIUM 20 MG: 20 TABLET ORAL at 15:34

## 2023-02-20 RX ADMIN — GLYCERIN 1 DROP: .002; .002; .01 SOLUTION/ DROPS OPHTHALMIC at 08:04

## 2023-02-20 NOTE — PLAN OF CARE
Problem: PAIN - ADULT  Goal: Verbalizes/displays adequate comfort level or baseline comfort level  Description: Interventions:  - Encourage patient to monitor pain and request assistance  - Assess pain using appropriate pain scale  - Administer analgesics based on type and severity of pain and evaluate response  - Implement non-pharmacological measures as appropriate and evaluate response  - Consider cultural and social influences on pain and pain management  - Notify physician/advanced practitioner if interventions unsuccessful or patient reports new pain  Outcome: Progressing     Problem: INFECTION - ADULT  Goal: Absence or prevention of progression during hospitalization  Description: INTERVENTIONS:  - Assess and monitor for signs and symptoms of infection  - Monitor lab/diagnostic results  - Monitor all insertion sites, i e  indwelling lines, tubes, and drains  - Monitor endotracheal if appropriate and nasal secretions for changes in amount and color  - [place] appropriate cooling/warming therapies per order  - Administer medications as ordered  - Instruct and encourage patient and family to use good hand hygiene technique  - Identify and instruct in appropriate isolation precautions for identified infection/condition  Outcome: Progressing  Goal: Absence of fever/infection during neutropenic period  Description: INTERVENTIONS:  - Monitor WBC    Outcome: Progressing     Problem: SAFETY ADULT  Goal: Patient will remain free of falls  Description: INTERVENTIONS:  - Educate patient/family on patient safety including physical limitations  - Instruct patient to call for assistance with activity   - Consult OT/PT to assist with strengthening/mobility   - Keep Call bell within reach  - Keep bed low and locked with side rails adjusted as appropriate  - Keep care items and personal belongings within reach  - Initiate and maintain comfort rounds  - Make Fall Risk Sign visible to staff  - Offer Toileting every 2 Hours, in advance of need  - Initiate/Maintain bed alarm  - Obtain necessary fall risk management equipment: bed alarm  - Apply yellow socks and bracelet for high fall risk patients  - Consider moving patient to room near nurses station  Outcome: Progressing  Goal: Maintain or return to baseline ADL function  Description: INTERVENTIONS:  -  Assess patient's ability to carry out ADLs; assess patient's baseline for ADL function and identify physical deficits which impact ability to perform ADLs (bathing, care of mouth/teeth, toileting, grooming, dressing, etc )  - Assess/evaluate cause of self-care deficits   - Assess range of motion  - Assess patient's mobility; develop plan if impaired  - Assess patient's need for assistive devices and provide as appropriate  - Encourage maximum independence but intervene and supervise when necessary  - Involve family in performance of ADLs  - Assess for home care needs following discharge   - Consider OT consult to assist with ADL evaluation and planning for discharge  - Provide patient education as appropriate  Outcome: Progressing  Goal: Maintains/Returns to pre admission functional level  Description: INTERVENTIONS:  - Perform BMAT or MOVE assessment daily    - Set and communicate daily mobility goal to care team and patient/family/caregiver  - Collaborate with rehabilitation services on mobility goals if consulted  - Perform Range of Motion 3 times a day  - Reposition patient every 2 hours    - Dangle patient 3 times a day  - Stand patient 3 times a day  - Ambulate patient 3 times a day  - Out of bed to chair 3 times a day   - Out of bed for meals 3 times a day  - Out of bed for toileting  - Record patient progress and toleration of activity level   Outcome: Progressing     Problem: DISCHARGE PLANNING  Goal: Discharge to home or other facility with appropriate resources  Description: INTERVENTIONS:  - Identify barriers to discharge w/patient and caregiver  - Arrange for needed discharge resources and transportation as appropriate  - Identify discharge learning needs (meds, wound care, etc )  - Arrange for interpretive services to assist at discharge as needed  - Refer to Case Management Department for coordinating discharge planning if the patient needs post-hospital services based on physician/advanced practitioner order or complex needs related to functional status, cognitive ability, or social support system  Outcome: Progressing     Problem: Prexisting or High Potential for Compromised Skin Integrity  Goal: Skin integrity is maintained or improved  Description: INTERVENTIONS:  - Identify patients at risk for skin breakdown  - Assess and monitor skin integrity  - Assess and monitor nutrition and hydration status  - Monitor labs   - Assess for incontinence   - Turn and reposition patient  - Assist with mobility/ambulation  - Relieve pressure over bony prominences  - Avoid friction and shearing  - Provide appropriate hygiene as needed including keeping skin clean and dry  - Evaluate need for skin moisturizer/barrier cream  - Collaborate with interdisciplinary team   - Patient/family teaching  - Consider wound care consult   Outcome: Progressing     Problem: Nutrition/Hydration-ADULT  Goal: Nutrient/Hydration intake appropriate for improving, restoring or maintaining nutritional needs  Description: Monitor and assess patient's nutrition/hydration status for malnutrition  Collaborate with interdisciplinary team and initiate plan and interventions as ordered  Monitor patient's weight and dietary intake as ordered or per policy  Utilize nutrition screening tool and intervene as necessary  Determine patient's food preferences and provide high-protein, high-caloric foods as appropriate       INTERVENTIONS:  - Monitor oral intake, urinary output, labs, and treatment plans  - Assess nutrition and hydration status and recommend course of action  - Evaluate amount of meals eaten  - Assist patient with eating if necessary   - Allow adequate time for meals  - Recommend/ encourage appropriate diets, oral nutritional supplements, and vitamin/mineral supplements  - Order, calculate, and assess calorie counts as needed  - Recommend, monitor, and adjust tube feedings and TPN/PPN based on assessed needs  - Assess need for intravenous fluids  - Provide specific nutrition/hydration education as appropriate  - Include patient/family/caregiver in decisions related to nutrition  Outcome: Progressing

## 2023-02-20 NOTE — PROGRESS NOTES
Internal Medicine Progress Note  Patient: Rachel Monroe  Age/sex: 69 y o  female  Medical Record #: [id number]      ASSESSMENT/PLAN: (Interval History)  Rachel Monroe is seen and examined and management for following issues:    Right displaced proximal femoral shaft fracture  • S/p IMN 2/3/23  • WBAT  • Per Ortho:  "Patient should not continue bisphosphonate use at this time given radiographic changes to left femur including lateral cortical thickening without obvious stress fracture/beaking  She needs to follow up immediately w ortho if she develops ambulatory pain in left thigh"   They discussed this with her   Left femur xray was negative fx 2/4/23     ABLA  • S/p 1 U PRBCs on 2/8/23 for hemoglobin of 7 3 with improvement to 9 9  • stable     Dizziness/syncope  • Related to orthostasis and blood loss  • Was given IVF and blood  • Watching for orthostasis while here = so far no sx of such and BPs have been stable     Hypothyroidism   • Continue levothyroxine  • Has a thyroid nodule found incidentally for which an US will need to be done as an OP     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 vs infundibulum  • To see as a followup in Neurovascular center     Old lacunar infarcts   • Found incidentally on imaging  • On statin     Right knee pain  • Xray negative 2/12/23  • Ortho saw = ACE wrap and ice  • improved        Discharge date:  2/23/23       The above assessment and plan was reviewed and updated as determined by my evaluation of the patient on 2/20/2023      Labs:   Results from last 7 days   Lab Units 02/16/23  0536   WBC Thousand/uL 7 57   HEMOGLOBIN g/dL 9 9*   HEMATOCRIT % 32 3*   PLATELETS Thousands/uL 520*     Results from last 7 days   Lab Units 02/16/23  0536   SODIUM mmol/L 142   POTASSIUM mmol/L 3 8   CHLORIDE mmol/L 112*   CO2 mmol/L 26   BUN mg/dL 16   CREATININE mg/dL 0 51*   CALCIUM mg/dL 8 8                   Review of Scheduled Meds:  Current Facility-Administered Medications   Medication Dose Route Frequency Provider Last Rate   • acetaminophen  975 mg Oral TID Selena Gibbs MD     • artificial tear   Both Eyes HS PRN SANTI Peña     • bisacodyl  10 mg Rectal Daily PRN SANTI Peña     • enoxaparin  40 mg Subcutaneous Daily SANTI Bobo     • glycerin-hypromellose-  1 drop Both Eyes TID SANTI Peña     • levothyroxine  25 mcg Oral Daily SANTI Bobo     • lidocaine  1 patch Topical Daily SANTI Bobo     • meclizine  12 5 mg Oral Q8H PRN SANTI Bobo     • melatonin  3 mg Oral HS Amada Ovalle MD     • oxyCODONE  2 5 mg Oral Q4H PRN SANTI Peña     • oxyCODONE  5 mg Oral Q4H PRN SANTI Bobo     • polyethylene glycol  17 g Oral Daily PRN SANTI Peña     • pravastatin  20 mg Oral Daily With SANTI Sneed     • senna-docusate sodium  1 tablet Oral HS PRN SANTI Bobo         Subjective/ HPI: Patient seen and examined  Patients overnight issues or events were reviewed with nursing or staff during rounds or morning huddle session  New or overnight issues include the following:     No new or overnight issues  Offers no complaints    ROS:   A 10 point ROS was performed; negative except as noted above       *Labs /Radiology studies reviewed  *Medications reviewed and reconciled as needed  *Please refer to order section for additional ordered labs studies  *Case discussed with primary attending during morning huddle case rounds    Physical Examination:  Vitals:   Vitals:    02/19/23 0532 02/19/23 1350 02/19/23 2033 02/20/23 0627   BP: 137/64 142/67 132/76 131/61   BP Location: Right arm Right arm Left arm Left leg   Pulse: 65 66 63 63   Resp: 18 18 18 18   Temp: 98 °F (36 7 °C) 97 7 °F (36 5 °C) 98 6 °F (37 °C) 98 °F (36 7 °C)   TempSrc: Oral Oral Oral Oral   SpO2: 97% 98% 97% 95%   Weight:       Height:           General Appearance: no distress, conversive  HEENT:  External ear normal   Nose normal w/o drainage  Mucous membranes are moist  Oropharynx is clear  Conjunctiva clear w/o icterus or redness  Neck:  Supple, normal ROM  Lungs: BBS without crackles/wheeze/rhonchi; respirations unlabored with normal inspiratory/expiratory effort  No retractions noted  On RA  CV: regular rate and rhythm; no rubs/murmurs/gallops, PMI normal   ABD: Abdomen is soft  Bowel sounds all quadrants  Nontender with no distention  EXT: very mild RLE edema  Skin: normal turgor, normal texture, no rashes  Psych: affect normal, mood normal  Neuro: AAO      The above physical exam was reviewed and updated as determined by my evaluation of the patient on 2/20/2023  Invasive Devices     None                    VTE Pharmacologic Prophylaxis: Enoxaparin  Code Status: Level 1 - Full Code  Current Length of Stay: 11 day(s)      Total time spent:  30 minutes with more than 50% spent counseling/coordinating care  Counseling includes discussion with patient re: progress  and discussion with patient of his/her current medical state/information  Coordination of patient's care was performed in conjunction with primary service  Time invested included review of patient's labs, vitals, and management of their comorbidities with continued monitoring  In addition, this patient was discussed with medical team including physician and advanced extenders  The care of the patient was extensively discussed and appropriate treatment plan was formulated unique for this patient  Medical decision making for the day was made by supervising physician unless otherwise noted in their attestation statement  ** Please Note:  voice to text software may have been used in the creation of this document   Although proof errors in transcription or interpretation are a potential of such software**

## 2023-02-20 NOTE — PROGRESS NOTES
02/20/23 0830   Pain Assessment   Pain Assessment Tool 0-10   Pain Score No Pain   Restrictions/Precautions   Precautions Fall Risk;Pain   Weight Bearing Restrictions Yes   RUE Weight Bearing Per Order WBAT   LUE Weight Bearing Per Order WBAT   RLE Weight Bearing Per Order WBAT   LLE Weight Bearing Per Order WBAT   ROM Restrictions No   Cognition   Overall Cognitive Status WFL   Arousal/Participation Cooperative   Attention Within functional limits   Orientation Level Oriented X4   Memory Within functional limits   Following Commands Follows all commands and directions without difficulty   Subjective   Subjective Pt agreeable to perform skilled PT session   Sit to Lying   Type of Assistance Needed Independent   Comment inc time for BLE management on mat   Sit to Lying CARE Score 6   Lying to Sitting on Side of Bed   Type of Assistance Needed Independent   Comment inc time for BLE management on mat head slightly elevated with pillows   Lying to Sitting on Side of Bed CARE Score 6   Sit to Stand   Type of Assistance Needed Independent; Adaptive equipment   Comment RW   Sit to Stand CARE Score 6   Bed-Chair Transfer   Type of Assistance Needed Independent; Adaptive equipment   Comment RW   Chair/Bed-to-Chair Transfer CARE Score 6   Transfer Bed/Chair/Wheelchair   Findings (S)  Pt reports that friend with SUV will no longer be picking her up upon DC, instead her brother with a 4 door sedan will be picking her up  Walk 10 Feet   Type of Assistance Needed Supervision; Adaptive equipment   Comment trialed SPC in LUE for navigation in tight bathroom where RW doesnt fit at [facility] and RW S lvl duirng ambulation , Pt recommend to use RW at home   Walk 10 Feet CARE Score 4   Walk 50 Feet with Two Turns   Type of Assistance Needed Supervision; Adaptive equipment   Comment RW   Walk 50 Feet with Two Turns CARE Score 4   Walk 150 Feet   Type of Assistance Needed Supervision; Adaptive equipment   Comment RW   Walk 150 Feet CARE Score 4   Walking 10 Feet on Uneven Surfaces   Type of Assistance Needed Incidental touching; Adaptive equipment   Comment (S)  RW over floor mat  Please take pt outisde before DC to trial on various surfaces   Walking 10 Feet on Uneven Surfaces CARE Score 4   Ambulation   Primary Mode of Locomotion Prior to Admission Walk   Distance Walked (feet) 180 ft   Assist Device Roller Walker   Gait Pattern Antalgic;Decreased R stance; Inconsistant Radha   Limitations Noted In Heel Strike;Speed;Strength   Does the patient walk? 2  Yes   Wheel 50 Feet with Two Turns   Reason if not Attempted Activity not applicable   Wheel 50 Feet with Two Turns CARE Score 9   Wheel 150 Feet   Reason if not Attempted Activity not applicable   Wheel 763 Feet CARE Score 9   Wheelchair mobility   Does the patient use a wheelchair? 0  No   Curb or Single Stair   Style negotiated Single stair   Type of Assistance Needed Supervision; Adaptive equipment;Verbal cues   Comment amb FF using LHR & SPC  to ascend & descend  VC to go down with the weaker leg  1 Step (Curb) CARE Score 4   4 Steps   Type of Assistance Needed Supervision; Adaptive equipment;Verbal cues   Comment amb FF using LHR & SPC  to ascend & descend  VC to go down with the weaker leg  4 Steps CARE Score 4   12 Steps   Type of Assistance Needed Supervision; Adaptive equipment;Verbal cues   Comment amb FF using LHR & SPC  to ascend & descend  VC to go down with the weaker leg  12 Steps CARE Score 4   Stairs   Type Stairs   # of Steps 12   Picking Up Object   Type of Assistance Needed Incidental touching; Adaptive equipment   Comment RW & reacher in RUE to retrieve 2 markers   Picking Up Object CARE Score 4   Therapeutic Interventions   Strengthening Finished reviewing HEP  SLR, supine ADD & ABD with red TB  Reviewed mini squats again to inc pts comfort  Sit to stand 2x10     Flexibility manual hamstring & gastroc stretch [id number]   Equipment Use   NuStep lvl 1 10min   Assessment   Treatment Assessment Pt engaged in 90 min skilled PT session focused on inc BLE strength & practicing activities for home DC  Pt amb over floor mat using RW 10'x2 CgA to mimic uneven surfaced encountered after DC  Please take pt outside before DC to trial pt walking on uneven sidewalks with RW  Pt performed object retrieval using reacher CgA  Pt was aware of proper sequensing for safety & states she has a reacher at home & will be able to utilize it  Pt performed FF stairs on floor 9 CS using LHR & SPC in RUE to ascend & descend to mimic pt entering home & stair management in home  Pt required VC to go down with weak LE  Pt is able to stay on first floor until HHPT states she's safe for stair negotiation  Pt brother informed pt RW doesnt fit in upstairs bathroom  Trialed SPC in LUE 10'x4 CgA for negotiation in bathroom  Requested pt brother send photos of bathroom for setup  Finished reviewing HEP & pt was comfortable with all exercises except mini squats  Pt is able to perform properly but feels unsafe due to past knee pain  Advised pt not to perform exercise alone if painful & unsafe  Pt performed STS 2x10 with UE on chair arms  Focusing on pushing through [address] as little as possible to inc BLE strength; Pt was unable to complete STS with UE on knees  Performed manual gastroc & hamstring stretch [id number] to inc flexibility  Pt performed NuStep lvl 1 for 10 min to inc strength & endurance  Pt c/o slight fatigue in upper thigh but was able to complete time  Pt states that she will no longer be picked up in SUV for DC by friend, instead will be picked up by brother in 3 door sedan  Pt continues to be limited by decreased strength & endurance  In next session take pt outside to trial RW on uneven surfaces & practice car xfer without added height     Problem List Decreased strength;Decreased range of motion;Decreased endurance;Decreased mobility;Pain   Barriers to Discharge Inaccessible home environment;Decreased caregiver support   PT Barriers   Physical Impairment Decreased strength;Decreased range of motion;Decreased endurance; Impaired balance;Pain   Functional Limitation Car transfers;Stair negotiation;Standing;Transfers; Walking   Plan   Treatment/Interventions Functional transfer training;LE strengthening/ROM; Elevations; Therapeutic exercise; Endurance training;Patient/family training;Equipment eval/education;Gait training   Progress Progressing toward goals   PT Therapy Minutes   PT Time In 0830   PT Time Out 1000   PT Total Time (minutes) 90   PT Mode of treatment - Individual (minutes) 90   PT Mode of treatment - Concurrent (minutes) 0   PT Mode of treatment - Group (minutes) 0   PT Mode of treatment - Co-treat (minutes) 0   PT Mode of Treatment - Total time(minutes) 90 minutes   PT Cumulative Minutes 960   Therapy Time missed   Time missed?  No

## 2023-02-21 PROBLEM — R42 LIGHTHEADEDNESS: Status: RESOLVED | Noted: 2023-02-06 | Resolved: 2023-02-21

## 2023-02-21 RX ADMIN — ACETAMINOPHEN 975 MG: 325 TABLET, FILM COATED ORAL at 21:15

## 2023-02-21 RX ADMIN — ACETAMINOPHEN 975 MG: 325 TABLET, FILM COATED ORAL at 14:26

## 2023-02-21 RX ADMIN — ACETAMINOPHEN 975 MG: 325 TABLET, FILM COATED ORAL at 06:11

## 2023-02-21 RX ADMIN — PRAVASTATIN SODIUM 20 MG: 20 TABLET ORAL at 16:36

## 2023-02-21 RX ADMIN — LEVOTHYROXINE SODIUM 25 MCG: 25 TABLET ORAL at 21:15

## 2023-02-21 RX ADMIN — GLYCERIN 1 DROP: .002; .002; .01 SOLUTION/ DROPS OPHTHALMIC at 21:18

## 2023-02-21 RX ADMIN — GLYCERIN 1 DROP: .002; .002; .01 SOLUTION/ DROPS OPHTHALMIC at 08:30

## 2023-02-21 RX ADMIN — LIDOCAINE 5% 1 PATCH: 700 PATCH TOPICAL at 08:30

## 2023-02-21 RX ADMIN — ENOXAPARIN SODIUM 40 MG: 40 INJECTION SUBCUTANEOUS at 14:27

## 2023-02-21 RX ADMIN — GLYCERIN 1 DROP: .002; .002; .01 SOLUTION/ DROPS OPHTHALMIC at 16:36

## 2023-02-21 RX ADMIN — MELATONIN 3 MG: at 21:20

## 2023-02-21 NOTE — PROGRESS NOTES
02/21/23 1030   Pain Assessment   Pain Assessment Tool 0-10   Pain Score No Pain   Restrictions/Precautions   Precautions Fall Risk;Pain   Weight Bearing Restrictions Yes   RUE Weight Bearing Per Order WBAT   LUE Weight Bearing Per Order WBAT   RLE Weight Bearing Per Order WBAT   LLE Weight Bearing Per Order WBAT   ROM Restrictions No   Cognition   Overall Cognitive Status WFL   Arousal/Participation Cooperative   Attention Within functional limits   Orientation Level Oriented X4   Memory Within functional limits   Following Commands Follows all commands and directions without difficulty   Subjective   Subjective Pt agreeable to perform skilled PT   Roll Left and Right   Type of Assistance Needed Independent   Comment on flat mat without bed rail   Roll Left and Right CARE Score 6   Sit to Lying   Type of Assistance Needed Independent   Comment on flat mat without railings   Sit to Lying CARE Score 6   Lying to Sitting on Side of Bed   Type of Assistance Needed Independent   Comment on flat mat without railings inc time for BLE management   Lying to Sitting on Side of Bed CARE Score 6   Sit to Stand   Type of Assistance Needed Independent; Adaptive equipment   Comment RW   Sit to Stand CARE Score 6   Bed-Chair Transfer   Type of Assistance Needed Independent; Adaptive equipment   Comment RW   Chair/Bed-to-Chair Transfer CARE Score 6   Transfer Bed/Chair/Wheelchair   Adaptive Equipment Roller Walker   Walk 10 Feet   Type of Assistance Needed Independent; Adaptive equipment   Comment Pt personal RW   Walk 10 Feet CARE Score 6   Walk 50 Feet with Two Turns   Type of Assistance Needed Independent; Adaptive equipment   Comment Pt personal RW   Walk 50 Feet with Two Turns CARE Score 6   Walk 150 Feet   Type of Assistance Needed Independent; Adaptive equipment   Comment Pt personal RW   Walk 150 Feet CARE Score 6   Walking 10 Feet on Uneven Surfaces   Type of Assistance Needed Independent; Adaptive equipment   Comment Pt personal RW over floor mat  Pt walker was getting hung up on mat & suggested purchasing skis   Walking 10 Feet on Uneven Surfaces CARE Score 6   Ambulation   Primary Mode of Locomotion Prior to Admission Walk   Distance Walked (feet) 300 ft   Assist Device Roller Walker   Gait Pattern Antalgic; Inconsistant Radha;Decreased R stance   Limitations Noted In Heel Strike;Speed;Strength   Walk Assist Level Independent   Findings fit & trialed pts personal RW for xfers & amb to inc pts comfort for DC   Does the patient walk? 2  Yes   Wheel 50 Feet with Two Turns   Reason if not Attempted Activity not applicable   Wheel 50 Feet with Two Turns CARE Score 9   Wheel 150 Feet   Reason if not Attempted Activity not applicable   Wheel 248 Feet CARE Score 9   Wheelchair mobility   Does the patient use a wheelchair? 0  No   Curb or Single Stair   Style negotiated Curb   Type of Assistance Needed Independent; Adaptive equipment   Comment 6'' curb step with pt personal RW   1 Step (Curb) CARE Score 6   Therapeutic Interventions   Flexibility manual hamstring & gastroc stretch [id number]   Equipment Use   NuStep lvl 1 12min   Assessment   Treatment Assessment Pt participated in 60 min skilled PT session focused on gathering CARE scores & inc LE strength  Pt rolled L & R on flat mat (I)  Pt c/o 4/10 pain described as muscular pain in lateral proximal RLE when lying on R side  Fitted pt with personal RW that will be used on second floor upon DC  Pt amb up 6'' curb, over floor mat & 300' using personal RW (I) to minic activities for DC  Pt personal RW is 24'' wide & pt will be able to amb in bathroom which is 27'' to enter  Pt practiced side stepping (I) with personal RW to mimic amb in upstairs bathroom where too narrow  Advised pt to have brother remove throw rugs that were shown in bathroom prior to DC to decrease fall risk  Pt was able to fold & unfold RW (I)  Pt performed NuStep lvl 1 for 12min to inc strength & endurance   OT was consulted & pt was granted hallway privileges with RW  Pt continues to be limited by decreased strength & endurance  In next session please take pt outside to perform FF, curb & amb  on sidewalk with personal RW to prepare for home DC 2/23/23  Family/Caregiver Present No   Problem List Decreased strength;Decreased range of motion;Decreased endurance;Decreased mobility;Pain   Barriers to Discharge Inaccessible home environment;Decreased caregiver support   PT Barriers   Physical Impairment Decreased strength;Decreased range of motion;Decreased endurance; Impaired balance;Pain   Functional Limitation Car transfers;Stair negotiation;Standing;Transfers; Walking   Plan   Treatment/Interventions ADL retraining;Functional transfer training; Therapeutic exercise; Endurance training;Equipment eval/education;Patient/family training;Bed mobility; Compensatory technique education   Progress Progressing toward goals   Recommendation   PT Discharge Recommendation Home with home health rehabilitation   Equipment Recommended Walker;Cane   Walker Package Recommended Wheeled walker   PT Therapy Minutes   PT Time In 1030   PT Time Out 1130   PT Total Time (minutes) 60   PT Mode of treatment - Individual (minutes) 0   PT Mode of treatment - Concurrent (minutes) 60   PT Mode of treatment - Group (minutes) 0   PT Mode of treatment - Co-treat (minutes) 0   PT Mode of Treatment - Total time(minutes) 60 minutes   PT Cumulative Minutes 1050   Therapy Time missed   Time missed?  No

## 2023-02-21 NOTE — PROGRESS NOTES
[facility] Occupational Therapy Daily Note  Patient Active Problem List   Diagnosis    Primary osteoarthritis of right knee    Primary osteoarthritis of left knee    Sjogren's syndrome (HCC)    Chest pain    Abnormal electrocardiogram (ECG) (EKG)    Femur fracture (HCC)    Fall    Acute pain due to trauma    Hypothyroidism    Acute blood loss anemia    HLD (hyperlipidemia)    Lightheadedness    Osteoporosis    Nail abnormalities       Past Medical History:   Diagnosis Date    Arthritis 2005    Closed nondisplaced fracture of fifth metatarsal bone of right foot with routine healing     Disease of thyroid gland     hypothyroid    Glaucoma, bilateral     Hyperlipidemia     Osteoporosis     Scoliosis 12/12/12     Etiologic Diagnosis: right proximal femoral shaft fx  Restrictions/Precautions  Precautions: Fall Risk, Pain  Weight Bearing Restrictions: Yes  RUE Weight Bearing Per Order: Weight Bearing as Tolerated  LUE Weight Bearing Per Order: Weight Bearing as Tolerated  RLE Weight Bearing Per Order: Weight Bearing as Tolerated  LLE Weight Bearing Per Order: Weight Bearing as Tolerated  ROM Restrictions: No  Braces or Orthoses:  (RLE Ace wrap)  ADL Team Goal: Patient will be independent with ADLs with least restrictive device upon completion of rehab program  Occupational Therapy LTG's  Eating Oral care Bathing LB dress UB dress   Eating Goal: 06  Independent - Patient completes the activity by him/herself with no assistance from a helper  Oral Hygiene Goal: 06  Independent - Patient completes the activity by him/herself with no assistance from a helper  Shower/bathe self Goal: 05  Setup or clean-up assistance - [place] SETS UP or CLEANS UP, patient completes activity  [place] assists only prior to or following the activity  Lower body dressing Goal: 06  Independent - Patient completes the activity by him/herself with no assistance from a helper  Upper body dressing Goal: 06   Independent - Patient completes the activity by him/herself with no assistance from a helper  Toileting Toilet txf Func txf IADL Med    Toileting hygiene Goal: 06  Independent - Patient completes the activity by him/herself with no assistance from a helper  Toilet transfer Goal: 06  Independent - Patient completes the activity by him/herself with no assistance from a helper  Assist Level: Independent (light meal prep) Assist Level: Independent   OT interventions: Treatment/Interventions: ADL retraining, Functional transfer training, Therapeutic exercise, Endurance training, Equipment eval/education, Patient/family training, Bed mobility, Compensatory technique education  Discharge Plan:  OT Discharge Recommendation: No rehabilitation needs   DME: Ordered tub bench, RW tray from [employer]  Pt is picking up RW from pharmacy  Pt owns 2 BSC and toilet riser and SC     02/21/23 6825   Pain Assessment   Pain Assessment Tool 0-10   Pain Score No Pain   Lifestyle   Autonomy "I think I am prepared "   Sit to Stand   Type of Assistance Needed Independent   Sit to Stand CARE Score 6   Bed-Chair Transfer   Type of Assistance Needed Independent   Chair/Bed-to-Chair Transfer CARE Score 6   Toileting Hygiene   Type of Assistance Needed Independent   Toileting Hygiene CARE Score 6   Toilet Transfer   Type of Assistance Needed Independent   Comment session focusing on review of bathroom images from pictures  friend supplied photographs of bathroom areas  there was concern about a small space in the upstairs bathroom  by counting wall tiles pts space to toilet is only 16" in width and will not accomidate a RW front on or either side stepping  bathroom is small enough that he will only walking about 4' to toilet  dicussed that she may be safe enough to utilize [facility] since she will be using the [facility] on the steps to bathroom  pt is going to wait to practice with home therapy which is recommended   Pt will also have her brother take out a toilet riser from [facility] as BSC may not fit into space around toilet   Toilet Transfer CARE Score 6   Cognition   Overall Cognitive Status WFL   Additional Activities   Additional Activities Comments majority of time spent finalizing DME items with friend lyn  erwin tub bench and RW tray   Assessment   Treatment Assessment Pt participated in skilled OT treatment session with treatment focus on fxnl xfers, standing balance, DME training/education and EC techniques/education  Pt tolerated session well reporting that she is feeling prepared for d/c now that all her DME is set  Pt has agreed to wait for home PT therapy to use the upstairs bathroom from accessing the stairs  Pt is prepared for d/c on Thursday     Prognosis Good   Plan   Progress Progressing toward goals   OT Therapy Minutes   OT Time In 1245   OT Time Out 1415   OT Total Time (minutes) 90   OT Mode of treatment - Individual (minutes) 90   OT Mode of treatment - Concurrent (minutes) 0   OT Mode of treatment - Group (minutes) 0   OT Mode of treatment - Co-treat (minutes) 0   OT Mode of Treatment - Total time(minutes) 90 minutes   OT Cumulative Minutes 900

## 2023-02-21 NOTE — PROGRESS NOTES
Internal Medicine Progress Note  Patient: Artem Chu  Age/sex: 69 y o  female  Medical Record #: [id number]      ASSESSMENT/PLAN: (Interval History)  Artem Chu is seen and examined and management for following issues:    Right displaced proximal femoral shaft fracture  • S/p IMN 2/3/23  • WBAT  • Per Ortho:  "Patient should not continue bisphosphonate use at this time given radiographic changes to left femur including lateral cortical thickening without obvious stress fracture/beaking  She needs to follow up immediately w ortho if she develops ambulatory pain in left thigh"   They discussed this with her   Left femur xray was negative fx 2/4/23     ABLA  • S/p 1 U PRBCs on 2/8/23 for hemoglobin of 7 3 with improvement to 9 9  • stable     Dizziness/syncope  • Related to orthostasis and blood loss  • Was given IVF and blood  • Watching for orthostasis while here = so far no sx of such and BPs have been stable  • She reports that she gets dizziness at home when she is on her right side in bed and sits up = will d/w Dr Narendra Tavarez     Hypothyroidism   • Continue levothyroxine  • Has a thyroid nodule found incidentally for which an US will need to be done as an OP     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 vs infundibulum  • To see as a followup in Neurovascular center     Old lacunar infarcts   • Found incidentally on imaging  • On statin     Right knee pain  • Xray negative 2/12/23  • Ortho saw = ACE wrap and ice  • Pain and swelling improved        Discharge date:  2/23/23       The above assessment and plan was reviewed and updated as determined by my evaluation of the patient on 2/21/2023      Labs:   Results from last 7 days   Lab Units 02/16/23  0536   WBC Thousand/uL 7 57   HEMOGLOBIN g/dL 9 9*   HEMATOCRIT % 32 3*   PLATELETS Thousands/uL 520*     Results from last 7 days   Lab Units 02/16/23  0536   SODIUM mmol/L 142   POTASSIUM mmol/L 3 8   CHLORIDE mmol/L 112*   CO2 mmol/L 26   BUN mg/dL 16   CREATININE mg/dL 0 51* CALCIUM mg/dL 8 8                   Review of Scheduled Meds:  Current Facility-Administered Medications   Medication Dose Route Frequency Provider Last Rate   • acetaminophen  975 mg Oral TID Caitlin Gao MD     • artificial tear   Both Eyes HS PRN SANTI Maria     • bisacodyl  10 mg Rectal Daily PRN SANTI Maria     • enoxaparin  40 mg Subcutaneous Daily SANTI Bobo     • glycerin-hypromellose-  1 drop Both Eyes TID SANTI Maria     • levothyroxine  25 mcg Oral Daily SANTI Bobo     • lidocaine  1 patch Topical Daily SANTI Bobo     • meclizine  12 5 mg Oral Q8H PRN SANTI Bobo     • melatonin  3 mg Oral HS Luis Miguel Chowdary MD     • oxyCODONE  2 5 mg Oral Q4H PRN SANTI Maria     • oxyCODONE  5 mg Oral Q4H PRN SANTI Bobo     • polyethylene glycol  17 g Oral Daily PRN SANTI Maria     • pravastatin  20 mg Oral Daily With SANTI Sneed     • senna-docusate sodium  1 tablet Oral HS PRN SANTI oBbo         Subjective/ HPI: Patient seen and examined  Patients overnight issues or events were reviewed with nursing or staff during rounds or morning huddle session  New or overnight issues include the following:     No new or overnight issues  Offers no complaints    ROS:   A 10 point ROS was performed; negative except as noted above       *Labs /Radiology studies reviewed  *Medications reviewed and reconciled as needed  *Please refer to order section for additional ordered labs studies  *Case discussed with primary attending during morning huddle case rounds    Physical Examination:  Vitals:   Vitals:    02/20/23 0627 02/20/23 1355 02/20/23 1931 02/21/23 0500   BP: 131/61 140/68 145/65 133/65   BP Location: Left leg Right arm Right arm Left arm   Pulse: 63 79 76 74   Resp: 18 18 18 17   Temp: 98 °F (36 7 °C) 98 8 °F (37 1 °C) 98 7 °F (37 1 °C) 97 9 °F (36 6 °C)   TempSrc: Oral Oral Oral Oral   SpO2: 95% 97% 97% 97%   Weight:       Height:           General Appearance: no distress, conversive  HEENT: PERRLA, conjuctiva normal; oropharynx clear; mucous membranes moist   Neck:  Supple, normal ROM  Lungs: CTA, normal respiratory effort, no retractions, expiratory effort normal  CV: regular rate and rhythm; no rubs/murmurs/gallops, PMI normal   ABD: soft; ND/NT; +BS  EXT: mild right thigh and knee edema  Skin: normal turgor, normal texture, no rashes  Psych: affect normal, mood normal  Neuro: AAO      The above physical exam was reviewed and updated as determined by my evaluation of the patient on 2/21/2023  Invasive Devices     None                    VTE Pharmacologic Prophylaxis: Enoxaparin  Code Status: Level 1 - Full Code  Current Length of Stay: 12 day(s)      Total time spent:  30 minutes with more than 50% spent counseling/coordinating care  Counseling includes discussion with patient re: progress  and discussion with patient of his/her current medical state/information  Coordination of patient's care was performed in conjunction with primary service  Time invested included review of patient's labs, vitals, and management of their comorbidities with continued monitoring  In addition, this patient was discussed with medical team including physician and advanced extenders  The care of the patient was extensively discussed and appropriate treatment plan was formulated unique for this patient  Medical decision making for the day was made by supervising physician unless otherwise noted in their attestation statement  ** Please Note:  voice to text software may have been used in the creation of this document   Although proof errors in transcription or interpretation are a potential of such software**

## 2023-02-21 NOTE — PROGRESS NOTES
02/21/23 0830   Pain Assessment   Pain Assessment Tool 0-10   Pain Score No Pain   Restrictions/Precautions   Precautions Fall Risk;Pain   Weight Bearing Restrictions Yes   RUE Weight Bearing Per Order WBAT   LUE Weight Bearing Per Order WBAT   RLE Weight Bearing Per Order WBAT   LLE Weight Bearing Per Order WBAT   ROM Restrictions No   Cognition   Overall Cognitive Status WFL   Arousal/Participation Cooperative   Attention Within functional limits   Orientation Level Oriented X4   Memory Within functional limits   Following Commands Follows all commands and directions without difficulty   Subjective   Subjective Pt agreeable to perform skilled PT; pt reports she slept well   Sit to Stand   Type of Assistance Needed Independent; Adaptive equipment   Comment RW   Sit to Stand CARE Score 6   Bed-Chair Transfer   Type of Assistance Needed Independent; Adaptive equipment   Comment RW   Chair/Bed-to-Chair Transfer CARE Score 6   Transfer Bed/Chair/Wheelchair   Adaptive Equipment Roller Walker   Car Transfer   Type of Assistance Needed Independent; Adaptive equipment   Comment Standard height due to change in vehicle used for DC using    Car Transfer CARE Score 6   Walk 10 Feet   Type of Assistance Needed Independent; Adaptive equipment   Comment RW   Walk 10 Feet CARE Score 6   Walk 50 Feet with Two Turns   Type of Assistance Needed Independent; Adaptive equipment   Comment RW   Walk 50 Feet with Two Turns CARE Score 6   Walk 150 Feet   Type of Assistance Needed Independent; Adaptive equipment   Comment RW   Walk 150 Feet CARE Score 6   Ambulation   Primary Mode of Locomotion Prior to Admission Walk   Distance Walked (feet) 450 ft   Assist Device Roller Walker   Gait Pattern Antalgic; Inconsistant Radha;Decreased R stance   Limitations Noted In Heel Strike;Speed;Strength   Walk Assist Level Independent   Does the patient walk? 2   Yes   Wheel 50 Feet with Two Turns   Reason if not Attempted Activity not applicable   Wheel 50 Feet with Two Turns CARE Score 9   Wheel 150 Feet   Reason if not Attempted Activity not applicable   Wheel 257 Feet CARE Score 9   Wheelchair mobility   Does the patient use a wheelchair? 0  No   Picking Up Object   Type of Assistance Needed Independent; Adaptive equipment   Comment RW & reacher in RUE to retrieve 3 markers   Picking Up Object CARE Score 6   Therapeutic Interventions   Flexibility manual hamstring & gastroc stretch [id number]   Assessment   Treatment Assessment Pt participarted in 30 min skilled PT session focused on activities for DC & collecting CARE scores  Pt performed car xfer, amb 450' & picking up objects with reacher all with RW (I) to mimic home activities for DC  Pt provided photos & measurements of 2nd level bathroom; 29'' wide from tub to baseboard & 27'' doorframe  Only area of difficulty maybe getting past tub to toilet d/t being narrow  Pt will also have shower chair that extends past tub that will need to be stored inside tub to amb past  In following session will practice side step with RW  Pt requested to use RW she will be amb at home with in next session  Pt continues to be limited by decreased strength & balance  In next session continue to perform mimiced home activitied for DC including going outside & ascending & descending FF  Family/Caregiver Present No   Problem List Decreased strength;Decreased range of motion;Decreased endurance;Decreased mobility;Pain   Barriers to Discharge Inaccessible home environment;Decreased caregiver support   PT Barriers   Physical Impairment Decreased strength;Decreased range of motion;Decreased endurance; Impaired balance;Pain   Functional Limitation Car transfers;Stair negotiation;Standing;Transfers; Walking   Plan   Treatment/Interventions ADL retraining;Functional transfer training; Therapeutic exercise; Endurance training;Equipment eval/education;Patient/family training;Bed mobility; Compensatory technique education   Progress Progressing toward goals   Recommendation   PT Discharge Recommendation Home with home health rehabilitation   Equipment Recommended Walker;Cane  [facility] for stairs)   Walker Package Recommended Wheeled walker   PT Therapy Minutes   PT Time In 0830   PT Time Out 0900   PT Total Time (minutes) 30   PT Mode of treatment - Individual (minutes) 30   PT Mode of treatment - Concurrent (minutes) 0   PT Mode of treatment - Group (minutes) 0   PT Mode of treatment - Co-treat (minutes) 0   PT Mode of Treatment - Total time(minutes) 30 minutes   PT Cumulative Minutes 990   Therapy Time missed   Time missed?  No

## 2023-02-21 NOTE — PLAN OF CARE
Problem: PAIN - ADULT  Goal: Verbalizes/displays adequate comfort level or baseline comfort level  Description: Interventions:  - Encourage patient to monitor pain and request assistance  - Assess pain using appropriate pain scale  - Administer analgesics based on type and severity of pain and evaluate response  - Implement non-pharmacological measures as appropriate and evaluate response  - Consider cultural and social influences on pain and pain management  - Notify physician/advanced practitioner if interventions unsuccessful or patient reports new pain  Outcome: Progressing     Problem: INFECTION - ADULT  Goal: Absence or prevention of progression during hospitalization  Description: INTERVENTIONS:  - Assess and monitor for signs and symptoms of infection  - Monitor lab/diagnostic results  - Monitor all insertion sites, i e  indwelling lines, tubes, and drains  - Monitor endotracheal if appropriate and nasal secretions for changes in amount and color  - [place] appropriate cooling/warming therapies per order  - Administer medications as ordered  - Instruct and encourage patient and family to use good hand hygiene technique  - Identify and instruct in appropriate isolation precautions for identified infection/condition  Outcome: Progressing  Goal: Absence of fever/infection during neutropenic period  Description: INTERVENTIONS:  - Monitor WBC    Outcome: Progressing     Problem: SAFETY ADULT  Goal: Patient will remain free of falls  Description: INTERVENTIONS:  - Educate patient/family on patient safety including physical limitations  - Instruct patient to call for assistance with activity   - Consult OT/PT to assist with strengthening/mobility   - Keep Call bell within reach  - Keep bed low and locked with side rails adjusted as appropriate  - Keep care items and personal belongings within reach  - Initiate and maintain comfort rounds  - Make Fall Risk Sign visible to staff  - Apply yellow socks and bracelet for high fall risk patients  - Consider moving patient to room near nurses station  Outcome: Progressing  Goal: Maintain or return to baseline ADL function  Description: INTERVENTIONS:  -  Assess patient's ability to carry out ADLs; assess patient's baseline for ADL function and identify physical deficits which impact ability to perform ADLs (bathing, care of mouth/teeth, toileting, grooming, dressing, etc )  - Assess/evaluate cause of self-care deficits   - Assess range of motion  - Assess patient's mobility; develop plan if impaired  - Assess patient's need for assistive devices and provide as appropriate  - Encourage maximum independence but intervene and supervise when necessary  - Involve family in performance of ADLs  - Assess for home care needs following discharge   - Consider OT consult to assist with ADL evaluation and planning for discharge  - Provide patient education as appropriate  Outcome: Progressing  Goal: Maintains/Returns to pre admission functional level  Description: INTERVENTIONS:  - Perform BMAT or MOVE assessment daily    - Set and communicate daily mobility goal to care team and patient/family/caregiver     - Collaborate with rehabilitation services on mobility goals if consulted  - Out of bed for toileting  - Record patient progress and toleration of activity level   Outcome: Progressing     Problem: DISCHARGE PLANNING  Goal: Discharge to home or other facility with appropriate resources  Description: INTERVENTIONS:  - Identify barriers to discharge w/patient and caregiver  - Arrange for needed discharge resources and transportation as appropriate  - Identify discharge learning needs (meds, wound care, etc )  - Arrange for interpretive services to assist at discharge as needed  - Refer to Case Management Department for coordinating discharge planning if the patient needs post-hospital services based on physician/advanced practitioner order or complex needs related to functional status, cognitive ability, or social support system  Outcome: Progressing     Problem: Prexisting or High Potential for Compromised Skin Integrity  Goal: Skin integrity is maintained or improved  Description: INTERVENTIONS:  - Identify patients at risk for skin breakdown  - Assess and monitor skin integrity  - Assess and monitor nutrition and hydration status  - Monitor labs   - Assess for incontinence   - Turn and reposition patient  - Assist with mobility/ambulation  - Relieve pressure over bony prominences  - Avoid friction and shearing  - Provide appropriate hygiene as needed including keeping skin clean and dry  - Evaluate need for skin moisturizer/barrier cream  - Collaborate with interdisciplinary team   - Patient/family teaching  - Consider wound care consult   Outcome: Progressing     Problem: Nutrition/Hydration-ADULT  Goal: Nutrient/Hydration intake appropriate for improving, restoring or maintaining nutritional needs  Description: Monitor and assess patient's nutrition/hydration status for malnutrition  Collaborate with interdisciplinary team and initiate plan and interventions as ordered  Monitor patient's weight and dietary intake as ordered or per policy  Utilize nutrition screening tool and intervene as necessary  Determine patient's food preferences and provide high-protein, high-caloric foods as appropriate       INTERVENTIONS:  - Monitor oral intake, urinary output, labs, and treatment plans  - Assess nutrition and hydration status and recommend course of action  - Evaluate amount of meals eaten  - Assist patient with eating if necessary   - Allow adequate time for meals  - Recommend/ encourage appropriate diets, oral nutritional supplements, and vitamin/mineral supplements  - Order, calculate, and assess calorie counts as needed  - Recommend, monitor, and adjust tube feedings and TPN/PPN based on assessed needs  - Assess need for intravenous fluids  - Provide specific nutrition/hydration education as appropriate  - Include patient/family/caregiver in decisions related to nutrition  Outcome: Progressing

## 2023-02-21 NOTE — PLAN OF CARE
Problem: PAIN - ADULT  Goal: Verbalizes/displays adequate comfort level or baseline comfort level  Description: Interventions:  - Encourage patient to monitor pain and request assistance  - Assess pain using appropriate pain scale  - Administer analgesics based on type and severity of pain and evaluate response  - Implement non-pharmacological measures as appropriate and evaluate response  - Consider cultural and social influences on pain and pain management  - Notify physician/advanced practitioner if interventions unsuccessful or patient reports new pain  Outcome: Progressing     Problem: INFECTION - ADULT  Goal: Absence or prevention of progression during hospitalization  Description: INTERVENTIONS:  - Assess and monitor for signs and symptoms of infection  - Monitor lab/diagnostic results  - Monitor all insertion sites, i e  indwelling lines, tubes, and drains  - Monitor endotracheal if appropriate and nasal secretions for changes in amount and color  - [place] appropriate cooling/warming therapies per order  - Administer medications as ordered  - Instruct and encourage patient and family to use good hand hygiene technique  - Identify and instruct in appropriate isolation precautions for identified infection/condition  Outcome: Progressing  Goal: Absence of fever/infection during neutropenic period  Description: INTERVENTIONS:  - Monitor WBC    Outcome: Progressing     Problem: SAFETY ADULT  Goal: Patient will remain free of falls  Description: INTERVENTIONS:  - Educate patient/family on patient safety including physical limitations  - Instruct patient to call for assistance with activity   - Consult OT/PT to assist with strengthening/mobility   - Keep Call bell within reach  - Keep bed low and locked with side rails adjusted as appropriate  - Keep care items and personal belongings within reach  - Initiate and maintain comfort rounds  - Make Fall Risk Sign visible to staff  - Offer Toileting every 2 Hours, in advance of need  - Initiate/Maintain bed/chair alarm  - Obtain necessary fall risk management equipment: non skid footwear  - Apply yellow socks and bracelet for high fall risk patients  - Consider moving patient to room near nurses station  Outcome: Progressing  Goal: Maintain or return to baseline ADL function  Description: INTERVENTIONS:  -  Assess patient's ability to carry out ADLs; assess patient's baseline for ADL function and identify physical deficits which impact ability to perform ADLs (bathing, care of mouth/teeth, toileting, grooming, dressing, etc )  - Assess/evaluate cause of self-care deficits   - Assess range of motion  - Assess patient's mobility; develop plan if impaired  - Assess patient's need for assistive devices and provide as appropriate  - Encourage maximum independence but intervene and supervise when necessary  - Involve family in performance of ADLs  - Assess for home care needs following discharge   - Consider OT consult to assist with ADL evaluation and planning for discharge  - Provide patient education as appropriate  Outcome: Progressing  Goal: Maintains/Returns to pre admission functional level  Description: INTERVENTIONS:  - Perform BMAT or MOVE assessment daily    - Set and communicate daily mobility goal to care team and patient/family/caregiver  - Collaborate with rehabilitation services on mobility goals if consulted  - Perform Range of Motion 3 times a day  - Reposition patient every 2 hours    - Dangle patient 3 times a day  - Stand patient 3 times a day  - Ambulate patient 3 times a day  - Out of bed to chair 3 times a day   - Out of bed for meals 3 times a day  - Out of bed for toileting  - Record patient progress and toleration of activity level   Outcome: Progressing     Problem: DISCHARGE PLANNING  Goal: Discharge to home or other facility with appropriate resources  Description: INTERVENTIONS:  - Identify barriers to discharge w/patient and caregiver  - Arrange for needed discharge resources and transportation as appropriate  - Identify discharge learning needs (meds, wound care, etc )  - Arrange for interpretive services to assist at discharge as needed  - Refer to Case Management Department for coordinating discharge planning if the patient needs post-hospital services based on physician/advanced practitioner order or complex needs related to functional status, cognitive ability, or social support system  Outcome: Progressing     Problem: Prexisting or High Potential for Compromised Skin Integrity  Goal: Skin integrity is maintained or improved  Description: INTERVENTIONS:  - Identify patients at risk for skin breakdown  - Assess and monitor skin integrity  - Assess and monitor nutrition and hydration status  - Monitor labs   - Assess for incontinence   - Turn and reposition patient  - Assist with mobility/ambulation  - Relieve pressure over bony prominences  - Avoid friction and shearing  - Provide appropriate hygiene as needed including keeping skin clean and dry  - Evaluate need for skin moisturizer/barrier cream  - Collaborate with interdisciplinary team   - Patient/family teaching  - Consider wound care consult   Outcome: Progressing     Problem: Nutrition/Hydration-ADULT  Goal: Nutrient/Hydration intake appropriate for improving, restoring or maintaining nutritional needs  Description: Monitor and assess patient's nutrition/hydration status for malnutrition  Collaborate with interdisciplinary team and initiate plan and interventions as ordered  Monitor patient's weight and dietary intake as ordered or per policy  Utilize nutrition screening tool and intervene as necessary  Determine patient's food preferences and provide high-protein, high-caloric foods as appropriate       INTERVENTIONS:  - Monitor oral intake, urinary output, labs, and treatment plans  - Assess nutrition and hydration status and recommend course of action  - Evaluate amount of meals eaten  - Assist patient with eating if necessary   - Allow adequate time for meals  - Recommend/ encourage appropriate diets, oral nutritional supplements, and vitamin/mineral supplements  - Order, calculate, and assess calorie counts as needed  - Recommend, monitor, and adjust tube feedings and TPN/PPN based on assessed needs  - Assess need for intravenous fluids  - Provide specific nutrition/hydration education as appropriate  - Include patient/family/caregiver in decisions related to nutrition  Outcome: Progressing

## 2023-02-21 NOTE — PROGRESS NOTES
PM&R PROGRESS NOTE:  Sofiya Oconnell 69 y o  female MRN: [id number]  Unit/Bed#: -01 Encounter: [id number]        Rehabilitation Diagnosis: Impairment of mobility, safety and Activities of Daily Living (ADLs) due to Orthopedic Disorders:  08 2  Femur (Shaft) Fracture    HPI: Cate Reddy is a 69 y o  female with a history of hypothyroid, osteoporosis, scoliosis, and hyperlipidemia that presented to [facility] on 2/2/23 with c/o right hip pain after feeling a pop in her right hip when descending stairs  She was unable to ambulate, lowered herself to the floor and crawled to phone to call EMS  On exam right lower extremity shortening and external rotation  Imaging showed acute transverse substantially displaced proximal femoral shaft fracture  Orthopedics was consulted and recommended NWB with Mane's traction, with surgical intervention  On 2/3, patient underwent right insertion IM nailing with Dr Joanne Moralez  Patient is WBAT with Lovenox for 28 days for DVT prophylaxis  Post-op complicated by dizziness and an episode requiring her to be lowered to floor  CTA of head/neck was obtained, which showed no acute findings; did show outpouching of PCA  Neurovascular recommended follow-up as outpatient  Echo EF 65%, mild TVR and trace PVR  Patient's hemoglobin 7 3 which she received 1 unit pRBC improving to 9 4  Patient was deemed hemodynamically stable, PT/OT recommend inpatient acute rehabilitation  Patient was admitted to [address] [place] [address] on 02/09/23  SUBJECTIVE: Patient seen face to face  No acute issues overnight  Patient with in room privileges with rolling walker  Pain adequately controlled with current regimen  Patient to purchase rolling walker from [employer]  Discussed discharge medications, scripts sent to [employer]  No questions at this time  Lovenox teaching box provided to patient  LBM 02/20  No chest pain, shortness of breath, fever, chills, nausea, abdominal pain        ASSESSMENT: Stable, progressing    PLAN:   - prescriptions sent to [address]  - continue with current rehabilitation program with anticipated discharge on 02/23  - pain- continue with current regimen    Rehabilitation  • Functional deficits:  Self care, impaired mobility  • Continue current rehabilitation plan of care to maximize function      • Functional update:   • PT: mobility- supervision, transfers- incidental touching with RW ambulation- incidental touching with ', stairs- 6 LHR  • OT: ADL- bathing- incidental touching, dressing UB supervision, LB min A, footwear- min A, toileting- incidental touching  • Estimated Discharge: anticipated 02/23 with Outpatient at home PT    Pain  • tylenol    DVT prophylaxis  • Lovenox    Bladder plan  • Continent    Bowel plan  • Continent    * Femur fracture ([address] )  Assessment & Plan  - Pt felt "pop" when descending stairs with inability to ambulate  -X-ray: acute transverse substantially displaced proximal femoral shaft fracture  -2/3/23 right IMN by Dr Esdras Anderson  -WBAT right lower extremity  - Lovenox for 28 days (3/3)  S/p PRBC x 1 unit    Plan at [facility]:  -Continue WBAT RLE  - Patient developed swelling at right lateral knee evaluated by orthopedics - x-ray completed - per ortho distal incision with hematoma; ace wrap daily and continue therapies as tolerated  -Monitor incision for signs of infection -incision very stable  -Ortho removed staples, 02/16/23  -Continue acute rehabilitation program  -Follow-up with Orthopedic Surgery (Dr Esdras Anderson)    Acute pain due to trauma  Assessment & Plan  -acute post-op pain right hip/leg  -tylenol 975 mg scheduled, oxycodone IR 2 5-5 mg prn (pt not taking), Lidoderm patch   -monitor pain with therapy  -adjust medication as needed    Acute blood loss anemia  Assessment & Plan  In acute care post op dizziness, diaphoresis, lowered to floor  -IVF  -Hbg 7 3, received 1 unit pRBC    Here at [facility]:  -current Hbg 9 9  -monitor hemoglobin  -No further diaphoretic episodes    Lightheadedness  Assessment & Plan  -post-op with ambulation  - orthostatic BP normal  -Hbg 7 3 (2/7)  -received IVF, 1 unit PRBC in acute care    No further episodes in acute rehabilitation    Osteoporosis  Assessment & Plan  -took bisphosphonate <10 years  -per Ortho: pt should stop bisphosphonate at this time d/t left femur lateral cortical thickening without obvious stress fracture/break  -needs immediate f/u if develops pain in left leg  -follow-up with Orthopedics    Nail abnormalities  Assessment & Plan  -podiatry consult for nail abnormalities  - trimmed   - follow-up with o/p podiatry    HLD (hyperlipidemia)  Assessment & Plan  -  -home: Crestor 10 mg  -continue Pravachol 20 mg    Hypothyroidism  Assessment & Plan  -continue levothyroxine    Appreciate IM consultants medical co-management  Labs, medications, and imaging reviewed  ROS:  Review of Systems   A 10 point review of systems was negative except for what is noted in the HPI  OBJECTIVE:   /65 (BP Location: Left arm)   Pulse 74   Temp 97 9 °F (36 6 °C) (Oral)   Resp 17   Ht 5' 1" (1 549 m)   Wt 58 7 kg (129 lb 6 4 oz)   SpO2 97%   BMI 24 45 kg/m²     Physical Exam  Constitutional:       Appearance: Normal appearance  HENT:      Head: Normocephalic and atraumatic  Mouth/Throat:      Mouth: Mucous membranes are moist    Cardiovascular:      Rate and Rhythm: Normal rate and regular rhythm  Pulses: Normal pulses  Heart sounds: Normal heart sounds  Pulmonary:      Effort: Pulmonary effort is normal       Breath sounds: Normal breath sounds  Abdominal:      General: Bowel sounds are normal       Palpations: Abdomen is soft  Musculoskeletal:         General: Normal range of motion  Cervical back: Normal range of motion  Right lower leg: Edema present  Skin:     General: Skin is warm and dry  Capillary Refill: Capillary refill takes less than 2 seconds        Findings: Bruising present  Comments: Right leg ecchymosis, incisions healing, distal incision hematoma-improving   Neurological:      Mental Status: She is alert and oriented to person, place, and time  Motor: Weakness present     Psychiatric:         Mood and Affect: Mood normal          Judgment: Judgment normal         Lab Results   Component Value Date    WBC 7 57 02/16/2023    HGB 9 9 (L) 02/16/2023    HCT 32 3 (L) 02/16/2023    MCV 96 02/16/2023     (H) 02/16/2023     Lab Results   Component Value Date    SODIUM 142 02/16/2023    K 3 8 02/16/2023     (H) 02/16/2023    CO2 26 02/16/2023    BUN 16 02/16/2023    CREATININE 0 51 (L) 02/16/2023    GLUC 87 02/16/2023    CALCIUM 8 8 02/16/2023     Lab Results   Component Value Date    INR 1 05 02/03/2023    INR 0 95 02/02/2023    PROTIME 13 9 02/03/2023    PROTIME 12 8 02/02/2023       Current Facility-Administered Medications:   •  acetaminophen (TYLENOL) tablet 975 mg, 975 mg, Oral, TID, Zoya Jefferson MD, 975 mg at 02/21/23 8654  •  artificial tear (LUBRIFRESH P M ) ophthalmic ointment, , Both Eyes, HS PRN, SANTI Agustin, Given at 02/09/23 1746  •  bisacodyl (DULCOLAX) rectal suppository 10 mg, 10 mg, Rectal, Daily PRN, SANTI Bobo  •  enoxaparin (LOVENOX) subcutaneous injection 40 mg, 40 mg, Subcutaneous, Daily, SANTI Bobo, 40 mg at 02/20/23 1355  •  glycerin-hypromellose- (ARTIFICIAL TEARS) ophthalmic solution 1 drop, 1 drop, Both Eyes, TID, SANTI Bobo, 1 drop at 02/21/23 0830  •  levothyroxine tablet 25 mcg, 25 mcg, Oral, Daily, SANTI Bobo, 25 mcg at 02/20/23 2129  •  lidocaine (LIDODERM) 5 % patch 1 patch, 1 patch, Topical, Daily, SANTI Bobo, 1 patch at 02/21/23 0830  •  meclizine (ANTIVERT) tablet 12 5 mg, 12 5 mg, Oral, Q8H PRN, SANTI Bobo  •  melatonin tablet 3 mg, 3 mg, Oral, HS, Perry Ziegler MD, 3 mg at 02/20/23 2123  •  oxyCODONE (ROXICODONE) IR tablet 2 5 mg, 2 5 mg, Oral, Q4H PRN, SANTI Bobo  •  oxyCODONE (ROXICODONE) IR tablet 5 mg, 5 mg, Oral, Q4H PRN, SANTI Bobo  •  polyethylene glycol (MIRALAX) packet 17 g, 17 g, Oral, Daily PRN, SANTI Bobo  •  pravastatin (PRAVACHOL) tablet 20 mg, 20 mg, Oral, Daily With Dinner, SANTI Gage, 20 mg at 02/20/23 1534  •  senna-docusate sodium (SENOKOT S) 8 6-50 mg per tablet 1 tablet, 1 tablet, Oral, HS PRN, SANTI Gage    Past Medical History:   Diagnosis Date   • Arthritis 2005   • Closed nondisplaced fracture of fifth metatarsal bone of right foot with routine healing    • Disease of thyroid gland     hypothyroid   • Glaucoma, bilateral    • Hyperlipidemia    • Osteoporosis    • Scoliosis 12/12/12       Patient Active Problem List    Diagnosis Date Noted   • Femur fracture ([facility] ) 02/02/2023   • Acute pain due to trauma 02/03/2023   • Acute blood loss anemia 02/05/2023   • Lightheadedness 02/06/2023   • Osteoporosis 02/09/2023   • Nail abnormalities 02/17/2023   • HLD (hyperlipidemia) 02/06/2023   • Fall 02/03/2023   • Hypothyroidism 02/03/2023   • Chest pain 10/31/2022   • Abnormal electrocardiogram (ECG) (EKG) 10/31/2022   • Sjogren's syndrome ([facility] ) 10/06/2021   • Primary osteoarthritis of right knee 05/14/2019   • Primary osteoarthritis of left knee 05/14/2019      SANTI Gage  Physical Medicine and Mes[facility]     Total visit time: 35 minutes, with more than 50% spent counseling/coordinating care  Counseling includes discussion with patient re: progress in therapies, functional issues observed by therapy staff, and discussion with patient regarding their current medical state and wellbeing  Coordination of patient's care was performed in conjunction with Internal Medicine service to monitor patient's labs, vitals, and management of their comorbidities

## 2023-02-22 RX ORDER — LANOLIN ALCOHOL/MO/W.PET/CERES
3 CREAM (GRAM) TOPICAL
Qty: 30 TABLET | Refills: 0 | Status: SHIPPED | OUTPATIENT
Start: 2023-02-22

## 2023-02-22 RX ORDER — ENOXAPARIN SODIUM 100 MG/ML
40 INJECTION SUBCUTANEOUS DAILY
Qty: 3.2 ML | Refills: 0 | Status: SHIPPED | OUTPATIENT
Start: 2023-02-23 | End: 2023-03-03

## 2023-02-22 RX ADMIN — ACETAMINOPHEN 975 MG: 325 TABLET, FILM COATED ORAL at 05:33

## 2023-02-22 RX ADMIN — ENOXAPARIN SODIUM 40 MG: 40 INJECTION SUBCUTANEOUS at 13:10

## 2023-02-22 RX ADMIN — ACETAMINOPHEN 975 MG: 325 TABLET, FILM COATED ORAL at 13:10

## 2023-02-22 RX ADMIN — GLYCERIN 1 DROP: .002; .002; .01 SOLUTION/ DROPS OPHTHALMIC at 21:46

## 2023-02-22 RX ADMIN — GLYCERIN 1 DROP: .002; .002; .01 SOLUTION/ DROPS OPHTHALMIC at 08:46

## 2023-02-22 RX ADMIN — LEVOTHYROXINE SODIUM 25 MCG: 25 TABLET ORAL at 21:45

## 2023-02-22 RX ADMIN — MELATONIN 3 MG: at 21:47

## 2023-02-22 RX ADMIN — PRAVASTATIN SODIUM 20 MG: 20 TABLET ORAL at 15:58

## 2023-02-22 RX ADMIN — ACETAMINOPHEN 975 MG: 325 TABLET, FILM COATED ORAL at 21:45

## 2023-02-22 RX ADMIN — LIDOCAINE 5% 1 PATCH: 700 PATCH TOPICAL at 08:45

## 2023-02-22 RX ADMIN — GLYCERIN 1 DROP: .002; .002; .01 SOLUTION/ DROPS OPHTHALMIC at 15:58

## 2023-02-22 NOTE — TEAM CONFERENCE
Acute RehabilitationTeam Conference Note  Date: 2/22/2023   Time: 11:40 AM       Patient Name:  Roxanna Felipe       Medical Record Number: [id number]   YOB: 1945  Sex: Female          Room/Bed:  [facility]-01  Payor Info:  Payor: Lorie Newaly / Plan: MEDICARE A AND B / Product Type: Medicare A & B Fee for Service /      Admitting Diagnosis: Right femoral shaft fracture ([facility] ) Ivania Piña   Admit Date/Time:  2/9/2023 12:14 PM  Admission Comments: No comment available     Primary Diagnosis:  Femur fracture ([facility] )  Principal Problem: Femur fracture ([facility] )    Patient Active Problem List    Diagnosis Date Noted   • Nail abnormalities 02/17/2023   • Osteoporosis 02/09/2023   • HLD (hyperlipidemia) 02/06/2023   • Acute blood loss anemia 02/05/2023   • Fall 02/03/2023   • Acute pain due to trauma 02/03/2023   • Hypothyroidism 02/03/2023   • Femur fracture ([facility] ) 02/02/2023   • Chest pain 10/31/2022   • Abnormal electrocardiogram (ECG) (EKG) 10/31/2022   • Sjogren's syndrome ([facility] ) 10/06/2021   • Primary osteoarthritis of right knee 05/14/2019   • Primary osteoarthritis of left knee 05/14/2019       Physical Therapy:    Weight Bearing Status: Weight Bearing as Tolerated  Transfers: Independent  Bed Mobility: Independent  Amulation Distance (ft): 450 feet  Ambulation: Independent  Assistive Device for Ambulation: Roller Walker  Wheelchair Mobility Distance:  (N/A)  Number of Stairs: 12  Assistive Device for Stairs: Single [employer] Assistance: Supervision  Ramp:  (not required for entry to home)  Discharge Recommendations: Home with:  [address] with[de-identified] Home Physical Therapy, Family Support, First Floor Setup    Pt is a 75yo female admitted to [address] [place] [address] s/p RLE IMN nailing after dx of femur fx  Pts PLOF is independent, lives alone, and has 5STE with L sided railing and flight of stairs with R sided railing to her second floor  Her first floor has 1/2 bath and "recliner bed"   With use of RW, pt performs transfers and gait with CGA  Trialed ambulating with one UE support but pt required Essence with increased antlagic gait pattern noted; hence, still recommend use of RW at this time  Pt negotiates 6 six-inch  steps with BUE support on L railing with CGA ascending and min A to descend  Barriers to DC are her RLE pain impacting her mobility as well as limited social support  Pt reports her friend/neighbor will assist with all IADLs and assist her to appointments as needed  She has a brother that can help intermittently  pt also reports she is open to first floor set up although she will have to sponge bathe  Pt will continue to benefit from skilled PT interventions to address deficits, reduce fall risk, and maximize functional independence  Anticipate DC home with intermittent social support, RW, and home PT     2/21/23  Pt lives alone and has met goals of being (I) except (S) on FF  Pt received IRP 2/20/23 & hallway privileges 2/21/23  Pt is to DC home 2/23/23 with "[address]" Pt was provided a HEP & everything was reviewed  Pt states she decided to get her RW, walker skis & walker tray from the pharmacy before she leaves incase she needs another AD within the next 5 years  Pt declined having neighbor/friend come in to observe therapy, she feels it is unnecessary d/t being (I)  Pt brother brought in [address] [place] [address] that will be used to amb upstairs & SPC that will be used for stair management  Pt has curb step to enter vs 5 steps into home with L rail ascending  Recommended pt used 5 steps with HR over curb step with no HR  Pt will stay on 1st floor till home PT deems she is safe to manage stairs  Pt brought up concern that brother tried to amb in upstairs bathroom with RW & was unable  Pt was (I) with side stepping with RW & holding onto counter  Pt will continue to benefit from home PT to address barriers to stair management & maximize independence         Occupational Therapy:  Eating: Independent  Grooming: Independent  Bathing: Independent  Bathing: Independent  Upper Body Dressing: Independent  Lower Body Dressing: Independent  Toileting: Independent  Tub/Shower Transfer: Independent  Toilet Transfer: Independent  Cognition: Within Defined Limits  Orientation: Person, Place, Time, Situation  Discharge Recommendations: Home with:  [address] with[de-identified] Family Support       Occupational Therapy Weekly Team Note    Pt continues to make good progress with skilled occupational therapy intervention and is progressing toward long term goals for ADL, IADL's, and functional transfers/mobility  Pts long term goals for ADLs are Independent with Rolling Walker  Pt continues to present with impairments in activity tolerance, endurance, and standing balance/tolerance  Occupational performance remains limited by pain and orthopedic restricitions   Family training/education will not be required prior to D/C  Pt will continue to benefit from skilled acute rehab OT services to address above mentioned barriers and maximize functional independence in baseline areas of occupation to meet established treatment goals with overall decreased burden of care  Plan of care to continue to focus on LB Dressing, Meal preparation, Medication management , Functional Transfers, Standing tolerance, Standing balance , DME training/education, Family training/education, and Energy conservation training/education  Goals for the upcoming week are: continue to progress assess for progression to independent goals in the room    D/c set for Thursday 2/23                Speech Therapy:           No notes on file    Nursing Notes:  Appetite: Good  Diet Type: Regular/House                      Diet Patient/Family Education Complete: No                         Type of Wound Patient/Family Education: Yes  Bladder: Continent     Bladder Patient/Family Education: Yes  Bowel: Continent     Bowel Patient/Family Education: Yes  Pain Location/Orientation: Orientation: Right, Location: Hip  Pain Score: 0                       Hospital Pain Intervention(s): Medication (See MAR)  Pain Patient/Family Education: Yes  Medication Management/Safety  New Medication: Lovenox teaching for home administration  Injectable: Lovenox  Safe Administration: Yes (by staff and Lovenox education)  Medication Patient/Family Education Complete: No (ongoing)    Right displaced proximal femoral shaft fracture  • S/p IMN 2/3/23  • WBAT  • Per Ortho:  "Patient should not continue bisphosphonate use at this time given radiographic changes to left femur including lateral cortical thickening without obvious stress fracture/beaking  She needs to follow up immediately w ortho if she develops ambulatory pain in left thigh"  They discussed this with her  Left femur xray was negative fx 2/4/23     ABLA  • S/p 1 U PRBCs on 2/8/23 for hemoglobin of 7 3 with improvement to 9 9  • stable     Dizziness/syncope  • Related to orthostasis and blood loss  • Was given IVF and blood  • Watching for orthostasis while here = so far no sx of such and BPs have been stable  • She reports that she gets dizziness at home when she is on her right side in bed and sits up = will d/w Dr Urvashi Dodson     Hypothyroidism   • Continue levothyroxine  • Has a thyroid nodule found incidentally for which an [address] will need to be done as an OP     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 vs infundibulum  • To see as a followup in Neurovascular center     Old lacunar infarcts   • Found incidentally on imaging  • On statin    Right knee pain  • Xray negative 2/12/23  • Ortho saw = ACE wrap and ice  • Pain and swelling improved      Discharge date:  2/23/23    This week we will continue to encourage independence with ADLs  We will continue to monitor labs and vital signs  We will continue to educate pt about repositioning to prevent skin breakdown  We will continue to assist with repositioning and perform routine skin checks    We will continue to monitor for constipation and medicate as ordered  We will continue to monitor for adequate pain control  We will continue to increase safety awareness with transfers and keep pt free from falls, pt now IRP  Case Management:     Discharge Planning  Living Arrangements: Lives Alone  Support Systems: Self, Friend, Family members  Assistance Needed: unknown  Type of Current Residence: Other (Comment) (2 story home)  Current Home Care Services: No  Pt is making good progress and is scheduled to return home later this week  Pt is referred to [facility] for continued physical therapy at home  Lovenox teaching kit provided to nursing to review with patient  Is the patient actively participating in therapies? yes  List any modifications to the treatment plan:     Barriers Interventions   All functional barriers resolved                      Is the patient making expected progress toward goals? yes  List any update or changes to goals:     Medical Goals: Patient will be medically stable for discharge to [facility] upon completion of rehab program and Patient will be able to manage medical conditions and comorbid conditions with medications and follow up upon completion of rehab program    Weekly Team Goals:   Rehab Team Goals  ADL Team Goal: Patient will be independent with ADLs with least restrictive device upon completion of rehab program  Transfer Team Goal: Patient will be independent with transfers with least restrictive device upon completion of rehab program  Locomotion Team Goal: Patient will be independent with locomotion with least restrictive device upon completion of rehab program    Discussion: pt has made good progress and is scheduled for dc home tomorrow  Pt is independent with a rw and adls  Pt is self injecting her lovenox and will be receiving outpt physical therapy at home  Anticipated Discharge Date:  2/23/2023  [facility] Team Members Present: The following team members are supervising care for this patient and were present during this Weekly Team Conference      Physician: Dr Adams Christianson MD  : Zula Skiff, MSW  Registered Nurse: Fineses Cr RN  Physical Therapist: Huy Lovell DPT  Occupational Therapist: Emily Persaud MS, OTR/L  Speech Therapist:

## 2023-02-22 NOTE — PLAN OF CARE
Problem: PAIN - ADULT  Goal: Verbalizes/displays adequate comfort level or baseline comfort level  Description: Interventions:  - Encourage patient to monitor pain and request assistance  - Assess pain using appropriate pain scale  - Administer analgesics based on type and severity of pain and evaluate response  - Implement non-pharmacological measures as appropriate and evaluate response  - Consider cultural and social influences on pain and pain management  - Notify physician/advanced practitioner if interventions unsuccessful or patient reports new pain  Outcome: Progressing     Problem: INFECTION - ADULT  Goal: Absence or prevention of progression during hospitalization  Description: INTERVENTIONS:  - Assess and monitor for signs and symptoms of infection  - Monitor lab/diagnostic results  - Monitor all insertion sites, i e  indwelling lines, tubes, and drains  - Monitor endotracheal if appropriate and nasal secretions for changes in amount and color  - [place] appropriate cooling/warming therapies per order  - Administer medications as ordered  - Instruct and encourage patient and family to use good hand hygiene technique  - Identify and instruct in appropriate isolation precautions for identified infection/condition  Outcome: Progressing  Goal: Absence of fever/infection during neutropenic period  Description: INTERVENTIONS:  - Monitor WBC    Outcome: Progressing     Problem: SAFETY ADULT  Goal: Patient will remain free of falls  Description: INTERVENTIONS:  - Educate patient/family on patient safety including physical limitations  - Instruct patient to call for assistance with activity   - Consult OT/PT to assist with strengthening/mobility   - Keep Call bell within reach  - Keep bed low and locked with side rails adjusted as appropriate  - Keep care items and personal belongings within reach  - Initiate and maintain comfort rounds  - Make Fall Risk Sign visible to staff  - Apply yellow socks and bracelet for high fall risk patients  - Consider moving patient to room near nurses station  Outcome: Progressing  Goal: Maintain or return to baseline ADL function  Description: INTERVENTIONS:  -  Assess patient's ability to carry out ADLs; assess patient's baseline for ADL function and identify physical deficits which impact ability to perform ADLs (bathing, care of mouth/teeth, toileting, grooming, dressing, etc )  - Assess/evaluate cause of self-care deficits   - Assess range of motion  - Assess patient's mobility; develop plan if impaired  - Assess patient's need for assistive devices and provide as appropriate  - Encourage maximum independence but intervene and supervise when necessary  - Involve family in performance of ADLs  - Assess for home care needs following discharge   - Consider OT consult to assist with ADL evaluation and planning for discharge  - Provide patient education as appropriate  Outcome: Progressing  Goal: Maintains/Returns to pre admission functional level  Description: INTERVENTIONS:  - Perform BMAT or MOVE assessment daily    - Set and communicate daily mobility goal to care team and patient/family/caregiver  - Collaborate with rehabilitation services on mobility goals if consulted  - Perform Range of Motion 3 times a day  - Reposition patient every 2 hours    - Dangle patient 3 times a day  - Stand patient 3 times a day  - Ambulate patient 3 times a day  - Out of bed to chair 3 times a day   - Out of bed for meals 3 times a day  - Out of bed for toileting  - Record patient progress and toleration of activity level   Outcome: Progressing     Problem: DISCHARGE PLANNING  Goal: Discharge to home or other facility with appropriate resources  Description: INTERVENTIONS:  - Identify barriers to discharge w/patient and caregiver  - Arrange for needed discharge resources and transportation as appropriate  - Identify discharge learning needs (meds, wound care, etc )  - Arrange for interpretive services to assist at discharge as needed  - Refer to Case Management Department for coordinating discharge planning if the patient needs post-hospital services based on physician/advanced practitioner order or complex needs related to functional status, cognitive ability, or social support system  Outcome: Progressing     Problem: Prexisting or High Potential for Compromised Skin Integrity  Goal: Skin integrity is maintained or improved  Description: INTERVENTIONS:  - Identify patients at risk for skin breakdown  - Assess and monitor skin integrity  - Assess and monitor nutrition and hydration status  - Monitor labs   - Assess for incontinence   - Turn and reposition patient  - Assist with mobility/ambulation  - Relieve pressure over bony prominences  - Avoid friction and shearing  - Provide appropriate hygiene as needed including keeping skin clean and dry  - Evaluate need for skin moisturizer/barrier cream  - Collaborate with interdisciplinary team   - Patient/family teaching  - Consider wound care consult   Outcome: Progressing     Problem: Nutrition/Hydration-ADULT  Goal: Nutrient/Hydration intake appropriate for improving, restoring or maintaining nutritional needs  Description: Monitor and assess patient's nutrition/hydration status for malnutrition  Collaborate with interdisciplinary team and initiate plan and interventions as ordered  Monitor patient's weight and dietary intake as ordered or per policy  Utilize nutrition screening tool and intervene as necessary  Determine patient's food preferences and provide high-protein, high-caloric foods as appropriate       INTERVENTIONS:  - Monitor oral intake, urinary output, labs, and treatment plans  - Assess nutrition and hydration status and recommend course of action  - Evaluate amount of meals eaten  - Assist patient with eating if necessary   - Allow adequate time for meals  - Recommend/ encourage appropriate diets, oral nutritional supplements, and vitamin/mineral supplements  - Order, calculate, and assess calorie counts as needed  - Recommend, monitor, and adjust tube feedings and TPN/PPN based on assessed needs  - Assess need for intravenous fluids  - Provide specific nutrition/hydration education as appropriate  - Include patient/family/caregiver in decisions related to nutrition  Outcome: Progressing

## 2023-02-22 NOTE — PROGRESS NOTES
02/22/23 1230   Pain Assessment   Pain Assessment Tool 0-10   Pain Score No Pain   Restrictions/Precautions   RLE Weight Bearing Per Order WBAT   LLE Weight Bearing Per Order WBAT   Subjective   Subjective pt agreeable to perform skilled PT   Roll Left and Right   Type of Assistance Needed Independent   Roll Left and Right CARE Score 6   Sit to Lying   Type of Assistance Needed Independent   Sit to Lying CARE Score 6   Lying to Sitting on Side of Bed   Type of Assistance Needed Independent   Lying to Sitting on Side of Bed CARE Score 6   Sit to Stand   Type of Assistance Needed Independent   Sit to Stand CARE Score 6   Bed-Chair Transfer   Type of Assistance Needed Independent; Adaptive equipment   Chair/Bed-to-Chair Transfer CARE Score 6   Transfer Bed/Chair/Wheelchair   Adaptive Equipment Roller Walker   Walk 10 Feet   Type of Assistance Needed Independent; Adaptive equipment   Walk 10 Feet CARE Score 6   Walk 50 Feet with Two Turns   Type of Assistance Needed Independent; Adaptive equipment   Walk 50 Feet with Two Turns CARE Score 6   Walk 150 Feet   Type of Assistance Needed Independent; Adaptive equipment   Walk 150 Feet CARE Score 6   Ambulation   Primary Mode of Locomotion Prior to Admission Walk   Distance Walked (feet) 400 ft  (x2)   Assist Device Roller Walker   Does the patient walk? 2  Yes   Wheel 50 Feet with Two Turns   Reason if not Attempted Activity not applicable   Wheel 50 Feet with Two Turns CARE Score 9   Wheel 150 Feet   Reason if not Attempted Activity not applicable   Wheel 325 Feet CARE Score 9   Wheelchair mobility   Does the patient use a wheelchair? 0   No   Curb or Single Stair   Style negotiated Single stair   Type of Assistance Needed Independent   1 Step (Curb) CARE Score 6   4 Steps   Type of Assistance Needed Independent   4 Steps CARE Score 6   12 Steps   Type of Assistance Needed Independent   12 Steps CARE Score 6   Stairs   # of Steps 12   Picking Up Object   Type of Assistance Needed Independent; Adaptive equipment   Comment RW reacher marker   Picking Up Object CARE Score 6   Toilet Transfer   Type of Assistance Needed Independent; Adaptive equipment   Toilet Transfer CARE Score 6   Toilet Transfer   Surface Assessed Standard Toilet   Assessment   Treatment Assessment Pt focus on mobility with RW and overall indep lvl for home and all DME ;s met , Cont POC fo DC tomorrow   Barriers to Discharge Inaccessible home environment;Decreased caregiver support   Recommendation   PT Discharge Recommendation Home with home health rehabilitation   PT Therapy Minutes   PT Time In 1230   PT Time Out 1300   PT Total Time (minutes) 30   PT Mode of treatment - Individual (minutes) 30   PT Mode of treatment - Concurrent (minutes) 0   PT Mode of treatment - Group (minutes) 0   PT Mode of treatment - Co-treat (minutes) 0   PT Mode of Treatment - Total time(minutes) 30 minutes   PT Cumulative Minutes 1140   Therapy Time missed   Time missed?  No

## 2023-02-22 NOTE — PROGRESS NOTES
Internal Medicine Progress Note  Patient: Leslie Sofia  Age/sex: 69 y o  female  Medical Record #: [id number]      ASSESSMENT/PLAN: (Interval History)  Leslie Sofia is seen and examined and management for following issues:    Right displaced proximal femoral shaft fracture  • S/p IMN 2/3/23  • WBAT  • Per Ortho:  "Patient should not continue bisphosphonate use at this time given radiographic changes to left femur including lateral cortical thickening without obvious stress fracture/beaking  She needs to follow up immediately w ortho if she develops ambulatory pain in left thigh"   They discussed this with her   Left femur xray was negative fx 2/4/23     ABLA  • S/p 1 U PRBCs on 2/8/23 for hemoglobin of 7 3 with improvement to 9 9  • stable     Dizziness/syncope  • Related to orthostasis and blood loss  • Was given IVF and blood  • Watching for orthostasis while here = so far no sx of such and BPs have been stable  • She reports that she gets dizziness at home when she is on her right side in bed and sits up = will d/w Dr Cabezas [place]     Hypothyroidism   • Continue levothyroxine  • Has a thyroid nodule found incidentally for which an US will need to be done as an OP     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 vs infundibulum  • To see as a followup in [facility] center     Old lacunar infarcts   • Found incidentally on imaging (CTA) on 2/6/23  • On statin  • Currently not on ASA     Right knee pain  • Xray negative 2/12/23  • Ortho saw = ACE wrap and ice  • Pain and swelling improved        Discharge date:  2/23/23       The above assessment and plan was reviewed and updated as determined by my evaluation of the patient on 2/22/2023      Labs:   Results from last 7 days   Lab Units 02/16/23  0536   WBC Thousand/uL 7 57   HEMOGLOBIN g/dL 9 9*   HEMATOCRIT % 32 3*   PLATELETS Thousands/uL 520*     Results from last 7 days   Lab Units 02/16/23  0536   SODIUM mmol/L 142   POTASSIUM mmol/L 3 8   CHLORIDE mmol/L 112*   CO2 mmol/L 26   BUN mg/dL 16   CREATININE mg/dL 0 51*   CALCIUM mg/dL 8 8                   Review of Scheduled Meds:  Current Facility-Administered Medications   Medication Dose Route Frequency Provider Last Rate   • acetaminophen  975 mg Oral TID Laura Peña MD     • artificial tear   Both Eyes HS PRN SANTI Cancino     • bisacodyl  10 mg Rectal Daily PRN SANTI Cancino     • enoxaparin  40 mg Subcutaneous Daily SANTI Bobo     • glycerin-hypromellose-  1 drop Both Eyes TID SANTI Cancino     • levothyroxine  25 mcg Oral Daily SANTI Bobo     • lidocaine  1 patch Topical Daily SANTI Bobo     • meclizine  12 5 mg Oral Q8H PRN SANTI Bobo     • melatonin  3 mg Oral HS Armida Chapa MD     • oxyCODONE  2 5 mg Oral Q4H PRN SANTI Cancino     • oxyCODONE  5 mg Oral Q4H PRN SANTI Bobo     • polyethylene glycol  17 g Oral Daily PRN SANTI Bobo     • pravastatin  20 mg Oral Daily With SANTI Sneed     • senna-docusate sodium  1 tablet Oral HS PRN SANTI Bobo         Subjective/ HPI: Patient seen and examined  Patients overnight issues or events were reviewed with nursing or staff during rounds or morning huddle session  New or overnight issues include the following:     No new or overnight issues  She feels making progress    ROS:   A 10 point ROS was performed; negative except as noted above       *Labs /Radiology studies reviewed  *Medications reviewed and reconciled as needed  *Please refer to order section for additional ordered labs studies  *Case discussed with primary attending during morning huddle case rounds    Physical Examination:  Vitals:   Vitals:    02/21/23 0500 02/21/23 1426 02/21/23 2039 02/22/23 0521   BP: 133/65 130/64 152/70 145/67   BP Location: Left arm Right arm Left arm Right arm   Pulse: 74 79 66 69   Resp: 17 18 18 17   Temp: 97 9 °F (36 6 °C) 97 9 °F (36 6 °C) 98 5 °F (36 9 °C) 98 °F (36 7 °C) TempSrc: Oral Oral Oral Oral   SpO2: 97% 95% 95% 96%   Weight:    57 kg (125 lb 9 6 oz)   Height:           General Appearance: no distress, conversive  HEENT:  External ear normal   Nose normal w/o drainage  Mucous membranes are moist  Oropharynx is clear  Conjunctiva clear w/o icterus or redness  Neck:  Supple, normal ROM  Lungs: CTA, normal respiratory effort, no retractions, expiratory effort normal  CV: regular rate and rhythm; no rubs/murmurs/gallops, PMI normal   ABD: Abdomen is soft  Bowel sounds all quadrants  Nontender with no distention  EXT: mild right thigh edema  Skin: normal turgor, normal texture, no rashes  Psych: affect normal, mood normal  Neuro: AAO      The above physical exam was reviewed and updated as determined by my evaluation of the patient on 2/22/2023  Invasive Devices     None                    VTE Pharmacologic Prophylaxis: Enoxaparin  Code Status: Level 1 - Full Code  Current Length of Stay: 13 day(s)      Total time spent:  30 minutes with more than 50% spent counseling/coordinating care  Counseling includes discussion with patient re: progress  and discussion with patient of his/her current medical state/information  Coordination of patient's care was performed in conjunction with primary service  Time invested included review of patient's labs, vitals, and management of their comorbidities with continued monitoring  In addition, this patient was discussed with medical team including physician and advanced extenders  The care of the patient was extensively discussed and appropriate treatment plan was formulated unique for this patient  Medical decision making for the day was made by supervising physician unless otherwise noted in their attestation statement  ** Please Note:  voice to text software may have been used in the creation of this document   Although proof errors in transcription or interpretation are a potential of such software**

## 2023-02-22 NOTE — PLAN OF CARE
Problem: PAIN - ADULT  Goal: Verbalizes/displays adequate comfort level or baseline comfort level  Description: Interventions:  - Encourage patient to monitor pain and request assistance  - Assess pain using appropriate pain scale  - Administer analgesics based on type and severity of pain and evaluate response  - Implement non-pharmacological measures as appropriate and evaluate response  - Consider cultural and social influences on pain and pain management  - Notify physician/advanced practitioner if interventions unsuccessful or patient reports new pain  Outcome: Progressing     Problem: INFECTION - ADULT  Goal: Absence or prevention of progression during hospitalization  Description: INTERVENTIONS:  - Assess and monitor for signs and symptoms of infection  - Monitor lab/diagnostic results  - Monitor all insertion sites, i e  indwelling lines, tubes, and drains  - Monitor endotracheal if appropriate and nasal secretions for changes in amount and color  - [place] appropriate cooling/warming therapies per order  - Administer medications as ordered  - Instruct and encourage patient and family to use good hand hygiene technique  - Identify and instruct in appropriate isolation precautions for identified infection/condition  Outcome: Progressing  Goal: Absence of fever/infection during neutropenic period  Description: INTERVENTIONS:  - Monitor WBC    Outcome: Progressing     Problem: SAFETY ADULT  Goal: Patient will remain free of falls  Description: INTERVENTIONS:  - Educate patient/family on patient safety including physical limitations  - Instruct patient to call for assistance with activity   - Consult OT/PT to assist with strengthening/mobility   - Keep Call bell within reach  - Keep bed low and locked with side rails adjusted as appropriate  - Keep care items and personal belongings within reach  - Initiate and maintain comfort rounds  - Make Fall Risk Sign visible to staff  - Apply yellow socks and bracelet for high fall risk patients  - Consider moving patient to room near nurses station  Outcome: Progressing  Goal: Maintain or return to baseline ADL function  Description: INTERVENTIONS:  -  Assess patient's ability to carry out ADLs; assess patient's baseline for ADL function and identify physical deficits which impact ability to perform ADLs (bathing, care of mouth/teeth, toileting, grooming, dressing, etc )  - Assess/evaluate cause of self-care deficits   - Assess range of motion  - Assess patient's mobility; develop plan if impaired  - Assess patient's need for assistive devices and provide as appropriate  - Encourage maximum independence but intervene and supervise when necessary  - Involve family in performance of ADLs  - Assess for home care needs following discharge   - Consider OT consult to assist with ADL evaluation and planning for discharge  - Provide patient education as appropriate  Outcome: Progressing  Goal: Maintains/Returns to pre admission functional level  Description: INTERVENTIONS:  - Perform BMAT or MOVE assessment daily    - Set and communicate daily mobility goal to care team and patient/family/caregiver     - Collaborate with rehabilitation services on mobility goals if consulted  - Out of bed for toileting  - Record patient progress and toleration of activity level   Outcome: Progressing     Problem: DISCHARGE PLANNING  Goal: Discharge to home or other facility with appropriate resources  Description: INTERVENTIONS:  - Identify barriers to discharge w/patient and caregiver  - Arrange for needed discharge resources and transportation as appropriate  - Identify discharge learning needs (meds, wound care, etc )  - Arrange for interpretive services to assist at discharge as needed  - Refer to Case Management Department for coordinating discharge planning if the patient needs post-hospital services based on physician/advanced practitioner order or complex needs related to functional status, cognitive ability, or social support system  Outcome: Progressing     Problem: Prexisting or High Potential for Compromised Skin Integrity  Goal: Skin integrity is maintained or improved  Description: INTERVENTIONS:  - Identify patients at risk for skin breakdown  - Assess and monitor skin integrity  - Assess and monitor nutrition and hydration status  - Monitor labs   - Assess for incontinence   - Turn and reposition patient  - Assist with mobility/ambulation  - Relieve pressure over bony prominences  - Avoid friction and shearing  - Provide appropriate hygiene as needed including keeping skin clean and dry  - Evaluate need for skin moisturizer/barrier cream  - Collaborate with interdisciplinary team   - Patient/family teaching  - Consider wound care consult   Outcome: Progressing     Problem: Nutrition/Hydration-ADULT  Goal: Nutrient/Hydration intake appropriate for improving, restoring or maintaining nutritional needs  Description: Monitor and assess patient's nutrition/hydration status for malnutrition  Collaborate with interdisciplinary team and initiate plan and interventions as ordered  Monitor patient's weight and dietary intake as ordered or per policy  Utilize nutrition screening tool and intervene as necessary  Determine patient's food preferences and provide high-protein, high-caloric foods as appropriate       INTERVENTIONS:  - Monitor oral intake, urinary output, labs, and treatment plans  - Assess nutrition and hydration status and recommend course of action  - Evaluate amount of meals eaten  - Assist patient with eating if necessary   - Allow adequate time for meals  - Recommend/ encourage appropriate diets, oral nutritional supplements, and vitamin/mineral supplements  - Order, calculate, and assess calorie counts as needed  - Recommend, monitor, and adjust tube feedings and TPN/PPN based on assessed needs  - Assess need for intravenous fluids  - Provide specific nutrition/hydration education as appropriate  - Include patient/family/caregiver in decisions related to nutrition  Outcome: Progressing

## 2023-02-22 NOTE — PROGRESS NOTES
PM&R PROGRESS NOTE:  Trey Walker 69 y o  female MRN: [id number]  Unit/Bed#: -01 Encounter: [id number]        Rehabilitation Diagnosis: Impairment of mobility, safety and Activities of Daily Living (ADLs) due to Orthopedic Disorders:  08 2  Femur (Shaft) Fracture    SUBJECTIVE: Patient seen face to face  No acute issues  Progressing as expected in rehabilitation  Preparing for discharge in AM      ASSESSMENT: Stable, progressing      PLAN:    Rehabilitation  I personally attended, reviewed, and discussed medical and functional updates in team conference today  Please refer to advance care planning note for details    · Expected Discharge: Discharge in AM 2/23/23 to home with outpatient in home PT    Physical Therapy Occupational Therapy Speech Therapy   Weight Bearing Status: Weight Bearing as Tolerated  Transfers: Independent  Bed Mobility: Independent  Amulation Distance (ft): 450 feet  Ambulation: Independent  Assistive Device for Ambulation: Roller Walker  Wheelchair Mobility Distance:  (N/A)  Number of Stairs: 12  Assistive Device for Stairs: Single [employer] Assistance: Supervision  Ramp:  (not required for entry to home)  Discharge Recommendations: Home with:  [address] [facility] Fantasma Willett with[de-identified] Home Physical Therapy, Family Support, First Floor Setup   Eating: Independent  Grooming: Independent  Bathing: Independent  Bathing: Independent  Upper Body Dressing: Independent  Lower Body Dressing: Independent  Toileting: Independent  Tub/Shower Transfer: Independent  Toilet Transfer: Independent  Cognition: Within Defined Limits  Orientation: Person, Place, Time, Situation                   * Femur fracture ([facility] )  Assessment & Plan  - Pt felt "pop" when descending stairs with inability to ambulate  -X-ray: acute transverse substantially displaced proximal femoral shaft fracture  -2/3/23 right IMN by Dr Nigel Piña  -WBAT right lower extremity  - Lovenox for 28 days (3/3)  S/p PRBC x 1 unit    Plan at [facility]:  -Continue WBAT RLE  - Patient developed swelling at right lateral knee evaluated by orthopedics - x-ray completed - per ortho distal incision with hematoma; ace wrap daily and continue therapies as tolerated  -Monitor incision for signs of infection -incision very stable  -Ortho removed staples, 02/16/23  -Continue acute rehabilitation program  -Follow-up with Orthopedic Surgery (Dr Divine Tellez)    Nail abnormalities  Assessment & Plan  -podiatry consult for nail abnormalities  - trimmed   - follow-up with o/p podiatry    Osteoporosis  Assessment & Plan  -took bisphosphonate <10 years  -per Ortho: pt should stop bisphosphonate at this time d/t left femur lateral cortical thickening without obvious stress fracture/break  -needs immediate f/u if develops pain in left leg  -follow-up with Orthopedics    HLD (hyperlipidemia)  Assessment & Plan  -  -home: Crestor 10 mg  -continue Pravachol 20 mg    Acute blood loss anemia  Assessment & Plan  In acute care post op dizziness, diaphoresis, lowered to floor  -IVF  -Hbg 7 3, received 1 unit pRBC    Here at [facility]:  -current Hbg 9 9  -monitor hemoglobin  -No further diaphoretic episodes    Hypothyroidism  Assessment & Plan  -continue levothyroxine    Acute pain due to trauma  Assessment & Plan  -acute post-op pain right hip/leg  -tylenol 975 mg scheduled, oxycodone IR 2 5-5 mg prn (pt not taking), Lidoderm patch   -monitor pain with therapy  -adjust medication as needed    Lightheadedness-resolved as of 2/21/2023  Assessment & Plan  -post-op with ambulation  - orthostatic BP normal  -Hbg 7 3 (2/7)  -received IVF, 1 unit PRBC in acute care    No further episodes in acute rehabilitation          Appreciate IM consultants medical co-management  Labs, medications, and imaging personally reviewed  ROS:  A ten point review of systems was completed on 02/22/23 and pertinent positives are listed in subjective section  All other systems reviewed were negative         OBJECTIVE:   /67 (BP Location: Right arm)   Pulse 69   Temp 98 °F (36 7 °C) (Oral)   Resp 17   Ht 5' 1" (1 549 m)   Wt 57 kg (125 lb 9 6 oz)   SpO2 96%   BMI 23 73 kg/m²     Physical Exam  Vitals and nursing note reviewed  Constitutional:       General: She is not in acute distress  HENT:      Head: Normocephalic and atraumatic  Nose: Nose normal       Mouth/Throat:      Mouth: Mucous membranes are moist    Eyes:      Conjunctiva/sclera: Conjunctivae normal    Cardiovascular:      Rate and Rhythm: Normal rate and regular rhythm  Pulses: Normal pulses  Pulmonary:      Effort: Pulmonary effort is normal       Breath sounds: Normal breath sounds  No wheezing or rales  Abdominal:      General: Bowel sounds are normal  There is no distension  Palpations: Abdomen is soft  Tenderness: There is no abdominal tenderness  Musculoskeletal:         General: Swelling (right knee swelling decreasing) present  Cervical back: Neck supple  Skin:     General: Skin is warm  Comments: Incision healing well   Neurological:      Mental Status: She is alert and oriented to person, place, and time  Motor: Weakness present        Gait: Gait abnormal    Psychiatric:         Mood and Affect: Mood normal           Lab Results   Component Value Date    WBC 7 57 02/16/2023    HGB 9 9 (L) 02/16/2023    HCT 32 3 (L) 02/16/2023    MCV 96 02/16/2023     (H) 02/16/2023     Lab Results   Component Value Date    SODIUM 142 02/16/2023    K 3 8 02/16/2023     (H) 02/16/2023    CO2 26 02/16/2023    BUN 16 02/16/2023    CREATININE 0 51 (L) 02/16/2023    GLUC 87 02/16/2023    CALCIUM 8 8 02/16/2023     Lab Results   Component Value Date    INR 1 05 02/03/2023    INR 0 95 02/02/2023    PROTIME 13 9 02/03/2023    PROTIME 12 8 02/02/2023           Current Facility-Administered Medications:   •  acetaminophen (TYLENOL) tablet 975 mg, 975 mg, Oral, TID, Bandar Rojas MD, 975 mg at 02/22/23 0533  •  artificial tear (LUBRIFRESH P M ) ophthalmic ointment, , Both Eyes, HS PRN, SANTI Fletcher, Given at 02/09/23 1746  •  bisacodyl (DULCOLAX) rectal suppository 10 mg, 10 mg, Rectal, Daily PRN, SANTI Bobo  •  enoxaparin (LOVENOX) subcutaneous injection 40 mg, 40 mg, Subcutaneous, Daily, SANTI Bobo, 40 mg at 02/21/23 1427  •  glycerin-hypromellose- (ARTIFICIAL TEARS) ophthalmic solution 1 drop, 1 drop, Both Eyes, TID, SANTI Bobo, 1 drop at 02/22/23 6129  •  levothyroxine tablet 25 mcg, 25 mcg, Oral, Daily, SANTI Bobo, 25 mcg at 02/21/23 2115  •  lidocaine (LIDODERM) 5 % patch 1 patch, 1 patch, Topical, Daily, SANTI Bobo, 1 patch at 02/22/23 0845  •  meclizine (ANTIVERT) tablet 12 5 mg, 12 5 mg, Oral, Q8H PRN, SANTI Bobo  •  melatonin tablet 3 mg, 3 mg, Oral, HS, Jayce Santoyo MD, 3 mg at 02/21/23 2120  •  oxyCODONE (ROXICODONE) IR tablet 2 5 mg, 2 5 mg, Oral, Q4H PRN, SANTI Bobo  •  oxyCODONE (ROXICODONE) IR tablet 5 mg, 5 mg, Oral, Q4H PRN, SANTI Bobo  •  polyethylene glycol (MIRALAX) packet 17 g, 17 g, Oral, Daily PRN, SANTI Bobo  •  pravastatin (PRAVACHOL) tablet 20 mg, 20 mg, Oral, Daily With Dinner, SANTI Fletcher, 20 mg at 02/21/23 1636  •  senna-docusate sodium (SENOKOT S) 8 6-50 mg per tablet 1 tablet, 1 tablet, Oral, HS PRN, SANTI Fletcher    Past Medical History:   Diagnosis Date   • Arthritis 2005   • Closed nondisplaced fracture of fifth metatarsal bone of right foot with routine healing    • Disease of thyroid gland     hypothyroid   • Glaucoma, bilateral    • Hyperlipidemia    • Osteoporosis    • Scoliosis 12/12/12       Patient Active Problem List    Diagnosis Date Noted   • Femur fracture ([facility] ) 02/02/2023   • Nail abnormalities 02/17/2023   • Osteoporosis 02/09/2023   • HLD (hyperlipidemia) 02/06/2023   • Acute blood loss anemia 02/05/2023   • Fall 02/03/2023   • Acute pain due to trauma 02/03/2023   • Hypothyroidism 02/03/2023   • Chest pain 10/31/2022   • Abnormal electrocardiogram (ECG) (EKG) 10/31/2022   • Sjogren's syndrome ([facility] ) 10/06/2021   • Primary osteoarthritis of right knee 05/14/2019   • Primary osteoarthritis of left knee 05/14/2019          Perry Ziegler MD    I have spent a total time of 50 minutes on 02/22/23 in caring for this patient including Impressions, Counseling / Coordination of care, Reviewing / ordering tests, medicine, procedures  , Obtaining or reviewing history  , Communicating with other healthcare professionals  and weekly team conference  ** Please Note:  voice to text software may have been used in the creation of this document   Although proof errors in transcription or interpretation are a potential of such software**

## 2023-02-22 NOTE — PROGRESS NOTES
02/22/23 1030     Pain Assessment   Pain Assessment Tool 0-10   Pain Score No Pain   Restrictions/Precautions   Precautions Fall Risk;Pain   RLE Weight Bearing Per Order WBAT   LLE Weight Bearing Per Order WBAT   Braces or Orthoses   (ace wrap)   Subjective   Subjective pt agreeable to perform skilled PT   Roll Left and Right   Type of Assistance Needed Independent   Roll Left and Right CARE Score 6   Sit to Lying   Type of Assistance Needed Independent   Sit to Lying CARE Score 6   Lying to Sitting on Side of Bed   Type of Assistance Needed Independent   Lying to Sitting on Side of Bed CARE Score 6   Sit to Stand   Type of Assistance Needed Independent   Comment RW   Sit to Stand CARE Score 6   Bed-Chair Transfer   Type of Assistance Needed Independent   Comment RW   Chair/Bed-to-Chair Transfer CARE Score 6   Transfer Bed/Chair/Wheelchair   Adaptive Equipment Roller Walker   Car Transfer   Type of Assistance Needed Independent   Car Transfer CARE Score 6   Walk 10 Feet   Type of Assistance Needed Independent; Adaptive equipment   Comment RW IRP and in hallway   Walk 10 Feet CARE Score 6   Walk 50 Feet with Two Turns   Type of Assistance Needed Independent; Adaptive equipment   Walk 50 Feet with Two Turns CARE Score 6   Walk 150 Feet   Type of Assistance Needed Independent; Adaptive equipment   Comment RW   Walk 150 Feet CARE Score 6   Walking 10 Feet on Uneven Surfaces   Type of Assistance Needed Independent; Adaptive equipment   Comment RW   Walking 10 Feet on Uneven Surfaces CARE Score 6   Ambulation   Primary Mode of Locomotion Prior to Admission Walk   Distance Walked (feet) 350 ft  (x2)   Does the patient walk? 2  Yes   Wheel 50 Feet with Two Turns   Reason if not Attempted Activity not applicable   Wheel 50 Feet with Two Turns CARE Score 9   Wheel 150 Feet   Reason if not Attempted Activity not applicable   Wheel 650 Feet CARE Score 9   Wheelchair mobility   Does the patient use a wheelchair? 0   No   Curb or Single Stair   Style negotiated Single stair   Type of Assistance Needed Independent; Adaptive equipment   Comment SPC Single HR   1 Step (Curb) CARE Score 6   4 Steps   Type of Assistance Needed Independent; Adaptive equipment   Comment SPC Single HR   4 Steps CARE Score 6   12 Steps   Type of Assistance Needed Independent; Adaptive equipment   Comment SPC Single HR   12 Steps CARE Score 6   Picking Up Object   Type of Assistance Needed Independent   Picking Up Object CARE Score 6   Toilet Transfer   Type of Assistance Needed Independent; Adaptive equipment   Comment REACHER   Toilet Transfer CARE Score 6   Equipment Use   NuStep LVL 2 FOR 10 MIN   Assessment   Treatment Assessment Pt progressing toward DC tomorrow and all needs DME are met , pt had no LOB and progressing in hallway with RW withnsging and PT/OT aware  Pt instructed and ed  on fall risk and safety    Cont POC in PM session   Barriers to Discharge Inaccessible home environment;Decreased caregiver support   Recommendation   PT Discharge Recommendation Home with home health rehabilitation   PT Therapy Minutes   PT Time In 1030   PT Time Out 1130   PT Total Time (minutes) 60   PT Mode of treatment - Individual (minutes) 60   PT Mode of treatment - Concurrent (minutes) 0   PT Mode of treatment - Group (minutes) 0   PT Mode of treatment - Co-treat (minutes) 0   PT Mode of Treatment - Total time(minutes) 60 minutes   PT Cumulative Minutes 1110

## 2023-02-22 NOTE — PROGRESS NOTES
02/22/23 0710   Pain Assessment   Pain Assessment Tool 0-10   Pain Score No Pain   Restrictions/Precautions   Precautions Fall Risk;Pain   RLE Weight Bearing Per Order WBAT   LLE Weight Bearing Per Order WBAT   Lifestyle   Autonomy "I'm excited to go home but it makes me a little nervous, because I'll be by myself  I have people close by if I need help, though "   Eating   Type of Assistance Needed Independent   Eating CARE Score 6   Oral Hygiene   Type of Assistance Needed Independent   Comment in stance w/ RW at sink   Oral Hygiene CARE Score 6   Shower/Bathe Self   Type of Assistance Needed Independent   Physical Assistance Level No physical assistance   Comment Mod I for shower   Shower/Bathe Self CARE Score 6   Tub/Shower Transfer   Findings Mod I tub transfer using tub bench   Upper Body Dressing   Type of Assistance Needed Independent   Upper Body Dressing CARE Score 6   Lower Body Dressing   Type of Assistance Needed Independent   Comment seated to thread BLE w/ cross leg tech  In stance for CM over hips  Lower Body Dressing CARE Score 6   Putting On/Taking Off Footwear   Type of Assistance Needed Physical assistance   Physical Assistance Level 25% or less   Comment Nicolle overall, assist only provided for TEDs  Unclear if rec is for pt to wear TEDs post DC  Pt able to don socks and sneakers, use of [place] [address]  Putting On/Taking Off Footwear CARE Score 3   Lying to Sitting on Side of Bed   Type of Assistance Needed Independent   Lying to Sitting on Side of Bed CARE Score 6   Sit to Stand   Type of Assistance Needed Independent   Comment RW   Sit to Stand CARE Score 6   Toileting Hygiene   Type of Assistance Needed Independent   Comment Mod I w/ RW   Toileting Hygiene CARE Score 6   Toilet Transfer   Type of Assistance Needed Independent   Comment Mod I w/ RW   Toilet Transfer CARE Score 6   Exercise Tools   Theraband Pt participated in thera ex utilizing yellow theraband  Completed 3x10 in available planes  Tolerated well w/ short rest breaks  Completed to increase strength in BUE for increased IND w/ ADLs/IADLs  Cognition   Overall Cognitive Status WFL   Arousal/Participation Cooperative   Attention Within functional limits   Orientation Level Oriented X4   Memory Within functional limits   Following Commands Follows all commands and directions without difficulty   Comments pleasant and talkative   Activity Tolerance   Activity Tolerance Patient tolerated treatment well   Assessment   Treatment Assessment OT session focuisng on DC ADL and UE TE  Pt Mod I for all ADLs w/ exception of donning L MATTHEW  Unclear if pt will be instructed to continue w/ TEDs following DC, will need to follow up  Pt was able to complete R knee ACE wrapping IND to manage swelling R knee  Pt demo'd good progress w/ IPOT  Plan to DC home tomorrow at Mod I level w/ RW, plan to not access upstairs BR until home therapy can assess  Plan   Treatment/Interventions   (DC home tomorrow)   Progress   (Pt achieved OT goals)   OT Therapy Minutes   OT Time In 0710   OT Time Out 0840   OT Total Time (minutes) 90   OT Mode of treatment - Individual (minutes) 90   OT Mode of treatment - Concurrent (minutes) 0   OT Mode of treatment - Group (minutes) 0   OT Mode of treatment - Co-treat (minutes) 0   OT Mode of Treatment - Total time(minutes) 90 minutes   OT Cumulative Minutes 990   Therapy Time missed   Time missed?  No

## 2023-02-22 NOTE — CASE MANAGEMENT
Met w/pt and confirmed dc arrangements for tomorrow  Pt confirmed her brother is picking her up between 11-12 as dr Martina Molina has some things she wants to review with him  Pt stated she will be getting a walker from the pharmacy to take home with her, her friend ordered a walker tray for her as well as a tub bench which will be delivered on the 28th   Cm confirmed physical therapy serivces will notify her once she is home to begin their visits

## 2023-02-23 VITALS
HEART RATE: 68 BPM | HEIGHT: 61 IN | TEMPERATURE: 98.1 F | RESPIRATION RATE: 16 BRPM | SYSTOLIC BLOOD PRESSURE: 134 MMHG | DIASTOLIC BLOOD PRESSURE: 70 MMHG | WEIGHT: 125.6 LBS | BODY MASS INDEX: 23.71 KG/M2 | OXYGEN SATURATION: 97 %

## 2023-02-23 RX ORDER — ASPIRIN 81 MG/1
81 TABLET ORAL DAILY
Qty: 30 TABLET | Refills: 0 | Status: SHIPPED | OUTPATIENT
Start: 2023-03-04

## 2023-02-23 RX ORDER — ACETAMINOPHEN 325 MG/1
650 TABLET ORAL EVERY 6 HOURS PRN
Refills: 0
Start: 2023-02-23

## 2023-02-23 RX ADMIN — LIDOCAINE 5% 1 PATCH: 700 PATCH TOPICAL at 08:07

## 2023-02-23 RX ADMIN — ACETAMINOPHEN 975 MG: 325 TABLET, FILM COATED ORAL at 06:08

## 2023-02-23 RX ADMIN — ACETAMINOPHEN 975 MG: 325 TABLET, FILM COATED ORAL at 12:25

## 2023-02-23 RX ADMIN — ENOXAPARIN SODIUM 40 MG: 40 INJECTION SUBCUTANEOUS at 12:24

## 2023-02-23 RX ADMIN — GLYCERIN 1 DROP: .002; .002; .01 SOLUTION/ DROPS OPHTHALMIC at 08:07

## 2023-02-23 NOTE — PROGRESS NOTES
Pastoral Care Progress Note    2023  Patient: Leslie Sofia :   Admission Date & Time: 2023 1214  MRN: [id number] [facility]: [id number]         23 1500   Clinical Encounter Type   Visited With Patient   Advent Encounters   Advent Needs Prayer   Sacramental Encounters   Communion Given Indicator Yes     Jole Mejía visited with the pt and provided prayers, blessings and the Sacrament of [address] [place] [address]  No further needs were expressed at this time  Chaplains still remain available

## 2023-02-23 NOTE — NURSING NOTE
AVS reviewed with patient and patient's brother  All questions answered  Patient is independent with RW with ambulation and transfers  Right hip skin is clean, dry, and intact  Patient is continent of bowel and bladder  Patient discharged home via wheelchair  Patients brother present and assisted with taking patient home

## 2023-02-23 NOTE — PLAN OF CARE
Problem: PAIN - ADULT  Goal: Verbalizes/displays adequate comfort level or baseline comfort level  Description: Interventions:  - Encourage patient to monitor pain and request assistance  - Assess pain using appropriate pain scale  - Administer analgesics based on type and severity of pain and evaluate response  - Implement non-pharmacological measures as appropriate and evaluate response  - Consider cultural and social influences on pain and pain management  - Notify physician/advanced practitioner if interventions unsuccessful or patient reports new pain  Outcome: Progressing     Problem: INFECTION - ADULT  Goal: Absence or prevention of progression during hospitalization  Description: INTERVENTIONS:  - Assess and monitor for signs and symptoms of infection  - Monitor lab/diagnostic results  - Monitor all insertion sites, i e  indwelling lines, tubes, and drains  - Monitor endotracheal if appropriate and nasal secretions for changes in amount and color  - [place] appropriate cooling/warming therapies per order  - Administer medications as ordered  - Instruct and encourage patient and family to use good hand hygiene technique  - Identify and instruct in appropriate isolation precautions for identified infection/condition  Outcome: Progressing  Goal: Absence of fever/infection during neutropenic period  Description: INTERVENTIONS:  - Monitor WBC    Outcome: Progressing     Problem: SAFETY ADULT  Goal: Patient will remain free of falls  Description: INTERVENTIONS:  - Educate patient/family on patient safety including physical limitations  - Instruct patient to call for assistance with activity   - Consult OT/PT to assist with strengthening/mobility   - Keep Call bell within reach  - Keep bed low and locked with side rails adjusted as appropriate  - Keep care items and personal belongings within reach  - Initiate and maintain comfort rounds  - Make Fall Risk Sign visible to staff  - Apply yellow socks and bracelet for high fall risk patients  - Consider moving patient to room near nurses station  Outcome: Progressing  Goal: Maintain or return to baseline ADL function  Description: INTERVENTIONS:  -  Assess patient's ability to carry out ADLs; assess patient's baseline for ADL function and identify physical deficits which impact ability to perform ADLs (bathing, care of mouth/teeth, toileting, grooming, dressing, etc )  - Assess/evaluate cause of self-care deficits   - Assess range of motion  - Assess patient's mobility; develop plan if impaired  - Assess patient's need for assistive devices and provide as appropriate  - Encourage maximum independence but intervene and supervise when necessary  - Involve family in performance of ADLs  - Assess for home care needs following discharge   - Consider OT consult to assist with ADL evaluation and planning for discharge  - Provide patient education as appropriate  Outcome: Progressing  Goal: Maintains/Returns to pre admission functional level  Description: INTERVENTIONS:  - Perform BMAT or MOVE assessment daily    - Set and communicate daily mobility goal to care team and patient/family/caregiver     - Collaborate with rehabilitation services on mobility goals if consulted  - Out of bed for toileting  - Record patient progress and toleration of activity level   Outcome: Progressing     Problem: DISCHARGE PLANNING  Goal: Discharge to home or other facility with appropriate resources  Description: INTERVENTIONS:  - Identify barriers to discharge w/patient and caregiver  - Arrange for needed discharge resources and transportation as appropriate  - Identify discharge learning needs (meds, wound care, etc )  - Arrange for interpretive services to assist at discharge as needed  - Refer to Case Management Department for coordinating discharge planning if the patient needs post-hospital services based on physician/advanced practitioner order or complex needs related to functional status, cognitive ability, or social support system  Outcome: Progressing     Problem: Prexisting or High Potential for Compromised Skin Integrity  Goal: Skin integrity is maintained or improved  Description: INTERVENTIONS:  - Identify patients at risk for skin breakdown  - Assess and monitor skin integrity  - Assess and monitor nutrition and hydration status  - Monitor labs   - Assess for incontinence   - Turn and reposition patient  - Assist with mobility/ambulation  - Relieve pressure over bony prominences  - Avoid friction and shearing  - Provide appropriate hygiene as needed including keeping skin clean and dry  - Evaluate need for skin moisturizer/barrier cream  - Collaborate with interdisciplinary team   - Patient/family teaching  - Consider wound care consult   Outcome: Progressing     Problem: Nutrition/Hydration-ADULT  Goal: Nutrient/Hydration intake appropriate for improving, restoring or maintaining nutritional needs  Description: Monitor and assess patient's nutrition/hydration status for malnutrition  Collaborate with interdisciplinary team and initiate plan and interventions as ordered  Monitor patient's weight and dietary intake as ordered or per policy  Utilize nutrition screening tool and intervene as necessary  Determine patient's food preferences and provide high-protein, high-caloric foods as appropriate       INTERVENTIONS:  - Monitor oral intake, urinary output, labs, and treatment plans  - Assess nutrition and hydration status and recommend course of action  - Evaluate amount of meals eaten  - Assist patient with eating if necessary   - Allow adequate time for meals  - Recommend/ encourage appropriate diets, oral nutritional supplements, and vitamin/mineral supplements  - Order, calculate, and assess calorie counts as needed  - Recommend, monitor, and adjust tube feedings and TPN/PPN based on assessed needs  - Assess need for intravenous fluids  - Provide specific nutrition/hydration education as appropriate  - Include patient/family/caregiver in decisions related to nutrition  Outcome: Progressing

## 2023-02-24 ENCOUNTER — TRANSITIONAL CARE MANAGEMENT (OUTPATIENT)
Dept: INTERNAL MEDICINE CLINIC | Facility: OTHER | Age: 78
End: 2023-02-24

## 2023-02-24 NOTE — CASE MANAGEMENT
Team dc summary - pt made good progress and returned home w/contd outpt physical therapy at home through [facility]  Pt purchased her own roller walker in the pharmacy  pts brother present for dc instructions and aware of pts functional ability

## 2023-02-27 ENCOUNTER — EVALUATION (OUTPATIENT)
Age: 78
End: 2023-02-27

## 2023-02-27 DIAGNOSIS — M25.551 ACUTE RIGHT HIP PAIN: Primary | ICD-10-CM

## 2023-02-27 NOTE — PROGRESS NOTES
02/27/23 2357   Hello, [Guardian’s Name / Patient’s Kp Cordova, this is [Caller Kp Cordova from [facility], and our clinical care team wanted to check on you / your child after your recent visit to the hospital  It will only take 3-5 minutes  Is this a good time? Discharge Call Type/ Specific Diagnosis: General Call   General Discharge Phone Call   Were your/your child's discharge instructions clear and understandable? Please tell me in your own words how to care for yourself/your child now that you're home Yes;Patient understood instructions   Have you filled your/your child's new prescriptions yet? Yes   What questions do you have about those medications? No questions   Are you/your child having any unusual symptoms or problems? (Specific to problem- i e , dressing, pain, bruising or swelling, procedure, etc ) No reported symptoms/problems   Do you have follow up appointment with your/your child's physician? Yes   Is there anything preventing you from keeping that appointment? No;Patient able to keep appointment   Are there any physicians, nurses, or hospital staff you would like us to recognize for doing a very good job? Nurse;PCA/Tech;PT/OT/RT/SLP   Thank you for taking the time to share with me about your care and recovery  Do you have any suggestions for us? No   This call resulted in: No interventions needed   Call Complete   Attempted Number of Calls 1   Discharge phone call complete?  Complete

## 2023-02-27 NOTE — PROGRESS NOTES
PT Evaluation     Today's date: 2023  Patient name: Mor Fraire  :   MRN: [id number]  Referring provider: No ref  provider found  Dx:   Encounter Diagnosis     ICD-10-CM    1  Acute right hip pain  M25 551                      Assessment  Assessment details: Joo Omalley is a 69 y o  female presenting to PT w/ history of R femur fracture and IM nail placement 3 weeks ago  Pt would benefit from skilled PT services to address the below listed impairments and functional limitations to encourage return to PLOF  Impairments: abnormal gait, abnormal or restricted ROM, activity intolerance, impaired balance, impaired physical strength, lacks appropriate home exercise program, pain with function and safety issue  Functional limitations: Difficulty ambulating, navigating stairs, lifting/carrying/pushing/pulling objectsUnderstanding of Dx/Px/POC: good   Prognosis: good    Goals  STG: RLE MMT improved by at least 1/2 grade in 2-4 weeks  STG: RLE ROM improved by at least 25% in all deficient planes in 2-4 weeks  STG: Subjectively reports pain at worst decreased by 2 points in 2-4 weeks  STG: I w/ HEPs 1 week after prescription  LTG: Drives to OPPT by d/c  LTG: I navigates home environment by d/c  LTG: I w/ comprehensive HEP by d/c       Plan  Patient would benefit from: PT eval and skilled physical therapy  Planned modality interventions: TENS, thermotherapy: hydrocollator packs and cryotherapy  Planned therapy interventions: joint mobilization, manual therapy, massage, balance, neuromuscular re-education, patient education, strengthening, stretching, therapeutic activities, therapeutic exercise, therapeutic training, flexibility, functional ROM exercises, gait training, graded exercise, graded activity, graded motor and home exercise program  Frequency: 2x week  Duration in visits: 12  Duration in weeks: 6  Plan of Care beginning date: 2023  Plan of Care expiration date: 4/10/2023  Treatment plan discussed with: patient        Subjective Evaluation    History of Present Illness  Mechanism of injury: Reports R femur fracture when she was stepping down her stairs  States that she was able to lower herself to the ground and she was able to drag herself to the phone where she called her friend who called 911  She had IM nail placed 2/3/23 and stayed in rehab for 2 weeks  She does state that in the past she had thigh pain with single leg weightbearing on the right side  States that she has residual R knee soreness and sometimes still has soreness on the outside of the R hip  States that she has some muscle soreness when she wakes up in the morning which improves with movement  She is currently ambulating with a RW but she did not use any AD before  She was active before this happened, bowling and walking throughout the week  She has extensive social support who have been helped her get reacquainted with things at home  She does drive  She was walking and doing stairs in rehab  She has a history of knee pain which sometimes limits her exercise  She is able to cook and care for herself independently but everything is taking more time     Pain  Current pain ratin  At best pain ratin  At worst pain ratin  Quality: throbbing, tight, dull ache and discomfort  Aggravating factors: standing, walking, lifting and stair climbing    Patient Goals  Patient goals for therapy: decreased pain, increased motion, increased strength and return to sport/leisure activities          Objective     Neurological Testing     Sensation     Hip   Left Hip   Intact: light touch    Right Hip   Intact: light touch    Active Range of Motion   Left Hip   Normal active range of motion    Right Hip   Flexion: 100 degrees with pain  Extension: 10 degrees   Abduction: 25 degrees   Adduction: WFL  External rotation (90/90): 15 degrees with pain  Internal rotation (90/90): 10 degrees with pain    Strength/Myotome Testing     Left Hip Normal muscle strength    Right Hip   Planes of Motion   Flexion: 3+  Abduction: 3+  External rotation: 3+  Internal rotation: 3+    Additional Strength Details  Knee ext/flex 3+/5 on R    Functional Assessment        Comments  Completes 1 flight of stairs w/ CGA w/ SPC in LUE and use of HR on right   STS from commode and recliner completed w/ use of BUE  Ambulates w/ moderate pace w/ RW    Patient was seen in their Home for this Outpatient in the Home visit      At Initial Evaluation the following documents were reviewed and given to the patient: The [employer] of [facility]'s General Consent and Release, The [employer] of [address] [place] [address] Service Agreement, Patient Rights and Responsibilities, Home Health Admission Handbook and Outpatient Insurance Verification              Precautions: Osteoporosis, femur IM nail placement 2/3/23      Manuals 2/27                                                                Neuro Re-Ed 2/27                                                                                                       Ther Ex 2/27                                                                                                                    Ther Activity             Education Regarding rehab, PoC, soreness 10'            Transfers Stairs 1x flight, STS from recliner and toilet 2x ea            Gait Training                                       Modalities

## 2023-03-01 ENCOUNTER — OFFICE VISIT (OUTPATIENT)
Age: 78
End: 2023-03-01

## 2023-03-01 ENCOUNTER — TELEPHONE (OUTPATIENT)
Dept: HOME HEALTH SERVICES | Facility: HOME HEALTHCARE | Age: 78
End: 2023-03-01

## 2023-03-01 DIAGNOSIS — M25.551 ACUTE RIGHT HIP PAIN: Primary | ICD-10-CM

## 2023-03-01 NOTE — PROGRESS NOTES
Daily Note     Today's date: 3/1/2023  Patient name: Alexandre Echols  :   MRN: [id number]  Referring provider: No ref  provider found  Dx:   Encounter Diagnosis     ICD-10-CM    1  Acute right hip pain  M25 551                      Subjective: States doing good, she got her shower bench but has not been upstairs to set it up  Objective: See treatment diary below    Assessment: Min VC for sequencing during stair navigation  Able to complete shower transfers w/ min VC for hand placement  Instructed on step up exercise at home as she is not completely comfortable completing full flights, though she is able to complete I today  Min VC for step length and cane placement during ambulation tasks  She would benefit from continued skilled PT services to reduce pain, reduce risk of falls, and increase level of function  Plan: Continue per plan of care        Patient was seen in their Home for this Outpatient in the Home visit      At Initial Evaluation the following documents were reviewed and given to the patient: The [employer] of [facility] Consent and Release, The [employer] of [facility] Service Agreement, Patient Rights and Responsibilities, Home Health Admission Handbook and Outpatient Insurance Verification         Precautions: Osteoporosis, femur IM nail placement 2/3/23      Manuals 2/27 3/1                                                               Neuro Re-Ed 2/27 3/1           TUG, 5xSTS ARAIZA  15'           Step up  3x5 w/ SPC                                                               Cane walking  2x50', navigate around kitchen and living room           Ther Ex 2/27 3/1                                                                                                                   Ther Activity             Education Regarding rehab, PoC, soreness 10'            Shower bench set up  5'           Transfers Stairs 1x flight, STS from recliner and toilet 2x ea Stairs 2x flights w/ SPC, 3x in and out of shower w/ shower bench           Gait Training                                       Modalities

## 2023-03-02 ENCOUNTER — PATIENT OUTREACH (OUTPATIENT)
Dept: CASE MANAGEMENT | Facility: HOSPITAL | Age: 78
End: 2023-03-02

## 2023-03-02 DIAGNOSIS — S72.91XD CLOSED FRACTURE OF RIGHT FEMUR WITH ROUTINE HEALING, UNSPECIFIED FRACTURE MORPHOLOGY, UNSPECIFIED PORTION OF FEMUR, SUBSEQUENT ENCOUNTER: Primary | ICD-10-CM

## 2023-03-02 DIAGNOSIS — D62 ACUTE BLOOD LOSS ANEMIA: ICD-10-CM

## 2023-03-02 NOTE — PROGRESS NOTES
Outpatient Care Manager Note: Patient identified for SNF/CELESTINE Pathway  Chart review completed  Patient discharged from [facility] on 2/23/23 to home with home PT with OZIEL CERVANTES  Call made to patient and CELESTINE pathway interventions reviewed  including:  • PCP visit within 1 week- sees him 3/8/23  • Avoiding NSAIDs- uses tylenol  • Adequate nutrition and hydration- eating and drinking well  Cooking for herself self  • Keeping BP >130 if normal BP not lower states has been 130's   • Checking temperature  • Assessing for weight gain or loss and edema changes since home does not weigh- no history of heart or renal problems   • Medication Review-done  • BMP- patient states her AVS has instructions to get a  BMP and a CBC, but not in Epic-  will order for Dr Charbel Huber and inform him  Patient states lives alone in 2 story home, but for now staying on first floor  She has a brother, Martha Geiger and a good friend Alyssa Clyaton who lives across the street who have helped her get settled at home  Friend is going grocery shopping and checks in on her daily  She was very active before her surgery- volunteering once/month at [facility], walking 5 miles/week with friends and in a bowling league  Currently she is making progress with PT and has gone up the stairs once (12 steps) with therapist  She denies care needs  Will follow up

## 2023-03-06 ENCOUNTER — OFFICE VISIT (OUTPATIENT)
Age: 78
End: 2023-03-06

## 2023-03-06 DIAGNOSIS — M25.551 ACUTE RIGHT HIP PAIN: Primary | ICD-10-CM

## 2023-03-06 NOTE — PROGRESS NOTES
Daily Note     Today's date: 3/6/2023  Patient name: Tony Butts  : 36  MRN: [id number]  Referring provider: No ref  provider found  Dx:   Encounter Diagnosis     ICD-10-CM    1  Acute right hip pain  M25 551                      Subjective: States that she is improving every day, she did the stairs by herself yesterday  Objective: See treatment diary below    Assessment: Continued min VC for sequencing on stairs  Able to exit rear and front entrances of home w/ min A during steps  Ambulates w/ CGA w/ RW outdoors on pavement  Listed activities completed in preparation for leaving for appointment at [address] [place] [address] office on Wednesday  Completes standing HF w/ 1UE support w/o pain and only mild trendelenburg on R  She would benefit from continued skilled PT services to reduce pain, reduce risk of falls, and increase level of function  Plan: Continue per plan of care        Patient was seen in their Home for this Outpatient in the Home visit      At Initial Evaluation the following documents were reviewed and given to the patient: The [employer] of [facility] Consent and Release, The Vis[employer] Nurse Association of [address] [place] [address], Patient Rights and Responsibilities, Home Health Admission Handbook and Outpatient Insurance Verification         Precautions: Osteoporosis, femur IM nail placement 2/3/23      Manuals 2/27 3/1 3                                                              Neuro Re-Ed 2/27 3/1 3          TUG, 5xSTS ARAIZA  15'           Step up  3x5 / [facility]           Outdoor ambulation   Cane 50' RW 48'          Standing HF    1UE 3x10 ea                                    Cane walking  2x50', navigate around kitchen and living room           Ther Ex 2/27 3/1 3/6          Knee ext TB   BTB 3x10                                                                                                     Ther Activity             Education Regarding rehab, PoC, soreness 10'            Shower bench set up  5'           Transfers Stairs 1x flight, STS from recliner and toilet 2x ea Stairs 2x flights w/ SPC, 3x in and out of shower w/ shower bench Stairs 2x flights w/ SPC, 3x stairs outside front, 2x stairs outsdie back, 2x shower transfer          Gait Training                                       Modalities

## 2023-03-07 NOTE — PROGRESS NOTES
Assessment/Plan:    No problem-specific Assessment & Plan notes found for this encounter  There are no diagnoses linked to this encounter  Subjective:      Patient ID: Parisa Krause is a 69 y o  female  HPI    The following portions of the patient's history were reviewed and updated as appropriate: allergies, current medications, past family history, past medical history, past social history, past surgical history, and problem list     Review of Systems      Objective: There were no vitals taken for this visit           Physical Exam

## 2023-03-08 ENCOUNTER — OFFICE VISIT (OUTPATIENT)
Dept: INTERNAL MEDICINE CLINIC | Facility: CLINIC | Age: 78
End: 2023-03-08

## 2023-03-08 ENCOUNTER — OFFICE VISIT (OUTPATIENT)
Age: 78
End: 2023-03-08

## 2023-03-08 ENCOUNTER — APPOINTMENT (OUTPATIENT)
Dept: LAB | Facility: CLINIC | Age: 78
End: 2023-03-08

## 2023-03-08 VITALS
HEIGHT: 61 IN | TEMPERATURE: 98.2 F | HEART RATE: 72 BPM | DIASTOLIC BLOOD PRESSURE: 80 MMHG | SYSTOLIC BLOOD PRESSURE: 140 MMHG | OXYGEN SATURATION: 99 % | BODY MASS INDEX: 23.6 KG/M2 | WEIGHT: 125 LBS

## 2023-03-08 DIAGNOSIS — I10 HYPERTENSION: ICD-10-CM

## 2023-03-08 DIAGNOSIS — M25.551 ACUTE RIGHT HIP PAIN: Primary | ICD-10-CM

## 2023-03-08 DIAGNOSIS — S72.91XA FEMUR FRACTURE, RIGHT (HCC): ICD-10-CM

## 2023-03-08 DIAGNOSIS — D64.9 ANEMIA: ICD-10-CM

## 2023-03-08 DIAGNOSIS — I72.9 ANEURYSM (HCC): ICD-10-CM

## 2023-03-08 DIAGNOSIS — E03.9 ACQUIRED HYPOTHYROIDISM: ICD-10-CM

## 2023-03-08 DIAGNOSIS — D62 ACUTE BLOOD LOSS ANEMIA: ICD-10-CM

## 2023-03-08 DIAGNOSIS — M35.00 SJOGREN'S SYNDROME (HCC): ICD-10-CM

## 2023-03-08 DIAGNOSIS — S72.91XD CLOSED FRACTURE OF RIGHT FEMUR WITH ROUTINE HEALING, UNSPECIFIED FRACTURE MORPHOLOGY, UNSPECIFIED PORTION OF FEMUR, SUBSEQUENT ENCOUNTER: ICD-10-CM

## 2023-03-08 DIAGNOSIS — S72.90XA FEMUR FRACTURE (HCC): ICD-10-CM

## 2023-03-08 DIAGNOSIS — E78.5 HYPERLIPIDEMIA: Primary | ICD-10-CM

## 2023-03-08 LAB
ALBUMIN SERPL BCP-MCNC: 3.7 G/DL (ref 3.5–5)
ALP SERPL-CCNC: 106 U/L (ref 46–116)
ALT SERPL W P-5'-P-CCNC: 28 U/L (ref 12–78)
ANION GAP SERPL CALCULATED.3IONS-SCNC: 4 MMOL/L (ref 4–13)
AST SERPL W P-5'-P-CCNC: 21 U/L (ref 5–45)
BASOPHILS # BLD AUTO: 0.04 THOUSANDS/ÂΜL (ref 0–0.1)
BASOPHILS NFR BLD AUTO: 0 % (ref 0–1)
BILIRUB SERPL-MCNC: 0.3 MG/DL (ref 0.2–1)
BUN SERPL-MCNC: 23 MG/DL (ref 5–25)
CALCIUM SERPL-MCNC: 9.7 MG/DL (ref 8.3–10.1)
CHLORIDE SERPL-SCNC: 106 MMOL/L (ref 96–108)
CO2 SERPL-SCNC: 26 MMOL/L (ref 21–32)
CREAT SERPL-MCNC: 0.81 MG/DL (ref 0.6–1.3)
EOSINOPHIL # BLD AUTO: 0.06 THOUSAND/ÂΜL (ref 0–0.61)
EOSINOPHIL NFR BLD AUTO: 1 % (ref 0–6)
ERYTHROCYTE [DISTWIDTH] IN BLOOD BY AUTOMATED COUNT: 16 % (ref 11.6–15.1)
ERYTHROCYTE [SEDIMENTATION RATE] IN BLOOD: 27 MM/HOUR (ref 0–29)
GFR SERPL CREATININE-BSD FRML MDRD: 70 ML/MIN/1.73SQ M
GLUCOSE SERPL-MCNC: 90 MG/DL (ref 65–140)
HCT VFR BLD AUTO: 41.5 % (ref 34.8–46.1)
HGB BLD-MCNC: 13.3 G/DL (ref 11.5–15.4)
IMM GRANULOCYTES # BLD AUTO: 0.06 THOUSAND/UL (ref 0–0.2)
IMM GRANULOCYTES NFR BLD AUTO: 1 % (ref 0–2)
LYMPHOCYTES # BLD AUTO: 1.84 THOUSANDS/ÂΜL (ref 0.6–4.47)
LYMPHOCYTES NFR BLD AUTO: 17 % (ref 14–44)
MCH RBC QN AUTO: 30.9 PG (ref 26.8–34.3)
MCHC RBC AUTO-ENTMCNC: 32 G/DL (ref 31.4–37.4)
MCV RBC AUTO: 97 FL (ref 82–98)
MONOCYTES # BLD AUTO: 1.18 THOUSAND/ÂΜL (ref 0.17–1.22)
MONOCYTES NFR BLD AUTO: 11 % (ref 4–12)
NEUTROPHILS # BLD AUTO: 7.65 THOUSANDS/ÂΜL (ref 1.85–7.62)
NEUTS SEG NFR BLD AUTO: 70 % (ref 43–75)
NRBC BLD AUTO-RTO: 0 /100 WBCS
PLATELET # BLD AUTO: 391 THOUSANDS/UL (ref 149–390)
PMV BLD AUTO: 11.3 FL (ref 8.9–12.7)
POTASSIUM SERPL-SCNC: 3.8 MMOL/L (ref 3.5–5.3)
PROT SERPL-MCNC: 7 G/DL (ref 6.4–8.4)
PTH-INTACT SERPL-MCNC: 77.5 PG/ML (ref 18.4–80.1)
RBC # BLD AUTO: 4.3 MILLION/UL (ref 3.81–5.12)
SODIUM SERPL-SCNC: 136 MMOL/L (ref 135–147)
TSH SERPL DL<=0.05 MIU/L-ACNC: 2.67 UIU/ML (ref 0.45–4.5)
WBC # BLD AUTO: 10.83 THOUSAND/UL (ref 4.31–10.16)

## 2023-03-08 RX ORDER — LEVOTHYROXINE SODIUM 0.03 MG/1
25 TABLET ORAL DAILY
Qty: 90 TABLET | Refills: 0 | Status: SHIPPED | OUTPATIENT
Start: 2023-03-08

## 2023-03-08 RX ORDER — SIMVASTATIN 10 MG
10 TABLET ORAL
Qty: 90 TABLET | Refills: 3 | Status: SHIPPED | OUTPATIENT
Start: 2023-03-08 | End: 2023-06-06

## 2023-03-08 RX ORDER — SENNOSIDES 8.6 MG
CAPSULE ORAL
COMMUNITY

## 2023-03-08 NOTE — PROGRESS NOTES
Daily Note     Today's date: 3/8/2023  Patient name: Swathi Guerra  : [birth year]  MRN: [id number]  Referring provider: No ref  provider found  Dx:   Encounter Diagnosis     ICD-10-CM    1  Acute right hip pain  M25 551                      Subjective: States doing good, she showered yesterday without help  Objective: See treatment diary below    Assessment: Completes cane ambulation today w/ minor soreness outside of right hip which abolishes when seated  Slow gait w/ cane but proper sequencing and technique  Stepping exercises able to be completed w/ SPC and with forward head position  Minor sway w/ semi-tandem eyes closed  She would benefit from continued skilled PT services to reduce pain, reduce risk of falls, and increase level of function  Plan: Continue per plan of care        Patient was seen in their Home for this Outpatient in the Home visit      At Initial Evaluation the following documents were reviewed and given to the patient: The [employer] of [employer] Consent and Release, The [employer] of [facility] Service Agreement, Patient Rights and Responsibilities, Home Health Admission Handbook and Outpatient Insurance Verification         Precautions: Osteoporosis, femur IM nail placement 2/3/23      Manuals 2/27 3/1 3/6 3/8                                                             Neuro Re-Ed 2/27 3/1 3/6 3/8         TUG, 5xSTS ARAIZA  15'           Step up  3x5 [facility]           Outdoor ambulation   Cane 50' RW 50'          Standing HF    1UE 3x10 ea 1UE 3x10 ea         Step over object    Carpet 2x8 fwd/bck/lat ea foot         Balance progression    FT EC 2x1' semi tandem EC 2x30" ea         Cane walking  2x50', navigate around kitchen and living room  3x4'         Ther Ex 2/27 3/1 3/6 3/8         Knee ext TB   BTB 3x10 BTB 3x10                                                                                                    Ther Activity Education Regarding rehab, PoC, soreness 10'   Regarding HEP, walking w/ cane 10'         Shower bench set up  5'           Transfers Stairs 1x flight, STS from recliner and toilet 2x ea Stairs 2x flights w/ SPC, 3x in and out of shower w/ shower bench Stairs 2x flights w/ SPC, 3x stairs outside front, 2x stairs outsdie back, 2x shower transfer          Gait Training                                       Modalities

## 2023-03-09 NOTE — PHYSICAL THERAPY NOTE
PHYSICAL THERAPY DISCHARGE SUMMARY    Patient made good progress during her stay at the acute rehab center where she presented with an Impairment of mobility, safety and Activities of Daily Living (ADLs) due to Orthopedic Disorders 2* Right Proximal Femoral Shaft Fracture where her hip "popped" out while walking  She underwent IM nailing 2/3/23 and was recommended for rehab to maximize her functional mobility (I) and safety  She presented on evaluation with impaired BLE strength, standing balance, and activity tolerance as well as RLE pain resulting in decreased (I) with functional mobility skills  She responded well to therapeutic activity and exercise, neuro re-ed and transfer, gait and stair training  She was able to progress to an (I) level with use of RW  She purchased her own RW, was provided with a HEP and was recommended to follow up with HHPT services       Nida Braun PT, DPT

## 2023-03-10 ENCOUNTER — TELEPHONE (OUTPATIENT)
Dept: INTERNAL MEDICINE CLINIC | Facility: CLINIC | Age: 78
End: 2023-03-10

## 2023-03-10 NOTE — TELEPHONE ENCOUNTER
Patient called because she received a message regarding her bloodwork results  A few results were high and she wanted to ensure that everything is okay  She also forgot to ask at her visit if she is to resume taking her vitamins again  When she was getting stomach shots for bloodclots she was advised to stop taking her vitamins (calcium, biotin) and wanted to know if she is able to resume or if she is not to take them any longer  Patient would like a call back

## 2023-03-13 ENCOUNTER — OFFICE VISIT (OUTPATIENT)
Age: 78
End: 2023-03-13

## 2023-03-13 DIAGNOSIS — M25.551 ACUTE RIGHT HIP PAIN: Primary | ICD-10-CM

## 2023-03-13 NOTE — PROGRESS NOTES
Daily Note     Today's date: 3/13/2023  Patient name: Vivek Luther  : [birth year]  MRN: [id number]  Referring provider: No ref  provider found  Dx:   Encounter Diagnosis     ICD-10-CM    1  Acute right hip pain  M25 551                      Subjective: States minor pain today maybe because of the weather, last couple days without pain  Objective: See treatment diary below    Assessment: Minor pain w/ listed interventions but not made worse  No trendelenburg with listed interventions  Completes all listed interventions w/ SPC  Encouraged completion of transfers and home mobility w/ SPC  She would benefit from continued skilled PT services to reduce risk of falls and increase level of function  Plan: Continue per plan of care        Patient was seen in their Home for this Outpatient in the Home visit      At Initial Evaluation the following documents were reviewed and given to the patient: The [employer] of [address] [employer] Consent and Release, The Vis[employer] Nurse Association of [facility] Service Agreement, Patient Rights and Responsibilities, Home Health Admission Handbook and Outpatient Insurance Verification         Precautions: Osteoporosis, femur IM nail placement 2/3/23      Manuals 2/27 3/1 3/6 3/8 3/13                                                            Neuro Re-Ed 2/27 3/1 3/6 3/8 3/13        TUG, 5xSTS ARAIZA  15'           Step up  3x5 w/ [facility]           Outdoor ambulation   Cane 50' RW 50'          Standing HF    1UE 3x10 ea 1UE 3x10 ea         Step over object    Carpet 2x8 fwd/bck/lat ea foot         Balance progression    FT EC 2x1' semi tandem EC 2x30" ea FT EC 2x1' semi tandem EC 2x30" ea         Sidesteps     W/ cane 3x30'        Marching     W/ cane 2x30'                     Cane walking  2x50', navigate around kitchen and living room  3x4' 1x6'        Ther Ex 2/27 3/1 3/6 3/8 3/13        Knee ext TB   BTB 3x10 BTB 3x10 BTB 3x10 Ther Activity             Education Regarding rehab, PoC, soreness 10'   Regarding HEP, walking w/ cane 10' Education/demonstration rgarding transfers out backdoor for garbage/recyclng 10'        Shower bench set up  5'           Transfers Stairs 1x flight, STS from recliner and toilet 2x ea Stairs 2x flights w/ SPC, 3x in and out of shower w/ shower bench Stairs 2x flights w/ SPC, 3x stairs outside front, 2x stairs outsdie back, 2x shower transfer          Gait Training                                       Modalities

## 2023-03-15 ENCOUNTER — OFFICE VISIT (OUTPATIENT)
Age: 78
End: 2023-03-15

## 2023-03-15 DIAGNOSIS — M25.551 ACUTE RIGHT HIP PAIN: Primary | ICD-10-CM

## 2023-03-15 NOTE — PROGRESS NOTES
Daily Note     Today's date: 3/15/2023  Patient name: Concepcion Bullard  :   MRN: [id number]  Referring provider: No ref  provider found  Dx:   Encounter Diagnosis     ICD-10-CM    1  Acute right hip pain  M25 551                      Subjective: States without pain the last 2 days  Objective: See treatment diary below    Assessment: 2UE assist required on cane and railing w/ R sided step up, no trendeleburg  Attempted w/ 1UE but unable due to glute weakness  Completes walking along counter w/ LUE assist forward and back  She would benefit from continued skilled PT services to reduce risk of falls and increase level of function  Plan: Continue per plan of care        Patient was seen in their Home for this Outpatient in the Home visit      At Initial Evaluation the following documents were reviewed and given to the patient: The [employer] of [employer] Consent and Release, The Vis[employer] of [facility] Service Agreement, Patient Rights and Responsibilities, Home Health Admission Handbook and Outpatient Insurance Verification         Precautions: Osteoporosis, femur IM nail placement 2/3/23      Manuals 2/27 3/1 3/6 3/8 3/13 3/15                                                           Neuro Re-Ed 2/27 3/1 3/6 3/8 3/13 3/15       TUG, 5xSTS ARAIZA  15'           Step up  3x5 w/ SPC           Outdoor ambulation   Cane 50' RW 50'          Standing HF    1UE 3x10 ea 1UE 3x10 ea         Step over object    Carpet 2x8 fwd/bck/lat ea foot         Balance progression    FT EC 2x1' semi tandem EC 2x30" ea FT EC 2x1' semi tandem EC 2x30" ea  FTEC 2x1' semi tandem EC 2x30" ea       Sidesteps     W/ cane 3x30' W/ cane 3x30'       Marching     W/ cane 2x30' W/ cane 3x30'       Counter walking      LUE assist 3x5x10'       Cane walking  2x50', navigate around kitchen and living room  3x4' 1x6' 1x6'       Ther Ex 2/27 3/1 3/6 3/8 3/13 3/15       Knee ext TB   BTB 3x10 BTB 3x10 BTB 3x10                                                                                                   Ther Activity             Education Regarding rehab, PoC, soreness 10'   Regarding HEP, walking w/ cane 10' Education/demonstration rgarding transfers out backdoor for garbage/recyclng 10'        Step up      3x7 ascending R       Shower bench set up  5'           Transfers Stairs 1x flight, STS from recliner and toilet 2x ea Stairs 2x flights w/ SPC, 3x in and out of shower w/ shower bench Stairs 2x flights w/ SPC, 3x stairs outside front, 2x stairs outsdie back, 2x shower transfer          Gait Training                                       Modalities

## 2023-03-20 ENCOUNTER — HOSPITAL ENCOUNTER (OUTPATIENT)
Dept: RADIOLOGY | Facility: HOSPITAL | Age: 78
Discharge: HOME | End: 2023-03-20
Attending: ORTHOPAEDIC SURGERY

## 2023-03-20 ENCOUNTER — OFFICE VISIT (OUTPATIENT)
Age: 78
End: 2023-03-20

## 2023-03-20 ENCOUNTER — OFFICE VISIT (OUTPATIENT)
Dept: OBGYN CLINIC | Facility: HOSPITAL | Age: 78
End: 2023-03-20

## 2023-03-20 VITALS
HEIGHT: 61 IN | SYSTOLIC BLOOD PRESSURE: 148 MMHG | BODY MASS INDEX: 23.6 KG/M2 | WEIGHT: 125 LBS | DIASTOLIC BLOOD PRESSURE: 84 MMHG | HEART RATE: 74 BPM

## 2023-03-20 DIAGNOSIS — S72.21XA CLOSED DISPLACED SUBTROCHANTERIC FRACTURE OF RIGHT FEMUR, INITIAL ENCOUNTER (HCC): Primary | ICD-10-CM

## 2023-03-20 DIAGNOSIS — S72.90XA FEMUR FRACTURE (HCC): ICD-10-CM

## 2023-03-20 DIAGNOSIS — S72.21XA CLOSED DISPLACED SUBTROCHANTERIC FRACTURE OF RIGHT FEMUR, INITIAL ENCOUNTER (HCC): ICD-10-CM

## 2023-03-20 DIAGNOSIS — M25.551 ACUTE RIGHT HIP PAIN: Primary | ICD-10-CM

## 2023-03-20 NOTE — PROGRESS NOTES
Daily Note     Today's date: 3/20/2023  Patient name: Tery Walker  : [id number]  MRN: [id number]  Referring provider: No ref  provider found  Dx:   Encounter Diagnosis     ICD-10-CM    1  Acute right hip pain  M25 551                      Subjective: States last 2 days without pain, today she has minor pain  She hasn't taken tylenol today  She has an appointment w/ the doctor later today  Objective: See treatment diary below    Assessment: Demonstrates excessive weightbearing through cane this session leading to R sided hip shift and L shoulder shrug  Able to self correct w/ VC, but returns w/ fatigue  Pain no worse w/ listed interventions  She would benefit from continued skilled PT services to reduce pain, reduce risk of falls, and increase level of function  Plan: Continue per plan of care        Patient was seen in their Home for this Outpatient in the Home visit      At Initial Evaluation the following documents were reviewed and given to the patient: [employer] of [address] Consent and Release, [employer] of [facility] Service Agreement, Patient Rights and Responsibilities, Home Health Admission Handbook and Outpatient Insurance Verification         Precautions: Osteoporosis, femur IM nail placement 2/3/23      Manuals 2/27 3/1 3/6 3/8 3/13 3/15 3/20                                                          Neuro Re-Ed 2/27 3/1 3/6 3/8 3/13 3/15 3/20      TUG, 5xSTS ARAIZA  15'           Step up  3x5 w/ SPC           Outdoor ambulation   Cane 50' RW 48'          Standing HF    1UE 3x10 ea 1UE 3x10 ea         Step over object    Carpet 2x8 fwd/bck/lat ea foot         Balance progression    FT EC 2x1' semi tandem EC 2x30" ea FT EC 2x1' semi tandem EC 2x30" ea  FTEC 2x1' semi tandem EC 2x30" ea FT EC2x1' semi tandem EC 2x30" ea      Sidesteps     W/ cane 3x30' W/ cane 3x30' W/ cane 3x30'      Marching     W/ cane 2x30' W/ cane 3x30' W/ cane 3x30'      Counter walking      LUE assist 3x5x10' LUE assist 3x5x10'      Education       Regarding cane position during walking 10'      Cane walking  2x50', navigate around kitchen and living room  3x4' 1x6' 1x6' 1x5'      Ther Ex 2/27 3/1 3/6 3/8 3/13 3/15 3/20      Knee ext TB   BTB 3x10 BTB 3x10 BTB 3x10                                                                                                   Ther Activity             Education Regarding rehab, PoC, soreness 10'   Regarding HEP, walking w/ cane 10' Education/demonstration rgarding transfers out backdoor for garbage/recyclng 10'        Step up      3x7 ascending R 3x7 ascending R      Shower bench set up  5'           Transfers Stairs 1x flight, STS from recliner and toilet 2x ea Stairs 2x flights w/ SPC, 3x in and out of shower w/ shower bench Stairs 2x flights w/ SPC, 3x stairs outside front, 2x stairs outsdie back, 2x shower transfer          Gait Training                                       Modalities

## 2023-03-20 NOTE — PROGRESS NOTES
Assessment:   Diagnosis ICD-10-CM Associated Orders   1  Closed displaced subtrochanteric fracture of right femur, initial encounter ([facility] [address] )  S72 21XA XR femur 2 vw right      2  Femur fracture ([facility] [address] )  S72 90XA Ambulatory referral to Orthopedic Surgery          Plan:  • New x-rays of the right femur were obtained today and reviewed with patient  • On exam patient has nonpainful range of motion of both the right hip and knee  She does have mild tenderness on the lateral aspect of the hip where the fracture is located along with screws  • She should continue with her strengthening with PT and can gradually transition off the cane  • Patient has to feel safe driving, to anticipating driving and she can have a handicap placard  • At the follow-up new x-rays of the right femur and left femur as well to monitor for insufficiency fracture    To do next visit:  Return in about 3 months (around 6/20/2023) for right femur  The above stated was discussed in layman's terms and the patient expressed understanding  All questions were answered to the patient's satisfaction  Scribe Attestation    I,:  Luna Guzman am acting as a scribe while in the presence of the attending physician :       I,:  Antoinette Fuentes MD personally performed the services described in this documentation    as scribed in my presence :             Subjective:   Roxanna Felipe is a 69 y o  female who presents today for postop visit for her right IM nail femur anterograde, 2/3/2023  She has been in formal physical therapy working on her strength and balance  She reports no pain over the last 2 days  She has not had the need to take Tylenol  She is weightbearing with a cane  Review of systems negative unless otherwise specified in HPI  Review of Systems   Constitutional: Negative for chills, fever and unexpected weight change  HENT: Negative for hearing loss, nosebleeds and sore throat      Eyes: Negative for pain, redness and visual disturbance  Respiratory: Negative for cough, shortness of breath and wheezing  Cardiovascular: Negative for chest pain, palpitations and leg swelling  Gastrointestinal: Negative for abdominal pain and nausea  Genitourinary: Negative for dyspareunia, dysuria and frequency  Skin: Negative for rash and wound  Neurological: Negative for dizziness, numbness and headaches  Psychiatric/Behavioral: Negative for decreased concentration and suicidal ideas  The patient is not nervous/anxious  Past Medical History:   Diagnosis Date   • Arthritis 2005   • Closed nondisplaced fracture of fifth metatarsal bone of right foot with routine healing    • Disease of thyroid gland     hypothyroid   • Glaucoma, bilateral    • Hyperlipidemia    • Osteoporosis    • Scoliosis 12/12/12       Past Surgical History:   Procedure Laterality Date   • BUNIONECTOMY     • COLONOSCOPY  06/04/2019   • CORRECTION HAMMER TOE     • MAMMO (HISTORICAL)  07/09/2018   • NEUROMA EXCISION     • OTHER SURGICAL HISTORY      Birth kwasi removed   • OH OPTX FEM SHFT FX W/INSJ IMED IMPLT W/WO SCREW Right 2/3/2023    Procedure: INSERTION NAIL IM FEMUR ANTEGRADE (TROCHANTERIC);   Surgeon: Reena Cardenas MD;  Location: [facility];  Service: Orthopedics   • WISDOM TOOTH EXTRACTION         Family History   Problem Relation Age of Onset   • Hypertension Mother    • Thyroid disease Mother    • Arthritis Mother    • Glaucoma Mother    • Stomach cancer Father 46   • Cancer Father    • No Known Problems Maternal Grandmother    • No Known Problems Maternal Grandfather    • No Known Problems Paternal Grandmother    • No Known Problems Paternal Grandfather    • Dementia Maternal Aunt    • No Known Problems Maternal Aunt    • No Known Problems Paternal Aunt    • Hypertension Brother    • Osteoporosis Family    • Hyperlipidemia Family    • Hypertension Family    • Dementia Maternal Aunt    • Hypertension Maternal Aunt        Social History     Occupational History   • Not on file   Tobacco Use   • Smoking status: Never   • Smokeless tobacco: Never   Vaping Use   • Vaping Use: Never used   Substance and Sexual Activity   • Alcohol use: Yes     Comment: Glass of wine a few times a year   • Drug use: Yes   • Sexual activity: Never         Current Outpatient Medications:   •  acetaminophen (TYLENOL) 650 mg CR tablet, , Disp: , Rfl:   •  aspirin (ECOTRIN LOW STRENGTH) 81 mg EC tablet, Take 1 tablet (81 mg total) by mouth daily Do not start before March 4, 2023 , Disp: 30 tablet, Rfl: 0  •  levothyroxine 25 mcg tablet, Take 1 tablet (25 mcg total) by mouth daily, Disp: 90 tablet, Rfl: 0  •  melatonin 3 mg, Take 1 tablet (3 mg total) by mouth daily at bedtime, Disp: 30 tablet, Rfl: 0  •  Naphazoline-Polyethyl Glycol 0 012-0 2 % SOLN, Apply 1-2 drops to eye 4 (four) times a day, Disp: , Rfl:   •  simvastatin (ZOCOR) 10 mg tablet, Take 1 tablet (10 mg total) by mouth daily at bedtime, Disp: 90 tablet, Rfl: 3  •  acetaminophen (TYLENOL) 325 mg tablet, Take 2 tablets (650 mg total) by mouth every 6 (six) hours as needed for mild pain or moderate pain (Patient not taking: Reported on 3/20/2023), Disp: , Rfl: 0  •  enoxaparin (LOVENOX) 40 mg/0 4 mL, Inject 0 4 mL (40 mg total) under the skin in the morning for 8 doses Do not start before February 23, 2023  (Patient not taking: Reported on 3/8/2023), Disp: 3 2 mL, Rfl: 0    Allergies   Allergen Reactions   • Fentanyl Vomiting   • Indomethacin GI Intolerance     Other reaction(s): GI upset  Other reaction(s): GI upset   • Oxycodone Dizziness, Nausea Only, GI Intolerance and Other (See Comments)     Other reaction(s): GI upset  Other reaction(s): GI upset            Vitals:    03/20/23 1337   BP: 148/84   Pulse: 74       Objective:                    Right Knee Exam     Tenderness   The patient is experiencing tenderness in the medial joint line      Range of Motion   Extension: 0   Flexion: 120     Tests   Varus: negative Valgus: negative    Other   Erythema: absent  Sensation: normal  Pulse: present  Swelling: none  Effusion: no effusion present    Comments:  Calf is soft and non tender      Right Hip Exam     Tenderness   The patient is experiencing tenderness in the lateral     Range of Motion   Flexion: 80   External rotation: 30   Internal rotation: 20     Muscle Strength   Abduction: 5/5   Adduction: 5/5   Flexion: 5/5     Other   Erythema: absent  Sensation: normal  Pulse: present            Diagnostics, reviewed and taken today if performed as documented: The attending physician has personally reviewed the pertinent films in PACS and interpretation is as follows:  Xrays of the right femur 2 views: Hardware is in appropriate position with no signs of loosening or failure  Fracture still present    Procedures, if performed today:    Procedures    None performed      Portions of the record may have been created with voice recognition software  Occasional wrong word or "sound a like" substitutions may have occurred due to the inherent limitations of voice recognition software  Read the chart carefully and recognize, using context, where substitutions have occurred

## 2023-03-21 ENCOUNTER — PATIENT OUTREACH (OUTPATIENT)
Dept: CASE MANAGEMENT | Facility: HOSPITAL | Age: 78
End: 2023-03-21

## 2023-03-21 NOTE — PROGRESS NOTES
Outpatient Care Manager Note: Chart notes reviewed and call made to patient  She reports is doing well and now allowed to drive  She took a friend with her and went to grocery store  SHe continues with home therapy  CMP done 3/8 shows stable renal function  She saw her PCP on 3/8 as well and /80  She denies concerns    SNF/CELESTINE Pathway completed and Complex episode resolved

## 2023-03-22 ENCOUNTER — OFFICE VISIT (OUTPATIENT)
Age: 78
End: 2023-03-22

## 2023-03-22 DIAGNOSIS — M25.551 ACUTE RIGHT HIP PAIN: Primary | ICD-10-CM

## 2023-03-22 NOTE — PROGRESS NOTES
Daily Note     Today's date: 3/22/2023  Patient name: Gianni Cox  :   MRN: [id number]  Referring provider: No ref  provider found  Dx:   Encounter Diagnosis     ICD-10-CM    1  Acute right hip pain  M25 551                      Subjective: States that her appointment went well, she is allowed to drive and drove once already  Objective: See treatment diary below    5xSTS - 17 42s w/ UE  TUG w/ cane - 19 95s     Assessment: D/c to OPPT due to meeting all goals set at IE and pt subjectively reports I in home performing ADLs and transfers without difficulty  STG: RLE MMT improved by at least 1/2 grade in 2-4 weeks  - MET  STG: RLE ROM improved by at least 25% in all deficient planes in 2-4 weeks  - MET  STG: Subjectively reports pain at worst decreased by 2 points in 2-4 weeks  - MET  STG: I w/ HEPs 1 week after prescription  - MET  LTG: Drives to OPPT by d/c  - In progress, driving to appointment next week, has driven to grocery store   LTG: I navigates home environment by d/c  - MET  LTG: I w/ comprehensive HEP by d/c  - MET    Plan: D/c to OPPT       Patient was seen in their Home for this Outpatient in the Home visit      At Initial Evaluation the following documents were reviewed and given to the patient: [employer] of [address] Consent and Release, The [employer] of [address] Service Agreement, Patient Rights and Responsibilities, Home Health Admission Handbook and Outpatient Insurance Verification         Precautions: Osteoporosis, femur IM nail placement 2/3/23      Manuals 2/27 3/1 3/6 3/8 3/13 3/15 3/20 3/22                                            Re-assessment        15'     Neuro Re-Ed 2/27 3/1 3/6 3/8 3/13 3/15 3/20 3/22     TUG, 5xSTS ARAIZA  15'           Step up  3x5 w/ SPC           Outdoor ambulation   Cane 50' RW 50'          Standing HF    1UE 3x10 ea 1UE 3x10 ea         Step over object    Carpet 2x8 fwd/bck/lat ea foot         Balance progression    FT EC 2x1' semi tandem EC 2x30" ea FT EC 2x1' semi tandem EC 2x30" ea  FTEC 2x1' semi tandem EC 2x30" ea FT EC2x1' semi tandem EC 2x30" ea      Sidesteps     W/ cane 3x30' W/ cane 3x30' W/ cane 3x30' W/ cane 3x30'     Marching     W/ cane 2x30' W/ cane 3x30' W/ cane 3x30' W/ cane 3x30'     Counter walking      LUE assist 3x5x10' LUE assist 3x5x10' LUE assist 3x5     Education       Regarding cane position during walking 10'      Cane walking  2x50', navigate around kitchen and living room  3x4' 1x6' 1x6' 1x5' 1x5'     Ther Ex 2/27 3/1 3/6 3/8 3/13 3/15 3/20 3/22     Knee ext TB   BTB 3x10 BTB 3x10 BTB 3x10        HEP        Reviewed and updated HEP 10'                                                                                   Ther Activity             Education Regarding rehab, PoC, soreness 10'   Regarding HEP, walking w/ cane 10' Education/demonstration rgarding transfers out backdoor for garbage/recyclng 10'        Step up      3x7 ascending R 3x7 ascending R 3x7 R     Shower bench set up  5'           Transfers Stairs 1x flight, STS from recliner and toilet 2x ea Stairs 2x flights w/ SPC, 3x in and out of shower w/ shower bench Stairs 2x flights w/ SPC, 3x stairs outside front, 2x stairs outsdie back, 2x shower transfer     2x stairs, outdoor steps, garage steps     Gait Training                                       Modalities

## 2023-03-29 ENCOUNTER — EVALUATION (OUTPATIENT)
Dept: PHYSICAL THERAPY | Facility: REHABILITATION | Age: 78
End: 2023-03-29

## 2023-03-29 VITALS — SYSTOLIC BLOOD PRESSURE: 102 MMHG | DIASTOLIC BLOOD PRESSURE: 65 MMHG

## 2023-03-29 DIAGNOSIS — M25.551 ACUTE RIGHT HIP PAIN: Primary | ICD-10-CM

## 2023-03-29 NOTE — PROGRESS NOTES
PT Evaluation     Today's date: 3/29/2023  Patient name: Marletta Harada  :   MRN: [id number]  Referring provider: SANTI Garrison  Dx:   Encounter Diagnosis     ICD-10-CM    1  Acute right hip pain  M25 551           Start Time: 1200  Stop Time: 1300  Total time in clinic (min): 60 minutes    Assessment  Assessment details:  Ольга Deluna is a 69 y o  female presenting to PT w/ history of R femur fracture and IM nail placement on 23  Pt currently ambulates with SPC with short community distances  She notes difficulty with stair negotiation, moving items in her home without UE support, prolonged standing/walking, and difficulty with squatting activities  Pt presents with RLE AROM deficits, R hip strength deficits, gait deviations (R Trendelenburg, decrease stance time of RLE, and decrease gait speed)  Pt would benefit from skilled PT to address impairments and improve return to activities such as bowling, yard work, and self-care per PLOF,   Impairments: abnormal gait, abnormal or restricted ROM, impaired balance and impaired physical strength    Symptom irritability: lowUnderstanding of Dx/Px/POC: good   Prognosis: good    Goals  1  Pt will demonstrate ability to complete amb without cane for short distances with min to no gait deviations per PLOF to improve ease with getting around her home safely  2  Increase B lower extremity strength golbally to 4/5 in 6 weeks to increase tolerance to reciprocal stair climbing  3  Increase Right hip abductor and external rotator strength strength to 4/5  6 weeks to improve gait mechanics and tolerance to Prolonged amb  4  Pt will demonstrate 10-15 deg improvement in hip flex< IR/ER ROM to increase tolerance to getting into/out of her chair, improve gait mechanics, and improve ability to putting on shoes/socks    1  Return to Prior Level of Function in 8 weeks to improve ease with self-care, grocery shopping, and regular hobbies  2   Pt will demonstrate [place] with progressive home exercise program to improve carryover and decrease recurrence of symptoms  3  Pt will demonstrate 20% improvement in FOTO score to increase tolerance to functional activities   4  Pt will demonstrate 4+/5 in LE strength to allow for improved tolerance to prolonged amb/standing in 6-8 weeks      Plan  Patient would benefit from: PT eval and skilled physical therapy  Planned modality interventions: TENS, thermotherapy: hydrocollator packs, low level laser therapy and cryotherapy  Planned therapy interventions: balance, gait training, neuromuscular re-education, manual therapy, massage, patient education, home exercise program, therapeutic exercise, therapeutic activities, stretching and strengthening  Frequency: 2x week  Plan of Care beginning date: 3/29/2023  Plan of Care expiration date: 5/24/2023  Treatment plan discussed with: patient        Subjective Evaluation    History of Present Illness  Date of onset: 2/2/2023  Date of surgery: 2/3/2023  Mechanism of injury: surgery  Mechanism of injury: Claire Rudd presents to OP PT secondary to R femur fracture which occured when she took a single step forward and noted a significant elevation in Right groin to hip pain  Pt reports during this incident she was able to lower herself to the ground and she was able to drag herself to the phone where she called her friend who called 911 and was admitted to hospital on 02/02/23  She had IM nail placed 2/3/23 and stayed in inpatient rehab for 2 weeks than DCd to home therapy, which was completed up until last week  Currently pt is able to cook and care for herself independently, she is able to complete bathing with use of shower bench  She notes improved tolerance to walking with SPC  Tammie notes good progress the past few weeks with walking and standing  She was able to complete food shopping with [facility] and grocery cart without sig discomfort   She additionally has trialed using only cane when going to and from MD appts  Pt reports she was finally able to go up her steps to her bed yesterday  Lilly Manuela reports pain is intermittent, and primarily aggravated with prolonged standing/walking which she manages with tylenol once a day prior to bed  She notes elevated pain in Left knee from placing more weight through LLE due to Right hip injury  During hospital stay it was discovered pt had presence of hematoma and TIA, unspecified if acute or chronic which she will be following up with neurologist in the week or so about  Recurrent probem    Quality of life: good    Pain  Current pain ratin  At best pain ratin  At worst pain ratin  Quality: dull ache  Relieving factors: rest and medications  Aggravating factors: standing and walking  Progression: improved    Social Support  Steps to enter house: yes  Stairs in house: yes   Lives in: multiple-level home  Lives with: alone    Employment status: not working  Hand dominance: right  Exercise history: Prior to injury; bowling and walking >3 times a week         Diagnostic Tests  X-ray: normal  Treatments  Previous treatment: medication and physical therapy  Current treatment: medication  Patient Goals  Patient goals for therapy: increased strength, improved balance, increased motion and return to sport/leisure activities        Objective     Active Range of Motion   Left Hip   Flexion: 122 degrees   External rotation (90/90): 40 degrees   Internal rotation (90/90): 38 degrees     Right Hip   Flexion: 130 degrees   External rotation (90/90): 32 degrees with pain  Internal rotation (90/90): 28 degrees with pain    Passive Range of Motion   Left Hip   Flexion: 130 degrees   External rotation (90/90): 35 degrees   External rotation (prone): with pain  Internal rotation (90/90): 30 degrees with pain    Strength/Myotome Testing     Left Hip   Planes of Motion   Flexion: 4  Extension: 4  Abduction: 4  External rotation: 4  Internal rotation: 4    Right Hip   Planes of Motion   Flexion: 3  Extension: 3+  Abduction: 4-  External rotation: 4-  Internal rotation: 4-    Additional Strength Details  Left Knee ext 4/5  Left knee flex 4/5    Right knee ext 4-/5   Right knee flex  4-/5            Precautions: hx of IM Right Femur, hx of TIA, check BP as needed     Manuals 3/29                                                                Neuro Re-Ed             Lateral stepping             Foam stance EO/EC             SLS             Hip hike                                                     Ther Ex             Nustep             SLR 3x10 HEP            Bridges 3x10 HEP            Clamshells  2x10 HEP            Hip add kay             Standing SLR 3 way              Step ups fwd/lat             HR/TR             Ther Activity                                       Gait Training             Amb without cane                          Modalities

## 2023-03-31 ENCOUNTER — OFFICE VISIT (OUTPATIENT)
Dept: NEUROSURGERY | Facility: CLINIC | Age: 78
End: 2023-03-31

## 2023-03-31 VITALS
WEIGHT: 126 LBS | HEIGHT: 61 IN | SYSTOLIC BLOOD PRESSURE: 160 MMHG | TEMPERATURE: 97.4 F | BODY MASS INDEX: 23.79 KG/M2 | DIASTOLIC BLOOD PRESSURE: 92 MMHG | HEART RATE: 81 BPM

## 2023-03-31 DIAGNOSIS — I72.9 ANEURYSM (HCC): ICD-10-CM

## 2023-03-31 NOTE — PROGRESS NOTES
"Neurosurgery Office Note  Nai Garcia 69 y o  female MRN: [id number]      Assessment/Plan     Aneurysm [facility])  Patient seen outpatient office today as a referral by the ED for further work-up and evaluation of incidental right posterior communicating artery aneurysm  · This was originally found when patient presented to the ED on 2/2/2023 after she took a step and felt a \"pop\" in her right leg and sustained a right femur fracture which required surgery  During her hospitalization she was complaining of a lot of lightheadedness and underwent CTA which demonstrated a incidental 2 5 mm right posterior communicating artery aneurysm  Imaging:    CTA head and neck w/wo 2/6/23:CT brain: No acute intracranial abnormality  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  CT angiography: There is a 2 5 x 2 0 mm focal outpouching at the expected location of the posterior communicating artery on the right  There is no vessel extending from this abnormality, suspicious for small aneurysm  Alternatively an infundibulum could have this appearance  Recommend follow-up consultation with the [facility], a division of [address] [place] [address] Neuroscience at [phone]  Incidental thyroid nodule(s) for which nonemergent thyroid ultrasound is recommended  Plan:  · Reviewed imaging with patient and reviewed brain anatomy etc   · Recommend continuing healthy habits such as eating a balanced diet and exercising regularly  · Extensively discussed natural history of aneurysms  Discussed modifiable risk factors including hypertension, hyperlipidemia, and smoking  Patient does endorse history of hyperlipidemia on medication  She denies any history of hypertension and states she was never a smoker  She denies any family history of aneurysms or sudden death  · Discussed signs symptoms of aneurysm rupture including severe, sudden onset headache, neck pain, nausea and vomiting, and seizure    Reiterated " "that this symptoms should prompt patient to visit emergency department immediately  · Patient follow-up as needed or symptoms worsen  · Patient made aware to seek care sooner if she develops any new or worsening neurological change or flag signs  · Patient made aware to contact neurosurgery with any further questions or concerns  Diagnoses and all orders for this visit:    Aneurysm [facility])  -     Ambulatory referral to Neurosurgery          I have spent a total time of 40 minutes on 04/01/23 in caring for this patient including Diagnostic results, Instructions for management, Patient and family education, Impressions, Counseling / Coordination of care, Documenting in the medical record, Reviewing / ordering tests, medicine, procedures  , Obtaining or reviewing history   and Communicating with other healthcare professionals   CHIEF COMPLAINT    Chief Complaint   Patient presents with   • Consult       HISTORY    History of Present Illness     69y o  year old female with past medical history significant for hypothyroidism, osteoarthritis, osteoporosis, hyperlipidemia, and glaucoma  She seen in outpatient office today as referral by the ED for further work-up and evaluation of incidental right posterior communicating artery aneurysm  Patient originally presented to the ED on 2/2/2023 after she took a step at home and felt a \"pop\" in her right leg she sustained a right femur fracture and underwent surgery with orthopedics this was likely secondary to osteoporosis  She was complaining of a lot of lightheadedness and underwent CTA imaging which demonstrated a incidental 2 5 mm right posterior communicating artery aneurysm  Patient then transition to rehab on 2/9/2023 and has been home since 2/23/23  States she has been doing well since she has been at home  She endorses some occasional sinus headaches as well as lightheadedness when lying flat with history of vertigo    She also endorses some left " fourth and fifth digit tingling which has been ongoing  She continues to do home exercises she learned at rehab and is starting outpatient therapy  She states she still remains very active  She does endorse some urinary urgency at times but no khalif incontinence  She denies any recent falls or traumas and states her balance is okay with use of a cane  She denies any blurry vision, chest pain, shortness of breath, abdominal pain, nausea, vomiting, diarrhea, no problems with bowel or bladder, no new weakness or numbness/tingling  Does endorse history of hyperlipidemia on medication  She denies any history of hypertension and states she was never smoker  She denies any family history of aneurysms or sudden death  HPI    See Discussion    REVIEW OF SYSTEMS    Review of Systems   HENT: Positive for tinnitus (slightly when everything is quite)  Eyes: Negative for visual disturbance  Respiratory: Negative for shortness of breath and wheezing  Cardiovascular: Negative for chest pain  Gastrointestinal: Negative  Genitourinary: Negative  Musculoskeletal: Positive for arthralgias (knee pain ), back pain (once in a while lower back pain ) and gait problem (cane, broken femur)  Negative for neck pain  Doing PT for broken leg      Neurological: Positive for dizziness (hx of vertigo )  Negative for tremors, seizures, weakness, numbness and headaches  Hematological: Bruises/bleeds easily (asa81 )       ROS reviewed with patient and agree and changes were made as needed    Meds/Allergies     Current Outpatient Medications   Medication Sig Dispense Refill   • aspirin (ECOTRIN LOW STRENGTH) 81 mg EC tablet Take 1 tablet (81 mg total) by mouth daily Do not start before March 4, 2023  30 tablet 0   • levothyroxine 25 mcg tablet Take 1 tablet (25 mcg total) by mouth daily 90 tablet 0   • melatonin 3 mg Take 1 tablet (3 mg total) by mouth daily at bedtime 30 tablet 0   • Naphazoline-Polyethyl Glycol 0 012-0 2 % SOLN Apply 1-2 drops to eye 4 (four) times a day     • simvastatin (ZOCOR) 10 mg tablet Take 1 tablet (10 mg total) by mouth daily at bedtime 90 tablet 3   • acetaminophen (TYLENOL) 325 mg tablet Take 2 tablets (650 mg total) by mouth every 6 (six) hours as needed for mild pain or moderate pain (Patient not taking: Reported on 3/20/2023)  0   • acetaminophen (TYLENOL) 650 mg CR tablet      • enoxaparin (LOVENOX) 40 mg/0 4 mL Inject 0 4 mL (40 mg total) under the skin in the morning for 8 doses Do not start before February 23, 2023  (Patient not taking: Reported on 3/8/2023) 3 2 mL 0     No current facility-administered medications for this visit  Allergies   Allergen Reactions   • Fentanyl Vomiting   • Indomethacin GI Intolerance     Other reaction(s): GI upset  Other reaction(s): GI upset   • Oxycodone Dizziness, Nausea Only, GI Intolerance and Other (See Comments)     Other reaction(s): GI upset  Other reaction(s): GI upset       PAST HISTORY    Past Medical History:   Diagnosis Date   • Arthritis 2005   • Closed nondisplaced fracture of fifth metatarsal bone of right foot with routine healing    • Disease of thyroid gland     hypothyroid   • Glaucoma, bilateral    • Hyperlipidemia    • Osteoporosis    • Scoliosis 12/12/12       Past Surgical History:   Procedure Laterality Date   • BUNIONECTOMY     • COLONOSCOPY  06/04/2019   • CORRECTION HAMMER TOE     • MAMMO (HISTORICAL)  07/09/2018   • NEUROMA EXCISION     • OTHER SURGICAL HISTORY      Birth kwasi removed   • NM OPTX FEM SHFT FX W/INSJ IMED IMPLT W/WO SCREW Right 2/3/2023    Procedure: INSERTION NAIL IM FEMUR ANTEGRADE (TROCHANTERIC);   Surgeon: Kenn Villarreal MD;  Location: [facility];  Service: Orthopedics   • WISDOM TOOTH EXTRACTION         Social History     Tobacco Use   • Smoking status: Never   • Smokeless tobacco: Never   Vaping Use   • Vaping Use: Never used   Substance Use Topics   • Alcohol use: Yes     Comment: Glass of wine a few "times a year   • Drug use: Never       Family History   Problem Relation Age of Onset   • Hypertension Mother    • Thyroid disease Mother    • Arthritis Mother    • Glaucoma Mother    • Stomach cancer Father 46   • Cancer Father    • No Known Problems Maternal Grandmother    • No Known Problems Maternal Grandfather    • No Known Problems Paternal Grandmother    • No Known Problems Paternal Grandfather    • Dementia Maternal Aunt    • No Known Problems Maternal Aunt    • No Known Problems Paternal Aunt    • Hypertension Brother    • Osteoporosis Family    • Hyperlipidemia Family    • Hypertension Family    • Dementia Maternal Aunt    • Hypertension Maternal Aunt          Above history personally reviewed  EXAM    Vitals:Blood pressure 160/92, pulse 81, temperature (!) 97 4 °F (36 3 °C), temperature source Temporal, height 5' 1\" (1 549 m), weight 57 2 kg (126 lb)  ,Body mass index is 23 81 kg/m²  Physical Exam  Vitals reviewed  Constitutional:       General: She is awake  She is not in acute distress  Appearance: Normal appearance  She is not ill-appearing  HENT:      Head: Normocephalic and atraumatic  Eyes:      Extraocular Movements: Extraocular movements intact and EOM normal       Conjunctiva/sclera: Conjunctivae normal       Pupils: Pupils are equal, round, and reactive to light  Cardiovascular:      Rate and Rhythm: Normal rate  Pulmonary:      Effort: Pulmonary effort is normal  No respiratory distress  Chest:      Chest wall: No tenderness  Abdominal:      General: There is no distension  Palpations: Abdomen is soft  Tenderness: There is no abdominal tenderness  Musculoskeletal:         General: Normal range of motion  Cervical back: Normal range of motion and neck supple  Skin:     General: Skin is warm and dry  Neurological:      Mental Status: She is alert and oriented to person, place, and time        Motor: Motor strength is normal       Coordination: " Finger-Nose-Finger Test normal       Deep Tendon Reflexes:      Reflex Scores:       Bicep reflexes are 2+ on the right side and 2+ on the left side  Patellar reflexes are 2+ on the right side and 2+ on the left side  Psychiatric:         Attention and Perception: Attention and perception normal          Mood and Affect: Mood and affect normal          Speech: Speech normal          Behavior: Behavior normal  Behavior is cooperative  Thought Content: Thought content normal          Cognition and Memory: Cognition and memory normal          Judgment: Judgment normal          Neurologic Exam     Mental Status   Oriented to person, place, and time  Follows 2 step commands  Attention: normal  Concentration: normal    Speech: speech is normal   Level of consciousness: alert  Knowledge: good  Able to perform simple calculations  Able to name object  Normal comprehension  Cranial Nerves     CN III, IV, VI   Pupils are equal, round, and reactive to light  Extraocular motions are normal    CN III: no CN III palsy  CN VI: no CN VI palsy  Nystagmus: none   Diplopia: none  Conjugate gaze: present    CN V   Facial sensation intact  CN VII   Facial expression full, symmetric  CN VIII   CN VIII normal    Hearing: intact    CN IX, X   CN IX normal      CN XI   CN XI normal      CN XII   CN XII normal      Motor Exam   Muscle bulk: normal  Overall muscle tone: normal  Right arm pronator drift: absent  Left arm pronator drift: absent    Strength   Strength 5/5 throughout       Sensory Exam   Light touch normal    Proprioception normal    JPS and DST intact     Gait, Coordination, and Reflexes     Gait  Gait: (walking with cane )    Coordination   Finger to nose coordination: normal    Tremor   Resting tremor: absent  Intention tremor: absent  Action tremor: absent    Reflexes   Right biceps: 2+  Left biceps: 2+  Right patellar: 2+  Left patellar: 2+  Right Kuhn: absent  Left Kuhn: absent  Right ankle clonus: absent  Left ankle clonus: absent        MEDICAL DECISION MAKING    Imaging Studies:     XR femur 2 vw right    Result Date: 3/25/2023  Narrative: RIGHT FEMUR INDICATION:   S72  21XA: Displaced subtrochanteric fracture of right femur, initial encounter for closed fracture  COMPARISON:  Compared with 2/12/2023 VIEWS:  XR FEMUR 2 VW RIGHT FINDINGS: Right femoral nail with intramedullary bruce and interlocking screws in place fixing the proximal femur fracture in alignment with callus formation  No loosening of hardware  Degenerative changes of the knee noted  No lytic or blastic osseous lesion  Soft tissues are unremarkable  Impression: Stable hardware fixing proximal humeral fracture with healing in alignment  Workstation performed: [id number]       I have personally reviewed pertinent reports     and I have personally reviewed pertinent films in PACS

## 2023-04-01 NOTE — ASSESSMENT & PLAN NOTE
"Patient seen outpatient office today as a referral by the ED for further work-up and evaluation of incidental right posterior communicating artery aneurysm  · This was originally found when patient presented to the ED on 2/2/2023 after she took a step and felt a \"pop\" in her right leg and sustained a right femur fracture which required surgery  During her hospitalization she was complaining of a lot of lightheadedness and underwent CTA which demonstrated a incidental 2 5 mm right posterior communicating artery aneurysm  Imaging:    CTA head and neck w/wo 2/6/23:CT brain: No acute intracranial abnormality  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  CT angiography: There is a 2 5 x 2 0 mm focal outpouching at the expected location of the posterior communicating artery on the right  There is no vessel extending from this abnormality, suspicious for small aneurysm  Alternatively an infundibulum could have this appearance  Recommend follow-up consultation with the [facility], a division of [facility] at [phone]  Incidental thyroid nodule(s) for which nonemergent thyroid ultrasound is recommended  Plan:  · Reviewed imaging with patient and reviewed brain anatomy etc   · Recommend continuing healthy habits such as eating a balanced diet and exercising regularly  · Extensively discussed natural history of aneurysms  Discussed modifiable risk factors including hypertension, hyperlipidemia, and smoking  Patient does endorse history of hyperlipidemia on medication  She denies any history of hypertension and states she was never a smoker  She denies any family history of aneurysms or sudden death  · Discussed signs symptoms of aneurysm rupture including severe, sudden onset headache, neck pain, nausea and vomiting, and seizure    Reiterated that this symptoms should prompt patient to visit emergency department immediately  · Patient follow-up as needed or " symptoms worsen  · Patient made aware to seek care sooner if she develops any new or worsening neurological change or flag signs  · Patient made aware to contact neurosurgery with any further questions or concerns

## 2023-04-03 ENCOUNTER — OFFICE VISIT (OUTPATIENT)
Dept: PHYSICAL THERAPY | Facility: REHABILITATION | Age: 78
End: 2023-04-03

## 2023-04-03 DIAGNOSIS — M25.551 ACUTE RIGHT HIP PAIN: Primary | ICD-10-CM

## 2023-04-03 NOTE — PROGRESS NOTES
"Daily Note     Today's date: 4/3/2023  Patient name: Geoff Gifford  :   MRN: [id number]  Referring provider: SANTI Huerta  Dx:   Encounter Diagnosis     ICD-10-CM    1  Acute right hip pain  M25 551           Start Time: 1530  Stop Time: 1615  Total time in clinic (min): 45 minutes    Subjective: Reports general leg soreness  Objective: See treatment diary below    Assessment: Tolerated treatment well  Patient would benefit from continued PT 1:1 with Farhad Becker DPT for entirety of tx  Pt progressing well within expectation  HEP reviewed  Continue LE strengthening and functional mobility per tolerance  Plan: Continue per plan of care        Precautions: hx of IM Right Femur, hx of TIA, check BP as needed     Manuals 3/29 4/3                       Neuro Re-Ed     Lateral stepping     Foam stance EO/EC     SLS                         Ther Ex     Nustep  10' L5   SLR 3x10 HEP 3x10   Bridges 3x10 HEP 3x10   Clamshells  2x10 HEP 3x10 mtb   Standing SLR ABD  2x10 ea   HR  3x10   Ther Activity     LSU  2x10 4\"   FSU  2x10 4\"   STS  3x5 2foam   Gait Training     Amb without cane          Modalities                    "

## 2023-04-06 ENCOUNTER — OFFICE VISIT (OUTPATIENT)
Dept: PHYSICAL THERAPY | Facility: REHABILITATION | Age: 78
End: 2023-04-06

## 2023-04-06 DIAGNOSIS — M25.551 ACUTE RIGHT HIP PAIN: Primary | ICD-10-CM

## 2023-04-06 NOTE — PROGRESS NOTES
"Daily Note     Today's date: 2023  Patient name: Cheryle Do  :   MRN: [id number]  Referring provider: Gray Babinski, CRNP  Dx:   Encounter Diagnosis     ICD-10-CM    1  Acute right hip pain  M25 551           Start Time: 1530  Stop Time: 1615  Total time in clinic (min): 45 minutes    Subjective: Progressing daily, intermittent general soreness  Objective: See treatment diary below      Assessment: Tolerated treatment well  Patient would benefit from continued PT 1:1 with Aurea Lama DPT for entirety of tx  Pt progressing well, improved function, reports intermittent (B) knee pain with CKC exercises  Modified form reduced knee pain  Continue per POC  Plan: Continue per plan of care        Precautions: hx of IM Right Femur, hx of TIA, check BP as needed     Manuals 3/29 4/3 4/6                           Neuro Re-Ed      Lateral stepping      Foam stance EO/EC      SLS                              Ther Ex      Nustep  10' L5 10' L5   SLR 3x10 HEP 3x10 3x10 (add wt nv)   Bridges 3x10 HEP 3x10 3x10   Clamshells  2x10 HEP 3x10 mtb 3x10   Standing SLR ABD  2x10 ea 2x10 ea   HR  3x10 3x10   Ther Activity      LSU  2x10 4\" 3x10 6\"   FSU  2x10 4\" 3x10 \"   Leg Press   3x10 25#   STS  3x5 2foam    Gait Training      Amb without cane            Modalities                       "

## 2023-04-13 NOTE — ED ATTENDING ATTESTATION
2/2/2023  IAngela DO, saw and evaluated the patient  I have discussed the patient with the resident/non-physician practitioner and agree with the resident's/non-physician practitioner's findings, Plan of Care, and MDM as documented in the resident's/non-physician practitioner's note, except where noted  All available labs and Radiology studies were reviewed  I was present for key portions of any procedure(s) performed by the resident/non-physician practitioner and I was immediately available to provide assistance  At this point I agree with the current assessment done in the Emergency Department  I have conducted an independent evaluation of this patient a history and physical is as follows:    68 yo female presents for evaluation of R hip pain  States she mis-stepped, felt a pop then had immediate pain, inability to walk  Denies focal weakness/numbness/tingling  Did not strike her head      +EHL/FHL  SILT L3-S2 B  R hip TTP, large amount of swelling to proximal femur region    xrays reviewed in realtime showing proximal femur fx with 90 degree angulation    Imp: prox femur fracture plan: femur xray, CXR, pre-op labs, admit to trauma, ortho consultation, place in [facility]      ED Course         Critical Care Time  Procedures 78 yo M with hx of CAD s/p CABG, HFrEF EF 40% LVEDD 5.0 modMR, afib on Eliquis, DM, CKD presented with BLE edema and KAY after recent discharge from OSH with ADHF as well as exertional chest pain admitted this time again for ADHF. He was found to have new aflutter for which EP was consulted and plan for KEAGAN/DCCV vs ablation on this admission once more euvolemic. He is making urine despite BUN/Cr of 101/6.9. He is not responding well to boluses of IV diuretics (or PO diuretics at home ) which may contribute to his recurrent hospitalization for volume overload. He was consented and plan to started HD first session today 4/13.    Cardiac studies  TTE 4/10: EF 40%, LVEDD 5.0, ANISA 49, modMR, stage II diastolic dysfunction, RAP based on IVC was 15

## 2023-04-25 ENCOUNTER — OFFICE VISIT (OUTPATIENT)
Dept: PHYSICAL THERAPY | Facility: REHABILITATION | Age: 78
End: 2023-04-25

## 2023-04-25 DIAGNOSIS — M25.551 ACUTE RIGHT HIP PAIN: Primary | ICD-10-CM

## 2023-04-25 NOTE — PROGRESS NOTES
"Daily Note     Today's date: 2023  Patient name: Nette Cerda  : [id number]  MRN: [id number]  Referring provider: SANTI Villarreal  Dx:   Encounter Diagnosis     ICD-10-CM    1  Acute right hip pain  M25 551                      Subjective: Reports slow improvement in mobility and reduced pain  Objective: See treatment diary below      Assessment: Tolerated treatment well  Patient would benefit from continued PT  Tolerated increased intensity well  Improved strength with CKC activity and no reported knee pain during tx  Plan: Continue per plan of care        Precautions: hx of IM Right Femur, hx of TIA, check BP as needed     Manuals 4/3 4/6 4/10 4/12 4/17 4/19 4/25                                           Neuro Re-Ed          Lateral stepping          Foam stance EO/EC          SLS                                                  Ther Ex          Nustep 10' L5 10' L5 10' L5 10' L3 10' L3 10' L3 10' L4   SLR 3x10 3x10 (add wt nv) 3x10 3x10 1# 3x10 1# 3x10 1# 3x10 2#   Bridges 3x10 3x10 3x10 3x10 5# 3x10 5# 3x10 5# 3x10 5#   Clamshells  3x10 mtb 3x10 3x10 3x10 mtb 3x10 mtb 3x10 mtb 3x10 mtb   Standing SLR ABD 2x10 ea 2x10 ea 2x10 ea  3x10 ea 2# 3x10 2# 3x10 2#   HR 3x10 3x10 3x10 3x10 3x10 3x10 3x10   Ther Activity          LSU 2x10 4\" 3x10 6\" 3x10 6\" 3x10 6\" 3x10 6\" 3x10 6\" 3x10 6\"   FSU 2x10 4\" 3x10 \" 3x10 6\" 3x10 6\" x10 9\"  2x10 6\" x10 9\"  2x10 6\" 3x10 6\"   Leg Press  3x10 25# 3x10 25# 3x10 25# 3x10 25# 3x10 25#    STS 3x5 2foam         Gait Training          Amb without cane                    Modalities                                   "

## 2023-04-27 ENCOUNTER — OFFICE VISIT (OUTPATIENT)
Dept: PHYSICAL THERAPY | Facility: REHABILITATION | Age: 78
End: 2023-04-27

## 2023-04-27 DIAGNOSIS — M25.551 ACUTE RIGHT HIP PAIN: Primary | ICD-10-CM

## 2023-04-27 NOTE — PROGRESS NOTES
"Daily Note     Today's date: 2023  Patient name: Marletta Harada  :   MRN: [id number]  Referring provider: SANTI Garrison  Dx:   Encounter Diagnosis     ICD-10-CM    1  Acute right hip pain  M25 551                      Subjective: Pt demonstrating improved gait speed  Objective: See treatment diary below      Assessment: Tolerated treatment well  Patient would benefit from continued PT 1:1 with Carlota Anderson DPT for entirety of tx  Pt progressing well, reduced pain and improved ROM and strength  Progressed CKC exercise without c/o  Plan: Continue per plan of care        Precautions: hx of IM Right Femur, hx of TIA, check BP as needed     Manuals 4/3 4/6 4/10 4/12 4/17 4/19 4/25 4/27                                               Neuro Re-Ed           Lateral stepping           Foam stance EO/EC           SLS                                                       Ther Ex           Nustep 10' L5 10' L5 10' L5 10' L3 10' L3 10' L3 10' L4 10' L4   SLR 3x10 3x10 (add wt nv) 3x10 3x10 1# 3x10 1# 3x10 1# 3x10 2# 3x10 2#   Bridges 3x10 3x10 3x10 3x10 5# 3x10 5# 3x10 5# 3x10 5# 3x10 5#   Clamshells  3x10 mtb 3x10 3x10 3x10 mtb 3x10 mtb 3x10 mtb 3x10 mtb    Standing SLR ABD 2x10 ea 2x10 ea 2x10 ea  3x10 ea 2# 3x10 2# 3x10 2# 3x10 2#   HR 3x10 3x10 3x10 3x10 3x10 3x10 3x10 HEP   Ther Activity           LSU 2x10 4\" 3x10 6\" 3x10 6\" 3x10 6\" 3x10 6\" 3x10 6\" 3x10 6\" 3x10 6\"   FSU 2x10 4\" 3x10 \" 3x10 6\" 3x10 6\" x10 9\"  2x10 6\" x10 9\"  2x10 6\" 3x10 6\" 3x10 6\"   Leg Press  3x10 25# 3x10 25# 3x10 25# 3x10 25# 3x10 25#  3x10 40#   STS 3x5 2foam          Gait Training           Amb without cane                      Modalities                                      "

## 2023-05-02 ENCOUNTER — OFFICE VISIT (OUTPATIENT)
Dept: PHYSICAL THERAPY | Facility: REHABILITATION | Age: 78
End: 2023-05-02

## 2023-05-02 DIAGNOSIS — M25.551 ACUTE RIGHT HIP PAIN: Primary | ICD-10-CM

## 2023-05-02 NOTE — PROGRESS NOTES
"Daily Note     Today's date: 2023  Patient name: Linda Figueroa  : [id number]  MRN: [id number]  Referring provider: SANTI Sotelo  Dx:   Encounter Diagnosis     ICD-10-CM    1  Acute right hip pain  M25 551                  1on1 with 2 PT's  Subjective: Pt reports increased soreness in back and LE after performance of leg press last session as she felt she used to much weight  Pt had to take tylenol that night which did help alleviate her discomfort  Objective: See treatment diary below      Assessment: Tolerated treatment well  No pain complaints with exercise performance this session  Patient would benefit from continued PT      Plan: Continue per plan of care  Progress treatment as tolerated         Precautions: hx of IM Right Femur, hx of TIA, check BP as needed     Manuals 4/6 4/10 4/12 4/17 4/19 4/25 4/27 5/2                                               Neuro Re-Ed           Lateral stepping        3 laps at window   Foam stance EO/EC           SLS                                                       Ther Ex           Nustep 10' L5 10' L5 10' L3 10' L3 10' L3 10' L4 10' L4 10' L4   SLR 3x10 (add wt nv) 3x10 3x10 1# 3x10 1# 3x10 1# 3x10 2# 3x10 2# 3x10 2#   Bridges 3x10 3x10 3x10 5# 3x10 5# 3x10 5# 3x10 5# 3x10 5# 3x10 5#   Clamshells  3x10 3x10 3x10 mtb 3x10 mtb 3x10 mtb 3x10 mtb  3x10 mtb   Standing SLR ABD 2x10 ea 2x10 ea  3x10 ea 2# 3x10 2# 3x10 2# 3x10 2# 3x10 2#   HR 3x10 3x10 3x10 3x10 3x10 3x10 HEP    Ther Activity           LSU 3x10 6\" 3x10 6\" 3x10 6\" 3x10 6\" 3x10 6\" 3x10 6\" 3x10 6\" 6\" 3x10   FSU 3x10 \" 3x10 6\" 3x10 6\" x10 9\"  2x10 6\" x10 9\"  2x10 6\" 3x10 6\" 3x10 6\" 6\" 3x10   Leg Press 3x10 25# 3x10 25# 3x10 25# 3x10 25# 3x10 25#  3x10 40# 3x10 40#   STS           Gait Training           Amb without cane                      Modalities                                      "

## 2023-05-04 ENCOUNTER — OFFICE VISIT (OUTPATIENT)
Dept: PHYSICAL THERAPY | Facility: REHABILITATION | Age: 78
End: 2023-05-04

## 2023-05-04 DIAGNOSIS — M25.551 ACUTE RIGHT HIP PAIN: Primary | ICD-10-CM

## 2023-05-04 NOTE — PROGRESS NOTES
PT Re-Evaluation     Today's date: 2023  Patient name: Linda Figueroa  : [id number]  MRN: [id number]  Referring provider: SANTI Sotelo  Dx:   Encounter Diagnosis     ICD-10-CM    1  Acute right hip pain  M25 551           Start Time: 1115  Stop Time: 1200  Total time in clinic (min): 45 minutes    Assessment/Plan  Pt progressing well towards goals, strength gait and ROM all steadily improving  Continue per POC  Goal Summary  Goals  1  Pt will demonstrate ability to complete amb without cane for short distances with min to no gait deviations per PLOF to improve ease with getting around her home safely Progressing  2  Increase B lower extremity strength golbally to / in 6 weeks to increase tolerance to reciprocal stair climbing  MET  3  Increase Right hip abductor and external rotator strength strength to 5  6 weeks to improve gait mechanics and tolerance to Prolonged amb MET  4  Pt will demonstrate 10-15 deg improvement in hip flex< IR/ER ROM to increase tolerance to getting into/out of her chair, improve gait mechanics, and improve ability to putting on shoes/socks MET    1  Return to Prior Level of Function in 8 weeks to improve ease with self-care, grocery shopping, and regular hobbies [facility]  2  Pt will demonstrate [place] with progressive home exercise program to improve carryover and decrease recurrence of symptoms  3  Pt will demonstrate 20% improvement in FOTO score to increase tolerance to functional activities   4  Pt will demonstrate 4+/5 in LE strength to allow for improved tolerance to prolonged amb/standing in 6-8 weeks    Subjective  Pt reports making steady progess towards goals  Objective           Plan: Continue per plan of care  Progress treatment as tolerated         Precautions: hx of IM Right Femur, hx of TIA, check BP as needed     Manuals 4/6 4/10 4/12 4/17 4/19 4/25 4/27 5/2 5/4                                                   Neuro Re-Ed "  Lateral stepping        3 laps at window 3 laps at window   Foam stance EO/EC            SLS                                                            Ther Ex            Nustep 10' L5 10' L5 10' L3 10' L3 10' L3 10' L4 10' L4 10' L4 10' L4   SLR 3x10 (add wt nv) 3x10 3x10 1# 3x10 1# 3x10 1# 3x10 2# 3x10 2# 3x10 2# 3x10 2#   Bridges 3x10 3x10 3x10 5# 3x10 5# 3x10 5# 3x10 5# 3x10 5# 3x10 5# 3x10 5#   Clamshells  3x10 3x10 3x10 mtb 3x10 mtb 3x10 mtb 3x10 mtb  3x10 mtb 3x10 mtb   Standing SLR ABD 2x10 ea 2x10 ea  3x10 ea 2# 3x10 2# 3x10 2# 3x10 2# 3x10 2# 3x10 2#   HR 3x10 3x10 3x10 3x10 3x10 3x10 HEP     Ther Activity            LSU 3x10 6\" 3x10 6\" 3x10 6\" 3x10 6\" 3x10 6\" 3x10 6\" 3x10 6\" 6\" 3x10 6\" 3x10   FSU 3x10 \" 3x10 6\" 3x10 6\" x10 9\"  2x10 6\" x10 9\"  2x10 6\" 3x10 6\" 3x10 6\" 6\" 3x10 6\" 3x10   Leg Press 3x10 25# 3x10 25# 3x10 25# 3x10 25# 3x10 25#  3x10 40# 3x10 40# 3x10 40#   STS            Gait Training            Amb without cane                        Modalities                                             "

## 2023-05-05 ENCOUNTER — TELEPHONE (OUTPATIENT)
Dept: NEUROLOGY | Facility: CLINIC | Age: 78
End: 2023-05-05

## 2023-05-05 NOTE — TELEPHONE ENCOUNTER
Hello,     Patient has called and confirmed her apt with Didier Roberts,5/10/23,  Joelle Villalba, 8:15 am,NP     Thank you,     João Guerra

## 2023-05-09 ENCOUNTER — OFFICE VISIT (OUTPATIENT)
Dept: PHYSICAL THERAPY | Facility: REHABILITATION | Age: 78
End: 2023-05-09

## 2023-05-09 DIAGNOSIS — M25.551 ACUTE RIGHT HIP PAIN: Primary | ICD-10-CM

## 2023-05-09 NOTE — PROGRESS NOTES
"Daily Note     Today's date: 2023  Patient name: Aline Calvo  : [id number]  MRN: [id number]  Referring provider: SANTI Villafana  Dx:   Encounter Diagnosis     ICD-10-CM    1  Acute right hip pain  M25 551           Start Time: 1345  Stop Time: 1438  Total time in clinic (min): 53 minutes    Subjective: Pt progressing well, reduced pain and improved mobility and function  Objective: See treatment diary below    Assessment: Tolerated treatment well  Patient would benefit from continued PT 1:1 with Dionna Brown, DPT for30 mins of tx, 23 mins 1:1 with Xavier Bragg PTA  Pt progressing very well, improved strength and mobility tolerated tx well      Plan: Continue per plan of care  Plan: Continue per plan of care  Progress treatment as tolerated         Precautions: hx of IM Right Femur, hx of TIA, check BP as needed     POC Ends: 2023    Manuals                                        Neuro Re-Ed         Lateral stepping    3 laps at window 3 laps at window    Foam stance EO/EC         SLS                                             Ther Ex         Nustep 10' L3 10' L4 10' L4 10' L4 10' L4 10' L4   SLR 3x10 1# 3x10 2# 3x10 2# 3x10 2# 3x10 2# 3x10 2#   Bridges 3x10 5# 3x10 5# 3x10 5# 3x10 5# 3x10 5# 3x10 10#   Clamshells  3x10 mtb 3x10 mtb  3x10 mtb 3x10 mtb    Standing SLR ABD 3x10 2# 3x10 2# 3x10 2# 3x10 2# 3x10 2# Sidelying 2x10   HR 3x10 3x10 HEP      Ther Activity         LSU 3x10 6\" 3x10 6\" 3x10 6\" 6\" 3x10 6\" 3x10 6\" 3x10   FSU x10 9\"  2x10 6\" 3x10 6\" 3x10 6\" 6\" 3x10 6\" 3x10 6\" 3x10   Leg Press 3x10 25#  3x10 40# 3x10 40# 3x10 40# 3x10 40#   STS         Gait Training         Amb without cane                  Modalities                                      "

## 2023-05-10 ENCOUNTER — OFFICE VISIT (OUTPATIENT)
Dept: NEUROLOGY | Facility: CLINIC | Age: 78
End: 2023-05-10

## 2023-05-10 VITALS
BODY MASS INDEX: 24.19 KG/M2 | SYSTOLIC BLOOD PRESSURE: 145 MMHG | RESPIRATION RATE: 14 BRPM | DIASTOLIC BLOOD PRESSURE: 70 MMHG | HEART RATE: 73 BPM | TEMPERATURE: 97.9 F | WEIGHT: 128 LBS

## 2023-05-10 DIAGNOSIS — E78.5 HLD (HYPERLIPIDEMIA): ICD-10-CM

## 2023-05-10 DIAGNOSIS — E04.2 MULTIPLE THYROID NODULES: ICD-10-CM

## 2023-05-10 DIAGNOSIS — Z86.73 HISTORY OF STROKE: Primary | ICD-10-CM

## 2023-05-10 DIAGNOSIS — I72.9 ANEURYSM (HCC): ICD-10-CM

## 2023-05-10 PROBLEM — Z13.1 SCREENING FOR DIABETES MELLITUS: Status: ACTIVE | Noted: 2023-05-10

## 2023-05-10 RX ORDER — GINGER ROOT/GINGER ROOT EXT 262.5 MG
CAPSULE ORAL
COMMUNITY

## 2023-05-10 RX ORDER — BIOTIN 1 MG
TABLET ORAL
COMMUNITY
Start: 2015-05-16

## 2023-05-10 RX ORDER — SODIUM PHOSPHATE,MONO-DIBASIC 19G-7G/118
1 ENEMA (ML) RECTAL DAILY
COMMUNITY

## 2023-05-10 NOTE — PATIENT INSTRUCTIONS
Incidental thyroid nodule:    -Upon the patient's most recent CTA head and neck, incidental thyroid nodule was located  Patient states that she has a past history of thyroid nodules and is currently on medication to treat hypothyroidism  She had a biopsy in the past, specifically around May 2017  Everything appeared to be fine with the nodules at this time   -Still going to order a thyroid ultrasound as it was recommended at this time upon reading of this CTA  History of Stroke:    I had the pleasure of seeing Truong Lawrence today in the office at [place] [address] [facility] in [place]  She is presenting today as a new patient consult in regards to an old lacunar infarct in the basal ganglia that was found on her most recent CTA imaging  Patient was following with neurosurgery for evaluation of her aneurysm, they noted that she does not need to have any additional follow-up or imaging at this time  However, recommended coming over to neurology to assess the patient's old lacunar infarct  Patient is currently on grams once daily and also taking simvastatin 10 mg once daily as well  Not having any residual deficits from his stroke, or not experiencing any new strokelike symptoms at this time  She did note that for about a year and a half she does have these random spurts of pain which starts at her shoulder blades and radiate to her chest and then up to her jaw  She had an EKG and an echocardiogram with cardiology  No cardiac abnormality was found at this time  It would usually last about 10 minutes in length  Did not require any further work-up by cardiology at this time     -Would like for the patient to have a updated lipid panel before she comes back next time  Would like to see if we need to increase her simvastatin dosage at all at this time   -Would also like for the patient to have an MRI brain without contrast before she returns    Does not appear that the patient has had a recent brain which had indicated a previous lacunar infarct  Would be good to use an MRI to evaluate for any additional infarcts at this time, or to make sure that the infarcts that she had are still stable  -Continue to follow with neurosurgery on an as-needed basis for the evaluation of her aneurysm  - For ongoing stroke prevention continue: Aspirin 81 mg once daily, simvastatin 10 mg once daily  - Discussed the importance of antiplatelet management with the patient to prevent future strokes  - Recommend to check blood pressure occasionally away from the doctor's office to make sure that those numbers are typically less than 130/80  If they are frequently higher than that, we recommend checking a little more often and to follow up with primary care team   - Will defer to primary care team for monitoring of cholesterol panel and blood sugar numbers with target LDL cholesterol of less than 70 and hemoglobin A1c less than 7%  - Recommend following a low salt, mediterranean diet   - Recommend routine physical exercise as tolerated     We will plan for her to return to the office in 4 months time time to see on of the APPs or Dr Susan Parra but would be happy to see her sooner if the need should arise  If she has any symptoms concerning for TIA or stroke including sudden painless loss of vision or double vision, difficulty speaking or swallowing, vertigo/room spinning that does not quickly resolve, or weakness/numbness/loss of coordination affecting 1 side of the face or body she should proceed by ambulance to the nearest emergency room immediately

## 2023-05-10 NOTE — PROGRESS NOTES
Patient ID: Janel Jansen is a 69 y o  female who presents to the [facility]  Assessment/Plan:    Incidental thyroid nodule:    -Upon the patient's most recent CTA head and neck, incidental thyroid nodule was located  Patient states that she has a past history of thyroid nodules and is currently on medication to treat hypothyroidism  She had a biopsy in the past, specifically around May 2017  Everything appeared to be fine with the nodules at this time   -Still going to order a thyroid ultrasound as it was recommended at this time upon reading of this CTA  History of Stroke:    I had the pleasure of seeing Elle Holm today in the office at [place] [address] [facility] in [place]  She is presenting today as a new patient consult in regards to an old lacunar infarct in the basal ganglia that was found on her most recent CTA imaging  Patient was following with neurosurgery for evaluation of her aneurysm, they noted that she does not need to have any additional follow-up or imaging at this time  However, recommended coming over to neurology to assess the patient's old lacunar infarct  Patient is currently on grams once daily and also taking simvastatin 10 mg once daily as well  Not having any residual deficits from his stroke, or not experiencing any new strokelike symptoms at this time  She did note that for about a year and a half she does have these random spurts of pain which starts at her shoulder blades and radiate to her chest and then up to her jaw  She had an EKG and an echocardiogram with cardiology  No cardiac abnormality was found at this time  It would usually last about 10 minutes in length  Did not require any further work-up by cardiology at this time     -Would like for the patient to have a updated lipid panel before she comes back next time    Would like to see if we need to increase her simvastatin dosage at all at this time   -Would also like for the patient to have an MRI brain without contrast before she returns  Does not appear that the patient has had a recent brain which had indicated a previous lacunar infarct  Would be good to use an MRI to evaluate for any additional infarcts at this time, or to make sure that the infarcts that she had are still stable  -Continue to follow with neurosurgery on an as-needed basis for the evaluation of her aneurysm  - For ongoing stroke prevention continue: Aspirin 81 mg once daily, simvastatin 10 mg once daily  - Discussed the importance of antiplatelet management with the patient to prevent future strokes  - Recommend to check blood pressure occasionally away from the doctor's office to make sure that those numbers are typically less than 130/80  If they are frequently higher than that, we recommend checking a little more often and to follow up with primary care team   - Will defer to primary care team for monitoring of cholesterol panel and blood sugar numbers with target LDL cholesterol of less than 70 and hemoglobin A1c less than 7%  - Recommend following a low salt, mediterranean diet   - Recommend routine physical exercise as tolerated     We will plan for her to return to the office in 4 months time time to see on of the APPs or Dr Estephania Peña but would be happy to see her sooner if the need should arise  If she has any symptoms concerning for TIA or stroke including sudden painless loss of vision or double vision, difficulty speaking or swallowing, vertigo/room spinning that does not quickly resolve, or weakness/numbness/loss of coordination affecting 1 side of the face or body she should proceed by ambulance to the nearest emergency room immediately  Subjective:    HPI    For Review:    Marsha Macias is a 69 y o  female who presents for follow up in regard to her recent stroke  Patient has a significant past medical history for hypothyroidism, osteoporosis, scoliosis and hyperlipidemia    [facility] with complaints of right hip pain after feeling a \"pop\" in her right hip when descending stairs  She was able to get to the phone and contacted EMS  A right femur x-ray showed acute transverse substantially displaced proximal femoral shaft fracture  Orthopedics were consulted, patient underwent surgical intervention on 02/03 where she underwent a right insertion of IM nailing with Dr Alfredo Leigh  Lovenox subcutaneously was initiated for DVT prophylaxis for 28 days  Patient was encouraged to stop taking bisphosphonates at this time per orthopedics  Patient developed dizziness and diaphoresis with ambulation, requiring IV fluid boluses  On 02/05 rapid response team was called due to the patient being lowered to the floor due to dizziness  A CTA head and neck was obtained at this time  Which did not show any acute findings  However, did show an outpouching of the right PCA and neurovascular follow-up was to be completed on outpatient basis  This CTA head and neck also indicated small old lacunar infarcts in the basal ganglia  This is when a outpatient neurology consultation was placed for the patient to be seen for evaluation of previous lacunar infarcts  Echocardiogram showed an ejection fraction of 65%, mild TVR and trace PVR  On 2/8, patient was transfused with 1 unit of packed red blood cells for hemoglobin of 7 3 given for ongoing lightheadedness  Patient's hemoglobin improved to 9 4 on 2/9  Patient's symptoms of lightheadedness/dizziness  had significantly improved at that time  Patient was undergoing physical therapy and Occupational Therapy at the acute rehab center at [facility] for her recent right femoral shaft fracture  Patient was discharged on 02/23/2023 from the [facility] at [place] [address]  Patient had an outpatient follow-up with neurosurgery on 03/31/2023    At this appointment, recent CTA imaging was " reviewed for potential aneurysm versus infundibulum  This aneurysm was reviewed by the neurosurgical team, no intervention required at this time and patient was able to follow-up on an as-needed basis  Interval History: For about a year an half now, pain around her shoulder blades, radiated to her chest, and then had radiated up to the jaw  Had recent ECG and echocardiogram, no abnormality with the patient's cardiac function at this time  Last time that this happened was back in April  Usually last around 10 minutes, longest has been about 20 minutes in length  Has happened with rest and also with activity as well  Was seen by cardiology in the office, and stated that she did not have to go back at that time  Did not have to return to Neurosurgery for the patient's aneurysm  Neurosurgery stated that she was able to follow-up on an as-needed basis for any further evaluation of the aneurysm or if her symptoms have progressed at this time  Recent blood tranfusion while in the hospital as the patient's hemoglobin had fallen to about 7 3  Patient has not felt lightheaded or dizzy since the transfusion at the hospital   Her most recent CBC had shown a hemoglobin A1c of 13 3 at this time  Is not able to pinpoint and identify when she may have had her basal ganglia stroke  No new stroke like symptoms, no weakness or speech difficulty  Incidental thyroid nodules found on most recent CTA head and neck  Did do a biopsy in the past of the patient's thyroid  Thyroid biopsy May 30th, 2017  No issues in regards to the patient's most recent biopsy  Is currently on aspirin and simvastatin  No bleeding or bruising concerns at this time  Has been on simvastatin 10 mg for manay years now  Bp: 145/70 at today's office visit, not consistently taking her blood pressure  Usually, 521P or 571M systolic at home  Usually higher at the doctor's office she states       States she was very active before she ended up breaking her leg, has slowed down and not has been as active since this point  Twice a week to physical therapy still  Is trying to get active again now  Usually has toast in the morning with coffee and orange juice  Has salads with meat and vegetables  Has a lot of fruit into her diet currently  Will have pretzels and cereal to eat and snack on later in the day  Will usually go to bed around midnight, will wake up once or twice  Always usually getting up once or twice  Does have a history of snoring  No periods of apnea known at this time         Lab Results   Component Value Date/Time    CHOLESTEROL 203 (H) 10/08/2021 09:29 AM     Lab Results   Component Value Date/Time    TRIG 107 10/08/2021 09:29 AM    TRIG 71 04/16/2015 09:17 AM     Lab Results   Component Value Date/Time    HDL 74 10/08/2021 09:29 AM    HDL 96 04/16/2015 09:17 AM     Lab Results   Component Value Date/Time    LDLCALC 108 (H) 10/08/2021 09:29 AM    LDLCALC 98 04/16/2015 09:17 AM       No results found for: HGBA1C  No results found for: EAG        Past Medical History:   Diagnosis Date   • Aneurysm ([facility] [address] ) 2/8/23    2 5 mg found on neck CAT scan   • Arthritis 2005   • Closed nondisplaced fracture of fifth metatarsal bone of right foot with routine healing    • Difficulty walking 2/2/23    Broken Femur   • Disease of thyroid gland     hypothyroid   • Glaucoma, bilateral    • Hyperlipidemia    • Osteoporosis    • Scoliosis 12/12/12       Current Outpatient Medications:   •  aspirin (ECOTRIN LOW STRENGTH) 81 mg EC tablet, Take 1 tablet (81 mg total) by mouth daily Do not start before March 4, 2023 , Disp: 30 tablet, Rfl: 0  •  Biotin 1000 MCG tablet, , Disp: , Rfl:   •  Calcium Carb-Cholecalciferol 600-20 MG-MCG TABS, , Disp: , Rfl:   •  Glucosamine-Chondroitin 500-400 MG CAPS, Take 1 tablet by mouth in the morning, Disp: , Rfl:   •  levothyroxine 25 mcg tablet, Take 1 tablet (25 mcg total) by mouth daily, Disp: 90 tablet, Rfl: 0  • Naphazoline-Polyethyl Glycol 0 012-0 2 % SOLN, Apply 1-2 drops to eye 4 (four) times a day, Disp: , Rfl:   •  simvastatin (ZOCOR) 10 mg tablet, Take 1 tablet (10 mg total) by mouth daily at bedtime, Disp: 90 tablet, Rfl: 3  •  acetaminophen (TYLENOL) 325 mg tablet, Take 2 tablets (650 mg total) by mouth every 6 (six) hours as needed for mild pain or moderate pain (Patient not taking: Reported on 3/20/2023), Disp: , Rfl: 0  •  acetaminophen (TYLENOL) 650 mg CR tablet, , Disp: , Rfl:   •  enoxaparin (LOVENOX) 40 mg/0 4 mL, Inject 0 4 mL (40 mg total) under the skin in the morning for 8 doses Do not start before February 23, 2023  (Patient not taking: Reported on 3/8/2023), Disp: 3 2 mL, Rfl: 0  •  melatonin 3 mg, Take 1 tablet (3 mg total) by mouth daily at bedtime, Disp: 30 tablet, Rfl: 0     Objective:    Physical Exam:                                                                 Vitals:            Constitutional:    /70 (BP Location: Right arm, Patient Position: Sitting, Cuff Size: Standard)   Pulse 73   Temp 97 9 °F (36 6 °C) (Temporal)   Resp 14   Wt 58 1 kg (128 lb)   BMI 24 19 kg/m²   BP Readings from Last 3 Encounters:   05/10/23 145/70   03/31/23 160/92   03/29/23 102/65     Pulse Readings from Last 3 Encounters:   05/10/23 73   03/31/23 81   03/20/23 74         Well developed, well nourished, well groomed  No dysmorphic features  Psychiatric:  Normal behavior and appropriate affect        Neurological Examination:     Mental status/cognitive function:   Orientated to time, place and person  Recent and remote memory intact  Attention span and concentration as well as fund of knowledge are appropriate for age  Normal language and spontaneous speech  Cranial Nerves:  II-visual fields full  III, IV, VI-Pupils were equal, round, and reactive to light and accomodation  Extraocular movements were full and conjugate without nystagmus   Conjugate gaze, normal smooth pursuits, normal saccades V-facial sensation symmetric  VII-facial expression symmetric, intact forehead wrinkle, strong eye closure, symmetric smile    VIII-hearing grossly intact bilaterally   IX, X-palate elevation symmetric, no dysarthria  XI-shoulder shrug strength intact    XII-tongue protrusion midline  Motor Exam: symmetric bulk and tone throughout, no pronator drift  Power/strength 5/5 bilateral upper and lower extremities, no atrophy, fasciculations or abnormal movements noted  Sensory: grossly intact light touch in all extremities  Reflexes: brachioradialis 2+, biceps 2+, knee 2+ bilaterally  Coordination: Finger nose finger intact bilaterally, no apparent dysmetria, ataxia or tremor noted  Gait: steady casual and tandem gait  ROS:    Review of Systems   Constitutional: Negative  Negative for appetite change and fever  HENT: Negative  Negative for hearing loss, tinnitus, trouble swallowing and voice change  Eyes: Negative  Negative for photophobia, pain and visual disturbance  Respiratory: Negative  Negative for shortness of breath  Cardiovascular: Negative  Negative for palpitations  Gastrointestinal: Negative  Negative for nausea and vomiting  Endocrine: Negative  Negative for cold intolerance  Genitourinary: Negative  Negative for dysuria, frequency and urgency  Musculoskeletal: Negative  Negative for gait problem, myalgias and neck pain  Skin: Negative  Negative for rash  Allergic/Immunologic: Negative  Neurological: Positive for light-headedness (Better now thanks to blood transfusion)  Negative for dizziness, tremors, seizures, syncope, facial asymmetry, speech difficulty, weakness, numbness and headaches  Hematological: Negative  Does not bruise/bleed easily  Psychiatric/Behavioral: Negative  Negative for confusion, hallucinations and sleep disturbance         I have spent 60 minutes today on this case including chart review, performing history and exam, patient counseling, and documentation/communication    Candie Ames PA-C  05/10/2023

## 2023-05-10 NOTE — PROGRESS NOTES
Review of Systems   Constitutional: Negative  Negative for appetite change and fever  HENT: Negative  Negative for hearing loss, tinnitus, trouble swallowing and voice change  Eyes: Negative  Negative for photophobia, pain and visual disturbance  Respiratory: Negative  Negative for shortness of breath  Cardiovascular: Negative  Negative for palpitations  Gastrointestinal: Negative  Negative for nausea and vomiting  Endocrine: Negative  Negative for cold intolerance  Genitourinary: Negative  Negative for dysuria, frequency and urgency  Musculoskeletal: Negative  Negative for gait problem, myalgias and neck pain  Skin: Negative  Negative for rash  Allergic/Immunologic: Negative  Neurological: Positive for light-headedness (Better now thanks to blood transfusion)  Negative for dizziness, tremors, seizures, syncope, facial asymmetry, speech difficulty, weakness, numbness and headaches  Hematological: Negative  Does not bruise/bleed easily  Psychiatric/Behavioral: Negative  Negative for confusion, hallucinations and sleep disturbance

## 2023-05-11 ENCOUNTER — OFFICE VISIT (OUTPATIENT)
Dept: PHYSICAL THERAPY | Facility: REHABILITATION | Age: 78
End: 2023-05-11

## 2023-05-11 DIAGNOSIS — M25.551 ACUTE RIGHT HIP PAIN: Primary | ICD-10-CM

## 2023-05-11 NOTE — PROGRESS NOTES
"Daily Note     Today's date: 2023  Patient name: Elyse Morris  : [id number]  MRN: [id number]  Referring provider: SANTI Scales  Dx:   Encounter Diagnosis     ICD-10-CM    1  Acute right hip pain  M25 551           Start Time: 1300  Stop Time: 1345  Total time in clinic (min): 45 minutes    Subjective: Reports feeling she is progressing very well, improved mobility and transfers  Objective: See treatment diary below      Assessment: Tolerated treatment well  Patient would benefit from continued PT 1:1 with Car Mcmullen DPT for entirety of tx  Pt progressing very well improved strength and, ROM  Reports being able to perform floor transfer at home when cleaning  Plan: Continue per plan of care  Plan: Continue per plan of care  Progress treatment as tolerated         Precautions: hx of IM Right Femur, hx of TIA, check BP as needed     POC Ends: 2023    Manuals                                            Neuro Re-Ed          Lateral stepping    3 laps at window 3 laps at window     Foam stance EO/EC          SLS                                                  Ther Ex          Nustep 10' L3 10' L4 10' L4 10' L4 10' L4 10' L4 10' L4   SLR 3x10 1# 3x10 2# 3x10 2# 3x10 2# 3x10 2# 3x10 2# 3x10 2#   Bridges 3x10 5# 3x10 5# 3x10 5# 3x10 5# 3x10 5# 3x10 10# 3x10 10#   Clamshells  3x10 mtb 3x10 mtb  3x10 mtb 3x10 mtb     Standing SLR ABD 3x10 2# 3x10 2# 3x10 2# 3x10 2# 3x10 2# Sidelying 2x10 Sidelying 2x10   HR 3x10 3x10 HEP       Ther Activity          LSU 3x10 6\" 3x10 6\" 3x10 6\" 6\" 3x10 6\" 3x10 6\" 3x10 6\" 3x10   FSU x10 9\"  2x10 6\" 3x10 6\" 3x10 6\" 6\" 3x10 6\" 3x10 6\" 3x10 6\" 3x10   Leg Press 3x10 25#  3x10 40# 3x10 40# 3x10 40# 3x10 40# 3x10 40#   STS          Gait Training          Amb without cane                    Modalities                                           "

## 2023-05-15 ENCOUNTER — CONSULT (OUTPATIENT)
Dept: ENDOCRINOLOGY | Facility: CLINIC | Age: 78
End: 2023-05-15

## 2023-05-15 VITALS
BODY MASS INDEX: 24.17 KG/M2 | HEART RATE: 78 BPM | HEIGHT: 61 IN | WEIGHT: 128 LBS | SYSTOLIC BLOOD PRESSURE: 142 MMHG | DIASTOLIC BLOOD PRESSURE: 80 MMHG

## 2023-05-15 DIAGNOSIS — Z78.0 MENOPAUSE: ICD-10-CM

## 2023-05-15 DIAGNOSIS — M80.00XA OSTEOPOROSIS WITH CURRENT PATHOLOGICAL FRACTURE, UNSPECIFIED OSTEOPOROSIS TYPE, INITIAL ENCOUNTER: Primary | ICD-10-CM

## 2023-05-15 DIAGNOSIS — E55.9 VITAMIN D DEFICIENCY: ICD-10-CM

## 2023-05-15 DIAGNOSIS — E03.9 HYPOTHYROIDISM, UNSPECIFIED TYPE: ICD-10-CM

## 2023-05-15 NOTE — PATIENT INSTRUCTIONS
Abaloparatide (By injection)   Abaloparatide (a-bal-oh-PAR-a-tide)  Treats osteoporosis in postmenopausal women who are at high risk for bone fracture  Brand Name(s): Tymlos   There may be other brand names for this medicine  When This Medicine Should Not Be Used: This medicine is not right for everyone  You should not receive it if you had an allergic reaction to abaloparatide  How to Use This Medicine:   Injectable  Your doctor will prescribe your exact dose and tell you how often it should be given  This medicine is given as a shot under your skin  It is usually given in the stomach area  A nurse or other health provider will give you this medicine  You may be taught how to give your medicine at home  Make sure you understand all instructions before giving yourself an injection  Do not use more medicine or use it more often than your doctor tells you to  You should receive the first several injections of this medicine while sitting or lying down if needed, until you know how this medicine affects you  You will be shown the body areas where this shot can be given  Use a different body area each time you give yourself a shot  Keep track of where you give each shot to make sure you rotate body areas  Do not inject into skin areas that are tender, bruised, red, scaly, or hard  Use a new needle each time you inject your medicine  Do not store the prefilled pen with the needle attached  If the medicine in the prefilled syringe has changed color, or if you see particles in it, do not use it  You may take calcium and vitamin D supplements while you are using this medicine if needed  Follow your doctor's instructions about how to take these supplements  You should not use this medicine for more than 2 years during your lifetime  This medicine should come with a Medication Guide  Ask your pharmacist for a copy if you do not have one  Missed dose: Take a dose as soon as you remember   If it is almost time for your next dose, wait until then and take a regular dose  Do not take extra medicine to make up for a missed dose  Before first use: Store the medicine in the refrigerator  Do not freeze  After first use: Store the medicine at room temperature, away from heat and direct light for up to 30 days  Do not freeze  Throw away used needles in a hard, closed container that the needles cannot poke through  Keep this container away from children and pets  Drugs and Foods to Avoid:      Ask your doctor or pharmacist before using any other medicine, including over-the-counter medicines, vitamins, and herbal products  Warnings While Using This Medicine:   Tell your doctor if you are pregnant or breastfeeding, or if you have kidney disease, parathyroid disease, other bone disease (including Paget's disease of the bone), or a history of bone cancer  Tell your doctor if you had a bone radiation treatment  This medicine may increase your risk of having a bone cancer  This medicine may make you dizzy or drowsy  Do not drive or do anything that could be dangerous until you know how this medicine affects you  Stand or sit up slowly if you feel lightheaded or dizzy  Your doctor will do lab tests at regular visits to check on the effects of this medicine  Keep all appointments  Keep all medicine out of the reach of children  Never share your medicine with anyone  Possible Side Effects While Using This Medicine:   Call your doctor right away if you notice any of these side effects:   Allergic reaction: Itching or hives, swelling in your face or hands, swelling or tingling in your mouth or throat, chest tightness, trouble breathing  Dizziness, faintness, lightheadedness  Fast, pounding, uneven heartbeat  Nausea, vomiting  Pain in the lower back, side, or stomach  Red or dark brown urine, painful urination  If you notice these less serious side effects, talk with your doctor:   Headache  Pain, itching, burning, swelling, or a lump under your skin where the shot was given  Tiredness  If you notice other side effects that you think are caused by this medicine, tell your doctor  Call your doctor for medical advice about side effects  You may report side effects to FDA at 1-654-FDA-7454  © Copyright [employer]sa 2022 Information is for End User's use only and may not be sold, redistributed or otherwise used for commercial purposes  The above information is an  only  It is not intended as medical advice for individual conditions or treatments  Talk to your doctor, nurse or pharmacist before following any medical regimen to see if it is safe and effective for you  Teriparatide (By injection)   Teriparatide (ter-i-PAR-a-tide)  Treats osteoporosis (weak or brittle bones) in men, and in women who have gone through menopause  Also treats osteoporosis caused by steroid medicine  Brand Name(s): Forteo   There may be other brand names for this medicine  When This Medicine Should Not Be Used: This medicine is not right for everyone  Do not use it if you had an allergic reaction to teriparatide  How to Use This Medicine:   Injectable  Your doctor will prescribe your exact dose and tell you how often it should be given  This medicine is given as a shot under your skin  It is usually given in the stomach or thigh  A nurse or other health provider will give you this medicine  You may be taught how to give your medicine at home  Make sure you understand all instructions before giving yourself an injection  Do not use more medicine or use it more often than your doctor tells you to  You should receive the first several injections of this medicine while sitting or lying down if needed, until you know how this medicine affects you  You will be shown the body areas where this shot can be given  Use a different body area each time you give yourself a shot  Keep track of where you give each shot to make sure you rotate body areas    Use a new needle each time you inject your medicine  Do not store the prefilled pen with the needle attached  If the medicine in the prefilled syringe has changed color, or if you see particles in it, do not use it  You may take calcium and vitamin D supplements while you are using this medicine if needed  Follow your doctor's instructions about how to take these supplements  This medicine comes with a Medication Guide and the User Manual  Read and follow the instructions carefully  Ask your doctor if you have any questions  Missed dose: Take a dose as soon as you remember  If it is almost time for your next dose, wait until then and take a regular dose  Do not take extra medicine to make up for a missed dose  If you store this medicine at home, keep it in the refrigerator  Do not freeze  You might not use all of the medicine in each prefilled pen  Throw away any unused medicine after 28 days, even if there is still medicine in it  Drugs and Foods to Avoid:   Ask your doctor or pharmacist before using any other medicine, including over-the-counter medicines, vitamins, and herbal products  Some medicines can affect how teriparatide works  Tell your doctor if you are using digoxin  Warnings While Using This Medicine:   Tell your doctor if you are pregnant, or if you have kidney disease (including kidney stones), parathyroid disease, other bone disease (including Paget's disease of the bone), or a history of bone cancer  Tell your doctor if you had a bone radiation treatment  Do not breastfeed during treatment with this medicine  This medicine may increase your risk of having a bone cancer  This medicine may make you dizzy or drowsy  Do not drive or do anything that could be dangerous until you know how this medicine affects you  Stand or sit up slowly if you feel lightheaded or dizzy  Your doctor will do lab tests at regular visits to check on the effects of this medicine  Keep all appointments    Keep all medicine out of the reach of children  Never share your medicine with anyone  Throw away used needles in a hard, closed container that the needles cannot poke through  Keep this container away from children and pets  Possible Side Effects While Using This Medicine:   Call your doctor right away if you notice any of these side effects: Allergic reaction: Itching or hives, swelling in your face or hands, swelling or tingling in your mouth or throat, chest tightness, trouble breathing  Dizziness, faintness, lightheadedness  Fast, pounding, uneven heartbeat  Joint or muscle pain  Unusual tiredness or weakness  If you notice these less serious side effects, talk with your doctor:   Constipation, diarrhea, nausea, upset stomach  Headache  Pain, itching, burning, swelling, or a lump under your skin where the shot was given  Runny or stuffy nose, cough, sore throat  If you notice other side effects that you think are caused by this medicine, tell your doctor  Call your doctor for medical advice about side effects  You may report side effects to FDA at 5-358-FDA-7537  © Copyright Kim Seat 2022 Information is for End User's use only and may not be sold, redistributed or otherwise used for commercial purposes  The above information is an  only  It is not intended as medical advice for individual conditions or treatments  Talk to your doctor, nurse or pharmacist before following any medical regimen to see if it is safe and effective for you

## 2023-05-15 NOTE — PROGRESS NOTES
Willis Fitzgerald 69 y o  female MRN: [id number]    Encounter: [id number]      Assessment/Plan     Assessment: This is a 69y o -year-old female with hypothyroidism  Plan:    Diagnoses and all orders for this visit:    Osteoporosis with current pathological fracture, unspecified osteoporosis type, initial encounter  Last DEXA scan which was done in 2021, showed left total hip T score -2 1, left femoral neck T score -3 4, left forearm T score -4 7, suggestive of osteoporosis  Since 2019 study there has been no statistically significant change in BMD at any of evaluated skeletal sites  Discussed with patient about option of anabolic agents such as teriparatide abaloparatide therapy, once daily injection (Given  history of atraumatic fracture of femur on bisphosphonate therapy) she is not the candidate for oral or IV bisphosphonate therapy as well as Prolia injections  Discussed the side effects and benefits of teriparatide therapy  And is agreeable to start Forteo injection, 20 mcg per day ( SQ)   We will start the paperwork for Forteo  (No previous history of kidney stones, parathyroid problems)    Obtain DEXA scan 3 months, patient is due for next DEXA scan  Discussed the importance of fall precautions, continue calcium and vitamin D3 supplementation  Continue to hold Fosamax  -     DXA bone density spine hip and pelvis; Future  -     Basic metabolic panel; Future    Hypothyroidism, unspecified type    Lab Results   Component Value Date    [id number] 2 670 03/08/2023   And is clinically and biochemically euthyroid  Continue current dose of levothyroxine    -     Ambulatory referral to Endocrinology  -     T4, free; Future  -     TSH, 3rd generation; Future  -     Thyroid Antibodies Panel; Future  -     T4, free; Future  -     TSH, 3rd generation; Future    Menopause  Content DEXA scan-patient is due for her next DEXA scan  -     DXA bone density spine hip and pelvis;  Future    Vitamin D deficiency  Obtain vitamin D level before we start teriparatide injection  Expected level of vitamin D is above 30  Continue current dose of vitamin D supplementation for now  -     Vitamin D 25 hydroxy; Future  -     Basic metabolic panel; Future  -     Vitamin D 25 hydroxy; Future  -     Basic metabolic panel; Future        CC:   Hypothyroidism, osteoporosis, menopause vitamin D deficiency    History of Present Illness     HPI:    Kennedi Franco is 44-year-old woman with medical history of osteoporosis, vitamin D deficiency, hypothyroidism, history of atraumatic fracture of right femur in February 2023, status post intramedullary nail placement is here for evaluation of osteoporosis and management  History was obtained from patient and from review of medical records    She was treated with Fosamax from 2006 to 2008   She was T/micah with Alendronate from 3/2008 to 4/2016   She was treated with Alendronate from may 2017 to 2/2023   Alendronate was stopped because of her atraumatic fracture of femur    She was diagnosed with Atraumatic fracture of right femur in Feb 2023, and had surgery ( intramedullary nail placement) on 2/3/2023     Patient has history of thyroid nodule 3 cm  Denies obstructive symptoms, denies history of exposure to the radiation to the neck/face or chest area    Currently patient takes calcium/vitamin D supplementation 600 /400 IU, 1 tablet daily     Component      Latest Ref Rng & Units 3/8/2023   Sodium      135 - 147 mmol/L 136   Potassium      3 5 - 5 3 mmol/L 3 8   Chloride      96 - 108 mmol/L 106   CO2      21 - 32 mmol/L 26   Anion Gap      4 - 13 mmol/L 4   BUN      5 - 25 mg/dL 23   Creatinine      0 60 - 1 30 mg/dL 0 81   Glucose, Random      65 - 140 mg/dL 90   Calcium      8 3 - 10 1 mg/dL 9 7   AST      5 - 45 U/L 21   ALT      12 - 78 U/L 28   Alkaline Phosphatase      46 - 116 U/L 106   Total Protein      6 4 - 8 4 g/dL 7 0   Albumin      3 5 - 5 0 g/dL 3 7   TOTAL BILIRUBIN      0 20 - 1 00 mg/dL 0 30   eGFR      ml/min/1 73sq m 70   TSH 3RD GENERATON      0 450 - 4 500 uIU/mL 2 670   PARATHYROID HORMONE      18 4 - 80 1 pg/mL 77 5       thyroid USG , 2016   THYROID ULTRASOUND     INDICATION: Nontoxic uninodular goiter  Patient states history of previous benign biopsy      COMPARISON: None     TECHNIQUE:   Ultrasound of the thyroid was performed with a high frequency linear transducer in transverse and sagittal planes including volumetric imaging sweeps as well as traditional still imaging technique      FINDINGS:  Heterogeneous parenchymal echotexture      Right gland:  2 3 x 0 9 x 1 1 cm  No dominant nodules      Left gland:  3 7 x 1 9 x 2 5 cm  3 0 x 1 9 x 2 4 cm heterogeneous solid dominant nodule      Isthmus: The isthmus is 0 2 cm in AP dimension      IMPRESSION:     3 cm dominant left thyroid nodule  Correlate with patient's history and outside imaging      If outside studies are submitted, an addendum can be issued          Lab Results   Component Value Date    [id number] 2 670 03/08/2023     Lab Results   Component Value Date    PTH 77 5 03/08/2023    CALCIUM 9 7 03/08/2023       Review of Systems   Constitutional: Positive for activity change and fatigue  Negative for diaphoresis, fever and unexpected weight change  HENT: Negative  Eyes: Negative for visual disturbance  Respiratory: Negative for cough, chest tightness and shortness of breath  Cardiovascular: Negative for chest pain, palpitations and leg swelling  Gastrointestinal: Negative for abdominal pain, blood in stool, constipation, diarrhea, nausea and vomiting  Endocrine: Negative for cold intolerance, heat intolerance, polydipsia, polyphagia and polyuria  Genitourinary: Negative for dysuria, enuresis, frequency and urgency  Musculoskeletal: Positive for arthralgias, gait problem and myalgias  Skin: Negative for pallor, rash and wound  Allergic/Immunologic: Negative  Neurological: Negative for dizziness, tremors, weakness and numbness  Hematological: Negative  Psychiatric/Behavioral: Negative  Historical Information   Past Medical History:   Diagnosis Date   • Aneurysm ([address] ) 2/8/23    2 5 mg found on neck CAT scan   • Arthritis 2005   • Closed nondisplaced fracture of fifth metatarsal bone of right foot with routine healing    • Difficulty walking 2/2/23    Broken Femur   • Disease of thyroid gland     hypothyroid   • Glaucoma, bilateral    • Hyperlipidemia    • Osteoporosis    • Scoliosis 12/12/12     Past Surgical History:   Procedure Laterality Date   • BUNIONECTOMY     • COLONOSCOPY  06/04/2019   • CORRECTION HAMMER TOE     • MAMMO (HISTORICAL)  07/09/2018   • NEUROMA EXCISION     • OTHER SURGICAL HISTORY      Birth kwasi removed   • PA OPTX FEM SHFT FX W/INSJ IMED IMPLT W/WO SCREW Right 2/3/2023    Procedure: INSERTION NAIL IM FEMUR ANTEGRADE (TROCHANTERIC);   Surgeon: Mainor Pimentel MD;  Location: [facility];  Service: Orthopedics   • WISDOM TOOTH EXTRACTION       Social History   Social History     Substance and Sexual Activity   Alcohol Use Yes    Comment: Glass of wine a few times a year     Social History     Substance and Sexual Activity   Drug Use Not Currently    Comment: Only drug use was for pain prescribed by a doctor     Social History     Tobacco Use   Smoking Status Never   Smokeless Tobacco Never     Family History:   Family History   Problem Relation Age of Onset   • Hypertension Mother    • Thyroid disease Mother    • Arthritis Mother    • Glaucoma Mother    • Hypothyroidism Mother    • Stomach cancer Father 46   • Cancer Father    • No Known Problems Maternal Grandmother    • No Known Problems Maternal Grandfather    • No Known Problems Paternal Grandmother    • No Known Problems Paternal Grandfather    • Dementia Maternal Aunt    • No Known Problems Maternal Aunt    • No Known Problems Paternal Aunt    • Hypertension Brother    • Osteoporosis "Family    • Hyperlipidemia Family    • Hypertension Family    • Dementia Maternal Aunt    • Hypertension Maternal Aunt    • Osteoporosis Maternal Aunt        Meds/Allergies   Current Outpatient Medications   Medication Sig Dispense Refill   • aspirin (ECOTRIN LOW STRENGTH) 81 mg EC tablet Take 1 tablet (81 mg total) by mouth daily Do not start before March 4, 2023  30 tablet 0   • Biotin 1000 MCG tablet      • Calcium Carb-Cholecalciferol 600-20 MG-MCG TABS      • Glucosamine-Chondroitin 500-400 MG CAPS Take 1 tablet by mouth in the morning     • levothyroxine 25 mcg tablet Take 1 tablet (25 mcg total) by mouth daily 90 tablet 0   • Naphazoline-Polyethyl Glycol 0 012-0 2 % SOLN Apply 1-2 drops to eye 4 (four) times a day     • simvastatin (ZOCOR) 10 mg tablet Take 1 tablet (10 mg total) by mouth daily at bedtime 90 tablet 3     No current facility-administered medications for this visit  Allergies   Allergen Reactions   • Fentanyl Vomiting   • Indomethacin GI Intolerance     Other reaction(s): GI upset  Other reaction(s): GI upset   • Oxycodone Dizziness, Nausea Only, GI Intolerance and Other (See Comments)     Other reaction(s): GI upset  Other reaction(s): GI upset       Objective   Vitals: Blood pressure 142/80, pulse 78, height 5' 1\" (1 549 m), weight 58 1 kg (128 lb)  Physical Exam  Vitals reviewed  Constitutional:       General: She is not in acute distress  Appearance: Normal appearance  She is not ill-appearing  HENT:      Head: Normocephalic and atraumatic  Nose: Nose normal    Eyes:      Extraocular Movements: Extraocular movements intact  Conjunctiva/sclera: Conjunctivae normal    Cardiovascular:      Rate and Rhythm: Normal rate and regular rhythm  Pulses: Normal pulses  Heart sounds: Normal heart sounds  Pulmonary:      Effort: Pulmonary effort is normal  No respiratory distress  Breath sounds: Normal breath sounds     Abdominal:      General: Bowel sounds are " "normal       Palpations: Abdomen is soft  Musculoskeletal:         General: Normal range of motion  Cervical back: Normal range of motion and neck supple  Right lower leg: No edema  Left lower leg: No edema  Skin:     General: Skin is dry  Findings: No rash  Neurological:      General: No focal deficit present  Mental Status: She is alert and oriented to person, place, and time  Psychiatric:         Mood and Affect: Mood normal          Behavior: Behavior normal          The history was obtained from the review of the chart, patient  Lab Results:   Lab Results   Component Value Date/Time    TSH 3RD GENERATON 2 670 03/08/2023 05:05 PM       Imaging Studies:   Results for orders placed during the hospital encounter of 12/15/16    US thyroid    Impression  3 cm dominant left thyroid nodule  Correlate with patient's history and outside imaging  If outside studies are submitted, an addendum can be issued  Workstation performed: [id number]      I have personally reviewed pertinent reports  Portions of the record may have been created with voice recognition software  Occasional wrong word or \"sound a like\" substitutions may have occurred due to the inherent limitations of voice recognition software  Read the chart carefully and recognize, using context, where substitutions have occurred    "

## 2023-05-16 ENCOUNTER — OFFICE VISIT (OUTPATIENT)
Dept: PHYSICAL THERAPY | Facility: REHABILITATION | Age: 78
End: 2023-05-16

## 2023-05-16 DIAGNOSIS — M25.551 ACUTE RIGHT HIP PAIN: Primary | ICD-10-CM

## 2023-05-16 NOTE — PROGRESS NOTES
"Daily Note     Today's date: 2023  Patient name: Jose Yates  :   MRN: [id number]  Referring provider: SANTI Johnson  Dx:   Encounter Diagnosis     ICD-10-CM    1  Acute right hip pain  M25 551           Start Time: 830  Stop Time: 915  Total time in clinic (min): 45 minutes    Subjective: Followed up with endoncrinologist regarding osteoporosis, has dexa scheduled in September  Reports reduced pain, improved mobility and reduced cane usage at home  Objective: See treatment diary below    Assessment: Tolerated treatment well  Patient would benefit from continued PT 1:1 with Bianca Hagen DPT for entirety of tx  Pt continues to progress well with PT interventions, improved mobility, strength tolerance  Reviewed cane titration at home and in safe enviroments, but informed pt that given status of osteoporsis, it may be prudent to use when in crowds/uneven surfaces, etc     Plan: Continue per plan of care  Plan: Continue per plan of care  Progress treatment as tolerated         Precautions: hx of IM Right Femur, hx of TIA, check BP as needed     POC Ends: 2023    Manuals                                                Neuro Re-Ed           Lateral stepping    3 laps at window 3 laps at window      Foam stance EO/EC           SLS                                                       Ther Ex           Nustep 10' L3 10' L4 10' L4 10' L4 10' L4 10' L4 10' L4 10' L4   SLR 3x10 1# 3x10 2# 3x10 2# 3x10 2# 3x10 2# 3x10 2# 3x10 2# 3x10 2#   Bridges 3x10 5# 3x10 5# 3x10 5# 3x10 5# 3x10 5# 3x10 10# 3x10 10# 3x10 10#   Clamshells  3x10 mtb 3x10 mtb  3x10 mtb 3x10 mtb      Standing SLR ABD 3x10 2# 3x10 2# 3x10 2# 3x10 2# 3x10 2# Sidelying 2x10 Sidelying 2x10 Sidelying 2x10   HR 3x10 3x10 HEP        Ther Activity           LSU 3x10 6\" 3x10 6\" 3x10 6\" 6\" 3x10 6\" 3x10 6\" 3x10 6\" 3x10 6\" 3x10   FSU x10 9\"  2x10 6\" 3x10 6\" 3x10 6\" 6\" 3x10 6\" 3x10 6\" " "3x10 6\" 3x10 6\" 3x10   Leg Press 3x10 25#  3x10 40# 3x10 40# 3x10 40# 3x10 40# 3x10 40# 3x10 40#   STS           Gait Training           Amb without cane                      Modalities                                                "

## 2023-05-18 ENCOUNTER — TELEPHONE (OUTPATIENT)
Dept: ENDOCRINOLOGY | Facility: CLINIC | Age: 78
End: 2023-05-18

## 2023-05-18 ENCOUNTER — OFFICE VISIT (OUTPATIENT)
Dept: PHYSICAL THERAPY | Facility: REHABILITATION | Age: 78
End: 2023-05-18

## 2023-05-18 DIAGNOSIS — M80.00XA OSTEOPOROSIS WITH CURRENT PATHOLOGICAL FRACTURE, UNSPECIFIED OSTEOPOROSIS TYPE, INITIAL ENCOUNTER: Primary | ICD-10-CM

## 2023-05-18 DIAGNOSIS — M25.551 ACUTE RIGHT HIP PAIN: Primary | ICD-10-CM

## 2023-05-18 RX ORDER — PEN NEEDLE, DIABETIC 32GX 5/32"
NEEDLE, DISPOSABLE MISCELLANEOUS
Qty: 100 EACH | Refills: 3 | Status: SHIPPED | OUTPATIENT
Start: 2023-05-18

## 2023-05-18 RX ORDER — TERIPARATIDE 250 UG/ML
20 INJECTION, SOLUTION SUBCUTANEOUS DAILY
Qty: 7.2 ML | Refills: 3 | Status: SHIPPED | OUTPATIENT
Start: 2023-05-18

## 2023-05-18 NOTE — PROGRESS NOTES
"Daily Note     Today's date: 2023  Patient name: Aline Calvo  : [id number]  MRN: [id number]  Referring provider: SANTI Villafana  Dx:   Encounter Diagnosis     ICD-10-CM    1  Acute right hip pain  M25 551           Start Time: 1300  Stop Time: 1340  Total time in clinic (min): 40 minutes    Subjective: Reprots progressing well, reduced SPC usage  Objective: See treatment diary below      Assessment: Tolerated treatment well  Patient would benefit from continued PT 1:1 with Dionna Dull, DPT for entirety of tx  Pt progressing well, added increased resistance without increased pain  Continue per POC  Plan: Continue per plan of care  Plan: Continue per plan of care  Progress treatment as tolerated         Precautions: hx of IM Right Femur, hx of TIA, check BP as needed     POC Ends: 2023    Manuals                                                    Neuro Re-Ed            Lateral stepping    3 laps at window 3 laps at window       Foam stance EO/EC            SLS                                                            Ther Ex            Nustep 10' L3 10' L4 10' L4 10' L4 10' L4 10' L4 10' L4 10' L4 10' L4   SLR 3x10 1# 3x10 2# 3x10 2# 3x10 2# 3x10 2# 3x10 2# 3x10 2# 3x10 2# 3x10 2#   Bridges 3x10 5# 3x10 5# 3x10 5# 3x10 5# 3x10 5# 3x10 10# 3x10 10# 3x10 10# 3x10 10#   Clamshells  3x10 mtb 3x10 mtb  3x10 mtb 3x10 mtb       Standing SLR ABD 3x10 2# 3x10 2# 3x10 2# 3x10 2# 3x10 2# Sidelying 2x10 Sidelying 2x10 Sidelying 2x10 Sidelying 2x10   HR 3x10 3x10 HEP         Ther Activity            LSU 3x10 6\" 3x10 6\" 3x10 6\" 6\" 3x10 6\" 3x10 6\" 3x10 6\" 3x10 6\" 3x10 6\" 3x10 5#   FSU x10 9\"  2x10 6\" 3x10 6\" 3x10 6\" 6\" 3x10 6\" 3x10 6\" 3x10 6\" 3x10 6\" 3x10 6\" 3x10 5#   Leg Press 3x10 25#  3x10 40# 3x10 40# 3x10 40# 3x10 40# 3x10 40# 3x10 40# 3x10 40#   STS            Gait Training            Suitcase carry          3x1'ea 5#   Amb without cane  " Modalities

## 2023-05-18 NOTE — TELEPHONE ENCOUNTER
Asaf lucas, I spoke with you about this pt on 5/15/2023   Please start the paperwork for Forteo injection 20 mg subcu once daily  We will also need injection training,      Eliud Ruiz

## 2023-05-22 ENCOUNTER — OFFICE VISIT (OUTPATIENT)
Dept: PHYSICAL THERAPY | Facility: REHABILITATION | Age: 78
End: 2023-05-22

## 2023-05-22 DIAGNOSIS — M25.551 ACUTE RIGHT HIP PAIN: Primary | ICD-10-CM

## 2023-05-22 NOTE — PROGRESS NOTES
Daily Note     Today's date: 2023  Patient name: Mey Young  : [id number]  MRN: [id number]  Referring provider: SANTI Wade  Dx:   Encounter Diagnosis     ICD-10-CM    1  Acute right hip pain  M25 551           Start Time: 1443  Stop Time: 1527  Total time in clinic (min): 44 minutes    Subjective: Patient denies any complaints pre-treatment and states that she is ambulating more outside of house without SPC  She also reports sitting on ground to weed gardens x 1 5 hours  She was able to hold on to bucket and get up from ground without much difficulty or pain  Objective: See treatment diary below      Assessment: Tolerated treatment well  No changes to program this session  Patient demonstrates good posturing throughout program  Patient was challenged by suitcase carry with weight in Left hand > Right and increased antalgic gait noted  Muscular fatigue noted after treatment  Patient demonstrated fatigue post treatment, exhibited good technique with therapeutic exercises and would benefit from continued PT      Plan: Continue per plan of care  Progress treatment as tolerated  Plan: Continue per plan of care  Progress treatment as tolerated         Precautions: hx of IM Right Femur, hx of TIA, check BP as needed     POC Ends: 2023    Manuals                                                        Neuro Re-Ed             Lateral stepping    3 laps at window 3 laps at window        Foam stance EO/EC             SLS                                                                 Ther Ex             Nustep 10' L3 10' L4 10' L4 10' L4 10' L4 10' L4 10' L4 10' L4 10' L4 L4 10min   SLR 3x10 1# 3x10 2# 3x10 2# 3x10 2# 3x10 2# 3x10 2# 3x10 2# 3x10 2# 3x10 2# 2# 3x10 ea   Bridges 3x10 5# 3x10 5# 3x10 5# 3x10 5# 3x10 5# 3x10 10# 3x10 10# 3x10 10# 3x10 10# 3x10 10#   Clamshells  3x10 mtb 3x10 mtb  3x10 mtb 3x10 mtb        Standing SLR ABD 3x10 "2# 3x10 2# 3x10 2# 3x10 2# 3x10 2# Sidelying 2x10 Sidelying 2x10 Sidelying 2x10 Sidelying 2x10 Sidelying 2x10   HR 3x10 3x10 HEP          Ther Activity             LSU 3x10 6\" 3x10 6\" 3x10 6\" 6\" 3x10 6\" 3x10 6\" 3x10 6\" 3x10 6\" 3x10 6\" 3x10 5# 6\" 3x10 5#   FSU x10 9\"  2x10 6\" 3x10 6\" 3x10 6\" 6\" 3x10 6\" 3x10 6\" 3x10 6\" 3x10 6\" 3x10 6\" 3x10 5# 6\" 3x10 5#   Leg Press 3x10 25#  3x10 40# 3x10 40# 3x10 40# 3x10 40# 3x10 40# 3x10 40# 3x10 40# 40# 3x10   STS             Gait Training             Suitcase carry          3x1'ea 5# 5# 3x1' ea   Amb without cane                          Modalities                                                          "

## 2023-05-24 ENCOUNTER — LAB (OUTPATIENT)
Dept: LAB | Facility: CLINIC | Age: 78
End: 2023-05-24

## 2023-05-24 ENCOUNTER — TELEPHONE (OUTPATIENT)
Dept: ENDOCRINOLOGY | Facility: CLINIC | Age: 78
End: 2023-05-24

## 2023-05-24 DIAGNOSIS — M80.00XA OSTEOPOROSIS WITH CURRENT PATHOLOGICAL FRACTURE, UNSPECIFIED OSTEOPOROSIS TYPE, INITIAL ENCOUNTER: ICD-10-CM

## 2023-05-24 DIAGNOSIS — Z00.00 ENCOUNTER FOR MEDICARE ANNUAL WELLNESS EXAM: ICD-10-CM

## 2023-05-24 DIAGNOSIS — E03.9 HYPOTHYROIDISM, UNSPECIFIED TYPE: ICD-10-CM

## 2023-05-24 DIAGNOSIS — Z86.73 HISTORY OF STROKE: ICD-10-CM

## 2023-05-24 DIAGNOSIS — E78.5 HLD (HYPERLIPIDEMIA): ICD-10-CM

## 2023-05-24 DIAGNOSIS — E55.9 VITAMIN D DEFICIENCY: ICD-10-CM

## 2023-05-24 DIAGNOSIS — R07.9 CHEST PAIN: ICD-10-CM

## 2023-05-24 LAB
25(OH)D3 SERPL-MCNC: 34.6 NG/ML (ref 30–100)
ALBUMIN SERPL BCP-MCNC: 3.6 G/DL (ref 3.5–5)
ALP SERPL-CCNC: 136 U/L (ref 46–116)
ALT SERPL W P-5'-P-CCNC: 19 U/L (ref 12–78)
ANION GAP SERPL CALCULATED.3IONS-SCNC: 2 MMOL/L (ref 4–13)
AST SERPL W P-5'-P-CCNC: 16 U/L (ref 5–45)
BILIRUB SERPL-MCNC: 0.43 MG/DL (ref 0.2–1)
BUN SERPL-MCNC: 18 MG/DL (ref 5–25)
CALCIUM SERPL-MCNC: 9.9 MG/DL (ref 8.3–10.1)
CHLORIDE SERPL-SCNC: 106 MMOL/L (ref 96–108)
CHOLEST SERPL-MCNC: 211 MG/DL
CO2 SERPL-SCNC: 29 MMOL/L (ref 21–32)
CREAT SERPL-MCNC: 0.85 MG/DL (ref 0.6–1.3)
GFR SERPL CREATININE-BSD FRML MDRD: 66 ML/MIN/1.73SQ M
GLUCOSE P FAST SERPL-MCNC: 98 MG/DL (ref 65–99)
HDLC SERPL-MCNC: 75 MG/DL
LDLC SERPL CALC-MCNC: 112 MG/DL (ref 0–100)
POTASSIUM SERPL-SCNC: 3.8 MMOL/L (ref 3.5–5.3)
PROT SERPL-MCNC: 7.6 G/DL (ref 6.4–8.4)
SODIUM SERPL-SCNC: 137 MMOL/L (ref 135–147)
T4 FREE SERPL-MCNC: 0.99 NG/DL (ref 0.61–1.12)
TRIGL SERPL-MCNC: 120 MG/DL
TSH SERPL DL<=0.05 MIU/L-ACNC: 3.75 UIU/ML (ref 0.45–4.5)

## 2023-05-24 NOTE — TELEPHONE ENCOUNTER
Can you please find out if Tymlos injection will be covered ?   PT has H/o Atrumatic Femur fracture that is why, Teriparatide or tymlos ( abaloperatide is safer option)  is safer option     IF Tymlos is not covered ,we can try [employer]

## 2023-05-24 NOTE — TELEPHONE ENCOUNTER
Pt called stating [facility] is going to cost her $1400 per month for a 30 day supply  Mentioned about applying for pt assistance, but she said she doesn't qualify  She thought this was just a 6 month treatment  Please advise

## 2023-05-25 ENCOUNTER — OFFICE VISIT (OUTPATIENT)
Dept: PHYSICAL THERAPY | Facility: REHABILITATION | Age: 78
End: 2023-05-25

## 2023-05-25 DIAGNOSIS — M25.551 ACUTE RIGHT HIP PAIN: Primary | ICD-10-CM

## 2023-05-25 LAB
THYROGLOB AB SERPL-ACNC: <1 IU/ML (ref 0–0.9)
THYROPEROXIDASE AB SERPL-ACNC: <9 IU/ML (ref 0–34)

## 2023-05-25 NOTE — PROGRESS NOTES
"Daily Note     Today's date: 2023  Patient name: Tanya Galarza  :   MRN: [id number]  Referring provider: SANTI Grullon  Dx:   Encounter Diagnosis     ICD-10-CM    1  Acute right hip pain  M25 551           Start Time: 1340  Stop Time: 1420  Total time in clinic (min): 40 minutes    Subjective: Pt progressing well reduced pain with ambulation, no longer using SPC  Objective: See treatment diary below    Assessment: Tolerated treatment well  Patient would benefit from continued PT 1:1 with Werner Faustin DPT for entirety of tx  Pt tolerated tx well, improved fucntional mobility, has pending interventions for osteoporosis with endocrinology  Plan: Continue per plan of care  Plan: Continue per plan of care  Progress treatment as tolerated         Precautions: hx 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 Right Femur, hx of TIA, check BP as needed     POC Ends: 2023    Manuals                                            Neuro Re-Ed          Lateral stepping 3 laps at window         Foam stance EO/EC          SLS                                                  Ther Ex          Nustep 10' L4 10' L4 10' L4 10' L4 10' L4 L4 10min L5 10min   SLR 3x10 2# 3x10 2# 3x10 2# 3x10 2# 3x10 2# 2# 3x10 ea 2# 3x10 ea   Bridges 3x10 5# 3x10 10# 3x10 10# 3x10 10# 3x10 10# 3x10 10# 3x10 10#   Clamshells  3x10 mtb         Standing SLR ABD 3x10 2# Sidelying 2x10 Sidelying 2x10 Sidelying 2x10 Sidelying 2x10 Sidelying 2x10 Sidelying 2x10   HR          Ther Activity          LSU 6\" 3x10 6\" 3x10 6\" 3x10 6\" 3x10 6\" 3x10 5# 6\" 3x10 5# 6\" 3x10 5#   FSU 6\" 3x10 6\" 3x10 6\" 3x10 6\" 3x10 6\" 3x10 5# 6\" 3x10 5# 6\" 3x10 5#   Leg Press 3x10 40# 3x10 40# 3x10 40# 3x10 40# 3x10 40# 40# 3x10 40# 3x10   STS          Gait Training          Suitcase carry      3x1'ea 5# 5# 3x1' ea 5# 3x1' ea   Amb without cane                    Modalities                                                   "

## 2023-05-26 ENCOUNTER — TELEPHONE (OUTPATIENT)
Dept: ENDOCRINOLOGY | Facility: CLINIC | Age: 78
End: 2023-05-26

## 2023-05-26 NOTE — TELEPHONE ENCOUNTER
Pt has Atraumatic fracture of femur while she is on Fosamax, so the preferred medications are Forteo or Tymlos  ( anabolic agents)   Can you please call Forteo Rep to find out if she can get the medication or we can send the letter of medical necessity     I spoke with pt and discussed everything     Thanks     Alexei Simmons

## 2023-05-26 NOTE — TELEPHONE ENCOUNTER
Spoke to pt  Notified of Dr Luna Todd message  Pt will check w/ insurance to see if Tymlos is cheaper  She will call us back w/ outcome

## 2023-05-26 NOTE — TELEPHONE ENCOUNTER
Pt called back  Tymlos is not covered by her insurance  Reviewed Evenity w/ her  Discussed side effects, etc  She will to start  Please confirm so I can have her scheduled      Thanks

## 2023-05-31 ENCOUNTER — OFFICE VISIT (OUTPATIENT)
Dept: PHYSICAL THERAPY | Facility: REHABILITATION | Age: 78
End: 2023-05-31

## 2023-05-31 DIAGNOSIS — M25.551 ACUTE RIGHT HIP PAIN: Primary | ICD-10-CM

## 2023-05-31 NOTE — PROGRESS NOTES
Daily Note     Today's date: 2023  Patient name: Sigrid Dumas  :   MRN: [id number]  Referring provider: Miquel Essex, CRNP  Dx:   Encounter Diagnosis     ICD-10-CM    1  Acute right hip pain  M25 551           Start Time: 1130  Stop Time: 1215  Total time in clinic (min): 45 minutes    Subjective: Patient presents today with R-sided back pain/ache after cleaning her house yesterday  Continues to ambulate well without SPC    Objective: See treatment diary below    Assessment: Patient tolerated treatment session well today with focus on hip functional strengthening  Performed STM along R paraspinals due to increase soreness, reported tenderness with palpation  She was able to tolerate therex load progression  Had relief in knee pain with VCs for glute engagement during LAT step-ups  She had no increase in her LBP during and at the end of the session  Patient will continue to be appropriate for skilled outpatient physical therapy in order to address impairments and maximize functional outcoemes  1:1 with Santo Durand, PT, DPT for entirety of treatment session  Pending interventions in regards to her osteoporosis management with endocrinology  Plan: Continue per plan of care  Plan: Continue per plan of care  Progress treatment as tolerated         Precautions: hx of IM Right Femur, hx of TIA, check BP as needed     POC Ends: 2023    Manuals    STM along R paraspinals/glute        AD 8'                                    Neuro Re-Ed           Lateral stepping 3 laps at window          Foam stance EO/EC           SLS                                                       Ther Ex           Nustep 10' L4 10' L4 10' L4 10' L4 10' L4 L4 10min L5 10min L5 10 min   SLR 3x10 2# 3x10 2# 3x10 2# 3x10 2# 3x10 2# 2# 3x10 ea 2# 3x10 ea #2 5 3x10 ea   Bridges 3x10 5# 3x10 10# 3x10 10# 3x10 10# 3x10 10# 3x10 10# 3x10 10# 3x10 10#   Clamshells  3x10 mtb "  Standing SLR ABD 3x10 2# Sidelying 2x10 Sidelying 2x10 Sidelying 2x10 Sidelying 2x10 Sidelying 2x10 Sidelying 2x10 Sidelying  #2 aw   2x10    HR           Ther Activity           LSU 6\" 3x10 6\" 3x10 6\" 3x10 6\" 3x10 6\" 3x10 5# 6\" 3x10 5# 6\" 3x10 5# 6\" 3x10 #5   FSU 6\" 3x10 6\" 3x10 6\" 3x10 6\" 3x10 6\" 3x10 5# 6\" 3x10 5# 6\" 3x10 5# 6\" 3x10 #5   Leg Press 3x10 40# 3x10 40# 3x10 40# 3x10 40# 3x10 40# 40# 3x10 40# 3x10 #55 x 10  #40 2x10   STS           Gait Training           Suitcase carry      3x1'ea 5# 5# 3x1' ea 5# 3x1' ea #5 2x1' ea   Amb without cane                      Modalities                                                      "

## 2023-06-02 ENCOUNTER — OFFICE VISIT (OUTPATIENT)
Dept: PHYSICAL THERAPY | Facility: REHABILITATION | Age: 78
End: 2023-06-02

## 2023-06-02 DIAGNOSIS — M25.551 ACUTE RIGHT HIP PAIN: Primary | ICD-10-CM

## 2023-06-02 NOTE — PROGRESS NOTES
"Daily Note     Today's date: 2023  Patient name: Reno Da Silva  :   MRN: [id number]  Referring provider: SANTI Mejia  Dx:   Encounter Diagnosis     ICD-10-CM    1  Acute right hip pain  M25 551           Start Time: 1030  Stop Time: 1115  Total time in clinic (min): 45 minutes    Subjective: Patient reports that her LBP has resolved but she does have minimal soreness at Right anterior hip  Objective: See treatment diary below      Assessment: Tolerated treatment well  Patient appears to be making steady progress in strength and overall mobility as demonstrated by increased resistances and ability to ambulate without SPC  She reports improved ability with all ADLs but remains limited with ascending/descending stairs with weight or ambulation with carrying any weight  Patient exhibited good technique with therapeutic exercises and would benefit from continued PT      Plan: Continue per plan of care  Progress treatment as tolerated  Advised patient to schedule 2x/week x2 wk            Precautions: hx of IM Right Femur, hx of TIA, check BP as needed     POC Ends: 2023    Manuals  6   STM along R paraspinals/glute        AD 8'                                        Neuro Re-Ed            Lateral stepping 3 laps at window           Foam stance EO/EC            SLS                                                            Ther Ex            Nustep 10' L4 10' L4 10' L4 10' L4 10' L4 L4 10min L5 10min L5 10 min L5 10'   SLR 3x10 2# 3x10 2# 3x10 2# 3x10 2# 3x10 2# 2# 3x10 ea 2# 3x10 ea #2 5 3x10 ea #2 5 3x10 ea   Bridges 3x10 5# 3x10 10# 3x10 10# 3x10 10# 3x10 10# 3x10 10# 3x10 10# 3x10 10# 10# 5\" 3x10   Clamshells  3x10 mtb           Standing SLR ABD 3x10 2# Sidelying 2x10 Sidelying 2x10 Sidelying 2x10 Sidelying 2x10 Sidelying 2x10 Sidelying 2x10 Sidelying  #2 aw   2x10  Sidelying  #2 aw   2x10   HR            Ther Activity            LSU 6\" " "3x10 6\" 3x10 6\" 3x10 6\" 3x10 6\" 3x10 5# 6\" 3x10 5# 6\" 3x10 5# 6\" 3x10 #5 6\" 3x10 #5   FSU 6\" 3x10 6\" 3x10 6\" 3x10 6\" 3x10 6\" 3x10 5# 6\" 3x10 5# 6\" 3x10 5# 6\" 3x10 #5 6\" 3x10 #5   Leg Press 3x10 40# 3x10 40# 3x10 40# 3x10 40# 3x10 40# 40# 3x10 40# 3x10 #55 x 10  #40 2x10 #55 x 10  #40 2x10   STS            Gait Training            Suitcase carry      3x1'ea 5# 5# 3x1' ea 5# 3x1' ea #5 2x1' ea #5 2x1' ea   Amb without cane                        Modalities                                                           "

## 2023-06-05 ENCOUNTER — OFFICE VISIT (OUTPATIENT)
Dept: PHYSICAL THERAPY | Facility: REHABILITATION | Age: 78
End: 2023-06-05
Payer: MEDICARE

## 2023-06-05 DIAGNOSIS — M25.551 ACUTE RIGHT HIP PAIN: Primary | ICD-10-CM

## 2023-06-05 PROCEDURE — 97110 THERAPEUTIC EXERCISES: CPT | Performed by: PHYSICAL THERAPIST

## 2023-06-05 PROCEDURE — 97530 THERAPEUTIC ACTIVITIES: CPT | Performed by: PHYSICAL THERAPIST

## 2023-06-05 NOTE — PROGRESS NOTES
"Daily Note     Today's date: 2023  Patient name: Karly Tolentino  : [birth year]  MRN: [id number]  Referring provider: SANTI Tabor  Dx:   Encounter Diagnosis     ICD-10-CM    1  Acute right hip pain  M25 551           Start Time: 1000  Stop Time: 1045  Total time in clinic (min): 45 minutes    Subjective: Reports pain is improving  Objective: See treatment diary below      Assessment: Tolerated treatment well  Patient would benefit from continued PT 1:1 with Malini Randhawa DPT for entirety of tx  Tolerated progressed CKC exercise intensity well  Decreased knee pain c/o and improved assymetrical loading tolerance  Plan: Continue per plan of care        Precautions: hx of IM Right Femur, hx of TIA, check BP as needed     POC Ends: 2023    Manuals                            Neuro Re-Ed        Lateral stepping        Foam stance EO/EC        SLS                                        Ther Ex        Nustep L4 10min L5 10min L5 10 min L5 10' L5 10'   SLR 2# 3x10 ea 2# 3x10 ea #2 5 3x10 ea #2 5 3x10 ea #2 5 3x10 ea   Bridges 3x10 10# 3x10 10# 3x10 10# 10# 5\" 3x10 10# 5\" 3x10   Clamshells         Standing SLR ABD Sidelying 2x10 Sidelying 2x10 Sidelying  #2 aw   2x10  Sidelying  #2 aw   2x10 Sidelying  #2 aw   2x10   HR        Ther Activity        LSU 6\" 3x10 5# 6\" 3x10 5# 6\" 3x10 #5 6\" 3x10 #5 6\" 3x10 #8   FSU 6\" 3x10 5# 6\" 3x10 5# 6\" 3x10 #5 6\" 3x10 #5 6\" 3x10 #8   Leg Press 40# 3x10 40# 3x10 #55 x 10  #40 2x10 #55 x 10  #40 2x10 #55 3 x 10   STS        Gait Training        Suitcase carry  5# 3x1' ea 5# 3x1' ea #5 2x1' ea #5 2x1' ea #5 2x1' ea   Amb without cane                Modalities                                                 "

## 2023-06-07 ENCOUNTER — TELEPHONE (OUTPATIENT)
Dept: ENDOCRINOLOGY | Facility: CLINIC | Age: 78
End: 2023-06-07

## 2023-06-07 DIAGNOSIS — E78.5 HYPERLIPIDEMIA: ICD-10-CM

## 2023-06-07 DIAGNOSIS — E03.9 ACQUIRED HYPOTHYROIDISM: Primary | ICD-10-CM

## 2023-06-07 RX ORDER — SIMVASTATIN 10 MG
10 TABLET ORAL
Qty: 90 TABLET | Refills: 3 | Status: CANCELLED | OUTPATIENT
Start: 2023-06-07 | End: 2023-09-05

## 2023-06-07 RX ORDER — LEVOTHYROXINE SODIUM 0.03 MG/1
25 TABLET ORAL DAILY
Qty: 90 TABLET | Refills: 0 | Status: CANCELLED | OUTPATIENT
Start: 2023-06-07

## 2023-06-07 RX ORDER — SIMVASTATIN 20 MG
20 TABLET ORAL
Qty: 90 TABLET | Refills: 1 | Status: SHIPPED | OUTPATIENT
Start: 2023-06-07 | End: 2023-09-05

## 2023-06-07 RX ORDER — LEVOTHYROXINE SODIUM 0.03 MG/1
25 TABLET ORAL DAILY
Qty: 90 TABLET | Refills: 0 | Status: SHIPPED | OUTPATIENT
Start: 2023-06-07

## 2023-06-07 NOTE — TELEPHONE ENCOUNTER
Patient requesting Levothyroxine & Simvastatin         *Neurologist recommends that this gets increased from 10 mg to 20 mg - due to her LDL being 112       Please advise if ok to increase Simvastatin to 20mg qhs

## 2023-06-08 ENCOUNTER — HOSPITAL ENCOUNTER (OUTPATIENT)
Dept: RADIOLOGY | Facility: HOSPITAL | Age: 78
Discharge: HOME | End: 2023-06-08
Payer: MEDICARE

## 2023-06-08 ENCOUNTER — OFFICE VISIT (OUTPATIENT)
Dept: PHYSICAL THERAPY | Facility: REHABILITATION | Age: 78
End: 2023-06-08
Payer: MEDICARE

## 2023-06-08 DIAGNOSIS — Z86.73 HISTORY OF STROKE: ICD-10-CM

## 2023-06-08 DIAGNOSIS — M25.551 ACUTE RIGHT HIP PAIN: Primary | ICD-10-CM

## 2023-06-08 DIAGNOSIS — E04.2 MULTIPLE THYROID NODULES: ICD-10-CM

## 2023-06-08 PROCEDURE — 97530 THERAPEUTIC ACTIVITIES: CPT

## 2023-06-08 PROCEDURE — 97110 THERAPEUTIC EXERCISES: CPT

## 2023-06-08 PROCEDURE — 76536 US EXAM OF HEAD AND NECK: CPT

## 2023-06-08 PROCEDURE — 70551 MRI BRAIN STEM W/O DYE: CPT

## 2023-06-08 PROCEDURE — G1004 CDSM NDSC: HCPCS

## 2023-06-08 NOTE — PROGRESS NOTES
"Daily Note     Today's date: 2023  Patient name: Chris Krishnan  :   MRN: [id number]  Referring provider: SANTI Ross  Dx:   Encounter Diagnosis     ICD-10-CM    1  Acute right hip pain  M25 551                      Subjective: Pt reports soreness after last session that persisted about 2 days, is feeling better today  Objective: See treatment diary below      Assessment: Tolerated treatment well  Patient would benefit from continued PT   Pt  able to complete all exercises with no increase in pain during or after session  Pt demonstrated improved tolerance during session, did well with current level of resistance on exercises  Pt  1:1 with PTA for entirety  Plan: Continue per plan of care        Precautions: hx of IM Right Femur, hx of TIA, check BP as needed     POC Ends: 2023    Manuals                               Neuro Re-Ed         Lateral stepping         Foam stance EO/EC         SLS                                             Ther Ex         Nustep L4 10min L5 10min L5 10 min L5 10' L5 10' 10' L5   SLR 2# 3x10 ea 2# 3x10 ea #2 5 3x10 ea #2 5 3x10 ea #2 5 3x10 ea 3x10 b/l 2 5#   Bridges 3x10 10# 3x10 10# 3x10 10# 10# 5\" 3x10 10# 5\" 3x10 3x10 5\" 10#   Clamshells          Standing SLR ABD Sidelying 2x10 Sidelying 2x10 Sidelying  #2 aw   2x10  Sidelying  #2 aw   2x10 Sidelying  #2 aw   2x10 sidelying 2x10 2# R   HR         Ther Activity         LSU 6\" 3x10 5# 6\" 3x10 5# 6\" 3x10 #5 6\" 3x10 #5 6\" 3x10 #8 3x10 8# 6\" R   FSU 6\" 3x10 5# 6\" 3x10 5# 6\" 3x10 #5 6\" 3x10 #5 6\" 3x10 #8 3x10 8# 6\" R   Leg Press 40# 3x10 40# 3x10 #55 x 10  #40 2x10 #55 x 10  #40 2x10 #55 3 x 10 3x10 55#   STS         Gait Training         Suitcase carry  5# 3x1' ea 5# 3x1' ea #5 2x1' ea #5 2x1' ea #5 2x1' ea 2x1' ea 5#   Amb without cane                  Modalities                                                      "

## 2023-06-13 ENCOUNTER — OFFICE VISIT (OUTPATIENT)
Dept: PHYSICAL THERAPY | Facility: REHABILITATION | Age: 78
End: 2023-06-13
Payer: MEDICARE

## 2023-06-13 DIAGNOSIS — M25.551 ACUTE RIGHT HIP PAIN: Primary | ICD-10-CM

## 2023-06-13 PROCEDURE — 97530 THERAPEUTIC ACTIVITIES: CPT

## 2023-06-13 PROCEDURE — 97110 THERAPEUTIC EXERCISES: CPT

## 2023-06-13 NOTE — PROGRESS NOTES
"Daily Note     Today's date: 2023  Patient name: Brianda Cespedes  : [id number]  MRN: [id number]  Referring provider: Marcina Burkitt, CRNP  Dx:   Encounter Diagnosis     ICD-10-CM    1  Acute right hip pain  M25 551                      Subjective: Pt reports that her R hip is doing pretty good today but does note that her low back is in some discomfort, blaming it on pulling weeds in the garden yesterday  Objective: See treatment diary below      Assessment: Tolerated treatment well  Patient would benefit from continued PT  Pt began on the nu step as an active warmup to start session  She was able to continue with all exercises this session holding any progressions to avoid increased LBP  Challenged with the leg press to end due to fatigue but able to complete with rest breaks  Pt  1:1 with PTA for entirety  Plan: Continue per plan of care        Precautions: hx of IM Right Femur, hx of TIA, check BP as needed     POC Ends: 2023    Manuals                               Neuro Re-Ed         Lateral stepping         Foam stance EO/EC         SLS                                             Ther Ex         Nustep L5 10min L5 10 min L5 10' L5 10' 10' L5 10' L5   SLR 2# 3x10 ea #2 5 3x10 ea #2 5 3x10 ea #2 5 3x10 ea 3x10 b/l 2 5# 3x10 b/l 2 5#   Bridges 3x10 10# 3x10 10# 10# 5\" 3x10 10# 5\" 3x10 3x10 5\" 10# 3x10 5\" 10#   Clamshells          Standing SLR ABD Sidelying 2x10 Sidelying  #2 aw   2x10  Sidelying  #2 aw   2x10 Sidelying  #2 aw   2x10 sidelying 2x10 2# R sidelying 2x10 2# R   HR         Ther Activity         LSU 6\" 3x10 5# 6\" 3x10 #5 6\" 3x10 #5 6\" 3x10 #8 3x10 8# 6\" R 3x10 8# 6\" R   FSU 6\" 3x10 5# 6\" 3x10 #5 6\" 3x10 #5 6\" 3x10 #8 3x10 8# 6\" R 3x10 8# 6\" R   Leg Press 40# 3x10 #55 x 10  #40 2x10 #55 x 10  #40 2x10 #55 3 x 10 3x10 55# 3x10 55#   STS         Gait Training         Suitcase carry  5# 3x1' ea #5 2x1' ea #5 2x1' ea #5 2x1' ea 2x1' ea 5# 2x1' ea 5#   Amb " without cane                  Modalities

## 2023-06-14 ENCOUNTER — OFFICE VISIT (OUTPATIENT)
Dept: INTERNAL MEDICINE CLINIC | Facility: CLINIC | Age: 78
End: 2023-06-14
Payer: MEDICARE

## 2023-06-14 VITALS
DIASTOLIC BLOOD PRESSURE: 80 MMHG | TEMPERATURE: 98.6 F | HEART RATE: 80 BPM | OXYGEN SATURATION: 96 % | SYSTOLIC BLOOD PRESSURE: 140 MMHG

## 2023-06-14 DIAGNOSIS — E03.9 HYPOTHYROIDISM: ICD-10-CM

## 2023-06-14 DIAGNOSIS — E78.5 HYPERLIPIDEMIA: ICD-10-CM

## 2023-06-14 DIAGNOSIS — M85.80 OSTEOPENIA: ICD-10-CM

## 2023-06-14 DIAGNOSIS — E04.1 THYROID NODULE: Primary | ICD-10-CM

## 2023-06-14 PROCEDURE — 99213 OFFICE O/P EST LOW 20 MIN: CPT | Performed by: INTERNAL MEDICINE

## 2023-06-14 NOTE — PROGRESS NOTES
Assessment/Plan:    Follow up   Thyroid nodule, Hyperlipidemia,  Stage one  Hypertension, Osteopenia,  Hypothyroidism     Diagnoses and all orders for this visit:    Thyroid nodule  -     Ambulatory Referral to General Surgery; Future    Hypothyroidism    Hyperlipidemia    Osteopenia          Subjective:      Patient ID: Xavier Holcomb is a 69 y o  female  HPI    The following portions of the patient's history were reviewed and updated as appropriate: allergies, current medications, past family history, past medical history, past social history, past surgical history, and problem list     Review of Systems   Constitutional: Negative  HENT: Negative for dental problem, drooling, ear discharge and ear pain  Eyes: Negative for discharge, redness and itching  Respiratory: Negative for apnea, cough and wheezing  Cardiovascular: Negative for chest pain and palpitations  Gastrointestinal: Negative for abdominal pain, blood in stool, diarrhea and vomiting  Endocrine: Negative for polydipsia, polyphagia and polyuria  Genitourinary: Negative for decreased urine volume, dysuria and frequency  Musculoskeletal: Negative for arthralgias, myalgias and neck stiffness  Skin: Negative for pallor and wound  Allergic/Immunologic: Negative for environmental allergies and food allergies  Neurological: Negative for facial asymmetry, light-headedness, numbness and headaches  Hematological: Negative for adenopathy  Does not bruise/bleed easily  Psychiatric/Behavioral: Negative for agitation, behavioral problems and confusion  Objective:      /80 (BP Location: Left arm, Patient Position: Sitting, Cuff Size: Standard)   Pulse 80   Temp 98 6 °F (37 °C) (Temporal)   SpO2 96%          Physical Exam  Constitutional:       Appearance: Normal appearance  HENT:      Head: Normocephalic        Nose: Nose normal       Mouth/Throat:      Mouth: Mucous membranes are moist    Eyes:      Pupils: Pupils are equal, round, and reactive to light  Cardiovascular:      Rate and Rhythm: Regular rhythm  Heart sounds: Normal heart sounds  Pulmonary:      Breath sounds: Normal breath sounds  Abdominal:      Palpations: Abdomen is soft  Musculoskeletal:         General: No swelling  Cervical back: Neck supple  Skin:     General: Skin is warm  Neurological:      General: No focal deficit present  Mental Status: She is alert and oriented to person, place, and time  Psychiatric:         Mood and Affect: Mood normal        Hyperlipidemia     Pravastatin  Increased to  20mg     Stage one  Hypertension      Lifestyle  Management  For now    Osteoporosis   S/P   Hip fracture   Doing  Well  Post  Surgery      Thyroids  Nodule     To  See  Surgeon  For  Biopsy  Hypothyroidism    Same  Dose  Of  Levothyroxine    Osteoporosis   Followed  By  Endocrine  Fup  3 months          F Debra ATKINS FACP

## 2023-06-15 ENCOUNTER — OFFICE VISIT (OUTPATIENT)
Dept: PHYSICAL THERAPY | Facility: REHABILITATION | Age: 78
End: 2023-06-15
Payer: MEDICARE

## 2023-06-15 ENCOUNTER — TELEPHONE (OUTPATIENT)
Dept: SURGERY | Facility: CLINIC | Age: 78
End: 2023-06-15

## 2023-06-15 DIAGNOSIS — M25.551 ACUTE RIGHT HIP PAIN: Primary | ICD-10-CM

## 2023-06-15 PROCEDURE — 97110 THERAPEUTIC EXERCISES: CPT

## 2023-06-15 PROCEDURE — 97530 THERAPEUTIC ACTIVITIES: CPT

## 2023-06-15 NOTE — TELEPHONE ENCOUNTER
Verification received  No PA required  No OOP cost for Evenity and no admin fee once yearly Medicare deductible is met

## 2023-06-15 NOTE — PROGRESS NOTES
"Daily Note     Today's date: 6/15/2023  Patient name: Beny Franco  : [id number]  MRN: [id number]  Referring provider: SANTI Deluna  Dx:   Encounter Diagnosis     ICD-10-CM    1  Acute right hip pain  M25 551                      Subjective: Patient reports she has been doing a lot of housework and has been quite active  She does report some pain in back pre treatment  Objective: See treatment diary below      Assessment: Tolerated treatment well  Patient demonstrates difficulty maintaining full knee ext with SLR, notably when fatigued  She requires verbal cueing to prevent hip flexion during SL hip abduction with fair carryover following verbal cues  Patient able to progress resistance on step ups this visit without adverse effects  Patient exhibited good technique with therapeutic exercises and would benefit from continued PT      Plan: Continue per plan of care        Precautions: hx of IM Right Femur, hx of TIA, check BP as needed     POC Ends: 2023    Manuals 6/15 5/31 6/2 6/5 6/8 6/13                              Neuro Re-Ed         Lateral stepping         Foam stance EO/EC         SLS                                             Ther Ex         Nustep L5 10min L5 10 min L5 10' L5 10' 10' L5 10' L5   SLR #2 5 3x10 ea #2 5 3x10 ea #2 5 3x10 ea #2 5 3x10 ea 3x10 b/l 2 5# 3x10 b/l 2 5#   Bridges 3x10 5s 10# 3x10 10# 10# 5\" 3x10 10# 5\" 3x10 3x10 5\" 10# 3x10 5\" 10#   Clamshells          Standing SLR ABD SL 2# 2x10 R Sidelying  #2 aw   2x10  Sidelying  #2 aw   2x10 Sidelying  #2 aw   2x10 sidelying 2x10 2# R sidelying 2x10 2# R   HR         Ther Activity         LSU 3x10 8# 6\" R 6\" 3x10 #5 6\" 3x10 #5 6\" 3x10 #8 3x10 8# 6\" R 3x10 8# 6\" R   FSU 3x10 10# 6\" R 6\" 3x10 #5 6\" 3x10 #5 6\" 3x10 #8 3x10 8# 6\" R 3x10 8# 6\" R   Leg Press 3x10 55# #55 x 10  #40 2x10 #55 x 10  #40 2x10 #55 3 x 10 3x10 55# 3x10 55#   STS         Gait Training         Suitcase carry  2x1min ea 5# #5 2x1' ea #5 2x1' ea #5 " 2x1' ea 2x1' ea 5# 2x1' ea 5#   Amb without cane                  Modalities

## 2023-06-19 ENCOUNTER — HOSPITAL ENCOUNTER (OUTPATIENT)
Dept: RADIOLOGY | Facility: HOSPITAL | Age: 78
Discharge: HOME | End: 2023-06-19
Attending: ORTHOPAEDIC SURGERY
Payer: MEDICARE

## 2023-06-19 ENCOUNTER — OFFICE VISIT (OUTPATIENT)
Dept: OBGYN CLINIC | Facility: HOSPITAL | Age: 78
End: 2023-06-19
Payer: MEDICARE

## 2023-06-19 VITALS
SYSTOLIC BLOOD PRESSURE: 121 MMHG | HEART RATE: 74 BPM | HEIGHT: 61 IN | WEIGHT: 128 LBS | DIASTOLIC BLOOD PRESSURE: 76 MMHG | BODY MASS INDEX: 24.17 KG/M2

## 2023-06-19 DIAGNOSIS — S72.21XA CLOSED DISPLACED SUBTROCHANTERIC FRACTURE OF RIGHT FEMUR, INITIAL ENCOUNTER (HCC): Primary | ICD-10-CM

## 2023-06-19 DIAGNOSIS — M79.605 LEFT LEG PAIN: ICD-10-CM

## 2023-06-19 DIAGNOSIS — S72.21XA CLOSED DISPLACED SUBTROCHANTERIC FRACTURE OF RIGHT FEMUR, INITIAL ENCOUNTER (HCC): ICD-10-CM

## 2023-06-19 PROCEDURE — 73552 X-RAY EXAM OF FEMUR 2/>: CPT

## 2023-06-19 PROCEDURE — 99213 OFFICE O/P EST LOW 20 MIN: CPT | Performed by: ORTHOPAEDIC SURGERY

## 2023-06-19 NOTE — PROGRESS NOTES
Assessment:   Diagnosis ICD-10-CM Associated Orders   1  Closed displaced subtrochanteric fracture of right femur, initial encounter ([facility] )  S72 21XA XR femur 2 vw right      2  Left leg pain  M79 605 XR femur 2 vw left          Plan:     · Xrays of the right hip were obtained today and reviewed  Xrays of the left femur were good  · She has no pain in the left femur  Instructed the patient to keep an eye on any new pain over the left femur before she has a fracture  · She has no pain int he right femur  · She can gradually get into her activities and continue with her PT   · New xrays of bilateral femurs at that time      To do next visit:  Return in about 6 months (around 12/19/2023) for right hip  The above stated was discussed in layman's terms and the patient expressed understanding  All questions were answered to the patient's satisfaction  Scribe Attestation    I,:  Zachary Carlson am acting as a scribe while in the presence of the attending physician :       I,:  Kathe Babinski, MD personally performed the services described in this documentation    as scribed in my presence :             Subjective:   Candelario Sanchez is a 69 y o  female who presents today for 3-month follow-up for her right femur  She is 4 months s/p right IM nail femur anterograde, 2/3/2023  She has had no issues with the right femur and been doing well with therapy  She continues to go her exercises  She has no pain in the left femur  She does see endo      Review of systems negative unless otherwise specified in HPI  Review of Systems   Constitutional: Negative for chills, fever and unexpected weight change  HENT: Negative for hearing loss, nosebleeds and sore throat  Eyes: Negative for pain, redness and visual disturbance  Respiratory: Negative for cough, shortness of breath and wheezing  Cardiovascular: Negative for chest pain, palpitations and leg swelling     Gastrointestinal: Negative for abdominal pain and nausea  Genitourinary: Negative for dyspareunia, dysuria and frequency  Skin: Negative for rash and wound  Neurological: Negative for dizziness, numbness and headaches  Psychiatric/Behavioral: Negative for decreased concentration and suicidal ideas  The patient is not nervous/anxious  Past Medical History:   Diagnosis Date   • Aneurysm ([address] ) 2/8/23    2 5 mg found on neck CAT scan   • Arthritis 2005   • Closed nondisplaced fracture of fifth metatarsal bone of right foot with routine healing    • Difficulty walking 2/2/23    Broken Femur   • Disease of thyroid gland     hypothyroid   • Glaucoma, bilateral    • Hyperlipidemia    • Osteoporosis    • Scoliosis 12/12/12       Past Surgical History:   Procedure Laterality Date   • BUNIONECTOMY     • COLONOSCOPY  06/04/2019   • CORRECTION HAMMER TOE     • MAMMO (HISTORICAL)  07/09/2018   • NEUROMA EXCISION     • OTHER SURGICAL HISTORY      Birth kwasi removed   • [place] OPTX FEM SHFT FX W/INSJ IMED IMPLT W/WO SCREW Right 2/3/2023    Procedure: INSERTION NAIL IM FEMUR ANTEGRADE (TROCHANTERIC);   Surgeon: Latonia Goel MD;  Location: [facility];  Service: Orthopedics   • WISDOM TOOTH EXTRACTION         Family History   Problem Relation Age of Onset   • Hypertension Mother    • Thyroid disease Mother    • Arthritis Mother    • Glaucoma Mother    • Hypothyroidism Mother    • Stomach cancer Father 46   • Cancer Father    • No Known Problems Maternal Grandmother    • No Known Problems Maternal Grandfather    • No Known Problems Paternal Grandmother    • No Known Problems Paternal Grandfather    • Dementia Maternal Aunt    • No Known Problems Maternal Aunt    • No Known Problems Paternal Aunt    • Hypertension Brother    • Osteoporosis Family    • Hyperlipidemia Family    • Hypertension Family    • Dementia Maternal Aunt    • Hypertension Maternal Aunt    • Osteoporosis Maternal Aunt        Social History     Occupational History   • Not on file   Tobacco Use   • Smoking status: Never   • Smokeless tobacco: Never   Vaping Use   • Vaping Use: Never used   Substance and Sexual Activity   • Alcohol use: Yes     Comment: Glass of wine a few times a year   • Drug use: Not Currently     Comment: Only drug use was for pain prescribed by a doctor   • Sexual activity: Never         Current Outpatient Medications:   •  aspirin (ECOTRIN LOW STRENGTH) 81 mg EC tablet, Take 1 tablet (81 mg total) by mouth daily Do not start before March 4, 2023 , Disp: 30 tablet, Rfl: 0  •  Calcium Carb-Cholecalciferol 600-20 MG-MCG TABS, , Disp: , Rfl:   •  Glucosamine-Chondroitin 500-400 MG CAPS, Take 1 tablet by mouth in the morning, Disp: , Rfl:   •  levothyroxine 25 mcg tablet, Take 1 tablet (25 mcg total) by mouth daily, Disp: 90 tablet, Rfl: 0  •  Naphazoline-Polyethyl Glycol 0 012-0 2 % SOLN, Apply 1-2 drops to eye 4 (four) times a day, Disp: , Rfl:   •  simvastatin (ZOCOR) 20 mg tablet, Take 1 tablet (20 mg total) by mouth daily at bedtime, Disp: 90 tablet, Rfl: 1  •  Biotin 1000 MCG tablet, , Disp: , Rfl:     Allergies   Allergen Reactions   • Fentanyl Vomiting   • Indomethacin GI Intolerance     Other reaction(s): GI upset  Other reaction(s): GI upset   • Oxycodone Dizziness, Nausea Only, GI Intolerance and Other (See Comments)     Other reaction(s): GI upset  Other reaction(s): GI upset            Vitals:    06/19/23 1429   BP: 121/76   Pulse: 74       Objective:                    Right Hip Exam     Tenderness   The patient is experiencing no tenderness  Range of Motion   Flexion: 100   External rotation: 40   Internal rotation: 30 (pain at end range)     Muscle Strength   Abduction: 5/5   Adduction: 5/5   Flexion: 5/5     Other   Erythema: absent  Sensation: normal  Pulse: present    Comments:  Calf is soft and non tender            Diagnostics, reviewed and taken today if performed as documented:     The attending physician has personally reviewed the pertinent films in PACS and "interpretation is as follows:  Xrays of right femur 2 views: Healing fracture with callus formation  Hardware is in place  Xrays of left femur 2 views: Thickened cortises noted proximal femur    Procedures, if performed today:    Procedures    None performed      Portions of the record may have been created with voice recognition software  Occasional wrong word or \"sound a like\" substitutions may have occurred due to the inherent limitations of voice recognition software  Read the chart carefully and recognize, using context, where substitutions have occurred    "

## 2023-06-21 ENCOUNTER — OFFICE VISIT (OUTPATIENT)
Dept: PHYSICAL THERAPY | Facility: REHABILITATION | Age: 78
End: 2023-06-21
Payer: MEDICARE

## 2023-06-21 DIAGNOSIS — M25.551 ACUTE RIGHT HIP PAIN: Primary | ICD-10-CM

## 2023-06-21 DIAGNOSIS — M81.0 OSTEOPOROSIS, UNSPECIFIED OSTEOPOROSIS TYPE, UNSPECIFIED PATHOLOGICAL FRACTURE PRESENCE: Primary | ICD-10-CM

## 2023-06-21 PROCEDURE — 97110 THERAPEUTIC EXERCISES: CPT | Performed by: PHYSICAL THERAPIST

## 2023-06-21 PROCEDURE — 97112 NEUROMUSCULAR REEDUCATION: CPT | Performed by: PHYSICAL THERAPIST

## 2023-06-21 NOTE — PROGRESS NOTES
"Daily Note     Today's date: 2023  Patient name: Eri Beavers  :   MRN: [id number]  Referring provider: SANTI Huggins  Dx:   Encounter Diagnosis     ICD-10-CM    1  Acute right hip pain  M25 551                      Subjective: Pt reports ready to d/c to HEP, nearing PLOF  Objective: See treatment diary below      Assessment: Tolerated treatment well  Patient d/c to [facility], all goals met  Plan: Continue per plan of care        Precautions: hx of IM Right Femur, hx of TIA, check BP as needed     Manuals 6/15 5/31 6/2 6/5 6/8 6/13 6/21                                 Neuro Re-Ed          Lateral stepping          Foam stance EO/EC          SLS                                                  Ther Ex          Nustep L5 10min L5 10 min L5 10' L5 10' 10' L5 10' L5 10' L5   SLR #2 5 3x10 ea #2 5 3x10 ea #2 5 3x10 ea #2 5 3x10 ea 3x10 b/l 2 5# 3x10 b/l 2 5# 3x10 b/l 2 5#   Bridges 3x10 5s 10# 3x10 10# 10# 5\" 3x10 10# 5\" 3x10 3x10 5\" 10# 3x10 5\" 10# 3x10 5\" 15#   Clamshells           Standing SLR ABD SL 2# 2x10 R Sidelying  #2 aw   2x10  Sidelying  #2 aw   2x10 Sidelying  #2 aw   2x10 sidelying 2x10 2# R sidelying 2x10 2# R sidelying 2x10 2# R   HR          Ther Activity          LSU 3x10 8# 6\" R 6\" 3x10 #5 6\" 3x10 #5 6\" 3x10 #8 3x10 8# 6\" R 3x10 8# 6\" R 3x10 8# 6\" R   FSU 3x10 10# 6\" R 6\" 3x10 #5 6\" 3x10 #5 6\" 3x10 #8 3x10 8# 6\" R 3x10 8# 6\" R 3x10 8# 6\" R   Leg Press 3x10 55# #55 x 10  #40 2x10 #55 x 10  #40 2x10 #55 3 x 10 3x10 55# 3x10 55# 3x10 55#   STS          Gait Training          Suitcase carry  2x1min ea 5# #5 2x1' ea #5 2x1' ea #5 2x1' ea 2x1' ea 5# 2x1' ea 5# 2x1' ea 5#   Amb without cane                    Modalities                                                   "

## 2023-06-22 ENCOUNTER — CLINICAL SUPPORT (OUTPATIENT)
Dept: ENDOCRINOLOGY | Facility: CLINIC | Age: 78
End: 2023-06-22
Payer: MEDICARE

## 2023-06-22 ENCOUNTER — TELEPHONE (OUTPATIENT)
Dept: ENDOCRINOLOGY | Facility: CLINIC | Age: 78
End: 2023-06-22

## 2023-06-22 VITALS — HEART RATE: 67 BPM | SYSTOLIC BLOOD PRESSURE: 112 MMHG | DIASTOLIC BLOOD PRESSURE: 78 MMHG

## 2023-06-22 DIAGNOSIS — M81.0 OSTEOPOROSIS, UNSPECIFIED OSTEOPOROSIS TYPE, UNSPECIFIED PATHOLOGICAL FRACTURE PRESENCE: ICD-10-CM

## 2023-06-22 PROCEDURE — 96372 THER/PROPH/DIAG INJ SC/IM: CPT

## 2023-06-22 NOTE — PROGRESS NOTES
Assessment/Plan:    Jay Amador came into the [place] [address] Endocrinology Office today 06/22/23 to receive Evenity injection  Verbal consent obtained  Consent given by: patient    patient states patient has been medically healthy with no underlining concerns/complications  Jay Amador presents with no symptoms today  All insturctions were reviewed with the patient  If the patient should have any questions/concerns, advised patient to contacted [place] [address] Endocrinology Office  Subjective:     History provided by: patient    Patient ID: Jay Amador is a 69 y o  female      Objective: There were no vitals filed for this visit  Patient tolerated the injection well without any complications  Injection site/s 2 injections, right/left arm  Medication was provided by office  Patient signed consent form yes   Patient signed Joshua Hallmark form yes (If no patient is not a medicare patient)  Patient waited 15 minutes after injection yes (This only applies to patient's receiving first time injection)         Last Visit: 6/21/2023  Next visit:11/20/2023

## 2023-07-09 PROBLEM — Z13.1 SCREENING FOR DIABETES MELLITUS: Status: RESOLVED | Noted: 2023-05-10 | Resolved: 2023-07-09

## 2023-07-19 ENCOUNTER — CONSULT (OUTPATIENT)
Dept: SURGERY | Facility: CLINIC | Age: 78
End: 2023-07-19
Payer: MEDICARE

## 2023-07-19 VITALS
WEIGHT: 128.3 LBS | TEMPERATURE: 97.1 F | BODY MASS INDEX: 24.22 KG/M2 | RESPIRATION RATE: 12 BRPM | SYSTOLIC BLOOD PRESSURE: 144 MMHG | HEIGHT: 61 IN | HEART RATE: 75 BPM | DIASTOLIC BLOOD PRESSURE: 74 MMHG | OXYGEN SATURATION: 99 %

## 2023-07-19 DIAGNOSIS — E04.1 THYROID NODULE: ICD-10-CM

## 2023-07-19 PROCEDURE — 99213 OFFICE O/P EST LOW 20 MIN: CPT | Performed by: SURGERY

## 2023-07-19 PROCEDURE — 99203 OFFICE O/P NEW LOW 30 MIN: CPT | Performed by: SURGERY

## 2023-07-19 NOTE — PROGRESS NOTES
Office Visit - General Surgery  Jewel Burgess MRN: [id number]  Encounter: [id number]    Assessment and Plan  Problem List Items Addressed This Visit        Endocrine    Thyroid nodule     Is a palpable thyroid nodule on the left. Ultrasound was reviewed and slight increase in size from previous ultrasound. She had a needle biopsy of this done in 2017 which was a [place] 2 benign. With very slight increase in size of only a couple millimeters and a previous biopsy that was benign, I think this can be followed. We will repeat an ultrasound in a year's time and if any significant changes will proceed with biopsy at this time. She was comfortable with this plan. Relevant Orders    US thyroid       Chief Complaint:  Jewel Burgess is a 69 y.o. female who presents for Thyroid Problem (Consult thyroid nodule.)    Subjective  67-year-old female hospitalized and CAT scan showed thyroid nodule. She had follow-up ultrasound which showed a nodule in the left side. This measured slightly larger than the previous ultrasound. She has no thyroid specific complaints or problems    Past Medical History:   Diagnosis Date   • Aneurysm ([address]) 2/8/23    2.5 mg found on neck CAT scan   • Arthritis 2005   • Closed nondisplaced fracture of fifth metatarsal bone of right foot with routine healing    • Difficulty walking 2/2/23    Broken Femur   • Disease of thyroid gland     hypothyroid   • Glaucoma, bilateral    • Hyperlipidemia    • Osteoporosis    • Scoliosis 12/12/12       Past Surgical History:   Procedure Laterality Date   • BUNIONECTOMY     • COLONOSCOPY  06/04/2019   • CORRECTION HAMMER TOE     • MAMMO (HISTORICAL)  07/09/2018   • NEUROMA EXCISION     • OTHER SURGICAL HISTORY      Birth kwasi removed   • [place] OPTX FEM SHFT FX W/INSJ IMED IMPLT W/WO SCREW Right 2/3/2023    Procedure: INSERTION NAIL IM FEMUR ANTEGRADE (TROCHANTERIC);   Surgeon: Dipesh Goldman MD;  [facility];  Service: Orthopedics   • WISDOM TOOTH EXTRACTION         Family History   Problem Relation Age of Onset   • Hypertension Mother    • Thyroid disease Mother    • Arthritis Mother    • Glaucoma Mother    • Hypothyroidism Mother    • Stomach cancer Father 46   • Cancer Father    • No Known Problems Maternal Grandmother    • No Known Problems Maternal Grandfather    • No Known Problems Paternal Grandmother    • No Known Problems Paternal Grandfather    • Dementia Maternal Aunt    • No Known Problems Maternal Aunt    • No Known Problems Paternal Aunt    • Hypertension Brother    • Osteoporosis Family    • Hyperlipidemia Family    • Hypertension Family    • Dementia Maternal Aunt    • Hypertension Maternal Aunt    • Osteoporosis Maternal Aunt        Social History     Tobacco Use   • Smoking status: Never   • Smokeless tobacco: Never   Vaping Use   • Vaping Use: Never used   Substance Use Topics   • Alcohol use: Yes     Comment: Glass of wine a few times a year   • Drug use: Not Currently     Comment: Only drug use was for pain prescribed by a doctor        Medications  Current Outpatient Medications on File Prior to Visit   Medication Sig Dispense Refill   • aspirin (ECOTRIN LOW STRENGTH) 81 mg EC tablet Take 1 tablet (81 mg total) by mouth daily Do not start before March 4, 2023. 30 tablet 0   • Calcium Carb-Cholecalciferol 600-20 MG-MCG TABS      • Glucosamine-Chondroitin 500-400 MG CAPS Take 1 tablet by mouth in the morning     • levothyroxine 25 mcg tablet Take 1 tablet (25 mcg total) by mouth daily 90 tablet 0   • Naphazoline-Polyethyl Glycol 0.012-0.2 % SOLN Apply 1-2 drops to eye 4 (four) times a day     • simvastatin (ZOCOR) 20 mg tablet Take 1 tablet (20 mg total) by mouth daily at bedtime 90 tablet 1     Current Facility-Administered Medications on File Prior to Visit   Medication Dose Route Frequency Provider Last Rate Last Admin   • romosozumab-aqqg (EVENITY) subcutaneous injection 210 mg  210 mg Subcutaneous (multi inj) Q30 Days Leonro Moya MD   210 mg at 06/22/23 1009       Allergies  Allergies   Allergen Reactions   • Fentanyl Vomiting   • Indomethacin GI Intolerance     Other reaction(s): GI upset  Other reaction(s): GI upset   • Oxycodone Dizziness, Nausea Only, GI Intolerance and Other (See Comments)     Other reaction(s): GI upset  Other reaction(s): GI upset       Review of Systems   Constitutional: Negative for chills and fever. HENT: Negative for ear pain and sore throat. Eyes: Negative for pain and visual disturbance. Respiratory: Negative for cough and shortness of breath. Cardiovascular: Negative for chest pain and palpitations. Gastrointestinal: Negative for abdominal pain and vomiting. Genitourinary: Negative for dysuria and hematuria. Musculoskeletal: Negative for arthralgias and back pain. Skin: Negative for color change and rash. Neurological: Negative for seizures and syncope. All other systems reviewed and are negative. Objective  Vitals:    07/19/23 0908   BP: 144/74   Pulse: 75   Resp: 12   Temp: (!) 97.1 °F (36.2 °C)   SpO2: 99%       Physical Exam  Vitals and nursing note reviewed. Constitutional:       General: She is not in acute distress. Appearance: She is well-developed. She is not diaphoretic. HENT:      Head: Normocephalic and atraumatic. Right Ear: External ear normal.      Left Ear: External ear normal.   Eyes:      General: No scleral icterus. Conjunctiva/sclera: Conjunctivae normal.   Neck:      Thyroid: No thyromegaly. Trachea: No tracheal deviation. Comments: Left thyroid nodule about 2.5 cm  Cardiovascular:      Rate and Rhythm: Normal rate and regular rhythm. Heart sounds: Normal heart sounds. No murmur heard. No friction rub. Pulmonary:      Effort: Pulmonary effort is normal. No respiratory distress. Breath sounds: Normal breath sounds. No wheezing or rales. Abdominal:      General: There is no distension.       Palpations: Abdomen is soft. There is no mass. Tenderness: There is no abdominal tenderness. There is no guarding or rebound. Musculoskeletal:         General: Normal range of motion. Cervical back: Neck supple. Lymphadenopathy:      Cervical: No cervical adenopathy. Skin:     General: Skin is warm and dry. Neurological:      Mental Status: She is alert and oriented to person, place, and time. Psychiatric:         Behavior: Behavior normal.         Thought Content:  Thought content normal.         Judgment: Judgment normal.

## 2023-07-19 NOTE — ASSESSMENT & PLAN NOTE
Is a palpable thyroid nodule on the left. Ultrasound was reviewed and slight increase in size from previous ultrasound. She had a needle biopsy of this done in 2017 which was a [place] 2 benign. With very slight increase in size of only a couple millimeters and a previous biopsy that was benign, I think this can be followed. We will repeat an ultrasound in a year's time and if any significant changes will proceed with biopsy at this time. She was comfortable with this plan.

## 2023-07-25 ENCOUNTER — CLINICAL SUPPORT (OUTPATIENT)
Dept: ENDOCRINOLOGY | Facility: CLINIC | Age: 78
End: 2023-07-25
Payer: MEDICARE

## 2023-07-25 VITALS — DIASTOLIC BLOOD PRESSURE: 84 MMHG | HEART RATE: 81 BPM | SYSTOLIC BLOOD PRESSURE: 158 MMHG | OXYGEN SATURATION: 98 %

## 2023-07-25 DIAGNOSIS — M81.0 OSTEOPOROSIS, UNSPECIFIED OSTEOPOROSIS TYPE, UNSPECIFIED PATHOLOGICAL FRACTURE PRESENCE: ICD-10-CM

## 2023-07-25 PROCEDURE — 96372 THER/PROPH/DIAG INJ SC/IM: CPT

## 2023-07-25 NOTE — PROGRESS NOTES
Assessment/Plan:    Lorna Powell came into the [facility]ocr[facility] Office today 07/25/23 to receive Evenity injection. Verbal consent obtained. Consent given by: patient    patient states patient has been medically healthy with no underlining concerns/complications. Lorna Powell presents with no symptoms today. All insturctions were reviewed with the patient. If the patient should have any questions/concerns, advised patient to contacted [facility]. Subjective:     History provided by: patient    Patient ID: Lorna Powell is a 69 y.o. female      Objective:    Vitals:    07/25/23 1143   BP: 158/84   BP Location: Left arm   Patient Position: Sitting   Cuff Size: Standard   Pulse: 81   SpO2: 98%       Patient tolerated the injection well without any complications. Injection site/s B/L arms. Medication was provided by office. Patient signed consent form yes   Patient signed David Marva form yes (If no patient is not a medicare patient). Patient waited 15 minutes after injection no (This only applies to patient's receiving first time injection).        Last Visit: 6/22/2023  Next visit:8/29/2023

## 2023-08-24 ENCOUNTER — TELEPHONE (OUTPATIENT)
Dept: ENDOCRINOLOGY | Facility: CLINIC | Age: 78
End: 2023-08-24

## 2023-08-24 NOTE — TELEPHONE ENCOUNTER
Patient called to double check if she could still have her evenity shot this coming Tuesday as she had a large filling come out, and the tooth may need to be extracted. Consulted with Anastasia Barcenas PA-C. Advised the patient to inform her dentist of the upcoming evenity shots to check if he has any restrictions or advisements. Patient expressed understanding and had no further questions or concerns.

## 2023-08-29 ENCOUNTER — CLINICAL SUPPORT (OUTPATIENT)
Dept: ENDOCRINOLOGY | Facility: CLINIC | Age: 78
End: 2023-08-29
Payer: MEDICARE

## 2023-08-29 ENCOUNTER — TELEPHONE (OUTPATIENT)
Dept: ENDOCRINOLOGY | Facility: CLINIC | Age: 78
End: 2023-08-29

## 2023-08-29 VITALS — HEART RATE: 71 BPM | DIASTOLIC BLOOD PRESSURE: 90 MMHG | OXYGEN SATURATION: 98 % | SYSTOLIC BLOOD PRESSURE: 150 MMHG

## 2023-08-29 DIAGNOSIS — M81.0 OSTEOPOROSIS, UNSPECIFIED OSTEOPOROSIS TYPE, UNSPECIFIED PATHOLOGICAL FRACTURE PRESENCE: ICD-10-CM

## 2023-08-29 NOTE — PROGRESS NOTES
Assessment/Plan:    Arden Krishnan came into the [facility]'s Endocrinology Office today 08/29/23 to receive Evenity injection. Verbal consent obtained. Consent given by: patient    patient states patient has been medically healthy with no underlining concerns/complications. Arden Krishnan presents with no symptoms today. All insturctions were reviewed with the patient. If the patient should have any questions/concerns, advised patient to contacted [facility]. Subjective:     History provided by: patient    Patient ID: Arden Krishnan is a 69 y.o. female      Objective:    Vitals:    08/29/23 1053   BP: 150/90   BP Location: Left arm   Patient Position: Sitting   Cuff Size: Standard   Pulse: 71   SpO2: 98%       Patient tolerated the injection well without any complications. Injection site/s L & R. arms  Medication was provided by Office. Patient signed consent form yes   Patient signed Sangita Cullen form yes (If no patient is not a medicare patient). Patient waited 15 minutes after injection no (This only applies to patient's receiving first time injection).        Last Visit: 8/24/2023  Next visit:11/20/2023

## 2023-09-03 DIAGNOSIS — E03.9 ACQUIRED HYPOTHYROIDISM: ICD-10-CM

## 2023-09-05 RX ORDER — LEVOTHYROXINE SODIUM 0.03 MG/1
25 TABLET ORAL DAILY
Qty: 90 TABLET | Refills: 0 | OUTPATIENT
Start: 2023-09-05

## 2023-09-12 ENCOUNTER — OFFICE VISIT (OUTPATIENT)
Dept: NEUROLOGY | Facility: CLINIC | Age: 78
End: 2023-09-12

## 2023-09-12 VITALS
OXYGEN SATURATION: 99 % | DIASTOLIC BLOOD PRESSURE: 86 MMHG | TEMPERATURE: 97.2 F | WEIGHT: 127.9 LBS | HEIGHT: 61 IN | BODY MASS INDEX: 24.15 KG/M2 | HEART RATE: 71 BPM | SYSTOLIC BLOOD PRESSURE: 132 MMHG

## 2023-09-12 DIAGNOSIS — Z86.73 HISTORY OF STROKE: Primary | ICD-10-CM

## 2023-09-12 DIAGNOSIS — E03.9 ACQUIRED HYPOTHYROIDISM: ICD-10-CM

## 2023-09-12 DIAGNOSIS — I72.9 ANEURYSM (HCC): ICD-10-CM

## 2023-09-12 DIAGNOSIS — E78.5 HLD (HYPERLIPIDEMIA): ICD-10-CM

## 2023-09-12 RX ORDER — LEVOTHYROXINE SODIUM 0.03 MG/1
25 TABLET ORAL DAILY
Qty: 90 TABLET | Refills: 0 | Status: SHIPPED | OUTPATIENT
Start: 2023-09-12

## 2023-09-12 NOTE — PROGRESS NOTES
Review of Systems   Constitutional: Negative for appetite change, fatigue and fever. HENT: Negative. Negative for hearing loss, tinnitus, trouble swallowing and voice change. Eyes: Negative. Negative for photophobia, pain and visual disturbance. Respiratory: Negative. Negative for shortness of breath. Cardiovascular: Negative. Negative for palpitations. Gastrointestinal: Negative. Negative for nausea and vomiting. Endocrine: Negative. Negative for cold intolerance. Genitourinary: Negative. Negative for dysuria, frequency and urgency. Musculoskeletal: Negative for back pain, gait problem, myalgias and neck pain. Shoulder blade pain (just once since visit)   Skin: Negative. Negative for rash. Allergic/Immunologic: Negative. Neurological: Negative. Negative for dizziness, tremors, seizures, syncope, facial asymmetry, speech difficulty, weakness, light-headedness, numbness and headaches. Hematological: Negative. Does not bruise/bleed easily. Psychiatric/Behavioral: Negative. Negative for confusion, hallucinations and sleep disturbance.

## 2023-09-12 NOTE — PROGRESS NOTES
Patient ID: Natacha Mitchell is a 69 y.o. female who presents to the [address] [place] [address]. Assessment/Plan:    History of Stroke:    I had the pleasure of seeing Hector Fitzgerald today in the office at [facility] in [facility]. 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 After the patient had an MRI brain without contrast, did not appear to significantly note any old lacunar infarct of the basal ganglia, there were potentially some enhancement or dilated spaces around this area but a lacunar infarct was not directly identified. Patient is doing well and not experiencing any new strokelike symptoms. No residual deficits at all at this time. Will be contacting Dr. Jabari Arteaga and talking to him and seeing if he could take a look at the MRI brain to see if there is truly any infarct to be concerned with at this time. Patient is still taking aspirin 81 mg once daily. Taking simvastatin 20 mg once daily. She is doing very good job at monitoring her blood pressure and also taking her medications as prescribed. Patient is very active at this time, stating that she will sometimes go on walks and walk about 2 miles at a time. Her diet is relatively healthy at this time, she eats a relatively well-balanced meal with lots of meat, fruits and vegetables. No significant difficulty with sleeping although she notes that she wakes up more now. No significant depression or anxiety and no smoking.    -Patient is wondering if she can eventually discontinue the aspirin 81 mg once daily at this time. Once Dr. Jabari Arteaga looks at the MRI brain and notify us if this seems to be related to a stroke or not we can make that decision about the patient's aspirin.   At least from my understanding it does not appear that the patient has any other cardiovascular risk factors at this time and does not have any significant stenosis of the extracranial and intracranial arteries at this time. If this is determined not to be a stroke I would not see why we would have to continue to keep the patient on aspirin but ultimately we will talk to Dr. Doug Gentile and see what he thinks. -If the patient is going to have a oral surgical procedure in the future, advised her to discontinue aspirin for 7 days prior to the surgery as she is still taking it at that time. She can resume it about 2 to 3 days after the surgery but we can leave this up to her surgeon's discretion at this time.  -No additional monitoring required for the patient's intracranial aneurysm at this time, neurosurgery stated the patient can follow-up on an as-needed basis. -Patient is perfectly fine to continue with her Evenity injections now at this time. However as the patient has stated to me there is an increased risk of stroke with taking the injections. Patient should just be aware of this, although we have not confirmed a true suspected stroke for the patient at this time and it is always something to be mindful of and aware of as well. -If the patient has any more bouts of the shoulder pain that she has recently experienced, she should let her primary care know as soon as possible especially if these episodes become more frequent. However, at least from my perspective it appears that they could be related to muscle spasms or a pinched/impinged nerve in the patient's cervical spine or upper back.  -Would like for the patient to get a repeat lipid panel before next appointment to make sure that the simvastatin dosage that she is on right now is appropriate for her. - For ongoing stroke prevention continue: Aspirin 81 mg once daily, simvastatin 20 mg once daily  - Discussed the importance of antiplatelet management with the patient to prevent future strokes.    - Recommend to check blood pressure occasionally away from the doctor's office to make sure that those numbers are typically less than 130/80. If they are frequently higher than that, we recommend checking a little more often and to follow up with primary care team   - Will defer to primary care team for monitoring of cholesterol panel and blood sugar numbers with target LDL cholesterol of less than 70 and hemoglobin A1c less than 7%  - Recommend following a low salt, mediterranean diet   - Recommend routine physical exercise as tolerated     We will plan for her to return to the office in 1 year time to see on of the APPs or Dr. Kinsey Vale but would be happy to see her sooner if the need should arise. If she has any symptoms concerning for TIA or stroke including sudden painless loss of vision or double vision, difficulty speaking or swallowing, vertigo/room spinning that does not quickly resolve, or weakness/numbness/loss of coordination affecting 1 side of the face or body she should proceed by ambulance to the nearest emergency room immediately. Subjective:    HPI      For Review:    Saskia Mccracken was last seen in the office on 05/10/2023 by myself. Patient presented at the last appointment as a new patient consult in regard to an old lacunar infarct that was found in the basal ganglia on most recent CTA imaging. Patient had been following with neurosurgery in regard to evaluation of her aneurysm, it was noted that she did not need any additional follow-up at this time. Patient is taking aspirin 81 mg once daily, and taking simvastatin 10 mg once daily as well. Patient was not having any residual deficits of stroke and not experiencing any new strokelike symptoms. The last appointment, patient was also concerned with redness versus pain that she had at her shoulder blades and radiated to her chest and jaw. Patient had an EKG and echocardiogram with cardiology and no cardiac abnormalities were found at that time. Stated that this usually lasts about 10 minutes in length.   Recommended she continue to follow with cardiology if this continues to persist.  Patient was also found to have an incidental thyroid nodule on her x-rays and CTA head and neck. Patient stated that she had a past history of thyroid nodules and had currently been taking medication for hypothyroidism. She had a biopsy in the past specifically around May 2017. Wanted the patient to have another thyroid ultrasound at this time, it was based on the patient's ultrasound readings that she be suggested to have a thyroid nodule biopsy, she did follow-up with endocrinology and they did not recommend any additional thyroid nodule biopsy at this time. Recommended that the patient have a repeat lipid panel before next appointment to evaluate and make sure her simvastatin was at the appropriate dosage. Wanted to also have the patient have an MRI brain without contrast before she return for the next appointment, wanted to confirm the presence of suspected infarct that was found on CTA head and neck. MRI brain without contrast would give us a more specific view. Patient did have the MRI brain without contrast completed and did not can confirm the presence 100% that the patient did have a basal ganglia infarct. Interval History:      New stroke symptoms/residual symptoms:    Any new, sudden onset weakness, numbness, facial droop, slurred speech, difficulty speaking, trouble swallowing, persistent vertigo, or sudden double vision or vision loss? No new stroke like symptoms     Residual symptoms include: None    Stroke Etiology and Risk Factor modification: This was a(n) lacunar stroke, most likely related to Small Vessel/microvascular    Stroke risk factors were evaluated including: hyperlipidemia,     AP/AC therapy: aspirin 81 mg once daily. No significant issues with we will resume at this time.     Statin therapy: Simvastatin 20 mg once daily     Blood pressure today and as of late: 132/86 as of today at this time, recent blood pressure readings at home all within normal range at this time    Most recent LDL: 112 mg/dL    Most recent hemoglobin A1C: No recent A1C    Cardiology evaluation? Any cardiac monitoring required?:  No cardiologist at this time. Endocrinology evaluation? Following proper glycemic treatment/diet? Following with endocrinology for thyroid. Lifestyle history/modifications:    Diet/Exercise regimen: Trying to have a consistent healthy diet, salads throughout the day at this time. Well rounded meals at this time. Trying to stay active, was walking two miles but it has been hard with the heat. Any physical therapy, occupational therapy or speech therapy performed/required at this time?: No PT at this time     Any difficulty with sleep? No significant difficulty with sleep, does get up a few times throughout the night    Any history of sleep apnea apnea? CPAP compliance?: No sleep apnea at this time     Post-stroke depression/anxiety? No depression/anxiety     Any history of smoking? No history of smoking     Additional History:    Patient stated that she was following up with her endocrinologist in regard to her thyroid ultrasound. Stated that it was not recommended at this time by her endocrinologist to have a biopsy of the nodule at this time. We will continue to monitor this with the patient's endocrinologist.  Patient also noted that she started taking Envinity for her osteoporosis at this time. Patient did state that she does have to be careful in regards to the cardiovascular risk and stroke risk associated with the injections. Patient states that she is going to have an upcoming tooth extraction with oral surgery in the future. Patient was inquiring again about her aspirin 81 mg once daily and if she needs to discontinue the medication prior to her procedure. Stated that patient would be best to discontinue aspirin about 7 days prior to her procedure and could potentially resume 2 to 3 days afterwards.   She also stated that neurosurgery did not recommend any further monitoring of her aneurysm forward could continue to follow-up on an as-needed basis. When discussing the patient's MRI brain without contrast, did state that did not appear that there were any infarcts that were indicated on this imaging compared to the patient's CTA head/neck. Discussed that we can certainly speak with Dr. Moy Otto and see if there is any concern for infarct on the patient's MRI brain and if she needs to continue aspirin moving forward.     Lab Results   Component Value Date/Time    CHOLESTEROL 211 (H) 05/24/2023 09:27 AM     Lab Results   Component Value Date/Time    TRIG 120 05/24/2023 09:27 AM    TRIG 71 04/16/2015 09:17 AM     Lab Results   Component Value Date/Time    HDL 75 05/24/2023 09:27 AM    HDL 96 04/16/2015 09:17 AM     Lab Results   Component Value Date/Time    LDLCALC 112 (H) 05/24/2023 09:27 AM    LDLCALC 98 04/16/2015 09:17 AM       No results found for: "HGBA1C"  No results found for: "EAG"        Past Medical History:   Diagnosis Date   • Aneurysm ([address]) 2/8/23    2.5 mg found on neck CAT scan   • Arthritis 2005   • Closed nondisplaced fracture of fifth metatarsal bone of right foot with routine healing    • Difficulty walking 2/2/23    Broken Femur   • Disease of thyroid gland     hypothyroid   • Glaucoma, bilateral    • Hyperlipidemia    • Osteoporosis    • Scoliosis 12/12/12       Current Outpatient Medications:   •  aspirin (ECOTRIN LOW STRENGTH) 81 mg EC tablet, Take 1 tablet (81 mg total) by mouth daily Do not start before March 4, 2023., Disp: 30 tablet, Rfl: 0  •  Calcium Carb-Cholecalciferol 600-20 MG-MCG TABS, , Disp: , Rfl:   •  Glucosamine-Chondroitin 500-400 MG CAPS, Take 1 tablet by mouth in the morning, Disp: , Rfl:   •  levothyroxine 25 mcg tablet, Take 1 tablet (25 mcg total) by mouth daily, Disp: 90 tablet, Rfl: 0  •  Naphazoline-Polyethyl Glycol 0.012-0.2 % SOLN, Apply 1-2 drops to eye 4 (four) times a day, Disp: , Rfl:   •  romosozumab-aqqg (EVENITY) subcutaneous injection, , Disp: , Rfl:   •  simvastatin (ZOCOR) 20 mg tablet, Take 1 tablet (20 mg total) by mouth daily at bedtime, Disp: 90 tablet, Rfl: 1    Current Facility-Administered Medications:   •  romosozumab-aqqg (EVENITY) subcutaneous injection 210 mg, 210 mg, Subcutaneous (multi inj), Q30 Days, Yenni Crowell MD, 210 mg at 07/25/23 1000     Objective:    Physical Exam:                                                                 Vitals:            Constitutional:    /86 (BP Location: Left arm, Patient Position: Sitting, Cuff Size: Adult)   Pulse 71   Temp (!) 97.2 °F (36.2 °C) (Temporal)   Ht 5' 1" (1.549 m)   Wt 58 kg (127 lb 14.4 oz)   SpO2 99%   BMI 24.17 kg/m²   BP Readings from Last 3 Encounters:   09/12/23 132/86   08/29/23 150/90   07/25/23 158/84     Pulse Readings from Last 3 Encounters:   09/12/23 71   08/29/23 71   07/25/23 81         Well developed, well nourished, well groomed. No dysmorphic features. Psychiatric:  Normal behavior and appropriate affect        Neurological Examination:     Mental status/cognitive function:   Orientated to time, place and person. Recent and remote memory intact. Attention span and concentration as well as fund of knowledge are appropriate for age. Normal language and spontaneous speech. Cranial Nerves:  II-visual fields full. III, IV, VI-Pupils were equal, round, and reactive to light and accomodation. Extraocular movements were full and conjugate without nystagmus. Conjugate gaze, normal smooth pursuits, normal saccades   V-facial sensation symmetric. VII-facial expression symmetric, intact forehead wrinkle, strong eye closure, symmetric smile    VIII-hearing grossly intact bilaterally   IX, X-palate elevation symmetric, no dysarthria. XI-shoulder shrug strength intact    XII-tongue protrusion midline. Motor Exam: symmetric bulk and tone throughout, no pronator drift. Power/strength 5/5 bilateral upper and lower extremities, no atrophy, fasciculations or abnormal movements noted. Sensory: grossly intact light touch in all extremities. Reflexes: brachioradialis 2+, biceps 2+, knee 2+, bilaterally  Coordination: Finger nose finger intact bilaterally, no apparent dysmetria, ataxia or tremor noted  Gait: steady casual and tandem gait. ROS:    Review of Systems   Constitutional: Negative for appetite change, fatigue and fever. HENT: Negative. Negative for hearing loss, tinnitus, trouble swallowing and voice change. Eyes: Negative. Negative for photophobia, pain and visual disturbance. Respiratory: Negative. Negative for shortness of breath. Cardiovascular: Negative. Negative for palpitations. Gastrointestinal: Negative. Negative for nausea and vomiting. Endocrine: Negative. Negative for cold intolerance. Genitourinary: Negative. Negative for dysuria, frequency and urgency. Musculoskeletal: Negative for back pain, gait problem, myalgias and neck pain. Shoulder blade pain (just once since visit)   Skin: Negative. Negative for rash. Allergic/Immunologic: Negative. Neurological: Negative. Negative for dizziness, tremors, seizures, syncope, facial asymmetry, speech difficulty, weakness, light-headedness, numbness and headaches. Hematological: Negative. Does not bruise/bleed easily. Psychiatric/Behavioral: Negative. Negative for confusion, hallucinations and sleep disturbance.        I have spent 40 minutes today on this case including chart review, performing history and exam, patient counseling, and documentation/communication      Sebastian Golden PA-C  9/12/2023 9:49 AM

## 2023-09-12 NOTE — PATIENT INSTRUCTIONS
History of Stroke:    I had the pleasure of seeing Idris Burris today in the office at [facility] in [facility]. 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 After the patient had an MRI brain without contrast, did not appear to significantly note any old lacunar infarct of the basal ganglia, there were potentially some enhancement or dilated spaces around this area but a lacunar infarct was not directly identified. Patient is doing well and not experiencing any new strokelike symptoms. No residual deficits at all at this time. Will be contacting Dr. Samira Simmons and talking to him and seeing if he could take a look at the MRI brain to see if there is truly any infarct to be concerned with at this time. Patient is still taking aspirin 81 mg once daily. Taking simvastatin 20 mg once daily. She is doing very good job at monitoring her blood pressure and also taking her medications as prescribed. Patient is very active at this time, stating that she will sometimes go on walks and walk about 2 miles at a time. Her diet is relatively healthy at this time, she eats a relatively well-balanced meal with lots of meat, fruits and vegetables. No significant difficulty with sleeping although she notes that she wakes up more now. No significant depression or anxiety and no smoking.    -Patient is wondering if she can eventually discontinue the aspirin 81 mg once daily at this time. Once Dr. Samira Simmons looks at the MRI brain and notify us if this seems to be related to a stroke or not we can make that decision about the patient's aspirin. At least from my understanding it does not appear that the patient has any other cardiovascular risk factors at this time and does not have any significant stenosis of the extracranial and intracranial arteries at this time.   If this is determined not to be a stroke I would not see why we would have to continue to keep the patient on aspirin but ultimately we will talk to Dr. Elle Garcia and see what he thinks. -If the patient is going to have a oral surgical procedure in the future, advised her to discontinue aspirin for 7 days prior to the surgery as she is still taking it at that time. She can resume it about 2 to 3 days after the surgery but we can leave this up to her surgeon's discretion at this time.  -No additional monitoring required for the patient's intracranial aneurysm at this time, neurosurgery stated the patient can follow-up on an as-needed basis. -Patient is perfectly fine to continue with her Evenity injections now at this time. However as the patient has stated to me there is an increased risk of stroke with taking the injections. Patient should just be aware of this, although we have not confirmed a true suspected stroke for the patient at this time and it is always something to be mindful of and aware of as well. -If the patient has any more bouts of the shoulder pain that she has recently experienced, she should let her primary care know as soon as possible especially if these episodes become more frequent. However, at least from my perspective it appears that they could be related to muscle spasms or a pinched/impinged nerve in the patient's cervical spine or upper back.  -Would like for the patient to get a repeat lipid panel before next appointment to make sure that the simvastatin dosage that she is on right now is appropriate for her. - For ongoing stroke prevention continue: Aspirin 81 mg once daily, simvastatin 20 mg once daily  - Discussed the importance of antiplatelet management with the patient to prevent future strokes. - Recommend to check blood pressure occasionally away from the doctor's office to make sure that those numbers are typically less than 130/80.   If they are frequently higher than that, we recommend checking a little more often and to follow up with primary care team   - Will defer to primary care team for monitoring of cholesterol panel and blood sugar numbers with target LDL cholesterol of less than 70 and hemoglobin A1c less than 7%  - Recommend following a low salt, mediterranean diet   - Recommend routine physical exercise as tolerated     We will plan for her to return to the office in 1 year time to see on of the APPs or Dr. Doug Gentile but would be happy to see her sooner if the need should arise. If she has any symptoms concerning for TIA or stroke including sudden painless loss of vision or double vision, difficulty speaking or swallowing, vertigo/room spinning that does not quickly resolve, or weakness/numbness/loss of coordination affecting 1 side of the face or body she should proceed by ambulance to the nearest emergency room immediately.

## 2023-09-15 ENCOUNTER — OFFICE VISIT (OUTPATIENT)
Dept: INTERNAL MEDICINE CLINIC | Facility: CLINIC | Age: 78
End: 2023-09-15
Payer: MEDICARE

## 2023-09-15 VITALS
SYSTOLIC BLOOD PRESSURE: 110 MMHG | DIASTOLIC BLOOD PRESSURE: 70 MMHG | HEART RATE: 74 BPM | BODY MASS INDEX: 24.24 KG/M2 | OXYGEN SATURATION: 97 % | WEIGHT: 128.4 LBS | HEIGHT: 61 IN | TEMPERATURE: 98.4 F

## 2023-09-15 DIAGNOSIS — E78.5 HYPERLIPIDEMIA: Primary | ICD-10-CM

## 2023-09-15 DIAGNOSIS — M81.0 OSTEOPOROSIS: ICD-10-CM

## 2023-09-15 DIAGNOSIS — E03.9 HYPOTHYROIDISM: ICD-10-CM

## 2023-09-15 PROCEDURE — 99213 OFFICE O/P EST LOW 20 MIN: CPT | Performed by: INTERNAL MEDICINE

## 2023-09-15 NOTE — PROGRESS NOTES
Assessment/Plan:    Follow up  Hypertension, Hyperlipidemia,  Osteoporosis     Diagnoses and all orders for this visit:    Hyperlipidemia  -     Comprehensive metabolic panel; Future  -     Lipid panel; Future    Hypothyroidism    Osteoporosis          Subjective:      Patient ID: Aleyda Sanchez is a 69 y.o. female. HPI    The following portions of the patient's history were reviewed and updated as appropriate: allergies, current medications, past family history, past medical history, past social history, past surgical history, and problem list.    Review of Systems   Constitutional: Negative. HENT: Negative for dental problem, drooling, ear discharge and ear pain. Eyes: Negative for discharge, redness and itching. Respiratory: Negative for apnea, cough and wheezing. Cardiovascular: Negative for chest pain and palpitations. Gastrointestinal: Negative for abdominal pain, blood in stool, diarrhea and vomiting. Endocrine: Negative for polydipsia, polyphagia and polyuria. Genitourinary: Negative for decreased urine volume, dysuria and frequency. Musculoskeletal: Negative for arthralgias, myalgias and neck stiffness. Skin: Negative for pallor and wound. Allergic/Immunologic: Negative for environmental allergies and food allergies. Neurological: Negative for facial asymmetry, light-headedness, numbness and headaches. Hematological: Negative for adenopathy. Does not bruise/bleed easily. Psychiatric/Behavioral: Negative for agitation, behavioral problems and confusion. Objective:      /70 (BP Location: Left arm, Patient Position: Sitting, Cuff Size: Standard)   Pulse 74   Temp 98.4 °F (36.9 °C) (Temporal)   Ht 5' 1" (1.549 m)   Wt 58.2 kg (128 lb 6.4 oz)   SpO2 97%   BMI 24.26 kg/m²          Physical Exam  Constitutional:       Appearance: Normal appearance. HENT:      Head: Normocephalic.       Nose: Nose normal.      Mouth/Throat:      Mouth: Mucous membranes are moist.   Eyes:      Pupils: Pupils are equal, round, and reactive to light. Cardiovascular:      Rate and Rhythm: Regular rhythm. Heart sounds: Normal heart sounds. Pulmonary:      Breath sounds: Normal breath sounds. Abdominal:      Palpations: Abdomen is soft. Musculoskeletal:         General: No swelling. Cervical back: Neck supple. Skin:     General: Skin is warm. Neurological:      General: No focal deficit present. Mental Status: She is alert and oriented to person, place, and time.    Psychiatric:         Mood and Affect: Mood normal.       hypertension     Well  Controlled on  Current meds    Hyperlipidemia  Controlled  On Statin    Osteoporosis     Treated  By  Endocrine    Hypothyroidism    Stable  On  Current  Levothyroxine       Fup  6 months       Lum Score MD.FACP

## 2023-09-20 ENCOUNTER — APPOINTMENT (OUTPATIENT)
Dept: LAB | Facility: CLINIC | Age: 78
End: 2023-09-20
Payer: MEDICARE

## 2023-09-20 DIAGNOSIS — E78.5 HYPERLIPIDEMIA: ICD-10-CM

## 2023-09-20 DIAGNOSIS — E78.5 HLD (HYPERLIPIDEMIA): ICD-10-CM

## 2023-09-20 DIAGNOSIS — Z86.73 HISTORY OF STROKE: ICD-10-CM

## 2023-09-20 LAB
ALBUMIN SERPL BCP-MCNC: 4.2 G/DL (ref 3.5–5)
ALP SERPL-CCNC: 93 U/L (ref 34–104)
ALT SERPL W P-5'-P-CCNC: 12 U/L (ref 7–52)
ANION GAP SERPL CALCULATED.3IONS-SCNC: 8 MMOL/L
AST SERPL W P-5'-P-CCNC: 19 U/L (ref 13–39)
BILIRUB SERPL-MCNC: 0.6 MG/DL (ref 0.2–1)
BUN SERPL-MCNC: 20 MG/DL (ref 5–25)
CALCIUM SERPL-MCNC: 9.5 MG/DL (ref 8.4–10.2)
CHLORIDE SERPL-SCNC: 104 MMOL/L (ref 96–108)
CHOLEST SERPL-MCNC: 195 MG/DL
CO2 SERPL-SCNC: 28 MMOL/L (ref 21–32)
CREAT SERPL-MCNC: 0.79 MG/DL (ref 0.6–1.3)
GFR SERPL CREATININE-BSD FRML MDRD: 72 ML/MIN/1.73SQ M
GLUCOSE P FAST SERPL-MCNC: 87 MG/DL (ref 65–99)
HDLC SERPL-MCNC: 73 MG/DL
LDLC SERPL CALC-MCNC: 103 MG/DL (ref 0–100)
POTASSIUM SERPL-SCNC: 4.1 MMOL/L (ref 3.5–5.3)
PROT SERPL-MCNC: 7.1 G/DL (ref 6.4–8.4)
SODIUM SERPL-SCNC: 140 MMOL/L (ref 135–147)
TRIGL SERPL-MCNC: 96 MG/DL

## 2023-09-20 PROCEDURE — 80053 COMPREHEN METABOLIC PANEL: CPT

## 2023-09-20 PROCEDURE — 80061 LIPID PANEL: CPT

## 2023-09-20 PROCEDURE — 36415 COLL VENOUS BLD VENIPUNCTURE: CPT

## 2023-09-25 DIAGNOSIS — E78.5 HLD (HYPERLIPIDEMIA): Primary | ICD-10-CM

## 2023-09-25 DIAGNOSIS — Z86.73 HISTORY OF STROKE: ICD-10-CM

## 2023-09-25 RX ORDER — SIMVASTATIN 40 MG
40 TABLET ORAL
Qty: 90 TABLET | Refills: 3 | Status: SHIPPED | OUTPATIENT
Start: 2023-09-25

## 2023-09-25 NOTE — TELEPHONE ENCOUNTER
Called patient. She reports how she is doing okay and how she just underwent a root canal today. Reports a chronic history of vertigo since high school and how she is currently experiencing one. Reports nausea and lightheaded with vertigo. She will reach out to her PCP for management. Reports this is exactly like her vertigo episodes she has experienced in the past. She is aware to present to the ED if she develops any new or worsening stroke-like symptoms. Notified her of Emanuel Roberts's response. She reports adherence to simvastatin 20 mg once daily in the evening. She is willing to increase the simvastatin to 40 mg. She requested for the new script to be sent to the pharmacy. She also reports how her PCP recently told her she does not need to take aspirin. Reports she stopped taking the aspirin on Saturday. She inquired if Emanuel Ragsdale was able to discuss stopping the aspirin with Dr. Lucrecia Lubin. Advised patient this RN will send a message to the provider. She expressed understanding and was appreciative.

## 2023-09-25 NOTE — TELEPHONE ENCOUNTER
----- Message from Lilly Tejada PA-C sent at 9/25/2023  7:35 AM EDT -----   mg/dL, please notify the patient and tell her I would recommend that she increase her simvastatin to 40 mg to lower her cholesterol and for better adequate secondary stroke prevention. Thanks!

## 2023-09-26 ENCOUNTER — TELEPHONE (OUTPATIENT)
Dept: INTERNAL MEDICINE CLINIC | Facility: CLINIC | Age: 78
End: 2023-09-26

## 2023-09-26 NOTE — TELEPHONE ENCOUNTER
Patient left a message for you:     She was seen by you on September 15th. She received a call today from the [facility], the neurosurgeon. They're increasing her medication for simvastatin from 20 milligrams to 40 milligrams because of the blood test results. Another reason she is calling is she had a bad Vertigo attack on Thursday and everything starts spinning and she was sick to her stomach and now she cannot lay flat. She is lightheaded and took Dramamine. She is wondering if she should continue taking it? Should she just take that or does she need to call an ear doctor?     # [phone]

## 2023-09-27 ENCOUNTER — HOSPITAL ENCOUNTER (OUTPATIENT)
Dept: RADIOLOGY | Age: 78
Discharge: HOME | End: 2023-09-27
Payer: MEDICARE

## 2023-09-27 DIAGNOSIS — M80.00XA OSTEOPOROSIS WITH CURRENT PATHOLOGICAL FRACTURE, UNSPECIFIED OSTEOPOROSIS TYPE, INITIAL ENCOUNTER: ICD-10-CM

## 2023-09-27 DIAGNOSIS — Z78.0 MENOPAUSE: ICD-10-CM

## 2023-09-27 PROCEDURE — 77080 DXA BONE DENSITY AXIAL: CPT

## 2023-10-02 ENCOUNTER — CLINICAL SUPPORT (OUTPATIENT)
Dept: ENDOCRINOLOGY | Facility: CLINIC | Age: 78
End: 2023-10-02
Payer: MEDICARE

## 2023-10-02 ENCOUNTER — TELEPHONE (OUTPATIENT)
Dept: ENDOCRINOLOGY | Facility: CLINIC | Age: 78
End: 2023-10-02

## 2023-10-02 VITALS — OXYGEN SATURATION: 99 % | HEART RATE: 65 BPM | DIASTOLIC BLOOD PRESSURE: 78 MMHG | SYSTOLIC BLOOD PRESSURE: 140 MMHG

## 2023-10-02 DIAGNOSIS — M81.0 OSTEOPOROSIS, UNSPECIFIED OSTEOPOROSIS TYPE, UNSPECIFIED PATHOLOGICAL FRACTURE PRESENCE: ICD-10-CM

## 2023-10-02 PROCEDURE — 96372 THER/PROPH/DIAG INJ SC/IM: CPT

## 2023-10-02 NOTE — TELEPHONE ENCOUNTER
Please inform pt that it needs to be done every month, so next would be in Nov first week     Angela Raphael

## 2023-10-02 NOTE — PROGRESS NOTES
Assessment/Plan:    Aldo Braden came into the [facility]'s Endocrinology Office today 10/02/23 to receive Evenity injection. Verbal consent obtained. Consent given by: patient    patient states patient has been medically healthy with no underlining concerns/complications. Aldo Braden presents with No symptoms today. All insturctions were reviewed with the patient. If the patient should have any questions/concerns, advised patient to contacted [facility]. Subjective:     History provided by: patient    Patient ID: Aldo Braden is a 69 y.o. female      Objective:    Vitals:    10/02/23 1001   BP: 140/78   BP Location: Right arm   Patient Position: Sitting   Cuff Size: Standard   Pulse: 65   SpO2: 99%       Patient tolerated the injection well without any complications. Injection site/s Both Arms. Medication was provided by Office. Patient signed consent form yes   Patient signed Niko Sa form yes (If no patient is not a medicare patient). Patient waited 15 minutes after injection no (This only applies to patient's receiving first time injection).        Last Visit: 8/29/2023  Next visit:10/2/2023

## 2023-10-02 NOTE — TELEPHONE ENCOUNTER
Hi Dr Lianna Holm   Can pt have her Evenity inj when she comes to her appt Nov 20th with you ?  She had done today so she would be 2 weeks late for it  Thank you

## 2023-10-24 ENCOUNTER — HOSPITAL ENCOUNTER (OUTPATIENT)
Dept: RADIOLOGY | Age: 78
Discharge: HOME | End: 2023-10-24
Payer: MEDICARE

## 2023-10-24 DIAGNOSIS — Z12.31 ENCOUNTER FOR SCREENING MAMMOGRAM FOR MALIGNANT NEOPLASM OF BREAST: ICD-10-CM

## 2023-10-24 PROCEDURE — 77067 SCR MAMMO BI INCL CAD: CPT

## 2023-10-24 PROCEDURE — 77063 BREAST TOMOSYNTHESIS BI: CPT

## 2023-10-26 ENCOUNTER — CLINICAL SUPPORT (OUTPATIENT)
Dept: INTERNAL MEDICINE CLINIC | Facility: CLINIC | Age: 78
End: 2023-10-26
Payer: MEDICARE

## 2023-10-26 DIAGNOSIS — Z23 ENCOUNTER FOR IMMUNIZATION: Primary | ICD-10-CM

## 2023-10-26 PROCEDURE — G0008 ADMIN INFLUENZA VIRUS VAC: HCPCS

## 2023-10-26 PROCEDURE — 90662 IIV NO PRSV INCREASED AG IM: CPT

## 2023-11-03 ENCOUNTER — CLINICAL SUPPORT (OUTPATIENT)
Dept: ENDOCRINOLOGY | Facility: CLINIC | Age: 78
End: 2023-11-03
Payer: MEDICARE

## 2023-11-03 VITALS — OXYGEN SATURATION: 99 % | DIASTOLIC BLOOD PRESSURE: 86 MMHG | HEART RATE: 62 BPM | SYSTOLIC BLOOD PRESSURE: 142 MMHG

## 2023-11-03 DIAGNOSIS — M81.0 OSTEOPOROSIS, UNSPECIFIED OSTEOPOROSIS TYPE, UNSPECIFIED PATHOLOGICAL FRACTURE PRESENCE: Primary | ICD-10-CM

## 2023-11-03 PROCEDURE — 96372 THER/PROPH/DIAG INJ SC/IM: CPT

## 2023-11-03 PROCEDURE — 90471 IMMUNIZATION ADMIN: CPT

## 2023-11-14 ENCOUNTER — LAB (OUTPATIENT)
Dept: LAB | Facility: CLINIC | Age: 78
End: 2023-11-14
Payer: MEDICARE

## 2023-11-14 DIAGNOSIS — E03.9 HYPOTHYROIDISM, UNSPECIFIED TYPE: ICD-10-CM

## 2023-11-14 DIAGNOSIS — E55.9 VITAMIN D DEFICIENCY: ICD-10-CM

## 2023-11-14 LAB
25(OH)D3 SERPL-MCNC: 31.5 NG/ML (ref 30–100)
ANION GAP SERPL CALCULATED.3IONS-SCNC: 6 MMOL/L
BUN SERPL-MCNC: 18 MG/DL (ref 5–25)
CALCIUM SERPL-MCNC: 9.3 MG/DL (ref 8.4–10.2)
CHLORIDE SERPL-SCNC: 103 MMOL/L (ref 96–108)
CO2 SERPL-SCNC: 30 MMOL/L (ref 21–32)
CREAT SERPL-MCNC: 0.72 MG/DL (ref 0.6–1.3)
GFR SERPL CREATININE-BSD FRML MDRD: 81 ML/MIN/1.73SQ M
GLUCOSE P FAST SERPL-MCNC: 97 MG/DL (ref 65–99)
POTASSIUM SERPL-SCNC: 3.9 MMOL/L (ref 3.5–5.3)
SODIUM SERPL-SCNC: 139 MMOL/L (ref 135–147)
T4 FREE SERPL-MCNC: 0.84 NG/DL (ref 0.61–1.12)
TSH SERPL DL<=0.05 MIU/L-ACNC: 2.95 UIU/ML (ref 0.45–4.5)

## 2023-11-14 PROCEDURE — 84443 ASSAY THYROID STIM HORMONE: CPT

## 2023-11-14 PROCEDURE — 36415 COLL VENOUS BLD VENIPUNCTURE: CPT

## 2023-11-14 PROCEDURE — 82306 VITAMIN D 25 HYDROXY: CPT

## 2023-11-14 PROCEDURE — 84439 ASSAY OF FREE THYROXINE: CPT

## 2023-11-14 PROCEDURE — 80048 BASIC METABOLIC PNL TOTAL CA: CPT

## 2023-11-20 ENCOUNTER — OFFICE VISIT (OUTPATIENT)
Dept: ENDOCRINOLOGY | Facility: CLINIC | Age: 78
End: 2023-11-20
Payer: MEDICARE

## 2023-11-20 VITALS
WEIGHT: 131.8 LBS | BODY MASS INDEX: 24.9 KG/M2 | HEART RATE: 69 BPM | SYSTOLIC BLOOD PRESSURE: 136 MMHG | OXYGEN SATURATION: 99 % | DIASTOLIC BLOOD PRESSURE: 84 MMHG

## 2023-11-20 DIAGNOSIS — Z78.0 MENOPAUSE: ICD-10-CM

## 2023-11-20 DIAGNOSIS — E03.9 HYPOTHYROIDISM, UNSPECIFIED TYPE: ICD-10-CM

## 2023-11-20 DIAGNOSIS — M80.00XA OSTEOPOROSIS WITH CURRENT PATHOLOGICAL FRACTURE, UNSPECIFIED OSTEOPOROSIS TYPE, INITIAL ENCOUNTER: Primary | ICD-10-CM

## 2023-11-20 DIAGNOSIS — E55.9 VITAMIN D DEFICIENCY: ICD-10-CM

## 2023-11-20 PROCEDURE — 99214 OFFICE O/P EST MOD 30 MIN: CPT | Performed by: INTERNAL MEDICINE

## 2023-11-20 NOTE — PROGRESS NOTES
Ebonie Gambino 69 y.o. female MRN: [id number]    Encounter: [id number]      Assessment/Plan     Assessment: This is a 69y.o.-year-old female with vitamin D deficiency. Plan:      Diagnoses and all orders for this visit:    Osteoporosis with current pathological fracture, unspecified osteoporosis type, initial encounter  Previously treated with bisphosphonate therapy, history of traumatic fracture of femur. Currently she is on Evenity injection every month, received fifth injection in November 2023. We will follow-up in 6 months, continue Evenity injection once a month. After Jacques Dulce Maria is finished we will need to switch to Prolia injection every 6 months  Educated about importance of fall precautions and weightbearing exercises  -     Basic metabolic panel; Future    Menopause    Hypothyroidism, unspecified type  She is clinically and biochemically euthyroid. Currently hypothyroidism is managed by primary care physician  -     T4, free; Future  -     TSH, 3rd generation; Future    Vitamin D deficiency  Continue vitamin D3 supplementation, increase calcium and vitamin D to 1 tablet twice a day 600 mg / 20 mg  -     Basic metabolic panel; Future  -     Vitamin D 25 hydroxy; Future    Patient has history of thyroid nodule, she is scheduled for follow-up thyroid ultrasound which was ordered by PCP in summer 2024    CC:   Hypothyroidism, vitamin D deficiency, osteoporosis    History of Present Illness     HPI:    Ebonie Gambino is a 67-year-old woman with medical history of osteoporosis, vitamin D deficiency, hypothyroidism, history of traumatic fracture of right femur in February 2023, status post intramedullary nail placement is here for follow-up of osteoporosis. She was treated with Fosamax from 8503-1841, treated with alendronate from 0804-5641, she was treated with alendronate again from May 2017 to February 2023. Alendronate was stopped because of traumatic fracture of femur.   She had a surgery for her traumatic fracture of right femur on February 2023  She takes calcium and vitamin D3 supplementation 600/400 IU 1 tablet daily. For hypothyroidism, she takes levothyroxine 25 mcg daily on empty stomach 1 hour before breakfast  Levothyroxine is prescribed by primary care physician  For hyperlipidemia, she takes Zocor 40 mg daily.     DEXA scan from September 2023 showed lumbar spine T score is -0.7, left total hip T score is -1.9, left femoral neck T score is -3.4, left forearm T score is -0.9  Suggestive of osteoporosis, statistically significant increase in BMD at lumbar spine    She received 5 th Evenity on Nov 3 rd     Patient also has a history of thyroid nodule which is monitored by primary care physician  Component      Latest Ref Rng 5/24/2023 9/20/2023   Sodium      135 - 147 mmol/L  140    Potassium      3.5 - 5.3 mmol/L  4.1    Chloride      96 - 108 mmol/L  104    CO2      21 - 32 mmol/L  28    Anion Gap      mmol/L  8    BUN      5 - 25 mg/dL  20    Creatinine      0.60 - 1.30 mg/dL  0.79    GLUCOSE FASTING      65 - 99 mg/dL  87    Calcium      8.4 - 10.2 mg/dL  9.5    AST      13 - 39 U/L  19    ALT      7 - 52 U/L  12    Alkaline Phosphatase      34 - 104 U/L  93    Total Protein      6.4 - 8.4 g/dL  7.1    Albumin      3.5 - 5.0 g/dL  4.2    TOTAL BILIRUBIN      0.20 - 1.00 mg/dL  0.60    eGFR      ml/min/1.73sq m  72    Cholesterol      See Comment mg/dL  195    Triglycerides      See Comment mg/dL  96    HDL      >=50 mg/dL  73    LDL Calculated      0 - 100 mg/dL  103 (H)    Vit D, 25-Hydroxy      30.0 - 100.0 ng/mL 34.6     THYROID MICROSOMAL ANTIBODY      0 - 34 IU/mL <9     THYROGLOBULIN AB      0.0 - 0.9 IU/mL <1.0     Free T4      0.61 - 1.12 ng/dL     TSH 3RD GENERATON      0.450 - 4.500 uIU/mL       Component      Latest Ref Rng 11/14/2023   Sodium      135 - 147 mmol/L 139    Potassium      3.5 - 5.3 mmol/L 3.9    Chloride      96 - 108 mmol/L 103    CO2      21 - 32 mmol/L 30 Anion Gap      mmol/L 6    BUN      5 - 25 mg/dL 18    Creatinine      0.60 - 1.30 mg/dL 0.72    GLUCOSE FASTING      65 - 99 mg/dL 97    Calcium      8.4 - 10.2 mg/dL 9.3    AST      13 - 39 U/L    ALT      7 - 52 U/L    Alkaline Phosphatase      34 - 104 U/L    Total Protein      6.4 - 8.4 g/dL    Albumin      3.5 - 5.0 g/dL    TOTAL BILIRUBIN      0.20 - 1.00 mg/dL    eGFR      ml/min/1.73sq m 81    Cholesterol      See Comment mg/dL    Triglycerides      See Comment mg/dL    HDL      >=50 mg/dL    LDL Calculated      0 - 100 mg/dL    Vit D, 25-Hydroxy      30.0 - 100.0 ng/mL 31.5    THYROID MICROSOMAL ANTIBODY      0 - 34 IU/mL    THYROGLOBULIN AB      0.0 - 0.9 IU/mL    Free T4      0.61 - 1.12 ng/dL 0.84    TSH 3RD GENERATON      0.450 - 4.500 uIU/mL 2.946      Review of Systems   Constitutional:  Negative for activity change, diaphoresis, fatigue, fever and unexpected weight change. HENT: Negative. Eyes:  Negative for visual disturbance. Respiratory:  Negative for cough, chest tightness and shortness of breath. Cardiovascular:  Negative for chest pain, palpitations and leg swelling. Gastrointestinal:  Negative for abdominal pain, blood in stool, constipation, diarrhea, nausea and vomiting. Endocrine: Negative for cold intolerance, heat intolerance, polydipsia, polyphagia and polyuria. Genitourinary:  Negative for dysuria, enuresis, frequency and urgency. Musculoskeletal:  Negative for arthralgias and myalgias. Skin:  Negative for pallor, rash and wound. Allergic/Immunologic: Negative. Neurological:  Negative for dizziness, tremors, weakness and numbness. Hematological: Negative. Psychiatric/Behavioral: Negative.          Historical Information   Past Medical History:   Diagnosis Date    Aneurysm ([address]) 2/8/23    2.5 mg found on neck CAT scan    Arthritis 2005    Closed nondisplaced fracture of fifth metatarsal bone of right foot with routine healing     Difficulty walking 2/2/23 Broken Femur    Disease of thyroid gland     hypothyroid    Glaucoma, bilateral     Hyperlipidemia     Osteoporosis     Scoliosis 12/12/12     Past Surgical History:   Procedure Laterality Date    BUNIONECTOMY      COLONOSCOPY  06/04/2019    CORRECTION HAMMER TOE      MAMMO (HISTORICAL)  07/09/2018    NEUROMA EXCISION      OTHER SURGICAL HISTORY      Birth kwasi removed    [place] OPTX FEM SHFT FX W/INSJ IMED IMPLT W/WO SCREW Right 2/3/2023    Procedure: INSERTION NAIL IM FEMUR ANTEGRADE (TROCHANTERIC);   Surgeon: Winter Ocampo MD;  Location: [facility];  Service: Orthopedics    WISDOM TOOTH EXTRACTION       Social History   Social History     Substance and Sexual Activity   Alcohol Use Yes    Comment: Glass of wine a few times a year     Social History     Substance and Sexual Activity   Drug Use Not Currently    Comment: Only drug use was for pain prescribed by a doctor     Social History     Tobacco Use   Smoking Status Never   Smokeless Tobacco Never     Family History:   Family History   Problem Relation Age of Onset    Hypertension Mother     Thyroid disease Mother     Arthritis Mother     Glaucoma Mother     Hypothyroidism Mother     Stomach cancer Father 46    Cancer Father     No Known Problems Maternal Grandmother     No Known Problems Maternal Grandfather     No Known Problems Paternal Grandmother     No Known Problems Paternal Grandfather     Dementia Maternal Aunt     No Known Problems Maternal Aunt     No Known Problems Paternal Aunt     Hypertension Brother     Osteoporosis Family     Hyperlipidemia Family     Hypertension Family     Dementia Maternal Aunt     Hypertension Maternal Aunt     Osteoporosis Maternal Aunt        Meds/Allergies   Current Outpatient Medications   Medication Sig Dispense Refill    Calcium Carb-Cholecalciferol 600-20 MG-MCG TABS       Glucosamine-Chondroitin 500-400 MG CAPS Take 1 tablet by mouth in the morning      levothyroxine 25 mcg tablet Take 1 tablet (25 mcg total) by mouth daily 90 tablet 0    Naphazoline-Polyethyl Glycol 0.012-0.2 % SOLN Apply 1-2 drops to eye 4 (four) times a day      romosozumab-aqqg (EVENITY) subcutaneous injection       simvastatin (ZOCOR) 40 mg tablet Take 1 tablet (40 mg total) by mouth daily at bedtime 90 tablet 3    aspirin (ECOTRIN LOW STRENGTH) 81 mg EC tablet Take 1 tablet (81 mg total) by mouth daily Do not start before March 4, 2023. 30 tablet 0     Current Facility-Administered Medications   Medication Dose Route Frequency Provider Last Rate Last Admin    romosozumab-aqqg (EVENITY) subcutaneous injection 210 mg  210 mg Subcutaneous (multi inj) Q30 Days Jeannette Davis MD   210 mg at 07/25/23 1000     Allergies   Allergen Reactions    Fentanyl Vomiting    Indomethacin GI Intolerance     Other reaction(s): GI upset  Other reaction(s): GI upset    Oxycodone Dizziness, Nausea Only, GI Intolerance and Other (See Comments)     Other reaction(s): GI upset  Other reaction(s): GI upset       Objective   Vitals: Blood pressure 136/84, pulse 69, weight 59.8 kg (131 lb 12.8 oz), SpO2 99 %. Physical Exam  Vitals reviewed. Constitutional:       General: She is not in acute distress. Appearance: Normal appearance. She is not ill-appearing. HENT:      Head: Normocephalic and atraumatic. Nose: Nose normal.   Eyes:      Extraocular Movements: Extraocular movements intact. Conjunctiva/sclera: Conjunctivae normal.   Pulmonary:      Effort: No respiratory distress. Musculoskeletal:      Cervical back: Normal range of motion. Neurological:      General: No focal deficit present. Mental Status: She is alert and oriented to person, place, and time. Psychiatric:         Mood and Affect: Mood normal.         Behavior: Behavior normal.         The history was obtained from the review of the chart, patient.     Lab Results:   Lab Results   Component Value Date/Time    Potassium 3.9 11/14/2023 09:17 AM    Potassium 4.1 09/20/2023 09:25 AM Potassium 3.8 05/24/2023 09:27 AM    Chloride 103 11/14/2023 09:17 AM    Chloride 104 09/20/2023 09:25 AM    Chloride 106 05/24/2023 09:27 AM    CO2 30 11/14/2023 09:17 AM    CO2 28 09/20/2023 09:25 AM    CO2 29 05/24/2023 09:27 AM    CO2, i-STAT 24 02/05/2023 03:03 PM    BUN 18 11/14/2023 09:17 AM    BUN 20 09/20/2023 09:25 AM    BUN 18 05/24/2023 09:27 AM    Creatinine 0.72 11/14/2023 09:17 AM    Creatinine 0.79 09/20/2023 09:25 AM    Creatinine 0.85 05/24/2023 09:27 AM    Glucose, i-STAT 145 (H) 02/05/2023 03:03 PM    Glucose, Fasting 97 11/14/2023 09:17 AM    Glucose, Fasting 87 09/20/2023 09:25 AM    Glucose, Fasting 98 05/24/2023 09:27 AM    Calcium 9.3 11/14/2023 09:17 AM    Calcium 9.5 09/20/2023 09:25 AM    Calcium 9.9 05/24/2023 09:27 AM    eGFR 81 11/14/2023 09:17 AM    eGFR 72 09/20/2023 09:25 AM    eGFR 66 05/24/2023 09:27 AM    TSH 3RD GENERATON 2.946 11/14/2023 09:17 AM    TSH 3RD GENERATON 3.745 05/24/2023 09:27 AM    TSH 3RD GENERATON 2.670 03/08/2023 05:05 PM    Free T4 0.84 11/14/2023 09:17 AM    Free T4 0.99 05/24/2023 09:27 AM    PTH 77.5 03/08/2023 05:05 PM    Vit D, 25-Hydroxy 31.5 11/14/2023 09:17 AM    Vit D, 25-Hydroxy 34.6 05/24/2023 09:27 AM         Imaging Studies:         Results for orders placed during the hospital encounter of 09/27/23    DXA bone density spine hip and pelvis    Impression  1. Osteoporosis. Based on the left femoral neck    2. Since a DXA study from 7/3/2021, there has been:  A  STATISTICALLY SIGNIFICANT INCREASE in bone mineral density of 0.075 g/cm2 (8.3%) in the lumbar spine. This may be artifactual, in part, due to progression of spondylosis since the last DXA study. 3.  The 10 year risk of hip fracture is 15% with the 10 year risk of major osteoporotic fracture being 35% as calculated by the [facility] fracture risk assessment tool (FRAX, which is based on data generated by the [facility]  for Metabolic Bone Diseases).     4. The current NOF guidelines recommend treating patients with a T-score of -2.5 or less in the lumbar spine or hips, or in post-menopausal women and men over the age of 48 with low bone mass (osteopenia) and a FRAX 10 year risk score of >3% for hip  fracture and/or >20% for major osteoporotic fracture. 5.  The NOF recommends follow-up DXA in 1-2 years after initiating therapy for osteoporosis and every 2 years thereafter. More frequent evaluation is appropriate for patients with conditions associated with rapid bone loss, such as glucocorticoid  therapy. The interval between DXA screenings may be longer for individuals without major risk factors and initial T-score in the normal or upper low bone mass range. The FRAX algorithm has certain limitations:  -FRAX has not been validated in patients currently or previously treated with pharmacotherapy for osteoporosis. In such patients, clinical judgment must be exercised in interpreting FRAX scores. -Prior hip, vertebral and humeral fragility fractures appear to confer greater risk of subsequent fracture than fractures at other sites (this is especially true for individuals with severe vertebral fractures), but quantification of this incremental  risk is not possible with FRAX. -FRAX underestimates fracture risk in patients with history of multiple fragility fractures. -FRAX may underestimate fracture risk in patients with history of frequent falls.  -It is not appropriate to use FRAX to monitor treatment response. WHO CLASSIFICATION:  Normal (a T-score of -1.0 or higher)  Low bone mineral density (a T-score of less than -1.0 but higher than -2.5)  Osteoporosis (a T-score of -2.5 or less)  Severe osteoporosis (a T-score of -2.5 or less with a fragility fracture)      LEAST SIGNIFICANT CHANGE (AT 95% C. I):  Lumbar spine 0.036 g/cm2; 2.2%  Total hip: 0.022 g/cm2; 2.6%  Forearm: 0.017 g/cm2; 3.0%.                   Workstation performed: [id number]            I have personally reviewed pertinent reports. Portions of the record may have been created with voice recognition software. Occasional wrong word or "sound a like" substitutions may have occurred due to the inherent limitations of voice recognition software. Read the chart carefully and recognize, using context, where substitutions have occurred.

## 2023-12-04 ENCOUNTER — CLINICAL SUPPORT (OUTPATIENT)
Dept: ENDOCRINOLOGY | Facility: CLINIC | Age: 78
End: 2023-12-04
Payer: MEDICARE

## 2023-12-04 ENCOUNTER — TELEPHONE (OUTPATIENT)
Dept: ENDOCRINOLOGY | Facility: CLINIC | Age: 78
End: 2023-12-04

## 2023-12-04 VITALS — DIASTOLIC BLOOD PRESSURE: 82 MMHG | HEART RATE: 67 BPM | SYSTOLIC BLOOD PRESSURE: 140 MMHG | OXYGEN SATURATION: 98 %

## 2023-12-04 DIAGNOSIS — M80.00XA OSTEOPOROSIS WITH CURRENT PATHOLOGICAL FRACTURE, UNSPECIFIED OSTEOPOROSIS TYPE, INITIAL ENCOUNTER: Primary | ICD-10-CM

## 2023-12-04 PROCEDURE — 96372 THER/PROPH/DIAG INJ SC/IM: CPT

## 2023-12-04 NOTE — LETTER
[facility] END[facility]CRIN[facility] [address]  [place] [address]  Phone#  [phone]  Fax#  [phone]      December 6, 2023      Dear:   Gregorio Nj         Our office has attempted to contact you several times regarding your {Reason list:67647}. Could you please contact our office at [phone]. Thank you.      Sincerely,    NicOxJAMEEL

## 2023-12-04 NOTE — PROGRESS NOTES
Assessment/Plan:    Aurea Mclaughlin came into the [facility] Endocrinology Office today 12/04/23 to receive Evenity injection. Verbal consent obtained. Consent given by: patient    patient states patient has been medically healthy with no underlining concerns/complications. Aurea Mclaughlin presents with no symptoms today. All insturctions were reviewed with the patient. If the patient should have any questions/concerns, advised patient to contacted [facility] Endocrinology Office. Subjective:     History provided by: patient    Patient ID: Aurea Mclaughlin is a 69 y.o. female      Objective:    Vitals:    12/04/23 1006   BP: 140/82   BP Location: Right arm   Patient Position: Sitting   Cuff Size: Standard   Pulse: 67   SpO2: 98%       Patient tolerated the injection well without any complications. Injection site/s both arms. Medication was provided by office. Patient signed consent form yes   Patient signed Adeel Friends form yes (If no patient is not a medicare patient). Patient waited 15 minutes after injection no (This only applies to patient's receiving first time injection).        Last Visit: 11/20/2023  Next visit:12/4/2023

## 2023-12-07 DIAGNOSIS — E03.9 ACQUIRED HYPOTHYROIDISM: ICD-10-CM

## 2023-12-07 RX ORDER — LEVOTHYROXINE SODIUM 0.03 MG/1
25 TABLET ORAL DAILY
Qty: 90 TABLET | Refills: 0 | Status: SHIPPED | OUTPATIENT
Start: 2023-12-07

## 2023-12-13 DIAGNOSIS — M79.605 LEFT LEG PAIN: ICD-10-CM

## 2023-12-13 DIAGNOSIS — S72.21XA CLOSED DISPLACED SUBTROCHANTERIC FRACTURE OF RIGHT FEMUR, INITIAL ENCOUNTER (HCC): Primary | ICD-10-CM

## 2023-12-19 ENCOUNTER — HOSPITAL ENCOUNTER (OUTPATIENT)
Dept: RADIOLOGY | Facility: HOSPITAL | Age: 78
Discharge: HOME | End: 2023-12-19
Attending: ORTHOPAEDIC SURGERY
Payer: MEDICARE

## 2023-12-19 ENCOUNTER — TELEPHONE (OUTPATIENT)
Age: 78
End: 2023-12-19

## 2023-12-19 ENCOUNTER — OFFICE VISIT (OUTPATIENT)
Dept: OBGYN CLINIC | Facility: HOSPITAL | Age: 78
End: 2023-12-19
Payer: MEDICARE

## 2023-12-19 VITALS
DIASTOLIC BLOOD PRESSURE: 77 MMHG | HEART RATE: 63 BPM | HEIGHT: 61 IN | BODY MASS INDEX: 24.9 KG/M2 | SYSTOLIC BLOOD PRESSURE: 142 MMHG

## 2023-12-19 DIAGNOSIS — S72.21XA CLOSED DISPLACED SUBTROCHANTERIC FRACTURE OF RIGHT FEMUR, INITIAL ENCOUNTER (HCC): Primary | ICD-10-CM

## 2023-12-19 DIAGNOSIS — S72.21XA CLOSED DISPLACED SUBTROCHANTERIC FRACTURE OF RIGHT FEMUR, INITIAL ENCOUNTER (HCC): ICD-10-CM

## 2023-12-19 DIAGNOSIS — M79.605 LEFT LEG PAIN: ICD-10-CM

## 2023-12-19 PROCEDURE — 73552 X-RAY EXAM OF FEMUR 2/>: CPT

## 2023-12-19 PROCEDURE — 99214 OFFICE O/P EST MOD 30 MIN: CPT | Performed by: ORTHOPAEDIC SURGERY

## 2023-12-19 NOTE — ASSESSMENT & PLAN NOTE
- mechanical in nature - felt a pop in her hip when she took a step and lowered herself to the ground   Did not strike her head    - PT/OT and case management LOV 12/23

## 2023-12-19 NOTE — PROGRESS NOTES
Assessment:   Diagnosis ICD-10-CM Associated Orders   1. Closed displaced subtrochanteric fracture of right femur, initial encounter ([facility])  S72.21XA             Plan:  Xrays of the right hip were obtained today and reviewed. Her fracture is completely healed.  She has no pain in the left femur. Instructed the patient to keep an eye on any new pain over the left femur before she has a fracture given beaking from aledronate.  WBAT BLLE  Follow up PRN with us, follow up with Dr Johnson for knee pain      To do next visit:  No follow-ups on file.    The above stated was discussed in layman's terms and the patient expressed understanding.  All questions were answered to the patient's satisfaction.         Subjective:   Mari Vilchis is a 78 y.o. female who presents today for 10-month follow-up for her right femur. She is 10 months s/p right IM nail femur anterograde, 2/3/2023. She has had no issues with the right femur and been doing well with therapy. She continues to do her exercises. She has no pain in the left femur. She does see endo, she has stopped taking aledronate and is on an alternate regimen per their recs.      Review of systems negative unless otherwise specified in HPI    Past Medical History:   Diagnosis Date   • Aneurysm ([facility]) 2/8/23    2.5 mg found on neck CAT scan   • Arthritis 2005   • Closed nondisplaced fracture of fifth metatarsal bone of right foot with routine healing    • Difficulty walking 2/2/23    Broken Femur   • Disease of thyroid gland     hypothyroid   • Glaucoma, bilateral    • Hyperlipidemia    • Osteoporosis    • Scoliosis 12/12/12       Past Surgical History:   Procedure Laterality Date   • BUNIONECTOMY     • COLONOSCOPY  06/04/2019   • CORRECTION HAMMER TOE     • MAMMO (HISTORICAL)  07/09/2018   • NEUROMA EXCISION     • OTHER SURGICAL HISTORY      Birth kwasi removed   • [place] OPTX FEM SHFT FX W/INSJ IMED IMPLT W/WO SCREW Right 2/3/2023    Procedure: INSERTION NAIL IM FEMUR  ANTEGRADE (TROCHANTERIC);  Surgeon: Chai Chavez MD;  Location: [facility];  Service: Orthopedics   • WISDOM TOOTH EXTRACTION         Family History   Problem Relation Age of Onset   • Hypertension Mother    • Thyroid disease Mother    • Arthritis Mother    • Glaucoma Mother    • Hypothyroidism Mother    • Stomach cancer Father 51   • Cancer Father    • No Known Problems Maternal Grandmother    • No Known Problems Maternal Grandfather    • No Known Problems Paternal Grandmother    • No Known Problems Paternal Grandfather    • Dementia Maternal Aunt    • No Known Problems Maternal Aunt    • No Known Problems Paternal Aunt    • Hypertension Brother    • Osteoporosis Family    • Hyperlipidemia Family    • Hypertension Family    • Dementia Maternal Aunt    • Hypertension Maternal Aunt    • Osteoporosis Maternal Aunt        Social History     Occupational History   • Not on file   Tobacco Use   • Smoking status: Never   • Smokeless tobacco: Never   Vaping Use   • Vaping status: Never Used   Substance and Sexual Activity   • Alcohol use: Yes     Comment: Glass of wine a few times a year   • Drug use: Not Currently     Comment: Only drug use was for pain prescribed by a doctor   • Sexual activity: Never         Current Outpatient Medications:   •  Calcium Carb-Cholecalciferol 600-20 MG-MCG TABS, , Disp: , Rfl:   •  Glucosamine-Chondroitin 500-400 MG CAPS, Take 1 tablet by mouth in the morning, Disp: , Rfl:   •  levothyroxine 25 mcg tablet, TAKE 1 TABLET BY MOUTH EVERY DAY, Disp: 90 tablet, Rfl: 0  •  Naphazoline-Polyethyl Glycol 0.012-0.2 % SOLN, Apply 1-2 drops to eye 4 (four) times a day, Disp: , Rfl:   •  romosozumab-aqqg (EVENITY) subcutaneous injection, , Disp: , Rfl:   •  simvastatin (ZOCOR) 40 mg tablet, Take 1 tablet (40 mg total) by mouth daily at bedtime, Disp: 90 tablet, Rfl: 3  •  aspirin (ECOTRIN LOW STRENGTH) 81 mg EC tablet, Take 1 tablet (81 mg total) by mouth daily Do not start before March 4, 2023.,  "Disp: 30 tablet, Rfl: 0    Current Facility-Administered Medications:   •  romosozumab-aqqg (EVENITY) subcutaneous injection 210 mg, 210 mg, Subcutaneous (multi inj), Q30 Days, Vadim Bender MD, 210 mg at 07/25/23 1000    Allergies   Allergen Reactions   • Fentanyl Vomiting   • Indomethacin GI Intolerance     Other reaction(s): GI upset  Other reaction(s): GI upset   • Oxycodone Dizziness, Nausea Only, GI Intolerance and Other (See Comments)     Other reaction(s): GI upset  Other reaction(s): GI upset            Vitals:    12/19/23 1048   BP: 142/77   Pulse: 63       Objective:                    Right Hip Exam     Tenderness   The patient is experiencing no tenderness.     Range of Motion   Flexion:  100   External rotation:  40   Internal rotation:  30 (pain at end range)     Muscle Strength   Abduction: 5/5   Adduction: 5/5   Flexion: 5/5     Other   Erythema: absent  Sensation: normal  Pulse: present    Comments:  Calf is soft and non tender        Well healed incisions.    Diagnostics, reviewed and taken today if performed as documented:    The attending physician has personally reviewed the pertinent films in PACS and interpretation is as follows:  Xrays of right femur 2 views: Healed fracture with callus formation. Hardware is in place without evidence of failure or loosening.  Xrays of left femur 2 views: Thickened cortices noted proximal femur with lateral beaking.    Procedures, if performed today:    Procedures    None performed      Portions of the record may have been created with voice recognition software.  Occasional wrong word or \"sound a like\" substitutions may have occurred due to the inherent limitations of voice recognition software.  Read the chart carefully and recognize, using context, where substitutions have occurred.  "

## 2023-12-19 NOTE — TELEPHONE ENCOUNTER
Caller: Patient  Doctor/office: Dali  CB#: [phone]       called to start auth/delivery for VISCO/EUFLEXXA/Durolane injections    Body Part: b/l knees  Pain Level (scale 1-10): 5-6/10  Date of last Gel Injection: 11/11/22-patient specifically requested Euflexxa    Educated patient on Visco procedure. Medications must first be authorized and delivered to our office BEFORE we can schedule

## 2023-12-20 DIAGNOSIS — M17.12 PRIMARY OSTEOARTHRITIS OF LEFT KNEE: ICD-10-CM

## 2023-12-20 DIAGNOSIS — M17.11 PRIMARY OSTEOARTHRITIS OF RIGHT KNEE: Primary | ICD-10-CM

## 2024-01-02 ENCOUNTER — TELEPHONE (OUTPATIENT)
Dept: ENDOCRINOLOGY | Facility: HOSPITAL | Age: 79
End: 2024-01-02

## 2024-01-02 NOTE — TELEPHONE ENCOUNTER
Received summary of benefits for 2024.    No prior auth required for Evenity. She has met $0 of her $240 Medicare Deductible. Her secondary insurance does  that cost. There is no OOP cost to her.

## 2024-01-12 ENCOUNTER — CLINICAL SUPPORT (OUTPATIENT)
Dept: ENDOCRINOLOGY | Facility: CLINIC | Age: 79
End: 2024-01-12
Payer: MEDICARE

## 2024-01-12 ENCOUNTER — TELEPHONE (OUTPATIENT)
Dept: ENDOCRINOLOGY | Facility: CLINIC | Age: 79
End: 2024-01-12

## 2024-01-12 VITALS — DIASTOLIC BLOOD PRESSURE: 80 MMHG | HEART RATE: 74 BPM | SYSTOLIC BLOOD PRESSURE: 130 MMHG | OXYGEN SATURATION: 96 %

## 2024-01-12 DIAGNOSIS — M80.00XA OSTEOPOROSIS WITH CURRENT PATHOLOGICAL FRACTURE, UNSPECIFIED OSTEOPOROSIS TYPE, INITIAL ENCOUNTER: Primary | ICD-10-CM

## 2024-01-12 PROCEDURE — 96372 THER/PROPH/DIAG INJ SC/IM: CPT

## 2024-01-12 NOTE — TELEPHONE ENCOUNTER
Patient scheduled for her next Evenity on 2/13/24.    Please verify authorization for her.    Thank you.

## 2024-01-12 NOTE — TELEPHONE ENCOUNTER
See phone note from 1/2/24. She is good to go. Evenity only has to be ran in the beginning of the year.

## 2024-02-09 ENCOUNTER — PROCEDURE VISIT (OUTPATIENT)
Dept: OBGYN CLINIC | Facility: CLINIC | Age: 79
End: 2024-02-09
Payer: MEDICARE

## 2024-02-09 VITALS — HEIGHT: 61 IN | BODY MASS INDEX: 24.55 KG/M2 | WEIGHT: 130 LBS

## 2024-02-09 DIAGNOSIS — M17.12 PRIMARY OSTEOARTHRITIS OF LEFT KNEE: ICD-10-CM

## 2024-02-09 DIAGNOSIS — M17.11 PRIMARY OSTEOARTHRITIS OF RIGHT KNEE: Primary | ICD-10-CM

## 2024-02-09 PROCEDURE — 20610 DRAIN/INJ JOINT/BURSA W/O US: CPT | Performed by: ORTHOPAEDIC SURGERY

## 2024-02-09 RX ORDER — HYALURONATE SODIUM 10 MG/ML
20 SYRINGE (ML) INTRAARTICULAR
Status: COMPLETED | OUTPATIENT
Start: 2024-02-09 | End: 2024-02-09

## 2024-02-09 RX ADMIN — Medication 20 MG: at 12:00

## 2024-02-09 NOTE — PROGRESS NOTES
Orthopedics          Mari Vilchis 78 y.o. female MRN: [id number]      Chief Complaint:   bilateral knee pain    HPI:   78 y.o.female complaining of bilateral knee pain.  She presents to the office today regarding bilateral knee pain due to osteoarthritis.  States pain is aching nature worse with mildly relieved with rest and rates her pain anywhere from a 2-5 out of 10.  She has had viscosupplementation injections past significant pain relief and presents today for start of 3 series bilateral knee Euflexxa injections                Review Of Systems:   Skin: Normal  Neuro: See HPI  Musculoskeletal: See HPI  All other systems reviewed and are negative    Past Medical History:   Past Medical History:   Diagnosis Date    Aneurysm ([facility]) 2/8/23    2.5 mg found on neck CAT scan    Arthritis 2005    Closed nondisplaced fracture of fifth metatarsal bone of right foot with routine healing     Difficulty walking 2/2/23    Broken Femur    Disease of thyroid gland     hypothyroid    Glaucoma, bilateral     Hyperlipidemia     Osteoporosis     Scoliosis 12/12/12       Past Surgical History:   Past Surgical History:   Procedure Laterality Date    BUNIONECTOMY      COLONOSCOPY  06/04/2019    CORRECTION HAMMER TOE      MAMMO (HISTORICAL)  07/09/2018    NEUROMA EXCISION      OTHER SURGICAL HISTORY      Birth kwasi removed    [place] OPTX FEM SHFT FX W/INSJ IMED IMPLT W/WO SCREW Right 2/3/2023    Procedure: INSERTION NAIL IM FEMUR ANTEGRADE (TROCHANTERIC);  Surgeon: Chai Chavez MD;  Location: [facility];  Service: Orthopedics    WISDOM TOOTH EXTRACTION         Family History:  Family history reviewed and non-contributory  Family History   Problem Relation Age of Onset    Hypertension Mother     Thyroid disease Mother     Arthritis Mother     Glaucoma Mother     Hypothyroidism Mother     Stomach cancer Father 51    Cancer Father     No Known Problems Maternal Grandmother     No Known Problems Maternal Grandfather     No Known  Problems Paternal Grandmother     No Known Problems Paternal Grandfather     Dementia Maternal Aunt     No Known Problems Maternal Aunt     No Known Problems Paternal Aunt     Hypertension Brother     Osteoporosis Family     Hyperlipidemia Family     Hypertension Family     Dementia Maternal Aunt     Hypertension Maternal Aunt     Osteoporosis Maternal Aunt          Social History:  Social History     Socioeconomic History    Marital status: Single     Spouse name: None    Number of children: None    Years of education: None    Highest education level: None   Occupational History    None   Tobacco Use    Smoking status: Never    Smokeless tobacco: Never   Vaping Use    Vaping status: Never Used   Substance and Sexual Activity    Alcohol use: Yes     Comment: Glass of wine a few times a year    Drug use: Not Currently     Comment: Only drug use was for pain prescribed by a doctor    Sexual activity: Never   Other Topics Concern    None   Social History Narrative    None     Social Determinants of Health     Financial Resource Strain: Low Risk  (10/11/2022)    Overall Financial Resource Strain (CARDIA)     Difficulty of Paying Living Expenses: Not hard at all   Food Insecurity: No Food Insecurity (2/3/2023)    Hunger Vital Sign     Worried About Running Out of Food in the Last Year: Never true     Ran Out of Food in the Last Year: Never true   Transportation Needs: No Transportation Needs (2/3/2023)    PRAPARE - Transportation     Lack of Transportation (Medical): No     Lack of Transportation (Non-Medical): No   Physical Activity: Not on file   Stress: Not on file   Social Connections: Not on file   Intimate Partner Violence: Not on file   Housing Stability: Low Risk  (2/3/2023)    Housing Stability Vital Sign     Unable to Pay for Housing in the Last Year: No     Number of Places Lived in the Last Year: 1     Unstable Housing in the Last Year: No       Allergies:   Allergies   Allergen Reactions    Fentanyl Vomiting     Indomethacin GI Intolerance     Other reaction(s): GI upset  Other reaction(s): GI upset    Oxycodone Dizziness, Nausea Only, GI Intolerance and Other (See Comments)     Other reaction(s): GI upset  Other reaction(s): GI upset       Labs:  0   Lab Value Date/Time    HCT 41.5 03/08/2023 1705    HCT 32.3 (L) 02/16/2023 0536    HCT 30.5 (L) 02/13/2023 0531    HGB 13.3 03/08/2023 1705    HGB 9.9 (L) 02/16/2023 0536    HGB 9.4 (L) 02/13/2023 0531    INR 1.05 02/03/2023 0550    WBC 10.83 (H) 03/08/2023 1705    WBC 7.57 02/16/2023 0536    WBC 8.42 02/13/2023 0531    ESR 27 03/08/2023 1705       Meds:    Current Outpatient Medications:     Calcium Carb-Cholecalciferol 600-20 MG-MCG TABS, , Disp: , Rfl:     Glucosamine-Chondroitin 500-400 MG CAPS, Take 1 tablet by mouth in the morning, Disp: , Rfl:     levothyroxine 25 mcg tablet, TAKE 1 TABLET BY MOUTH EVERY DAY, Disp: 90 tablet, Rfl: 0    Naphazoline-Polyethyl Glycol 0.012-0.2 % SOLN, Apply 1-2 drops to eye 4 (four) times a day, Disp: , Rfl:     romosozumab-aqqg (EVENITY) subcutaneous injection, , Disp: , Rfl:     simvastatin (ZOCOR) 40 mg tablet, Take 1 tablet (40 mg total) by mouth daily at bedtime, Disp: 90 tablet, Rfl: 3    aspirin (ECOTRIN LOW STRENGTH) 81 mg EC tablet, Take 1 tablet (81 mg total) by mouth daily Do not start before March 4, 2023., Disp: 30 tablet, Rfl: 0    Current Facility-Administered Medications:     romosozumab-aqqg (EVENITY) subcutaneous injection 210 mg, 210 mg, Subcutaneous (multi inj), Q30 Days, Vadim Bender MD, 210 mg at 07/25/23 1000    Body mass index is 24.56 kg/m².  Wt Readings from Last 3 Encounters:   02/09/24 59 kg (130 lb)   11/20/23 59.8 kg (131 lb 12.8 oz)   09/15/23 58.2 kg (128 lb 6.4 oz)     Physical Exam:   There were no vitals filed for this visit.    General Appearance:    Alert, cooperative, no distress, appears stated age   Head:    Normocephalic, without obvious abnormality, atraumatic   Eyes:     conjunctiva/corneas clear, both eyes        Nose:   Nares normal, septum midline, no drainage    Throat:   Lips normal; teeth and gums normal   Neck:    symmetrical, trachea midline, ;     thyroid:  no enlargement/   Back:     Symmetric, no curvature, ROM normal   Lungs:   No audible wheezing or labored breathing   Chest Wall:    No tenderness or deformity    Heart:    Regular rate and rhythm               Pulses:   2+ and symmetric all extremities   Skin:   Skin color, texture, turgor normal, no rashes or lesions   Neurologic:   normal strength, sensation and reflexes     throughout       Musculoskeletal: bilateral lower extremity  On examination of the right knee there is no effusion, no erythema.  Range of motion to full active extension and flexion to greater than 120°.  Pain on palpation medial and lateral joint lines.  There is crepitus with range of motion, no warmth to palpation, bony enlargement noted. No pain on palpation pes anserine bursa region or distal iliotibial band.  Stable to varus and valgus stress without pain or gapping.  Negative anterior and posterior drawer testing.  Sensation intact distal pulses present.  On examination of the left knee there is no effusion, no erythema.  Range of motion to full active extension and flexion to greater than 120°.  Pain on palpation medial and lateral joint lines.  There is crepitus with range of motion, no warmth to palpation, bony enlargement noted. No pain on palpation pes anserine bursa region or distal iliotibial band.  Stable to varus and valgus stress without pain or gapping.  Negative anterior and posterior drawer testing.  Sensation intact distal pulses present.    Large joint arthrocentesis: R knee  Universal Protocol:  Consent: Verbal consent obtained.  Consent given by: patient  Patient understanding: patient states understanding of the procedure being performed  Site marked: the operative site was marked  Patient identity confirmed: verbally  with patient  Supporting Documentation  Indications: pain   Procedure Details  Location: knee - R knee  Needle size: 22 G  Approach: superior  Medications administered: 20 mg Sodium Hyaluronate (Viscosup) 20 MG/2ML    Patient tolerance: patient tolerated the procedure well with no immediate complications      Large joint arthrocentesis: L knee  Universal Protocol:  Consent given by: patient  Patient understanding: patient states understanding of the procedure being performed  Site marked: the operative site was marked  Patient identity confirmed: verbally with patient  Supporting Documentation  Indications: pain   Procedure Details  Location: knee - L knee  Needle size: 22 G  Approach: superior  Medications administered: 20 mg Sodium Hyaluronate (Viscosup) 20 MG/2ML    Patient tolerance: patient tolerated the procedure well with no immediate complications         _*_*_*_*_*_*_*_*_*_*_*_*_*_*_*_*_*_*_*_*_*_*_*_*_*_*_*_*_*_*_*_*_*_*_*_*_*_*_*_*_*    Assessment:  78 y.o.female with bilateral knee pain due to arthritis    Plan:   Weight bearing as tolerated  bilateral lower extremity  1 the series of 3 bilateral knee intra-articular flexion injections administered as noted above  Advised no significant activity or increased ambulation over the next 24-48 hours and warm compresses to the knee no greater 20 minutes 3-4 times 24 hours following the injection.  Patient advised should they develop any increasing pain, redness, drainage, numbness, tingling or swelling surrounding the injection sight, they are to contact our office or present to the emergency department.  Follow up in 1 week's time for repeat bilateral knee 2 and a series of 3 Euflexxa injections    Douglas Love PA-C                    .

## 2024-02-16 ENCOUNTER — PROCEDURE VISIT (OUTPATIENT)
Dept: OBGYN CLINIC | Facility: CLINIC | Age: 79
End: 2024-02-16
Payer: MEDICARE

## 2024-02-16 VITALS — HEIGHT: 61 IN | BODY MASS INDEX: 24.55 KG/M2 | WEIGHT: 130 LBS

## 2024-02-16 DIAGNOSIS — M17.11 PRIMARY OSTEOARTHRITIS OF RIGHT KNEE: Primary | ICD-10-CM

## 2024-02-16 DIAGNOSIS — M17.12 PRIMARY OSTEOARTHRITIS OF LEFT KNEE: ICD-10-CM

## 2024-02-16 PROCEDURE — 20610 DRAIN/INJ JOINT/BURSA W/O US: CPT | Performed by: ORTHOPAEDIC SURGERY

## 2024-02-16 RX ORDER — HYALURONATE SODIUM 10 MG/ML
20 SYRINGE (ML) INTRAARTICULAR
Status: COMPLETED | OUTPATIENT
Start: 2024-02-16 | End: 2024-02-16

## 2024-02-16 RX ADMIN — Medication 20 MG: at 11:00

## 2024-02-16 NOTE — PROGRESS NOTES
Assessment:  1. Primary osteoarthritis of right knee  Large joint arthrocentesis: bilateral knee      2. Primary osteoarthritis of left knee  Large joint arthrocentesis: bilateral knee        78-year-old female presenting for 2/3 bilateral knee Euflexxa injections for conservative management of bilateral knee osteoarthritis.    Plan:    Weightbearing as tolerated bilateral lower extremity  2 out of 3 bilateral knee intra-articular Euflexxa injections administered as described below.  Patient advised should they develop any increasing pain, redness, drainage, numbness, tingling or swelling surrounding the injection site, they are to contact our office or present to the emergency department.  1 week follow-up scheduled 2/23/2024 for final bilateral knee Euflexxa injections        The above stated was discussed in layman's terms and the patient expressed understanding.  All questions were answered to the patient's satisfaction.         Subjective:   Mari Vilchis is a 78-year-old female presenting for 2/3 bilateral knee Euflexxa injections for conservative management of bilateral knee osteoarthritis.  Since receiving her first bilateral knee intra-articular Euflexxa injection last week, patient reports moderate improvement of her bilateral knee pain.  No new complaints or interval changes since prior clinic visit.  Bilateral knee pain is 2-3/10 in severity at its worst over the last week.      Review of systems negative unless otherwise specified in HPI    Past Medical History:   Diagnosis Date    Aneurysm ([facility]) 2/8/23    2.5 mg found on neck CAT scan    Arthritis 2005    Closed nondisplaced fracture of fifth metatarsal bone of right foot with routine healing     Difficulty walking 2/2/23    Broken Femur    Disease of thyroid gland     hypothyroid    Glaucoma, bilateral     Hyperlipidemia     Osteoporosis     Scoliosis 12/12/12       Past Surgical History:   Procedure Laterality Date    BUNIONECTOMY       COLONOSCOPY  06/04/2019    CORRECTION HAMMER TOE      MAMMO (HISTORICAL)  07/09/2018    NEUROMA EXCISION      OTHER SURGICAL HISTORY      Birth kwasi removed    [place] OPTX FEM SHFT FX W/INSJ IMED IMPLT W/WO SCREW Right 2/3/2023    Procedure: INSERTION NAIL IM FEMUR ANTEGRADE (TROCHANTERIC);  Surgeon: Chai Chavez MD;  Location: [facility];  Service: Orthopedics    WISDOM TOOTH EXTRACTION         Family History   Problem Relation Age of Onset    Hypertension Mother     Thyroid disease Mother     Arthritis Mother     Glaucoma Mother     Hypothyroidism Mother     Stomach cancer Father 51    Cancer Father     No Known Problems Maternal Grandmother     No Known Problems Maternal Grandfather     No Known Problems Paternal Grandmother     No Known Problems Paternal Grandfather     Dementia Maternal Aunt     No Known Problems Maternal Aunt     No Known Problems Paternal Aunt     Hypertension Brother     Osteoporosis Family     Hyperlipidemia Family     Hypertension Family     Dementia Maternal Aunt     Hypertension Maternal Aunt     Osteoporosis Maternal Aunt        Social History     Occupational History    Not on file   Tobacco Use    Smoking status: Never    Smokeless tobacco: Never   Vaping Use    Vaping status: Never Used   Substance and Sexual Activity    Alcohol use: Yes     Comment: Glass of wine a few times a year    Drug use: Not Currently     Comment: Only drug use was for pain prescribed by a doctor    Sexual activity: Never         Current Outpatient Medications:     Calcium Carb-Cholecalciferol 600-20 MG-MCG TABS, , Disp: , Rfl:     levothyroxine 25 mcg tablet, TAKE 1 TABLET BY MOUTH EVERY DAY, Disp: 90 tablet, Rfl: 0    Naphazoline-Polyethyl Glycol 0.012-0.2 % SOLN, Apply 1-2 drops to eye 4 (four) times a day, Disp: , Rfl:     romosozumab-aqqg (EVENITY) subcutaneous injection, , Disp: , Rfl:     simvastatin (ZOCOR) 40 mg tablet, Take 1 tablet (40 mg total) by mouth daily at bedtime, Disp: 90 tablet, Rfl: 3     aspirin (ECOTRIN LOW STRENGTH) 81 mg EC tablet, Take 1 tablet (81 mg total) by mouth daily Do not start before March 4, 2023., Disp: 30 tablet, Rfl: 0    Glucosamine-Chondroitin 500-400 MG CAPS, Take 1 tablet by mouth in the morning (Patient not taking: Reported on 2/16/2024), Disp: , Rfl:     Current Facility-Administered Medications:     romosozumab-aqqg (EVENITY) subcutaneous injection 210 mg, 210 mg, Subcutaneous (multi inj), Q30 Days, Vadim Bender MD, 210 mg at 07/25/23 1000    Allergies   Allergen Reactions    Fentanyl Vomiting    Indomethacin GI Intolerance     Other reaction(s): GI upset  Other reaction(s): GI upset    Oxycodone Dizziness, Nausea Only, GI Intolerance and Other (See Comments)     Other reaction(s): GI upset  Other reaction(s): GI upset            Vitals:     Body mass index is 24.56 kg/m².  Wt Readings from Last 3 Encounters:   02/16/24 59 kg (130 lb)   02/09/24 59 kg (130 lb)   11/20/23 59.8 kg (131 lb 12.8 oz)       Objective:          Physical Exam  Physical Exam:      General Appearance:    Alert, cooperative, no distress, appears stated age   Head:  Normocephalic, without obvious abnormality, atraumatic   Eyes:  Conjunctiva/corneas clear, both eyes   Nose: Nares normal, septum midline, no drainage    Throat: Lips normal; teeth and gums normal   Neck: Symmetrical, trachea midline; thyroid: no enlargement   Back: Symmetric, no curvature, ROM normal   Lungs: No audible wheezing or labored breathing   Chest Wall:  No tenderness or deformity    Heart:  Regular rate and rhythm   Pulses: 2+ and symmetric all extremities   Skin: Skin color, texture, turgor normal, no rashes or lesions   Neurologic: Normal strength, sensation and reflexes throughout                       Ortho exam      Left Knee:        Inspection: Skin intact      Effusion: Negative      ROM 0-120 without pain      Extensor Lag: Negative      Palpation: Medial and lateral joint line tenderness to palpation      AP  "Stability at 90 deg stable     M/L stability in full extension stable      M/L stability in midflexion stable    Crepitus with passive range of motion     Motor: 5/5 IP/Q/HS/TA/GS      Pulses: 2+ DP / 2+ PT      SILT DP/SP/S/S/TN       Right knee:       Inspection: Skin intact      Effusion: Negative      ROM 0-120 without pain      Extensor Lag: Negative      Palpation: Medial and lateral joint line tenderness to palpation      AP Stability at 90 deg stable     M/L stability in full extension stable      M/L stability in midflexion stable    Crepitus with passive range of motion     Motor: 5/5 IP/Q/HS/TA/GS      Pulses: 2+ DP / 2+ PT      SILT DP/SP/S/S/TN        Diagnostics, reviewed and taken today if performed as documented:    None performed        Procedures, if performed today:    Large joint arthrocentesis: bilateral knee  Universal Protocol:  Consent: Verbal consent obtained.  Risks and benefits: risks, benefits and alternatives were discussed  Consent given by: patient  Patient identity confirmed: verbally with patient  Supporting Documentation  Indications: pain   Procedure Details  Location: knee - bilateral knee  Preparation: Patient was prepped and draped in the usual sterile fashion  Needle size: 22 G  Ultrasound guidance: no  Approach: anterolateral    Medications (Right): 20 mg Sodium Hyaluronate (Viscosup) 20 MG/2MLMedications (Left): 20 mg Sodium Hyaluronate (Viscosup) 20 MG/2ML   Patient tolerance: patient tolerated the procedure well with no immediate complications  Dressing:  Sterile dressing applied            Portions of the record may have been created with voice recognition software.  Occasional wrong word or \"sound a like\" substitutions may have occurred due to the inherent limitations of voice recognition software.  Read the chart carefully and recognize, using context, where substitutions have occurred.    "

## 2024-02-21 ENCOUNTER — CLINICAL SUPPORT (OUTPATIENT)
Dept: ENDOCRINOLOGY | Facility: CLINIC | Age: 79
End: 2024-02-21
Payer: MEDICARE

## 2024-02-21 VITALS — OXYGEN SATURATION: 98 % | SYSTOLIC BLOOD PRESSURE: 130 MMHG | DIASTOLIC BLOOD PRESSURE: 90 MMHG | HEART RATE: 70 BPM

## 2024-02-21 DIAGNOSIS — M80.00XA OSTEOPOROSIS WITH CURRENT PATHOLOGICAL FRACTURE, UNSPECIFIED OSTEOPOROSIS TYPE, INITIAL ENCOUNTER: Primary | ICD-10-CM

## 2024-02-21 DIAGNOSIS — M81.0 OSTEOPOROSIS, UNSPECIFIED OSTEOPOROSIS TYPE, UNSPECIFIED PATHOLOGICAL FRACTURE PRESENCE: ICD-10-CM

## 2024-02-21 PROCEDURE — 96372 THER/PROPH/DIAG INJ SC/IM: CPT

## 2024-02-21 NOTE — PROGRESS NOTES
Assessment/Plan:    Mari Vilchis came into the [facility] Endocrinology Office today 02/21/24 to receive evenity injection.      Verbal consent obtained.  Consent given by: patient    patient states patient has been medically healthy with no underlining concerns/complications.      Mari Vilchis presents with no symptoms today.       All insturctions were reviewed with the patient.    If the patient should have any questions/concerns, advised patient to contacted [facility]ocr[facility] Office.       Subjective:     History provided by: patient    Patient ID: Mari Vilchis is a 78 y.o. female      Objective:    Vitals:    02/21/24 1005   BP: 130/90   BP Location: Left arm   Patient Position: Sitting   Cuff Size: Standard   Pulse: 70   SpO2: 98%       Patient tolerated the injection well without any complications.  Injection site/s right and left arm.  Medication was provided by office.    Patient signed consent form yes   Patient signed ABN form no (If no patient is not a medicare patient).   Patient waited 15 minutes after injection no (This only applies to patient's receiving first time injection).       Last Visit: 1/12/2024  Next visit:3/22/2024

## 2024-02-23 ENCOUNTER — PROCEDURE VISIT (OUTPATIENT)
Dept: OBGYN CLINIC | Facility: CLINIC | Age: 79
End: 2024-02-23
Payer: MEDICARE

## 2024-02-23 VITALS — HEIGHT: 61 IN | WEIGHT: 130 LBS | BODY MASS INDEX: 24.55 KG/M2

## 2024-02-23 DIAGNOSIS — M17.12 PRIMARY OSTEOARTHRITIS OF LEFT KNEE: ICD-10-CM

## 2024-02-23 DIAGNOSIS — M17.11 PRIMARY OSTEOARTHRITIS OF RIGHT KNEE: Primary | ICD-10-CM

## 2024-02-23 PROCEDURE — 20610 DRAIN/INJ JOINT/BURSA W/O US: CPT | Performed by: ORTHOPAEDIC SURGERY

## 2024-02-23 RX ORDER — HYALURONATE SODIUM 10 MG/ML
20 SYRINGE (ML) INTRAARTICULAR
Status: COMPLETED | OUTPATIENT
Start: 2024-02-23 | End: 2024-02-23

## 2024-02-23 RX ADMIN — Medication 20 MG: at 11:00

## 2024-02-23 NOTE — PROGRESS NOTES
Orthopedics          Mari Vilchis 78 y.o. female MRN: [id number]      Chief Complaint:   bilateral knee pain    HPI:   78 y.o.female complaining of bilateral knee pain.  She presents office today regarding bilateral knee pain due to arthritis.  States the pain is aching nature anywhere from a 2-5 out of 10.  Pain is localized to bilateral knees.  She has had viscosupplementation injections in the past with significant relief her most recent injections not giving her any issues.                Review Of Systems:   Skin: Normal  Neuro: See HPI  Musculoskeletal: See HPI  All other systems reviewed and are negative    Past Medical History:   Past Medical History:   Diagnosis Date    Aneurysm (HCC) 2/8/23    2.5 mg found on neck CAT scan    Arthritis 2005    Closed nondisplaced fracture of fifth metatarsal bone of right foot with routine healing     Difficulty walking 2/2/23    Broken Femur    Disease of thyroid gland     hypothyroid    Glaucoma, bilateral     Hyperlipidemia     Osteoporosis     Scoliosis 12/12/12       Past Surgical History:   Past Surgical History:   Procedure Laterality Date    BUNIONECTOMY      COLONOSCOPY  06/04/2019    CORRECTION HAMMER TOE      MAMMO (HISTORICAL)  07/09/2018    NEUROMA EXCISION      OTHER SURGICAL HISTORY      Birth kwasi removed    LA OPTX FEM SHFT FX W/INSJ IMED IMPLT W/WO SCREW Right 2/3/2023    Procedure: INSERTION NAIL IM FEMUR ANTEGRADE (TROCHANTERIC);  Surgeon: Chai Chavez MD;  Location: [facility];  Service: Orthopedics    WISDOM TOOTH EXTRACTION         Family History:  Family history reviewed and non-contributory  Family History   Problem Relation Age of Onset    Hypertension Mother     Thyroid disease Mother     Arthritis Mother     Glaucoma Mother     Hypothyroidism Mother     Stomach cancer Father 51    Cancer Father     No Known Problems Maternal Grandmother     No Known Problems Maternal Grandfather     No Known Problems Paternal Grandmother     No Known  Problems Paternal Grandfather     Dementia Maternal Aunt     No Known Problems Maternal Aunt     No Known Problems Paternal Aunt     Hypertension Brother     Osteoporosis Family     Hyperlipidemia Family     Hypertension Family     Dementia Maternal Aunt     Hypertension Maternal Aunt     Osteoporosis Maternal Aunt          Social History:  Social History     Socioeconomic History    Marital status: Single     Spouse name: None    Number of children: None    Years of education: None    Highest education level: None   Occupational History    None   Tobacco Use    Smoking status: Never    Smokeless tobacco: Never   Vaping Use    Vaping status: Never Used   Substance and Sexual Activity    Alcohol use: Yes     Comment: Glass of wine a few times a year    Drug use: Not Currently     Comment: Only drug use was for pain prescribed by a doctor    Sexual activity: Never   Other Topics Concern    None   Social History Narrative    None     Social Determinants of Health     Financial Resource Strain: Low Risk  (10/11/2022)    Overall Financial Resource Strain (CARDIA)     Difficulty of Paying Living Expenses: Not hard at all   Food Insecurity: No Food Insecurity (2/3/2023)    Hunger Vital Sign     Worried About Running Out of Food in the Last Year: Never true     Ran Out of Food in the Last Year: Never true   Transportation Needs: No Transportation Needs (2/3/2023)    PRAPARE - Transportation     Lack of Transportation (Medical): No     Lack of Transportation (Non-Medical): No   Physical Activity: Not on file   Stress: Not on file   Social Connections: Not on file   Intimate Partner Violence: Not on file   Housing Stability: Low Risk  (2/3/2023)    Housing Stability Vital Sign     Unable to Pay for Housing in the Last Year: No     Number of Places Lived in the Last Year: 1     Unstable Housing in the Last Year: No       Allergies:   Allergies   Allergen Reactions    Fentanyl Vomiting    Indomethacin GI Intolerance     Other  reaction(s): GI upset  Other reaction(s): GI upset    Oxycodone Dizziness, Nausea Only, GI Intolerance and Other (See Comments)     Other reaction(s): GI upset  Other reaction(s): GI upset       Labs:  0   Lab Value Date/Time    HCT 41.5 03/08/2023 1705    HCT 32.3 (L) 02/16/2023 0536    HCT 30.5 (L) 02/13/2023 0531    HGB 13.3 03/08/2023 1705    HGB 9.9 (L) 02/16/2023 0536    HGB 9.4 (L) 02/13/2023 0531    INR 1.05 02/03/2023 0550    WBC 10.83 (H) 03/08/2023 1705    WBC 7.57 02/16/2023 0536    WBC 8.42 02/13/2023 0531    ESR 27 03/08/2023 1705       Meds:    Current Outpatient Medications:     aspirin (ECOTRIN LOW STRENGTH) 81 mg EC tablet, Take 1 tablet (81 mg total) by mouth daily Do not start before March 4, 2023., Disp: 30 tablet, Rfl: 0    Calcium Carb-Cholecalciferol 600-20 MG-MCG TABS, , Disp: , Rfl:     Glucosamine-Chondroitin 500-400 MG CAPS, Take 1 tablet by mouth in the morning (Patient not taking: Reported on 2/16/2024), Disp: , Rfl:     levothyroxine 25 mcg tablet, TAKE 1 TABLET BY MOUTH EVERY DAY, Disp: 90 tablet, Rfl: 0    Naphazoline-Polyethyl Glycol 0.012-0.2 % SOLN, Apply 1-2 drops to eye 4 (four) times a day, Disp: , Rfl:     romosozumab-aqqg (EVENITY) subcutaneous injection, , Disp: , Rfl:     simvastatin (ZOCOR) 40 mg tablet, Take 1 tablet (40 mg total) by mouth daily at bedtime, Disp: 90 tablet, Rfl: 3    Current Facility-Administered Medications:     romosozumab-aqqg (EVENITY) subcutaneous injection 210 mg, 210 mg, Subcutaneous (multi inj), Q30 Days, Vadim Bender MD, 210 mg at 07/25/23 1000    Body mass index is 24.56 kg/m².  Wt Readings from Last 3 Encounters:   02/23/24 59 kg (130 lb)   02/16/24 59 kg (130 lb)   02/09/24 59 kg (130 lb)     Physical Exam:   Vitals:       General Appearance:    Alert, cooperative, no distress, appears stated age   Head:    Normocephalic, without obvious abnormality, atraumatic   Eyes:    conjunctiva/corneas clear, both eyes        Nose:   Nares normal,  septum midline, no drainage    Throat:   Lips normal; teeth and gums normal   Neck:    symmetrical, trachea midline, ;     thyroid:  no enlargement/   Back:     Symmetric, no curvature, ROM normal   Lungs:   No audible wheezing or labored breathing   Chest Wall:    No tenderness or deformity    Heart:    Regular rate and rhythm               Pulses:   2+ and symmetric all extremities   Skin:   Skin color, texture, turgor normal, no rashes or lesions   Neurologic:   normal strength, sensation and reflexes     throughout       Musculoskeletal: bilateral lower extremity  On examination of the right knee there is no effusion, no erythema.  Range of motion to full active extension and flexion to greater than 120°.  Pain on palpation medial and lateral joint lines.  There is crepitus with range of motion, no warmth to palpation, bony enlargement noted. No pain on palpation pes anserine bursa region or distal iliotibial band.  Stable to varus and valgus stress without pain or gapping.  Negative anterior and posterior drawer testing.  Sensation intact distal pulses present.  On examination of the left knee there is no effusion, no erythema.  Range of motion to full active extension and flexion to greater than 120°.  Pain on palpation medial and lateral joint lines.  There is crepitus with range of motion, no warmth to palpation, bony enlargement noted. No pain on palpation pes anserine bursa region or distal iliotibial band.  Stable to varus and valgus stress without pain or gapping.  Negative anterior and posterior drawer testing.  Sensation intact distal pulses present.    Large joint arthrocentesis: R knee  Universal Protocol:  Consent: Verbal consent obtained.  Consent given by: patient  Patient understanding: patient states understanding of the procedure being performed  Site marked: the operative site was marked  Patient identity confirmed: verbally with patient  Supporting Documentation  Indications: pain   Procedure  Details  Location: knee - R knee  Needle size: 22 G  Approach: superior  Medications administered: 20 mg Sodium Hyaluronate (Viscosup) 20 MG/2ML    Patient tolerance: patient tolerated the procedure well with no immediate complications      Large joint arthrocentesis: L knee  Universal Protocol:  Consent: Verbal consent obtained.  Consent given by: patient  Patient understanding: patient states understanding of the procedure being performed  Site marked: the operative site was marked  Patient identity confirmed: verbally with patient  Supporting Documentation  Indications: pain   Procedure Details  Location: knee - L knee  Needle size: 22 G  Approach: superior  Medications administered: 20 mg Sodium Hyaluronate (Viscosup) 20 MG/2ML    Patient tolerance: patient tolerated the procedure well with no immediate complications         _*_*_*_*_*_*_*_*_*_*_*_*_*_*_*_*_*_*_*_*_*_*_*_*_*_*_*_*_*_*_*_*_*_*_*_*_*_*_*_*_*    Assessment:  78 y.o.female with bilateral knee pain due to arthritis    Plan:   Weight bearing as tolerated  bilateral lower extremity  Bilateral knee intra-articular Euflexxa 3 and a series of 3 administered as noted above  Advised no significant activity or increased ambulation over the next 24-48 hours and warm compresses to the knee no greater 20 minutes 3-4 times 24 hours following the injection.  Patient advised should they develop any increasing pain, redness, drainage, numbness, tingling or swelling surrounding the injection sight, they are to contact our office or present to the emergency department.  Follow up in 6 months or sooner if needed      Douglas Love PA-C                    .

## 2024-03-01 ENCOUNTER — RA CDI HCC (OUTPATIENT)
Dept: OTHER | Facility: HOSPITAL | Age: 79
End: 2024-03-01

## 2024-03-08 ENCOUNTER — OFFICE VISIT (OUTPATIENT)
Dept: INTERNAL MEDICINE CLINIC | Facility: CLINIC | Age: 79
End: 2024-03-08
Payer: MEDICARE

## 2024-03-08 VITALS
SYSTOLIC BLOOD PRESSURE: 130 MMHG | HEIGHT: 61 IN | OXYGEN SATURATION: 98 % | DIASTOLIC BLOOD PRESSURE: 76 MMHG | WEIGHT: 130 LBS | HEART RATE: 86 BPM | BODY MASS INDEX: 24.55 KG/M2 | TEMPERATURE: 98.6 F

## 2024-03-08 DIAGNOSIS — E03.9 ACQUIRED HYPOTHYROIDISM: ICD-10-CM

## 2024-03-08 DIAGNOSIS — I10 HYPERTENSION: ICD-10-CM

## 2024-03-08 DIAGNOSIS — E78.5 HYPERLIPIDEMIA: Primary | ICD-10-CM

## 2024-03-08 DIAGNOSIS — I72.9 ANEURYSM (HCC): ICD-10-CM

## 2024-03-08 PROCEDURE — G0439 PPPS, SUBSEQ VISIT: HCPCS | Performed by: INTERNAL MEDICINE

## 2024-03-08 PROCEDURE — 99213 OFFICE O/P EST LOW 20 MIN: CPT | Performed by: INTERNAL MEDICINE

## 2024-03-08 RX ORDER — LEVOTHYROXINE SODIUM 0.03 MG/1
25 TABLET ORAL DAILY
Qty: 90 TABLET | Refills: 0 | Status: SHIPPED | OUTPATIENT
Start: 2024-03-08

## 2024-03-08 NOTE — PROGRESS NOTES
Assessment and Plan:     Problem List Items Addressed This Visit       Hypothyroidism        Preventive health issues were discussed with patient, and age appropriate screening tests were ordered as noted in patient's After Visit Summary.  Personalized health advice and appropriate referrals for health education or preventive services given if needed, as noted in patient's After Visit Summary.     History of Present Illness:     Patient presents for a Medicare Wellness Visit    HPI   Patient Care Team:  Devaughn Richardson MD as PCP - General (Internal Medicine)  Vadim Bender MD as PCP - Endocrinology (Endocrinology)     Review of Systems:     Review of Systems     Problem List:     Patient Active Problem List   Diagnosis    Primary osteoarthritis of right knee    Primary osteoarthritis of left knee    Chest pain    Abnormal electrocardiogram (ECG) (EKG)    Femur fracture (HCC)    Fall    Acute pain due to trauma    Hypothyroidism    Acute blood loss anemia    HLD (hyperlipidemia)    Osteoporosis    Nail abnormalities    Aneurysm (HCC)    Thyroid nodule    History of stroke      Past Medical and Surgical History:     Past Medical History:   Diagnosis Date    Aneurysm (HCC) 2/8/23    2.5 mg found on neck CAT scan    Arthritis 2005    Closed nondisplaced fracture of fifth metatarsal bone of right foot with routine healing     Difficulty walking 2/2/23    Broken Femur    Disease of thyroid gland     hypothyroid    Glaucoma, bilateral     Hyperlipidemia     Osteoporosis     Otitis media 11/4/14    Otitis Media    Scoliosis 12/12/12     Past Surgical History:   Procedure Laterality Date    BUNIONECTOMY      COLONOSCOPY  06/04/2019    CORRECTION HAMMER TOE      FRACTURE SURGERY  2/3/23    Vamsi inserted for broken femur    MAMMO (HISTORICAL)  07/09/2018    NEUROMA EXCISION      OTHER SURGICAL HISTORY      Birth kwasi removed    IA OPTX FEM SHFT FX W/INSJ IMED IMPLT W/WO SCREW Right 02/03/2023    Procedure: INSERTION NAIL  IM FEMUR ANTEGRADE (TROCHANTERIC);  Surgeon: Chai Chavez MD;  Location: [facility];  Service: Orthopedics    WISDOM TOOTH EXTRACTION        Family History:     Family History   Problem Relation Age of Onset    Hypertension Mother     Thyroid disease Mother     Arthritis Mother     Glaucoma Mother     Hypothyroidism Mother     Stomach cancer Father 51    Cancer Father     No Known Problems Maternal Grandmother     No Known Problems Maternal Grandfather     No Known Problems Paternal Grandmother     No Known Problems Paternal Grandfather     Dementia Maternal Aunt     No Known Problems Maternal Aunt     No Known Problems Paternal Aunt     Hypertension Brother     Osteoporosis Family     Hyperlipidemia Family     Hypertension Family     Dementia Maternal Aunt     Hypertension Maternal Aunt     Osteoporosis Maternal Aunt       Social History:     Social History     Socioeconomic History    Marital status: Single     Spouse name: None    Number of children: None    Years of education: None    Highest education level: None   Occupational History    None   Tobacco Use    Smoking status: Never    Smokeless tobacco: Never   Vaping Use    Vaping status: Never Used   Substance and Sexual Activity    Alcohol use: Yes     Comment: Glass of wine a few times a year    Drug use: Never     Comment: Only drug use was for pain prescribed by a doctor    Sexual activity: Never   Other Topics Concern    None   Social History Narrative    None     Social Determinants of Health     Financial Resource Strain: Low Risk  (3/2/2024)    Overall Financial Resource Strain (CARDIA)     Difficulty of Paying Living Expenses: Not hard at all   Food Insecurity: No Food Insecurity (2/3/2023)    Hunger Vital Sign     Worried About Running Out of Food in the Last Year: Never true     Ran Out of Food in the Last Year: Never true   Transportation Needs: No Transportation Needs (3/2/2024)    PRAPARE - Transportation     Lack of Transportation (Medical):  No     Lack of Transportation (Non-Medical): No   Physical Activity: Not on file   Stress: Not on file   Social Connections: Not on file   Intimate Partner Violence: Not on file   Housing Stability: Low Risk  (2/3/2023)    Housing Stability Vital Sign     Unable to Pay for Housing in the Last Year: No     Number of Places Lived in the Last Year: 1     Unstable Housing in the Last Year: No      Medications and Allergies:     Current Outpatient Medications   Medication Sig Dispense Refill    levothyroxine 25 mcg tablet TAKE 1 TABLET BY MOUTH EVERY DAY 90 tablet 0    romosozumab-aqqg (EVENITY) subcutaneous injection       simvastatin (ZOCOR) 40 mg tablet Take 1 tablet (40 mg total) by mouth daily at bedtime 90 tablet 3    aspirin (ECOTRIN LOW STRENGTH) 81 mg EC tablet Take 1 tablet (81 mg total) by mouth daily Do not start before March 4, 2023. 30 tablet 0    Calcium Carb-Cholecalciferol 600-20 MG-MCG TABS  (Patient not taking: Reported on 3/8/2024)      Glucosamine-Chondroitin 500-400 MG CAPS Take 1 tablet by mouth in the morning (Patient not taking: Reported on 2/16/2024)      Naphazoline-Polyethyl Glycol 0.012-0.2 % SOLN Apply 1-2 drops to eye 4 (four) times a day (Patient not taking: Reported on 3/8/2024)       Current Facility-Administered Medications   Medication Dose Route Frequency Provider Last Rate Last Admin    romosozumab-aqqg (EVENITY) subcutaneous injection 210 mg  210 mg Subcutaneous (multi inj) Q30 Days Vadim Bender MD   210 mg at 07/25/23 1000     Allergies   Allergen Reactions    Fentanyl Vomiting    Indomethacin GI Intolerance     Other reaction(s): GI upset  Other reaction(s): GI upset    Oxycodone Dizziness, Nausea Only, GI Intolerance and Other (See Comments)     Other reaction(s): GI upset  Other reaction(s): GI upset      Immunizations:     Immunization History   Administered Date(s) Administered    COVID-19 PFIZER VACCINE 0.3 ML IM 03/05/2021, 03/26/2021, 11/06/2021, 07/29/2022     COVID-19 Pfizer mRNA vacc PF gold-sucrose 12 yr and older (Comirnaty) 12/13/2023    COVID-19 Pfizer vac (Gold-sucrose, gray cap) 12 yr+ IM 07/29/2022    DTaP 05/11/2017, 05/11/2017    INFLUENZA 11/06/2018, 11/06/2018, 10/08/2019, 10/26/2020, 10/12/2022    Influenza Split High Dose Preservative Free IM 10/26/2020    Influenza, high dose seasonal 0.7 mL 10/26/2020, 10/06/2021, 10/12/2022, 10/26/2023    Pneumococcal 04/01/2011    Pneumococcal Conjugate 13-Valent 04/22/2015    Pneumococcal Conjugate PCV 7 04/01/2011    Pneumococcal Polysaccharide PPV23 04/01/2011    Tdap 05/11/2017, 05/11/2017    Zoster 05/04/2012    Zoster Vaccine Recombinant 04/22/2022, 04/22/2022, 08/23/2022    influenza, trivalent, adjuvanted 10/26/2020, 10/06/2021      Health Maintenance:         Topic Date Due    Breast Cancer Screening: Mammogram  10/24/2024    DXA SCAN  09/27/2025    Hepatitis C Screening  Completed    Colorectal Cancer Screening  Discontinued         Topic Date Due    Pneumococcal Vaccine: 65+ Years (3 of 3 - PPSV23 or PCV20) 04/22/2020    COVID-19 Vaccine (7 - 2023-24 season) 02/07/2024      Medicare Screening Tests and Risk Assessments:     Mari is here for her Subsequent Wellness visit. Last Medicare Wellness visit information reviewed, patient interviewed and updates made to the record today.      Health Risk Assessment:   Patient rates overall health as very good. Patient feels that their physical health rating is much better. Patient is very satisfied with their life. Eyesight was rated as same. Hearing was rated as same. Patient feels that their emotional and mental health rating is slightly better. Patients states they are never, rarely angry. Patient states they are sometimes unusually tired/fatigued. Pain experienced in the last 7 days has been some. Patient's pain rating has been 2/10. Patient states that she has experienced no weight loss or gain in last 6 months.     Depression Screening:   PHQ-2 Score:  0      Fall Risk Screening:   In the past year, patient has experienced: no history of falling in past year      Urinary Incontinence Screening:   Patient has leaked urine accidently in the last six months.     Home Safety:  Patient does not have trouble with stairs inside or outside of their home. Patient has working smoke alarms and has working carbon monoxide detector. Home safety hazards include: none.     Nutrition:   Current diet is Regular, Low Cholesterol and Low Saturated Fat.     Medications:   Patient is currently taking over-the-counter supplements. OTC medications include: see medication list. Patient is able to manage medications.     Activities of Daily Living (ADLs)/Instrumental Activities of Daily Living (IADLs):   Walk and transfer into and out of bed and chair?: Yes  Dress and groom yourself?: Yes    Bathe or shower yourself?: Yes    Feed yourself? Yes  Do your laundry/housekeeping?: Yes  Manage your money, pay your bills and track your expenses?: Yes  Make your own meals?: Yes    Do your own shopping?: Yes    Previous Hospitalizations:   Any hospitalizations or ED visits within the last 12 months?: No      Advance Care Planning:   Living will: Yes    Durable POA for healthcare: Yes    Advanced directive: Yes      PREVENTIVE SCREENINGS      Cardiovascular Screening:    General: Screening Not Indicated and History Lipid Disorder      Diabetes Screening:     General: Screening Current      Breast Cancer Screening:     General: Screening Current      Cervical Cancer Screening:    General: Screening Not Indicated      Osteoporosis Screening:    General: Screening Not Indicated and History Osteoporosis      Lung Cancer Screening:     General: Screening Not Indicated      Hepatitis C Screening:    General: Screening Current    Screening, Brief Intervention, and Referral to Treatment (SBIRT)    Screening  Typical number of drinks in a day: 0  Typical number of drinks in a week: 0  Interpretation: Low  risk drinking behavior.    AUDIT-C Screenin) How often did you have a drink containing alcohol in the past year? monthly or less  2) How many drinks did you have on a typical day when you were drinking in the past year? 1 to 2  3) How often did you have 6 or more drinks on one occasion in the past year? never    AUDIT-C Score: 1  Interpretation: Score 0-2 (female): Negative screen for alcohol misuse    Single Item Drug Screening:  How often have you used an illegal drug (including marijuana) or a prescription medication for non-medical reasons in the past year? never    Single Item Drug Screen Score: 0  Interpretation: Negative screen for possible drug use disorder    No results found.     Physical Exam:     There were no vitals taken for this visit.    Physical Exam     Devaughn Richardson MD

## 2024-03-08 NOTE — PATIENT INSTRUCTIONS
Medicare Preventive Visit Patient Instructions  Thank you for completing your Welcome to Medicare Visit or Medicare Annual Wellness Visit today. Your next wellness visit will be due in one year (3/9/2025).  The screening/preventive services that you may require over the next 5-10 years are detailed below. Some tests may not apply to you based off risk factors and/or age. Screening tests ordered at today's visit but not completed yet may show as past due. Also, please note that scanned in results may not display below.  Preventive Screenings:  Service Recommendations Previous Testing/Comments   Colorectal Cancer Screening  * Colonoscopy    * Fecal Occult Blood Test (FOBT)/Fecal Immunochemical Test (FIT)  * Fecal DNA/Cologuard Test  * Flexible Sigmoidoscopy Age: 45-75 years old   Colonoscopy: every 10 years (may be performed more frequently if at higher risk)  OR  FOBT/FIT: every 1 year  OR  Cologuard: every 3 years  OR  Sigmoidoscopy: every 5 years  Screening may be recommended earlier than age 45 if at higher risk for colorectal cancer. Also, an individualized decision between you and your healthcare provider will decide whether screening between the ages of 76-85 would be appropriate. Colonoscopy: 06/04/2019  FOBT/FIT: Not on file  Cologuard: Not on file  Sigmoidoscopy: Not on file          Breast Cancer Screening Age: 40+ years old  Frequency: every 1-2 years  Not required if history of left and right mastectomy Mammogram: 10/24/2023    Screening Current   Cervical Cancer Screening Between the ages of 21-29, pap smear recommended once every 3 years.   Between the ages of 30-65, can perform pap smear with HPV co-testing every 5 years.   Recommendations may differ for women with a history of total hysterectomy, cervical cancer, or abnormal pap smears in past. Pap Smear: Not on file    Screening Not Indicated   Hepatitis C Screening Once for adults born between 1945 and 1965  More frequently in patients at high risk  for Hepatitis C Hep C Antibody: 05/13/2020    Screening Current   Diabetes Screening 1-2 times per year if you're at risk for diabetes or have pre-diabetes Fasting glucose: 97 mg/dL (11/14/2023)  A1C: No results in last 5 years (No results in last 5 years)  Screening Current   Cholesterol Screening Once every 5 years if you don't have a lipid disorder. May order more often based on risk factors. Lipid panel: 09/20/2023    Screening Not Indicated  History Lipid Disorder     Other Preventive Screenings Covered by Medicare:  Abdominal Aortic Aneurysm (AAA) Screening: covered once if your at risk. You're considered to be at risk if you have a family history of AAA.  Lung Cancer Screening: covers low dose CT scan once per year if you meet all of the following conditions: (1) Age 55-77; (2) No signs or symptoms of lung cancer; (3) Current smoker or have quit smoking within the last 15 years; (4) You have a tobacco smoking history of at least 20 pack years (packs per day multiplied by number of years you smoked); (5) You get a written order from a healthcare provider.  Glaucoma Screening: covered annually if you're considered high risk: (1) You have diabetes OR (2) Family history of glaucoma OR (3)  aged 50 and older OR (4)  American aged 65 and older  Osteoporosis Screening: covered every 2 years if you meet one of the following conditions: (1) You're estrogen deficient and at risk for osteoporosis based off medical history and other findings; (2) Have a vertebral abnormality; (3) On glucocorticoid therapy for more than 3 months; (4) Have primary hyperparathyroidism; (5) On osteoporosis medications and need to assess response to drug therapy.   Last bone density test (DXA Scan): 09/27/2023.  HIV Screening: covered annually if you're between the age of 15-65. Also covered annually if you are younger than 15 and older than 65 with risk factors for HIV infection. For pregnant patients, it is covered  up to 3 times per pregnancy.    Immunizations:  Immunization Recommendations   Influenza Vaccine Annual influenza vaccination during flu season is recommended for all persons aged >= 6 months who do not have contraindications   Pneumococcal Vaccine   * Pneumococcal conjugate vaccine = PCV13 (Prevnar 13), PCV15 (Vaxneuvance), PCV20 (Prevnar 20)  * Pneumococcal polysaccharide vaccine = PPSV23 (Pneumovax) Adults 19-65 yo with certain risk factors or if 65+ yo  If never received any pneumonia vaccine: recommend Prevnar 20 (PCV20)  Give PCV20 if previously received 1 dose of PCV13 or PPSV23   Hepatitis B Vaccine 3 dose series if at intermediate or high risk (ex: diabetes, end stage renal disease, liver disease)   Respiratory syncytial virus (RSV) Vaccine - COVERED BY MEDICARE PART D  * RSVPreF3 (Arexvy) CDC recommends that adults 60 years of age and older may receive a single dose of RSV vaccine using shared clinical decision-making (SCDM)   Tetanus (Td) Vaccine - COST NOT COVERED BY MEDICARE PART B Following completion of primary series, a booster dose should be given every 10 years to maintain immunity against tetanus. Td may also be given as tetanus wound prophylaxis.   Tdap Vaccine - COST NOT COVERED BY MEDICARE PART B Recommended at least once for all adults. For pregnant patients, recommended with each pregnancy.   Shingles Vaccine (Shingrix) - COST NOT COVERED BY MEDICARE PART B  2 shot series recommended in those 19 years and older who have or will have weakened immune systems or those 50 years and older     Health Maintenance Due:      Topic Date Due   • Breast Cancer Screening: Mammogram  10/24/2024   • DXA SCAN  09/27/2025   • Hepatitis C Screening  Completed   • Colorectal Cancer Screening  Discontinued     Immunizations Due:      Topic Date Due   • Pneumococcal Vaccine: 65+ Years (3 of 3 - PPSV23 or PCV20) 04/22/2020   • COVID-19 Vaccine (7 - 2023-24 season) 02/07/2024     Advance Directives   What are  advance directives?  Advance directives are legal documents that state your wishes and plans for medical care. These plans are made ahead of time in case you lose your ability to make decisions for yourself. Advance directives can apply to any medical decision, such as the treatments you want, and if you want to donate organs.   What are the types of advance directives?  There are many types of advance directives, and each state has rules about how to use them. You may choose a combination of any of the following:  Living will:  This is a written record of the treatment you want. You can also choose which treatments you do not want, which to limit, and which to stop at a certain time. This includes surgery, medicine, IV fluid, and tube feedings.   Durable power of  for healthcare (DPAHC):  This is a written record that states who you want to make healthcare choices for you when you are unable to make them for yourself. This person, called a proxy, is usually a family member or a friend. You may choose more than 1 proxy.  Do not resuscitate (DNR) order:  A DNR order is used in case your heart stops beating or you stop breathing. It is a request not to have certain forms of treatment, such as CPR. A DNR order may be included in other types of advance directives.  Medical directive:  This covers the care that you want if you are in a coma, near death, or unable to make decisions for yourself. You can list the treatments you want for each condition. Treatment may include pain medicine, surgery, blood transfusions, dialysis, IV or tube feedings, and a ventilator (breathing machine).  Values history:  This document has questions about your views, beliefs, and how you feel and think about life. This information can help others choose the care that you would choose.  Why are advance directives important?  An advance directive helps you control your care. Although spoken wishes may be used, it is better to have your  wishes written down. Spoken wishes can be misunderstood, or not followed. Treatments may be given even if you do not want them. An advance directive may make it easier for your family to make difficult choices about your care.   Urinary Incontinence   Urinary incontinence (UI)  is when you lose control of your bladder. UI develops because your bladder cannot store or empty urine properly. The 3 most common types of UI are stress incontinence, urge incontinence, or both.  Medicines:   May be given to help strengthen your bladder control. Report any side effects of medication to your healthcare provider.  Do pelvic muscle exercises often:  Your pelvic muscles help you stop urinating. Squeeze these muscles tight for 5 seconds, then relax for 5 seconds. Gradually work up to squeezing for 10 seconds. Do 3 sets of 15 repetitions a day, or as directed. This will help strengthen your pelvic muscles and improve bladder control.  Train your bladder:  Go to the bathroom at set times, such as every 2 hours, even if you do not feel the urge to go. You can also try to hold your urine when you feel the urge to go. For example, hold your urine for 5 minutes when you feel the urge to go. As that becomes easier, hold your urine for 10 minutes.   Self-care:   Keep a UI record.  Write down how often you leak urine and how much you leak. Make a note of what you were doing when you leaked urine.  Drink liquids as directed. You may need to limit the amount of liquid you drink to help control your urine leakage. Do not drink any liquid right before you go to bed. Limit or do not have drinks that contain caffeine or alcohol.   Prevent constipation.  Eat a variety of high-fiber foods. Good examples are high-fiber cereals, beans, vegetables, and whole-grain breads. Walking is the best way to trigger your intestines to have a bowel movement.  Exercise regularly and maintain a healthy weight.  Weight loss and exercise will decrease pressure on  your bladder and help you control your leakage.   Use a catheter as directed  to help empty your bladder. A catheter is a tiny, plastic tube that is put into your bladder to drain your urine.   Go to behavior therapy as directed.  Behavior therapy may be used to help you learn to control your urge to urinate.     © Copyright [employer] 2018 Information is for End User's use only and may not be sold, redistributed or otherwise used for commercial purposes. All illustrations and images included in CareNotes® are the copyrighted property of [employer]M., Inc[employer] or [employer]

## 2024-03-22 ENCOUNTER — CLINICAL SUPPORT (OUTPATIENT)
Dept: ENDOCRINOLOGY | Facility: CLINIC | Age: 79
End: 2024-03-22
Payer: MEDICARE

## 2024-03-22 ENCOUNTER — DOCUMENTATION (OUTPATIENT)
Dept: ENDOCRINOLOGY | Facility: CLINIC | Age: 79
End: 2024-03-22

## 2024-03-22 ENCOUNTER — TELEPHONE (OUTPATIENT)
Dept: ENDOCRINOLOGY | Facility: CLINIC | Age: 79
End: 2024-03-22

## 2024-03-22 DIAGNOSIS — M81.0 OSTEOPOROSIS, UNSPECIFIED OSTEOPOROSIS TYPE, UNSPECIFIED PATHOLOGICAL FRACTURE PRESENCE: ICD-10-CM

## 2024-03-22 DIAGNOSIS — M80.80XA LOCALIZED OSTEOPOROSIS WITH CURRENT PATHOLOGICAL FRACTURE, INITIAL ENCOUNTER: Primary | ICD-10-CM

## 2024-03-22 PROCEDURE — 96372 THER/PROPH/DIAG INJ SC/IM: CPT | Performed by: INTERNAL MEDICINE

## 2024-03-22 PROCEDURE — 99202 OFFICE O/P NEW SF 15 MIN: CPT

## 2024-03-22 NOTE — PROGRESS NOTES
Assessment/Plan:    Mari Vilchis came into the [facility] Endocrinology Office today 03/22/24 to receive evenity injection.      Verbal consent obtained.  Consent given by: patient    patient states patient has been medically healthy with no underlining concerns/complications.      Mari Vilchis presents with no symptoms today.       All insturctions were reviewed with the patient.    If the patient should have any questions/concerns, advised patient to contacted [facility] Endocrinology Office.       Subjective:     History provided by: patient    Patient ID: Mari Vilchis is a 78 y.o. female      Objective:    There were no vitals filed for this visit.    Patient tolerated the injection well without any complications.  Injection site/s Left and right arm .  Medication was provided by Office.    Patient signed consent form yes   Patient signed ABN form yes (If no patient is not a medicare patient).   Patient waited 15 minutes after injection yes (This only applies to patient's receiving first time injection).       Last Visit: 2/21/2024  Next visit:4/23/2024

## 2024-04-19 ENCOUNTER — OFFICE VISIT (OUTPATIENT)
Dept: INTERNAL MEDICINE CLINIC | Facility: CLINIC | Age: 79
End: 2024-04-19
Payer: MEDICARE

## 2024-04-19 DIAGNOSIS — J32.9 SINUSITIS: Primary | ICD-10-CM

## 2024-04-19 PROCEDURE — G2211 COMPLEX E/M VISIT ADD ON: HCPCS | Performed by: INTERNAL MEDICINE

## 2024-04-19 PROCEDURE — 99212 OFFICE O/P EST SF 10 MIN: CPT | Performed by: INTERNAL MEDICINE

## 2024-04-19 RX ORDER — SENNOSIDES 8.6 MG
650 CAPSULE ORAL EVERY 8 HOURS PRN
COMMUNITY

## 2024-04-19 RX ORDER — IBUPROFEN 200 MG
200 TABLET ORAL EVERY 4 HOURS PRN
COMMUNITY

## 2024-04-19 RX ORDER — AMOXICILLIN 500 MG/1
500 CAPSULE ORAL EVERY 8 HOURS SCHEDULED
Qty: 21 CAPSULE | Refills: 0 | Status: SHIPPED | OUTPATIENT
Start: 2024-04-19 | End: 2024-04-26

## 2024-04-19 NOTE — PROGRESS NOTES
"Assessment/Plan:    Acute  sinusitis     Diagnoses and all orders for this visit:    Sinusitis    Other orders  -     acetaminophen (Tylenol 8 Hour) 650 mg CR tablet; Take 650 mg by mouth every 8 (eight) hours as needed for moderate pain  -     ibuprofen (Advil) 200 mg tablet; Take 200 mg by mouth every 4 (four) hours as needed for moderate pain          Subjective:      Patient ID: Mari Vilchis is a 78 y.o. female.    HPI    The following portions of the patient's history were reviewed and updated as appropriate: allergies, current medications, past family history, past medical history, past social history, past surgical history, and problem list.    Review of Systems   Constitutional: Negative.    HENT:  Negative for dental problem, drooling, ear discharge and ear pain.    Eyes:  Negative for discharge, redness and itching.   Respiratory:  Negative for apnea, cough and wheezing.    Cardiovascular:  Negative for chest pain and palpitations.   Gastrointestinal:  Negative for abdominal pain, blood in stool, diarrhea and vomiting.   Endocrine: Negative for polydipsia, polyphagia and polyuria.   Genitourinary:  Negative for decreased urine volume, dysuria and frequency.   Musculoskeletal:  Negative for arthralgias, myalgias and neck stiffness.   Skin:  Negative for pallor and wound.   Allergic/Immunologic: Negative for environmental allergies and food allergies.   Neurological:  Negative for facial asymmetry, light-headedness, numbness and headaches.   Hematological:  Negative for adenopathy. Does not bruise/bleed easily.   Psychiatric/Behavioral:  Negative for agitation, behavioral problems and confusion.          Objective:      BP (!) 177/81 (BP Location: Left arm, Patient Position: Sitting, Cuff Size: Adult)   Pulse 83   Temp 98.3 °F (36.8 °C) (Temporal)   Ht 5' 1\" (1.549 m)   Wt 62.1 kg (137 lb)   SpO2 97%   BMI 25.89 kg/m²          Physical Exam  Constitutional:       Appearance: Normal appearance. "   HENT:      Head: Normocephalic.      Nose: Nose normal.      Mouth/Throat:      Mouth: Mucous membranes are moist.   Eyes:      Pupils: Pupils are equal, round, and reactive to light.   Cardiovascular:      Rate and Rhythm: Regular rhythm.      Heart sounds: Normal heart sounds.   Pulmonary:      Breath sounds: Normal breath sounds.   Abdominal:      Palpations: Abdomen is soft.   Musculoskeletal:         General: No swelling.      Cervical back: Neck supple.   Skin:     General: Skin is warm.   Neurological:      General: No focal deficit present.      Mental Status: She is alert and oriented to person, place, and time.   Psychiatric:         Mood and Affect: Mood normal.       Sinusitis    Amoxiclin  500mg  tid  Allegra  one  daily  and  Nasacort  nasal spray.     Call if not  better in   3  days.      Devaughn Richardson MD.FACP

## 2024-04-22 ENCOUNTER — TELEPHONE (OUTPATIENT)
Dept: ENDOCRINOLOGY | Facility: CLINIC | Age: 79
End: 2024-04-22

## 2024-04-22 NOTE — TELEPHONE ENCOUNTER
Called patient to confirm her Evenity injection for 4/23/24.      Patient has been on amoxicillin for a sinus infection since 4/19/24 and wanted to know if it would still be ok to receive her injection tomorrow.    Please advise.    Thank you.

## 2024-04-23 ENCOUNTER — TELEPHONE (OUTPATIENT)
Age: 79
End: 2024-04-23

## 2024-04-23 ENCOUNTER — CLINICAL SUPPORT (OUTPATIENT)
Dept: ENDOCRINOLOGY | Facility: CLINIC | Age: 79
End: 2024-04-23
Payer: MEDICARE

## 2024-04-23 VITALS
DIASTOLIC BLOOD PRESSURE: 84 MMHG | SYSTOLIC BLOOD PRESSURE: 136 MMHG | WEIGHT: 131.4 LBS | HEIGHT: 61 IN | BODY MASS INDEX: 24.81 KG/M2 | OXYGEN SATURATION: 96 % | HEART RATE: 71 BPM

## 2024-04-23 VITALS
SYSTOLIC BLOOD PRESSURE: 177 MMHG | DIASTOLIC BLOOD PRESSURE: 81 MMHG | BODY MASS INDEX: 25.86 KG/M2 | HEART RATE: 83 BPM | OXYGEN SATURATION: 97 % | WEIGHT: 137 LBS | TEMPERATURE: 98.3 F | HEIGHT: 61 IN

## 2024-04-23 DIAGNOSIS — E03.8 HYPOTHYROIDISM DUE TO HASHIMOTO'S THYROIDITIS: Primary | ICD-10-CM

## 2024-04-23 DIAGNOSIS — E06.3 HYPOTHYROIDISM DUE TO HASHIMOTO'S THYROIDITIS: Primary | ICD-10-CM

## 2024-04-23 PROCEDURE — 96372 THER/PROPH/DIAG INJ SC/IM: CPT

## 2024-04-23 PROCEDURE — 99202 OFFICE O/P NEW SF 15 MIN: CPT

## 2024-04-23 NOTE — PROGRESS NOTES
"Assessment/Plan:    Mari Vilchis came into the [facility] Endocrinology Office today 04/23/24 to receive Evenity injection.      Verbal consent obtained.  Consent given by: patient    patient states patient has been medically healthy with no underlining concerns/complications.      Mari Vilchis presents with no symptoms today.       All insturctions were reviewed with the patient.    If the patient should have any questions/concerns, advised patient to contacted [facility] Endocrinology Office.       Subjective:     History provided by: patient    Patient ID: Mari Vilchis is a 78 y.o. female      Objective:    Vitals:    04/23/24 1016   BP: 136/84   Pulse: 71   SpO2: 96%   Weight: 59.6 kg (131 lb 6.4 oz)   Height: 5' 1\" (1.549 m)       Patient tolerated the injection well without any complications.  Injection site/s Left and right arm.  Medication was provided by Office.    Patient signed consent form yes   Patient signed ABN form yes (If no patient is not a medicare patient).   Patient waited 15 minutes after injection no (This only applies to patient's receiving first time injection).       Last Visit: 4/22/2024  Next visit:5/28/2024     "

## 2024-04-23 NOTE — TELEPHONE ENCOUNTER
Patient calling in regarding antibiotics. States that she is feeling a lot better. Advised to continue allegra and nose spray until feeling completely better. Patient verbalized understanding. Patient states that her weight is inacurrate.  for the Friday visit. States she weighs 131 and would like it changed. Patient questioned when she needs to have follow up. Advised that she can keep the appointment in september for the 6 month follow up.

## 2024-05-20 ENCOUNTER — LAB (OUTPATIENT)
Dept: LAB | Facility: CLINIC | Age: 79
End: 2024-05-20
Payer: MEDICARE

## 2024-05-20 DIAGNOSIS — I10 HYPERTENSION: ICD-10-CM

## 2024-05-20 DIAGNOSIS — E03.9 HYPOTHYROIDISM, UNSPECIFIED TYPE: ICD-10-CM

## 2024-05-20 DIAGNOSIS — M80.00XA OSTEOPOROSIS WITH CURRENT PATHOLOGICAL FRACTURE, UNSPECIFIED OSTEOPOROSIS TYPE, INITIAL ENCOUNTER: ICD-10-CM

## 2024-05-20 DIAGNOSIS — E55.9 VITAMIN D DEFICIENCY: ICD-10-CM

## 2024-05-20 DIAGNOSIS — E78.5 HYPERLIPIDEMIA: ICD-10-CM

## 2024-05-20 LAB
25(OH)D3 SERPL-MCNC: 40.8 NG/ML (ref 30–100)
T4 FREE SERPL-MCNC: 0.84 NG/DL (ref 0.61–1.12)
TSH SERPL DL<=0.05 MIU/L-ACNC: 3.53 UIU/ML (ref 0.45–4.5)

## 2024-05-20 PROCEDURE — 84443 ASSAY THYROID STIM HORMONE: CPT

## 2024-05-20 PROCEDURE — 80053 COMPREHEN METABOLIC PANEL: CPT

## 2024-05-20 PROCEDURE — 80061 LIPID PANEL: CPT

## 2024-05-20 PROCEDURE — 84439 ASSAY OF FREE THYROXINE: CPT

## 2024-05-20 PROCEDURE — 82306 VITAMIN D 25 HYDROXY: CPT

## 2024-05-20 PROCEDURE — 36415 COLL VENOUS BLD VENIPUNCTURE: CPT

## 2024-05-21 LAB
ALBUMIN SERPL BCP-MCNC: 4.1 G/DL (ref 3.5–5)
ALP SERPL-CCNC: 81 U/L (ref 34–104)
ALT SERPL W P-5'-P-CCNC: 12 U/L (ref 7–52)
ANION GAP SERPL CALCULATED.3IONS-SCNC: 10 MMOL/L (ref 4–13)
AST SERPL W P-5'-P-CCNC: 17 U/L (ref 13–39)
BILIRUB SERPL-MCNC: 0.6 MG/DL (ref 0.2–1)
BUN SERPL-MCNC: 22 MG/DL (ref 5–25)
CALCIUM SERPL-MCNC: 9.4 MG/DL (ref 8.4–10.2)
CHLORIDE SERPL-SCNC: 103 MMOL/L (ref 96–108)
CHOLEST SERPL-MCNC: 170 MG/DL
CO2 SERPL-SCNC: 26 MMOL/L (ref 21–32)
CREAT SERPL-MCNC: 0.8 MG/DL (ref 0.6–1.3)
GFR SERPL CREATININE-BSD FRML MDRD: 71 ML/MIN/1.73SQ M
GLUCOSE P FAST SERPL-MCNC: 96 MG/DL (ref 65–99)
HDLC SERPL-MCNC: 77 MG/DL
LDLC SERPL CALC-MCNC: 74 MG/DL (ref 0–100)
NONHDLC SERPL-MCNC: 93 MG/DL
POTASSIUM SERPL-SCNC: 4.2 MMOL/L (ref 3.5–5.3)
PROT SERPL-MCNC: 7.1 G/DL (ref 6.4–8.4)
SODIUM SERPL-SCNC: 139 MMOL/L (ref 135–147)
TRIGL SERPL-MCNC: 94 MG/DL

## 2024-05-28 ENCOUNTER — OFFICE VISIT (OUTPATIENT)
Dept: ENDOCRINOLOGY | Facility: CLINIC | Age: 79
End: 2024-05-28
Payer: MEDICARE

## 2024-05-28 VITALS
OXYGEN SATURATION: 98 % | SYSTOLIC BLOOD PRESSURE: 128 MMHG | WEIGHT: 130.2 LBS | HEART RATE: 65 BPM | HEIGHT: 61 IN | DIASTOLIC BLOOD PRESSURE: 82 MMHG | BODY MASS INDEX: 24.58 KG/M2

## 2024-05-28 DIAGNOSIS — E55.9 VITAMIN D DEFICIENCY: ICD-10-CM

## 2024-05-28 DIAGNOSIS — E03.8 HYPOTHYROIDISM DUE TO HASHIMOTO'S THYROIDITIS: ICD-10-CM

## 2024-05-28 DIAGNOSIS — E06.3 HYPOTHYROIDISM DUE TO HASHIMOTO'S THYROIDITIS: ICD-10-CM

## 2024-05-28 DIAGNOSIS — M81.0 OSTEOPOROSIS, UNSPECIFIED OSTEOPOROSIS TYPE, UNSPECIFIED PATHOLOGICAL FRACTURE PRESENCE: Primary | ICD-10-CM

## 2024-05-28 PROCEDURE — 99214 OFFICE O/P EST MOD 30 MIN: CPT | Performed by: PHYSICIAN ASSISTANT

## 2024-05-28 PROCEDURE — 96372 THER/PROPH/DIAG INJ SC/IM: CPT | Performed by: PHYSICIAN ASSISTANT

## 2024-05-28 NOTE — PROGRESS NOTES
"Assessment/Plan:    Mari Vilchis came into the [facility] Endocrinology Office today 05/28/24 to receive Evenity injection.      Verbal consent obtained.  Consent given by: patient    patient states patient has been medically healthy with no underlining concerns/complications.      Mari Vilchis presents with no symptoms today.       All insturctions were reviewed with the patient.    If the patient should have any questions/concerns, advised patient to contacted [facility] Endocrinology Office.       Subjective:     History provided by: patient    Patient ID: Mari Vilchis is a 78 y.o. female      Objective:    Vitals:    05/28/24 1028   BP: 128/82   Pulse: 65   SpO2: 98%   Weight: 59.1 kg (130 lb 3.2 oz)   Height: 5' 1\" (1.549 m)       Patient tolerated the injection well without any complications.  Injection site/s Left and right .  Medication was provided by office.    Patient signed consent form yes   Patient signed ABN form yes (If no patient is not a medicare patient).   Patient waited 15 minutes after injection no (This only applies to patient's receiving first time injection).       Last Visit: 4/23/2024  Next visit:7/1/2024     "

## 2024-05-28 NOTE — PROGRESS NOTES
Patient Progress Note    CC: osteoporosis    Referring Provider  Devaughn Richardson Md  [address] [facility] [place] [address]     History of Present Illness:     Patient is a 78-year-old female here for follow-up of osteoporosis and vitamin D deficiency.  She has a history of hypothyroidism which is being managed by her PCP.  In February 2023 she had a fracture of the right femur.  Per prior note, she was treated with Fosamax from 0151-0639 and then again from [id number] and again from May 2017 to February 2023.  In June 2023 she started Evenity.  She is due for an Evenity injection today; her last injection will be next month. She is taking a calcium plus vitamin D supplements twice daily.  Most recent calcium normal at 9.4 and vitamin D normal at 40.8.    For hypothyroidism patient is on levothyroxine 25 micrograms 1 tablet daily.  Patient is taking it 1 hr before breakfast on an empty stomach and at least 4 hrs apart from supplements.  Tolerating medication well.  TSH is normal at 3.527 and free T4 is normal at 0.84.  She does have a history of thyroid nodule which did not meet criteria for biopsy however given the slow growth continued surveillance ultrasound was also an option. She reports having a non-malignant biopsy in the past.  She has a repeat ultrasound scheduled. No history of external radiation to head/neck/chest.  No family history of thyroid cancer.      Patient Active Problem List   Diagnosis   • Primary osteoarthritis of right knee   • Primary osteoarthritis of left knee   • Chest pain   • Abnormal electrocardiogram (ECG) (EKG)   • Femur fracture (HCC)   • Fall   • Acute pain due to trauma   • Hypothyroidism   • Acute blood loss anemia   • HLD (hyperlipidemia)   • Osteoporosis   • Nail abnormalities   • Aneurysm (HCC)   • Thyroid nodule   • History of stroke   • Vitamin D deficiency     Past Medical History:   Diagnosis Date   • Aneurysm (HCC) 2/8/23    2.5 mg found on neck CAT scan   •  Arthritis 2005   • Closed nondisplaced fracture of fifth metatarsal bone of right foot with routine healing    • Difficulty walking 2/2/23    Broken Femur   • Disease of thyroid gland     hypothyroid   • Glaucoma, bilateral    • Hyperlipidemia    • Osteoporosis    • Otitis media 11/4/14    Otitis Media   • Scoliosis 12/12/12      Past Surgical History:   Procedure Laterality Date   • BUNIONECTOMY     • COLONOSCOPY  06/04/2019   • CORRECTION HAMMER TOE     • FRACTURE SURGERY  2/3/23    Vamsi inserted for broken femur   • MAMMO (HISTORICAL)  07/09/2018   • NEUROMA EXCISION     • OTHER SURGICAL HISTORY      Birth kwasi removed   • [place] OPTX FEM SHFT FX W/INSJ IMED IMPLT W/WO SCREW Right 02/03/2023    Procedure: INSERTION NAIL IM FEMUR ANTEGRADE (TROCHANTERIC);  Surgeon: Chai Chavez MD;  Location: [facility];  Service: Orthopedics   • WISDOM TOOTH EXTRACTION        Family History   Problem Relation Age of Onset   • Hypertension Mother    • Thyroid disease Mother    • Arthritis Mother    • Glaucoma Mother    • Hypothyroidism Mother    • Stomach cancer Father 51   • Cancer Father    • No Known Problems Maternal Grandmother    • No Known Problems Maternal Grandfather    • No Known Problems Paternal Grandmother    • No Known Problems Paternal Grandfather    • Dementia Maternal Aunt    • No Known Problems Maternal Aunt    • No Known Problems Paternal Aunt    • Hypertension Brother    • Osteoporosis Family    • Hyperlipidemia Family    • Hypertension Family    • Dementia Maternal Aunt    • Hypertension Maternal Aunt    • Osteoporosis Maternal Aunt      Social History     Tobacco Use   • Smoking status: Never   • Smokeless tobacco: Never   Substance Use Topics   • Alcohol use: Yes     Comment: Glass of wine a few times a year     Allergies   Allergen Reactions   • Fentanyl Vomiting   • Indomethacin GI Intolerance     Other reaction(s): GI upset  Other reaction(s): GI upset   • Oxycodone Dizziness, Nausea Only, GI Intolerance and  "Other (See Comments)     Other reaction(s): GI upset  Other reaction(s): GI upset     Current Outpatient Medications   Medication Sig Dispense Refill   • acetaminophen (Tylenol 8 Hour) 650 mg CR tablet Take 650 mg by mouth every 8 (eight) hours as needed for moderate pain     • Calcium Carb-Cholecalciferol 600-20 MG-MCG TABS      • ibuprofen (Advil) 200 mg tablet Take 200 mg by mouth every 4 (four) hours as needed for moderate pain     • levothyroxine 25 mcg tablet Take 1 tablet (25 mcg total) by mouth daily 90 tablet 0   • romosozumab-aqqg (EVENITY) subcutaneous injection      • simvastatin (ZOCOR) 40 mg tablet Take 1 tablet (40 mg total) by mouth daily at bedtime 90 tablet 3   • Glucosamine-Chondroitin 500-400 MG CAPS Take 1 tablet by mouth in the morning (Patient not taking: Reported on 2/16/2024)     • Naphazoline-Polyethyl Glycol 0.012-0.2 % SOLN Apply 1-2 drops to eye 4 (four) times a day (Patient not taking: Reported on 3/8/2024)       Current Facility-Administered Medications   Medication Dose Route Frequency Provider Last Rate Last Admin   • romosozumab-aqqg (EVENITY) subcutaneous injection 210 mg  210 mg Subcutaneous (multi inj) Q30 Days Vadim Bender MD   210 mg at 04/23/24 1025         Review of Systems   Constitutional:  Negative for activity change, appetite change, fatigue and unexpected weight change.   HENT:  Negative for trouble swallowing.    Eyes:  Negative for visual disturbance.   Respiratory:  Negative for shortness of breath.    Cardiovascular:  Negative for chest pain and palpitations.   Gastrointestinal:  Negative for constipation and diarrhea.   Endocrine: Negative for cold intolerance, heat intolerance, polydipsia and polyuria.   Musculoskeletal:  Positive for arthralgias.   Skin: Negative.    Neurological:  Negative for numbness.   Psychiatric/Behavioral: Negative.         Physical Exam:  Body mass index is 24.6 kg/m².  /82   Pulse 65   Ht 5' 1\" (1.549 m)   Wt 59.1 kg (130 " "lb 3.2 oz)   SpO2 98%   BMI 24.60 kg/m²    Wt Readings from Last 3 Encounters:   05/28/24 59.1 kg (130 lb 3.2 oz)   04/23/24 59.6 kg (131 lb 6.4 oz)   04/19/24 62.1 kg (137 lb)       Physical Exam  Vitals and nursing note reviewed.   Constitutional:       Appearance: She is well-developed.   HENT:      Head: Normocephalic.   Eyes:      General: No scleral icterus.     Extraocular Movements: EOM normal.   Neck:      Thyroid: Thyromegaly present.   Cardiovascular:      Rate and Rhythm: Normal rate and regular rhythm.      Pulses:           Radial pulses are 2+ on the right side and 2+ on the left side.      Heart sounds: No murmur heard.  Pulmonary:      Effort: Pulmonary effort is normal. No respiratory distress.      Breath sounds: Normal breath sounds. No wheezing.   Musculoskeletal:      Cervical back: Neck supple.   Skin:     General: Skin is warm and dry.   Neurological:      Mental Status: She is alert.   Psychiatric:         Mood and Affect: Mood and affect normal.     Patient's shoes and socks were not removed.        Labs:   No results found for: \"HGBA1C\"    Lab Results   Component Value Date    CHOL 208 04/16/2015    HDL 77 05/20/2024    TRIG 94 05/20/2024       Lab Results   Component Value Date    GLUCOSE 145 (H) 02/05/2023    CALCIUM 9.4 05/20/2024     04/16/2015    K 4.2 05/20/2024    CO2 26 05/20/2024     05/20/2024    BUN 22 05/20/2024    CREATININE 0.80 05/20/2024        eGFR   Date Value Ref Range Status   05/20/2024 71 ml/min/1.73sq m Final       Lab Results   Component Value Date    ALT 12 05/20/2024    AST 17 05/20/2024    ALKPHOS 81 05/20/2024    BILITOT 0.44 04/16/2015       Lab Results   Component Value Date    GAG1TTFXRVQF 3.527 05/20/2024           Plan:    Diagnoses and all orders for this visit:    Osteoporosis  Received her 11th Evenity injection today  Next month will be her last Evenity injection  1 month after her 12th Evenity injection, she can transition to Prolia " injections every 6 months  Continue calcium plus vitamin D supplementation  Take precautions to avoid falls  Do weightbearing exercises as tolerated  -     Vitamin D 25 hydroxy; Future  -     Basic metabolic panel; Future  -     romosozumab-aqqg (EVENITY) subcutaneous injection 210 mg    Vitamin D deficiency  Vitamin D is normal at 40.8  Continue current supplementation  -     Vitamin D 25 hydroxy; Future    Hypothyroidism due to Hashimoto's thyroiditis  TSH is normal at 3.527 and free T4 is normal at 0.84  Continue current dose of levothyroxine  Monitor labs  Managed by PCP        Discussed with the patient and all questions fully answered. She will call me if any problems arise.    Counseled patient on diagnostic results, prognosis, risk and benefit of treatment options, instruction for management, importance of treatment compliance, risk  factor reduction and impressions      Anastasia Barcenas PA-C

## 2024-05-30 ENCOUNTER — HOSPITAL ENCOUNTER (OUTPATIENT)
Dept: RADIOLOGY | Facility: HOSPITAL | Age: 79
Discharge: HOME | End: 2024-05-30
Payer: MEDICARE

## 2024-05-30 ENCOUNTER — OFFICE VISIT (OUTPATIENT)
Dept: OBGYN CLINIC | Facility: HOSPITAL | Age: 79
End: 2024-05-30
Payer: MEDICARE

## 2024-05-30 VITALS
BODY MASS INDEX: 24.55 KG/M2 | HEART RATE: 65 BPM | DIASTOLIC BLOOD PRESSURE: 77 MMHG | WEIGHT: 130 LBS | SYSTOLIC BLOOD PRESSURE: 138 MMHG | HEIGHT: 61 IN

## 2024-05-30 DIAGNOSIS — M79.605 LEFT LEG PAIN: ICD-10-CM

## 2024-05-30 DIAGNOSIS — M79.605 LEFT LEG PAIN: Primary | ICD-10-CM

## 2024-05-30 PROCEDURE — 99214 OFFICE O/P EST MOD 30 MIN: CPT

## 2024-05-30 PROCEDURE — 73552 X-RAY EXAM OF FEMUR 2/>: CPT

## 2024-05-30 NOTE — PROGRESS NOTES
"Assessment:   Diagnosis ICD-10-CM Associated Orders   1. Left leg pain  M79.605 XR femur 2 vw left     NM bone scan 3 phase          Plan:  A discussion was had with the patient that based on her left femur X-rays, she is likely heading toward another atypical bisphosphonate fracture.  We discussed that she must begin to use a walker and avoid putting her full body weight on her left leg right away.  She has two walkers at home, and she will begin using one of them as soon as she gets back.  We will order a STAT 3 phase bone scan of the left femur for further evaluation.  Based on these results, we will get her on the schedule for a prophylactic left femoral IM nail.  The physician was consulted and was instrumental in the formulation of the plan.    To do next visit:  Follow-up after the STAT 3 phase bone scan.    The above stated was discussed in layman's terms and the patient expressed understanding.  All questions were answered to the patient's satisfaction.         Subjective:   Mari Vilchis is a 78 y.o. female who presents to the office today for evaluation of left leg pain.  Of note, the patient had a fracture of her right femur a little over a year ago which she felt \"snapped\" before she fell, and she thinks her left leg pain feels similar to this.  She describes a deep, aching pain that keeps her up at night.  It hurts to walk.  She has trouble standing for prolonged periods of time, especially during her 2 mile walks.  Tylenol does help.      Review of systems negative unless otherwise specified in HPI    Past Medical History:   Diagnosis Date    Aneurysm (HCC) 2/8/23    2.5 mg found on neck CAT scan    Arthritis 2005    Closed nondisplaced fracture of fifth metatarsal bone of right foot with routine healing     Difficulty walking 2/2/23    Broken Femur    Disease of thyroid gland     hypothyroid    Glaucoma, bilateral     Hyperlipidemia     Osteoporosis     Otitis media 11/4/14    Otitis Media    " Scoliosis 12/12/12       Past Surgical History:   Procedure Laterality Date    BUNIONECTOMY      COLONOSCOPY  06/04/2019    CORRECTION HAMMER TOE      FRACTURE SURGERY  2/3/23    Vamsi inserted for broken femur    MAMMO (HISTORICAL)  07/09/2018    NEUROMA EXCISION      OTHER SURGICAL HISTORY      Birth kwasi removed    [place] OPTX FEM SHFT FX W/INSJ IMED IMPLT W/WO SCREW Right 02/03/2023    Procedure: INSERTION NAIL IM FEMUR ANTEGRADE (TROCHANTERIC);  Surgeon: Chai Chavez MD;  Location: [facility];  Service: Orthopedics    WISDOM TOOTH EXTRACTION         Family History   Problem Relation Age of Onset    Hypertension Mother     Thyroid disease Mother     Arthritis Mother     Glaucoma Mother     Hypothyroidism Mother     Stomach cancer Father 51    Cancer Father     No Known Problems Maternal Grandmother     No Known Problems Maternal Grandfather     No Known Problems Paternal Grandmother     No Known Problems Paternal Grandfather     Dementia Maternal Aunt     No Known Problems Maternal Aunt     No Known Problems Paternal Aunt     Hypertension Brother     Osteoporosis Family     Hyperlipidemia Family     Hypertension Family     Dementia Maternal Aunt     Hypertension Maternal Aunt     Osteoporosis Maternal Aunt        Social History     Occupational History    Not on file   Tobacco Use    Smoking status: Never    Smokeless tobacco: Never   Vaping Use    Vaping status: Never Used   Substance and Sexual Activity    Alcohol use: Yes     Comment: Glass of wine a few times a year    Drug use: Never     Comment: Only drug use was for pain prescribed by a doctor    Sexual activity: Never         Current Outpatient Medications:     acetaminophen (Tylenol 8 Hour) 650 mg CR tablet, Take 650 mg by mouth every 8 (eight) hours as needed for moderate pain, Disp: , Rfl:     Calcium Carb-Cholecalciferol 600-20 MG-MCG TABS, , Disp: , Rfl:     ibuprofen (Advil) 200 mg tablet, Take 200 mg by mouth every 4 (four) hours as needed for  moderate pain, Disp: , Rfl:     levothyroxine 25 mcg tablet, Take 1 tablet (25 mcg total) by mouth daily, Disp: 90 tablet, Rfl: 0    romosozumab-aqqg (EVENITY) subcutaneous injection, , Disp: , Rfl:     simvastatin (ZOCOR) 40 mg tablet, Take 1 tablet (40 mg total) by mouth daily at bedtime, Disp: 90 tablet, Rfl: 3    Glucosamine-Chondroitin 500-400 MG CAPS, Take 1 tablet by mouth in the morning (Patient not taking: Reported on 2/16/2024), Disp: , Rfl:     Naphazoline-Polyethyl Glycol 0.012-0.2 % SOLN, Apply 1-2 drops to eye 4 (four) times a day (Patient not taking: Reported on 3/8/2024), Disp: , Rfl:     Current Facility-Administered Medications:     romosozumab-aqqg (EVENITY) subcutaneous injection 210 mg, 210 mg, Subcutaneous (multi inj), Q30 Days, Vadim Bender MD, 210 mg at 04/23/24 1025    Allergies   Allergen Reactions    Fentanyl Vomiting    Indomethacin GI Intolerance     Other reaction(s): GI upset  Other reaction(s): GI upset    Oxycodone Dizziness, Nausea Only, GI Intolerance and Other (See Comments)     Other reaction(s): GI upset  Other reaction(s): GI upset            Vitals:    05/30/24 0808   BP: 138/77   Pulse: 65       Objective:    General:  Patient is WDWN, alert and oriented, appears stated age, and is in no acute distress.    Musculoskeletal:    Left Leg:    Inspection:  No visible abnormality.    Range of Motion:  No pain with IR, ER, or hip flexion.  Full knee flexion and extension.    Palpation:  She is not tender with palpation of the hip.        Diagnostics, reviewed and taken today if performed as documented:    The attending physician has personally reviewed the pertinent films in PACS and interpretation is as follows:  Left Femur X-rays:  Over the lateral aspect of the proximal femur is an irregularity suspicious impending atypical fracture.      Procedures, if performed today:    None performed      Portions of the record may have been created with voice recognition software.   "Occasional wrong word or \"sound a like\" substitutions may have occurred due to the inherent limitations of voice recognition software.  Read the chart carefully and recognize, using context, where substitutions have occurred.  "

## 2024-06-03 ENCOUNTER — HOSPITAL ENCOUNTER (OUTPATIENT)
Dept: RADIOLOGY | Facility: HOSPITAL | Age: 79
Discharge: HOME | End: 2024-06-03
Payer: MEDICARE

## 2024-06-03 DIAGNOSIS — M79.605 LEFT LEG PAIN: ICD-10-CM

## 2024-06-03 PROCEDURE — A9503 TC99M MEDRONATE: HCPCS

## 2024-06-03 PROCEDURE — 78315 BONE IMAGING 3 PHASE: CPT

## 2024-06-04 ENCOUNTER — OFFICE VISIT (OUTPATIENT)
Dept: OBGYN CLINIC | Facility: HOSPITAL | Age: 79
End: 2024-06-04
Payer: MEDICARE

## 2024-06-04 VITALS
WEIGHT: 130 LBS | DIASTOLIC BLOOD PRESSURE: 77 MMHG | HEIGHT: 61 IN | SYSTOLIC BLOOD PRESSURE: 159 MMHG | BODY MASS INDEX: 24.55 KG/M2 | HEART RATE: 81 BPM

## 2024-06-04 DIAGNOSIS — M76.32 IT BAND SYNDROME, LEFT: ICD-10-CM

## 2024-06-04 DIAGNOSIS — M79.605 LEFT LEG PAIN: Primary | ICD-10-CM

## 2024-06-04 DIAGNOSIS — E03.9 ACQUIRED HYPOTHYROIDISM: ICD-10-CM

## 2024-06-04 PROCEDURE — 99214 OFFICE O/P EST MOD 30 MIN: CPT | Performed by: ORTHOPAEDIC SURGERY

## 2024-06-04 RX ORDER — LEVOTHYROXINE SODIUM 0.03 MG/1
25 TABLET ORAL DAILY
Qty: 90 TABLET | Refills: 1 | Status: SHIPPED | OUTPATIENT
Start: 2024-06-04

## 2024-06-04 NOTE — PROGRESS NOTES
Assessment:   Diagnosis ICD-10-CM Associated Orders   1. Left leg pain  M79.605 Ambulatory Referral to Physical Therapy      2. It band syndrome, left  M76.32 Ambulatory Referral to Physical Therapy          Plan:  A discussion was had with the patient and her brother, Jose, that based on her recent STAT bone scan, she is unlikely to be heading toward a bisphosphonate fracture in the immediate future.  We had a lengthy discussion that it is possible that she may still fracture her femur even though our suspicions for this are low at present.  We discussed that she can discontinue use of the walker at present, but she must use it right away if she notes deep femur pain.    The patient may be experiencing pain from left-sided IT band syndrome.  A PT referral was placed in her chart today.    To do next visit:  Follow-up in 4-6 weeks for re-evaluation.    The above stated was discussed in layman's terms and the patient expressed understanding.  All questions were answered to the patient's satisfaction.         Subjective:   Mari Vilchis is a 78 y.o. female who presents to the office today for evaluation of left leg pain.  She recently had a bone scan to evaluate for impending bisphosphonate fracture in her left leg.  Recently, the patient has only joselyn having pain at night when she lays directly on the left side.  She states that she leg has not been bothering her while she is up and walking without the walker.  She has been out gardening without pain.      Review of systems negative unless otherwise specified in HPI    Past Medical History:   Diagnosis Date    Aneurysm (HCC) 2/8/23    2.5 mg found on neck CAT scan    Arthritis 2005    Closed nondisplaced fracture of fifth metatarsal bone of right foot with routine healing     Difficulty walking 2/2/23    Broken Femur    Disease of thyroid gland     hypothyroid    Glaucoma, bilateral     Hyperlipidemia     Osteoporosis     Otitis media 11/4/14    Otitis Media     Scoliosis 12/12/12       Past Surgical History:   Procedure Laterality Date    BUNIONECTOMY      COLONOSCOPY  06/04/2019    CORRECTION HAMMER TOE      FRACTURE SURGERY  2/3/23    Vamsi inserted for broken femur    MAMMO (HISTORICAL)  07/09/2018    NEUROMA EXCISION      OTHER SURGICAL HISTORY      Birth kwasi removed    [place] OPTX FEM SHFT FX W/INSJ IMED IMPLT W/WO SCREW Right 02/03/2023    Procedure: INSERTION NAIL IM FEMUR ANTEGRADE (TROCHANTERIC);  Surgeon: Chai Chavez MD;  Location: [facility];  Service: Orthopedics    WISDOM TOOTH EXTRACTION         Family History   Problem Relation Age of Onset    Hypertension Mother     Thyroid disease Mother     Arthritis Mother     Glaucoma Mother     Hypothyroidism Mother     Stomach cancer Father 51    Cancer Father     No Known Problems Maternal Grandmother     No Known Problems Maternal Grandfather     No Known Problems Paternal Grandmother     No Known Problems Paternal Grandfather     Dementia Maternal Aunt     No Known Problems Maternal Aunt     No Known Problems Paternal Aunt     Hypertension Brother     Osteoporosis Family     Hyperlipidemia Family     Hypertension Family     Dementia Maternal Aunt     Hypertension Maternal Aunt     Osteoporosis Maternal Aunt        Social History     Occupational History    Not on file   Tobacco Use    Smoking status: Never    Smokeless tobacco: Never   Vaping Use    Vaping status: Never Used   Substance and Sexual Activity    Alcohol use: Yes     Comment: Glass of wine a few times a year    Drug use: Never     Comment: Only drug use was for pain prescribed by a doctor    Sexual activity: Never         Current Outpatient Medications:     acetaminophen (Tylenol 8 Hour) 650 mg CR tablet, Take 650 mg by mouth every 8 (eight) hours as needed for moderate pain, Disp: , Rfl:     Calcium Carb-Cholecalciferol 600-20 MG-MCG TABS, , Disp: , Rfl:     ibuprofen (Advil) 200 mg tablet, Take 200 mg by mouth every 4 (four) hours as needed for  "moderate pain, Disp: , Rfl:     levothyroxine 25 mcg tablet, Take 1 tablet (25 mcg total) by mouth daily, Disp: 90 tablet, Rfl: 0    romosozumab-aqqg (EVENITY) subcutaneous injection, , Disp: , Rfl:     simvastatin (ZOCOR) 40 mg tablet, Take 1 tablet (40 mg total) by mouth daily at bedtime, Disp: 90 tablet, Rfl: 3    Glucosamine-Chondroitin 500-400 MG CAPS, Take 1 tablet by mouth in the morning (Patient not taking: Reported on 2/16/2024), Disp: , Rfl:     Naphazoline-Polyethyl Glycol 0.012-0.2 % SOLN, Apply 1-2 drops to eye 4 (four) times a day (Patient not taking: Reported on 3/8/2024), Disp: , Rfl:     Current Facility-Administered Medications:     romosozumab-aqqg (EVENITY) subcutaneous injection 210 mg, 210 mg, Subcutaneous (multi inj), Q30 Days, Vadim Bneder MD, 210 mg at 04/23/24 1025    Allergies   Allergen Reactions    Fentanyl Vomiting    Indomethacin GI Intolerance     Other reaction(s): GI upset  Other reaction(s): GI upset    Oxycodone Dizziness, Nausea Only, GI Intolerance and Other (See Comments)     Other reaction(s): GI upset  Other reaction(s): GI upset            Vitals:    06/04/24 0951   BP: 159/77   Pulse: 81       Objective:    General:  Patient is WDWN, alert and oriented, appears stated age, and is in no acute distress.    Musculoskeletal:    Left Leg:    Inspection:  No visible abnormality.    Range of Motion:  Full knee flexion and extension.        Diagnostics, reviewed and taken today if performed as documented:    The attending physician has personally reviewed the pertinent films in PACS and interpretation is as follows:  3 phase NM bone scan:  No increased uptake at the proximal and lateral aspect of the left femur.      Procedures, if performed today:    None performed      Portions of the record may have been created with voice recognition software.  Occasional wrong word or \"sound a like\" substitutions may have occurred due to the inherent limitations of voice recognition " software.  Read the chart carefully and recognize, using context, where substitutions have occurred.

## 2024-06-10 ENCOUNTER — EVALUATION (OUTPATIENT)
Dept: PHYSICAL THERAPY | Facility: REHABILITATION | Age: 79
End: 2024-06-10
Payer: MEDICARE

## 2024-06-10 DIAGNOSIS — M76.32 IT BAND SYNDROME, LEFT: ICD-10-CM

## 2024-06-10 DIAGNOSIS — M79.605 LEFT LEG PAIN: Primary | ICD-10-CM

## 2024-06-10 PROCEDURE — 97110 THERAPEUTIC EXERCISES: CPT | Performed by: PHYSICAL THERAPIST

## 2024-06-10 PROCEDURE — 97162 PT EVAL MOD COMPLEX 30 MIN: CPT | Performed by: PHYSICAL THERAPIST

## 2024-06-10 NOTE — PROGRESS NOTES
PT Evaluation     Today's date: 6/10/2024  Patient name: Mari Vilchis  : 1945  MRN: [id number]  Referring provider: Chai Chavez MD  Dx:   Encounter Diagnosis     ICD-10-CM    1. Left leg pain  M79.605 Ambulatory Referral to Physical Therapy      2. It band syndrome, left  M76.32 Ambulatory Referral to Physical Therapy                     Assessment  Impairments: abnormal or restricted ROM, activity intolerance, impaired physical strength, lacks appropriate home exercise program and pain with function  Other impairment: impaired LE flexibility    Assessment details: Patient presents with pain, decreased LE flexibility, decreased hip strength, and decreased function secondary to L LE pain/ITB syndrome.  Patient would benefit from skilled PT intervention to address these issues and to maximize function.  Thank you for the referral.  Understanding of Dx/Px/POC: good     Prognosis: good    Goals  Short Term:  Pt will report decreased levels of pain by at least 2 subjective ratings in 4 weeks  Pt will demonstrate improved LE flexibility by at least 25% in 4 weeks  Pt will demonstrate improved strength by 1/2 grade MMT in 4 weeks  Long Term:   Pt will be independent in their HEP in 8 weeks  Pt will demonstrate improved FOTO, > predicted level  Pt will be independent with all ADL's  Pt will be able to sleep without waking due to L LE pain    Plan  Patient would benefit from: skilled physical therapy    Planned therapy interventions: abdominal trunk stabilization, body mechanics training, manual therapy, neuromuscular re-education, patient/caregiver education, strengthening, stretching, therapeutic activities, therapeutic exercise, flexibility, graded exercise and home exercise program    Frequency: 2x week  Duration in weeks: 6  Plan of Care beginning date: 6/10/2024  Plan of Care expiration date: 2024  Treatment plan discussed with: patient  Plan details: Patient was educated in HEP and Plan of Care.   All questions were answered to pt's satisfaction.        Subjective Evaluation    History of Present Illness  Mechanism of injury: Pt is a 78 y.o female with a hx of R pathological femur fracture due to severe OP last year and subsequent ORIF.  Pt started to develop some pain in L hip region that started a few weeks ago and was told to notify MD to rule out fracture.  Pt noticed this pain while sleeping that radiates down her lateral L thigh.  Pt saw Ortho and had radiographs, which showed calcific tendinitis in the region of the left proximal hamstring tendon origin. Pt was told to ambulate with RW and to have bone scan.  Pt f/u with Ortho and was told as per bone scan, the possibility of sustaining L femur fracture was low and she did not have to use RW anymore.  Pt also has ongoing LBP and b/l knee OA.  Pt reports pain/difficulty with laying on L side, sleep (intermittent disturbance), steps (step to pattern with railing), static standing, ambulatory tolerance.  Patient Goals  Patient goals for therapy: decreased pain  Patient goal: improved sleep  Pain  At best pain ratin  At worst pain ratin  Location: lateral L hip and lateral thigh  Relieving factors: medications      Diagnostic Tests  X-ray: normal  Bone scan: normal  Treatments  No previous or current treatments      Objective     Concurrent Complaints  Negative for bladder dysfunction, bowel dysfunction and saddle (S4) numbness    Tenderness     Left Hip   Tenderness in the greater trochanter.     Neurological Testing     Reflexes   Left   Patellar (L4): normal (2+)  Achilles (S1): normal (2+)  Clonus sign: negative    Right   Patellar (L4): normal (2+)  Achilles (S1): normal (2+)  Clonus sign: negative    Additional Neurological Details  Pt denies n/t    Active Range of Motion     Additional Active Range of Motion Details  L/S AROM (% of normal):  Flex=wfl without symptoms; repeated=no change  Ext=70% without pain ; repeated=no change  RSB=wfl  without pain  LSB=wfl without pain  R rot=75% without pain  L rot=75% without pain    Passive Range of Motion   Left Hip   Flexion: WFL  Abduction: WFL  External rotation (90/90): WFL  Internal rotation (90/90): WFL    Right Hip   Flexion: WFL  Abduction: WFL  External rotation (90/90): WFL  Internal rotation (90/90): WFL    Strength/Myotome Testing     Left Hip   Planes of Motion   Flexion: 4-  Abduction: 4-  External rotation: 4-    Right Hip   Planes of Motion   Flexion: 4-  Abduction: 4-  External rotation: 4-    Left Knee   Flexion: 5  Extension: 5    Right Knee   Flexion: 5  Extension: 5    Left Ankle/Foot   Dorsiflexion: 5  Plantar flexion: 5 (seated)    Right Ankle/Foot   Dorsiflexion: 5  Plantar flexion: 5 (seated)    Tests     Lumbar     Left   Negative crossed SLR, passive SLR and slump test.     Right   Negative crossed SLR, passive SLR and slump test.     Left Pelvic Girdle/Sacrum   Negative: active SLR test.     Right Pelvic Girdle/Sacrum   Negative: active SLR test.     Left Hip   Negative piriformis and sign of the buttock.   SLR: Negative.     Right Hip   Negative piriformis and sign of the buttock.   SLR: Negative.     Additional Tests Details  Pt unable to lay prone  Decreased flexibility ITB and piriformis.            Precautions: OP, OA, hx R femur fracture and ORIF, hypothytroid  POC expires Unit limit Auth Expiration date PT/OT + Visit Limit?   7/22 BOMN NA BOMN              Pertinent Findings:                                                                                         Test / Measure  6/10/2024   FOTO (Predicted 70) 59                       Visits 1            Manuals 6/10            L ITB stretch                                                    Neuro Re-Ed                                                                                                        Ther Ex             NS             TA w/bridges             TA w/HL clamshell w/TB             TA w/SLR in supine              Standing b/l hip abd+ext                                       Pt education+ HEP instruction TP 8 mins            Ther Activity             Mini squats             Resisted side stepping             Sit to stand             Fwd step up             Lat step up             Gait Training                                       Modalities

## 2024-06-19 ENCOUNTER — OFFICE VISIT (OUTPATIENT)
Dept: PHYSICAL THERAPY | Facility: REHABILITATION | Age: 79
End: 2024-06-19
Payer: MEDICARE

## 2024-06-19 DIAGNOSIS — M76.32 IT BAND SYNDROME, LEFT: ICD-10-CM

## 2024-06-19 DIAGNOSIS — M79.605 LEFT LEG PAIN: Primary | ICD-10-CM

## 2024-06-19 PROCEDURE — 97530 THERAPEUTIC ACTIVITIES: CPT | Performed by: PHYSICAL THERAPIST

## 2024-06-19 PROCEDURE — 97110 THERAPEUTIC EXERCISES: CPT | Performed by: PHYSICAL THERAPIST

## 2024-06-19 NOTE — PROGRESS NOTES
"Daily Note     Today's date: 2024  Patient name: Mari Vilchis  : 1945  MRN: [id number]  Referring provider: Chai Chavez MD  Dx:   Encounter Diagnosis     ICD-10-CM    1. Left leg pain  M79.605       2. It band syndrome, left  M76.32                      Subjective: Pt reports HEP is going well without complaint.        Objective: See treatment diary below      Assessment: Tolerated treatment well. Patient exhibited good technique with therapeutic exercises and would benefit from continued PT.  No pain complaints during or after session.  Monitor response NV.        Plan: Continue per plan of care.  Progress treatment as tolerated.       Precautions: OP, OA, hx R femur fracture and ORIF, hypothytroid  POC expires Unit limit Auth Expiration date PT/OT + Visit Limit?    BOMN NA BOMN              Pertinent Findings:                                                                                         Test / Measure  6/10/2024   FOTO (Predicted 70) 59                       Visits 1 2           Manuals 6/10 6/19           L ITB stretch  TP 2'                                                  Neuro Re-Ed                                                                                                        Ther Ex             NS  L5x10'           TA w/bridges  3\" 2x10           TA w/HL clamshell w/TB  Otb 3\" 2x10           TA w/SLR in supine  2x10           Standing b/l hip abd+ext  x10 ea                                     Pt education+ HEP instruction TP 8 mins            Ther Activity             Mini squats  x10           Resisted side stepping  Otb 2 laps at mirror           Sit to stand             Fwd step up  6\" 2x10           Lat step up  6\" 2x10           Gait Training                                       Modalities                                            "

## 2024-06-21 ENCOUNTER — OFFICE VISIT (OUTPATIENT)
Dept: PHYSICAL THERAPY | Facility: REHABILITATION | Age: 79
End: 2024-06-21
Payer: MEDICARE

## 2024-06-21 DIAGNOSIS — M79.605 LEFT LEG PAIN: Primary | ICD-10-CM

## 2024-06-21 DIAGNOSIS — M76.32 IT BAND SYNDROME, LEFT: ICD-10-CM

## 2024-06-21 PROCEDURE — 97530 THERAPEUTIC ACTIVITIES: CPT | Performed by: PHYSICAL THERAPIST

## 2024-06-21 PROCEDURE — 97110 THERAPEUTIC EXERCISES: CPT | Performed by: PHYSICAL THERAPIST

## 2024-06-21 NOTE — PROGRESS NOTES
"Daily Note     Today's date: 2024  Patient name: Mari Vilchis  : 1945  MRN: [id number]  Referring provider: Chai Chavez MD  Dx:   Encounter Diagnosis     ICD-10-CM    1. Left leg pain  M79.605       2. It band syndrome, left  M76.32                  1on1 w/2 PT's entire session.      Subjective: Pt reports some muscle soreness after last session that has improved since.        Objective: See treatment diary below      Assessment: Tolerated treatment well. Pt notes some intermittent crepitus in her knees during session, no other complaints.  Patient demonstrated fatigue post treatment and would benefit from continued PT      Plan: Continue per plan of care.  Progress treatment as tolerated.       Precautions: OP, OA, hx R femur fracture and ORIF, hypothytroid  POC expires Unit limit Auth Expiration date PT/OT + Visit Limit?    BOMN NA BOMN              Pertinent Findings:                                                                                         Test / Measure  6/10/2024   FOTO (Predicted 70) 59                       Visits 1 2 3          Manuals 6/10 6/19 6/21          L ITB stretch  TP 2' TP 2'                                                 Neuro Re-Ed                                                                                                        Ther Ex             NS  L5x10' L5x10'          TA w/bridges  3\" 2x10 3\" 2x10          TA w/HL clamshell w/TB  Otb 3\" 2x10 Otb 3\" 2x10          TA w/SLR in supine  2x10 2x10          Standing b/l hip abd+ext  x10 ea X15 ea                                    Pt education+ HEP instruction TP 8 mins            Ther Activity             Mini squats  x10 2x10          Resisted side stepping  Otb 2 laps at mirror Otb 2 laps at window          Sit to stand             Fwd step up  6\" 2x10 6\" 2x10          Lat step up  6\" 2x10 6\" 2x10           Gait Training                                       Modalities                               "

## 2024-06-24 ENCOUNTER — OFFICE VISIT (OUTPATIENT)
Dept: PHYSICAL THERAPY | Facility: REHABILITATION | Age: 79
End: 2024-06-24
Payer: MEDICARE

## 2024-06-24 DIAGNOSIS — M79.605 LEFT LEG PAIN: Primary | ICD-10-CM

## 2024-06-24 DIAGNOSIS — M76.32 IT BAND SYNDROME, LEFT: ICD-10-CM

## 2024-06-24 PROCEDURE — 97110 THERAPEUTIC EXERCISES: CPT | Performed by: PHYSICAL THERAPIST

## 2024-06-24 PROCEDURE — 97530 THERAPEUTIC ACTIVITIES: CPT | Performed by: PHYSICAL THERAPIST

## 2024-06-24 NOTE — PROGRESS NOTES
"Daily Note     Today's date: 2024  Patient name: Mari Vilchis  : 1945  MRN: [id number]  Referring provider: Chai Chavez MD  Dx:   Encounter Diagnosis     ICD-10-CM    1. Left leg pain  M79.605       2. It band syndrome, left  M76.32                      Subjective: Pt reports walking 4/10 of a [place] and notes some muscle soreness b/l quads region.       Objective: See treatment diary below      Assessment: Tolerated treatment well. Pt notes some knee pain and crepitus with squats and sit to stand.  Good tolerance to all other exercises.  Patient would benefit from continued PT      Plan: Continue per plan of care.  Progress treatment as tolerated.       Precautions: OP, OA, hx R femur fracture and ORIF, hypothytroid  POC expires Unit limit Auth Expiration date PT/OT + Visit Limit?    BOMN NA BOMN              Pertinent Findings:                                                                                         Test / Measure  6/10/2024   FOTO (Predicted 70) 59                       Visits 1 2 3 4         Manuals 6/10 6/19 6/21 6/24         L ITB stretch  TP 2' TP 2' TP 2'                                                Neuro Re-Ed                                                                                                        Ther Ex             NS  L5x10' L5x10' L5x10'         TA w/bridges  3\" 2x10 3\" 2x10 3\" 2x10         TA w/HL clamshell w/TB  Otb 3\" 2x10 Otb 3\" 2x10 Otb 3\" 2x10         TA w/SLR in supine  2x10 2x10 2x10         Standing b/l hip abd+ext  x10 ea X15 ea 2x10 ea                                   Pt education+ HEP instruction TP 8 mins            Ther Activity             Mini squats  x10 2x10 2x10         Resisted side stepping  Otb 2 laps at mirror Otb 2 laps at window Otb 2 laps at window         Sit to stand    W/foam x10         Fwd step up  6\" 2x10 6\" 2x10 6\" 2x10         Lat step up  6\" 2x10 6\" 2x10  6\" 2x10         Gait " Training                                       Modalities

## 2024-06-28 ENCOUNTER — OFFICE VISIT (OUTPATIENT)
Dept: PHYSICAL THERAPY | Facility: REHABILITATION | Age: 79
End: 2024-06-28
Payer: MEDICARE

## 2024-06-28 DIAGNOSIS — M76.32 IT BAND SYNDROME, LEFT: ICD-10-CM

## 2024-06-28 DIAGNOSIS — M79.605 LEFT LEG PAIN: Primary | ICD-10-CM

## 2024-06-28 PROCEDURE — 97530 THERAPEUTIC ACTIVITIES: CPT

## 2024-06-28 PROCEDURE — 97110 THERAPEUTIC EXERCISES: CPT

## 2024-06-28 NOTE — PROGRESS NOTES
"Daily Note     Today's date: 2024  Patient name: Mari Vilchis  : 1945  MRN: [id number]  Referring provider: Chai Chavez MD  Dx:   Encounter Diagnosis     ICD-10-CM    1. Left leg pain  M79.605       2. It band syndrome, left  M76.32                      Subjective: Pt reported feeling a little sore/stiff in knees today. She has been having good and bad days.       Objective: See treatment diary below      Assessment: Tolerated treatment well. Patient demonstrated fatigue post treatment and exhibited good technique with therapeutic exercises. VC's needed for correct technique throughout session. Most challenged with squats and sit to stand. Monitor NV.      Plan: Continue per plan of care. 1 on 1 with PTA for entire session.     Precautions: OP, OA, hx R femur fracture and ORIF, hypothytroid  POC expires Unit limit Auth Expiration date PT/OT + Visit Limit?    BOMN NA BOMN              Pertinent Findings:                                                                                         Test / Measure  6/10/2024   FOTO (Predicted 70) 59                       Visits 1 2 3 4 5        Manuals 6/10 6/19 6/21 6/24 6/28        L ITB stretch  TP 2' TP 2' TP 2' TC 2'                                               Neuro Re-Ed                                                                                                        Ther Ex             NS  L5x10' L5x10' L5x10' L5x10'        TA w/bridges  3\" 2x10 3\" 2x10 3\" 2x10 3\" 2x10        TA w/HL clamshell w/TB  Otb 3\" 2x10 Otb 3\" 2x10 Otb 3\" 2x10 Otb 3\" 2x10        TA w/SLR in supine  2x10 2x10 2x10 2x10        Standing b/l hip abd+ext  x10 ea X15 ea 2x10 ea 2x10 ea.                                  Pt education+ HEP instruction TP 8 mins            Ther Activity             Mini squats  x10 2x10 2x10 2x10        Resisted side stepping  Otb 2 laps at mirror Otb 2 laps at window Otb 2 laps at window Otb 2 laps at window        Sit to stand    W/foam " "x10 W/foam x10        Fwd step up  6\" 2x10 6\" 2x10 6\" 2x10 6\" 2x10        Lat step up  6\" 2x10 6\" 2x10  6\" 2x10 6\" 2x10        Gait Training                                       Modalities                                                  "

## 2024-07-01 ENCOUNTER — CLINICAL SUPPORT (OUTPATIENT)
Dept: ENDOCRINOLOGY | Facility: CLINIC | Age: 79
End: 2024-07-01
Payer: MEDICARE

## 2024-07-01 ENCOUNTER — TELEPHONE (OUTPATIENT)
Dept: ENDOCRINOLOGY | Facility: CLINIC | Age: 79
End: 2024-07-01

## 2024-07-01 VITALS
HEIGHT: 61 IN | HEART RATE: 63 BPM | OXYGEN SATURATION: 98 % | DIASTOLIC BLOOD PRESSURE: 80 MMHG | BODY MASS INDEX: 24.66 KG/M2 | WEIGHT: 130.6 LBS | SYSTOLIC BLOOD PRESSURE: 112 MMHG

## 2024-07-01 DIAGNOSIS — M81.0 OSTEOPOROSIS, UNSPECIFIED OSTEOPOROSIS TYPE, UNSPECIFIED PATHOLOGICAL FRACTURE PRESENCE: Primary | ICD-10-CM

## 2024-07-01 PROCEDURE — 96372 THER/PROPH/DIAG INJ SC/IM: CPT | Performed by: PHYSICIAN ASSISTANT

## 2024-07-01 PROCEDURE — 99202 OFFICE O/P NEW SF 15 MIN: CPT

## 2024-07-01 NOTE — TELEPHONE ENCOUNTER
If today is her last Evenity injection, then 1 month later she can schedule her Prolia injection. I will send a message to the PA team for approval for Prolia.    Admission

## 2024-07-01 NOTE — PROGRESS NOTES
"Assessment/Plan:    Mari Vilchis came into the [facility] today 07/01/24 to receive evenity injection.      Verbal consent obtained.  Consent given by: patient    patient states patient has been medically healthy with no underlining concerns/complications.      Mari Vilchis presents with no symptoms today.       All insturctions were reviewed with the patient.    If the patient should have any questions/concerns, advised patient to contacted [facility].       Subjective:     History provided by: patient    Patient ID: Mari Vilchis is a 78 y.o. female      Objective:    Vitals:    07/01/24 1005   BP: 112/80   Pulse: 63   SpO2: 98%   Weight: 59.2 kg (130 lb 9.6 oz)   Height: 5' 1\" (1.549 m)       Patient tolerated the injection well without any complications.  Injection site/s Left and right arm.  Medication was provided by Office.    Patient signed consent form yes   Patient signed ABN form yes (If no patient is not a medicare patient).   Patient waited 15 minutes after injection no (This only applies to patient's receiving first time injection).       Last Visit: 5/28/2024  Next visit:7/1/2024     "

## 2024-07-02 ENCOUNTER — OFFICE VISIT (OUTPATIENT)
Dept: PHYSICAL THERAPY | Facility: REHABILITATION | Age: 79
End: 2024-07-02
Payer: MEDICARE

## 2024-07-02 DIAGNOSIS — M76.32 IT BAND SYNDROME, LEFT: ICD-10-CM

## 2024-07-02 DIAGNOSIS — M79.605 LEFT LEG PAIN: Primary | ICD-10-CM

## 2024-07-02 PROCEDURE — 97110 THERAPEUTIC EXERCISES: CPT

## 2024-07-02 PROCEDURE — 97530 THERAPEUTIC ACTIVITIES: CPT

## 2024-07-02 NOTE — PROGRESS NOTES
"Daily Note     Today's date: 2024  Patient name: Mari Vilchis  : 1945  MRN: [id number]  Referring provider: Chai Chavez MD  Dx:   Encounter Diagnosis     ICD-10-CM    1. Left leg pain  M79.605       2. It band syndrome, left  M76.32                      Subjective: Pt reported feeling good today. She noted having some good days but increased pain at night. She is still having a hard time laying on her left side.       Objective: See treatment diary below      Assessment: Tolerated treatment well. Patient demonstrated fatigue post treatment and exhibited good technique with therapeutic exercises. VC's needed for correct technique throughout session. Continues to be challenging  with mini squats and sit to stand. Monitor NV.      Plan: Continue per plan of care. MD appt 7-     Precautions: OP, OA, hx R femur fracture and ORIF, hypothytroid  POC expires Unit limit Auth Expiration date PT/OT + Visit Limit?    BOMN NA BOMN              Pertinent Findings:                                                                                         Test / Measure  6/10/2024   FOTO (Predicted 70) 59                       Visits 1 2 3 4 5 6       Manuals 6/10 6/19 6/21 6/24 6/28 7/2       L ITB stretch  TP 2' TP 2' TP 2' TC 2' TC 2'                                              Neuro Re-Ed                                                                                                        Ther Ex             NS  L5x10' L5x10' L5x10' L5x10' L5x10'       TA w/bridges  3\" 2x10 3\" 2x10 3\" 2x10 3\" 2x10 3\" 2x10       TA w/HL clamshell w/TB  Otb 3\" 2x10 Otb 3\" 2x10 Otb 3\" 2x10 Otb 3\" 2x10 Otb 3\" 2x10       TA w/SLR in supine  2x10 2x10 2x10 2x10 2x10       Standing b/l hip abd+ext  x10 ea X15 ea 2x10 ea 2x10 ea. 2x10 ea.                                 Pt education+ HEP instruction TP 8 mins            Ther Activity             Mini squats  x10 2x10 2x10 2x10 2x10       Resisted side stepping  Otb 2 laps at " "mirror Otb 2 laps at window Otb 2 laps at window Otb 2 laps at window Otb 2 laps at window       Sit to stand    W/foam x10 W/foam x10 W/foam x10       Fwd step up  6\" 2x10 6\" 2x10 6\" 2x10 6\" 2x10 6\" 2x10       Lat step up  6\" 2x10 6\" 2x10  6\" 2x10 6\" 2x10 6\" 2x10       Gait Training                                       Modalities                                                    "

## 2024-07-03 ENCOUNTER — TELEPHONE (OUTPATIENT)
Dept: ENDOCRINOLOGY | Facility: CLINIC | Age: 79
End: 2024-07-03

## 2024-07-03 NOTE — TELEPHONE ENCOUNTER
----- Message from Mikaela HENRIQUEZ sent at 7/3/2024 12:30 PM EDT -----  Regarding: FW: Prolia PA  Please schedule appt  ----- Message -----  From: Anastasia Barcenas PA-C  Sent: 7/3/2024  12:21 PM EDT  To: #  Subject: FW: Prolia PA                                    Let patient know she can schedule her Prolia about a month out from her last Evenity injection. Please schedule nurse visit.     Thanks  ----- Message -----  From: Barb Martin  Sent: 7/3/2024   9:10 AM EDT  To: Anastasia Barcenas PA-C  Subject: RE: Lewis VALDEZ                                    Good Morning!!  NO auth required for Prolia.   Thank you!  ----- Message -----  From: Anastasia Barcenas PA-C  Sent: 7/1/2024  10:26 AM EDT  To: #  Subject: Prolia PA                                        Patient needs PA for Prolia. Thanks!

## 2024-07-03 NOTE — TELEPHONE ENCOUNTER
Call placed to patient and scheduled her first Prolia Injection on 8/2/24 @ 11:00 with the nurse at [place].

## 2024-07-05 ENCOUNTER — OFFICE VISIT (OUTPATIENT)
Dept: PHYSICAL THERAPY | Facility: REHABILITATION | Age: 79
End: 2024-07-05
Payer: MEDICARE

## 2024-07-05 DIAGNOSIS — M79.605 LEFT LEG PAIN: Primary | ICD-10-CM

## 2024-07-05 DIAGNOSIS — M76.32 IT BAND SYNDROME, LEFT: ICD-10-CM

## 2024-07-05 PROCEDURE — 97110 THERAPEUTIC EXERCISES: CPT

## 2024-07-05 PROCEDURE — 97530 THERAPEUTIC ACTIVITIES: CPT

## 2024-07-05 NOTE — PROGRESS NOTES
"Daily Note     Today's date: 2024  Patient name: Mari Vilchis  : 1945  MRN: [id number]  Referring provider: Chai Chavez MD  Dx:   Encounter Diagnosis     ICD-10-CM    1. Left leg pain  M79.605       2. It band syndrome, left  M76.32             Start Time: 0915  Stop Time: 1000  Total time in clinic (min): 45 minutes    Subjective: Pt overall continues to have pain with side-sleeping.     Objective: See treatment diary below      Assessment: Tolerated treatment well. Patient demonstrated fatigue post treatment and exhibited good technique with therapeutic exercises. Patient was challenged with SLR on foam. VC's needed for correct technique throughout session. Monitor NV.      Plan: Continue per plan of care. MD appt 7-     Precautions: OP, OA, hx R femur fracture and ORIF, hypothytroid  POC expires Unit limit Auth Expiration date PT/OT + Visit Limit?    BOMN NA BOMN              Pertinent Findings:                                                                                         Test / Measure  6/10/2024   FOTO (Predicted 70) 59                       Visits 1 2 3 4 5 6 7      Manuals 6/10 6/19 6/21 6/24 6/28 7/2 7/5      L ITB stretch  TP 2' TP 2' TP 2' TC 2' TC 2' AR 2'                                             Neuro Re-Ed                                                                                                        Ther Ex             NS  L5x10' L5x10' L5x10' L5x10' L5x10' L6x10'      TA w/bridges  3\" 2x10 3\" 2x10 3\" 2x10 3\" 2x10 3\" 2x10 3\"  2x10       TA w/HL clamshell w/TB  Otb 3\" 2x10 Otb 3\" 2x10 Otb 3\" 2x10 Otb 3\" 2x10 Otb 3\" 2x10 OTB  3\"   2x10       TA w/SLR in supine  2x10 2x10 2x10 2x10 2x10 2x10      Standing b/l hip abd+ext  x10 ea X15 ea 2x10 ea 2x10 ea. 2x10 ea. On foam  2x10                                Pt education+ HEP instruction TP 8 mins            Ther Activity             Mini squats  x10 2x10 2x10 2x10 2x10 2x10       Resisted side stepping  Otb 2 " "laps at mirror Otb 2 laps at window Otb 2 laps at window Otb 2 laps at window Otb 2 laps at window Otb  2 laps at window       Sit to stand    W/foam x10 W/foam x10 W/foam x10 W/ foam  x10      Fwd step up  6\" 2x10 6\" 2x10 6\" 2x10 6\" 2x10 6\" 2x10 6\" 2x10      Lat step up  6\" 2x10 6\" 2x10  6\" 2x10 6\" 2x10 6\" 2x10 6\" 2x10      Gait Training                                       Modalities                                                    "

## 2024-07-08 ENCOUNTER — OFFICE VISIT (OUTPATIENT)
Dept: PHYSICAL THERAPY | Facility: REHABILITATION | Age: 79
End: 2024-07-08
Payer: MEDICARE

## 2024-07-08 DIAGNOSIS — M79.605 LEFT LEG PAIN: Primary | ICD-10-CM

## 2024-07-08 DIAGNOSIS — M76.32 IT BAND SYNDROME, LEFT: ICD-10-CM

## 2024-07-08 PROCEDURE — 97110 THERAPEUTIC EXERCISES: CPT | Performed by: PHYSICAL THERAPIST

## 2024-07-08 PROCEDURE — 97530 THERAPEUTIC ACTIVITIES: CPT | Performed by: PHYSICAL THERAPIST

## 2024-07-08 NOTE — PROGRESS NOTES
"Daily Note     Today's date: 2024  Patient name: Mari Vilchis  : 1945  MRN: [id number]  Referring provider: Chai Chavez MD  Dx:   Encounter Diagnosis     ICD-10-CM    1. Left leg pain  M79.605       2. It band syndrome, left  M76.32                      Subjective: patient offers no new complaints. She mentions that she was able to lay on her side without increased pain last night which she couldn't do before.      Objective: See treatment diary below      Assessment: Tolerated treatment well. Continued with program as noted below. She required cueing for mini squats for proper technique, good carry over after cueing. Patient demonstrated fatigue post treatment, exhibited good technique with therapeutic exercises, and would benefit from continued PT      Plan: Continue per plan of care.      Precautions: OP, OA, hx R femur fracture and ORIF, hypothytroid  POC expires Unit limit Auth Expiration date PT/OT + Visit Limit?    BOMN NA BOMN              Pertinent Findings:                                                                                         Test / Measure  6/10/2024   FOTO (Predicted 70) 59                       Visits 1 2 3 4 5 6 7 8     Manuals 6/10 6/19 6/21 6/24 6/28 7 78     L ITB stretch  TP 2' TP 2' TP 2' TC 2' TC 2' AR 2' SK 2'                                            Neuro Re-Ed                                                                                                        Ther Ex             NS  L5x10' L5x10' L5x10' L5x10' L5x10' L6x10' L6 10'     TA w/bridges  3\" 2x10 3\" 2x10 3\" 2x10 3\" 2x10 3\" 2x10 3\"  2x10  3\"  2x10      TA w/HL clamshell w/TB  Otb 3\" 2x10 Otb 3\" 2x10 Otb 3\" 2x10 Otb 3\" 2x10 Otb 3\" 2x10 OTB  3\"   2x10  OTB  3\"   2x10      TA w/SLR in supine  2x10 2x10 2x10 2x10 2x10 2x10 2x10     Standing b/l hip abd+ext  x10 ea X15 ea 2x10 ea 2x10 ea. 2x10 ea. On foam  2x10 On foam 2x10                               Pt education+ HEP instruction TP " "8 mins            Ther Activity             Mini squats  x10 2x10 2x10 2x10 2x10 2x10  2x10     Resisted side stepping  Otb 2 laps at mirror Otb 2 laps at window Otb 2 laps at window Otb 2 laps at window Otb 2 laps at window Otb  2 laps at window  OTB 2 laps at mirror     Sit to stand    W/foam x10 W/foam x10 W/foam x10 W/ foam  x10 W/ foam x10     Fwd step up  6\" 2x10 6\" 2x10 6\" 2x10 6\" 2x10 6\" 2x10 6\" 2x10 6\" 2x10     Lat step up  6\" 2x10 6\" 2x10  6\" 2x10 6\" 2x10 6\" 2x10 6\" 2x10 6\" 2x10     Gait Training                                       Modalities                                                      "

## 2024-07-09 ENCOUNTER — OFFICE VISIT (OUTPATIENT)
Dept: OBGYN CLINIC | Facility: HOSPITAL | Age: 79
End: 2024-07-09
Payer: MEDICARE

## 2024-07-09 VITALS
SYSTOLIC BLOOD PRESSURE: 154 MMHG | HEIGHT: 61 IN | DIASTOLIC BLOOD PRESSURE: 83 MMHG | HEART RATE: 65 BPM | WEIGHT: 130.8 LBS | BODY MASS INDEX: 24.7 KG/M2

## 2024-07-09 DIAGNOSIS — M76.32 IT BAND SYNDROME, LEFT: ICD-10-CM

## 2024-07-09 DIAGNOSIS — M79.605 LEFT LEG PAIN: Primary | ICD-10-CM

## 2024-07-09 PROCEDURE — 99214 OFFICE O/P EST MOD 30 MIN: CPT | Performed by: ORTHOPAEDIC SURGERY

## 2024-07-09 NOTE — PROGRESS NOTES
Assessment:   Diagnosis ICD-10-CM Associated Orders   1. Left leg pain  M79.605       2. It band syndrome, left  M76.32           Plan:  Because the patient's pain has not increased, we do not believe she is heading for an imminent bisphosphonate fracture.  We discussed that it is possible that she may still fracture her femur even though our suspicions for this are low at present.  The patient should continue PT for her bilateral legs and left-sided IT band syndrome.   If her pain worsens, she needs to come in right away to discuss prophylactic nail surgery for her hip.  She should continue to follow-up with Dr. Johnson for her knee pain.    To do next visit:  PRN.    The above stated was discussed in layman's terms and the patient expressed understanding.  All questions were answered to the patient's satisfaction.         Subjective:   Mari Vilchis is a 78 y.o. female who presents to the office today for evaluation of left leg pain.  She recently had a bone scan to evaluate for impending bisphosphonate fracture in her left leg, and it was determined that she was not at imminent risk for bisphosphonate fracture.  She was diagnosed with IT band syndrome.  She has been going to PT which she thinks has been helping.  She does still have some pain at night when she lays directly on the left side.  She is scheduled for a Prolia injection on 8/1.  She sees Dr. Johnson for her knee pain and has an appointment with him next month.      Review of systems negative unless otherwise specified in HPI    Past Medical History:   Diagnosis Date    Aneurysm (HCC) 2/8/23    2.5 mg found on neck CAT scan    Arthritis 2005    Closed nondisplaced fracture of fifth metatarsal bone of right foot with routine healing     Difficulty walking 2/2/23    Broken Femur    Disease of thyroid gland     hypothyroid    Glaucoma, bilateral     Hyperlipidemia     Osteoporosis     Otitis media 11/4/14    Otitis Media    Scoliosis 12/12/12        Past Surgical History:   Procedure Laterality Date    BUNIONECTOMY      COLONOSCOPY  06/04/2019    CORRECTION HAMMER TOE      FRACTURE SURGERY  2/3/23    Vamsi inserted for broken femur    MAMMO (HISTORICAL)  07/09/2018    NEUROMA EXCISION      OTHER SURGICAL HISTORY      Birth kwasi removed    WI OPTX FEM SHFT FX W/INSJ IMED IMPLT W/WO SCREW Right 02/03/2023    Procedure: INSERTION NAIL IM FEMUR ANTEGRADE (TROCHANTERIC);  Surgeon: Chai Chavez MD;  Location: [facility];  Service: Orthopedics    WISDOM TOOTH EXTRACTION         Family History   Problem Relation Age of Onset    Hypertension Mother     Thyroid disease Mother     Arthritis Mother     Glaucoma Mother     Hypothyroidism Mother     Stomach cancer Father 51    Cancer Father     No Known Problems Maternal Grandmother     No Known Problems Maternal Grandfather     No Known Problems Paternal Grandmother     No Known Problems Paternal Grandfather     Dementia Maternal Aunt     No Known Problems Maternal Aunt     No Known Problems Paternal Aunt     Hypertension Brother     Osteoporosis Family     Hyperlipidemia Family     Hypertension Family     Dementia Maternal Aunt     Hypertension Maternal Aunt     Osteoporosis Maternal Aunt        Social History     Occupational History    Not on file   Tobacco Use    Smoking status: Never    Smokeless tobacco: Never   Vaping Use    Vaping status: Never Used   Substance and Sexual Activity    Alcohol use: Yes     Comment: Glass of wine a few times a year    Drug use: Never     Comment: Only drug use was for pain prescribed by a doctor    Sexual activity: Never         Current Outpatient Medications:     acetaminophen (Tylenol 8 Hour) 650 mg CR tablet, Take 650 mg by mouth every 8 (eight) hours as needed for moderate pain, Disp: , Rfl:     Calcium Carb-Cholecalciferol 600-20 MG-MCG TABS, , Disp: , Rfl:     ibuprofen (Advil) 200 mg tablet, Take 200 mg by mouth every 4 (four) hours as needed for moderate pain, Disp: ,  "Rfl:     levothyroxine 25 mcg tablet, TAKE 1 TABLET BY MOUTH EVERY DAY, Disp: 90 tablet, Rfl: 1    romosozumab-aqqg (EVENITY) subcutaneous injection, , Disp: , Rfl:     simvastatin (ZOCOR) 40 mg tablet, Take 1 tablet (40 mg total) by mouth daily at bedtime, Disp: 90 tablet, Rfl: 3    Glucosamine-Chondroitin 500-400 MG CAPS, Take 1 tablet by mouth in the morning (Patient not taking: Reported on 2/16/2024), Disp: , Rfl:     Naphazoline-Polyethyl Glycol 0.012-0.2 % SOLN, Apply 1-2 drops to eye 4 (four) times a day (Patient not taking: Reported on 3/8/2024), Disp: , Rfl:     Allergies   Allergen Reactions    Fentanyl Vomiting    Indomethacin GI Intolerance     Other reaction(s): GI upset  Other reaction(s): GI upset    Oxycodone Dizziness, Nausea Only, GI Intolerance and Other (See Comments)     Other reaction(s): GI upset  Other reaction(s): GI upset            Vitals:    07/09/24 1039   BP: 154/83   Pulse: 65       Objective:    General:  Patient is WDWN, alert and oriented, appears stated age, and is in no acute distress.    Musculoskeletal:    Left Leg:    Inspection:  No visible abnormality.    Range of Motion:  Full knee flexion and extension.    Palpation:  She is tender with palpation of the IT band.        Diagnostics, reviewed and taken today if performed as documented:    None performed      Procedures, if performed today:    None performed      Portions of the record may have been created with voice recognition software.  Occasional wrong word or \"sound a like\" substitutions may have occurred due to the inherent limitations of voice recognition software.  Read the chart carefully and recognize, using context, where substitutions have occurred.  "

## 2024-07-12 ENCOUNTER — OFFICE VISIT (OUTPATIENT)
Dept: PHYSICAL THERAPY | Facility: REHABILITATION | Age: 79
End: 2024-07-12
Payer: MEDICARE

## 2024-07-12 DIAGNOSIS — M79.605 LEFT LEG PAIN: Primary | ICD-10-CM

## 2024-07-12 DIAGNOSIS — M76.32 IT BAND SYNDROME, LEFT: ICD-10-CM

## 2024-07-12 PROCEDURE — 97530 THERAPEUTIC ACTIVITIES: CPT

## 2024-07-12 PROCEDURE — 97110 THERAPEUTIC EXERCISES: CPT

## 2024-07-12 NOTE — PROGRESS NOTES
"Daily Note     Today's date: 2024  Patient name: Mari Vilchis  : 1945  MRN: [id number]  Referring provider: Chai Chavez MD  Dx:   Encounter Diagnosis     ICD-10-CM    1. Left leg pain  M79.605       2. It band syndrome, left  M76.32                      Subjective: Pt reported MD would like pt to continue with PT but does not have to return to him unless there is a problem.      Objective: See treatment diary below      Assessment: Tolerated treatment well. Patient demonstrated fatigue post treatment and exhibited good technique with therapeutic exercises. VC's needed for correct technique throughout session. No reports of increased pain post session. Some discomfort with manuals. Monitor NV.      Plan: Continue per plan of care.      Precautions: OP, OA, hx R femur fracture and ORIF, hypothytroid  POC expires Unit limit Auth Expiration date PT/OT + Visit Limit?    BOMN NA BOMN              Pertinent Findings:                                                                                         Test / Measure  6/10/2024   FOTO (Predicted 70) 59                       Visits 3 4 5 6 7 8 9    Manuals     L ITB stretch TP 2' TP 2' TC 2' TC 2' AR 2' SK 2' TC 2'                                     Neuro Re-Ed                                                                                        Ther Ex           NS L5x10' L5x10' L5x10' L5x10' L6x10' L6 10' L6x10'    TA w/bridges 3\" 2x10 3\" 2x10 3\" 2x10 3\" 2x10 3\"  2x10  3\"  2x10  3\" 2x10    TA w/HL clamshell w/TB Otb 3\" 2x10 Otb 3\" 2x10 Otb 3\" 2x10 Otb 3\" 2x10 OTB  3\"   2x10  OTB  3\"   2x10  Otb 3\" 2x10    TA w/SLR in supine 2x10 2x10 2x10 2x10 2x10 2x10 2x10    Standing b/l hip abd+ext X15 ea 2x10 ea 2x10 ea. 2x10 ea. On foam  2x10 On foam 2x10 On foam 2x10                          Pt education+ HEP instruction           Ther Activity           Mini squats 2x10 2x10 2x10 2x10 2x10  2x10 2x10    Resisted side " "stepping Otb 2 laps at window Otb 2 laps at window Otb 2 laps at window Otb 2 laps at window Otb  2 laps at window  OTB 2 laps at mirror Otb 2 laps at mirror    Sit to stand  W/foam x10 W/foam x10 W/foam x10 W/ foam  x10 W/ foam x10 W/ foam x15    Fwd step up 6\" 2x10 6\" 2x10 6\" 2x10 6\" 2x10 6\" 2x10 6\" 2x10 6\" 2x10    Lat step up 6\" 2x10  6\" 2x10 6\" 2x10 6\" 2x10 6\" 2x10 6\" 2x10 6\" 2x10    Gait Training                                 Modalities                                                  "

## 2024-07-15 ENCOUNTER — OFFICE VISIT (OUTPATIENT)
Dept: PHYSICAL THERAPY | Facility: REHABILITATION | Age: 79
End: 2024-07-15
Payer: MEDICARE

## 2024-07-15 DIAGNOSIS — M76.32 IT BAND SYNDROME, LEFT: ICD-10-CM

## 2024-07-15 DIAGNOSIS — M79.605 LEFT LEG PAIN: Primary | ICD-10-CM

## 2024-07-15 PROCEDURE — 97110 THERAPEUTIC EXERCISES: CPT

## 2024-07-15 PROCEDURE — 97530 THERAPEUTIC ACTIVITIES: CPT

## 2024-07-15 NOTE — PROGRESS NOTES
"Daily Note     Today's date: 7/15/2024  Patient name: Mari Vilchis  : 1945  MRN: [id number]  Referring provider: Chai Chavez MD  Dx:   Encounter Diagnosis     ICD-10-CM    1. Left leg pain  M79.605       2. It band syndrome, left  M76.32             Start Time: 0949  Stop Time: 1021  Total time in clinic (min): 32 minutes    Subjective: Pt reported feeling fatigued this morning.       Objective: See treatment diary below      Assessment: Tolerated treatment well. Patient demonstrated fatigue post treatment and exhibited good technique with therapeutic exercises. VC's needed for correct technique throughout session. No reports of increased pain post session.       Plan: Continue per plan of care.      Precautions: OP, OA, hx R femur fracture and ORIF, hypothytroid  POC expires Unit limit Auth Expiration date PT/OT + Visit Limit?    BOMN NA BOMN              Pertinent Findings:                                                                                         Test / Measure  6/10/2024   FOTO (Predicted 70) 59                       Visits 3 4 5 6 7 8 9 10   Manuals 6/21 6/24 6/28 7/2 7/5 7/8 7/12 7/15   L ITB stretch TP 2' TP 2' TC 2' TC 2' AR 2' SK 2' TC 2'                                     Neuro Re-Ed                                                                                        Ther Ex           NS L5x10' L5x10' L5x10' L5x10' L6x10' L6 10' L6x10' L6x10'   TA w/bridges 3\" 2x10 3\" 2x10 3\" 2x10 3\" 2x10 3\"  2x10  3\"  2x10  3\" 2x10 3\" 2x10   TA w/HL clamshell w/TB Otb 3\" 2x10 Otb 3\" 2x10 Otb 3\" 2x10 Otb 3\" 2x10 OTB  3\"   2x10  OTB  3\"   2x10  Otb 3\" 2x10 Otb 3\" 2x10   TA w/SLR in supine 2x10 2x10 2x10 2x10 2x10 2x10 2x10 2x10   Standing b/l hip abd+ext X15 ea 2x10 ea 2x10 ea. 2x10 ea. On foam  2x10 On foam 2x10 On foam 2x10 On foam 2x10                         Pt education+ HEP instruction           Ther Activity           Mini squats 2x10 2x10 2x10 2x10 2x10  2x10 2x10 2x10   Resisted " "side stepping Otb 2 laps at window Otb 2 laps at window Otb 2 laps at window Otb 2 laps at window Otb  2 laps at window  OTB 2 laps at mirror Otb 2 laps at mirror Otb 2 laps at mirror   Sit to stand  W/foam x10 W/foam x10 W/foam x10 W/ foam  x10 W/ foam x10 W/ foam x15 W/ foam x15   Fwd step up 6\" 2x10 6\" 2x10 6\" 2x10 6\" 2x10 6\" 2x10 6\" 2x10 6\" 2x10 6\" 2x10   Lat step up 6\" 2x10  6\" 2x10 6\" 2x10 6\" 2x10 6\" 2x10 6\" 2x10 6\" 2x10 6\" 2x10   Gait Training                                 Modalities                                                  "

## 2024-07-17 ENCOUNTER — OFFICE VISIT (OUTPATIENT)
Dept: PHYSICAL THERAPY | Facility: REHABILITATION | Age: 79
End: 2024-07-17
Payer: MEDICARE

## 2024-07-17 DIAGNOSIS — M79.605 LEFT LEG PAIN: Primary | ICD-10-CM

## 2024-07-17 DIAGNOSIS — M76.32 IT BAND SYNDROME, LEFT: ICD-10-CM

## 2024-07-17 PROCEDURE — 97530 THERAPEUTIC ACTIVITIES: CPT

## 2024-07-17 PROCEDURE — 97110 THERAPEUTIC EXERCISES: CPT

## 2024-07-17 NOTE — PROGRESS NOTES
"Daily Note     Today's date: 2024  Patient name: Mari Vilchis  : 1945  MRN: [id number]  Referring provider: Chai Chavez MD  Dx:   Encounter Diagnosis     ICD-10-CM    1. Left leg pain  M79.605       2. It band syndrome, left  M76.32                      Subjective: Pt reported continued L knee pain, depending on what she does.       Objective: See treatment diary below      Assessment: Tolerated treatment well. Patient demonstrated fatigue post treatment and exhibited good technique with therapeutic exercises. VC's needed for correct technique throughout session. Intermittent discomfort in knee with squats and sit to stand. No reports of increased pain post session. Monitor NV.      Plan: Continue per plan of care.      Precautions: OP, OA, hx R femur fracture and ORIF, hypothytroid  POC expires Unit limit Auth Expiration date PT/OT + Visit Limit?    BOMN NA BOMN              Pertinent Findings:                                                                                         Test / Measure  6/10/2024   FOTO (Predicted 70) 59                       Visits 5 6 7 8 9 10 11   Manuals 6/28 7/2 7/5 7/8 7/12 7/15 7/17   L ITB stretch TC 2' TC 2' AR 2' SK 2' TC 2'  TC 2'                                 Neuro Re-Ed                                                                                Ther Ex          NS L5x10' L5x10' L6x10' L6 10' L6x10' L6x10' L6x10'   TA w/bridges 3\" 2x10 3\" 2x10 3\"  2x10  3\"  2x10  3\" 2x10 3\" 2x10 3\" 2x10   TA w/HL clamshell w/TB Otb 3\" 2x10 Otb 3\" 2x10 OTB  3\"   2x10  OTB  3\"   2x10  Otb 3\" 2x10 Otb 3\" 2x10 Otb 3\" 2x10   TA w/SLR in supine 2x10 2x10 2x10 2x10 2x10 2x10 2x10   Standing b/l hip abd+ext 2x10 ea. 2x10 ea. On foam  2x10 On foam 2x10 On foam 2x10 On foam 2x10 On foam 2x10 ea.                       Pt education+ HEP instruction          Ther Activity          Mini squats 2x10 2x10 2x10  2x10 2x10 2x10 2x10   Resisted side stepping Otb 2 laps at window " "Otb 2 laps at window Otb  2 laps at window  OTB 2 laps at mirror Otb 2 laps at mirror Otb 2 laps at mirror Otb 3 laps at window   Sit to stand W/foam x10 W/foam x10 W/ foam  x10 W/ foam x10 W/ foam x15 W/ foam x15 W/foam x15   Fwd step up 6\" 2x10 6\" 2x10 6\" 2x10 6\" 2x10 6\" 2x10 6\" 2x10 6\" 2x10   Lat step up 6\" 2x10 6\" 2x10 6\" 2x10 6\" 2x10 6\" 2x10 6\" 2x10 6\" 2x10   Gait Training                              Modalities                                                 "

## 2024-07-19 ENCOUNTER — HOSPITAL ENCOUNTER (OUTPATIENT)
Dept: RADIOLOGY | Facility: HOSPITAL | Age: 79
Discharge: HOME | End: 2024-07-19
Payer: MEDICARE

## 2024-07-19 DIAGNOSIS — E04.1 THYROID NODULE: ICD-10-CM

## 2024-07-19 PROCEDURE — 76536 US EXAM OF HEAD AND NECK: CPT

## 2024-07-23 ENCOUNTER — OFFICE VISIT (OUTPATIENT)
Dept: PHYSICAL THERAPY | Facility: REHABILITATION | Age: 79
End: 2024-07-23
Payer: MEDICARE

## 2024-07-23 DIAGNOSIS — M79.605 LEFT LEG PAIN: Primary | ICD-10-CM

## 2024-07-23 DIAGNOSIS — M76.32 IT BAND SYNDROME, LEFT: ICD-10-CM

## 2024-07-23 PROCEDURE — 97110 THERAPEUTIC EXERCISES: CPT

## 2024-07-23 PROCEDURE — 97530 THERAPEUTIC ACTIVITIES: CPT

## 2024-07-23 NOTE — PROGRESS NOTES
"Daily Note     Today's date: 2024  Patient name: Mari Vilchis  : 1945  MRN: [id number]  Referring provider: Chai Chavez MD  Dx:   Encounter Diagnosis     ICD-10-CM    1. Left leg pain  M79.605       2. It band syndrome, left  M76.32                      Subjective: Pt reported feeling good today. She was able to sleep on her left side comfortably,which she wasn't able to do previously.       Objective: See treatment diary below      Assessment: Tolerated treatment well. Patient demonstrated fatigue post treatment and exhibited good technique with therapeutic exercises. VC's needed for correct technique throughout session. Pt more comfortable with exercises today and had improved tolerance with mini squats and sit to stand. Monitor NV.      Plan: Continue per plan of care.      Precautions: OP, OA, hx R femur fracture and ORIF, hypothytroid  POC expires Unit limit Auth Expiration date PT/OT + Visit Limit?    BOMN NA BOMN              Pertinent Findings:                                                                                         Test / Measure  6/10/2024   FOTO (Predicted 70) 59                       Visits 6 7 8 9 10 11 12   Manuals 7/2 7/5 7/8 7/12 7/15 7/17 7/23   L ITB stretch TC 2' AR 2' SK 2' TC 2'  TC 2' TC 2'                                 Neuro Re-Ed                                                                                Ther Ex          NS L5x10' L6x10' L6 10' L6x10' L6x10' L6x10' L6x10'   TA w/bridges 3\" 2x10 3\"  2x10  3\"  2x10  3\" 2x10 3\" 2x10 3\" 2x10 3\" 2x10   TA w/HL clamshell w/TB Otb 3\" 2x10 OTB  3\"   2x10  OTB  3\"   2x10  Otb 3\" 2x10 Otb 3\" 2x10 Otb 3\" 2x10 Otb 3\" 2x10   TA w/SLR in supine 2x10 2x10 2x10 2x10 2x10 2x10 2x10   Standing b/l hip abd+ext 2x10 ea. On foam  2x10 On foam 2x10 On foam 2x10 On foam 2x10 On foam 2x10 ea. On foam 2x10 ea.                       Pt education+ HEP instruction          Ther Activity          Mini squats 2x10 2x10  2x10 " "2x10 2x10 2x10 2x10   Resisted side stepping Otb 2 laps at window Otb  2 laps at window  OTB 2 laps at mirror Otb 2 laps at mirror Otb 2 laps at mirror Otb 3 laps at window Otb 3 laps at mirror   Sit to stand W/foam x10 W/ foam  x10 W/ foam x10 W/ foam x15 W/ foam x15 W/foam x15 W/foam x15   Fwd step up 6\" 2x10 6\" 2x10 6\" 2x10 6\" 2x10 6\" 2x10 6\" 2x10 6\" 2x10   Lat step up 6\" 2x10 6\" 2x10 6\" 2x10 6\" 2x10 6\" 2x10 6\" 2x10 6\" 2x10   Gait Training                              Modalities                                                   "

## 2024-07-25 ENCOUNTER — EVALUATION (OUTPATIENT)
Dept: PHYSICAL THERAPY | Facility: REHABILITATION | Age: 79
End: 2024-07-25
Payer: MEDICARE

## 2024-07-25 DIAGNOSIS — M79.605 LEFT LEG PAIN: Primary | ICD-10-CM

## 2024-07-25 DIAGNOSIS — M76.32 IT BAND SYNDROME, LEFT: ICD-10-CM

## 2024-07-25 PROCEDURE — 97110 THERAPEUTIC EXERCISES: CPT | Performed by: PHYSICAL THERAPIST

## 2024-07-25 NOTE — PROGRESS NOTES
PT Re-Evaluation     Today's date: 2024  Patient name: Mari Vilchis  : 1945  MRN: [id number]  Referring provider: Chai Chavez MD  Dx:   Encounter Diagnosis     ICD-10-CM    1. Left leg pain  M79.605       2. It band syndrome, left  M76.32               Updated measurements and functional status taken this session.        Assessment  Impairments: activity intolerance, impaired physical strength and pain with function    Assessment details: Pt has demonstrated improvement in LE flexibility, hip strength, and function/activity tolerance with a decrease in pain since starting therapy.  Pt reports compliance with HEP and feels comfortable transitioning to ongoing HEP at this time.  Updated HEP was reviewed and issued to pt and pt verbalizes an understanding.    Understanding of Dx/Px/POC: good     Prognosis: good    Goals  Short Term:  Pt will report decreased levels of pain by at least 2 subjective ratings in 4 weeks-met  Pt will demonstrate improved LE flexibility by at least 25% in 4 weeks-met  Pt will demonstrate improved strength by 1/2 grade MMT in 4 weeks-met  Long Term:   Pt will be independent in their HEP in 8 weeks-met  Pt will demonstrate improved FOTO, > predicted level-met  Pt will be independent with all ADL's-met  Pt will be able to sleep without waking due to L LE pain-partially met-progressing      Plan    Planned therapy interventions: home exercise program    Treatment plan discussed with: patient  Plan details: Patient was educated in HEP and Plan of Care.  All questions were answered to pt's satisfaction.        Subjective Evaluation    History of Present Illness  Mechanism of injury: Pt reports overall improvement since starting therapy, noting improved tolerance to laying on her left side.  Pt notes some nights she is able to sleep through the night without waking due to pain.  Other nights she can not tolerate prolonged L side lying.  Pt notes improvement with static  "standing, ambulatory tolerance (can ambulate 2 miles to good tolerance), steps (primarily performs in step to pattern secondary to her knee OA, but can perform reciprocally intermittently.  Pt notes pain has been minimal lately.    Patient Goals  Patient goals for therapy: decreased pain, increased strength, independence with ADLs/IADLs and return to sport/leisure activities    Pain  At best pain ratin  At worst pain ratin  Location: L hip/lateral thigh    Treatments  Current treatment: physical therapy    Objective     Strength/Myotome Testing     Left Hip   Planes of Motion   Flexion: 5  Abduction: 4+  External rotation: 4+    Right Hip   Planes of Motion   Flexion: 5  Abduction: 5  External rotation: 4    General Comments:      Hip Comments   Improved flexibility of L ITB and piriformis noted.             Precautions: OP, OA, hx R femur fracture and ORIF, hypothytroid  POC expires Unit limit Auth Expiration date PT/OT + Visit Limit?    BOMN NA BOMN                   Pertinent Findings:                                                                                         Test / Measure  6/10/2024 7/25   FOTO (Predicted 70) 59 76                                    Visits 6 7 8 9 10 11 12 13   Manuals 7/2 7/5 7/8 7/12 7/15 7/17 7/23 7/25   L ITB stretch TC 2' AR 2' SK 2' TC 2'   TC 2' TC 2'                                                             Neuro Re-Ed                                                                                                                                                       Ther Ex                  NS L5x10' L6x10' L6 10' L6x10' L6x10' L6x10' L6x10' L6x10'   TA w/bridges 3\" 2x10 3\"  2x10  3\"  2x10  3\" 2x10 3\" 2x10 3\" 2x10 3\" 2x10 reviewed   TA w/HL clamshell w/TB Otb 3\" 2x10 OTB  3\"   2x10  OTB  3\"   2x10  Otb 3\" 2x10 Otb 3\" 2x10 Otb 3\" 2x10 Otb 3\" 2x10 reviewed   TA w/SLR in supine 2x10 2x10 2x10 2x10 2x10 2x10 2x10 reviewed   Standing b/l hip abd+ext 2x10 ea. On " "foam  2x10 On foam 2x10 On foam 2x10 On foam 2x10 On foam 2x10 ea. On foam 2x10 ea. reviewed                       Re-assessment               TP   Pt education+ HEP instruction                  Ther Activity                  Mini squats 2x10 2x10  2x10 2x10 2x10 2x10 2x10 reviewed   Resisted side stepping Otb 2 laps at window Otb  2 laps at window  OTB 2 laps at mirror Otb 2 laps at mirror Otb 2 laps at mirror Otb 3 laps at window Otb 3 laps at mirror reviewed   Sit to stand W/foam x10 W/ foam  x10 W/ foam x10 W/ foam x15 W/ foam x15 W/foam x15 W/foam x15 reviewed   Fwd step up 6\" 2x10 6\" 2x10 6\" 2x10 6\" 2x10 6\" 2x10 6\" 2x10 6\" 2x10 reviewed   Lat step up 6\" 2x10 6\" 2x10 6\" 2x10 6\" 2x10 6\" 2x10 6\" 2x10 6\" 2x10 reviewed   Gait Training                                                        Modalities                                                                "

## 2024-08-02 ENCOUNTER — CLINICAL SUPPORT (OUTPATIENT)
Dept: ENDOCRINOLOGY | Facility: CLINIC | Age: 79
End: 2024-08-02
Payer: MEDICARE

## 2024-08-02 VITALS — OXYGEN SATURATION: 97 % | HEART RATE: 66 BPM | DIASTOLIC BLOOD PRESSURE: 82 MMHG | SYSTOLIC BLOOD PRESSURE: 118 MMHG

## 2024-08-02 DIAGNOSIS — M81.0 OSTEOPOROSIS, UNSPECIFIED OSTEOPOROSIS TYPE, UNSPECIFIED PATHOLOGICAL FRACTURE PRESENCE: Primary | ICD-10-CM

## 2024-08-02 PROCEDURE — 96372 THER/PROPH/DIAG INJ SC/IM: CPT | Performed by: INTERNAL MEDICINE

## 2024-08-05 NOTE — PROGRESS NOTES
Assessment/Plan:    Mari Vilchis came into the [facility]ocr[facility]ology Office today 08/05/24 to receive prolia injection.      Verbal consent obtained.  Consent given by: patient    patient states patient has been medically healthy with no underlining concerns/complications.      Mari Vilchis presents with no symptoms today.       All insturctions were reviewed with the patient.    If the patient should have any questions/concerns, advised patient to contacted [facility].       Subjective:     History provided by: patient    Patient ID: Mari Vilchis is a 78 y.o. female      Objective:    Vitals:    08/02/24 1114   BP: 118/82   BP Location: Left arm   Patient Position: Sitting   Cuff Size: Large   Pulse: 66   SpO2: 97%       Patient tolerated the injection well without any complications.  Injection site/s left arm.  [facility]    Patient signed consent form yes   Patient signed ABN form yes (If no patient is not a medicare patient).   Patient waited 15 minutes after injection yes (This only applies to patient's receiving first time injection).       Last Visit: 7/3/2024  Next visit:Visit date not found

## 2024-08-23 ENCOUNTER — OFFICE VISIT (OUTPATIENT)
Dept: OBGYN CLINIC | Facility: CLINIC | Age: 79
End: 2024-08-23
Payer: MEDICARE

## 2024-08-23 VITALS
SYSTOLIC BLOOD PRESSURE: 167 MMHG | DIASTOLIC BLOOD PRESSURE: 74 MMHG | BODY MASS INDEX: 24.55 KG/M2 | HEART RATE: 57 BPM | WEIGHT: 130 LBS | HEIGHT: 61 IN

## 2024-08-23 DIAGNOSIS — M17.11 PRIMARY OSTEOARTHRITIS OF RIGHT KNEE: Primary | ICD-10-CM

## 2024-08-23 DIAGNOSIS — M17.12 PRIMARY OSTEOARTHRITIS OF LEFT KNEE: ICD-10-CM

## 2024-08-23 PROCEDURE — 99213 OFFICE O/P EST LOW 20 MIN: CPT | Performed by: ORTHOPAEDIC SURGERY

## 2024-08-23 NOTE — PROGRESS NOTES
Assessment:  1. Primary osteoarthritis of right knee  Injection Procedure Prior Authorization      2. Primary osteoarthritis of left knee  Injection Procedure Prior Authorization          Plan:    Patient with bilateral patellofemoral syndrome and osteoarthritis   Weightbearing as tolerated   Will hold off on Euflexxa injections to day due to patient having pain only with sitting to standing.   Provided patient with VMO strengthening exercises   Follow up in  6 months for possible Bilateral Euflexxa injections         The above stated was discussed in layman's terms and the patient expressed understanding.  All questions were answered to the patient's satisfaction.         Subjective:   Mari Vilchis is a 79 y.o. female who presents today following for chronic bilateral knee pain due to osteoarthritis.  Patient had bilateral knee Euflexxa injections, last injection was on 2/23/2024 had relief until recently.  She states that she has no pain when she is walking or with increased activities.  She states she has pain mostly with sitting to standing over the anterior aspect of the knee.  Has been doing stretching exercises from physical therapy.  Patient states her pain level today is a 0 out of 10.    Review of systems negative unless otherwise specified in HPI    Past Medical History:   Diagnosis Date    Aneurysm (HCC) 2/8/23    2.5 mg found on neck CAT scan    Arthritis 2005    Closed nondisplaced fracture of fifth metatarsal bone of right foot with routine healing     Difficulty walking 2/2/23    Broken Femur    Disease of thyroid gland     hypothyroid    Glaucoma, bilateral     Hyperlipidemia     Osteoporosis     Otitis media 11/4/14    Otitis Media    Scoliosis 12/12/12       Past Surgical History:   Procedure Laterality Date    BUNIONECTOMY      COLONOSCOPY  06/04/2019    CORRECTION HAMMER TOE      FRACTURE SURGERY  2/3/23    Vamsi inserted for broken femur    MAMMO (HISTORICAL)  07/09/2018    NEUROMA EXCISION       OTHER SURGICAL HISTORY      Birth kwasi removed    CO OPTX FEM SHFT FX W/INSJ IMED IMPLT W/WO SCREW Right 02/03/2023    Procedure: INSERTION NAIL IM FEMUR ANTEGRADE (TROCHANTERIC);  Surgeon: Chai Chavez MD;  Location: [facility];  Service: Orthopedics    WISDOM TOOTH EXTRACTION         Family History   Problem Relation Age of Onset    Hypertension Mother     Thyroid disease Mother     Arthritis Mother     Glaucoma Mother     Hypothyroidism Mother     Stomach cancer Father 51    Cancer Father     No Known Problems Maternal Grandmother     No Known Problems Maternal Grandfather     No Known Problems Paternal Grandmother     No Known Problems Paternal Grandfather     Dementia Maternal Aunt     No Known Problems Maternal Aunt     No Known Problems Paternal Aunt     Hypertension Brother     Osteoporosis Family     Hyperlipidemia Family     Hypertension Family     Dementia Maternal Aunt     Hypertension Maternal Aunt     Osteoporosis Maternal Aunt        Social History     Occupational History    Not on file   Tobacco Use    Smoking status: Never    Smokeless tobacco: Never   Vaping Use    Vaping status: Never Used   Substance and Sexual Activity    Alcohol use: Yes     Comment: Glass of wine a few times a year    Drug use: Never     Comment: Only drug use was for pain prescribed by a doctor    Sexual activity: Never         Current Outpatient Medications:     acetaminophen (Tylenol 8 Hour) 650 mg CR tablet, Take 650 mg by mouth every 8 (eight) hours as needed for moderate pain, Disp: , Rfl:     Calcium Carb-Cholecalciferol 600-20 MG-MCG TABS, , Disp: , Rfl:     ibuprofen (Advil) 200 mg tablet, Take 200 mg by mouth every 4 (four) hours as needed for moderate pain, Disp: , Rfl:     levothyroxine 25 mcg tablet, TAKE 1 TABLET BY MOUTH EVERY DAY, Disp: 90 tablet, Rfl: 1    simvastatin (ZOCOR) 40 mg tablet, Take 1 tablet (40 mg total) by mouth daily at bedtime, Disp: 90 tablet, Rfl: 3    Glucosamine-Chondroitin 500-400  MG CAPS, Take 1 tablet by mouth in the morning (Patient not taking: Reported on 2/16/2024), Disp: , Rfl:     Naphazoline-Polyethyl Glycol 0.012-0.2 % SOLN, Apply 1-2 drops to eye 4 (four) times a day (Patient not taking: Reported on 3/8/2024), Disp: , Rfl:     romosozumab-aqqg (EVENITY) subcutaneous injection, , Disp: , Rfl:     Allergies   Allergen Reactions    Fentanyl Vomiting    Indomethacin GI Intolerance     Other reaction(s): GI upset  Other reaction(s): GI upset    Oxycodone Dizziness, Nausea Only, GI Intolerance and Other (See Comments)     Other reaction(s): GI upset  Other reaction(s): GI upset            Vitals:    08/23/24 0951   BP: 167/74   Pulse: 57     Body mass index is 24.56 kg/m².  Wt Readings from Last 3 Encounters:   08/23/24 59 kg (130 lb)   07/09/24 59.3 kg (130 lb 12.8 oz)   07/01/24 59.2 kg (130 lb 9.6 oz)       Objective:            Physical Exam  Physical Exam:      General Appearance:    Alert, cooperative, no distress, appears stated age   Head:    Normocephalic, without obvious abnormality, atraumatic   Eyes:    conjunctiva/corneas clear, both eyes         Nose:   Nares normal, septum midline, no drainage    Throat:   Lips normal; teeth and gums normal   Neck:    symmetrical, trachea midline, ;     thyroid:  no enlargement/   Back:     Symmetric, no curvature, ROM normal   Lungs:   No audible wheezing or labored breathing   Chest Wall:    No tenderness or deformity    Heart:    Regular rate and rhythm                         Pulses:   2+ and symmetric all extremities   Skin:   Skin color, texture, turgor normal, no rashes or lesions   Neurologic:   normal strength, sensation and reflexes     throughout                       Ortho Exam  Right Knee  Skin intact  No erythema or open wounds  Mild effusion  No Tenderness palpation of medial joint line  No lateral joint line tenderness  Near full extension  Full flexion  Patellar grind test (+)  Stable to varus and valgus stress  Negative  "posterior drawer  Negative Lachman test  Neurovascular intact distally     Left Knee  Skin intact  No erythema or open wounds  Mild effusion  No Tenderness palpation of medial joint line  No lateral joint line tenderness  Near full extension  Full flexion  Patellar grind test +/-  Stable to varus and valgus stress  Negative posterior drawer  Negative Lachman test  Neurovascular intact distally       Diagnostics, reviewed and taken today if performed as documented:    None performed        Procedures, if performed today:    Procedures    None performed      Scribe Attestation      I,:  Max Wall MA am acting as a scribe while in the presence of the attending physician.:       I,:  Magali Johnson MD personally performed the services described in this documentation    as scribed in my presence.:               Portions of the record may have been created with voice recognition software.  Occasional wrong word or \"sound a like\" substitutions may have occurred due to the inherent limitations of voice recognition software.  Read the chart carefully and recognize, using context, where substitutions have occurred.  "

## 2024-09-03 ENCOUNTER — TELEPHONE (OUTPATIENT)
Dept: NEUROLOGY | Facility: CLINIC | Age: 79
End: 2024-09-03

## 2024-09-06 ENCOUNTER — OFFICE VISIT (OUTPATIENT)
Dept: INTERNAL MEDICINE CLINIC | Facility: CLINIC | Age: 79
End: 2024-09-06
Payer: MEDICARE

## 2024-09-06 VITALS
HEIGHT: 61 IN | DIASTOLIC BLOOD PRESSURE: 70 MMHG | HEART RATE: 85 BPM | BODY MASS INDEX: 24.55 KG/M2 | SYSTOLIC BLOOD PRESSURE: 128 MMHG | OXYGEN SATURATION: 98 % | TEMPERATURE: 98 F | WEIGHT: 130 LBS

## 2024-09-06 DIAGNOSIS — E03.9 HYPOTHYROIDISM: Primary | ICD-10-CM

## 2024-09-06 DIAGNOSIS — M81.0 OSTEOPOROSIS: ICD-10-CM

## 2024-09-06 DIAGNOSIS — M19.90 OSTEOARTHRITIS: ICD-10-CM

## 2024-09-06 DIAGNOSIS — E78.5 HYPERLIPIDEMIA: ICD-10-CM

## 2024-09-06 PROCEDURE — 99213 OFFICE O/P EST LOW 20 MIN: CPT | Performed by: INTERNAL MEDICINE

## 2024-09-06 PROCEDURE — G2211 COMPLEX E/M VISIT ADD ON: HCPCS | Performed by: INTERNAL MEDICINE

## 2024-09-06 NOTE — PROGRESS NOTES
"Assessment/Plan:    Follow up  Hypothyroidism, Osteoarthritis, Hyperlipidemia, Osteoporosis     Diagnoses and all orders for this visit:    Hypothyroidism    Hyperlipidemia    Osteoporosis    Osteoarthritis          Subjective: Joint  pains     Patient ID: Mari Vilchis is a 79 y.o. female.    HPI    The following portions of the patient's history were reviewed and updated as appropriate: allergies, current medications, past family history, past medical history, past social history, past surgical history, and problem list.    Review of Systems   Constitutional: Negative.    HENT:  Negative for dental problem, drooling, ear discharge and ear pain.    Eyes:  Negative for discharge, redness and itching.   Respiratory:  Negative for apnea, cough and wheezing.    Cardiovascular:  Negative for chest pain and palpitations.   Gastrointestinal:  Negative for abdominal pain, blood in stool, diarrhea and vomiting.   Endocrine: Negative for polydipsia, polyphagia and polyuria.   Genitourinary:  Negative for decreased urine volume, dysuria and frequency.   Musculoskeletal:  Positive for arthralgias and myalgias. Negative for neck stiffness.   Skin:  Negative for pallor and wound.   Allergic/Immunologic: Negative for environmental allergies and food allergies.   Neurological:  Negative for facial asymmetry, light-headedness, numbness and headaches.   Hematological:  Negative for adenopathy. Does not bruise/bleed easily.   Psychiatric/Behavioral:  Negative for agitation, behavioral problems and confusion.          Objective:      /70 (BP Location: Left arm, Patient Position: Sitting, Cuff Size: Standard)   Pulse 85   Temp 98 °F (36.7 °C) (Tympanic)   Ht 5' 1\" (1.549 m)   Wt 59 kg (130 lb)   SpO2 98%   BMI 24.56 kg/m²          Physical Exam  Constitutional:       Appearance: Normal appearance.   HENT:      Head: Normocephalic.      Nose: Nose normal.      Mouth/Throat:      Mouth: Mucous membranes are moist.   Eyes: "      Pupils: Pupils are equal, round, and reactive to light.   Cardiovascular:      Rate and Rhythm: Regular rhythm.      Heart sounds: Normal heart sounds.   Pulmonary:      Breath sounds: Normal breath sounds.   Abdominal:      Palpations: Abdomen is soft.   Musculoskeletal:         General: No swelling.      Cervical back: Neck supple.   Skin:     General: Skin is warm.   Neurological:      General: No focal deficit present.      Mental Status: She is alert and oriented to person, place, and time.   Psychiatric:         Mood and Affect: Mood normal.       Problem#1  Osteoporosis   ON  Prolia  therapy    Problem#2  Osteoarthritis    On  tylenol  prn  Problem#3  Hypothyroidism   Controlled  on  current  dose  of  synthroid  Problem#4  Hyperlipidemia    Controlled  on  statin   Problem#4  Thyroid  nodule   stable    Fup   4 months.       Devaughn Richardson MD.FACP

## 2024-09-10 ENCOUNTER — OFFICE VISIT (OUTPATIENT)
Dept: NEUROLOGY | Facility: CLINIC | Age: 79
End: 2024-09-10
Payer: MEDICARE

## 2024-09-10 VITALS
OXYGEN SATURATION: 98 % | BODY MASS INDEX: 24.85 KG/M2 | SYSTOLIC BLOOD PRESSURE: 130 MMHG | HEART RATE: 80 BPM | DIASTOLIC BLOOD PRESSURE: 86 MMHG | TEMPERATURE: 97.6 F | WEIGHT: 131.5 LBS

## 2024-09-10 DIAGNOSIS — Z86.73 HISTORY OF STROKE: ICD-10-CM

## 2024-09-10 DIAGNOSIS — E78.5 HLD (HYPERLIPIDEMIA): Primary | ICD-10-CM

## 2024-09-10 DIAGNOSIS — I72.9 ANEURYSM (HCC): ICD-10-CM

## 2024-09-10 PROCEDURE — 99214 OFFICE O/P EST MOD 30 MIN: CPT

## 2024-09-10 RX ORDER — MINERAL OIL, PETROLATUM 425; 573 MG/G; MG/G
1 OINTMENT OPHTHALMIC AS NEEDED
COMMUNITY

## 2024-09-10 NOTE — PATIENT INSTRUCTIONS
- Will reach out to Dr. Quiroz about reviewing the MRI brain wo contrast patient previously had, see if any chronic infarct was present and if the patient needs to continue taking baby aspirin daily. Was discontinued by her primary care provider she states.     - For ongoing stroke prevention continue: simvastatin 40 mg once daily   - Discussed the importance of antiplatelet/anticoagulation management with the patient to prevent future strokes.   - Recommend to check blood pressure occasionally away from the doctor's office to make sure that those numbers are typically less than 130/80.  If they are frequently higher than that, we recommend checking a little more often and to follow up with primary care team   - Will defer to primary care team for monitoring of cholesterol panel and blood sugar numbers with target LDL cholesterol of less than 70 and hemoglobin A1c less than 7%  - Recommend following a low salt, mediterranean diet   - Recommend routine physical exercise as tolerated     We will plan for her to return to the office in 1 year time to see on of the APPs or Dr. Quiroz but would be happy to see her sooner if the need should arise.  If she has any symptoms concerning for TIA or stroke including sudden painless loss of vision or double vision, difficulty speaking or swallowing, vertigo/room spinning that does not quickly resolve, or weakness/numbness/loss of coordination affecting 1 side of the face or body she should proceed by ambulance to the nearest emergency room immediately.

## 2024-09-10 NOTE — ASSESSMENT & PLAN NOTE
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  - Consultation with neurosurgery for aneurysm, recommended to follow-up with neurosurgery on an as-needed basis.  No other routine imaging or monitoring required.

## 2024-09-10 NOTE — ASSESSMENT & PLAN NOTE
- Continue simvastatin 40 mg once daily  - Follow-up with primary care provider in regards to frequent lipid panel monitoring

## 2024-09-10 NOTE — PROGRESS NOTES
Ambulatory Visit  Name: Mari Vilchis      : 1945      MRN: [id number]  Encounter Provider: Osmel Roberts PA-C  Encounter Date: 9/10/2024   Encounter department: [facility]    Assessment & Plan  History of stroke  I had the pleasure of seeing Mari today in the office at [facility] in [place].  She is presenting for an office visit follow-up appointment in regard to her history of suspected old lacunar infarct of the basal ganglia.  Although, not quite certain if this enhancement was related to dilated vascular spaces or lacunar infarct in the past.  Regardless, patient states that she has stopped taking a daily baby aspirin.  When asked why that medication was discontinued she stated that her primary care told her to discontinue the medication as it was not felt that she needed it.  Patient was still taking simvastatin 40 mg daily.  She was doing well with monitoring and managing her vascular risk factors.  Her blood pressure was 130/86 at her last check in the office.  Her most recent LDL was 73 mg/dL and there was no recent hemoglobin A1c measurement.  The patient was working on trying to walk consistently and stay active in regards to her activity.  She was trying to eat generally healthy for the most part.  She did not have any issues or concerns with her sleep.  She did not have any issues with depression or anxiety.  And she did not have any significant smoking history.  The only complaint that the patient had is that she had recent episodes of vertigo over the last few days which had seem to resolve, she does note that she has a past history of vertigo as well.  Outside of this, no other questions or concerns.  Advised the patient that I would try to send a message to Dr. Quiroz again to be able to review the imaging as I am not quite certain if it was reviewed after the last visit.  If there was no concern for infarct on  previous imaging from his perspective, patient likely does not have to take continue on a daily baby aspirin.  Patient can continue to follow-up on a yearly basis.    - For ongoing stroke prevention continue: simvastatin 40 mg once daily   - Discussed the importance of antiplatelet/anticoagulation management with the patient to prevent future strokes.   - Recommend to check blood pressure occasionally away from the doctor's office to make sure that those numbers are typically less than 130/80.  If they are frequently higher than that, we recommend checking a little more often and to follow up with primary care team   - Will defer to primary care team for monitoring of cholesterol panel and blood sugar numbers with target LDL cholesterol of less than 70 and hemoglobin A1c less than 7%  - Recommend following a low salt, mediterranean diet   - Recommend routine physical exercise as tolerated        HLD (hyperlipidemia)  - Continue simvastatin 40 mg once daily  - Follow-up with primary care provider in regards to frequent lipid panel monitoring       Aneurysm (HCC)  - 2.2 x 2.0 cm focal outpouching at the posterior communicating artery on the right noted most recently on CTA head and neck with and without contrast on 02/06/2023.  - Consultation with neurosurgery for aneurysm, recommended to follow-up with neurosurgery on an as-needed basis.  No other routine imaging or monitoring required.         Patient Instructions   - Will reach out to Dr. Quiroz about reviewing the MRI brain wo contrast patient previously had, see if any chronic infarct was present and if the patient needs to continue taking baby aspirin daily. Was discontinued by her primary care provider she states.     - For ongoing stroke prevention continue: simvastatin 40 mg once daily   - Discussed the importance of antiplatelet/anticoagulation management with the patient to prevent future strokes.   - Recommend to check blood pressure occasionally away  from the doctor's office to make sure that those numbers are typically less than 130/80.  If they are frequently higher than that, we recommend checking a little more often and to follow up with primary care team   - Will defer to primary care team for monitoring of cholesterol panel and blood sugar numbers with target LDL cholesterol of less than 70 and hemoglobin A1c less than 7%  - Recommend following a low salt, mediterranean diet   - Recommend routine physical exercise as tolerated     We will plan for her to return to the office in 1 year time to see on of the APPs or Dr. Quiroz but would be happy to see her sooner if the need should arise.  If she has any symptoms concerning for TIA or stroke including sudden painless loss of vision or double vision, difficulty speaking or swallowing, vertigo/room spinning that does not quickly resolve, or weakness/numbness/loss of coordination affecting 1 side of the face or body she should proceed by ambulance to the nearest emergency room immediately.    History of Present Illness   HPI    For Review:    The patient was last seen in the office by myself on 09/12/2023.  At that time the patient was following up in regards to suspected old lacunar infarct of the basal ganglia.  Although this was questionable and could be related to enhancement or dilated perivascular spaces around the area.  Patient was reporting no new strokelike symptoms and no residual deficits at the last visit.  The patient was still taking aspirin 81 mg once daily for secondary stroke prevention and also taking simvastatin 20 mg once daily for control of her cholesterol/secondary stroke prevention as well.  Is noted that the patient was very active at this time, stating that she will sometimes go on walks and walk about 2 miles at a time.  Diet was relatively healthy and she was eating a relatively well-balanced meals.  No significant difficulty with sleeping she noted.  No significant  depression/anxiety or history of smoking at the last visit as well.  The patient was noted to not require any additional monitoring of her intracranial aneurysm at the last appointment.  She was recommended to follow-up with neurosurgery on an as-needed basis.  The patient did inquire about Evenity injections at the last visit.  Did explain that there was increased risk of stroke with taking the injections.  Advised the patient to heed the warnings offered by the injections but it was her personal choice if she wanted to continue with them.  Although the patient should keep in mind that she may or may not have actually had a previous infarct on imaging as well.  I did also want the patient to complete a recent lipid panel before her follow-up appointment after her last office visit.    Interval History:    New stroke symptoms/residual symptoms:    Any new, sudden onset weakness, numbness, facial droop, slurred speech, difficulty speaking, trouble swallowing, persistent vertigo, or sudden double vision or vision loss? No new stroke-like symptoms     Residual symptoms include: None    Stroke Etiology and Risk Factor modification:    This was a(n) lacunar stroke, most likely related to Small Vessel/microvascular    Stroke risk factors were evaluated including: hyperlipidemia     AP/AC therapy: does no longer take baby aspirin at this time, discontinued by his primary care provider.     Statin therapy: simvastatin 40 mg once daily     Blood pressure today and as of late: 130/86 BP today in the office today.     Most recent LDL: 73 mg/dL    Most recent hemoglobin A1C: No recent hemoglobin A1C    Lifestyle history/modifications:    Diet/Exercise regimen: Trying to walk consistently throughout the week with her friends. Doing a lot more activity for her back, knees, and hips she states. Trying to eat on generally consistent basis and trying to eat generally healthy as well.     Any physical therapy, occupational therapy or  speech therapy performed/required at this time?: No PT/OT    Any difficulty with sleeping? Any history of sleep apnea? CPAP compliance?: No significant difficulty with sleep or sleep apnea     Post-stroke depression/anxiety? No issues with depression or anxiety     Any history of smoking or tobacco usage?: No smoking history     Additional History:     Recent episodes of vertigo over the last few days she states. She had a past history of vertigo in the past.  She does note that the vertigo has seemed to clear up at this time.      Review of Systems  I have personally reviewed the MA's review of systems and made changes as necessary.    Medical History Reviewed by provider this encounter:  Tobacco  Allergies  Meds  Problems  Med Hx  Surg Hx  Fam Hx       Past Medical History   Past Medical History:   Diagnosis Date    Aneurysm (HCC) 2/8/23    2.5 mg found on neck CAT scan    Arthritis 2005    Closed nondisplaced fracture of fifth metatarsal bone of right foot with routine healing     Difficulty walking 2/2/23    Broken Femur    Disease of thyroid gland     hypothyroid    Glaucoma, bilateral     Hyperlipidemia     Osteoporosis     Otitis media 11/4/14    Otitis Media    Scoliosis 12/12/12     Past Surgical History:   Procedure Laterality Date    BUNIONECTOMY      COLONOSCOPY  06/04/2019    CORRECTION HAMMER TOE      FRACTURE SURGERY  2/3/23    Vamsi inserted for broken femur    MAMMO (HISTORICAL)  07/09/2018    NEUROMA EXCISION      OTHER SURGICAL HISTORY      Birth kwasi removed    AR OPTX FEM SHFT FX W/INSJ IMED IMPLT W/WO SCREW Right 02/03/2023    Procedure: INSERTION NAIL IM FEMUR ANTEGRADE (TROCHANTERIC);  Surgeon: Chai Chavez MD;  Location: [facility];  Service: Orthopedics    WISDOM TOOTH EXTRACTION       Family History   Problem Relation Age of Onset    Hypertension Mother     Thyroid disease Mother     Arthritis Mother     Glaucoma Mother     Hypothyroidism Mother     Stomach cancer Father 51     Cancer Father     No Known Problems Maternal Grandmother     No Known Problems Maternal Grandfather     No Known Problems Paternal Grandmother     No Known Problems Paternal Grandfather     Dementia Maternal Aunt     No Known Problems Maternal Aunt     No Known Problems Paternal Aunt     Hypertension Brother     Osteoporosis Family     Hyperlipidemia Family     Hypertension Family     Dementia Maternal Aunt     Hypertension Maternal Aunt     Osteoporosis Maternal Aunt      Current Outpatient Medications on File Prior to Visit   Medication Sig Dispense Refill    acetaminophen (Tylenol 8 Hour) 650 mg CR tablet Take 650 mg by mouth every 8 (eight) hours as needed for moderate pain      Calcium Carb-Cholecalciferol 600-20 MG-MCG TABS       denosumab (PROLIA) 60 mg/mL Inject 60 mg under the skin once Twice a year      ibuprofen (Advil) 200 mg tablet Take 200 mg by mouth every 4 (four) hours as needed for moderate pain      levothyroxine 25 mcg tablet TAKE 1 TABLET BY MOUTH EVERY DAY 90 tablet 1    simvastatin (ZOCOR) 40 mg tablet Take 1 tablet (40 mg total) by mouth daily at bedtime 90 tablet 3    White Petrolatum-Mineral Oil (artificial tears) Administer 1 Application to both eyes as needed for dry eyes      Glucosamine-Chondroitin 500-400 MG CAPS Take 1 tablet by mouth in the morning (Patient not taking: Reported on 2/16/2024)      Naphazoline-Polyethyl Glycol 0.012-0.2 % SOLN Apply 1-2 drops to eye 4 (four) times a day (Patient not taking: Reported on 3/8/2024)      romosozumab-aqqg (EVENITY) subcutaneous injection        No current facility-administered medications on file prior to visit.     Allergies   Allergen Reactions    Fentanyl Vomiting    Indomethacin GI Intolerance     Other reaction(s): GI upset  Other reaction(s): GI upset    Oxycodone Dizziness, Nausea Only, GI Intolerance and Other (See Comments)     Other reaction(s): GI upset  Other reaction(s): GI upset      Current Outpatient Medications on File  Prior to Visit   Medication Sig Dispense Refill    acetaminophen (Tylenol 8 Hour) 650 mg CR tablet Take 650 mg by mouth every 8 (eight) hours as needed for moderate pain      Calcium Carb-Cholecalciferol 600-20 MG-MCG TABS       denosumab (PROLIA) 60 mg/mL Inject 60 mg under the skin once Twice a year      ibuprofen (Advil) 200 mg tablet Take 200 mg by mouth every 4 (four) hours as needed for moderate pain      levothyroxine 25 mcg tablet TAKE 1 TABLET BY MOUTH EVERY DAY 90 tablet 1    simvastatin (ZOCOR) 40 mg tablet Take 1 tablet (40 mg total) by mouth daily at bedtime 90 tablet 3    White Petrolatum-Mineral Oil (artificial tears) Administer 1 Application to both eyes as needed for dry eyes      Glucosamine-Chondroitin 500-400 MG CAPS Take 1 tablet by mouth in the morning (Patient not taking: Reported on 2/16/2024)      Naphazoline-Polyethyl Glycol 0.012-0.2 % SOLN Apply 1-2 drops to eye 4 (four) times a day (Patient not taking: Reported on 3/8/2024)      romosozumab-aqqg (EVENITY) subcutaneous injection        No current facility-administered medications on file prior to visit.      Social History     Tobacco Use    Smoking status: Never    Smokeless tobacco: Never   Vaping Use    Vaping status: Never Used   Substance and Sexual Activity    Alcohol use: Yes     Comment: Glass of wine a few times a year    Drug use: Never     Comment: Only drug use was for pain prescribed by a doctor    Sexual activity: Never     Objective     /86 (BP Location: Left arm, Patient Position: Sitting, Cuff Size: Adult)   Pulse 80   Temp 97.6 °F (36.4 °C) (Temporal)   Wt 59.6 kg (131 lb 8 oz)   SpO2 98%   BMI 24.85 kg/m²     Physical Exam  Neurologic Exam    Physical Exam:                                                                 Vitals:            Constitutional:    /86 (BP Location: Left arm, Patient Position: Sitting, Cuff Size: Adult)   Pulse 80   Temp 97.6 °F (36.4 °C) (Temporal)   Wt 59.6 kg (131 lb 8  oz)   SpO2 98%   BMI 24.85 kg/m²   BP Readings from Last 3 Encounters:   09/10/24 130/86   09/06/24 128/70   08/23/24 167/74     Pulse Readings from Last 3 Encounters:   09/10/24 80   09/06/24 85   08/23/24 57         Well developed, well nourished, well groomed. No dysmorphic features.       Psychiatric:  Normal behavior and appropriate affect        Neurological Examination:     Mental status/cognitive function:   Orientated to time, place and person. Recent and remote memory intact. Attention span and concentration as well as fund of knowledge are appropriate for age. Normal language and spontaneous speech.    Cranial Nerves:  II-visual fields full.   Fundi poorly visualized due to pupillary constriction  III, IV, VI-Pupils were equal, round, and reactive to light and accomodation. Extraocular movements were full and conjugate without nystagmus. Conjugate gaze, normal smooth pursuits, normal saccades   V-facial sensation symmetric.    VII-facial expression symmetric, intact forehead wrinkle, strong eye closure, symmetric smile    VIII-hearing grossly intact bilaterally   IX, X-palate elevation symmetric, no dysarthria.   XI-shoulder shrug strength intact    XII-tongue protrusion midline.    Motor Exam: symmetric bulk and tone throughout, no pronator drift. Power/strength 5/5 bilateral upper and lower extremities, no atrophy, fasciculations or abnormal movements noted.   Sensory: grossly intact light touch in all extremities.   Reflexes: brachioradialis 2+, biceps 2+, knee 2+ bilaterally  Coordination: Finger nose finger intact bilaterally, no apparent dysmetria, ataxia or tremor noted  Gait: steady casual and tandem gait.      Administrative Statements   I have spent a total time of 20 minutes in caring for this patient on the day of the visit/encounter including Risks and benefits of tx options, Instructions for management, Patient and family education, Importance of tx compliance, Risk factor reductions,  Impressions, Counseling / Coordination of care, Documenting in the medical record, Reviewing / ordering tests, medicine, procedures  , and Obtaining or reviewing history  .

## 2024-10-11 ENCOUNTER — OFFICE VISIT (OUTPATIENT)
Dept: INTERNAL MEDICINE CLINIC | Facility: CLINIC | Age: 79
End: 2024-10-11
Payer: MEDICARE

## 2024-10-11 VITALS
HEART RATE: 85 BPM | SYSTOLIC BLOOD PRESSURE: 146 MMHG | BODY MASS INDEX: 24.92 KG/M2 | DIASTOLIC BLOOD PRESSURE: 84 MMHG | HEIGHT: 61 IN | TEMPERATURE: 98.5 F | WEIGHT: 132 LBS | OXYGEN SATURATION: 99 %

## 2024-10-11 DIAGNOSIS — M25.511 PAIN AND SWELLING OF RIGHT SHOULDER: Primary | ICD-10-CM

## 2024-10-11 DIAGNOSIS — M25.411 PAIN AND SWELLING OF RIGHT SHOULDER: Primary | ICD-10-CM

## 2024-10-11 PROCEDURE — G2211 COMPLEX E/M VISIT ADD ON: HCPCS | Performed by: INTERNAL MEDICINE

## 2024-10-11 PROCEDURE — 99213 OFFICE O/P EST LOW 20 MIN: CPT | Performed by: INTERNAL MEDICINE

## 2024-10-11 NOTE — PROGRESS NOTES
"Assessment/Plan:    Complaining of  R  shoulder  pain     Diagnoses and all orders for this visit:    Pain and swelling of right shoulder  -     XR shoulder 2+ vw right; Future  -     Ambulatory Referral to Physical Therapy; Future          Subjective: Pain  R shoulder     Patient ID: Mari Vilchis is a 79 y.o. female.    Shoulder Pain   Pertinent negatives include no numbness.       The following portions of the patient's history were reviewed and updated as appropriate: allergies, current medications, past family history, past medical history, past social history, past surgical history, and problem list.    Review of Systems   Constitutional: Negative.    HENT:  Negative for dental problem, drooling, ear discharge and ear pain.    Eyes:  Negative for discharge, redness and itching.   Respiratory:  Negative for apnea, cough and wheezing.    Cardiovascular:  Negative for chest pain and palpitations.   Gastrointestinal:  Negative for abdominal pain, blood in stool, diarrhea and vomiting.   Endocrine: Negative for polydipsia, polyphagia and polyuria.   Genitourinary:  Negative for decreased urine volume, dysuria and frequency.   Musculoskeletal:  Negative for arthralgias, myalgias and neck stiffness.   Skin:  Negative for pallor and wound.   Allergic/Immunologic: Negative for environmental allergies and food allergies.   Neurological:  Negative for facial asymmetry, light-headedness, numbness and headaches.   Hematological:  Negative for adenopathy. Does not bruise/bleed easily.   Psychiatric/Behavioral:  Negative for agitation, behavioral problems and confusion.          Objective:      /84 (BP Location: Left arm, Patient Position: Sitting, Cuff Size: Standard)   Pulse 85   Temp 98.5 °F (36.9 °C) (Temporal)   Ht 5' 1\" (1.549 m)   Wt 59.9 kg (132 lb)   SpO2 99%   BMI 24.94 kg/m²          Physical Exam  Constitutional:       Appearance: Normal appearance. She is obese.   HENT:      Head: Normocephalic. "      Nose: Nose normal.      Mouth/Throat:      Mouth: Mucous membranes are moist.   Eyes:      Pupils: Pupils are equal, round, and reactive to light.   Cardiovascular:      Rate and Rhythm: Regular rhythm.      Heart sounds: Normal heart sounds.   Pulmonary:      Breath sounds: Normal breath sounds.   Abdominal:      Palpations: Abdomen is soft.   Musculoskeletal:         General: Tenderness present. No swelling.      Cervical back: Neck supple.   Skin:     General: Skin is warm.   Neurological:      General: No focal deficit present.      Mental Status: She is alert and oriented to person, place, and time.   Psychiatric:         Mood and Affect: Mood normal.      R shoulder  pain   from  possible  rotator  cuff  tendonitis    XRay ordered,  Physical therapy   and  apply voltarem gel,  and  Tylenol  500mg  q 8hrs  prn  pain     Fup  in  2 weeks  if  not  better.      Devaughn Richardson MD.FACP

## 2024-10-12 ENCOUNTER — HOSPITAL ENCOUNTER (OUTPATIENT)
Dept: RADIOLOGY | Facility: HOSPITAL | Age: 79
Discharge: HOME | End: 2024-10-12
Attending: INTERNAL MEDICINE
Payer: MEDICARE

## 2024-10-12 DIAGNOSIS — M25.411 PAIN AND SWELLING OF RIGHT SHOULDER: ICD-10-CM

## 2024-10-12 DIAGNOSIS — M25.511 PAIN AND SWELLING OF RIGHT SHOULDER: ICD-10-CM

## 2024-10-12 PROCEDURE — 73030 X-RAY EXAM OF SHOULDER: CPT

## 2024-10-17 ENCOUNTER — EVALUATION (OUTPATIENT)
Dept: PHYSICAL THERAPY | Facility: REHABILITATION | Age: 79
End: 2024-10-17
Payer: MEDICARE

## 2024-10-17 DIAGNOSIS — M25.411 PAIN AND SWELLING OF RIGHT SHOULDER: Primary | ICD-10-CM

## 2024-10-17 DIAGNOSIS — M25.511 PAIN AND SWELLING OF RIGHT SHOULDER: Primary | ICD-10-CM

## 2024-10-17 PROCEDURE — 97112 NEUROMUSCULAR REEDUCATION: CPT

## 2024-10-17 PROCEDURE — 97162 PT EVAL MOD COMPLEX 30 MIN: CPT

## 2024-10-20 DIAGNOSIS — Z86.73 HISTORY OF STROKE: ICD-10-CM

## 2024-10-20 DIAGNOSIS — E78.5 HLD (HYPERLIPIDEMIA): ICD-10-CM

## 2024-10-22 ENCOUNTER — OFFICE VISIT (OUTPATIENT)
Dept: PHYSICAL THERAPY | Facility: REHABILITATION | Age: 79
End: 2024-10-22
Payer: MEDICARE

## 2024-10-22 DIAGNOSIS — M25.511 PAIN AND SWELLING OF RIGHT SHOULDER: Primary | ICD-10-CM

## 2024-10-22 DIAGNOSIS — M25.411 PAIN AND SWELLING OF RIGHT SHOULDER: Primary | ICD-10-CM

## 2024-10-22 PROCEDURE — 97112 NEUROMUSCULAR REEDUCATION: CPT

## 2024-10-22 RX ORDER — SIMVASTATIN 40 MG
40 TABLET ORAL
Qty: 90 TABLET | Refills: 3 | Status: SHIPPED | OUTPATIENT
Start: 2024-10-22

## 2024-10-22 NOTE — PROGRESS NOTES
Daily Note     Today's date: 10/22/2024  Patient name: Mari Vilchis  : 1945  MRN: [id number]  Referring provider: Devaughn Richardson MD  Dx:   Encounter Diagnosis     ICD-10-CM    1. Pain and swelling of right shoulder  M25.511     M25.411           Start Time: 09  Stop Time: 1015  Total time in clinic (min): 31 minutes    Subjective: I'm feeling a little better      Objective: See treatment diary below      Assessment: Tolerated treatment well. Taught and performed UE neural mobilizations with good tolerance and mild reductions in radicular sx following. Focused on postural mm stability and periscapular strengthening for the remainder of the session with good tolerance. Assist need to promote chin tuck in supine. Patient would benefit from continued PT      Plan: Continue per plan of care.      Precautions:   Past Medical History:   Diagnosis Date    Aneurysm (HCC) 23    2.5 mg found on neck CAT scan    Arthritis     Closed nondisplaced fracture of fifth metatarsal bone of right foot with routine healing     Difficulty walking 23    Broken Femur    Disease of thyroid gland     hypothyroid    Glaucoma, bilateral     Hyperlipidemia     Osteoporosis     Otitis media 14    Otitis Media    Scoliosis 12       Allergies   Allergen Reactions    Fentanyl Vomiting    Indomethacin GI Intolerance     Other reaction(s): GI upset  Other reaction(s): GI upset    Oxycodone Dizziness, Nausea Only, GI Intolerance and Other (See Comments)     Other reaction(s): GI upset  Other reaction(s): GI upset       POC expires Unit limit Auth Expiration date PT/OT + Visit Limit?   12 weeks - 2025 bomn bomn bomn         Visit/Unit Tracking  AUTH Status:  Date 10/17 10/22        bomn Used 1 2        Remaining             Pertinent Findings:      POC End Date:  12 weeks - 2025                                                                                     Test / Measure     FOTO (Predicted 69) 59  "  (+) spurling R    C/s ext/rot AROM 75%   Periscapular strength </=4+/5   Poor posture          Access Code: [email or web address]  URL: [email or web address]  Date: 10/17/2024  Prepared by: Viral Schneider    Exercises  - Standing Shoulder Row with Anchored Resistance  - 1 x daily - 3 x weekly - 2 sets - 10 reps  - Shoulder External Rotation with Anchored Resistance  - 1 x daily - 3 x weekly - 2 sets - 10 reps  - Shoulder Internal Rotation with Resistance  - 1 x daily - 3 x weekly - 2 sets - 10 reps  Manuals 10/17 10/22                                                               Neuro Re-Ed             Hep review/pt education 25'            Row  2x10 10#           LPD             Supine chin tuck  10x PT assist           Supine chin tuck and lift  2x10 PT assist           Median N glide  5x5\" holds PT assist           Radial N glide  5x5\" holds PT assist                                                               Ther Ex             Seated t/s ext             Seated t/s rot             Open book  15x R rot 5\" holds           ER/IR  2x10 ea 1#                                                               Ther Activity                                       Gait Training                                       Modalities                                            "

## 2024-10-24 ENCOUNTER — OFFICE VISIT (OUTPATIENT)
Dept: PHYSICAL THERAPY | Facility: REHABILITATION | Age: 79
End: 2024-10-24
Payer: MEDICARE

## 2024-10-24 DIAGNOSIS — M25.511 PAIN AND SWELLING OF RIGHT SHOULDER: Primary | ICD-10-CM

## 2024-10-24 DIAGNOSIS — M25.411 PAIN AND SWELLING OF RIGHT SHOULDER: Primary | ICD-10-CM

## 2024-10-24 PROCEDURE — 97112 NEUROMUSCULAR REEDUCATION: CPT

## 2024-10-24 PROCEDURE — 97110 THERAPEUTIC EXERCISES: CPT

## 2024-10-24 NOTE — PROGRESS NOTES
Daily Note     Today's date: 10/24/2024  Patient name: Mari Vilchis  : 1945  MRN: [id number]  Referring provider: Devaughn Richardson MD  Dx:   Encounter Diagnosis     ICD-10-CM    1. Pain and swelling of right shoulder  M25.511     M25.411           Start Time: 1121  Stop Time: 1200  Total time in clinic (min): 39 minutes    Subjective: Patient reports cont neck sx improvement, denies changes to R pointer finger numbness.      Objective: See treatment diary below      Assessment: Tolerated treatment well. Improvements in chin tuck noted today, patient able to perform without PT assist but with poor endurance and subjective reports of signif difficulty. Will cont to progress this. Educated on importance of posture again. N&T to pointer finger noted with the addition of LPD; added this and sup tri ext to progress elbow ext weakness. Patient would benefit from continued PT      Plan: Continue per plan of care.      Precautions:   Past Medical History:   Diagnosis Date    Aneurysm (HCC) 23    2.5 mg found on neck CAT scan    Arthritis     Closed nondisplaced fracture of fifth metatarsal bone of right foot with routine healing     Difficulty walking 23    Broken Femur    Disease of thyroid gland     hypothyroid    Glaucoma, bilateral     Hyperlipidemia     Osteoporosis     Otitis media 14    Otitis Media    Scoliosis 12       Allergies   Allergen Reactions    Fentanyl Vomiting    Indomethacin GI Intolerance     Other reaction(s): GI upset  Other reaction(s): GI upset    Oxycodone Dizziness, Nausea Only, GI Intolerance and Other (See Comments)     Other reaction(s): GI upset  Other reaction(s): GI upset       POC expires Unit limit Auth Expiration date PT/OT + Visit Limit?   12 weeks - 2025 bomn bomn bomn         Visit/Unit Tracking  AUTH Status:  Date 10/17 10/22 10/24       bomn Used 1 2 3       Remaining             Pertinent Findings:      POC End Date:  12 weeks - 2025       "                                                                               Test / Measure     FOTO (Predicted 69) 59   (+) spurling R    C/s ext/rot AROM 75%   Periscapular strength </=4+/5   Poor posture          Access Code: [email or web address]  URL: [email or web address]  Date: 10/17/2024  Prepared by: Viral Schneider    Exercises  - Standing Shoulder Row with Anchored Resistance  - 1 x daily - 3 x weekly - 2 sets - 10 reps  - Shoulder External Rotation with Anchored Resistance  - 1 x daily - 3 x weekly - 2 sets - 10 reps  - Shoulder Internal Rotation with Resistance  - 1 x daily - 3 x weekly - 2 sets - 10 reps  Manuals 10/17 10/22 10/24          Manual traction   2' CM                                                 Neuro Re-Ed             Hep review/pt education 25'            Row  2x10 10# 2x10 10#          LPD   10x 3#          Supine chin tuck  10x PT assist 15x 3\" holds          Supine chin tuck and lift  2x10 PT assist 10x no PT assist          Median N glide  5x5\" holds PT assist 5x5\" holds PT assist          Radial N glide  5x5\" holds PT assist 5x5\" holdsPT assist                                                              Ther Ex             Seated t/s ext             Seated t/s rot             UBE  3/3 L0 3/3 L0          Open book  15x R rot 5\" holds 15x R rot 5\" holds          ER/IR  2x10 ea 1# 2x10 ea 1#          Sup tri ext   2x10 R 1#                                                 Ther Activity                                       Gait Training                                       Modalities                                            "

## 2024-10-25 ENCOUNTER — HOSPITAL ENCOUNTER (OUTPATIENT)
Dept: RADIOLOGY | Age: 79
Discharge: HOME | End: 2024-10-25
Payer: MEDICARE

## 2024-10-25 VITALS — HEIGHT: 61 IN | BODY MASS INDEX: 24.93 KG/M2 | WEIGHT: 132.06 LBS

## 2024-10-25 DIAGNOSIS — Z12.31 ENCOUNTER FOR SCREENING MAMMOGRAM FOR MALIGNANT NEOPLASM OF BREAST: ICD-10-CM

## 2024-10-25 PROCEDURE — 77067 SCR MAMMO BI INCL CAD: CPT

## 2024-10-25 PROCEDURE — 77063 BREAST TOMOSYNTHESIS BI: CPT

## 2024-10-29 ENCOUNTER — IMMUNIZATIONS (OUTPATIENT)
Dept: INTERNAL MEDICINE CLINIC | Facility: CLINIC | Age: 79
End: 2024-10-29
Payer: MEDICARE

## 2024-10-29 ENCOUNTER — OFFICE VISIT (OUTPATIENT)
Dept: PHYSICAL THERAPY | Facility: REHABILITATION | Age: 79
End: 2024-10-29
Payer: MEDICARE

## 2024-10-29 DIAGNOSIS — M25.511 PAIN AND SWELLING OF RIGHT SHOULDER: Primary | ICD-10-CM

## 2024-10-29 DIAGNOSIS — M25.411 PAIN AND SWELLING OF RIGHT SHOULDER: Primary | ICD-10-CM

## 2024-10-29 DIAGNOSIS — Z23 ENCOUNTER FOR IMMUNIZATION: Primary | ICD-10-CM

## 2024-10-29 PROCEDURE — G0008 ADMIN INFLUENZA VIRUS VAC: HCPCS

## 2024-10-29 PROCEDURE — 97112 NEUROMUSCULAR REEDUCATION: CPT

## 2024-10-29 PROCEDURE — 97140 MANUAL THERAPY 1/> REGIONS: CPT

## 2024-10-29 PROCEDURE — 90662 IIV NO PRSV INCREASED AG IM: CPT

## 2024-10-29 NOTE — PROGRESS NOTES
Daily Note     Today's date: 10/29/2024  Patient name: Mari Vilchis  : 1945  MRN: [id number]  Referring provider: Devaughn Richardson MD  Dx:   Encounter Diagnosis     ICD-10-CM    1. Pain and swelling of right shoulder  M25.511     M25.411             Start Time: 1015  Stop Time: 1110  Total time in clinic (min): 55 minutes    Subjective: Patient reports cont neck sx improvement, states she is able to more throughout the day. Reports ongoing N&T to R pointer finger which is worst at night.      Objective: See treatment diary below      Assessment: Tolerated treatment well. Cont with a focus on postural awareness and postural mm stability. No increase in shoulder sx throughout. Taught and added supine and seated self traction to HEP for at home management of ongoing distal N&T. Patient would benefit from continued PT      Plan: Continue per plan of care.      Precautions:   Past Medical History:   Diagnosis Date    Aneurysm (HCC) 23    2.5 mg found on neck CAT scan    Arthritis     Closed nondisplaced fracture of fifth metatarsal bone of right foot with routine healing     Difficulty walking 23    Broken Femur    Disease of thyroid gland     hypothyroid    Glaucoma, bilateral     Hyperlipidemia     Osteoporosis     Otitis media 14    Otitis Media    Scoliosis 12       Allergies   Allergen Reactions    Fentanyl Vomiting    Indomethacin GI Intolerance     Other reaction(s): GI upset  Other reaction(s): GI upset    Oxycodone Dizziness, Nausea Only, GI Intolerance and Other (See Comments)     Other reaction(s): GI upset  Other reaction(s): GI upset       POC expires Unit limit Auth Expiration date PT/OT + Visit Limit?   12 weeks - 2025 bomn bomn bomn         Visit/Unit Tracking  AUTH Status:  Date 10/17 10/22 10/24 10/29     MC bomn Used 1 2 3 4      Remaining             Pertinent Findings:      POC End Date:  12 weeks - 2025                                                        "                              Test / Measure     FOTO (Predicted 69) 59   (+) spurling R    C/s ext/rot AROM 75%   Periscapular strength </=4+/5   Poor posture          Access Code: [email or web address]  URL: [email or web address]  Date: 10/17/2024  Prepared by: Viral Schneider    Exercises  - Standing Shoulder Row with Anchored Resistance  - 1 x daily - 3 x weekly - 2 sets - 10 reps  - Shoulder External Rotation with Anchored Resistance  - 1 x daily - 3 x weekly - 2 sets - 10 reps  - Shoulder Internal Rotation with Resistance  - 1 x daily - 3 x weekly - 2 sets - 10 reps  Manuals 10/17 10/22 10/24 10/29         Manual traction   2' CM 2x2' CM         R UT/LS stretching    3x30\" ea                                   Neuro Re-Ed             Hep review/pt education 25'   5'         Row  2x10 10# 2x10 10# 2x10 10#         LPD   10x 3# 2x10 3#         Supine chin tuck  10x PT assist 15x 3\" holds 15x 3\" holds         Supine chin tuck and lift  2x10 PT assist 10x no PT assist 10x no PT assist         Median N glide  5x5\" holds PT assist 5x5\" holds PT assist 5x5\" holds PT assist         Radial N glide  5x5\" holds PT assist 5x5\" holdsPT assist 5x5\" holdsPT assist         Supine self traction    3x30\" HEP                                                Ther Ex             Seated t/s ext             Seated t/s rot             UBE  3/3 L0 3/3 L0 3/3 L0         Open book  15x R rot 5\" holds 15x R rot 5\" holds 15x R rot 5\" holds         ER/IR  2x10 ea 1# 2x10 ea 1# 2x10 ea 1#         Sup tri ext   2x10 R 1# 2x12 R 1#                                                Ther Activity                                       Gait Training                                       Modalities                                            "

## 2024-10-31 ENCOUNTER — OFFICE VISIT (OUTPATIENT)
Dept: PHYSICAL THERAPY | Facility: REHABILITATION | Age: 79
End: 2024-10-31
Payer: MEDICARE

## 2024-10-31 DIAGNOSIS — M25.411 PAIN AND SWELLING OF RIGHT SHOULDER: Primary | ICD-10-CM

## 2024-10-31 DIAGNOSIS — M25.511 PAIN AND SWELLING OF RIGHT SHOULDER: Primary | ICD-10-CM

## 2024-10-31 PROCEDURE — 97112 NEUROMUSCULAR REEDUCATION: CPT

## 2024-10-31 PROCEDURE — 97110 THERAPEUTIC EXERCISES: CPT

## 2024-10-31 NOTE — PROGRESS NOTES
Daily Note     Today's date: 10/31/2024  Patient name: Mari Vilchis  : 1945  MRN: [id number]  Referring provider: Devaughn Richardson MD  Dx:   Encounter Diagnosis     ICD-10-CM    1. Pain and swelling of right shoulder  M25.511     M25.411             Start Time: 1009  Stop Time: 1055  Total time in clinic (min): 46 minutes    Subjective: Patient reports sx worsening since LV.      Objective: See treatment diary below      Assessment: Tolerated treatment well. Educated on mm fatigue and sx responses to exercise as patient description of sx aligns with mm fatigue and soreness. Increased soreness present throughout today. Spent increased time on posture education and promoting proper chin tuck 2/to cont upper c/s compensation. Reported slight sx improvement at th end of the session. Patient would benefit from continued PT      Plan: Continue per plan of care.      Precautions:   Past Medical History:   Diagnosis Date    Aneurysm (HCC) 23    2.5 mg found on neck CAT scan    Arthritis     Closed nondisplaced fracture of fifth metatarsal bone of right foot with routine healing     Difficulty walking 23    Broken Femur    Disease of thyroid gland     hypothyroid    Glaucoma, bilateral     Hyperlipidemia     Osteoporosis     Otitis media 14    Otitis Media    Scoliosis 12       Allergies   Allergen Reactions    Fentanyl Vomiting    Indomethacin GI Intolerance     Other reaction(s): GI upset  Other reaction(s): GI upset    Oxycodone Dizziness, Nausea Only, GI Intolerance and Other (See Comments)     Other reaction(s): GI upset  Other reaction(s): GI upset       POC expires Unit limit Auth Expiration date PT/OT + Visit Limit?   12 weeks - 2025 bomn bomn bomn         Visit/Unit Tracking  AUTH Status:  Date 10/17 10/22 10/24 10/29 10/31    MC bomn Used 1 2 3 4 5     Remaining             Pertinent Findings:      POC End Date:  12 weeks - 2025                                              "                                        Test / Measure     FOTO (Predicted 69) 59   (+) spurling R    C/s ext/rot AROM 75%   Periscapular strength </=4+/5   Poor posture          Access Code: [email or web address]  URL: [email or web address]  Date: 10/17/2024  Prepared by: Viral Schneider    Exercises  - Standing Shoulder Row with Anchored Resistance  - 1 x daily - 3 x weekly - 2 sets - 10 reps  - Shoulder External Rotation with Anchored Resistance  - 1 x daily - 3 x weekly - 2 sets - 10 reps  - Shoulder Internal Rotation with Resistance  - 1 x daily - 3 x weekly - 2 sets - 10 reps  Manuals 10/17 10/22 10/24 10/29 10/31        Manual traction   2' CM 2x2' CM D/c        R UT/LS stretching    3x30\" ea D/c                                  Neuro Re-Ed             Hep review/pt education 25'   5' 5'        Row  2x10 10# 2x10 10# 2x10 10# 2x10 10#        LPD   10x 3# 2x10 3# 2x10 5#        Supine chin tuck  10x PT assist 15x 3\" holds 15x 3\" holds         Seated chin tuck     2x10 3\"        Seated chin tuck and extend     10x 3\"        Seated chin tuck and rot     10x B 3\"        Supine chin tuck and lift  2x10 PT assist 10x no PT assist 10x no PT assist         Median N glide  5x5\" holds PT assist 5x5\" holds PT assist 5x5\" holds PT assist         Radial N glide  5x5\" holds PT assist 5x5\" holdsPT assist 5x5\" holdsPT assist         Supine self traction    3x30\" HEP         C/s rot isometrics     10x B 5\" holds                                  Ther Ex             Seated t/s ext     15x 5\"        Seated t/s rot             UBE  3/3 L0 3/3 L0 3/3 L0 3/3 L0        Open book  15x R rot 5\" holds 15x R rot 5\" holds 15x R rot 5\" holds 15x R rot 5\" holds        ER/IR  2x10 ea 1# 2x10 ea 1# 2x10 ea 1# Resume NV        Sup tri ext   2x10 R 1# 2x12 R 1# Resume NV                                               Ther Activity                                       Gait Training                                       Modalities                   "

## 2024-11-04 ENCOUNTER — OFFICE VISIT (OUTPATIENT)
Dept: INTERNAL MEDICINE CLINIC | Facility: CLINIC | Age: 79
End: 2024-11-04
Payer: MEDICARE

## 2024-11-04 VITALS
TEMPERATURE: 98.4 F | HEIGHT: 61 IN | HEART RATE: 80 BPM | OXYGEN SATURATION: 98 % | WEIGHT: 131 LBS | SYSTOLIC BLOOD PRESSURE: 160 MMHG | DIASTOLIC BLOOD PRESSURE: 110 MMHG | BODY MASS INDEX: 24.73 KG/M2

## 2024-11-04 DIAGNOSIS — M25.511 ACUTE PAIN OF RIGHT SHOULDER: Primary | ICD-10-CM

## 2024-11-04 DIAGNOSIS — M81.0 AGE-RELATED OSTEOPOROSIS WITHOUT CURRENT PATHOLOGICAL FRACTURE: ICD-10-CM

## 2024-11-04 DIAGNOSIS — I10 PRIMARY HYPERTENSION: ICD-10-CM

## 2024-11-04 DIAGNOSIS — I72.9 ANEURYSM (HCC): ICD-10-CM

## 2024-11-04 PROCEDURE — G2211 COMPLEX E/M VISIT ADD ON: HCPCS | Performed by: INTERNAL MEDICINE

## 2024-11-04 PROCEDURE — 99214 OFFICE O/P EST MOD 30 MIN: CPT | Performed by: INTERNAL MEDICINE

## 2024-11-04 RX ORDER — AMLODIPINE BESYLATE 5 MG/1
5 TABLET ORAL DAILY
Qty: 30 TABLET | Refills: 5 | Status: SHIPPED | OUTPATIENT
Start: 2024-11-04

## 2024-11-04 NOTE — PROGRESS NOTES
Assessment/Plan:           1. Acute pain of right shoulder  Comments:  Continue current regimen of Tylenol and as needed Aleve.  Physiatry follow-up advised  Orders:  -     Ambulatory Referral to Physiatry; Future  2. Aneurysm (HCC)  3. Primary hypertension  Comments:  Blood pressure needs to be better controlled and amlodipine 5 mg was advised.  Home BP monitoring twice a day advised.  Orders:  -     amLODIPine (NORVASC) 5 mg tablet; Take 1 tablet (5 mg total) by mouth daily  4. Age-related osteoporosis without current pathological fracture         1. Acute pain of right shoulder    - Ambulatory Referral to Physiatry; Future    2. Aneurysm (HCC)      3. Primary hypertension    - amLODIPine (NORVASC) 5 mg tablet; Take 1 tablet (5 mg total) by mouth daily  Dispense: 30 tablet; Refill: 5       No problem-specific Assessment & Plan notes found for this encounter.           Subjective:      Patient ID: Mari Vilchis is a 79 y.o. female.    HPI    The following portions of the patient's history were reviewed and updated as appropriate: She  has a past medical history of Aneurysm (HCC) (2/8/23), Arthritis (2005), Closed nondisplaced fracture of fifth metatarsal bone of right foot with routine healing, Difficulty walking (2/2/23), Disease of thyroid gland, Glaucoma, bilateral, Hyperlipidemia, Osteoporosis, Otitis media (11/4/14), and Scoliosis (12/12/12).  She   Patient Active Problem List    Diagnosis Date Noted    Left leg pain 05/30/2024    Vitamin D deficiency 05/28/2024    History of stroke 09/12/2023    Thyroid nodule 07/19/2023    Aneurysm (HCC) 03/08/2023    Nail abnormalities 02/17/2023    Osteoporosis 02/09/2023    HLD (hyperlipidemia) 02/06/2023    Acute blood loss anemia 02/05/2023    Fall 02/03/2023    Acute pain due to trauma 02/03/2023    Hypothyroidism 02/03/2023    Femur fracture (HCC) 02/02/2023    Chest pain 10/31/2022    Abnormal electrocardiogram (ECG) (EKG) 10/31/2022    Primary osteoarthritis of  right knee 05/14/2019    Primary osteoarthritis of left knee 05/14/2019     She  has a past surgical history that includes Bunionectomy; Correction hammer toe; [place] tooth extraction; NEUROMA EXCISION; Other surgical history; Colonoscopy (06/04/2019); Mammo (historical) (07/09/2018); pr optx fem shft fx w/insj imed implt w/wo screw (Right, 02/03/2023); and Fracture surgery (2/3/23).  Her family history includes Arthritis in her mother; Cancer in her father; Dementia in her maternal aunt and maternal aunt; Glaucoma in her mother; Hyperlipidemia in her family; Hypertension in her brother, family, maternal aunt, and mother; Hypothyroidism in her mother; No Known Problems in her maternal aunt, maternal grandfather, maternal grandmother, paternal aunt, paternal grandfather, and paternal grandmother; Osteoporosis in her family and maternal aunt; Stomach cancer (age of onset: 51) in her father; Thyroid disease in her mother.  She  reports that she has never smoked. She has never used smokeless tobacco. She reports current alcohol use. She reports that she does not use drugs.  Current Outpatient Medications   Medication Sig Dispense Refill    amLODIPine (NORVASC) 5 mg tablet Take 1 tablet (5 mg total) by mouth daily 30 tablet 5    Calcium Carb-Cholecalciferol 600-20 MG-MCG TABS 2 tablets Daily at 2am      denosumab (PROLIA) 60 mg/mL Inject 60 mg under the skin once Twice a year      Glucosamine-Chondroitin 500-400 MG CAPS Take 1 tablet by mouth in the morning      ibuprofen (Advil) 200 mg tablet Take 200 mg by mouth every 4 (four) hours as needed for moderate pain      levothyroxine 25 mcg tablet TAKE 1 TABLET BY MOUTH EVERY DAY 90 tablet 1    Naphazoline-Polyethyl Glycol 0.012-0.2 % SOLN Apply 1-2 drops to eye 4 (four) times a day      simvastatin (ZOCOR) 40 mg tablet TAKE 1 TABLET BY MOUTH DAILY AT BEDTIME 90 tablet 3    White Petrolatum-Mineral Oil (artificial tears) Administer 1 Application to both eyes as needed for  dry eyes      acetaminophen (Tylenol 8 Hour) 650 mg CR tablet Take 650 mg by mouth every 8 (eight) hours as needed for moderate pain (Patient not taking: Reported on 11/4/2024)       No current facility-administered medications for this visit.     Current Outpatient Medications on File Prior to Visit   Medication Sig    Calcium Carb-Cholecalciferol 600-20 MG-MCG TABS 2 tablets Daily at 2am    denosumab (PROLIA) 60 mg/mL Inject 60 mg under the skin once Twice a year    Glucosamine-Chondroitin 500-400 MG CAPS Take 1 tablet by mouth in the morning    ibuprofen (Advil) 200 mg tablet Take 200 mg by mouth every 4 (four) hours as needed for moderate pain    levothyroxine 25 mcg tablet TAKE 1 TABLET BY MOUTH EVERY DAY    Naphazoline-Polyethyl Glycol 0.012-0.2 % SOLN Apply 1-2 drops to eye 4 (four) times a day    simvastatin (ZOCOR) 40 mg tablet TAKE 1 TABLET BY MOUTH DAILY AT BEDTIME    White Petrolatum-Mineral Oil (artificial tears) Administer 1 Application to both eyes as needed for dry eyes    acetaminophen (Tylenol 8 Hour) 650 mg CR tablet Take 650 mg by mouth every 8 (eight) hours as needed for moderate pain (Patient not taking: Reported on 11/4/2024)    [DISCONTINUED] romosozumab-aqqg (EVENITY) subcutaneous injection      No current facility-administered medications on file prior to visit.     Medications Discontinued During This Encounter   Medication Reason    romosozumab-aqqg (EVENITY) subcutaneous injection Therapy completed      She is allergic to fentanyl, indomethacin, and oxycodone..    Review of Systems   Constitutional:  Negative for appetite change, chills, fatigue and fever.   HENT:  Negative for sore throat and trouble swallowing.    Eyes:  Negative for redness.   Respiratory:  Negative for shortness of breath.    Cardiovascular:  Negative for chest pain and palpitations.   Gastrointestinal:  Negative for abdominal pain, constipation and diarrhea.   Genitourinary:  Negative for dysuria and hematuria.  "  Musculoskeletal:  Positive for arthralgias. Negative for back pain and neck pain.   Skin:  Negative for rash.   Neurological:  Negative for seizures, weakness and headaches.   Hematological:  Negative for adenopathy.   Psychiatric/Behavioral:  Negative for confusion. The patient is not nervous/anxious.          Objective:      BP (!) 160/110 (BP Location: Left arm, Patient Position: Sitting, Cuff Size: Adult)   Pulse 80   Temp 98.4 °F (36.9 °C) (Temporal)   Ht 5' 1\" (1.549 m)   Wt 59.4 kg (131 lb)   SpO2 98% Comment: ra  BMI 24.75 kg/m²     Results Reviewed       None            No results found for this or any previous visit (from the past 1344 hour(s)).     Physical Exam  Constitutional:       General: She is not in acute distress.     Appearance: Normal appearance.   HENT:      Head: Normocephalic and atraumatic.      Nose: Nose normal.      Mouth/Throat:      Mouth: Mucous membranes are moist.   Eyes:      Extraocular Movements: Extraocular movements intact.      Pupils: Pupils are equal, round, and reactive to light.   Cardiovascular:      Rate and Rhythm: Normal rate and regular rhythm.      Pulses: Normal pulses.      Heart sounds: Normal heart sounds. No murmur heard.     No friction rub.   Pulmonary:      Effort: Pulmonary effort is normal. No respiratory distress.      Breath sounds: Normal breath sounds. No wheezing.   Abdominal:      General: Abdomen is flat. Bowel sounds are normal. There is no distension.      Palpations: Abdomen is soft. There is no mass.      Tenderness: There is no abdominal tenderness. There is no guarding.   Musculoskeletal:         General: Tenderness present.      Cervical back: Neck supple.      Comments: Some tenderness in the right shoulder area.   Neurological:      General: No focal deficit present.      Mental Status: She is alert and oriented to person, place, and time. Mental status is at baseline.      Cranial Nerves: No cranial nerve deficit.   Psychiatric:    "      Mood and Affect: Mood normal.         Behavior: Behavior normal.

## 2024-11-05 ENCOUNTER — OFFICE VISIT (OUTPATIENT)
Dept: PHYSICAL THERAPY | Facility: REHABILITATION | Age: 79
End: 2024-11-05
Payer: MEDICARE

## 2024-11-05 DIAGNOSIS — M25.411 PAIN AND SWELLING OF RIGHT SHOULDER: Primary | ICD-10-CM

## 2024-11-05 DIAGNOSIS — M25.511 PAIN AND SWELLING OF RIGHT SHOULDER: Primary | ICD-10-CM

## 2024-11-05 PROCEDURE — 97140 MANUAL THERAPY 1/> REGIONS: CPT

## 2024-11-05 PROCEDURE — 97112 NEUROMUSCULAR REEDUCATION: CPT

## 2024-11-05 NOTE — PROGRESS NOTES
"Daily Note     Today's date: 2024  Patient name: Mari Vilchis  : 1945  MRN: [id number]  Referring provider: Devaughn Richardson MD  Dx:   Encounter Diagnosis     ICD-10-CM    1. Pain and swelling of right shoulder  M25.511     M25.411               Start Time: 1010  Stop Time: 1105  Total time in clinic (min): 55 minutes    Subjective: Patient reports sx worsening since LV, states she has had pain all down her arm into her R hand. Patient reports she needed to schedule visit with physiatry for pain yesterday:    \"1. Acute pain of right shoulder  Comments:  Continue current regimen of Tylenol and as needed Aleve.  Physiatry follow-up advised  Orders:  -     Ambulatory Referral to Physiatry; Future  2. Aneurysm (HCC)  3. Primary hypertension  Comments:  Blood pressure needs to be better controlled and amlodipine 5 mg was advised.  Home BP monitoring twice a day advised.  Orders:  -     amLODIPine (NORVASC) 5 mg tablet; Take 1 tablet (5 mg total) by mouth daily  4. Age-related osteoporosis without current pathological fracture\"      Objective: See treatment diary below    BP: 138/94 pre session, L arm, seated    Assessment: Tolerated treatment well. Blood pressure assessed pre-visit 2/to patient reports of recent fluctuations. Spent more time on manuals, performed gentle STM 2/to high TTP and sx reproduction with UT palpation and with hypertonicity present. Patient reported sx reduction following but with ongoing R pointer finger numbness. Sx appear to be of cervical origin but positive phalen's test noted upon further assessment. Patient reports visit scheduled with orthopedist tomorrow. Extensive time spent for education throughout surrounding tissue end feels, sx responses to exercise, expected soreness following exercise, cervical anatomy and referred pain. Taught self UT/LS stretching and added to HEP. Will assess response NV. Patient would benefit from continued PT      Plan: Continue per plan of " care.      Precautions:   Past Medical History:   Diagnosis Date    Aneurysm (HCC) 2/8/23    2.5 mg found on neck CAT scan    Arthritis 2005    Closed nondisplaced fracture of fifth metatarsal bone of right foot with routine healing     Difficulty walking 2/2/23    Broken Femur    Disease of thyroid gland     hypothyroid    Glaucoma, bilateral     Hyperlipidemia     Osteoporosis     Otitis media 11/4/14    Otitis Media    Scoliosis 12/12/12       Allergies   Allergen Reactions    Fentanyl Vomiting    Indomethacin GI Intolerance     Other reaction(s): GI upset  Other reaction(s): GI upset    Oxycodone Dizziness, Nausea Only, GI Intolerance and Other (See Comments)     Other reaction(s): GI upset  Other reaction(s): GI upset       POC expires Unit limit Auth Expiration date PT/OT + Visit Limit?   12 weeks - 1/9/2025 bomn bomn bomn         Visit/Unit Tracking  AUTH Status:  Date 10/17 10/22 10/24 10/29 10/31 11/5   MC bomn Used 1 2 3 4 5 6    Remaining             Pertinent Findings:      POC End Date:  12 weeks - 1/9/2025                                                                                     Test / Measure     FOTO (Predicted 69) 59   (+) spurling R    C/s ext/rot AROM 75%   Periscapular strength </=4+/5   Poor posture          Access Code: [email or web address]  URL: [email or web address]  Date: 10/17/2024  Prepared by: Viral Schneider    Exercises  - Standing Shoulder Row with Anchored Resistance  - 1 x daily - 3 x weekly - 2 sets - 10 reps  - Shoulder External Rotation with Anchored Resistance  - 1 x daily - 3 x weekly - 2 sets - 10 reps  - Shoulder Internal Rotation with Resistance  - 1 x daily - 3 x weekly - 2 sets - 10 reps    Access Code: [email or web address]  URL: [email or web address]  Date: 11/05/2024  Prepared by: Viral Schneider    Exercises  - Seated Upper Trapezius Stretch  - 2 x daily - 7 x weekly - 3 sets - 30 seconds hold  - Seated Levator Scapulae Stretch  - 2 x daily - 7 x weekly - 3 sets -  "30 seconds hold  Manuals 10/17 10/22 10/24 10/29 10/31 11/5       Manual traction   2' CM 2x2' CM D/c CM 2'x3       R UT/LS stretching    3x30\" ea D/c 3x30\" ea       R UT /LS STM      CM 20'                    Neuro Re-Ed             Hep review/pt education 25'   5' 5' 15'       Row  2x10 10# 2x10 10# 2x10 10# 2x10 10# 2x10 5#       LPD   10x 3# 2x10 3# 2x10 5# 2x10 5#       Supine chin tuck  10x PT assist 15x 3\" holds 15x 3\" holds         Seated chin tuck     2x10 3\" 10x 3\"       Seated chin tuck and extend     10x 3\" D/c       Seated chin tuck and rot     10x B 3\" D/c       Supine chin tuck and lift  2x10 PT assist 10x no PT assist 10x no PT assist         Median N glide  5x5\" holds PT assist 5x5\" holds PT assist 5x5\" holds PT assist         Radial N glide  5x5\" holds PT assist 5x5\" holdsPT assist 5x5\" holdsPT assist         Supine self traction    3x30\" HEP         C/s rot isometrics     10x B 5\" holds D/c                                  Ther Ex             Seated t/s ext     15x 5\"        Seated t/s rot             UBE  3/3 L0 3/3 L0 3/3 L0 3/3 L0        Open book  15x R rot 5\" holds 15x R rot 5\" holds 15x R rot 5\" holds 15x R rot 5\" holds        ER/IR  2x10 ea 1# 2x10 ea 1# 2x10 ea 1# Resume NV        Sup tri ext   2x10 R 1# 2x12 R 1# Resume NV                                               Ther Activity                                       Gait Training                                       Modalities                                            "

## 2024-11-06 ENCOUNTER — TRANSCRIBE ORDERS (OUTPATIENT)
Dept: LAB | Facility: CLINIC | Age: 79
End: 2024-11-06

## 2024-11-06 ENCOUNTER — APPOINTMENT (OUTPATIENT)
Dept: LAB | Facility: CLINIC | Age: 79
End: 2024-11-06
Payer: MEDICARE

## 2024-11-06 DIAGNOSIS — Z51.81 ENCOUNTER FOR THERAPEUTIC DRUG MONITORING: ICD-10-CM

## 2024-11-06 DIAGNOSIS — Z51.81 ENCOUNTER FOR THERAPEUTIC DRUG MONITORING: Primary | ICD-10-CM

## 2024-11-06 DIAGNOSIS — M81.0 OSTEOPOROSIS, UNSPECIFIED OSTEOPOROSIS TYPE, UNSPECIFIED PATHOLOGICAL FRACTURE PRESENCE: ICD-10-CM

## 2024-11-06 DIAGNOSIS — E55.9 VITAMIN D DEFICIENCY: ICD-10-CM

## 2024-11-06 LAB — ERYTHROCYTE [SEDIMENTATION RATE] IN BLOOD: 33 MM/HOUR (ref 0–29)

## 2024-11-06 PROCEDURE — 36415 COLL VENOUS BLD VENIPUNCTURE: CPT

## 2024-11-06 PROCEDURE — 85652 RBC SED RATE AUTOMATED: CPT

## 2024-11-07 ENCOUNTER — OFFICE VISIT (OUTPATIENT)
Dept: PHYSICAL THERAPY | Facility: REHABILITATION | Age: 79
End: 2024-11-07
Payer: MEDICARE

## 2024-11-07 DIAGNOSIS — M25.411 PAIN AND SWELLING OF RIGHT SHOULDER: Primary | ICD-10-CM

## 2024-11-07 DIAGNOSIS — M25.511 PAIN AND SWELLING OF RIGHT SHOULDER: Primary | ICD-10-CM

## 2024-11-07 PROCEDURE — 97112 NEUROMUSCULAR REEDUCATION: CPT

## 2024-11-07 PROCEDURE — 97140 MANUAL THERAPY 1/> REGIONS: CPT

## 2024-11-07 NOTE — PROGRESS NOTES
Daily Note     Today's date: 2024  Patient name: Mari Vilchis  : 1945  MRN: [id number]  Referring provider: Devaughn Richardson MD  Dx:   Encounter Diagnosis     ICD-10-CM    1. Pain and swelling of right shoulder  M25.511     M25.411                 Start Time: 1030  Stop Time: 1110  Total time in clinic (min): 40 minutes    Subjective: Patient reports seeing another doc for 2nd opinion yesterday. Reports doctor believes her sx are of multiorigin, with subsequent dx of carpal tunnel syndrome, cervical radiculopathy, and polymyalgia rheumatica 2/to elevated ESR.      Objective: See treatment diary below    BP: 138/94 pre session, L arm, seated    Assessment: Tolerated treatment well. Patient was educated on dx of carpal tunnel syndrome, cervical radiculopathy, and polymyalgia rheumatica, their relation to her ongoing sx and how PT will benefit. Spent more time on manuals again with good tolerance. Patient would benefit from continued PT      Plan: Continue per plan of care.      Precautions:   Past Medical History:   Diagnosis Date    Aneurysm (HCC) 23    2.5 mg found on neck CAT scan    Arthritis     Closed nondisplaced fracture of fifth metatarsal bone of right foot with routine healing     Difficulty walking 23    Broken Femur    Disease of thyroid gland     hypothyroid    Glaucoma, bilateral     Hyperlipidemia     Osteoporosis     Otitis media 14    Otitis Media    Scoliosis 12       Allergies   Allergen Reactions    Fentanyl Vomiting    Indomethacin GI Intolerance     Other reaction(s): GI upset  Other reaction(s): GI upset    Oxycodone Dizziness, Nausea Only, GI Intolerance and Other (See Comments)     Other reaction(s): GI upset  Other reaction(s): GI upset       POC expires Unit limit Auth Expiration date PT/OT + Visit Limit?   12 weeks - 2025 bomn bomn bomn         Visit/Unit Tracking  AUTH Status:  Date 10/17 10/22 10/24 10/29 10/31 11/5 11/7    bomn Used 1 2 3 4  "5 6 7    Remaining              Pertinent Findings:      POC End Date:  12 weeks - 1/9/2025                                                                                     Test / Measure     FOTO (Predicted 69) 59   (+) spurling R    C/s ext/rot AROM 75%   Periscapular strength </=4+/5   Poor posture          Access Code: [email or web address]  URL: [email or web address]  Date: 10/17/2024  Prepared by: Viral Schneider    Exercises  - Standing Shoulder Row with Anchored Resistance  - 1 x daily - 3 x weekly - 2 sets - 10 reps  - Shoulder External Rotation with Anchored Resistance  - 1 x daily - 3 x weekly - 2 sets - 10 reps  - Shoulder Internal Rotation with Resistance  - 1 x daily - 3 x weekly - 2 sets - 10 reps    Access Code: [email or web address]  URL: [email or web address]  Date: 11/05/2024  Prepared by: Viral Schneider    Exercises  - Seated Upper Trapezius Stretch  - 2 x daily - 7 x weekly - 3 sets - 30 seconds hold  - Seated Levator Scapulae Stretch  - 2 x daily - 7 x weekly - 3 sets - 30 seconds hold  Manuals 10/17 10/22 10/24 10/29 10/31 11/5 11/7      Manual traction   2' CM 2x2' CM D/c CM 2'x3 CM 2'x3      R UT/LS stretching    3x30\" ea D/c 3x30\" ea 5x30\" ea      R UT /LS STM      CM 20' CM 10'                   Neuro Re-Ed             Hep review/pt education 25'   5' 5' 15' 10'      Row  2x10 10# 2x10 10# 2x10 10# 2x10 10# 2x10 5# 2x10 10#      LPD   10x 3# 2x10 3# 2x10 5# 2x10 5# 2x10 5#      Supine chin tuck  10x PT assist 15x 3\" holds 15x 3\" holds   15x 3\" holds      Seated chin tuck     2x10 3\" 10x 3\"       Seated chin tuck and extend     10x 3\" D/c       Seated chin tuck and rot     10x B 3\" D/c       Supine chin tuck and lift  2x10 PT assist 10x no PT assist 10x no PT assist         Median N glide  5x5\" holds PT assist 5x5\" holds PT assist 5x5\" holds PT assist         Radial N glide  5x5\" holds PT assist 5x5\" holdsPT assist 5x5\" holdsPT assist         Supine self traction    3x30\" HEP         C/s rot " "isometrics     10x B 5\" holds D/c                                  Ther Ex             Seated t/s ext     15x 5\"        Seated t/s rot             UBE  3/3 L0 3/3 L0 3/3 L0 3/3 L0        Open book  15x R rot 5\" holds 15x R rot 5\" holds 15x R rot 5\" holds 15x R rot 5\" holds        ER/IR  2x10 ea 1# 2x10 ea 1# 2x10 ea 1# Resume NV        Sup tri ext   2x10 R 1# 2x12 R 1# Resume NV                                               Ther Activity                                       Gait Training                                       Modalities                                            "

## 2024-11-11 ENCOUNTER — OFFICE VISIT (OUTPATIENT)
Dept: INTERNAL MEDICINE CLINIC | Facility: CLINIC | Age: 79
End: 2024-11-11
Payer: MEDICARE

## 2024-11-11 VITALS
HEART RATE: 84 BPM | HEIGHT: 61 IN | BODY MASS INDEX: 25.11 KG/M2 | SYSTOLIC BLOOD PRESSURE: 130 MMHG | DIASTOLIC BLOOD PRESSURE: 80 MMHG | TEMPERATURE: 98.2 F | OXYGEN SATURATION: 98 % | WEIGHT: 133 LBS

## 2024-11-11 DIAGNOSIS — M81.0 AGE-RELATED OSTEOPOROSIS WITHOUT CURRENT PATHOLOGICAL FRACTURE: ICD-10-CM

## 2024-11-11 DIAGNOSIS — M54.2 NECK PAIN: ICD-10-CM

## 2024-11-11 DIAGNOSIS — M13.0 POLYARTHRITIS: ICD-10-CM

## 2024-11-11 DIAGNOSIS — G56.01 CARPAL TUNNEL SYNDROME OF RIGHT WRIST: Primary | ICD-10-CM

## 2024-11-11 DIAGNOSIS — I72.9 ANEURYSM (HCC): ICD-10-CM

## 2024-11-11 DIAGNOSIS — Z86.73 HISTORY OF STROKE: ICD-10-CM

## 2024-11-11 DIAGNOSIS — I10 PRIMARY HYPERTENSION: ICD-10-CM

## 2024-11-11 PROCEDURE — 99214 OFFICE O/P EST MOD 30 MIN: CPT | Performed by: INTERNAL MEDICINE

## 2024-11-11 PROCEDURE — G2211 COMPLEX E/M VISIT ADD ON: HCPCS | Performed by: INTERNAL MEDICINE

## 2024-11-11 RX ORDER — PREDNISONE 10 MG/1
8 TABLET ORAL DAILY
COMMUNITY
Start: 2024-11-06

## 2024-11-11 NOTE — PROGRESS NOTES
Assessment/Plan:           1. Carpal tunnel syndrome of right wrist  Comments:  Saw physiatry and brief electrical study showed carpal tunnel  2. Primary hypertension  Comments:  Continue amlodipine 5 mg daily.  Home BP machine checked  3. Age-related osteoporosis without current pathological fracture  Comments:  Follows with Endo and is on Prolia.  4. History of stroke  5. Polyarthritis  Comments:  Rheumatology follow-up advised.  Orders:  -     Ambulatory Referral to Rheumatology; Future  6. Aneurysm (HCC)  7. Neck pain  Comments:  Osteoarthritis of the cervical vertebrae suspected.         1. Carpal tunnel syndrome of right wrist      2. Primary hypertension      3. Age-related osteoporosis without current pathological fracture      4. History of stroke         No problem-specific Assessment & Plan notes found for this encounter.           Subjective:      Patient ID: Mari Vilchis is a 79 y.o. female.    HPI    The following portions of the patient's history were reviewed and updated as appropriate: She  has a past medical history of Aneurysm (HCC) (2/8/23), Arthritis (2005), Closed nondisplaced fracture of fifth metatarsal bone of right foot with routine healing, Difficulty walking (2/2/23), Disease of thyroid gland, Glaucoma, bilateral, Hyperlipidemia, Osteoporosis, Otitis media (11/4/14), and Scoliosis (12/12/12).  She   Patient Active Problem List    Diagnosis Date Noted    Left leg pain 05/30/2024    Vitamin D deficiency 05/28/2024    History of stroke 09/12/2023    Thyroid nodule 07/19/2023    Aneurysm (HCC) 03/08/2023    Nail abnormalities 02/17/2023    Osteoporosis 02/09/2023    HLD (hyperlipidemia) 02/06/2023    Acute blood loss anemia 02/05/2023    Fall 02/03/2023    Acute pain due to trauma 02/03/2023    Hypothyroidism 02/03/2023    Femur fracture (HCC) 02/02/2023    Chest pain 10/31/2022    Abnormal electrocardiogram (ECG) (EKG) 10/31/2022    Primary osteoarthritis of right knee 05/14/2019     Primary osteoarthritis of left knee 05/14/2019     She  has a past surgical history that includes Bunionectomy; Correction hammer toe; [place] tooth extraction; NEUROMA EXCISION; Other surgical history; Colonoscopy (06/04/2019); Mammo (historical) (07/09/2018); pr optx fem shft fx w/insj imed implt w/wo screw (Right, 02/03/2023); and Fracture surgery (2/3/23).  Her family history includes Arthritis in her mother; Cancer in her father; Dementia in her maternal aunt and maternal aunt; Glaucoma in her mother; Hyperlipidemia in her family; Hypertension in her brother, family, maternal aunt, and mother; Hypothyroidism in her mother; No Known Problems in her maternal aunt, maternal grandfather, maternal grandmother, paternal aunt, paternal grandfather, and paternal grandmother; Osteoporosis in her family and maternal aunt; Stomach cancer (age of onset: 51) in her father; Thyroid disease in her mother.  She  reports that she has never smoked. She has never used smokeless tobacco. She reports current alcohol use. She reports that she does not use drugs.  Current Outpatient Medications   Medication Sig Dispense Refill    acetaminophen (Tylenol 8 Hour) 650 mg CR tablet Take 650 mg by mouth every 8 (eight) hours as needed for moderate pain      amLODIPine (NORVASC) 5 mg tablet Take 1 tablet (5 mg total) by mouth daily 30 tablet 5    Calcium Carb-Cholecalciferol 600-20 MG-MCG TABS 2 tablets Daily at 2am      denosumab (PROLIA) 60 mg/mL Inject 60 mg under the skin once Twice a year      Glucosamine-Chondroitin 500-400 MG CAPS Take 1 tablet by mouth in the morning      ibuprofen (Advil) 200 mg tablet Take 200 mg by mouth every 4 (four) hours as needed for moderate pain      levothyroxine 25 mcg tablet TAKE 1 TABLET BY MOUTH EVERY DAY 90 tablet 1    predniSONE 10 mg tablet Take 8 mg by mouth daily Take as directed by prescriber -  taper dose      simvastatin (ZOCOR) 40 mg tablet TAKE 1 TABLET BY MOUTH DAILY AT BEDTIME 90 tablet 3     White Petrolatum-Mineral Oil (artificial tears) Administer 1 Application to both eyes as needed for dry eyes      Naphazoline-Polyethyl Glycol 0.012-0.2 % SOLN Apply 1-2 drops to eye 4 (four) times a day       No current facility-administered medications for this visit.     Current Outpatient Medications on File Prior to Visit   Medication Sig    acetaminophen (Tylenol 8 Hour) 650 mg CR tablet Take 650 mg by mouth every 8 (eight) hours as needed for moderate pain    amLODIPine (NORVASC) 5 mg tablet Take 1 tablet (5 mg total) by mouth daily    Calcium Carb-Cholecalciferol 600-20 MG-MCG TABS 2 tablets Daily at 2am    denosumab (PROLIA) 60 mg/mL Inject 60 mg under the skin once Twice a year    Glucosamine-Chondroitin 500-400 MG CAPS Take 1 tablet by mouth in the morning    ibuprofen (Advil) 200 mg tablet Take 200 mg by mouth every 4 (four) hours as needed for moderate pain    levothyroxine 25 mcg tablet TAKE 1 TABLET BY MOUTH EVERY DAY    predniSONE 10 mg tablet Take 8 mg by mouth daily Take as directed by prescriber -  taper dose    simvastatin (ZOCOR) 40 mg tablet TAKE 1 TABLET BY MOUTH DAILY AT BEDTIME    White Petrolatum-Mineral Oil (artificial tears) Administer 1 Application to both eyes as needed for dry eyes    Naphazoline-Polyethyl Glycol 0.012-0.2 % SOLN Apply 1-2 drops to eye 4 (four) times a day     No current facility-administered medications on file prior to visit.     There are no discontinued medications.   She is allergic to fentanyl, indomethacin, and oxycodone..    Review of Systems   Constitutional:  Negative for appetite change, chills, fatigue and fever.   HENT:  Negative for sore throat and trouble swallowing.    Eyes:  Negative for redness.   Respiratory:  Negative for shortness of breath.    Cardiovascular:  Negative for chest pain and palpitations.   Gastrointestinal:  Negative for abdominal pain, constipation and diarrhea.   Genitourinary:  Negative for dysuria and hematuria.  "  Musculoskeletal:  Positive for arthralgias and neck pain. Negative for back pain.   Skin:  Negative for rash.   Neurological:  Negative for seizures, weakness and headaches.   Hematological:  Negative for adenopathy.   Psychiatric/Behavioral:  Negative for confusion. The patient is not nervous/anxious.          Objective:      /80 Comment: home  Pulse 84   Temp 98.2 °F (36.8 °C) (Oral)   Ht 5' 1\" (1.549 m)   Wt 60.3 kg (133 lb)   SpO2 98% Comment: ra  BMI 25.13 kg/m²     Results Reviewed       None            Recent Results (from the past 1344 hour(s))   Sedimentation rate, automated    Collection Time: 11/06/24 10:00 AM   Result Value Ref Range    Sed Rate 33 (H) 0 - 29 mm/hour        Physical Exam  Constitutional:       General: She is not in acute distress.     Appearance: Normal appearance.   HENT:      Head: Normocephalic and atraumatic.      Nose: Nose normal.      Mouth/Throat:      Mouth: Mucous membranes are moist.   Eyes:      Extraocular Movements: Extraocular movements intact.      Pupils: Pupils are equal, round, and reactive to light.   Cardiovascular:      Rate and Rhythm: Normal rate and regular rhythm.      Pulses: Normal pulses.      Heart sounds: Normal heart sounds. No murmur heard.     No friction rub.   Pulmonary:      Effort: Pulmonary effort is normal. No respiratory distress.      Breath sounds: Normal breath sounds. No wheezing.   Abdominal:      General: Abdomen is flat. Bowel sounds are normal. There is no distension.      Palpations: Abdomen is soft. There is no mass.      Tenderness: There is no abdominal tenderness. There is no guarding.   Musculoskeletal:         General: Normal range of motion.      Cervical back: Neck supple.   Neurological:      General: No focal deficit present.      Mental Status: She is alert and oriented to person, place, and time. Mental status is at baseline.      Cranial Nerves: No cranial nerve deficit.   Psychiatric:         Mood and Affect: " Mood normal.         Behavior: Behavior normal.

## 2024-11-12 ENCOUNTER — OFFICE VISIT (OUTPATIENT)
Dept: PHYSICAL THERAPY | Facility: REHABILITATION | Age: 79
End: 2024-11-12
Payer: MEDICARE

## 2024-11-12 DIAGNOSIS — M25.511 PAIN AND SWELLING OF RIGHT SHOULDER: Primary | ICD-10-CM

## 2024-11-12 DIAGNOSIS — M25.411 PAIN AND SWELLING OF RIGHT SHOULDER: Primary | ICD-10-CM

## 2024-11-12 PROCEDURE — 97110 THERAPEUTIC EXERCISES: CPT

## 2024-11-12 PROCEDURE — 97112 NEUROMUSCULAR REEDUCATION: CPT

## 2024-11-12 PROCEDURE — 97140 MANUAL THERAPY 1/> REGIONS: CPT

## 2024-11-12 NOTE — PROGRESS NOTES
Daily Note     Today's date: 2024  Patient name: Mari Vilchis  : 1945  MRN: [id number]  Referring provider: Devaughn Richardson MD  Dx:   No diagnosis found.            Start Time: 1047  Stop Time: 1125  Total time in clinic (min): 38 minutes    Subjective: Patient reports sx improvement since last visit.      Objective: See treatment diary below      Assessment: Tolerated treatment well. Patient tolerant of all interventions today without sx increase. Patient would benefit from continued PT      Plan: Continue per plan of care.      Precautions:   Past Medical History:   Diagnosis Date    Aneurysm (HCC) 23    2.5 mg found on neck CAT scan    Arthritis     Closed nondisplaced fracture of fifth metatarsal bone of right foot with routine healing     Difficulty walking 23    Broken Femur    Disease of thyroid gland     hypothyroid    Glaucoma, bilateral     Hyperlipidemia     Osteoporosis     Otitis media 14    Otitis Media    Scoliosis 12       Allergies   Allergen Reactions    Fentanyl Vomiting    Indomethacin GI Intolerance     Other reaction(s): GI upset  Other reaction(s): GI upset    Oxycodone Dizziness, Nausea Only, GI Intolerance and Other (See Comments)     Other reaction(s): GI upset  Other reaction(s): GI upset       POC expires Unit limit Auth Expiration date PT/OT + Visit Limit?   12 weeks - 2025 bomn bomn bomn         Visit/Unit Tracking  AUTH Status:  Date 10/17 10/22 10/24 10/29 10/31 11/5 11/7   MC bomn Used 1 2 3 4 5 6 7    Remaining              Pertinent Findings:      POC End Date:  12 weeks - 2025                                                                                     Test / Measure     FOTO (Predicted 69) 59   (+) spurling R    C/s ext/rot AROM 75%   Periscapular strength </=4+/5   Poor posture          Access Code: [email or web address]  URL: [email or web address]  Date: 10/17/2024  Prepared by: Viral Schneider    Exercises  - Standing  "Shoulder Row with Anchored Resistance  - 1 x daily - 3 x weekly - 2 sets - 10 reps  - Shoulder External Rotation with Anchored Resistance  - 1 x daily - 3 x weekly - 2 sets - 10 reps  - Shoulder Internal Rotation with Resistance  - 1 x daily - 3 x weekly - 2 sets - 10 reps    Access Code: [email or web address]  URL: [email or web address]  Date: 11/05/2024  Prepared by: Viral Schneider    Exercises  - Seated Upper Trapezius Stretch  - 2 x daily - 7 x weekly - 3 sets - 30 seconds hold  - Seated Levator Scapulae Stretch  - 2 x daily - 7 x weekly - 3 sets - 30 seconds hold  Manuals 10/29 10/31 11/5 11/7 11/12     Manual traction 2x2' CM D/c CM 2'x3 CM 2'x3 CM 2'x3     R UT/LS stretching 3x30\" ea D/c 3x30\" ea 5x30\" ea 5x30\" ea     R UT /LS STM   CM 20' CM 10' CM 5'               Neuro Re-Ed          Hep review/pt education 5' 5' 15' 10'      Row 2x10 10# 2x10 10# 2x10 5# 2x10 10# 2x10 10#     LPD 2x10 3# 2x10 5# 2x10 5# 2x10 5# 2x10 5#     Supine chin tuck 15x 3\" holds   15x 3\" holds 15x 3\" holds     Seated chin tuck  2x10 3\" 10x 3\"       Seated chin tuck and extend  10x 3\" D/c       Seated chin tuck and rot  10x B 3\" D/c       Supine chin tuck and lift 10x no PT assist         Median N glide 5x5\" holds PT assist         Radial N glide 5x5\" holdsPT assist         Supine self traction 3x30\" HEP         C/s rot isometrics  10x B 5\" holds D/c                            Ther Ex          Seated t/s ext  15x 5\"        Seated t/s rot          UBE 3/3 L0 3/3 L0  3/3 L0 3/3 L0     Open book 15x R rot 5\" holds 15x R rot 5\" holds   15x R rot 5\" holds     ER/IR 2x10 ea 1# Resume NV   2x10 ea 1#     Sup tri ext 2x12 R 1# Resume NV                                      Ther Activity                              Gait Training                              Modalities                                   "

## 2024-11-14 ENCOUNTER — OFFICE VISIT (OUTPATIENT)
Dept: PHYSICAL THERAPY | Facility: REHABILITATION | Age: 79
End: 2024-11-14
Payer: MEDICARE

## 2024-11-14 DIAGNOSIS — M25.411 PAIN AND SWELLING OF RIGHT SHOULDER: Primary | ICD-10-CM

## 2024-11-14 DIAGNOSIS — M25.511 PAIN AND SWELLING OF RIGHT SHOULDER: Primary | ICD-10-CM

## 2024-11-14 PROCEDURE — 97140 MANUAL THERAPY 1/> REGIONS: CPT

## 2024-11-14 PROCEDURE — 97112 NEUROMUSCULAR REEDUCATION: CPT

## 2024-11-14 PROCEDURE — 97110 THERAPEUTIC EXERCISES: CPT

## 2024-11-14 NOTE — PROGRESS NOTES
Daily Note     Today's date: 2024  Patient name: Mari Vilchis  : 1945  MRN: [id number]  Referring provider: Devaughn Richardson MD  Dx:   Encounter Diagnosis     ICD-10-CM    1. Pain and swelling of right shoulder  M25.511     M25.411                   Start Time: 1030  Stop Time: 1108  Total time in clinic (min): 38 minutes    Subjective: Patient reports soreness following LV. Reports steady improvements since then.      Objective: See treatment diary below      Assessment: Tolerated treatment well. Educated on timing and frequency of exercise performance, DOMS and typical responses to exercises. No sx increase noted throughout again. Patient would benefit from continued PT      Plan: Continue per plan of care.      Precautions:   Past Medical History:   Diagnosis Date    Aneurysm (HCC) 23    2.5 mg found on neck CAT scan    Arthritis     Closed nondisplaced fracture of fifth metatarsal bone of right foot with routine healing     Difficulty walking 23    Broken Femur    Disease of thyroid gland     hypothyroid    Glaucoma, bilateral     Hyperlipidemia     Osteoporosis     Otitis media 14    Otitis Media    Scoliosis 12       Allergies   Allergen Reactions    Fentanyl Vomiting    Indomethacin GI Intolerance     Other reaction(s): GI upset  Other reaction(s): GI upset    Oxycodone Dizziness, Nausea Only, GI Intolerance and Other (See Comments)     Other reaction(s): GI upset  Other reaction(s): GI upset       POC expires Unit limit Auth Expiration date PT/OT + Visit Limit?   12 weeks - 2025 bomn bomn bomn         Visit/Unit Tracking  AUTH Status:  Date 10/17 10/22 10/24 10/29 10/31 11/5 11/7 11/14 RE   MC bomn Used 1 2 3 4 5 6 7 8     Remaining                Pertinent Findings:      POC End Date:  12 weeks - 2025                                                                                     Test / Measure     FOTO (Predicted 69) 59   (+) spurling R    C/s ext/rot  "AROM 75%   Periscapular strength </=4+/5   Poor posture          Access Code: [email or web address]  URL: [email or web address]  Date: 10/17/2024  Prepared by: Viral Schneider    Exercises  - Standing Shoulder Row with Anchored Resistance  - 1 x daily - 3 x weekly - 2 sets - 10 reps  - Shoulder External Rotation with Anchored Resistance  - 1 x daily - 3 x weekly - 2 sets - 10 reps  - Shoulder Internal Rotation with Resistance  - 1 x daily - 3 x weekly - 2 sets - 10 reps    Access Code: [email or web address]  URL: [email or web address]  Date: 11/05/2024  Prepared by: Viral Schneider    Exercises  - Seated Upper Trapezius Stretch  - 2 x daily - 7 x weekly - 3 sets - 30 seconds hold  - Seated Levator Scapulae Stretch  - 2 x daily - 7 x weekly - 3 sets - 30 seconds hold  Manuals 10/29 10/31 11/5 11/7 11/12 11/14    Manual traction 2x2' CM D/c CM 2'x3 CM 2'x3 CM 2'x3 CM 2'x3    R UT/LS stretching 3x30\" ea D/c 3x30\" ea 5x30\" ea 5x30\" ea 5x30\" ea    R UT /LS STM   CM 20' CM 10' CM 5' CM 5'              Neuro Re-Ed          Hep review/pt education 5' 5' 15' 10'  5'    Row 2x10 10# 2x10 10# 2x10 5# 2x10 10# 2x10 10# 2x10 10#    LPD 2x10 3# 2x10 5# 2x10 5# 2x10 5# 2x10 5# 2x10 5#    Supine chin tuck 15x 3\" holds   15x 3\" holds 15x 3\" holds 15x 5\" holds    Seated chin tuck  2x10 3\" 10x 3\"       Supine chin tuck and lift 10x no PT assist         Median N glide 5x5\" holds PT assist         Radial N glide 5x5\" holdsPT assist                             Ther Ex          Seated t/s ext  15x 5\"        Seated t/s rot          UBE 3/3 L0 3/3 L0  3/3 L0 3/3 L0 3/3 L0    Open book 15x R rot 5\" holds 15x R rot 5\" holds   15x R rot 5\" holds 15x R rot 5\" holds    ER/IR 2x10 ea 1# Resume NV   2x10 ea 1# Resume NV    Sup tri ext 2x12 R 1# Resume NV                                      Ther Activity                              Gait Training                              Modalities                                   "

## 2024-11-19 ENCOUNTER — EVALUATION (OUTPATIENT)
Dept: PHYSICAL THERAPY | Facility: REHABILITATION | Age: 79
End: 2024-11-19
Payer: MEDICARE

## 2024-11-19 ENCOUNTER — TELEPHONE (OUTPATIENT)
Age: 79
End: 2024-11-19

## 2024-11-19 DIAGNOSIS — M25.411 PAIN AND SWELLING OF RIGHT SHOULDER: Primary | ICD-10-CM

## 2024-11-19 DIAGNOSIS — M25.511 PAIN AND SWELLING OF RIGHT SHOULDER: Primary | ICD-10-CM

## 2024-11-19 PROCEDURE — 97110 THERAPEUTIC EXERCISES: CPT

## 2024-11-19 PROCEDURE — 97140 MANUAL THERAPY 1/> REGIONS: CPT

## 2024-11-19 PROCEDURE — 97112 NEUROMUSCULAR REEDUCATION: CPT

## 2024-11-19 NOTE — PROGRESS NOTES
PT Evaluation     Today's date: 2024  Patient name: Mari Vilchis  : 1945  MRN: [id number]  Referring provider: Devaughn Richardson MD  Dx:   Encounter Diagnosis     ICD-10-CM    1. Pain and swelling of right shoulder  M25.511     M25.411             Start Time: 1004  Stop Time: 1058  Total time in clinic (min): 54 minutes    Assessment  Impairments: abnormal or restricted ROM, activity intolerance, impaired physical strength, lacks appropriate home exercise program and poor posture     Assessment details: Mari Vilchis is a pleasant 79 y.o. female who presents with R sided neck and shoulder pain. Upon eval today, she has poor posture, poor persicapular strength, chino c/s AROM, chino t/s AROM and altered UE neurodynamics resulting in worry over not knowing what's wrong and fear of not being able to keep active.  No further referral appears necessary at this time based upon examination results.  I expect she will improve within 6 weeks of skilled PT.  Positive prognostic indicators include positive attitude toward recovery and good understanding of diagnosis and treatment plan options.  Negative prognostic indicators include multiple concurrent orthopedic problems.      Comparable signs:  1) C/s ext/R rot  2) R spurling  3) Tricep MMT      Problem List:  1) Poor posture  2) Poor periscapular strength  3) Chino c/s/t/s AROM  4) Altered UE neurodynamics    24: Re-evaluation was performed today and the patient has demonstrated improvements in UE strength and c/s AROM. Patient still presents with deficits of poor posture, poor periscapular strength, chino c/s and t/s ROM resulting in pain with ADLs. Patient educated on findings of RE and that her sx appear to be of multi-origin. Patient with recent lab testing revealing elevated ESR, scheduled to visit with rheumatologist 24. Cont with progressions of existing interventions as charted below with good tolerance today. Education again provided  throughout surrounding tissue end feels, DOMS, sx response to exercise, UE and cervical anatomy. Sx consistent with mm fatigue present throughout.    Understanding of Dx/Px/POC: excellent     Prognosis: good    Goals  Impairment based goals  Patient will improve periscapular strength to 4+/5 in all planes within 3 weeks in order to improve ease and stability with functional mobility. - met  Patient will improve periscapular strength to 5/5 in all planes by time of d/c in order to improve ease and stability with functional mobility. - not met  Patient will consistently demonstrate proper upright seated and standing posture during treatment sessions centered around improving deficits of strength and mobility. - part  met  Patient will improve c/s ROM to WFL by time of d/c in order to improve ease and stability with functional mobility. - part met  Patient will improve t/s ROM to WFL by time of d/c in order to improve ease and stability with functional mobility. - part met      Functional based goals to be completed by time of d/c  Patient will improve FOTO to greater than predicted value. - not met  Patient will be independent with home exercise program. - part  met  Patient will be able to manage symptoms independently. - part met    Plan    Planned therapy interventions: abdominal trunk stabilization, activity modification, ADL training, balance/weight bearing training, gait training, home exercise program, therapeutic exercise, therapeutic activities, stretching, strengthening, patient education, neuromuscular re-education, postural training, therapeutic training, joint mobilization, IASTM, kinesiology taping, manual therapy, massage, Meyer taping, motor coordination training, muscle pump exercises, nerve gliding, self care, work reintegration, fine motor coordination training, flexibility, functional ROM exercises, graded activity, graded exercise, graded motor, IADL retraining, balance, behavior modification,  body mechanics training, breathing training, transfer training and coordination    Frequency: 2x week  Duration in weeks: 12  Treatment plan discussed with: patient        Subjective Evaluation    History of Present Illness  Mechanism of injury: 10/17/2024: Mari Vilchis is a pleasant 79 y.o. female presenting to PT for R sided neck and shoulder pain. Patient reports initial onset ~2 weeks ago which she attributes to falling asleep in a chair with her head tilted to the R. Since then, she reports occasional pain radiating down her R arm, as well as occasional N&T in her R pointer finger.    24: Patient presents to PT having completed 9 sessions to date. Patient has demonstrated inconsistent progress throughout PT thus far, initially demonstrating improvements in neck and shoulder soreness but with slight regression as of recent.   Patient Goals  Patient goals for therapy: increased strength, independence with ADLs/IADLs, return to sport/leisure activities, decreased pain and increased motion    Pain  Current pain ratin  At best pain ratin  At worst pain ratin  Location: R UT region/posterior shoulder to R pointer finger  Quality: sharp and dull ache  Relieving factors: medications, rest and support (tylenol/salonpas)  Aggravating factors: overhead activity (reaching, sitting and leaning against something)  Progression: improved    Social Support    Employment status: not working  Hand dominance: right      Diagnostic Tests  X-ray: normal        Objective  CERVICAL EXAM  Red flags: None  Kuhn: negative   Babinski: negative   Alar Ligament: negative   Sharp Kourtney: negative   Vertebral Artery Test: negative   Spurling: negative B  Distraction: negative    SENSATION   LEFT RIGHT   C2-C3 Intact Intact   C4 Intact Intact   C5 Intact Intact   C6 Intact Intact   C7 Intact Intact   C8 Intact Intact   T1 Intact Intact   T2 Intact Intact     REFLEXES   LEFT RIGHT   TRICEPS 2+ 2+   BICEPS 2+ 2+   BR 2+  2+     POSTURAL FINDINGS      CERVICAL   AROM    RIGHT LEFT   %   EXT 75% P!   ROT 90% P! 100%       THORACIC   AROM    RIGHT LEFT   FLX 75%   EXT 50% P!   ROT 50% P! 75%       SHOULDER   AROM all WFL PROM STRENGTH    RIGHT LEFT RIGHT LEFT RIGHT LEFT   FLX     4/5 5/5   ABD     4+/5 5/5   EXT     4/5 4+/5   ER     4+/5 4+/5   IR     4+/5 4+/5   U. TRAP     5/5 5/5   M. TRAP     4-/5 4-/5   L. TRAP     4-/5 4-/5   SERRATUS     4/5 4/5       ELBOW   AROM PROM STRENGTH    RIGHT LEFT RIGHT LEFT RIGHT LEFT   FLX     5/5 5/5   EXT     4-/5 5/5                    SPECIAL   LEFT RIGHT   RTC/IMPINGEMENT     Davis  pos   Infraspinatus  Pos for pain   Painful arc  neg   Drop arm  neg   ER lag sign     Lift off sign     Neer     Empty can     BICEPS TENDINOPATHY     Speed's     Yergason's     Arkoma's     ACJ     Active compression     Crossover          SCAPULA     Scapular assistance     Scapular dyskinesis     TOS     Adson     Ramin Phamos     ULTT     ULTT 1  Pos   ULTT 2     ULTT 3     ULTT 4       COMMENTS:  (+) carpal compression, (+) phalen's, (+) tinel's R         Precautions:   Past Medical History:   Diagnosis Date    Aneurysm (HCC) 2/8/23    2.5 mg found on neck CAT scan    Arthritis 2005    Closed nondisplaced fracture of fifth metatarsal bone of right foot with routine healing     Difficulty walking 2/2/23    Broken Femur    Disease of thyroid gland     hypothyroid    Glaucoma, bilateral     Hyperlipidemia     Osteoporosis     Otitis media 11/4/14    Otitis Media    Scoliosis 12/12/12       Allergies   Allergen Reactions    Fentanyl Vomiting    Indomethacin GI Intolerance     Other reaction(s): GI upset  Other reaction(s): GI upset    Oxycodone Dizziness, Nausea Only, GI Intolerance and Other (See Comments)     Other reaction(s): GI upset  Other reaction(s): GI upset          POC expires Unit limit Auth Expiration date PT/OT + Visit Limit?   12 weeks - 1/9/2025 bomn bomn bomn           "   Visit/Unit Tracking  AUTH Status:  Date 10/17 10/22 10/24 10/29 10/31 11/5 11/7 11/14 11/19 (RE)   MC bomn Used 1 2 3 4 5 6 7 8  9     Remaining                         Pertinent Findings:      POC End Date:  12 weeks - 1/9/2025                                                                                     Test / Measure      FOTO (Predicted 69) 59   (+) spurling R     C/s ext/rot AROM 75%   Periscapular strength </=4+/5   Poor posture              Access Code: [email or web address]  URL: [email or web address]  Date: 10/17/2024  Prepared by: Viral Schneider     Exercises  - Standing Shoulder Row with Anchored Resistance  - 1 x daily - 3 x weekly - 2 sets - 10 reps  - Shoulder External Rotation with Anchored Resistance  - 1 x daily - 3 x weekly - 2 sets - 10 reps  - Shoulder Internal Rotation with Resistance  - 1 x daily - 3 x weekly - 2 sets - 10 reps     Access Code: [email or web address]  URL: [email or web address]  Date: 11/05/2024  Prepared by: Viral Schneider     Exercises  - Seated Upper Trapezius Stretch  - 2 x daily - 7 x weekly - 3 sets - 30 seconds hold  - Seated Levator Scapulae Stretch  - 2 x daily - 7 x weekly - 3 sets - 30 seconds hold  Manuals 10/29 10/31 11/5 11/7 11/12 11/14  11/19   Manual traction 2x2' CM D/c CM 2'x3 CM 2'x3 CM 2'x3 CM 2'x3     R UT/LS stretching 3x30\" ea D/c 3x30\" ea 5x30\" ea 5x30\" ea 5x30\" ea     R UT /LS STM     CM 20' CM 10' CM 5' CM 5'  CM 15'                     Neuro Re-Ed                 Hep review/pt education 5' 5' 15' 10'   5'  10'   Row 2x10 10# 2x10 10# 2x10 5# 2x10 10# 2x10 10# 2x10 10#  2x10 10#   LPD 2x10 3# 2x10 5# 2x10 5# 2x10 5# 2x10 5# 2x10 5#  2x10 5\"   Supine chin tuck 15x 3\" holds     15x 3\" holds 15x 3\" holds 15x 5\" holds  15x 5\" holds   Seated chin tuck   2x10 3\" 10x 3\"           Supine chin tuck and lift 10x no PT assist               Median N glide 5x5\" holds PT assist               Radial N glide 5x5\" holdsPT assist                no money             " "  2x10 ytb                     Ther Ex                 Seated t/s ext   15x 5\"             Seated t/s rot                 UBE 3/3 L0 3/3 L0   3/3 L0 3/3 L0 3/3 L0  3/3 L0   Open book 15x R rot 5\" holds 15x R rot 5\" holds     15x R rot 5\" holds 15x R rot 5\" holds    ER/IR 2x10 ea 1# Resume NV     2x10 ea 1# Resume NV 2x10 ea 1#    Sup tri ext 2x12 R 1# Resume NV                                                                   Ther Activity                                                     Gait Training                                                     Modalities                                                        "

## 2024-11-19 NOTE — TELEPHONE ENCOUNTER
Patient said that she will be coming in Feb for next Prolia and last one was covered by Medicare and [employer], she just wanted to be sure that is the case again. Please verify and call patient.

## 2024-11-21 ENCOUNTER — OFFICE VISIT (OUTPATIENT)
Dept: PHYSICAL THERAPY | Facility: REHABILITATION | Age: 79
End: 2024-11-21
Payer: MEDICARE

## 2024-11-21 DIAGNOSIS — M25.411 PAIN AND SWELLING OF RIGHT SHOULDER: Primary | ICD-10-CM

## 2024-11-21 DIAGNOSIS — M25.511 PAIN AND SWELLING OF RIGHT SHOULDER: Primary | ICD-10-CM

## 2024-11-21 PROCEDURE — 97110 THERAPEUTIC EXERCISES: CPT

## 2024-11-21 PROCEDURE — 97112 NEUROMUSCULAR REEDUCATION: CPT

## 2024-11-21 PROCEDURE — 97140 MANUAL THERAPY 1/> REGIONS: CPT

## 2024-11-21 NOTE — PROGRESS NOTES
Daily Note     Today's date: 2024  Patient name: Mari Vilchis  : 1945  MRN: [id number]  Referring provider: Devaughn Richardson MD  Dx:   Encounter Diagnosis     ICD-10-CM    1. Pain and swelling of right shoulder  M25.511     M25.411           Start Time: 1040  Stop Time: 1118  Total time in clinic (min): 38 minutes    Subjective: I was good for 2 days and last night I was waking up every hour.      Objective: See treatment diary below      Assessment: Tolerated treatment well. Good tolerance to STM again today. Patient would benefit from continued PT      Plan: Continue per plan of care.      Precautions:   Past Medical History:   Diagnosis Date    Aneurysm (HCC) 23    2.5 mg found on neck CAT scan    Arthritis     Closed nondisplaced fracture of fifth metatarsal bone of right foot with routine healing     Difficulty walking 23    Broken Femur    Disease of thyroid gland     hypothyroid    Glaucoma, bilateral     Hyperlipidemia     Osteoporosis     Otitis media 14    Otitis Media    Scoliosis 12       Allergies   Allergen Reactions    Fentanyl Vomiting    Indomethacin GI Intolerance     Other reaction(s): GI upset  Other reaction(s): GI upset    Oxycodone Dizziness, Nausea Only, GI Intolerance and Other (See Comments)     Other reaction(s): GI upset  Other reaction(s): GI upset          POC expires Unit limit Auth Expiration date PT/OT + Visit Limit?   12 weeks - 2025 bomn bomn bomn             Visit/Unit Tracking  AUTH Status:  Date 10/17 10/22 10/24 10/29 10/31 11/5 11/7 11/14 11/19 (RE)     bomn Used 1 2 3 4 5 6 7 8  9 10     Remaining                          Pertinent Findings:      POC End Date:  12 weeks - 2025                                                                                     Test / Measure      FOTO (Predicted 69) 59   (+) spurling R     C/s ext/rot AROM 75%   Periscapular strength </=4+/5   Poor posture              Access Code:  [email or web address]  URL: [email or web address]  Date: 10/17/2024  Prepared by: Viral Schneider     Exercises  - Standing Shoulder Row with Anchored Resistance  - 1 x daily - 3 x weekly - 2 sets - 10 reps  - Shoulder External Rotation with Anchored Resistance  - 1 x daily - 3 x weekly - 2 sets - 10 reps  - Shoulder Internal Rotation with Resistance  - 1 x daily - 3 x weekly - 2 sets - 10 reps     Access Code: [email or web address]  URL: [email or web address]  Date: 11/05/2024  Prepared by: Viral Schneider     Exercises  - Seated Upper Trapezius Stretch  - 2 x daily - 7 x weekly - 3 sets - 30 seconds hold  - Seated Levator Scapulae Stretch  - 2 x daily - 7 x weekly - 3 sets - 30 seconds hold  Manuals 10/29 10/31 11/5 11/7 11/12 11/14  11/19 11/21   Manual traction 2x2' CM D/c CM 2'x3 CM 2'x3 CM 2'x3 CM 2'x3      R UT/LS stretching 3x30\" ea D/c 3x30\" ea 5x30\" ea 5x30\" ea 5x30\" ea      R UT /LS STM     CM 20' CM 10' CM 5' CM 5'  CM 15' CM 15'                      Neuro Re-Ed                  Hep review/pt education 5' 5' 15' 10'   5'  10' 5'   Row 2x10 10# 2x10 10# 2x10 5# 2x10 10# 2x10 10# 2x10 10#  2x10 10# 2x10 10#   LPD 2x10 3# 2x10 5# 2x10 5# 2x10 5# 2x10 5# 2x10 5#  2x10 5\" 2x10 5\"   Supine chin tuck 15x 3\" holds     15x 3\" holds 15x 3\" holds 15x 5\" holds  15x 5\" holds    Seated chin tuck   2x10 3\" 10x 3\"            Supine chin tuck and lift 10x no PT assist                Median N glide 5x5\" holds PT assist                Radial N glide 5x5\" holdsPT assist                 no money               2x10 ytb 2x10 ytb                      Ther Ex                  Seated t/s ext   15x 5\"              Seated t/s rot                  UBE 3/3 L0 3/3 L0   3/3 L0 3/3 L0 3/3 L0  3/3 L0 3/3 L0   Open book 15x R rot 5\" holds 15x R rot 5\" holds     15x R rot 5\" holds 15x R rot 5\" holds     ER/IR 2x10 ea 1# Resume NV     2x10 ea 1# Resume NV 2x10 ea 1#  2x10 ea 1#   Sup tri ext 2x12 R 1# Resume NV              Shrug                " NV?   Sup punch                NV?                      Ther Activity                                                        Gait Training                                                        Modalities

## 2024-11-25 ENCOUNTER — OFFICE VISIT (OUTPATIENT)
Dept: PHYSICAL THERAPY | Facility: REHABILITATION | Age: 79
End: 2024-11-25
Payer: MEDICARE

## 2024-11-25 DIAGNOSIS — M25.511 PAIN AND SWELLING OF RIGHT SHOULDER: Primary | ICD-10-CM

## 2024-11-25 DIAGNOSIS — M25.411 PAIN AND SWELLING OF RIGHT SHOULDER: Primary | ICD-10-CM

## 2024-11-25 PROCEDURE — 97140 MANUAL THERAPY 1/> REGIONS: CPT

## 2024-11-25 PROCEDURE — 97112 NEUROMUSCULAR REEDUCATION: CPT

## 2024-11-25 PROCEDURE — 97110 THERAPEUTIC EXERCISES: CPT

## 2024-11-25 NOTE — PROGRESS NOTES
Daily Note     Today's date: 2024  Patient name: Mari Vilchis  : 1945  MRN: [id number]  Referring provider: Devaughn Richardson MD  Dx:   Encounter Diagnosis     ICD-10-CM    1. Pain and swelling of right shoulder  M25.511     M25.411             Start Time: 1053  Stop Time: 1131  Total time in clinic (min): 38 minutes    Subjective: Patient reports improvements in neck and shoulder sx.      Objective: See treatment diary below      Assessment: Tolerated treatment well. Good tolerance to the current plan of care again today. Sx increase present with LPD with mild R shoulder hike noted. Patient would benefit from continued PT      Plan: Continue per plan of care.      Precautions:   Past Medical History:   Diagnosis Date    Aneurysm (HCC) 23    2.5 mg found on neck CAT scan    Arthritis     Closed nondisplaced fracture of fifth metatarsal bone of right foot with routine healing     Difficulty walking 23    Broken Femur    Disease of thyroid gland     hypothyroid    Glaucoma, bilateral     Hyperlipidemia     Osteoporosis     Otitis media 14    Otitis Media    Scoliosis 12       Allergies   Allergen Reactions    Fentanyl Vomiting    Indomethacin GI Intolerance     Other reaction(s): GI upset  Other reaction(s): GI upset    Oxycodone Dizziness, Nausea Only, GI Intolerance and Other (See Comments)     Other reaction(s): GI upset  Other reaction(s): GI upset          POC expires Unit limit Auth Expiration date PT/OT + Visit Limit?   12 weeks - 2025 bomn bomn bomn             Visit/Unit Tracking  AUTH Status:  Date  (RE)    MC bomn Used 6 7 8  9 10 11     Remaining                 Pertinent Findings:      POC End Date:  12 weeks - 2025                                                                                     Test / Measure      FOTO (Predicted 69) 59   (+) spurling R     C/s ext/rot AROM 75%   Periscapular strength </=4+/5   Poor  "posture              Access Code: [email or web address]  URL: [email or web address]  Date: 10/17/2024  Prepared by: Viral Schneider     Exercises  - Standing Shoulder Row with Anchored Resistance  - 1 x daily - 3 x weekly - 2 sets - 10 reps  - Shoulder External Rotation with Anchored Resistance  - 1 x daily - 3 x weekly - 2 sets - 10 reps  - Shoulder Internal Rotation with Resistance  - 1 x daily - 3 x weekly - 2 sets - 10 reps     Access Code: [email or web address]  URL: [email or web address]  Date: 11/05/2024  Prepared by: Viral Schneider     Exercises  - Seated Upper Trapezius Stretch  - 2 x daily - 7 x weekly - 3 sets - 30 seconds hold  - Seated Levator Scapulae Stretch  - 2 x daily - 7 x weekly - 3 sets - 30 seconds hold  Manuals 11/12 11/14 11/19 11/21 11/25   Manual traction CM 2'x3 CM 2'x3       R UT/LS stretching 5x30\" ea 5x30\" ea       R UT /LS STM CM 5' CM 5'  CM 15' CM 15' CM 15'               Neuro Re-Ed           Hep review/pt education   5'  10' 5'    Row 2x10 10# 2x10 10#  2x10 10# 2x10 10# 2x12 10#   LPD 2x10 5# 2x10 5#  2x10 5\" 2x10 5\" 2x10 5#   Supine chin tuck 15x 3\" holds 15x 5\" holds  15x 5\" holds     Seated chin tuck           Supine chin tuck and lift           Median N glide           Radial N glide            no money       2x10 ytb 2x10 ytb 2x10 ytb               Ther Ex           Seated t/s ext           Seated t/s rot           UBE 3/3 L0 3/3 L0  3/3 L0 3/3 L0 3/3 L0   Open book 15x R rot 5\" holds 15x R rot 5\" holds      ER/IR 2x10 ea 1# Resume NV 2x10 ea 1#  2x10 ea 1# 2x10 ea 1#   Sup tri ext           Shrug        NV?    Sup punch        NV?                Ther Activity                                   Gait Training                                   Modalities                                           "

## 2024-11-27 ENCOUNTER — OFFICE VISIT (OUTPATIENT)
Dept: PHYSICAL THERAPY | Facility: REHABILITATION | Age: 79
End: 2024-11-27
Payer: MEDICARE

## 2024-11-27 DIAGNOSIS — M25.511 PAIN AND SWELLING OF RIGHT SHOULDER: Primary | ICD-10-CM

## 2024-11-27 DIAGNOSIS — M25.411 PAIN AND SWELLING OF RIGHT SHOULDER: Primary | ICD-10-CM

## 2024-11-27 PROCEDURE — 97112 NEUROMUSCULAR REEDUCATION: CPT

## 2024-11-27 PROCEDURE — 97110 THERAPEUTIC EXERCISES: CPT

## 2024-11-27 PROCEDURE — 97140 MANUAL THERAPY 1/> REGIONS: CPT

## 2024-11-27 NOTE — PROGRESS NOTES
Daily Note     Today's date: 2024  Patient name: Mari Vilchis  : 1945  MRN: [id number]  Referring provider: Devaughn Richardson MD  Dx:   Encounter Diagnosis     ICD-10-CM    1. Pain and swelling of right shoulder  M25.511     M25.411             Start Time: 1026  Stop Time: 1104  Total time in clinic (min): 38 minutes    Subjective: Patient reports cont improvements in neck and shoulder sx.      Objective: See treatment diary below      Assessment: Tolerated treatment well. Improvements in form noted with LPD today. No sx increase noted throughout. Patient would benefit from continued PT      Plan: Continue per plan of care.      Precautions:   Past Medical History:   Diagnosis Date    Aneurysm (HCC) 23    2.5 mg found on neck CAT scan    Arthritis     Closed nondisplaced fracture of fifth metatarsal bone of right foot with routine healing     Difficulty walking 23    Broken Femur    Disease of thyroid gland     hypothyroid    Glaucoma, bilateral     Hyperlipidemia     Osteoporosis     Otitis media 14    Otitis Media    Scoliosis 12       Allergies   Allergen Reactions    Fentanyl Vomiting    Indomethacin GI Intolerance     Other reaction(s): GI upset  Other reaction(s): GI upset    Oxycodone Dizziness, Nausea Only, GI Intolerance and Other (See Comments)     Other reaction(s): GI upset  Other reaction(s): GI upset          POC expires Unit limit Auth Expiration date PT/OT + Visit Limit?   12 weeks - 2025 bomn bomn bomn             Visit/Unit Tracking  AUTH Status:  Date  (RE)     bomn Used 6 7 8  9 10 11 12     Remaining                  Pertinent Findings:      POC End Date:  12 weeks - 2025                                                                                     Test / Measure      FOTO (Predicted 69) 59   (+) spurling R     C/s ext/rot AROM 75%   Periscapular strength </=4+/5   Poor posture              Access  "Code: [email or web address]  URL: [email or web address]  Date: 10/17/2024  Prepared by: Viral Schneider     Exercises  - Standing Shoulder Row with Anchored Resistance  - 1 x daily - 3 x weekly - 2 sets - 10 reps  - Shoulder External Rotation with Anchored Resistance  - 1 x daily - 3 x weekly - 2 sets - 10 reps  - Shoulder Internal Rotation with Resistance  - 1 x daily - 3 x weekly - 2 sets - 10 reps     Access Code: [email or web address]  URL: [email or web address]  Date: 11/05/2024  Prepared by: Viral Schneider     Exercises  - Seated Upper Trapezius Stretch  - 2 x daily - 7 x weekly - 3 sets - 30 seconds hold  - Seated Levator Scapulae Stretch  - 2 x daily - 7 x weekly - 3 sets - 30 seconds hold  Manuals 11/12 11/14 11/19 11/21 11/25   Manual traction CM 2'x3 CM 2'x3       R UT/LS stretching 5x30\" ea 5x30\" ea       R UT /LS STM CM 5' CM 5'  CM 15' CM 15' CM 15'               Neuro Re-Ed           Hep review/pt education   5'  10' 5'    Row 2x10 10# 2x10 10#  2x10 10# 2x10 10# 2x12 10#   LPD 2x10 5# 2x10 5#  2x10 5\" 2x10 5\" 2x10 5#   Supine chin tuck 15x 3\" holds 15x 5\" holds  15x 5\" holds     Seated chin tuck           Supine chin tuck and lift           Median N glide           Radial N glide            no money       2x10 ytb 2x10 ytb 2x10 ytb               Ther Ex           Seated t/s ext           Seated t/s rot           UBE 3/3 L0 3/3 L0  3/3 L0 3/3 L0 3/3 L0   Open book 15x R rot 5\" holds 15x R rot 5\" holds      ER/IR 2x10 ea 1# Resume NV 2x10 ea 1#  2x10 ea 1# 2x10 ea 1#   Sup tri ext           Shrug        NV?    Sup punch        NV?                Ther Activity                                   Gait Training                                   Modalities                                           "

## 2024-11-28 DIAGNOSIS — E03.9 ACQUIRED HYPOTHYROIDISM: ICD-10-CM

## 2024-11-29 RX ORDER — LEVOTHYROXINE SODIUM 25 UG/1
25 TABLET ORAL DAILY
Qty: 90 TABLET | Refills: 1 | Status: SHIPPED | OUTPATIENT
Start: 2024-11-29

## 2024-12-02 ENCOUNTER — OFFICE VISIT (OUTPATIENT)
Dept: PHYSICAL THERAPY | Facility: REHABILITATION | Age: 79
End: 2024-12-02
Payer: MEDICARE

## 2024-12-02 DIAGNOSIS — M25.511 PAIN AND SWELLING OF RIGHT SHOULDER: Primary | ICD-10-CM

## 2024-12-02 DIAGNOSIS — M25.411 PAIN AND SWELLING OF RIGHT SHOULDER: Primary | ICD-10-CM

## 2024-12-02 PROCEDURE — 97140 MANUAL THERAPY 1/> REGIONS: CPT

## 2024-12-02 PROCEDURE — 97110 THERAPEUTIC EXERCISES: CPT

## 2024-12-02 PROCEDURE — 97112 NEUROMUSCULAR REEDUCATION: CPT

## 2024-12-02 NOTE — PROGRESS NOTES
Daily Note     Today's date: 2024  Patient name: Mari Vilchis  : 1945  MRN: [id number]  Referring provider: Devaughn Richardson MD  Dx:   Encounter Diagnosis     ICD-10-CM    1. Pain and swelling of right shoulder  M25.511     M25.411             Start Time: 1028  Stop Time: 1106  Total time in clinic (min): 38 minutes    Subjective: Patient reports cont improvements in neck and shoulder sx.      Objective: See treatment diary below      Assessment: Tolerated treatment well. Added wall circles to address winging. Will cont to progress as tolerated with a focus on posture and strengthening. Patient would benefit from continued PT      Plan: Continue per plan of care.      Precautions:   Past Medical History:   Diagnosis Date    Aneurysm (HCC) 23    2.5 mg found on neck CAT scan    Arthritis     Closed nondisplaced fracture of fifth metatarsal bone of right foot with routine healing     Difficulty walking 23    Broken Femur    Disease of thyroid gland     hypothyroid    Glaucoma, bilateral     Hyperlipidemia     Osteoporosis     Otitis media 14    Otitis Media    Scoliosis 12       Allergies   Allergen Reactions    Fentanyl Vomiting    Indomethacin GI Intolerance     Other reaction(s): GI upset  Other reaction(s): GI upset    Oxycodone Dizziness, Nausea Only, GI Intolerance and Other (See Comments)     Other reaction(s): GI upset  Other reaction(s): GI upset          POC expires Unit limit Auth Expiration date PT/OT + Visit Limit?   12 weeks - 2025 bomn bomn bomn             Visit/Unit Tracking  AUTH Status:  Date  (RE)    MC bomn Used 6 7 8  9 10 11 12     Remaining                  Pertinent Findings:      POC End Date:  12 weeks - 2025                                                                                     Test / Measure      FOTO (Predicted 69) 59   (+) spurling R     C/s ext/rot AROM 75%   Periscapular strength </=4+/5  "  Poor posture              Access Code: [email or web address]  URL: [email or web address]  Date: 10/17/2024  Prepared by: Viral Schneider     Exercises  - Standing Shoulder Row with Anchored Resistance  - 1 x daily - 3 x weekly - 2 sets - 10 reps  - Shoulder External Rotation with Anchored Resistance  - 1 x daily - 3 x weekly - 2 sets - 10 reps  - Shoulder Internal Rotation with Resistance  - 1 x daily - 3 x weekly - 2 sets - 10 reps     Access Code: [email or web address]  URL: [email or web address]  Date: 11/05/2024  Prepared by: Viral Schneider     Exercises  - Seated Upper Trapezius Stretch  - 2 x daily - 7 x weekly - 3 sets - 30 seconds hold  - Seated Levator Scapulae Stretch  - 2 x daily - 7 x weekly - 3 sets - 30 seconds hold  Manuals 11/12 11/14 11/19 11/21 11/25 12/2   Manual traction CM 2'x3 CM 2'x3        R UT/LS stretching 5x30\" ea 5x30\" ea        R UT /LS STM CM 5' CM 5'  CM 15' CM 15' CM 15' CM 15'                Neuro Re-Ed            Hep review/pt education   5'  10' 5'     Row 2x10 10# 2x10 10#  2x10 10# 2x10 10# 2x12 10# 2x12 10#   LPD 2x10 5# 2x10 5#  2x10 5\" 2x10 5\" 2x10 5# 2x10 5#   Supine chin tuck 15x 3\" holds 15x 5\" holds  15x 5\" holds      Seated chin tuck            Supine chin tuck and lift            Median N glide            Radial N glide             no money       2x10 ytb 2x10 ytb 2x10 ytb 2x10 ytb                Ther Ex            Seated t/s ext            Seated t/s rot            UBE 3/3 L0 3/3 L0  3/3 L0 3/3 L0 3/3 L0 Held    Open book 15x R rot 5\" holds 15x R rot 5\" holds       ER/IR 2x10 ea 1# Resume NV 2x10 ea 1#  2x10 ea 1# 2x10 ea 1# 2x10 ea   Sup tri ext            Shrug        NV?     Sup punch        NV?     Serratus wall circles      2x20 ea cw/ccw                Ther Activity                                      Gait Training                                      Modalities                                              "

## 2024-12-04 ENCOUNTER — OFFICE VISIT (OUTPATIENT)
Dept: PHYSICAL THERAPY | Facility: REHABILITATION | Age: 79
End: 2024-12-04
Payer: MEDICARE

## 2024-12-04 DIAGNOSIS — M25.511 PAIN AND SWELLING OF RIGHT SHOULDER: Primary | ICD-10-CM

## 2024-12-04 DIAGNOSIS — M25.411 PAIN AND SWELLING OF RIGHT SHOULDER: Primary | ICD-10-CM

## 2024-12-04 PROCEDURE — 97140 MANUAL THERAPY 1/> REGIONS: CPT

## 2024-12-04 PROCEDURE — 97112 NEUROMUSCULAR REEDUCATION: CPT

## 2024-12-04 PROCEDURE — 97110 THERAPEUTIC EXERCISES: CPT

## 2024-12-04 NOTE — PROGRESS NOTES
Daily Note     Today's date: 2024  Patient name: Mari Vilchis  : 1945  MRN: [id number]  Referring provider: Devaughn Richardson MD  Dx:   Encounter Diagnosis     ICD-10-CM    1. Pain and swelling of right shoulder  M25.511     M25.411             Start Time: 1230  Stop Time: 1308  Total time in clinic (min): 38 minutes    Subjective: Patient states her pain is no longer constant.      Objective: See treatment diary below      Assessment: Tolerated treatment well. Mild anterior shoulder soreness present with the addition of sup pro. Fatigue present at the end of the session. Patient would benefit from continued PT      Plan: Continue per plan of care.      Precautions:   Past Medical History:   Diagnosis Date    Aneurysm (HCC) 23    2.5 mg found on neck CAT scan    Arthritis     Closed nondisplaced fracture of fifth metatarsal bone of right foot with routine healing     Difficulty walking 23    Broken Femur    Disease of thyroid gland     hypothyroid    Glaucoma, bilateral     Hyperlipidemia     Osteoporosis     Otitis media 14    Otitis Media    Scoliosis 12       Allergies   Allergen Reactions    Fentanyl Vomiting    Indomethacin GI Intolerance     Other reaction(s): GI upset  Other reaction(s): GI upset    Oxycodone Dizziness, Nausea Only, GI Intolerance and Other (See Comments)     Other reaction(s): GI upset  Other reaction(s): GI upset          POC expires Unit limit Auth Expiration date PT/OT + Visit Limit?   12 weeks - 2025 bomn bomn bomn             Visit/Unit Tracking  AUTH Status:  Date  (RE)     bomn Used  9 10 11 12 13     Remaining             Pertinent Findings:      POC End Date:  12 weeks - 2025                                                                                     Test / Measure      FOTO (Predicted 69) 59   (+) spurling R     C/s ext/rot AROM 75%   Periscapular strength </=4+/5   Poor posture              Access  "Code: [email or web address]  URL: [email or web address]  Date: 10/17/2024  Prepared by: Viral Schneider     Exercises  - Standing Shoulder Row with Anchored Resistance  - 1 x daily - 3 x weekly - 2 sets - 10 reps  - Shoulder External Rotation with Anchored Resistance  - 1 x daily - 3 x weekly - 2 sets - 10 reps  - Shoulder Internal Rotation with Resistance  - 1 x daily - 3 x weekly - 2 sets - 10 reps     Access Code: [email or web address]  URL: [email or web address]  Date: 11/05/2024  Prepared by: Viral Schneider     Exercises  - Seated Upper Trapezius Stretch  - 2 x daily - 7 x weekly - 3 sets - 30 seconds hold  - Seated Levator Scapulae Stretch  - 2 x daily - 7 x weekly - 3 sets - 30 seconds hold  Manuals 11/12 11/14 11/19 11/21 11/25 12/2 12/4   Manual traction CM 2'x3 CM 2'x3         R UT/LS stretching 5x30\" ea 5x30\" ea         R UT /LS STM CM 5' CM 5'  CM 15' CM 15' CM 15' CM 15' CM 15'                 Neuro Re-Ed             Hep review/pt education   5'  10' 5'      Row 2x10 10# 2x10 10#  2x10 10# 2x10 10# 2x12 10# 2x12 10# 2x12 10#   LPD 2x10 5# 2x10 5#  2x10 5\" 2x10 5\" 2x10 5# 2x10 5# 2x10 5#   Supine chin tuck 15x 3\" holds 15x 5\" holds  15x 5\" holds       Seated chin tuck             Supine chin tuck and lift             Median N glide             Radial N glide              no money       2x10 ytb 2x10 ytb 2x10 ytb 2x10 ytb 2x10 ytb   SL flx/abd/ER       NV?                 Ther Ex             Seated t/s ext             Seated t/s rot             UBE 3/3 L0 3/3 L0  3/3 L0 3/3 L0 3/3 L0 Held  3/3 L0   Open book 15x R rot 5\" holds 15x R rot 5\" holds        ER/IR 2x10 ea 1# Resume NV 2x10 ea 1#  2x10 ea 1# 2x10 ea 1# 2x10 ea 1# 2x10 ea 1#   Sup tri ext             Shrug        NV?      Sup cane pro        NV?   2x10   Serratus wall circles      2x20 ea cw/ccw 2x20 ea cw/ccw                 Ther Activity                                         Gait Training                                         Modalities         "

## 2024-12-05 ENCOUNTER — CONSULT (OUTPATIENT)
Age: 79
End: 2024-12-05

## 2024-12-05 VITALS
HEART RATE: 66 BPM | TEMPERATURE: 98 F | DIASTOLIC BLOOD PRESSURE: 72 MMHG | BODY MASS INDEX: 24.35 KG/M2 | HEIGHT: 61 IN | OXYGEN SATURATION: 95 % | SYSTOLIC BLOOD PRESSURE: 124 MMHG | WEIGHT: 129 LBS

## 2024-12-05 DIAGNOSIS — G56.01 CARPAL TUNNEL SYNDROME OF RIGHT WRIST: Primary | ICD-10-CM

## 2024-12-05 DIAGNOSIS — M13.0 POLYARTHRITIS: ICD-10-CM

## 2024-12-05 RX ORDER — ACETAMINOPHEN 325 MG/1
325 TABLET ORAL
COMMUNITY

## 2024-12-05 NOTE — PROGRESS NOTES
Name: Mari Vilchis      : 1945      MRN: [id number]  Encounter Provider: Irene Santoro DO  Encounter Date: 2024   Encounter department: [facility] [address]  :  Assessment & Plan  Polyarthritis  Patient is a 79-year-old female presenting for further investigation of pain in right shoulder for the past 2 months.  Patient woke up with pain after lying on that side.  Pain is worse with use.  Denies prolonged morning stiffness.  Patient has been taking Tylenol as needed which helps.  Prednisone did not provide any relief.  Patient has also been working with physical therapy which has been helping.  Denies any prolonged morning stiffness.  Physical exam shows no synovitis or joint tenderness.  Full range of motion on exam.  Osteoarthritic changes appreciated.  Low suspicion for inflammatory arthritis at this point.  Symptoms seem most consistent with impingement syndrome and osteoarthritis.  -Continue physical therapy  -Continue Tylenol as needed  Orders:    Ambulatory Referral to Rheumatology    Carpal tunnel syndrome of right wrist  Patient has been experiencing numbness and tingling of the right wrist concerning for carpal tunnel syndrome.  Patient reports that when she wakes up in the morning she notices that the tingling is worse.  Patient has not tried any conservative measures yet.  Discussed using a brace at night to see if that will help.  If not, patient can follow-up with orthopedics to consider possible intra-articular steroids.       RTC as needed     History of Present Illness     HPI  Mari Vilchis is a 79 y.o. female with a history of hypothyroidism, osteoarthritis, osteoporosis, hyperlipidemia, CVA who presents for further evaluation of joint pain.    History obtained from: patient    Patient reports that about 2 months ago she started developing pain in her right shoulder.  Patient states that she was sleeping in a recliner on that side and then started noticing  pain.  Patient states then it stayed persistent.  Patient had an x-ray which was consistent with osteoarthritis.  There was also concerns for impingement syndrome.  Patient is undergoing physical therapy with some improvement.  Patient denies any prolonged morning stiffness.  Pain is worse with movement and better with rest.  Patient takes Tylenol as needed.  Patient was given prednisone for shoulder pain which did not help.    Patient also reports numbness and tingling in her right hand and has been diagnosed with carpal tunnel syndrome.  Has not tried bracing.    Review of Systems  Complete ROS conducted as per HPI. In addition, denies:  Fever  Photosensitive rash  Sicca symptoms  Recurrent oral ulcers  Muscle weakness  Uveitis  Dactylitis  Chest pain  SOB  Pleurisy  Gross hematuria  Foamy urine  Raynaud's  Joint issues other than noted above    Past Medical History   Past Medical History:   Diagnosis Date    Aneurysm (HCC) 2/8/23    2.5 mg found on neck CAT scan    Arthritis 2005    Closed nondisplaced fracture of fifth metatarsal bone of right foot with routine healing     Difficulty walking 2/2/23    Broken Femur    Disease of thyroid gland     hypothyroid    Glaucoma, bilateral     Hyperlipidemia     Osteoporosis     Otitis media 11/4/14    Otitis Media    Scoliosis 12/12/12     Past Surgical History:   Procedure Laterality Date    BUNIONECTOMY      COLONOSCOPY  06/04/2019    CORRECTION HAMMER TOE      FRACTURE SURGERY  2/3/23    Vamsi inserted for broken femur    MAMMO (HISTORICAL)  07/09/2018    NEUROMA EXCISION      OTHER SURGICAL HISTORY      Birth kwasi removed    WA OPTX FEM SHFT FX W/INSJ IMED IMPLT W/WO SCREW Right 02/03/2023    Procedure: INSERTION NAIL IM FEMUR ANTEGRADE (TROCHANTERIC);  Surgeon: Chai Chavez MD;  Location: [facility];  Service: Orthopedics    WISDOM TOOTH EXTRACTION       Family History   Problem Relation Age of Onset    Hypertension Mother     Thyroid disease Mother     Arthritis  Mother     Glaucoma Mother     Hypothyroidism Mother     Stomach cancer Father 51    Cancer Father     No Known Problems Maternal Grandmother     No Known Problems Maternal Grandfather     No Known Problems Paternal Grandmother     No Known Problems Paternal Grandfather     Dementia Maternal Aunt     No Known Problems Maternal Aunt     No Known Problems Paternal Aunt     Hypertension Brother     Osteoporosis Family     Hyperlipidemia Family     Hypertension Family     Dementia Maternal Aunt     Hypertension Maternal Aunt     Osteoporosis Maternal Aunt       reports that she has never smoked. She has never used smokeless tobacco. She reports current alcohol use. She reports that she does not use drugs.  Current Outpatient Medications on File Prior to Visit   Medication Sig Dispense Refill    acetaminophen (TYLENOL) 325 mg tablet Take 325 mg by mouth daily at bedtime      amLODIPine (NORVASC) 5 mg tablet Take 1 tablet (5 mg total) by mouth daily 30 tablet 5    Calcium Carb-Cholecalciferol 600-20 MG-MCG TABS 2 tablets Daily at 2am      denosumab (PROLIA) 60 mg/mL Inject 60 mg under the skin once Twice a year      Glucosamine-Chondroitin 500-400 MG CAPS Take 1 tablet by mouth in the morning      ibuprofen (Advil) 200 mg tablet Take 200 mg by mouth every 4 (four) hours as needed for moderate pain      levothyroxine 25 mcg tablet TAKE 1 TABLET BY MOUTH EVERY DAY 90 tablet 1    Polyvinyl Alcohol-Povidone (REFRESH OP) Apply to eye Refresh drops TID   Refresh drops + gel QHS      simvastatin (ZOCOR) 40 mg tablet TAKE 1 TABLET BY MOUTH DAILY AT BEDTIME 90 tablet 3    White Petrolatum-Mineral Oil (artificial tears) Administer 1 Application to both eyes as needed for dry eyes      acetaminophen (Tylenol 8 Hour) 650 mg CR tablet Take 650 mg by mouth every 8 (eight) hours as needed for moderate pain (Patient not taking: Reported on 12/5/2024)      Naphazoline-Polyethyl Glycol 0.012-0.2 % SOLN Apply 1-2 drops to eye 4 (four) times  a day (Patient not taking: Reported on 12/5/2024)      predniSONE 10 mg tablet Take 8 mg by mouth daily Take as directed by prescriber -  taper dose       No current facility-administered medications on file prior to visit.     Allergies   Allergen Reactions    Fentanyl Vomiting    Indomethacin GI Intolerance     Other reaction(s): GI upset  Other reaction(s): GI upset    Oxycodone Dizziness, Nausea Only, GI Intolerance and Other (See Comments)     Other reaction(s): GI upset  Other reaction(s): GI upset      Current Outpatient Medications on File Prior to Visit   Medication Sig Dispense Refill    acetaminophen (TYLENOL) 325 mg tablet Take 325 mg by mouth daily at bedtime      amLODIPine (NORVASC) 5 mg tablet Take 1 tablet (5 mg total) by mouth daily 30 tablet 5    Calcium Carb-Cholecalciferol 600-20 MG-MCG TABS 2 tablets Daily at 2am      denosumab (PROLIA) 60 mg/mL Inject 60 mg under the skin once Twice a year      Glucosamine-Chondroitin 500-400 MG CAPS Take 1 tablet by mouth in the morning      ibuprofen (Advil) 200 mg tablet Take 200 mg by mouth every 4 (four) hours as needed for moderate pain      levothyroxine 25 mcg tablet TAKE 1 TABLET BY MOUTH EVERY DAY 90 tablet 1    Polyvinyl Alcohol-Povidone (REFRESH OP) Apply to eye Refresh drops TID   Refresh drops + gel QHS      simvastatin (ZOCOR) 40 mg tablet TAKE 1 TABLET BY MOUTH DAILY AT BEDTIME 90 tablet 3    White Petrolatum-Mineral Oil (artificial tears) Administer 1 Application to both eyes as needed for dry eyes      acetaminophen (Tylenol 8 Hour) 650 mg CR tablet Take 650 mg by mouth every 8 (eight) hours as needed for moderate pain (Patient not taking: Reported on 12/5/2024)      Naphazoline-Polyethyl Glycol 0.012-0.2 % SOLN Apply 1-2 drops to eye 4 (four) times a day (Patient not taking: Reported on 12/5/2024)      predniSONE 10 mg tablet Take 8 mg by mouth daily Take as directed by prescriber -  taper dose       No current facility-administered  "medications on file prior to visit.      Social History     Tobacco Use    Smoking status: Never    Smokeless tobacco: Never   Vaping Use    Vaping status: Never Used   Substance and Sexual Activity    Alcohol use: Yes     Comment: Glass of wine a few times a year    Drug use: Never     Comment: Only drug use was for pain prescribed by a doctor    Sexual activity: Never        Objective   /72   Pulse 66   Temp 98 °F (36.7 °C)   Ht 5' 1\" (1.549 m)   Wt 58.5 kg (129 lb)   SpO2 95%   BMI 24.37 kg/m²      Physical Exam  General appearance: normal appearing, no acute distress  Skin: normal, no rashes  HEENT: normal, moist oropharynx, no nasal or oral ulcers  Lymph nodes: no palpable adenopathy  Lungs: normal respiratory effort, comfortable on room air, lungs clear to auscultation b/l   Heart: normal heart sounds, normal rate, normal rhythm,  Abdomen: soft, normal bowel sounds, no tenderness  Neurologic: no obvious neurological deficits   Extremities: no edema, warm and well perfused     Musculoskeletal Exam:   - Observation: Squaring of the CMC is present, Heberden's and Francisco nodes noted  - Palpation: no joint tenderness  - Synovitis: absent  - Joint effusions: absent  - ROM: intact throughout  - Muscle Strength: 5/5 throughout     I have personally reviewed notes, labs, and imaging available in the chart.     ESR 33     05/20/24 09:50   Creatinine 0.80   GLUCOSE, FASTING 96   Calcium 9.4   AST 17   ALT 12   ALK PHOS 81   Total Protein 7.1   Albumin 4.1   Total Bilirubin 0.60   GFR, Calculated 71     X-ray right shoulder 10/12/2024  No acute fracture or dislocation.     Mild glenohumeral and acromioclavicular osteoarthritis.     No lytic or blastic osseous lesion.     Unremarkable soft tissues      Irene Santoro DO, CCD, [facility]  [facility]     "

## 2024-12-09 ENCOUNTER — OFFICE VISIT (OUTPATIENT)
Dept: PHYSICAL THERAPY | Facility: REHABILITATION | Age: 79
End: 2024-12-09
Payer: MEDICARE

## 2024-12-09 DIAGNOSIS — M25.411 PAIN AND SWELLING OF RIGHT SHOULDER: Primary | ICD-10-CM

## 2024-12-09 DIAGNOSIS — M25.511 PAIN AND SWELLING OF RIGHT SHOULDER: Primary | ICD-10-CM

## 2024-12-09 PROCEDURE — 97140 MANUAL THERAPY 1/> REGIONS: CPT

## 2024-12-09 PROCEDURE — 97112 NEUROMUSCULAR REEDUCATION: CPT

## 2024-12-09 PROCEDURE — 97110 THERAPEUTIC EXERCISES: CPT

## 2024-12-09 NOTE — PROGRESS NOTES
Daily Note     Today's date: 2024  Patient name: Mari Vilchis  : 1945  MRN: [id number]  Referring provider: Devaughn Richardson, *  Dx:   Encounter Diagnosis     ICD-10-CM    1. Pain and swelling of right shoulder  M25.511     M25.411             Start Time: 1046  Stop Time: 1124  Total time in clinic (min): 38 minutes    Subjective: Patient reports cont sx improvement.      Objective: See treatment diary below      Assessment: Tolerated treatment well. Cont to demonstrate good tolerance to the current program, will cont to progress as tolerated. Patient would benefit from continued PT      Plan: Continue per plan of care.      Precautions:   Past Medical History:   Diagnosis Date    Aneurysm (HCC) 23    2.5 mg found on neck CAT scan    Arthritis     Closed nondisplaced fracture of fifth metatarsal bone of right foot with routine healing     Difficulty walking 23    Broken Femur    Disease of thyroid gland     hypothyroid    Glaucoma, bilateral     Hyperlipidemia     Osteoporosis     Otitis media 14    Otitis Media    Scoliosis 12       Allergies   Allergen Reactions    Fentanyl Vomiting    Indomethacin GI Intolerance     Other reaction(s): GI upset  Other reaction(s): GI upset    Oxycodone Dizziness, Nausea Only, GI Intolerance and Other (See Comments)     Other reaction(s): GI upset  Other reaction(s): GI upset          POC expires Unit limit Auth Expiration date PT/OT + Visit Limit?   12 weeks - 2025 bomn bomn bomn             Visit/Unit Tracking  AUTH Status:  Date  (RE)     bomn Used  9 10 11 12 13 14     Remaining              Pertinent Findings:      POC End Date:  12 weeks - 2025                                                                                     Test / Measure      FOTO (Predicted 69) 59   (+) spurling R     C/s ext/rot AROM 75%   Periscapular strength </=4+/5   Poor posture              Access Code:  [email or web address]  URL: [email or web address]  Date: 10/17/2024  Prepared by: Viral Schneider     Exercises  - Standing Shoulder Row with Anchored Resistance  - 1 x daily - 3 x weekly - 2 sets - 10 reps  - Shoulder External Rotation with Anchored Resistance  - 1 x daily - 3 x weekly - 2 sets - 10 reps  - Shoulder Internal Rotation with Resistance  - 1 x daily - 3 x weekly - 2 sets - 10 reps     Access Code: [email or web address]  URL: [email or web address]  Date: 11/05/2024  Prepared by: Viral Schneider     Exercises  - Seated Upper Trapezius Stretch  - 2 x daily - 7 x weekly - 3 sets - 30 seconds hold  - Seated Levator Scapulae Stretch  - 2 x daily - 7 x weekly - 3 sets - 30 seconds hold  Manuals 11/12 11/14 11/19 11/21 11/25 12/2 12/4 12/9   Manual traction CM 2'x3 CM 2'x3          R UT/LS stretching 5x30\" ea 5x30\" ea          R UT /LS STM CM 5' CM 5'  CM 15' CM 15' CM 15' CM 15' CM 15' CM 15'                  Neuro Re-Ed              Hep review/pt education   5'  10' 5'       Row 2x10 10# 2x10 10#  2x10 10# 2x10 10# 2x12 10# 2x12 10# 2x12 10# 2x12 10#   LPD 2x10 5# 2x10 5#  2x10 5\" 2x10 5\" 2x10 5# 2x10 5# 2x10 5# 2x10 5#   Supine chin tuck 15x 3\" holds 15x 5\" holds  15x 5\" holds        Seated chin tuck              Supine chin tuck and lift              Median N glide              Radial N glide               no money       2x10 ytb 2x10 ytb 2x10 ytb 2x10 ytb 2x10 ytb 2x10 ytb   SL flx/abd/ER       NV?                   Ther Ex              Seated t/s ext              Seated t/s rot              UBE 3/3 L0 3/3 L0  3/3 L0 3/3 L0 3/3 L0 Held  3/3 L0 3/3 L0   Open book 15x R rot 5\" holds 15x R rot 5\" holds         ER/IR 2x10 ea 1# Resume NV 2x10 ea 1#  2x10 ea 1# 2x10 ea 1# 2x10 ea 1# 2x10 ea 1# 2x10 ea 1#   Sup tri ext              Shrug        NV?    15x 3\"   Sup cane pro        NV?   2x10    Serratus wall circles      2x20 ea cw/ccw 2x20 ea cw/ccw 2x20 ea cw/ccw                  Ther Activity                       "                      Gait Training                                            Modalities

## 2024-12-11 ENCOUNTER — OFFICE VISIT (OUTPATIENT)
Dept: PHYSICAL THERAPY | Facility: REHABILITATION | Age: 79
End: 2024-12-11
Payer: MEDICARE

## 2024-12-11 DIAGNOSIS — M25.511 PAIN AND SWELLING OF RIGHT SHOULDER: Primary | ICD-10-CM

## 2024-12-11 DIAGNOSIS — M25.411 PAIN AND SWELLING OF RIGHT SHOULDER: Primary | ICD-10-CM

## 2024-12-11 PROCEDURE — 97112 NEUROMUSCULAR REEDUCATION: CPT

## 2024-12-11 PROCEDURE — 97110 THERAPEUTIC EXERCISES: CPT

## 2024-12-11 PROCEDURE — 97140 MANUAL THERAPY 1/> REGIONS: CPT

## 2024-12-11 NOTE — PROGRESS NOTES
Daily Note     Today's date: 2024  Patient name: Mari Vilchis  : 1945  MRN: [id number]  Referring provider: Devaughn Richardson, *  Dx:   Encounter Diagnosis     ICD-10-CM    1. Pain and swelling of right shoulder  M25.511     M25.411             Start Time: 1010  Stop Time: 1105  Total time in clinic (min): 55 minutes    Subjective: Patient reports cont sx improvement. States she got a cramp in her tricep region this morning.      Objective: See treatment diary below      Assessment: Tolerated treatment well. Introduced SL shoulder AROM to address ongoing deficits of scapular control and periscapular strength. Fatigue present during, will assess response NV. PNE provided throughout 2/to sx consistent with normal mm fatigue and stretching. Patient would benefit from continued PT      Plan: Continue per plan of care.      Precautions:   Past Medical History:   Diagnosis Date    Aneurysm (HCC) 23    2.5 mg found on neck CAT scan    Arthritis     Closed nondisplaced fracture of fifth metatarsal bone of right foot with routine healing     Difficulty walking 23    Broken Femur    Disease of thyroid gland     hypothyroid    Glaucoma, bilateral     Hyperlipidemia     Osteoporosis     Otitis media 14    Otitis Media    Scoliosis 12       Allergies   Allergen Reactions    Fentanyl Vomiting    Indomethacin GI Intolerance     Other reaction(s): GI upset  Other reaction(s): GI upset    Oxycodone Dizziness, Nausea Only, GI Intolerance and Other (See Comments)     Other reaction(s): GI upset  Other reaction(s): GI upset          POC expires Unit limit Auth Expiration date PT/OT + Visit Limit?   12 weeks - 2025 bomn bomn bomn             Visit/Unit Tracking  AUTH Status:  Date  (RE)    MC bomn Used  9 10 11 12 13 14 15     Remaining               Pertinent Findings:      POC End Date:  12 weeks - 2025                                            "                                          Test / Measure      FOTO (Predicted 69) 59   (+) spurling R     C/s ext/rot AROM 75%   Periscapular strength </=4+/5   Poor posture              Access Code: [email or web address]  URL: [email or web address]  Date: 10/17/2024  Prepared by: Viral Schneider     Exercises  - Standing Shoulder Row with Anchored Resistance  - 1 x daily - 3 x weekly - 2 sets - 10 reps  - Shoulder External Rotation with Anchored Resistance  - 1 x daily - 3 x weekly - 2 sets - 10 reps  - Shoulder Internal Rotation with Resistance  - 1 x daily - 3 x weekly - 2 sets - 10 reps     Access Code: [email or web address]  URL: [email or web address]  Date: 11/05/2024  Prepared by: Viral Schneider     Exercises  - Seated Upper Trapezius Stretch  - 2 x daily - 7 x weekly - 3 sets - 30 seconds hold  - Seated Levator Scapulae Stretch  - 2 x daily - 7 x weekly - 3 sets - 30 seconds hold  Manuals 11/12 11/14 11/19 11/21 11/25 12/2 12/4 12/9 12/11   Manual traction CM 2'x3 CM 2'x3           R UT/LS stretching 5x30\" ea 5x30\" ea           R UT /LS STM CM 5' CM 5'  CM 15' CM 15' CM 15' CM 15' CM 15' CM 15' CM 15'                   Neuro Re-Ed               Hep review/pt education   5'  10' 5'     5'   Row 2x10 10# 2x10 10#  2x10 10# 2x10 10# 2x12 10# 2x12 10# 2x12 10# 2x12 10# 2x12 10#   LPD 2x10 5# 2x10 5#  2x10 5\" 2x10 5\" 2x10 5# 2x10 5# 2x10 5# 2x10 5# 2x12 5#   Supine chin tuck 15x 3\" holds 15x 5\" holds  15x 5\" holds         Seated chin tuck               Supine chin tuck and lift               Median N glide               Radial N glide                no money       2x10 ytb 2x10 ytb 2x10 ytb 2x10 ytb 2x10 ytb 2x10 ytb 2x10 ytb   SL abd       NV?  15x 3\"   SL ER         15x 3\"                   Ther Ex               Seated t/s ext               Seated t/s rot               UBE 3/3 L0 3/3 L0  3/3 L0 3/3 L0 3/3 L0 Held  3/3 L0 3/3 L0 3/3 L0   Open book 15x R rot 5\" holds 15x R rot 5\" holds          ER/IR 2x10 ea 1# " "Resume NV 2x10 ea 1#  2x10 ea 1# 2x10 ea 1# 2x10 ea 1# 2x10 ea 1# 2x10 ea 1# 2x10 ea 1#   Sup tri ext               Shrug        NV?    15x 3\" 15x 3\" 1#   Sup cane pro        NV?   2x10     Serratus wall circles      2x20 ea cw/ccw 2x20 ea cw/ccw 2x20 ea cw/ccw 2x20 ea cw/ccw                   Ther Activity                                               Gait Training                                               Modalities                                                  "

## 2024-12-16 ENCOUNTER — OFFICE VISIT (OUTPATIENT)
Dept: PHYSICAL THERAPY | Facility: REHABILITATION | Age: 79
End: 2024-12-16
Payer: MEDICARE

## 2024-12-16 DIAGNOSIS — M25.511 PAIN AND SWELLING OF RIGHT SHOULDER: Primary | ICD-10-CM

## 2024-12-16 DIAGNOSIS — M25.411 PAIN AND SWELLING OF RIGHT SHOULDER: Primary | ICD-10-CM

## 2024-12-16 PROCEDURE — 97112 NEUROMUSCULAR REEDUCATION: CPT

## 2024-12-16 PROCEDURE — 97140 MANUAL THERAPY 1/> REGIONS: CPT

## 2024-12-16 PROCEDURE — 97110 THERAPEUTIC EXERCISES: CPT

## 2024-12-16 NOTE — PROGRESS NOTES
Daily Note     Today's date: 2024  Patient name: Mari Vilchis  : 1945  MRN: [id number]  Referring provider: Devaughn Richardson, *  Dx:   Encounter Diagnosis     ICD-10-CM    1. Pain and swelling of right shoulder  M25.511     M25.411             Start Time: 1400  Stop Time: 1455  Total time in clinic (min): 55 minutes    Subjective: Patient reports ongoing improvements in sx and function. States her shoulder feels good today.      Objective: See treatment diary below      Assessment: Tolerated treatment well. Difficulty with control noted with the addition of SL flx but great tolerance again to all strengthening interventions. Will cont to progress as tolerated. Patient would benefit from continued PT      Plan: Continue per plan of care.      Precautions:   Past Medical History:   Diagnosis Date    Aneurysm (HCC) 23    2.5 mg found on neck CAT scan    Arthritis     Closed nondisplaced fracture of fifth metatarsal bone of right foot with routine healing     Difficulty walking 23    Broken Femur    Disease of thyroid gland     hypothyroid    Glaucoma, bilateral     Hyperlipidemia     Osteoporosis     Otitis media 14    Otitis Media    Scoliosis 12       Allergies   Allergen Reactions    Fentanyl Vomiting    Indomethacin GI Intolerance     Other reaction(s): GI upset  Other reaction(s): GI upset    Oxycodone Dizziness, Nausea Only, GI Intolerance and Other (See Comments)     Other reaction(s): GI upset  Other reaction(s): GI upset          POC expires Unit limit Auth Expiration date PT/OT + Visit Limit?   12 weeks - 2025 bomn bomn bomn             Visit/Unit Tracking  AUTH Status:  Date  (RE)     bomn Used  9 10 11 12 13 14 15 16     Remaining                Pertinent Findings:      POC End Date:  12 weeks - 2025                                                                                     Test / Measure      FOTO  "(Predicted 69) 59   (+) spurling R     C/s ext/rot AROM 75%   Periscapular strength </=4+/5   Poor posture              Access Code: [email or web address]  URL: [email or web address]  Date: 10/17/2024  Prepared by: Viral Schneider     Exercises  - Standing Shoulder Row with Anchored Resistance  - 1 x daily - 3 x weekly - 2 sets - 10 reps  - Shoulder External Rotation with Anchored Resistance  - 1 x daily - 3 x weekly - 2 sets - 10 reps  - Shoulder Internal Rotation with Resistance  - 1 x daily - 3 x weekly - 2 sets - 10 reps     Access Code: [email or web address]  URL: [email or web address]  Date: 11/05/2024  Prepared by: Viral Schneider     Exercises  - Seated Upper Trapezius Stretch  - 2 x daily - 7 x weekly - 3 sets - 30 seconds hold  - Seated Levator Scapulae Stretch  - 2 x daily - 7 x weekly - 3 sets - 30 seconds hold  Manuals  11/19 11/21 11/25 12/2 12/4 12/9 12/11 12/16   Manual traction            R UT/LS stretching            R UT /LS STM  CM 15' CM 15' CM 15' CM 15' CM 15' CM 15' CM 15' CM 15'                Neuro Re-Ed            Hep review/pt education  10' 5'     5'    Row  2x10 10# 2x10 10# 2x12 10# 2x12 10# 2x12 10# 2x12 10# 2x12 10# 2x12 10#   LPD  2x10 5\" 2x10 5\" 2x10 5# 2x10 5# 2x10 5# 2x10 5# 2x12 5# 2x12 5#   Supine chin tuck  15x 5\" holds          Seated chin tuck            Supine chin tuck and lift            Median N glide            Radial N glide             no money   2x10 ytb 2x10 ytb 2x10 ytb 2x10 ytb 2x10 ytb 2x10 ytb 2x10 ytb 2x12 ytb   SL abd     NV?  15x 3\" 2x10   SL ER       15x 3\" 2x10   SL flx        10x                Ther Ex            Seated t/s ext            Seated t/s rot            UBE  3/3 L0 3/3 L0 3/3 L0 Held  3/3 L0 3/3 L0 3/3 L0    Open book           ER/IR 2x10 ea 1#  2x10 ea 1# 2x10 ea 1# 2x10 ea 1# 2x10 ea 1# 2x10 ea 1# 2x10 ea 1# 2x10 ea 1#   Sup tri ext            Shrug    NV?    15x 3\" 15x 3\" 1# 2x10 1#   Sup cane pro    NV?   2x10      Serratus wall circles    2x20 ea " cw/ccw 2x20 ea cw/ccw 2x20 ea cw/ccw 2x20 ea cw/ccw 2x20 ea cw/ccw                Ther Activity                                      Gait Training                                      Modalities

## 2024-12-18 ENCOUNTER — OFFICE VISIT (OUTPATIENT)
Dept: PHYSICAL THERAPY | Facility: REHABILITATION | Age: 79
End: 2024-12-18
Payer: MEDICARE

## 2024-12-18 DIAGNOSIS — M25.411 PAIN AND SWELLING OF RIGHT SHOULDER: Primary | ICD-10-CM

## 2024-12-18 DIAGNOSIS — M25.511 PAIN AND SWELLING OF RIGHT SHOULDER: Primary | ICD-10-CM

## 2024-12-18 PROCEDURE — 97112 NEUROMUSCULAR REEDUCATION: CPT

## 2024-12-18 PROCEDURE — 97140 MANUAL THERAPY 1/> REGIONS: CPT

## 2024-12-18 NOTE — PROGRESS NOTES
Daily Note     Today's date: 2024  Patient name: Mari Vilchis  : 1945  MRN: [id number]  Referring provider: Devaughn Richardson, *  Dx:   Encounter Diagnosis     ICD-10-CM    1. Pain and swelling of right shoulder  M25.511     M25.411             Start Time: 1000  Stop Time: 1038  Total time in clinic (min): 38 minutes    Subjective: Patient reports ongoing improvements in sx and function.      Objective: See treatment diary below      Assessment: Tolerated treatment well. Good tolerance to all progressions today with only mild reports of soreness throughout, consistent with mm fatigue, Patient would benefit from continued PT      Plan: Continue per plan of care.      Precautions:   Past Medical History:   Diagnosis Date    Aneurysm (HCC) 23    2.5 mg found on neck CAT scan    Arthritis     Closed nondisplaced fracture of fifth metatarsal bone of right foot with routine healing     Difficulty walking 23    Broken Femur    Disease of thyroid gland     hypothyroid    Glaucoma, bilateral     Hyperlipidemia     Osteoporosis     Otitis media 14    Otitis Media    Scoliosis 12       Allergies   Allergen Reactions    Fentanyl Vomiting    Indomethacin GI Intolerance     Other reaction(s): GI upset  Other reaction(s): GI upset    Oxycodone Dizziness, Nausea Only, GI Intolerance and Other (See Comments)     Other reaction(s): GI upset  Other reaction(s): GI upset          POC expires Unit limit Auth Expiration date PT/OT + Visit Limit?   12 weeks - 2025 bomn bomn bomn             Visit/Unit Tracking  AUTH Status:  Date  (RE)     bomn Used  9 10 11 12 13 14 15 16 17     Remaining                 Pertinent Findings:      POC End Date:  12 weeks - 2025                                                                                     Test / Measure      FOTO (Predicted 69) 59   (+) spurling R     C/s ext/rot AROM 75%  "  Periscapular strength </=4+/5   Poor posture              Access Code: [email or web address]  URL: [email or web address]  Date: 10/17/2024  Prepared by: Viral Schneider     Exercises  - Standing Shoulder Row with Anchored Resistance  - 1 x daily - 3 x weekly - 2 sets - 10 reps  - Shoulder External Rotation with Anchored Resistance  - 1 x daily - 3 x weekly - 2 sets - 10 reps  - Shoulder Internal Rotation with Resistance  - 1 x daily - 3 x weekly - 2 sets - 10 reps     Access Code: [email or web address]  URL: [email or web address]  Date: 11/05/2024  Prepared by: Viral Schneider     Exercises  - Seated Upper Trapezius Stretch  - 2 x daily - 7 x weekly - 3 sets - 30 seconds hold  - Seated Levator Scapulae Stretch  - 2 x daily - 7 x weekly - 3 sets - 30 seconds hold  Manuals  11/19 11/21 11/25 12/2 12/4 12/9 12/11 12/16 12/18   Manual traction             R UT/LS stretching             R UT /LS STM  CM 15' CM 15' CM 15' CM 15' CM 15' CM 15' CM 15' CM 15' CM 10'                 Neuro Re-Ed             Hep review/pt education  10' 5'     5'     Row  2x10 10# 2x10 10# 2x12 10# 2x12 10# 2x12 10# 2x12 10# 2x12 10# 2x12 10# 2x12 12#   LPD  2x10 5\" 2x10 5\" 2x10 5# 2x10 5# 2x10 5# 2x10 5# 2x12 5# 2x12 5# 2x12 5#   Supine chin tuck  15x 5\" holds           Seated chin tuck             Supine chin tuck and lift             Median N glide             Radial N glide              no money   2x10 ytb 2x10 ytb 2x10 ytb 2x10 ytb 2x10 ytb 2x10 ytb 2x10 ytb 2x12 ytb 2x10 otb   SL abd     NV?  15x 3\" 2x10 2x10   SL ER       15x 3\" 2x10 2x10   SL flx        10x 10x                 Ther Ex             Seated t/s ext             Seated t/s rot             UBE  3/3 L0 3/3 L0 3/3 L0 Held  3/3 L0 3/3 L0 3/3 L0     Open book            ER/IR 2x10 ea 1#  2x10 ea 1# 2x10 ea 1# 2x10 ea 1# 2x10 ea 1# 2x10 ea 1# 2x10 ea 1# 2x10 ea 1# 2x10 ea 1#   Sup tri ext             Shrug    NV?    15x 3\" 15x 3\" 1# 2x10 1# 2x10 1#   Sup cane pro    NV?   2x10     "   Serratus wall circles    2x20 ea cw/ccw 2x20 ea cw/ccw 2x20 ea cw/ccw 2x20 ea cw/ccw 2x20 ea cw/ccw 2x20 ea cw/ccw                 Ther Activity                                         Gait Training                                         Modalities

## 2024-12-24 ENCOUNTER — OFFICE VISIT (OUTPATIENT)
Dept: PHYSICAL THERAPY | Facility: REHABILITATION | Age: 79
End: 2024-12-24
Payer: MEDICARE

## 2024-12-24 DIAGNOSIS — M25.411 PAIN AND SWELLING OF RIGHT SHOULDER: Primary | ICD-10-CM

## 2024-12-24 DIAGNOSIS — M25.511 PAIN AND SWELLING OF RIGHT SHOULDER: Primary | ICD-10-CM

## 2024-12-24 PROCEDURE — 97140 MANUAL THERAPY 1/> REGIONS: CPT

## 2024-12-24 PROCEDURE — 97112 NEUROMUSCULAR REEDUCATION: CPT

## 2024-12-24 NOTE — PROGRESS NOTES
PT Evaluation     Today's date: 2024  Patient name: Mari Vilchis  : 1945  MRN: [id number]  Referring provider: Devaughn Richardson, *  Dx:   Encounter Diagnosis     ICD-10-CM    1. Pain and swelling of right shoulder  M25.511     M25.411             Start Time: 0950  Stop Time: 1028  Total time in clinic (min): 38 minutes    Assessment  Impairments: abnormal or restricted ROM, activity intolerance, impaired physical strength, lacks appropriate home exercise program and poor posture     Assessment details: Mari Vilchis is a pleasant 79 y.o. female who presents with R sided neck and shoulder pain. Upon eval today, she has poor posture, poor persicapular strength, chino c/s AROM, chino t/s AROM and altered UE neurodynamics resulting in worry over not knowing what's wrong and fear of not being able to keep active.  No further referral appears necessary at this time based upon examination results.  I expect she will improve within 6 weeks of skilled PT.  Positive prognostic indicators include positive attitude toward recovery and good understanding of diagnosis and treatment plan options.  Negative prognostic indicators include multiple concurrent orthopedic problems.      Comparable signs:  1) C/s ext/R rot  2) R spurling  3) Tricep MMT      Problem List:  1) Poor posture  2) Poor periscapular strength  3) Chino c/s/t/s AROM  4) Altered UE neurodynamics    24: Re-evaluation was performed today and the patient has demonstrated improvements in UE strength and c/s AROM. Patient still presents with deficits of poor posture, poor periscapular strength, chino c/s and t/s ROM resulting in pain with ADLs. Patient educated on findings of RE and that her sx appear to be of multi-origin. Patient with recent lab testing revealing elevated ESR, scheduled to visit with rheumatologist 24. Cont with progressions of existing interventions as charted below with good tolerance today. Education again provided  throughout surrounding tissue end feels, DOMS, sx response to exercise, UE and cervical anatomy. Sx consistent with mm fatigue present throughout.    12/24/24: Tammie cont to demonstrate great response to skilled PT, demonstrating improvements in UE strength and ROM, posture, c/s AROM, functional mobility and activity tolerance, as well as reporting ongoing reductions in shoulder sx and difficulties with ADL performance. Tammie presents with ongoing deficits of L UE and periscapular strength, chino c/s AROM and poor posture, resulting in ongoing soreness with ADL performance. Cont with progressions of existing interventions as charted below today with good tolerance. Will cont to progress as tolerated.    Understanding of Dx/Px/POC: excellent     Prognosis: good    Goals  Impairment based goals  Patient will improve periscapular strength to 4+/5 in all planes within 3 weeks in order to improve ease and stability with functional mobility. - met  Patient will improve periscapular strength to 5/5 in all planes by time of d/c in order to improve ease and stability with functional mobility. - not met  Patient will consistently demonstrate proper upright seated and standing posture during treatment sessions centered around improving deficits of strength and mobility. - part  met  Patient will improve c/s ROM to WFL by time of d/c in order to improve ease and stability with functional mobility. - part met  Patient will improve t/s ROM to WFL by time of d/c in order to improve ease and stability with functional mobility. - part met      Functional based goals to be completed by time of d/c  Patient will improve FOTO to greater than predicted value. - met  Patient will be independent with home exercise program. - part met  Patient will be able to manage symptoms independently. - part met    Plan    Planned therapy interventions: abdominal trunk stabilization, activity modification, ADL training, balance/weight bearing  training, gait training, home exercise program, therapeutic exercise, therapeutic activities, stretching, strengthening, patient education, neuromuscular re-education, postural training, therapeutic training, joint mobilization, IASTM, kinesiology taping, manual therapy, massage, Meyer taping, motor coordination training, muscle pump exercises, nerve gliding, self care, work reintegration, fine motor coordination training, flexibility, functional ROM exercises, graded activity, graded exercise, graded motor, IADL retraining, balance, behavior modification, body mechanics training, breathing training, transfer training and coordination    Frequency: 2x week  Duration in weeks: 12  Treatment plan discussed with: patient        Subjective Evaluation    History of Present Illness  Mechanism of injury: 10/17/2024: Mari Vilchis is a pleasant 79 y.o. female presenting to PT for R sided neck and shoulder pain. Patient reports initial onset ~2 weeks ago which she attributes to falling asleep in a chair with her head tilted to the R. Since then, she reports occasional pain radiating down her R arm, as well as occasional N&T in her R pointer finger.    24: Patient presents to PT having completed 9 sessions to date. Patient has demonstrated inconsistent progress throughout PT thus far, initially demonstrating improvements in neck and shoulder soreness but with slight regression as of recent.   Patient Goals  Patient goals for therapy: increased strength, independence with ADLs/IADLs, return to sport/leisure activities, decreased pain and increased motion    Pain  Current pain ratin  At best pain ratin  At worst pain ratin  Location: R UT region/posterior shoulder to R pointer finger  Quality: sharp and dull ache  Relieving factors: medications, rest and support (tylenol/salonpas)  Aggravating factors: overhead activity (reaching, sitting and leaning against something)  Progression: improved    Social  Support    Employment status: not working  Hand dominance: right      Diagnostic Tests  X-ray: normal        Objective  CERVICAL EXAM  Red flags: None  Kuhn: negative   Babinski: negative   Alar Ligament: negative   Sharp Kourtney: negative   Vertebral Artery Test: negative   Spurling: pos for P! R  Distraction: negative    SENSATION   LEFT RIGHT   C2-C3 Intact Intact   C4 Intact Intact   C5 Intact Intact   C6 Intact Intact   C7 Intact Intact   C8 Intact Intact   T1 Intact Intact   T2 Intact Intact     REFLEXES   LEFT RIGHT   TRICEPS 2+ 2+   BICEPS 2+ 2+   BR 2+ 2+     POSTURAL FINDINGS      CERVICAL   AROM    RIGHT LEFT   %   EXT 75% P!   ROT 90% P! 100%       THORACIC   AROM    RIGHT LEFT   FLX 75%   EXT 50% P!   ROT 50% P! 75%       SHOULDER   AROM all WFL PROM STRENGTH    RIGHT LEFT RIGHT LEFT RIGHT LEFT   FLX     4+/5 5/5   ABD     4+/5 5/5   EXT     4+/5 4+/5   ER     4+/5 4+/5   IR     5/5 4+/5   U. TRAP     5/5 5/5   M. TRAP     4/5 4/5   L. TRAP     4-/5 4-/5   SERRATUS     4/5 4/5       ELBOW   AROM PROM STRENGTH    RIGHT LEFT RIGHT LEFT RIGHT LEFT   FLX     5/5 5/5   EXT     4/5 5/5                    SPECIAL   LEFT RIGHT   RTC/IMPINGEMENT     Davis  pos   Infraspinatus  neg   Painful arc  neg   Drop arm  neg   ER lag sign     Lift off sign     Neer     Empty can     BICEPS TENDINOPATHY     Speed's     Yergason's     [place]'s     ACJ     Active compression     Crossover          SCAPULA     Scapular assistance     Scapular dyskinesis     TOS     [place]     Rmain Chinchilla     ULTT     ULTT 1  Pos   ULTT 2     ULTT 3     ULTT 4       COMMENTS:  (+) carpal compression, (+) phalen's, (+) tinel's R         Precautions:   Past Medical History:   Diagnosis Date    Aneurysm (HCC) 2/8/23    2.5 mg found on neck CAT scan    Arthritis 2005    Closed nondisplaced fracture of fifth metatarsal bone of right foot with routine healing     Difficulty walking 2/2/23    Broken Femur    Disease of  thyroid gland     hypothyroid    Glaucoma, bilateral     Hyperlipidemia     Osteoporosis     Otitis media 11/4/14    Otitis Media    Scoliosis 12/12/12       Allergies   Allergen Reactions    Fentanyl Vomiting    Indomethacin GI Intolerance     Other reaction(s): GI upset  Other reaction(s): GI upset    Oxycodone Dizziness, Nausea Only, GI Intolerance and Other (See Comments)     Other reaction(s): GI upset  Other reaction(s): GI upset          POC expires Unit limit Auth Expiration date PT/OT + Visit Limit?   12 weeks - 1/9/2025 bomn bomn bomn             Visit/Unit Tracking  AUTH Status:  Date 11/19 (RE) 11/21 11/25 11/27 12/4 12/9 12/11 12/16 12/18 12/24 (RE)   MC bomn Used  9 10 11 12 13 14 15 16 17 18     Remaining                          Pertinent Findings:      POC End Date:  12 weeks - 1/9/2025                                                                                     Test / Measure      FOTO (Predicted 69) 59   (+) spurling R     C/s ext/rot AROM 75%   Periscapular strength </=4+/5   Poor posture              Access Code: [email or web address]  URL: [email or web address]  Date: 10/17/2024  Prepared by: Viral Schneider     Exercises  - Standing Shoulder Row with Anchored Resistance  - 1 x daily - 3 x weekly - 2 sets - 10 reps  - Shoulder External Rotation with Anchored Resistance  - 1 x daily - 3 x weekly - 2 sets - 10 reps  - Shoulder Internal Rotation with Resistance  - 1 x daily - 3 x weekly - 2 sets - 10 reps     Access Code: [email or web address]  URL: [email or web address]  Date: 11/05/2024  Prepared by: iVral Schneider     Exercises  - Seated Upper Trapezius Stretch  - 2 x daily - 7 x weekly - 3 sets - 30 seconds hold  - Seated Levator Scapulae Stretch  - 2 x daily - 7 x weekly - 3 sets - 30 seconds hold  Manuals  11/19 11/21 11/25 12/2 12/4 12/9 12/11 12/16 12/18 12/24   Manual traction                      R UT/LS stretching                      R UT /LS STM  CM 15' CM 15' CM 15' CM 15' CM 15'  "CM 15' CM 15' CM 15' CM 10' CM 10'                          Neuro Re-Ed                      Hep review/pt education  10' 5'         5'        Row  2x10 10# 2x10 10# 2x12 10# 2x12 10# 2x12 10# 2x12 10# 2x12 10# 2x12 10# 2x12 12# 2x12 12#   LPD  2x10 5\" 2x10 5\" 2x10 5# 2x10 5# 2x10 5# 2x10 5# 2x12 5# 2x12 5# 2x12 5# 2x15 5#   Supine chin tuck  15x 5\" holds                    Seated chin tuck                      Supine chin tuck and lift                      Median N glide                      Radial N glide                       no money   2x10 ytb 2x10 ytb 2x10 ytb 2x10 ytb 2x10 ytb 2x10 ytb 2x10 ytb 2x12 ytb 2x10 otb 2x10 otb   SL abd         NV?   15x 3\" 2x10 2x10 2x10   SL ER             15x 3\" 2x10 2x10 2x10   SL flx               10x 10x 10x                          Ther Ex                      Seated t/s ext                      Seated t/s rot                      UBE  3/3 L0 3/3 L0 3/3 L0 Held  3/3 L0 3/3 L0 3/3 L0        Open book                      ER/IR 2x10 ea 1#  2x10 ea 1# 2x10 ea 1# 2x10 ea 1# 2x10 ea 1# 2x10 ea 1# 2x10 ea 1# 2x10 ea 1# 2x10 ea 1# 2x10 ea 1#   Sup tri ext                      Shrug    NV?       15x 3\" 15x 3\" 1# 2x10 1# 2x10 1# 2x10 1#   Sup cane pro    NV?     2x10            Serratus wall circles       2x20 ea cw/ccw 2x20 ea cw/ccw 2x20 ea cw/ccw 2x20 ea cw/ccw 2x20 ea cw/ccw 2x20 ea cw/ccw 2x20 ea cw/ccw                          Ther Activity                                                                    Gait Training                                                                    Modalities                                                                          "

## 2024-12-31 ENCOUNTER — OFFICE VISIT (OUTPATIENT)
Dept: PHYSICAL THERAPY | Facility: REHABILITATION | Age: 79
End: 2024-12-31
Payer: MEDICARE

## 2024-12-31 DIAGNOSIS — M25.511 PAIN AND SWELLING OF RIGHT SHOULDER: Primary | ICD-10-CM

## 2024-12-31 DIAGNOSIS — M25.411 PAIN AND SWELLING OF RIGHT SHOULDER: Primary | ICD-10-CM

## 2024-12-31 PROCEDURE — 97112 NEUROMUSCULAR REEDUCATION: CPT

## 2024-12-31 PROCEDURE — 97140 MANUAL THERAPY 1/> REGIONS: CPT

## 2024-12-31 PROCEDURE — 97110 THERAPEUTIC EXERCISES: CPT

## 2024-12-31 NOTE — PROGRESS NOTES
Daily Note     Today's date: 2024  Patient name: Mari Vilchis  : 1945  MRN: [id number]  Referring provider: Devaughn Richardson, *  Dx:   Encounter Diagnosis     ICD-10-CM    1. Pain and swelling of right shoulder  M25.511     M25.411           Start Time: 1130  Stop Time: 1208  Total time in clinic (min): 38 minutes    Subjective: Patient reports cont sx improvement      Objective: See treatment diary below      Assessment: Tolerated treatment well. Asymptomatic with all progression. Fatigue present post session. Patient would benefit from continued PT      Plan: Continue per plan of care.      Precautions:   Past Medical History:   Diagnosis Date    Aneurysm (HCC) 23    2.5 mg found on neck CAT scan    Arthritis     Closed nondisplaced fracture of fifth metatarsal bone of right foot with routine healing     Difficulty walking 23    Broken Femur    Disease of thyroid gland     hypothyroid    Glaucoma, bilateral     Hyperlipidemia     Osteoporosis     Otitis media 14    Otitis Media    Scoliosis 12       Allergies   Allergen Reactions    Fentanyl Vomiting    Indomethacin GI Intolerance     Other reaction(s): GI upset  Other reaction(s): GI upset    Oxycodone Dizziness, Nausea Only, GI Intolerance and Other (See Comments)     Other reaction(s): GI upset  Other reaction(s): GI upset          POC expires Unit limit Auth Expiration date PT/OT + Visit Limit?   12 weeks - 2025 bomn bomn bomn             Visit/Unit Tracking  AUTH Status:  Date  (RE)  (RE)     bomn Used  9 10 11 12 13 14 15 16 17 18 19     Remaining                           Pertinent Findings:      POC End Date:  12 weeks - 2025                                                                                     Test / Measure      FOTO (Predicted 69) 59   (+) spurling R     C/s ext/rot AROM 75%   Periscapular strength </=4+/5   Poor posture    "           Access Code: [email or web address]  URL: [email or web address]  Date: 10/17/2024  Prepared by: Viral Schneider     Exercises  - Standing Shoulder Row with Anchored Resistance  - 1 x daily - 3 x weekly - 2 sets - 10 reps  - Shoulder External Rotation with Anchored Resistance  - 1 x daily - 3 x weekly - 2 sets - 10 reps  - Shoulder Internal Rotation with Resistance  - 1 x daily - 3 x weekly - 2 sets - 10 reps     Access Code: [email or web address]  URL: [email or web address]  Date: 11/05/2024  Prepared by: Viral Schneider     Exercises  - Seated Upper Trapezius Stretch  - 2 x daily - 7 x weekly - 3 sets - 30 seconds hold  - Seated Levator Scapulae Stretch  - 2 x daily - 7 x weekly - 3 sets - 30 seconds hold  Manuals  11/19 11/21 11/25 12/2 12/4 12/9 12/11 12/16 12/18 12/24 12/31   Manual traction                       R UT/LS stretching                       R UT /LS STM  CM 15' CM 15' CM 15' CM 15' CM 15' CM 15' CM 15' CM 15' CM 10' CM 10' CM 10'                           Neuro Re-Ed                       Hep review/pt education  10' 5'         5'         Row  2x10 10# 2x10 10# 2x12 10# 2x12 10# 2x12 10# 2x12 10# 2x12 10# 2x12 10# 2x12 12# 2x12 12# 2x12 12#   LPD  2x10 5\" 2x10 5\" 2x10 5# 2x10 5# 2x10 5# 2x10 5# 2x12 5# 2x12 5# 2x12 5# 2x15 5# 2x15 5#   Supine chin tuck  15x 5\" holds                     Seated chin tuck                       Supine chin tuck and lift                       Median N glide                       Radial N glide                        no money   2x10 ytb 2x10 ytb 2x10 ytb 2x10 ytb 2x10 ytb 2x10 ytb 2x10 ytb 2x12 ytb 2x10 otb 2x10 otb 2x10 otb   SL abd         NV?   15x 3\" 2x10 2x10 2x10 2x10   SL ER             15x 3\" 2x10 2x10 2x10 2x10   SL flx               10x 10x 10x 2x10                           Ther Ex                       Seated t/s ext                       Seated t/s rot                       UBE  3/3 L0 3/3 L0 3/3 L0 Held  3/3 L0 3/3 L0 3/3 L0     3/3 L0 3/3 L0   Open book " "                      ER/IR 2x10 ea 1#  2x10 ea 1# 2x10 ea 1# 2x10 ea 1# 2x10 ea 1# 2x10 ea 1# 2x10 ea 1# 2x10 ea 1# 2x10 ea 1# 2x10 ea 1# 2x12 ea 1#   Sup tri ext                       Shrug    NV?       15x 3\" 15x 3\" 1# 2x10 1# 2x10 1# 2x10 1# 2x10 1#   Sup cane pro    NV?     2x10             Serratus wall circles       2x20 ea cw/ccw 2x20 ea cw/ccw 2x20 ea cw/ccw 2x20 ea cw/ccw 2x20 ea cw/ccw 2x20 ea cw/ccw 2x20 ea cw/ccw 2x20 ea cw/ccw                           Ther Activity                                                                       Gait Training                                                                       Modalities                                                                                  "

## 2025-01-02 ENCOUNTER — OFFICE VISIT (OUTPATIENT)
Dept: PHYSICAL THERAPY | Facility: REHABILITATION | Age: 80
End: 2025-01-02
Payer: MEDICARE

## 2025-01-02 ENCOUNTER — CLINICAL SUPPORT (OUTPATIENT)
Dept: INTERNAL MEDICINE CLINIC | Facility: CLINIC | Age: 80
End: 2025-01-02
Payer: MEDICARE

## 2025-01-02 DIAGNOSIS — M25.411 PAIN AND SWELLING OF RIGHT SHOULDER: Primary | ICD-10-CM

## 2025-01-02 DIAGNOSIS — M25.511 PAIN AND SWELLING OF RIGHT SHOULDER: Primary | ICD-10-CM

## 2025-01-02 DIAGNOSIS — Z23 NEED FOR COVID-19 VACCINE: Primary | ICD-10-CM

## 2025-01-02 PROCEDURE — 90480 ADMN SARSCOV2 VAC 1/ONLY CMP: CPT

## 2025-01-02 PROCEDURE — 91320 SARSCV2 VAC 30MCG TRS-SUC IM: CPT

## 2025-01-02 PROCEDURE — 97140 MANUAL THERAPY 1/> REGIONS: CPT

## 2025-01-02 PROCEDURE — 97110 THERAPEUTIC EXERCISES: CPT

## 2025-01-02 PROCEDURE — 97112 NEUROMUSCULAR REEDUCATION: CPT

## 2025-01-02 NOTE — PROGRESS NOTES
Daily Note     Today's date: 2025  Patient name: Mari Vilchis  : 1945  MRN: [id number]  Referring provider: Devaughn Rcihardson, *  Dx:   Encounter Diagnosis     ICD-10-CM    1. Pain and swelling of right shoulder  M25.511     M25.411             Start Time: 09  Stop Time: 1040  Total time in clinic (min): 43 minutes    Subjective: Patient reports cont sx improvement      Objective: See treatment diary below      Assessment: Tolerated treatment well. Asymptomatic with all progression again. Still presents with triceps weakness and fatigue, will focus on this in future visits. Patient would benefit from continued PT      Plan: Continue per plan of care.  Cont 1x/week.     Precautions:   Past Medical History:   Diagnosis Date    Aneurysm (HCC) 23    2.5 mg found on neck CAT scan    Arthritis     Closed nondisplaced fracture of fifth metatarsal bone of right foot with routine healing     Difficulty walking 23    Broken Femur    Disease of thyroid gland     hypothyroid    Glaucoma, bilateral     Hyperlipidemia     Osteoporosis     Otitis media 14    Otitis Media    Scoliosis 12       Allergies   Allergen Reactions    Fentanyl Vomiting    Indomethacin GI Intolerance     Other reaction(s): GI upset  Other reaction(s): GI upset    Oxycodone Dizziness, Nausea Only, GI Intolerance and Other (See Comments)     Other reaction(s): GI upset  Other reaction(s): GI upset          POC expires Unit limit Auth Expiration date PT/OT + Visit Limit?   12 weeks - 2025 bomn bomn bomn             Visit/Unit Tracking  AUTH Status:  Date  (RE)  (RE)    MC bomn Used   10 11 12 13 14 15 16 17 18 19 20     Remaining                            Pertinent Findings:      POC End Date:  12 weeks - 2025                                                                                     Test / Measure      FOTO (Predicted 69) 59   (+)  "spurling R     C/s ext/rot AROM 75%   Periscapular strength </=4+/5   Poor posture              Access Code: [email or web address]  URL: [email or web address]  Date: 10/17/2024  Prepared by: Viral Schneider     Exercises  - Standing Shoulder Row with Anchored Resistance  - 1 x daily - 3 x weekly - 2 sets - 10 reps  - Shoulder External Rotation with Anchored Resistance  - 1 x daily - 3 x weekly - 2 sets - 10 reps  - Shoulder Internal Rotation with Resistance  - 1 x daily - 3 x weekly - 2 sets - 10 reps     Access Code: [email or web address]  URL: [email or web address]  Date: 11/05/2024  Prepared by: Viral Schneider     Exercises  - Seated Upper Trapezius Stretch  - 2 x daily - 7 x weekly - 3 sets - 30 seconds hold  - Seated Levator Scapulae Stretch  - 2 x daily - 7 x weekly - 3 sets - 30 seconds hold  Manuals  11/19 11/21 11/25 12/2 12/4 12/9 12/11 12/16 12/18 12/24 12/31 1/2   Manual traction                        R UT/LS stretching                        R UT /LS STM  CM 15' CM 15' CM 15' CM 15' CM 15' CM 15' CM 15' CM 15' CM 10' CM 10' CM 10' CM 10'                            Neuro Re-Ed                        Hep review/pt education  10' 5'         5'          Row  2x10 10# 2x10 10# 2x12 10# 2x12 10# 2x12 10# 2x12 10# 2x12 10# 2x12 10# 2x12 12# 2x12 12# 2x12 12# 2x12 btb   LPD  2x10 5\" 2x10 5\" 2x10 5# 2x10 5# 2x10 5# 2x10 5# 2x12 5# 2x12 5# 2x12 5# 2x15 5# 2x15 5# 2x12 otb   Supine chin tuck  15x 5\" holds                      Seated chin tuck                        Supine chin tuck and lift                        Median N glide                        Radial N glide                         no money   2x10 ytb 2x10 ytb 2x10 ytb 2x10 ytb 2x10 ytb 2x10 ytb 2x10 ytb 2x12 ytb 2x10 otb 2x10 otb 2x10 otb 2x10 otb   SL abd         NV?   15x 3\" 2x10 2x10 2x10 2x10 2x10   SL ER             15x 3\" 2x10 2x10 2x10 2x10 2x10   SL flx               10x 10x 10x 2x10 2x10                            Ther Ex                        Seated " "t/s ext                        Seated t/s rot                        UBE  3/3 L0 3/3 L0 3/3 L0 Held  3/3 L0 3/3 L0 3/3 L0     3/3 L0 3/3 L0 3/3 L0   Open book                        ER/IR 2x10 ea 1#  2x10 ea 1# 2x10 ea 1# 2x10 ea 1# 2x10 ea 1# 2x10 ea 1# 2x10 ea 1# 2x10 ea 1# 2x10 ea 1# 2x10 ea 1# 2x12 ea 1# 2x12 ea gtb   Sup tri ext                     NV   Shrug    NV?       15x 3\" 15x 3\" 1# 2x10 1# 2x10 1# 2x10 1# 2x10 1# 10x 2#   Sup cane pro    NV?     2x10              Serratus wall circles       2x20 ea cw/ccw 2x20 ea cw/ccw 2x20 ea cw/ccw 2x20 ea cw/ccw 2x20 ea cw/ccw 2x20 ea cw/ccw 2x20 ea cw/ccw 2x20 ea cw/ccw 2x20 ea cw/ccw                            Ther Activity                                                                          Gait Training                                                                          Modalities                                                                                     "

## 2025-01-09 ENCOUNTER — OFFICE VISIT (OUTPATIENT)
Dept: PHYSICAL THERAPY | Facility: REHABILITATION | Age: 80
End: 2025-01-09
Payer: MEDICARE

## 2025-01-09 DIAGNOSIS — M25.511 PAIN AND SWELLING OF RIGHT SHOULDER: Primary | ICD-10-CM

## 2025-01-09 DIAGNOSIS — M25.411 PAIN AND SWELLING OF RIGHT SHOULDER: Primary | ICD-10-CM

## 2025-01-09 PROCEDURE — 97140 MANUAL THERAPY 1/> REGIONS: CPT

## 2025-01-09 PROCEDURE — 97112 NEUROMUSCULAR REEDUCATION: CPT

## 2025-01-09 PROCEDURE — 97110 THERAPEUTIC EXERCISES: CPT

## 2025-01-09 NOTE — PROGRESS NOTES
Daily Note     Today's date: 2025  Patient name: Mari Vilchis  : 1945  MRN: [id number]  Referring provider: Devaughn Richardson, *  Dx:   Encounter Diagnosis     ICD-10-CM    1. Pain and swelling of right shoulder  M25.511     M25.411             Start Time: 1008  Stop Time: 1046  Total time in clinic (min): 38 minutes    Subjective: Patient reports cont sx improvement      Objective: See treatment diary below      Assessment: Tolerated treatment well. Asymptomatic with all progression again. Good form present throughout with min need for cueing today. Patient would benefit from continued PT      Plan: Continue per plan of care. Plan to d/c NV.     Precautions:   Past Medical History:   Diagnosis Date    Aneurysm (HCC) 23    2.5 mg found on neck CAT scan    Arthritis     Closed nondisplaced fracture of fifth metatarsal bone of right foot with routine healing     Difficulty walking 23    Broken Femur    Disease of thyroid gland     hypothyroid    Glaucoma, bilateral     Hyperlipidemia     Osteoporosis     Otitis media 14    Otitis Media    Scoliosis 12       Allergies   Allergen Reactions    Fentanyl Vomiting    Indomethacin GI Intolerance     Other reaction(s): GI upset  Other reaction(s): GI upset    Oxycodone Dizziness, Nausea Only, GI Intolerance and Other (See Comments)     Other reaction(s): GI upset  Other reaction(s): GI upset          POC expires Unit limit Auth Expiration date PT/OT + Visit Limit?   12 weeks - 2025 bomn bomn bomn             Visit/Unit Tracking  AUTH Status:  Date  (RE)  (RE)    MC bomn Used  9 10 11 12 13 14 15 16 17 18 19 20 21     Remaining                             Pertinent Findings:      POC End Date:  12 weeks - 2025                                                                                     Test / Measure      FOTO (Predicted 69) 59   (+) spurling R    "  C/s ext/rot AROM 75%   Periscapular strength </=4+/5   Poor posture              Access Code: [email or web address]  URL: [email or web address]  Date: 10/17/2024  Prepared by: Viral Schneider     Exercises  - Standing Shoulder Row with Anchored Resistance  - 1 x daily - 3 x weekly - 2 sets - 10 reps  - Shoulder External Rotation with Anchored Resistance  - 1 x daily - 3 x weekly - 2 sets - 10 reps  - Shoulder Internal Rotation with Resistance  - 1 x daily - 3 x weekly - 2 sets - 10 reps     Access Code: [email or web address]  URL: [email or web address]  Date: 11/05/2024  Prepared by: Viral Schneider     Exercises  - Seated Upper Trapezius Stretch  - 2 x daily - 7 x weekly - 3 sets - 30 seconds hold  - Seated Levator Scapulae Stretch  - 2 x daily - 7 x weekly - 3 sets - 30 seconds hold  Manuals 12/11 12/16 12/18 12/24 12/31 1/2 1/9   Manual traction             R UT/LS stretching             R UT /LS STM CM 15' CM 15' CM 10' CM 10' CM 10' CM 10' CM 10'                 Neuro Re-Ed             Hep review/pt education 5'           Row 2x12 10# 2x12 10# 2x12 12# 2x12 12# 2x12 12# 2x12 btb 2x12 12#   LPD 2x12 5# 2x12 5# 2x12 5# 2x15 5# 2x15 5# 2x12 otb 2x15 5#   Supine chin tuck             Seated chin tuck             Supine chin tuck and lift             Median N glide             Radial N glide              no money  2x10 ytb 2x12 ytb 2x10 otb 2x10 otb 2x10 otb 2x10 otb 2x10 otb   SL abd 15x 3\" 2x10 2x10 2x10 2x10 2x10 2x10   SL ER 15x 3\" 2x10 2x10 2x10 2x10 2x10 2x10   SL flx   10x 10x 10x 2x10 2x10 2x10                 Ther Ex             Seated t/s ext             Seated t/s rot             UBE 3/3 L0     3/3 L0 3/3 L0 3/3 L0 3/3 L0   Open book             ER/IR 2x10 ea 1# 2x10 ea 1# 2x10 ea 1# 2x10 ea 1# 2x12 ea 1# 2x12 ea gtb 2x15 1#   Sup tri ext         NV    Shrug  15x 3\" 1# 2x10 1# 2x10 1# 2x10 1# 2x10 1# 10x 2# 10x 2#   Sup cane pro              Serratus wall circles 2x20 ea cw/ccw 2x20 ea cw/ccw 2x20 ea cw/ccw " 2x20 ea cw/ccw 2x20 ea cw/ccw 2x20 ea cw/ccw 2x20 ea cw/ccw                 Ther Activity                                         Gait Training                                         Modalities

## 2025-01-15 ENCOUNTER — OFFICE VISIT (OUTPATIENT)
Dept: PHYSICAL THERAPY | Facility: REHABILITATION | Age: 80
End: 2025-01-15
Payer: MEDICARE

## 2025-01-15 DIAGNOSIS — M25.511 PAIN AND SWELLING OF RIGHT SHOULDER: Primary | ICD-10-CM

## 2025-01-15 DIAGNOSIS — M25.411 PAIN AND SWELLING OF RIGHT SHOULDER: Primary | ICD-10-CM

## 2025-01-15 PROCEDURE — 97140 MANUAL THERAPY 1/> REGIONS: CPT

## 2025-01-15 PROCEDURE — 97110 THERAPEUTIC EXERCISES: CPT

## 2025-01-15 PROCEDURE — 97112 NEUROMUSCULAR REEDUCATION: CPT

## 2025-01-15 NOTE — PROGRESS NOTES
Daily Note     Today's date: 1/15/2025  Patient name: Mari Vilchis  : 1945  MRN: [id number]  Referring provider: Devaughn Richardson, *  Dx:   Encounter Diagnosis     ICD-10-CM    1. Pain and swelling of right shoulder  M25.511     M25.411                      Subjective: Pt reported feeling good today. She will be DC NV.      Objective: See treatment diary below      Assessment: Tolerated treatment well. Patient demonstrated fatigue post treatment and exhibited good technique with therapeutic exercises. VC's needed for correct technique throughout session. Mild restrictions noted with STM. Monitor NV.      Plan: Continue per plan of care.      Precautions:   Past Medical History:   Diagnosis Date    Aneurysm (HCC) 23    2.5 mg found on neck CAT scan    Arthritis     Closed nondisplaced fracture of fifth metatarsal bone of right foot with routine healing     Difficulty walking 23    Broken Femur    Disease of thyroid gland     hypothyroid    Glaucoma, bilateral     Hyperlipidemia     Osteoporosis     Otitis media 14    Otitis Media    Scoliosis 12       Allergies   Allergen Reactions    Fentanyl Vomiting    Indomethacin GI Intolerance     Other reaction(s): GI upset  Other reaction(s): GI upset    Oxycodone Dizziness, Nausea Only, GI Intolerance and Other (See Comments)     Other reaction(s): GI upset  Other reaction(s): GI upset          POC expires Unit limit Auth Expiration date PT/OT + Visit Limit?   12 weeks - 2025 bomn bomn bomn             Visit/Unit Tracking  AUTH Status:  Date  (RE)  (RE) 12/31 1/2 1/9 1/15   MC bomn Used  9 10 11 12 13 14 15 16 17 18 19 20 21 22     Remaining                              Pertinent Findings:      POC End Date:  12 weeks - 2025                                                                                     Test / Measure      FOTO (Predicted 69) 59   (+) spurling R    "  C/s ext/rot AROM 75%   Periscapular strength </=4+/5   Poor posture              Access Code: [email or web address]  URL: [email or web address]  Date: 10/17/2024  Prepared by: Viral Schneider     Exercises  - Standing Shoulder Row with Anchored Resistance  - 1 x daily - 3 x weekly - 2 sets - 10 reps  - Shoulder External Rotation with Anchored Resistance  - 1 x daily - 3 x weekly - 2 sets - 10 reps  - Shoulder Internal Rotation with Resistance  - 1 x daily - 3 x weekly - 2 sets - 10 reps     Access Code: [email or web address]  URL: [email or web address]  Date: 11/05/2024  Prepared by: Viral Schneider     Exercises  - Seated Upper Trapezius Stretch  - 2 x daily - 7 x weekly - 3 sets - 30 seconds hold  - Seated Levator Scapulae Stretch  - 2 x daily - 7 x weekly - 3 sets - 30 seconds hold  Manuals 12/11 12/16 12/18 12/24 12/31 1/2 1/9 1/15   Manual traction              R UT/LS stretching              R UT /LS STM CM 15' CM 15' CM 10' CM 10' CM 10' CM 10' CM 10' TC 10'                  Neuro Re-Ed              Hep review/pt education 5'            Row 2x12 10# 2x12 10# 2x12 12# 2x12 12# 2x12 12# 2x12 btb 2x12 12# 2x15 12#    LPD 2x12 5# 2x12 5# 2x12 5# 2x15 5# 2x15 5# 2x12 otb 2x15 5# 2x15 5#   Supine chin tuck              Seated chin tuck              Supine chin tuck and lift              Median N glide              Radial N glide               no money  2x10 ytb 2x12 ytb 2x10 otb 2x10 otb 2x10 otb 2x10 otb 2x10 otb 2x10 otb   SL abd 15x 3\" 2x10 2x10 2x10 2x10 2x10 2x10 2x10   SL ER 15x 3\" 2x10 2x10 2x10 2x10 2x10 2x10 2x10   SL flx   10x 10x 10x 2x10 2x10 2x10 2x10                  Ther Ex              Seated t/s ext              Seated t/s rot              UBE 3/3 L0     3/3 L0 3/3 L0 3/3 L0 3/3 L0 3'/3'   Open book              ER/IR 2x10 ea 1# 2x10 ea 1# 2x10 ea 1# 2x10 ea 1# 2x12 ea 1# 2x12 ea gtb 2x15 1# 2x15 1#   Sup tri ext         NV     Shrug  15x 3\" 1# 2x10 1# 2x10 1# 2x10 1# 2x10 1# 10x 2# 10x 2# 10x2#   Sup " cane pro               Serratus wall circles 2x20 ea cw/ccw 2x20 ea cw/ccw 2x20 ea cw/ccw 2x20 ea cw/ccw 2x20 ea cw/ccw 2x20 ea cw/ccw 2x20 ea cw/ccw 2x20 ea. Cw/ccw                  Ther Activity                                            Gait Training                                            Modalities

## 2025-01-23 ENCOUNTER — OFFICE VISIT (OUTPATIENT)
Dept: PHYSICAL THERAPY | Facility: REHABILITATION | Age: 80
End: 2025-01-23
Payer: MEDICARE

## 2025-01-23 DIAGNOSIS — M25.511 PAIN AND SWELLING OF RIGHT SHOULDER: Primary | ICD-10-CM

## 2025-01-23 DIAGNOSIS — M25.411 PAIN AND SWELLING OF RIGHT SHOULDER: Primary | ICD-10-CM

## 2025-01-23 PROCEDURE — 97112 NEUROMUSCULAR REEDUCATION: CPT

## 2025-01-23 PROCEDURE — 97110 THERAPEUTIC EXERCISES: CPT

## 2025-01-23 NOTE — PROGRESS NOTES
PT Evaluation     Today's date: 2025  Patient name: Mari Vilchis  : 1945  MRN: [id number]  Referring provider: Devaughn Richardson, *  Dx:   Encounter Diagnosis     ICD-10-CM    1. Pain and swelling of right shoulder  M25.511     M25.411               Start Time: 1122  Stop Time: 1202  Total time in clinic (min): 40 minutes    Assessment  Impairments: abnormal or restricted ROM, activity intolerance, impaired physical strength, lacks appropriate home exercise program and poor posture     Assessment details: Mari Vilchis is a pleasant 79 y.o. female who presents with R sided neck and shoulder pain. Upon eval today, she has poor posture, poor persicapular strength, chino c/s AROM, chino t/s AROM and altered UE neurodynamics resulting in worry over not knowing what's wrong and fear of not being able to keep active.  No further referral appears necessary at this time based upon examination results.  I expect she will improve within 6 weeks of skilled PT.  Positive prognostic indicators include positive attitude toward recovery and good understanding of diagnosis and treatment plan options.  Negative prognostic indicators include multiple concurrent orthopedic problems.      Comparable signs:  1) C/s ext/R rot  2) R spurling  3) Tricep MMT      Problem List:  1) Poor posture  2) Poor periscapular strength  3) Chino c/s/t/s AROM  4) Altered UE neurodynamics    24: Re-evaluation was performed today and the patient has demonstrated improvements in UE strength and c/s AROM. Patient still presents with deficits of poor posture, poor periscapular strength, chino c/s and t/s ROM resulting in pain with ADLs. Patient educated on findings of RE and that her sx appear to be of multi-origin. Patient with recent lab testing revealing elevated ESR, scheduled to visit with rheumatologist 24. Cont with progressions of existing interventions as charted below with good tolerance today. Education again provided  throughout surrounding tissue end feels, DOMS, sx response to exercise, UE and cervical anatomy. Sx consistent with mm fatigue present throughout.    12/24/24: Tammie cont to demonstrate great response to skilled PT, demonstrating improvements in UE strength and ROM, posture, c/s AROM, functional mobility and activity tolerance, as well as reporting ongoing reductions in shoulder sx and difficulties with ADL performance. Tammie presents with ongoing deficits of L UE and periscapular strength, chino c/s AROM and poor posture, resulting in ongoing soreness with ADL performance. Cont with progressions of existing interventions as charted below today with good tolerance. Will cont to progress as tolerated.    1/23/25: Tammie is able to manage all sx independently and demonstrates good understanding of and proper form with each of the exercises included in her HEP. She has met all personal and established goals for PT and is deemed safe for d/c. POC to remain open for 30 days and she was instructed to contact PT for any change in status.    Understanding of Dx/Px/POC: excellent     Prognosis: good    Goals  Impairment based goals  Patient will improve periscapular strength to 4+/5 in all planes within 3 weeks in order to improve ease and stability with functional mobility. - met  Patient will improve periscapular strength to 5/5 in all planes by time of d/c in order to improve ease and stability with functional mobility. - met  Patient will consistently demonstrate proper upright seated and standing posture during treatment sessions centered around improving deficits of strength and mobility. - met  Patient will improve c/s ROM to WFL by time of d/c in order to improve ease and stability with functional mobility. - met  Patient will improve t/s ROM to WFL by time of d/c in order to improve ease and stability with functional mobility. - met      Functional based goals to be completed by time of d/c  Patient will improve  FOTO to greater than predicted value. - met  Patient will be independent with home exercise program. - met  Patient will be able to manage symptoms independently. - met    Plan    Planned therapy interventions: abdominal trunk stabilization, activity modification, ADL training, balance/weight bearing training, gait training, home exercise program, therapeutic exercise, therapeutic activities, stretching, strengthening, patient education, neuromuscular re-education, postural training, therapeutic training, joint mobilization, IASTM, kinesiology taping, manual therapy, massage, Meyer taping, motor coordination training, muscle pump exercises, nerve gliding, self care, work reintegration, fine motor coordination training, flexibility, functional ROM exercises, graded activity, graded exercise, graded motor, IADL retraining, balance, behavior modification, body mechanics training, breathing training, transfer training and coordination    Frequency: 2x week  Duration in weeks: 12  Treatment plan discussed with: patient        Subjective Evaluation    History of Present Illness  Mechanism of injury: 10/17/2024: Mari Vilchis is a pleasant 79 y.o. female presenting to PT for R sided neck and shoulder pain. Patient reports initial onset ~2 weeks ago which she attributes to falling asleep in a chair with her head tilted to the R. Since then, she reports occasional pain radiating down her R arm, as well as occasional N&T in her R pointer finger.    24: Patient presents to PT having completed 9 sessions to date. Patient has demonstrated inconsistent progress throughout PT thus far, initially demonstrating improvements in neck and shoulder soreness but with slight regression as of recent.   Patient Goals  Patient goals for therapy: increased strength, independence with ADLs/IADLs, return to sport/leisure activities, decreased pain and increased motion    Pain  Current pain ratin  At best pain ratin  At  worst pain ratin  Location: R UT region/posterior shoulder to R pointer finger  Quality: sharp and dull ache  Relieving factors: medications, rest and support (tylenol/salonpas)  Aggravating factors: overhead activity (reaching, sitting and leaning against something)  Progression: improved    Social Support    Employment status: not working  Hand dominance: right      Diagnostic Tests  X-ray: normal        Objective  CERVICAL EXAM  Red flags: None  Kuhn: negative   Babinski: negative   Alar Ligament: negative   Sharp Kourtney: negative   Vertebral Artery Test: negative   Spurling: pos for P! R  Distraction: negative    SENSATION   LEFT RIGHT   C2-C3 Intact Intact   C4 Intact Intact   C5 Intact Intact   C6 Intact Intact   C7 Intact Intact   C8 Intact Intact   T1 Intact Intact   T2 Intact Intact     REFLEXES   LEFT RIGHT   TRICEPS 2+ 2+   BICEPS 2+ 2+   BR 2+ 2+     POSTURAL FINDINGS      CERVICAL   AROM    RIGHT LEFT   %   EXT 80%   ROT 90% 100%       THORACIC   AROM    RIGHT LEFT   FLX 75%   EXT 50%   ROT 50% 75%       SHOULDER   AROM all WFL PROM STRENGTH    RIGHT LEFT RIGHT LEFT RIGHT LEFT   FLX     5/5 5/5   ABD     5/5 5/5   EXT     5/5 5/5   ER     5/5 5/5   IR     5/5 5/5   U. TRAP     5/5 5/5   M. TRAP     5/5 5/5   L. TRAP     5/5 5/5   SERRATUS     5/5 5/5       ELBOW   AROM PROM STRENGTH    RIGHT LEFT RIGHT LEFT RIGHT LEFT   FLX     5/5 5/5   EXT     5/5 5/5                    SPECIAL   LEFT RIGHT   RTC/IMPINGEMENT     Davis  pos   Infraspinatus  neg   Painful arc  neg   Drop arm  neg   ER lag sign     Lift off sign     Neer     Empty can     BICEPS TENDINOPATHY     Speed's     Tito's     Radha's     ACJ     Active compression     Crossover          SCAPULA     Scapular assistance     Scapular dyskinesis     TOS     [place]     Ramin Chinchilla     ULTT     ULTT 1  Pos   ULTT 2     ULTT 3     ULTT 4       COMMENTS:  (+) carpal compression, (+) phalen's, (+) tinel's R          Precautions:   Past Medical History:   Diagnosis Date    Aneurysm (HCC) 2/8/23    2.5 mg found on neck CAT scan    Arthritis 2005    Closed nondisplaced fracture of fifth metatarsal bone of right foot with routine healing     Difficulty walking 2/2/23    Broken Femur    Disease of thyroid gland     hypothyroid    Glaucoma, bilateral     Hyperlipidemia     Osteoporosis     Otitis media 11/4/14    Otitis Media    Scoliosis 12/12/12       Allergies   Allergen Reactions    Fentanyl Vomiting    Indomethacin GI Intolerance     Other reaction(s): GI upset  Other reaction(s): GI upset    Oxycodone Dizziness, Nausea Only, GI Intolerance and Other (See Comments)     Other reaction(s): GI upset  Other reaction(s): GI upset       POC expires Unit limit Auth Expiration date PT/OT + Visit Limit?   12 weeks - 1/9/2025 bomn bomn bomn             Visit/Unit Tracking  AUTH Status:  Date 11/19 (RE) 11/21 11/25 11/27 12/4 12/9 12/11 12/16 12/18 12/24 (RE) 12/31 1/2 1/9 1/15   MC bomn Used  9 10 11 12 13 14 15 16 17 18 19 20 21 22     Remaining                                   Pertinent Findings:      POC End Date:  12 weeks - 1/9/2025                                                                                     Test / Measure      FOTO (Predicted 69) 59   (+) spurling R     C/s ext/rot AROM 75%   Periscapular strength </=4+/5   Poor posture              Access Code: [email or web address]  URL: [email or web address]  Date: 10/17/2024  Prepared by: Viral Schneider     Exercises  - Standing Shoulder Row with Anchored Resistance  - 1 x daily - 3 x weekly - 2 sets - 10 reps  - Shoulder External Rotation with Anchored Resistance  - 1 x daily - 3 x weekly - 2 sets - 10 reps  - Shoulder Internal Rotation with Resistance  - 1 x daily - 3 x weekly - 2 sets - 10 reps     Access Code: [email or web address]  URL: [email or web address]  Date: 11/05/2024  Prepared by: Viral Schneider     Exercises  - Seated Upper Trapezius Stretch  - 2 x daily - 7  "x weekly - 3 sets - 30 seconds hold  - Seated Levator Scapulae Stretch  - 2 x daily - 7 x weekly - 3 sets - 30 seconds hold  Manuals 12/11 12/16 12/18 12/24 12/31 1/2 1/9 1/15 1/23   Manual traction                    R UT/LS stretching                    R UT /LS STM CM 15' CM 15' CM 10' CM 10' CM 10' CM 10' CM 10' TC 10'                         Neuro Re-Ed                    Hep review/pt education 5'               10'   Row 2x12 10# 2x12 10# 2x12 12# 2x12 12# 2x12 12# 2x12 btb 2x12 12# 2x15 12#  2x15 12#   LPD 2x12 5# 2x12 5# 2x12 5# 2x15 5# 2x15 5# 2x12 otb 2x15 5# 2x15 5# 2x15 12#   Supine chin tuck                    Seated chin tuck                    Supine chin tuck and lift                    Median N glide                    Radial N glide                     no money  2x10 ytb 2x12 ytb 2x10 otb 2x10 otb 2x10 otb 2x10 otb 2x10 otb 2x10 otb    SL abd 15x 3\" 2x10 2x10 2x10 2x10 2x10 2x10 2x10 2x10   SL ER 15x 3\" 2x10 2x10 2x10 2x10 2x10 2x10 2x10 2x10   SL flx   10x 10x 10x 2x10 2x10 2x10 2x10 2x10                        Ther Ex                    Seated t/s ext                    Seated t/s rot                    UBE 3/3 L0     3/3 L0 3/3 L0 3/3 L0 3/3 L0 3'/3' 3/3 L0   Open book                    ER/IR 2x10 ea 1# 2x10 ea 1# 2x10 ea 1# 2x10 ea 1# 2x12 ea 1# 2x12 ea gtb 2x15 1# 2x15 1# 2x15 1#   Sup tri ext           NV        Shrug  15x 3\" 1# 2x10 1# 2x10 1# 2x10 1# 2x10 1# 10x 2# 10x 2# 10x2# 15x2#   Sup cane pro                     Serratus wall circles 2x20 ea cw/ccw 2x20 ea cw/ccw 2x20 ea cw/ccw 2x20 ea cw/ccw 2x20 ea cw/ccw 2x20 ea cw/ccw 2x20 ea cw/ccw 2x20 ea. Cw/ccw 2x20 ea cw/ccw                        Ther Activity                                                              Gait Training                                                              Modalities                                                                       "

## 2025-02-03 ENCOUNTER — TELEPHONE (OUTPATIENT)
Dept: ENDOCRINOLOGY | Facility: CLINIC | Age: 80
End: 2025-02-03

## 2025-02-10 ENCOUNTER — APPOINTMENT (OUTPATIENT)
Dept: LAB | Facility: CLINIC | Age: 80
End: 2025-02-10
Payer: MEDICARE

## 2025-02-10 ENCOUNTER — RESULTS FOLLOW-UP (OUTPATIENT)
Dept: ENDOCRINOLOGY | Facility: CLINIC | Age: 80
End: 2025-02-10

## 2025-02-10 LAB
25(OH)D3 SERPL-MCNC: 34.4 NG/ML (ref 30–100)
ANION GAP SERPL CALCULATED.3IONS-SCNC: 11 MMOL/L (ref 4–13)
BUN SERPL-MCNC: 23 MG/DL (ref 5–25)
CALCIUM SERPL-MCNC: 9.5 MG/DL (ref 8.4–10.2)
CHLORIDE SERPL-SCNC: 104 MMOL/L (ref 96–108)
CO2 SERPL-SCNC: 27 MMOL/L (ref 21–32)
CREAT SERPL-MCNC: 0.83 MG/DL (ref 0.6–1.3)
GFR SERPL CREATININE-BSD FRML MDRD: 67 ML/MIN/1.73SQ M
GLUCOSE P FAST SERPL-MCNC: 92 MG/DL (ref 65–99)
POTASSIUM SERPL-SCNC: 3.9 MMOL/L (ref 3.5–5.3)
SODIUM SERPL-SCNC: 142 MMOL/L (ref 135–147)

## 2025-02-11 ENCOUNTER — OFFICE VISIT (OUTPATIENT)
Dept: INTERNAL MEDICINE CLINIC | Facility: CLINIC | Age: 80
End: 2025-02-11
Payer: MEDICARE

## 2025-02-11 ENCOUNTER — TELEPHONE (OUTPATIENT)
Age: 80
End: 2025-02-11

## 2025-02-11 VITALS
SYSTOLIC BLOOD PRESSURE: 124 MMHG | DIASTOLIC BLOOD PRESSURE: 72 MMHG | HEIGHT: 61 IN | HEART RATE: 83 BPM | OXYGEN SATURATION: 99 % | BODY MASS INDEX: 25.11 KG/M2 | WEIGHT: 133 LBS | TEMPERATURE: 98 F

## 2025-02-11 DIAGNOSIS — G56.01 CARPAL TUNNEL SYNDROME OF RIGHT WRIST: ICD-10-CM

## 2025-02-11 DIAGNOSIS — E78.2 MIXED HYPERLIPIDEMIA: ICD-10-CM

## 2025-02-11 DIAGNOSIS — M81.0 OSTEOPOROSIS, UNSPECIFIED OSTEOPOROSIS TYPE, UNSPECIFIED PATHOLOGICAL FRACTURE PRESENCE: ICD-10-CM

## 2025-02-11 DIAGNOSIS — E03.9 ACQUIRED HYPOTHYROIDISM: ICD-10-CM

## 2025-02-11 DIAGNOSIS — I10 PRIMARY HYPERTENSION: Primary | ICD-10-CM

## 2025-02-11 PROCEDURE — G2211 COMPLEX E/M VISIT ADD ON: HCPCS | Performed by: INTERNAL MEDICINE

## 2025-02-11 PROCEDURE — 99214 OFFICE O/P EST MOD 30 MIN: CPT | Performed by: INTERNAL MEDICINE

## 2025-02-11 NOTE — PROGRESS NOTES
Assessment/Plan:           1. Primary hypertension  Comments:  Home readings were reviewed.  Continue amlodipine.  2. Osteoporosis, unspecified osteoporosis type, unspecified pathological fracture presence  3. Carpal tunnel syndrome of right wrist  Comments:  Better with using brace.  Wrist ultrasound ordered and follow-up with hand orthopedic specialist advised  Orders:  -     US Carpal Tunnel; Future; Expected date: 02/11/2025  -     Ambulatory Referral to Orthopedic Surgery; Future  4. Acquired hypothyroidism  Comments:  Continue levothyroxine.  5. Mixed hyperlipidemia  Comments:  Continue simvastatin.         1. Primary hypertension (Primary)      2. Osteoporosis, unspecified osteoporosis type, unspecified pathological fracture presence      3. Carpal tunnel syndrome of right wrist      4. Acquired hypothyroidism         No problem-specific Assessment & Plan notes found for this encounter.           Subjective:      Patient ID: Mari Vilchis is a 79 y.o. female.    HPI    The following portions of the patient's history were reviewed and updated as appropriate: She  has a past medical history of Aneurysm (HCC) (2/8/23), Arthritis (2005), Closed nondisplaced fracture of fifth metatarsal bone of right foot with routine healing, Difficulty walking (2/2/23), Disease of thyroid gland, Glaucoma, bilateral, Hyperlipidemia, Osteoporosis, Otitis media (11/4/14), and Scoliosis (12/12/12).  She   Patient Active Problem List    Diagnosis Date Noted    Left leg pain 05/30/2024    Vitamin D deficiency 05/28/2024    History of stroke 09/12/2023    Thyroid nodule 07/19/2023    Aneurysm (HCC) 03/08/2023    Nail abnormalities 02/17/2023    Osteoporosis 02/09/2023    HLD (hyperlipidemia) 02/06/2023    Acute blood loss anemia 02/05/2023    Fall 02/03/2023    Acute pain due to trauma 02/03/2023    Hypothyroidism 02/03/2023    Femur fracture (HCC) 02/02/2023    Chest pain 10/31/2022    Abnormal electrocardiogram (ECG) (EKG)  10/31/2022    Primary osteoarthritis of right knee 05/14/2019    Primary osteoarthritis of left knee 05/14/2019     She  has a past surgical history that includes Bunionectomy; Correction hammer toe; [place] tooth extraction; NEUROMA EXCISION; Other surgical history; Colonoscopy (06/04/2019); Mammo (historical) (07/09/2018); pr optx fem shft fx w/insj imed implt w/wo screw (Right, 02/03/2023); and Fracture surgery (2/3/23).  Her family history includes Arthritis in her mother; Cancer in her father; Dementia in her maternal aunt and maternal aunt; Glaucoma in her mother; Hyperlipidemia in her family; Hypertension in her brother, family, maternal aunt, and mother; Hypothyroidism in her mother; No Known Problems in her maternal aunt, maternal grandfather, maternal grandmother, paternal aunt, paternal grandfather, and paternal grandmother; Osteoporosis in her family and maternal aunt; Stomach cancer (age of onset: 51) in her father; Thyroid disease in her mother.  She  reports that she has never smoked. She has never used smokeless tobacco. She reports current alcohol use. She reports that she does not use drugs.  Current Outpatient Medications   Medication Sig Dispense Refill    acetaminophen (TYLENOL) 325 mg tablet Take 325 mg by mouth daily at bedtime Prn      amLODIPine (NORVASC) 5 mg tablet Take 1 tablet (5 mg total) by mouth daily 30 tablet 5    Calcium Carb-Cholecalciferol 600-20 MG-MCG TABS Take 2 tablets by mouth in the morning      denosumab (PROLIA) 60 mg/mL Inject 60 mg under the skin once Twice a year      Glucosamine-Chondroitin 500-400 MG CAPS Take 1 tablet by mouth in the morning      ibuprofen (Advil) 200 mg tablet Take 200 mg by mouth every 4 (four) hours as needed for moderate pain      levothyroxine 25 mcg tablet TAKE 1 TABLET BY MOUTH EVERY DAY 90 tablet 1    Naphazoline-Polyethyl Glycol 0.012-0.2 % SOLN Apply 1-2 drops to eye 4 (four) times a day      Polyvinyl Alcohol-Povidone (REFRESH OP) Apply to  eye Refresh drops TID   Refresh drops + gel QHS      simvastatin (ZOCOR) 40 mg tablet TAKE 1 TABLET BY MOUTH DAILY AT BEDTIME 90 tablet 3    White Petrolatum-Mineral Oil (artificial tears) Administer 1 Application to both eyes as needed for dry eyes       No current facility-administered medications for this visit.     Current Outpatient Medications on File Prior to Visit   Medication Sig    acetaminophen (TYLENOL) 325 mg tablet Take 325 mg by mouth daily at bedtime Prn    amLODIPine (NORVASC) 5 mg tablet Take 1 tablet (5 mg total) by mouth daily    Calcium Carb-Cholecalciferol 600-20 MG-MCG TABS Take 2 tablets by mouth in the morning    denosumab (PROLIA) 60 mg/mL Inject 60 mg under the skin once Twice a year    Glucosamine-Chondroitin 500-400 MG CAPS Take 1 tablet by mouth in the morning    ibuprofen (Advil) 200 mg tablet Take 200 mg by mouth every 4 (four) hours as needed for moderate pain    levothyroxine 25 mcg tablet TAKE 1 TABLET BY MOUTH EVERY DAY    Naphazoline-Polyethyl Glycol 0.012-0.2 % SOLN Apply 1-2 drops to eye 4 (four) times a day    Polyvinyl Alcohol-Povidone (REFRESH OP) Apply to eye Refresh drops TID   Refresh drops + gel QHS    simvastatin (ZOCOR) 40 mg tablet TAKE 1 TABLET BY MOUTH DAILY AT BEDTIME    White Petrolatum-Mineral Oil (artificial tears) Administer 1 Application to both eyes as needed for dry eyes    [DISCONTINUED] acetaminophen (Tylenol 8 Hour) 650 mg CR tablet Take 650 mg by mouth every 8 (eight) hours as needed for moderate pain (Patient not taking: Reported on 12/5/2024)    [DISCONTINUED] predniSONE 10 mg tablet Take 8 mg by mouth daily Take as directed by prescriber -  taper dose     No current facility-administered medications on file prior to visit.     Medications Discontinued During This Encounter   Medication Reason    acetaminophen (Tylenol 8 Hour) 650 mg CR tablet     predniSONE 10 mg tablet       She is allergic to fentanyl, indomethacin, and oxycodone..    Review of  "Systems   Constitutional:  Negative for appetite change, chills, fatigue and fever.   HENT:  Negative for sore throat and trouble swallowing.    Eyes:  Negative for redness.   Respiratory:  Negative for shortness of breath.    Cardiovascular:  Negative for chest pain and palpitations.   Gastrointestinal:  Negative for abdominal pain, constipation and diarrhea.   Genitourinary:  Negative for dysuria and hematuria.   Musculoskeletal:  Positive for arthralgias. Negative for back pain and neck pain.   Skin:  Negative for rash.   Neurological:  Negative for seizures, weakness and headaches.   Hematological:  Negative for adenopathy.   Psychiatric/Behavioral:  Negative for confusion. The patient is not nervous/anxious.          Objective:      /72 (BP Location: Left arm, Patient Position: Sitting, Cuff Size: Adult)   Pulse 83   Temp 98 °F (36.7 °C)   Ht 5' 1\" (1.549 m)   Wt 60.3 kg (133 lb)   SpO2 99%   BMI 25.13 kg/m²     Results Reviewed       None            Recent Results (from the past 8 weeks)   Vitamin D 25 hydroxy    Collection Time: 02/10/25  9:17 AM   Result Value Ref Range    Vit D, 25-Hydroxy 34.4 30.0 - 100.0 ng/mL   Basic metabolic panel    Collection Time: 02/10/25  9:17 AM   Result Value Ref Range    Sodium 142 135 - 147 mmol/L    Potassium 3.9 3.5 - 5.3 mmol/L    Chloride 104 96 - 108 mmol/L    CO2 27 21 - 32 mmol/L    ANION GAP 11 4 - 13 mmol/L    BUN 23 5 - 25 mg/dL    Creatinine 0.83 0.60 - 1.30 mg/dL    Glucose, Fasting 92 65 - 99 mg/dL    Calcium 9.5 8.4 - 10.2 mg/dL    eGFR 67 ml/min/1.73sq m        Physical Exam  Constitutional:       General: She is not in acute distress.     Appearance: Normal appearance.   HENT:      Head: Normocephalic and atraumatic.      Nose: Nose normal.      Mouth/Throat:      Mouth: Mucous membranes are moist.   Eyes:      Extraocular Movements: Extraocular movements intact.      Pupils: Pupils are equal, round, and reactive to light.   Cardiovascular:      " Rate and Rhythm: Normal rate and regular rhythm.      Pulses: Normal pulses.      Heart sounds: Normal heart sounds. No murmur heard.     No friction rub.   Pulmonary:      Effort: Pulmonary effort is normal. No respiratory distress.      Breath sounds: Normal breath sounds. No wheezing.   Abdominal:      General: Abdomen is flat. Bowel sounds are normal. There is no distension.      Palpations: Abdomen is soft. There is no mass.      Tenderness: There is no abdominal tenderness. There is no guarding.   Musculoskeletal:         General: Normal range of motion.      Cervical back: Neck supple.   Neurological:      General: No focal deficit present.      Mental Status: She is alert and oriented to person, place, and time. Mental status is at baseline.      Cranial Nerves: No cranial nerve deficit.   Psychiatric:         Mood and Affect: Mood normal.         Behavior: Behavior normal.

## 2025-02-11 NOTE — TELEPHONE ENCOUNTER
Patient called to inform Dr Mathis that she has her US wrist scheduled for 3/19/25. She is seeing the hand specialist, Dr. Musa, on 3/24/25 in [place]. Couldn't get in with Dr Augustine.    She did make a f/u with Dr Mathis in April.    a

## 2025-02-12 ENCOUNTER — OFFICE VISIT (OUTPATIENT)
Dept: ENDOCRINOLOGY | Facility: CLINIC | Age: 80
End: 2025-02-12
Payer: MEDICARE

## 2025-02-12 VITALS
SYSTOLIC BLOOD PRESSURE: 116 MMHG | DIASTOLIC BLOOD PRESSURE: 70 MMHG | HEIGHT: 61 IN | WEIGHT: 133 LBS | HEART RATE: 71 BPM | OXYGEN SATURATION: 98 % | BODY MASS INDEX: 25.11 KG/M2

## 2025-02-12 DIAGNOSIS — E55.9 VITAMIN D DEFICIENCY: ICD-10-CM

## 2025-02-12 DIAGNOSIS — M81.0 OSTEOPOROSIS, UNSPECIFIED OSTEOPOROSIS TYPE, UNSPECIFIED PATHOLOGICAL FRACTURE PRESENCE: Primary | ICD-10-CM

## 2025-02-12 DIAGNOSIS — E06.3 HYPOTHYROIDISM DUE TO HASHIMOTO'S THYROIDITIS: ICD-10-CM

## 2025-02-12 PROCEDURE — 96372 THER/PROPH/DIAG INJ SC/IM: CPT | Performed by: INTERNAL MEDICINE

## 2025-02-12 PROCEDURE — 99214 OFFICE O/P EST MOD 30 MIN: CPT | Performed by: INTERNAL MEDICINE

## 2025-02-12 PROCEDURE — G2211 COMPLEX E/M VISIT ADD ON: HCPCS | Performed by: INTERNAL MEDICINE

## 2025-02-12 NOTE — PROGRESS NOTES
Mari Vilchis 79 y.o. female MRN: [id number]    Encounter: [id number]      Assessment & Plan     Assessment:  This is a 79 y.o.-year-old female with vitamin D deficiency and osteoporosis management    Plan:  Diagnoses and all orders for this visit:    Osteoporosis, unspecified osteoporosis type, unspecified pathological fracture presence  Previously managed on antiresorptive therapy, Evenity for 1 year.  She finished a course of Evenity in June 2024 and since then she was started on Prolia injection every 6 months.  Continue Prolia injection every 6 months  Last DEXA scan was done in September 2023 which showed T-score at lumbar spine -0.7, left total hip T-score -1.9, left femoral neck T-score -3.4, left forearm T-score is -0.9 history of osteoporosis  She is due for DEXA scan in September 2025  Continue calcium and vitamin D3 supplementation  -     Basic metabolic panel; Future  -     denosumab (PROLIA) subcutaneous injection 60 mg    Vitamin D deficiency  Continue current dose of vitamin D3 supplementation  -     Vitamin D 25 hydroxy; Future    Hypothyroidism due to Hashimoto's thyroiditis  Managed by primary care physician.  Follow-up with PCP       CC:   Hypothyroidism, osteoporosis, vitamin D    History of Present Illness     HPI:    Mari Vilchis  is 78 yr old female with medical HX of osteoporosis, vitamin D deficiency, hypothyroidism is here for follow-up.  She had a fracture of right femur in February 2023.  She was treated with Fosamax from 2000 -2002  then again from 2399-7232, then from May 2017 to February 2023.  SHe was started on Evenity in June 2023.  And completed Evenity in June 2024  She was started on Prolia injections since then  She takes calcium and vitamin D3 supplementation.    For hypothyroidism she takes levothyroxine 25 mcg daily    Lab Results   Component Value Date    DQF9IVEPNGUM 3.527 05/20/2024         Thyroiud USG , 7/2024   HYROID ULTRASOUND     INDICATION: E04.1:  Nontoxic single thyroid nodule.  Reassessment, thyroid nodule.     COMPARISON: 6/8/2023     TECHNIQUE: Ultrasound of the thyroid was performed with a high frequency linear transducer in transverse and sagittal planes including volumetric imaging sweeps as well as traditional still imaging technique.     FINDINGS:  Thyroid texture: Thyroid parenchyma is diffusely heterogeneous in echotexture.     Right lobe: 2.5 x 0.5 x 0.9 cm. Volume 0.5 mL  Left lobe: 4.8 x 2.1 x 3.1 cm. Volume 15.0 mL  Isthmus: 0.2 cm.     Nodule #1. Image 31.  Left gland measuring 3.6 x 1.7 x 2.9 cm. Previously, 3.3 x 2.1 x 2.7 cm at the study prior to that time, 3.0 x 1.9 x 2.4 cm.  COMPOSITION: 2 points, solid or almost completely solid.  ECHOGENICITY: 1 point, hyperechoic or isoechoic.  SHAPE: 0 points, wider-than-tall.  MARGIN: 0 points, smooth.  ECHOGENIC FOCI: 3 points, punctate echogenic foci. (Graded as before)  TI-RADS Classification: TR 4 (4-6 points), FNA if > 1.5 cm. Follow if > 1cm.     No other discrete nodules        IMPRESSION:     3.6 cm left thyroid gland nodule. Given differences in measuring technique, not felt significantly changed in size since the most recent study, and with at most minimal growth since 2016.     Therefore, continued surveillance may be considered.    Final Diagnosis   A.- B.  Thyroid, Left (Thin-prep and smear preparations):  Negative for malignancy (Hoffman Category II) - See note.  Groups of unremarkable follicular cells.  Colloid.  Cytologic findings are compatible with a hyperplastic/adenomatoid nodule.     Satisfactory for evaluation.         Review of Systems   Constitutional:  Negative for activity change, diaphoresis, fatigue, fever and unexpected weight change.   HENT: Negative.     Eyes:  Negative for visual disturbance.   Respiratory:  Negative for cough, chest tightness and shortness of breath.    Cardiovascular:  Negative for chest pain, palpitations and leg swelling.   Gastrointestinal:   Negative for abdominal pain, blood in stool, constipation, diarrhea, nausea and vomiting.   Endocrine: Negative for cold intolerance, heat intolerance, polydipsia, polyphagia and polyuria.   Genitourinary:  Negative for dysuria, enuresis, frequency and urgency.   Musculoskeletal:  Negative for arthralgias and myalgias.   Skin:  Negative for pallor, rash and wound.   Allergic/Immunologic: Negative.    Neurological:  Negative for dizziness, tremors, weakness and numbness.   Hematological: Negative.    Psychiatric/Behavioral: Negative.         Historical Information   Past Medical History:   Diagnosis Date    Aneurysm (HCC) 2/8/23    2.5 mg found on neck CAT scan    Arthritis 2005    Closed nondisplaced fracture of fifth metatarsal bone of right foot with routine healing     Difficulty walking 2/2/23    Broken Femur    Disease of thyroid gland     hypothyroid    Glaucoma, bilateral     Hyperlipidemia     Osteoporosis     Otitis media 11/4/14    Otitis Media    Scoliosis 12/12/12     Past Surgical History:   Procedure Laterality Date    BUNIONECTOMY      COLONOSCOPY  06/04/2019    CORRECTION HAMMER TOE      FRACTURE SURGERY  2/3/23    Vamsi inserted for broken femur    MAMMO (HISTORICAL)  07/09/2018    NEUROMA EXCISION      OTHER SURGICAL HISTORY      Birth kwasi removed    [place] OPTX FEM SHFT FX W/INSJ IMED IMPLT W/WO SCREW Right 02/03/2023    Procedure: INSERTION NAIL IM FEMUR ANTEGRADE (TROCHANTERIC);  Surgeon: Chai Chavez MD;  Location: [facility];  Service: Orthopedics    WISDOM TOOTH EXTRACTION       Social History   Social History     Substance and Sexual Activity   Alcohol Use Yes    Comment: Glass of wine a few times a year     Social History     Substance and Sexual Activity   Drug Use Never    Comment: Only drug use was for pain prescribed by a doctor     Social History     Tobacco Use   Smoking Status Never   Smokeless Tobacco Never     Family History:   Family History   Problem Relation Age of Onset     Hypertension Mother     Thyroid disease Mother     Arthritis Mother     Glaucoma Mother     Hypothyroidism Mother     Stomach cancer Father 51    Cancer Father     No Known Problems Maternal Grandmother     No Known Problems Maternal Grandfather     No Known Problems Paternal Grandmother     No Known Problems Paternal Grandfather     Dementia Maternal Aunt     No Known Problems Maternal Aunt     No Known Problems Paternal Aunt     Hypertension Brother     Osteoporosis Family     Hyperlipidemia Family     Hypertension Family     Dementia Maternal Aunt     Hypertension Maternal Aunt     Osteoporosis Maternal Aunt        Meds/Allergies   Current Outpatient Medications   Medication Sig Dispense Refill    acetaminophen (TYLENOL) 325 mg tablet Take 325 mg by mouth daily at bedtime Prn      amLODIPine (NORVASC) 5 mg tablet Take 1 tablet (5 mg total) by mouth daily 30 tablet 5    Calcium Carb-Cholecalciferol 600-20 MG-MCG TABS Take 2 tablets by mouth in the morning      denosumab (PROLIA) 60 mg/mL Inject 60 mg under the skin once Twice a year      Glucosamine-Chondroitin 500-400 MG CAPS Take 1 tablet by mouth in the morning      ibuprofen (Advil) 200 mg tablet Take 200 mg by mouth every 4 (four) hours as needed for moderate pain      levothyroxine 25 mcg tablet TAKE 1 TABLET BY MOUTH EVERY DAY 90 tablet 1    Naphazoline-Polyethyl Glycol 0.012-0.2 % SOLN Apply 1-2 drops to eye 4 (four) times a day      Polyvinyl Alcohol-Povidone (REFRESH OP) Apply to eye Refresh drops TID   Refresh drops + gel QHS      simvastatin (ZOCOR) 40 mg tablet TAKE 1 TABLET BY MOUTH DAILY AT BEDTIME 90 tablet 3    White Petrolatum-Mineral Oil (artificial tears) Administer 1 Application to both eyes as needed for dry eyes       No current facility-administered medications for this visit.     Allergies   Allergen Reactions    Fentanyl Vomiting    Indomethacin GI Intolerance, Dizziness and Nausea Only     Other reaction(s): GI upset    Oxycodone  "Dizziness, Nausea Only, GI Intolerance and Other (See Comments)     Other reaction(s): GI upset  Other reaction(s): GI upset       Objective   Vitals: There were no vitals taken for this visit.    Physical Exam  Vitals reviewed.   Constitutional:       General: She is not in acute distress.     Appearance: Normal appearance. She is not ill-appearing.   HENT:      Head: Normocephalic and atraumatic.      Nose: Nose normal.   Eyes:      Extraocular Movements: Extraocular movements intact.      Conjunctiva/sclera: Conjunctivae normal.   Pulmonary:      Effort: No respiratory distress.   Musculoskeletal:      Cervical back: Normal range of motion.   Neurological:      General: No focal deficit present.      Mental Status: She is alert and oriented to person, place, and time.   Psychiatric:         Mood and Affect: Mood normal.         Behavior: Behavior normal.         The history was obtained from the review of the chart, patient.    Lab Results:   Lab Results   Component Value Date/Time    TSH 3RD GENERATON 3.527 05/20/2024 09:50 AM    Free T4 0.84 05/20/2024 09:50 AM       Imaging Studies:   Results for orders placed during the hospital encounter of 07/19/24    US thyroid    Impression  3.6 cm left thyroid gland nodule. Given differences in measuring technique, not felt significantly changed in size since the most recent study, and with at most minimal growth since 2016.    Therefore, continued surveillance may be considered.    Reference: ACR Thyroid Imaging, Reporting and Data System (TI-RADS): White Paper of the ACR TI-RADS Committee. J AM Fatoumata Radiol 2017;14:587-595. Additional recommendations based on American Thyroid Association 2015 guidelines.      Workstation performed: [id number]      Results Review Statement: No pertinent imaging studies reviewed.    Portions of the record may have been created with voice recognition software. Occasional wrong word or \"sound a like\" substitutions may have occurred due to " the inherent limitations of voice recognition software. Read the chart carefully and recognize, using context, where substitutions have occurred.

## 2025-02-12 NOTE — PROGRESS NOTES
"Assessment/Plan:    Mari Vilchis came into the [facility] today 02/12/25 to receive prolia injection.      Verbal consent obtained.  Consent given by: patient    patient states patient has been medically healthy with no underlining concerns/complications.      Mari Vilchis presents with no symptoms today.       All insturctions were reviewed with the patient.    If the patient should have any questions/concerns, advised patient to contacted [facility].       Subjective:     History provided by: patient    Patient ID: Mari Vilchis is a 79 y.o. female      Objective:    Vitals:    02/12/25 1003   BP: 116/70   BP Location: Left arm   Patient Position: Sitting   Cuff Size: Large   Pulse: 71   SpO2: 98%   Weight: 60.3 kg (133 lb)   Height: 5' 1\" (1.549 m)       Patient tolerated the injection well without any complications.  Injection site/s right arm.  Medication was provided by office.    Patient signed consent form yes   Patient signed ABN form yes (If no patient is not a medicare patient).   Patient waited 15 minutes after injection no (This only applies to patient's receiving first time injection).       Last Visit: 2/3/2025  Next visit:Visit date not found     "

## 2025-02-26 ENCOUNTER — OFFICE VISIT (OUTPATIENT)
Dept: INTERNAL MEDICINE CLINIC | Facility: CLINIC | Age: 80
End: 2025-02-26
Payer: MEDICARE

## 2025-02-26 VITALS
HEIGHT: 61 IN | OXYGEN SATURATION: 99 % | WEIGHT: 132.6 LBS | DIASTOLIC BLOOD PRESSURE: 84 MMHG | TEMPERATURE: 98.5 F | SYSTOLIC BLOOD PRESSURE: 130 MMHG | BODY MASS INDEX: 25.04 KG/M2 | HEART RATE: 92 BPM

## 2025-02-26 DIAGNOSIS — L50.9 URTICARIA: Primary | ICD-10-CM

## 2025-02-26 PROCEDURE — G2211 COMPLEX E/M VISIT ADD ON: HCPCS | Performed by: INTERNAL MEDICINE

## 2025-02-26 PROCEDURE — 99213 OFFICE O/P EST LOW 20 MIN: CPT | Performed by: INTERNAL MEDICINE

## 2025-02-26 RX ORDER — PREDNISONE 20 MG/1
20 TABLET ORAL DAILY
Qty: 5 TABLET | Refills: 0 | Status: SHIPPED | OUTPATIENT
Start: 2025-02-26 | End: 2025-03-03

## 2025-02-26 NOTE — PROGRESS NOTES
"Assessment/Plan:    Urticarial  rash       Diagnoses and all orders for this visit:    Urticaria  -     predniSONE 20 mg tablet; Take 1 tablet (20 mg total) by mouth daily for 5 days  -     Ambulatory Referral to Dermatology; Future          Subjective: Urticarila  rash      Patient ID: Mari Vilchis is a 79 y.o. female.    Rash  Pertinent negatives include no cough, diarrhea or vomiting.       The following portions of the patient's history were reviewed and updated as appropriate: allergies, current medications, past family history, past medical history, past social history, past surgical history, and problem list.    Review of Systems   Constitutional: Negative.    HENT:  Negative for dental problem, drooling, ear discharge and ear pain.    Eyes:  Negative for discharge, redness and itching.   Respiratory:  Negative for apnea, cough and wheezing.    Cardiovascular:  Negative for chest pain and palpitations.   Gastrointestinal:  Negative for abdominal pain, blood in stool, diarrhea and vomiting.   Endocrine: Negative for polydipsia, polyphagia and polyuria.   Genitourinary:  Negative for decreased urine volume, dysuria and frequency.   Musculoskeletal:  Negative for arthralgias, myalgias and neck stiffness.   Skin:  Positive for rash. Negative for pallor and wound.   Allergic/Immunologic: Negative for environmental allergies and food allergies.   Neurological:  Negative for facial asymmetry, light-headedness, numbness and headaches.   Hematological:  Negative for adenopathy. Does not bruise/bleed easily.   Psychiatric/Behavioral:  Negative for agitation, behavioral problems and confusion.          Objective:      /84 (BP Location: Left arm, Patient Position: Sitting, Cuff Size: Standard)   Pulse 92   Temp 98.5 °F (36.9 °C) (Temporal)   Ht 5' 1\" (1.549 m)   Wt 60.1 kg (132 lb 9.6 oz)   SpO2 99%   BMI 25.05 kg/m²          Physical Exam  Constitutional:       Appearance: Normal appearance.   HENT:     "  Head: Normocephalic.      Nose: Nose normal.      Mouth/Throat:      Mouth: Mucous membranes are moist.   Eyes:      Pupils: Pupils are equal, round, and reactive to light.   Cardiovascular:      Rate and Rhythm: Regular rhythm.      Heart sounds: Normal heart sounds.   Pulmonary:      Breath sounds: Normal breath sounds.   Abdominal:      Palpations: Abdomen is soft.   Musculoskeletal:         General: No swelling.      Cervical back: Neck supple.   Skin:     General: Skin is warm.   Neurological:      General: No focal deficit present.      Mental Status: She is alert and oriented to person, place, and time.   Psychiatric:         Mood and Affect: Mood normal.       Urticarial rash.  Referred to  dermatology  Prednisone  20mg one daily for 5 days Benadryl  for  itching  and  Cortizone 10 ointment  She  wonders if  she  needs  blood pressure meds. I told her to  stoop the blood pressure for one week and  do  blood pressure check  at home  and  bring  results  with her in one  weeks  then will make  a  decision  is  she  needs to go back on blood pressure  medication.     Fup one  week

## 2025-02-28 ENCOUNTER — PROCEDURE VISIT (OUTPATIENT)
Dept: OBGYN CLINIC | Facility: CLINIC | Age: 80
End: 2025-02-28
Payer: MEDICARE

## 2025-02-28 VITALS — HEIGHT: 61 IN | WEIGHT: 132 LBS | BODY MASS INDEX: 24.92 KG/M2

## 2025-02-28 DIAGNOSIS — M17.12 PRIMARY OSTEOARTHRITIS OF LEFT KNEE: ICD-10-CM

## 2025-02-28 DIAGNOSIS — M17.11 PRIMARY OSTEOARTHRITIS OF RIGHT KNEE: Primary | ICD-10-CM

## 2025-02-28 PROCEDURE — 20610 DRAIN/INJ JOINT/BURSA W/O US: CPT | Performed by: ORTHOPAEDIC SURGERY

## 2025-02-28 RX ORDER — HYALURONATE SODIUM 10 MG/ML
20 SYRINGE (ML) INTRAARTICULAR
Status: COMPLETED | OUTPATIENT
Start: 2025-02-28 | End: 2025-02-28

## 2025-02-28 RX ORDER — KETOROLAC TROMETHAMINE 30 MG/ML
30 INJECTION, SOLUTION INTRAMUSCULAR; INTRAVENOUS
Status: COMPLETED | OUTPATIENT
Start: 2025-02-28 | End: 2025-02-28

## 2025-02-28 RX ORDER — BUPIVACAINE HYDROCHLORIDE 2.5 MG/ML
1 INJECTION, SOLUTION INFILTRATION; PERINEURAL
Status: COMPLETED | OUTPATIENT
Start: 2025-02-28 | End: 2025-02-28

## 2025-02-28 RX ADMIN — Medication 20 MG: at 09:45

## 2025-02-28 RX ADMIN — KETOROLAC TROMETHAMINE 30 MG: 30 INJECTION, SOLUTION INTRAMUSCULAR; INTRAVENOUS at 09:45

## 2025-02-28 RX ADMIN — BUPIVACAINE HYDROCHLORIDE 1 ML: 2.5 INJECTION, SOLUTION INFILTRATION; PERINEURAL at 09:45

## 2025-02-28 NOTE — ASSESSMENT & PLAN NOTE
Euflexxa #1 was administered today, which she tolerated well.   Assume all activities as tolerated  Orders:    Large joint arthrocentesis: R knee

## 2025-02-28 NOTE — PROGRESS NOTES
Assessment:  Assessment & Plan  Primary osteoarthritis of right knee  Euflexxa #1 was administered today, which she tolerated well.   Assume all activities as tolerated  Orders:    Large joint arthrocentesis: R knee    Primary osteoarthritis of left knee  Euflexxa #1 was administered today, which she tolerated well.   Assume all activities as tolerated  Orders:    Large joint arthrocentesis: L knee        To do next visit:  Return in about 1 week (around 3/7/2025) for Already scheduled.    The above stated was discussed in layman's terms and the patient expressed understanding.  All questions were answered to the patient's satisfaction.       Scribe Attestation      I,:  Isabel Hernández am acting as a scribe while in the presence of the attending physician.:       I,:  Magali Johnson MD personally performed the services described in this documentation    as scribed in my presence.:               Subjective:   Mari Vilchis is a 79 y.o. female who presents today for Euflexxa No. 1 for bilateral knee pain due to osteoarthritis.  Patient states that the last 2 weeks she has had a rash occur along her upper extremity and was seen by her primary care.  She is now seeing a dermatologist to determine the diagnosis.  She went to informed in case it was not agreeable to proceed with the injections today.  She states that she has no pain when she is walking or with increased activities. She states she has pain mostly with sitting to standing over the anterior aspect of the knee. Has been doing stretching exercises from physical therapy.       Review of systems negative unless otherwise specified in HPI  Review of Systems   Constitutional:  Negative for chills, fever and unexpected weight change.   HENT:  Negative for hearing loss, nosebleeds and sore throat.    Eyes:  Negative for pain, redness and visual disturbance.   Respiratory:  Negative for cough, shortness of breath and wheezing.    Cardiovascular:  Negative  for chest pain, palpitations and leg swelling.   Gastrointestinal:  Negative for abdominal pain and nausea.   Genitourinary:  Negative for dyspareunia, dysuria and frequency.   Musculoskeletal:  Positive for arthralgias.   Skin:  Negative for rash and wound.   Neurological:  Negative for dizziness, numbness and headaches.   Psychiatric/Behavioral:  Negative for decreased concentration and suicidal ideas. The patient is not nervous/anxious.        Past Medical History:   Diagnosis Date    Aneurysm (HCC) 2/8/23    2.5 mg found on neck CAT scan    Arthritis 2005    Closed nondisplaced fracture of fifth metatarsal bone of right foot with routine healing     Difficulty walking 2/2/23    Broken Femur    Disease of thyroid gland     hypothyroid    Glaucoma, bilateral     Hyperlipidemia     Osteoporosis     Otitis media 11/4/14    Otitis Media    Scoliosis 12/12/12       Past Surgical History:   Procedure Laterality Date    BUNIONECTOMY      COLONOSCOPY  06/04/2019    CORRECTION HAMMER TOE      FRACTURE SURGERY  2/3/23    Vamsi inserted for broken femur    MAMMO (HISTORICAL)  07/09/2018    NEUROMA EXCISION      OTHER SURGICAL HISTORY      Birth kwasi removed    [facility] OPTX FEM SHFT FX W/INSJ IMED IMPLT W/WO SCREW Right 02/03/2023    Procedure: INSERTION NAIL IM FEMUR ANTEGRADE (TROCHANTERIC);  Surgeon: Chai Chavez MD;  Location: [facility];  Service: Orthopedics    WISDOM TOOTH EXTRACTION         Family History   Problem Relation Age of Onset    Hypertension Mother     Thyroid disease Mother     Arthritis Mother     Glaucoma Mother     Hypothyroidism Mother     Stomach cancer Father 51    Cancer Father     No Known Problems Maternal Grandmother     No Known Problems Maternal Grandfather     No Known Problems Paternal Grandmother     No Known Problems Paternal Grandfather     Dementia Maternal Aunt     No Known Problems Maternal Aunt     No Known Problems Paternal Aunt     Hypertension Brother     Osteoporosis Family      Hyperlipidemia Family     Hypertension Family     Dementia Maternal Aunt     Hypertension Maternal Aunt     Osteoporosis Maternal Aunt        Social History     Occupational History    Not on file   Tobacco Use    Smoking status: Never    Smokeless tobacco: Never   Vaping Use    Vaping status: Never Used   Substance and Sexual Activity    Alcohol use: Yes     Comment: Glass of wine a few times a year    Drug use: Never     Comment: Only drug use was for pain prescribed by a doctor    Sexual activity: Never         Current Outpatient Medications:     acetaminophen (TYLENOL) 325 mg tablet, Take 325 mg by mouth daily at bedtime Prn (Patient taking differently: Take 325 mg by mouth if needed for mild pain Prn), Disp: , Rfl:     amLODIPine (NORVASC) 5 mg tablet, Take 1 tablet (5 mg total) by mouth daily (Patient not taking: Reported on 2/28/2025), Disp: 30 tablet, Rfl: 5    Calcium Carb-Cholecalciferol 600-20 MG-MCG TABS, Take 2 tablets by mouth in the morning, Disp: , Rfl:     denosumab (PROLIA) 60 mg/mL, Inject 60 mg under the skin once Twice a year, Disp: , Rfl:     Glucosamine-Chondroitin 500-400 MG CAPS, Take 1 tablet by mouth in the morning, Disp: , Rfl:     ibuprofen (Advil) 200 mg tablet, Take 200 mg by mouth every 4 (four) hours as needed for moderate pain, Disp: , Rfl:     levothyroxine 25 mcg tablet, TAKE 1 TABLET BY MOUTH EVERY DAY, Disp: 90 tablet, Rfl: 1    Naphazoline-Polyethyl Glycol 0.012-0.2 % SOLN, Apply 1-2 drops to eye 4 (four) times a day, Disp: , Rfl:     Polyvinyl Alcohol-Povidone (REFRESH OP), Apply to eye Refresh drops TID  Refresh drops + gel QHS, Disp: , Rfl:     predniSONE 20 mg tablet, Take 1 tablet (20 mg total) by mouth daily for 5 days, Disp: 5 tablet, Rfl: 0    simvastatin (ZOCOR) 40 mg tablet, TAKE 1 TABLET BY MOUTH DAILY AT BEDTIME, Disp: 90 tablet, Rfl: 3    White Petrolatum-Mineral Oil (artificial tears), Administer 1 Application to both eyes as needed for dry eyes, Disp: , Rfl:  "    Allergies   Allergen Reactions    Fentanyl Vomiting    Indomethacin GI Intolerance, Dizziness and Nausea Only     Other reaction(s): GI upset    Oxycodone Dizziness, Nausea Only, GI Intolerance and Other (See Comments)     Other reaction(s): GI upset  Other reaction(s): GI upset          There were no vitals filed for this visit.    Body mass index is 24.94 kg/m².  Wt Readings from Last 3 Encounters:   02/28/25 59.9 kg (132 lb)   02/26/25 60.1 kg (132 lb 9.6 oz)   02/12/25 60.3 kg (133 lb)       Objective:                    Right Knee Exam     Tenderness   The patient is experiencing tenderness in the medial joint line and lateral joint line.    Range of Motion   Extension:  0   Flexion:  120     Tests   Varus: negative Valgus: negative    Other   Erythema: absent  Sensation: normal  Pulse: present  Swelling: none  Effusion: no effusion present    Comments:  Calf is soft and nontender      Left Knee Exam     Tenderness   The patient is experiencing tenderness in the medial joint line and lateral joint line.    Range of Motion   Extension:  0   Flexion:  120     Tests   Varus: negative Valgus: negative    Other   Erythema: absent  Sensation: normal  Pulse: present  Swelling: none  Effusion: no effusion present    Comments:  Calf is soft and nontender            Diagnostics, reviewed and taken today if performed as documented:    None performed      The attending physician has personally reviewed the pertinent films in PACS and interpretation is as follows:      Procedures, if performed today:    Large joint arthrocentesis: R knee  Universal Protocol:  Consent: Verbal consent obtained.  Risks and benefits: risks, benefits and alternatives were discussed  Consent given by: patient  Time out: Immediately prior to procedure a \"time out\" was called to verify the correct patient, procedure, equipment, support staff and site/side marked as required.  Timeout called at: 2/28/2025 10:17 AM.  Patient understanding: " "patient states understanding of the procedure being performed  Site marked: the operative site was marked  Patient identity confirmed: verbally with patient  Supporting Documentation  Indications: pain   Procedure Details  Location: knee - R knee  Preparation: Patient was prepped and draped in the usual sterile fashion  Needle size: 22 G  Ultrasound guidance: no  Approach: anterolateral  Medications administered: 1 mL bupivacaine 0.25 %; 30 mg ketorolac 30 mg/mL; 20 mg Sodium Hyaluronate (Viscosup) 20 MG/2ML    Patient tolerance: patient tolerated the procedure well with no immediate complications  Dressing:  Sterile dressing applied      Large joint arthrocentesis: L knee  Universal Protocol:  Consent: Verbal consent obtained.  Risks and benefits: risks, benefits and alternatives were discussed  Consent given by: patient  Time out: Immediately prior to procedure a \"time out\" was called to verify the correct patient, procedure, equipment, support staff and site/side marked as required.  Timeout called at: 2/28/2025 10:18 AM.  Patient understanding: patient states understanding of the procedure being performed  Site marked: the operative site was marked  Patient identity confirmed: verbally with patient  Supporting Documentation  Indications: pain   Procedure Details  Location: knee - L knee  Preparation: Patient was prepped and draped in the usual sterile fashion  Needle size: 22 G  Ultrasound guidance: no  Approach: anterolateral  Medications administered: 30 mg ketorolac 30 mg/mL; 20 mg Sodium Hyaluronate (Viscosup) 20 MG/2ML    Patient tolerance: patient tolerated the procedure well with no immediate complications  Dressing:  Sterile dressing applied          Portions of the record may have been created with voice recognition software.  Occasional wrong word or \"sound a like\" substitutions may have occurred due to the inherent limitations of voice recognition software.  Read the chart carefully and recognize, " using context, where substitutions have occurred.

## 2025-02-28 NOTE — ASSESSMENT & PLAN NOTE
Euflexxa #1 was administered today, which she tolerated well.   Assume all activities as tolerated  Orders:    Large joint arthrocentesis: L knee

## 2025-03-05 ENCOUNTER — OFFICE VISIT (OUTPATIENT)
Dept: INTERNAL MEDICINE CLINIC | Facility: CLINIC | Age: 80
End: 2025-03-05
Payer: MEDICARE

## 2025-03-05 VITALS
SYSTOLIC BLOOD PRESSURE: 128 MMHG | WEIGHT: 130 LBS | TEMPERATURE: 98.3 F | BODY MASS INDEX: 24.55 KG/M2 | HEIGHT: 61 IN | DIASTOLIC BLOOD PRESSURE: 90 MMHG | HEART RATE: 70 BPM | OXYGEN SATURATION: 99 %

## 2025-03-05 DIAGNOSIS — D64.9 ANEMIA: ICD-10-CM

## 2025-03-05 DIAGNOSIS — E03.9 HYPOTHYROIDISM: ICD-10-CM

## 2025-03-05 DIAGNOSIS — E78.5 HYPERLIPIDEMIA: Primary | ICD-10-CM

## 2025-03-05 PROCEDURE — 99213 OFFICE O/P EST LOW 20 MIN: CPT | Performed by: INTERNAL MEDICINE

## 2025-03-05 PROCEDURE — G2211 COMPLEX E/M VISIT ADD ON: HCPCS | Performed by: INTERNAL MEDICINE

## 2025-03-05 NOTE — PROGRESS NOTES
"Assessment/Plan:    Follow up  Hypertenson, skin rash, hypothyroidism,  hyperlpidemia     Diagnoses and all orders for this visit:    Hyperlipidemia  -     Lipid panel; Future    Anemia  -     CBC (Includes Diff/Plt) (Refl); Future    Hypothyroidism  -     Comprehensive metabolic panel; Future  -     TSH + Free T4; Future          Subjective: No  complaints  today     Patient ID: Mari Vilchis is a 79 y.o. female.    HPI    The following portions of the patient's history were reviewed and updated as appropriate: allergies, current medications, past family history, past medical history, past social history, past surgical history, and problem list.    Review of Systems   Constitutional: Negative.    HENT:  Negative for dental problem, drooling, ear discharge and ear pain.    Eyes:  Negative for discharge, redness and itching.   Respiratory:  Negative for apnea, cough and wheezing.    Cardiovascular:  Negative for chest pain and palpitations.   Gastrointestinal:  Negative for abdominal pain, blood in stool, diarrhea and vomiting.   Endocrine: Negative for polydipsia, polyphagia and polyuria.   Genitourinary:  Negative for decreased urine volume, dysuria and frequency.   Musculoskeletal:  Negative for arthralgias, myalgias and neck stiffness.   Skin:  Negative for pallor and wound.   Allergic/Immunologic: Negative for environmental allergies and food allergies.   Neurological:  Negative for facial asymmetry, light-headedness, numbness and headaches.   Hematological:  Negative for adenopathy. Does not bruise/bleed easily.   Psychiatric/Behavioral:  Negative for agitation, behavioral problems and confusion.          Objective:      /90 (BP Location: Left arm, Patient Position: Sitting, Cuff Size: Standard)   Pulse 70   Temp 98.3 °F (36.8 °C) (Temporal)   Ht 5' 1\" (1.549 m)   Wt 59 kg (130 lb)   SpO2 99%   BMI 24.56 kg/m²          Physical Exam  Constitutional:       Appearance: Normal appearance.   HENT: "      Head: Normocephalic.      Nose: Nose normal.      Mouth/Throat:      Mouth: Mucous membranes are moist.   Eyes:      Pupils: Pupils are equal, round, and reactive to light.   Cardiovascular:      Rate and Rhythm: Regular rhythm.      Heart sounds: Normal heart sounds.   Pulmonary:      Breath sounds: Normal breath sounds.   Abdominal:      Palpations: Abdomen is soft.   Musculoskeletal:         General: No swelling.      Cervical back: Neck supple.   Skin:     General: Skin is warm.   Neurological:      General: No focal deficit present.      Mental Status: She is alert and oriented to person, place, and time.   Psychiatric:         Mood and Affect: Mood normal.       Mild skin  rash.  Take  Allegra  180 one daily  Hypertension  Continue to  hold  BP meds for now  basdd on  readings of  BP  at home.  Blood work  Ordered .  Fup one week    Devaughn Richardson MD.FACP

## 2025-03-07 ENCOUNTER — PROCEDURE VISIT (OUTPATIENT)
Dept: OBGYN CLINIC | Facility: CLINIC | Age: 80
End: 2025-03-07
Payer: MEDICARE

## 2025-03-07 VITALS — WEIGHT: 130 LBS | BODY MASS INDEX: 24.55 KG/M2 | HEIGHT: 61 IN

## 2025-03-07 DIAGNOSIS — M17.12 PRIMARY OSTEOARTHRITIS OF LEFT KNEE: ICD-10-CM

## 2025-03-07 DIAGNOSIS — M17.11 PRIMARY OSTEOARTHRITIS OF RIGHT KNEE: Primary | ICD-10-CM

## 2025-03-07 PROCEDURE — 20610 DRAIN/INJ JOINT/BURSA W/O US: CPT | Performed by: ORTHOPAEDIC SURGERY

## 2025-03-07 RX ORDER — HYALURONATE SODIUM 10 MG/ML
20 SYRINGE (ML) INTRAARTICULAR
Status: COMPLETED | OUTPATIENT
Start: 2025-03-07 | End: 2025-03-07

## 2025-03-07 RX ORDER — KETOROLAC TROMETHAMINE 30 MG/ML
30 INJECTION, SOLUTION INTRAMUSCULAR; INTRAVENOUS
Status: COMPLETED | OUTPATIENT
Start: 2025-03-07 | End: 2025-03-07

## 2025-03-07 RX ADMIN — Medication 20 MG: at 09:45

## 2025-03-07 RX ADMIN — KETOROLAC TROMETHAMINE 30 MG: 30 INJECTION, SOLUTION INTRAMUSCULAR; INTRAVENOUS at 09:45

## 2025-03-07 NOTE — ASSESSMENT & PLAN NOTE
Euflexxa #2 lateral knees were administered today.  Patient tolerated the injection well.  Patient can resume all activities as tolerated.  Orders:  Orders:    Large joint arthrocentesis: R knee

## 2025-03-07 NOTE — ASSESSMENT & PLAN NOTE
Euflexxa #2 lateral knees were administered today.  Patient tolerated the injection well.  Patient can resume all activities as tolerated.  Orders:    Large joint arthrocentesis: L knee

## 2025-03-07 NOTE — PROGRESS NOTES
Assessment:  Assessment & Plan  Primary osteoarthritis of right knee  Euflexxa #2 lateral knees were administered today.  Patient tolerated the injection well.  Patient can resume all activities as tolerated.  Orders:  Orders:    Large joint arthrocentesis: R knee    Primary osteoarthritis of left knee  Euflexxa #2 lateral knees were administered today.  Patient tolerated the injection well.  Patient can resume all activities as tolerated.  Orders:    Large joint arthrocentesis: L knee        To do next visit:  Return in about 1 week (around 3/14/2025) for Euflexxa #3 bilateral knees.    The above stated was discussed in layman's terms and the patient expressed understanding.  All questions were answered to the patient's satisfaction.       Scribe Attestation      I,:  Isabel Hernández am acting as a scribe while in the presence of the attending physician.:       I,:  Magali Johnson MD personally performed the services described in this documentation    as scribed in my presence.:               Subjective:   Mari Vilchis is a 79 y.o. female who presents today for Euflexxa No. 2 for bilateral knee pain due to osteoarthritis.  Patient reports that she tolerated the first injection well and she has some relief.  She does have some tenderness on the medial aspect of bilateral knees, worse when they touch when she sleeping.      Review of systems negative unless otherwise specified in HPI  Review of Systems   Constitutional:  Negative for chills, fever and unexpected weight change.   HENT:  Negative for hearing loss, nosebleeds and sore throat.    Eyes:  Negative for pain, redness and visual disturbance.   Respiratory:  Negative for cough, shortness of breath and wheezing.    Cardiovascular:  Negative for chest pain, palpitations and leg swelling.   Gastrointestinal:  Negative for abdominal pain and nausea.   Genitourinary:  Negative for dyspareunia, dysuria and frequency.   Musculoskeletal:  Positive for  arthralgias.   Skin:  Negative for rash and wound.   Neurological:  Negative for dizziness, numbness and headaches.   Psychiatric/Behavioral:  Negative for decreased concentration and suicidal ideas. The patient is not nervous/anxious.        Past Medical History:   Diagnosis Date    Aneurysm (HCC) 2/8/23    2.5 mg found on neck CAT scan    Arthritis 2005    Closed nondisplaced fracture of fifth metatarsal bone of right foot with routine healing     Difficulty walking 2/2/23    Broken Femur    Disease of thyroid gland     hypothyroid    Glaucoma, bilateral     Hyperlipidemia     Osteoporosis     Otitis media 11/4/14    Otitis Media    Scoliosis 12/12/12       Past Surgical History:   Procedure Laterality Date    BUNIONECTOMY      COLONOSCOPY  06/04/2019    CORRECTION HAMMER TOE      FRACTURE SURGERY  2/3/23    Vamsi inserted for broken femur    MAMMO (HISTORICAL)  07/09/2018    NEUROMA EXCISION      OTHER SURGICAL HISTORY      Birth kwasi removed    [facility] OPTX FEM SHFT FX W/INSJ IMED IMPLT W/WO SCREW Right 02/03/2023    Procedure: INSERTION NAIL IM FEMUR ANTEGRADE (TROCHANTERIC);  Surgeon: Chai Chavez MD;  Location: [facility];  Service: Orthopedics    WISDOM TOOTH EXTRACTION         Family History   Problem Relation Age of Onset    Hypertension Mother     Thyroid disease Mother     Arthritis Mother     Glaucoma Mother     Hypothyroidism Mother     Stomach cancer Father 51    Cancer Father     No Known Problems Maternal Grandmother     No Known Problems Maternal Grandfather     No Known Problems Paternal Grandmother     No Known Problems Paternal Grandfather     Dementia Maternal Aunt     No Known Problems Maternal Aunt     No Known Problems Paternal Aunt     Hypertension Brother     Osteoporosis Family     Hyperlipidemia Family     Hypertension Family     Dementia Maternal Aunt     Hypertension Maternal Aunt     Osteoporosis Maternal Aunt        Social History     Occupational History    Not on file   Tobacco Use     Smoking status: Never    Smokeless tobacco: Never   Vaping Use    Vaping status: Never Used   Substance and Sexual Activity    Alcohol use: Yes     Comment: Glass of wine a few times a year    Drug use: Never     Comment: Only drug use was for pain prescribed by a doctor    Sexual activity: Never         Current Outpatient Medications:     acetaminophen (TYLENOL) 325 mg tablet, Take 325 mg by mouth daily at bedtime Prn, Disp: , Rfl:     amLODIPine (NORVASC) 5 mg tablet, Take 1 tablet (5 mg total) by mouth daily (Patient not taking: Reported on 2/28/2025), Disp: 30 tablet, Rfl: 5    Calcium Carb-Cholecalciferol 600-20 MG-MCG TABS, Take 2 tablets by mouth in the morning, Disp: , Rfl:     denosumab (PROLIA) 60 mg/mL, Inject 60 mg under the skin once Twice a year, Disp: , Rfl:     Glucosamine-Chondroitin 500-400 MG CAPS, Take 1 tablet by mouth in the morning, Disp: , Rfl:     ibuprofen (Advil) 200 mg tablet, Take 200 mg by mouth every 4 (four) hours as needed for moderate pain, Disp: , Rfl:     levothyroxine 25 mcg tablet, TAKE 1 TABLET BY MOUTH EVERY DAY, Disp: 90 tablet, Rfl: 1    Naphazoline-Polyethyl Glycol 0.012-0.2 % SOLN, Apply 1-2 drops to eye 4 (four) times a day, Disp: , Rfl:     Polyvinyl Alcohol-Povidone (REFRESH OP), Apply to eye Refresh drops TID  Refresh drops + gel QHS, Disp: , Rfl:     simvastatin (ZOCOR) 40 mg tablet, TAKE 1 TABLET BY MOUTH DAILY AT BEDTIME, Disp: 90 tablet, Rfl: 3    White Petrolatum-Mineral Oil (artificial tears), Administer 1 Application to both eyes as needed for dry eyes, Disp: , Rfl:     Allergies   Allergen Reactions    Fentanyl Vomiting    Indomethacin GI Intolerance, Dizziness and Nausea Only     Other reaction(s): GI upset    Oxycodone Dizziness, Nausea Only, GI Intolerance and Other (See Comments)     Other reaction(s): GI upset  Other reaction(s): GI upset          There were no vitals filed for this visit.    Body mass index is 24.56 kg/m².  Wt Readings from Last 3  "Encounters:   03/07/25 59 kg (130 lb)   03/05/25 59 kg (130 lb)   02/28/25 59.9 kg (132 lb)       Objective:                    Right Knee Exam     Tenderness   The patient is experiencing tenderness in the medial joint line and lateral joint line.    Range of Motion   Extension:  0   Flexion:  120     Tests   Varus: negative Valgus: negative  Patellar apprehension: negative    Other   Erythema: absent  Sensation: normal  Pulse: present  Swelling: none  Effusion: no effusion present    Comments:  Calf is soft and non tender      Left Knee Exam     Tenderness   The patient is experiencing tenderness in the medial joint line.    Range of Motion   Extension:  0   Flexion:  120     Tests   Varus: negative Valgus: negative  Patellar apprehension: negative    Other   Erythema: absent  Sensation: normal  Pulse: present  Swelling: none  Effusion: no effusion present    Comments:  Calf is soft and non tender            Diagnostics, reviewed and taken today if performed as documented:    None performed      The attending physician has personally reviewed the pertinent films in PACS and interpretation is as follows:      Procedures, if performed today:    Large joint arthrocentesis: R knee  Universal Protocol:  Consent: Verbal consent obtained.  Risks and benefits: risks, benefits and alternatives were discussed  Consent given by: patient  Time out: Immediately prior to procedure a \"time out\" was called to verify the correct patient, procedure, equipment, support staff and site/side marked as required.  Timeout called at: 3/7/2025 10:31 AM.  Patient understanding: patient states understanding of the procedure being performed  Site marked: the operative site was marked  Patient identity confirmed: verbally with patient  Supporting Documentation  Indications: pain   Procedure Details  Location: knee - R knee  Preparation: Patient was prepped and draped in the usual sterile fashion  Needle size: 22 G  Ultrasound guidance: " "no  Approach: anterolateral  Medications administered: 30 mg ketorolac 30 mg/mL; 20 mg Sodium Hyaluronate (Viscosup) 20 MG/2ML    Patient tolerance: patient tolerated the procedure well with no immediate complications  Dressing:  Sterile dressing applied      Large joint arthrocentesis: L knee  Universal Protocol:  Consent: Verbal consent obtained.  Risks and benefits: risks, benefits and alternatives were discussed  Consent given by: patient  Time out: Immediately prior to procedure a \"time out\" was called to verify the correct patient, procedure, equipment, support staff and site/side marked as required.  Timeout called at: 3/7/2025 10:32 AM.  Patient understanding: patient states understanding of the procedure being performed  Site marked: the operative site was marked  Patient identity confirmed: verbally with patient  Supporting Documentation  Indications: pain   Procedure Details  Location: knee - L knee  Preparation: Patient was prepped and draped in the usual sterile fashion  Needle size: 22 G  Ultrasound guidance: no  Approach: anterolateral  Medications administered: 30 mg ketorolac 30 mg/mL; 20 mg Sodium Hyaluronate (Viscosup) 20 MG/2ML    Patient tolerance: patient tolerated the procedure well with no immediate complications  Dressing:  Sterile dressing applied            Portions of the record may have been created with voice recognition software.  Occasional wrong word or \"sound a like\" substitutions may have occurred due to the inherent limitations of voice recognition software.  Read the chart carefully and recognize, using context, where substitutions have occurred.  "

## 2025-03-11 ENCOUNTER — APPOINTMENT (OUTPATIENT)
Dept: LAB | Facility: CLINIC | Age: 80
End: 2025-03-11
Payer: MEDICARE

## 2025-03-11 DIAGNOSIS — E03.9 HYPOTHYROIDISM, UNSPECIFIED TYPE: ICD-10-CM

## 2025-03-11 DIAGNOSIS — E78.5 HYPERLIPIDEMIA: ICD-10-CM

## 2025-03-11 DIAGNOSIS — D64.9 ANEMIA, UNSPECIFIED TYPE: ICD-10-CM

## 2025-03-11 DIAGNOSIS — E03.9 HYPOTHYROIDISM: ICD-10-CM

## 2025-03-11 LAB
ALBUMIN SERPL BCG-MCNC: 4.3 G/DL (ref 3.5–5)
ALP SERPL-CCNC: 68 U/L (ref 34–104)
ALT SERPL W P-5'-P-CCNC: 14 U/L (ref 7–52)
ANION GAP SERPL CALCULATED.3IONS-SCNC: 10 MMOL/L (ref 4–13)
AST SERPL W P-5'-P-CCNC: 18 U/L (ref 13–39)
BASOPHILS # BLD AUTO: 0.04 THOUSANDS/ÂΜL (ref 0–0.1)
BASOPHILS NFR BLD AUTO: 0 % (ref 0–1)
BILIRUB SERPL-MCNC: 0.5 MG/DL (ref 0.2–1)
BUN SERPL-MCNC: 22 MG/DL (ref 5–25)
CALCIUM SERPL-MCNC: 9.6 MG/DL (ref 8.4–10.2)
CHLORIDE SERPL-SCNC: 104 MMOL/L (ref 96–108)
CHOLEST SERPL-MCNC: 171 MG/DL (ref ?–200)
CO2 SERPL-SCNC: 27 MMOL/L (ref 21–32)
CREAT SERPL-MCNC: 0.77 MG/DL (ref 0.6–1.3)
EOSINOPHIL # BLD AUTO: 0.12 THOUSAND/ÂΜL (ref 0–0.61)
EOSINOPHIL NFR BLD AUTO: 1 % (ref 0–6)
ERYTHROCYTE [DISTWIDTH] IN BLOOD BY AUTOMATED COUNT: 14.3 % (ref 11.6–15.1)
GFR SERPL CREATININE-BSD FRML MDRD: 74 ML/MIN/1.73SQ M
GLUCOSE P FAST SERPL-MCNC: 104 MG/DL (ref 65–99)
HCT VFR BLD AUTO: 46.5 % (ref 34.8–46.1)
HDLC SERPL-MCNC: 70 MG/DL
HGB BLD-MCNC: 14.4 G/DL (ref 11.5–15.4)
IMM GRANULOCYTES # BLD AUTO: 0.03 THOUSAND/UL (ref 0–0.2)
IMM GRANULOCYTES NFR BLD AUTO: 0 % (ref 0–2)
LDLC SERPL CALC-MCNC: 81 MG/DL (ref 0–100)
LYMPHOCYTES # BLD AUTO: 2.85 THOUSANDS/ÂΜL (ref 0.6–4.47)
LYMPHOCYTES NFR BLD AUTO: 30 % (ref 14–44)
MCH RBC QN AUTO: 28.5 PG (ref 26.8–34.3)
MCHC RBC AUTO-ENTMCNC: 31 G/DL (ref 31.4–37.4)
MCV RBC AUTO: 92 FL (ref 82–98)
MONOCYTES # BLD AUTO: 1.03 THOUSAND/ÂΜL (ref 0.17–1.22)
MONOCYTES NFR BLD AUTO: 11 % (ref 4–12)
NEUTROPHILS # BLD AUTO: 5.47 THOUSANDS/ÂΜL (ref 1.85–7.62)
NEUTS SEG NFR BLD AUTO: 58 % (ref 43–75)
NONHDLC SERPL-MCNC: 101 MG/DL
NRBC BLD AUTO-RTO: 0 /100 WBCS
PLATELET # BLD AUTO: 309 THOUSANDS/UL (ref 149–390)
PMV BLD AUTO: 11.7 FL (ref 8.9–12.7)
POTASSIUM SERPL-SCNC: 3.9 MMOL/L (ref 3.5–5.3)
PROT SERPL-MCNC: 6.8 G/DL (ref 6.4–8.4)
RBC # BLD AUTO: 5.05 MILLION/UL (ref 3.81–5.12)
SODIUM SERPL-SCNC: 141 MMOL/L (ref 135–147)
T4 FREE SERPL-MCNC: 0.89 NG/DL (ref 0.61–1.12)
TRIGL SERPL-MCNC: 98 MG/DL (ref ?–150)
TSH SERPL DL<=0.05 MIU/L-ACNC: 3.42 UIU/ML (ref 0.45–4.5)
WBC # BLD AUTO: 9.54 THOUSAND/UL (ref 4.31–10.16)

## 2025-03-11 PROCEDURE — 84443 ASSAY THYROID STIM HORMONE: CPT

## 2025-03-11 PROCEDURE — 80053 COMPREHEN METABOLIC PANEL: CPT

## 2025-03-11 PROCEDURE — 84439 ASSAY OF FREE THYROXINE: CPT

## 2025-03-11 PROCEDURE — 80061 LIPID PANEL: CPT

## 2025-03-11 PROCEDURE — 36415 COLL VENOUS BLD VENIPUNCTURE: CPT

## 2025-03-11 PROCEDURE — 85025 COMPLETE CBC W/AUTO DIFF WBC: CPT

## 2025-03-14 ENCOUNTER — PROCEDURE VISIT (OUTPATIENT)
Dept: OBGYN CLINIC | Facility: CLINIC | Age: 80
End: 2025-03-14
Payer: MEDICARE

## 2025-03-14 ENCOUNTER — OFFICE VISIT (OUTPATIENT)
Dept: INTERNAL MEDICINE CLINIC | Facility: CLINIC | Age: 80
End: 2025-03-14
Payer: MEDICARE

## 2025-03-14 VITALS
WEIGHT: 130 LBS | OXYGEN SATURATION: 99 % | HEIGHT: 61 IN | SYSTOLIC BLOOD PRESSURE: 132 MMHG | TEMPERATURE: 98.2 F | BODY MASS INDEX: 24.55 KG/M2 | DIASTOLIC BLOOD PRESSURE: 70 MMHG | HEART RATE: 74 BPM

## 2025-03-14 VITALS — BODY MASS INDEX: 24.55 KG/M2 | WEIGHT: 130 LBS | HEIGHT: 61 IN

## 2025-03-14 DIAGNOSIS — M17.11 PRIMARY OSTEOARTHRITIS OF RIGHT KNEE: Primary | ICD-10-CM

## 2025-03-14 DIAGNOSIS — M17.12 PRIMARY OSTEOARTHRITIS OF LEFT KNEE: ICD-10-CM

## 2025-03-14 DIAGNOSIS — E78.2 MIXED HYPERLIPIDEMIA: ICD-10-CM

## 2025-03-14 DIAGNOSIS — I10 PRIMARY HYPERTENSION: ICD-10-CM

## 2025-03-14 DIAGNOSIS — Z00.00 ENCOUNTER FOR MEDICARE ANNUAL WELLNESS EXAM: Primary | ICD-10-CM

## 2025-03-14 DIAGNOSIS — E03.9 ACQUIRED HYPOTHYROIDISM: ICD-10-CM

## 2025-03-14 DIAGNOSIS — M81.0 OSTEOPOROSIS WITHOUT CURRENT PATHOLOGICAL FRACTURE, UNSPECIFIED OSTEOPOROSIS TYPE: ICD-10-CM

## 2025-03-14 PROCEDURE — G0439 PPPS, SUBSEQ VISIT: HCPCS | Performed by: INTERNAL MEDICINE

## 2025-03-14 PROCEDURE — 20610 DRAIN/INJ JOINT/BURSA W/O US: CPT | Performed by: PHYSICIAN ASSISTANT

## 2025-03-14 RX ORDER — HYALURONATE SODIUM 10 MG/ML
20 SYRINGE (ML) INTRAARTICULAR
Status: COMPLETED | OUTPATIENT
Start: 2025-03-14 | End: 2025-03-14

## 2025-03-14 RX ADMIN — Medication 20 MG: at 09:45

## 2025-03-14 NOTE — PROGRESS NOTES
"Answers submitted by the patient for this visit:  Medicare Annual Wellness Visit (Submitted on 3/7/2025)  How would you rate your overall health?: very good  Compared to last year, how is your physical health?: slightly better  In general, how satisfied are you with your life?: very satisfied  Compared to last year, how is your eyesight?: same  Compared to last year, how is your hearing?: same  Compared to last year, how is your emotional/mental health?: slightly better  How often is anger a problem for you?: never, rarely  How often do you feel unusually tired/fatigued?: never, rarely  In the past 7 days, how much pain have you experienced?: some  If you answered \"some\" or \"a lot\", please rate the severity of your pain on a scale of 1 to 10 (1 being the least severe pain and 10 being the most intense pain).: 1/10  In the past 6 months, have you lost or gained 10 pounds without trying?: No  One or more falls in the last year: No  In the past 6 months, have you accidentally leaked urine?: Yes  Do you have trouble with the stairs inside or outside your home?: No  Does your home have working smoke alarms?: Yes  Does your home have a carbon monoxide monitor?: Yes  Which safety hazards (if any) have you experienced in your home? Please select all that apply.: none  How would you describe your current diet? Please select all that apply.: Regular, Low Cholesterol, Low Saturated Fat, Limited junk food  In addition to prescription medications, are you taking any over-the-counter supplements?: Yes  If yes, what supplements are you taking?: Caltrate, Cosamin, Refresh eye drops, occasional Tylenol or Advil  Can you manage your medications?: Yes  Are you currently taking any opioid medications?: No  Can you walk and transfer into and out of your bed and chair?: Yes  Can you dress and groom yourself?: Yes  Can you bathe or shower yourself?: Yes  Can you feed yourself?: Yes  Can you do your laundry/ housekeeping?: Yes  Can you " manage your money, pay your bills, and track your expenses?: Yes  Can you make your own meals?: Yes  Can you do your own shopping?: Yes  Please list your DME (Durable Medical Equipment) supplier, if you use one.: None  Within the last 12 months, have you had any hospitalizations or Emergency Department visits?: No  Do you have a living will?: Yes  Do you have a Durable POA (Power of ) for healthcare decisions?: Yes  Do you have an Advanced Directive for end of life decisions?: Yes  How often have you used an illegal drug (including marijuana) or a prescription medication for non-medical reasons in the past year?: never  What is the typical number of drinks you consume in a day?: 0  What is the typical number of drinks you consume in a week?: 0  How often did you have a drink containing alcohol in the past year?: monthly or less  How many drinks did you have on a typical day  when you were drinking in the past year?: 1 to 2  How often did you have 6 or more drinks on one occasion in the past year?: never

## 2025-03-14 NOTE — PROGRESS NOTES
"Assessment/Plan:    No problem-specific Assessment & Plan notes found for this encounter.       Diagnoses and all orders for this visit:    Encounter for Medicare annual wellness exam    Acquired hypothyroidism    Osteoporosis without current pathological fracture, unspecified osteoporosis type    Primary hypertension    Mixed hyperlipidemia          Subjective:      Patient ID: Mari Vilchis is a 79 y.o. female.    HPI    The following portions of the patient's history were reviewed and updated as appropriate: allergies, current medications, past family history, past medical history, past social history, past surgical history, and problem list.    Review of Systems   Constitutional: Negative.    HENT:  Negative for dental problem, drooling, ear discharge and ear pain.    Eyes:  Negative for discharge, redness and itching.   Respiratory:  Negative for apnea, cough and wheezing.    Cardiovascular:  Negative for chest pain and palpitations.   Gastrointestinal:  Negative for abdominal pain, blood in stool, diarrhea and vomiting.   Endocrine: Negative for polydipsia, polyphagia and polyuria.   Genitourinary:  Negative for decreased urine volume, dysuria and frequency.   Musculoskeletal:  Negative for arthralgias, myalgias and neck stiffness.   Skin:  Negative for pallor and wound.   Allergic/Immunologic: Negative for environmental allergies and food allergies.   Neurological:  Negative for facial asymmetry, light-headedness, numbness and headaches.   Hematological:  Negative for adenopathy. Does not bruise/bleed easily.   Psychiatric/Behavioral:  Negative for agitation, behavioral problems and confusion.          Objective:      /70 (BP Location: Left arm, Patient Position: Sitting, Cuff Size: Standard)   Pulse 74   Temp 98.2 °F (36.8 °C) (Temporal)   Ht 5' 1\" (1.549 m)   Wt 59 kg (130 lb)   SpO2 99%   BMI 24.56 kg/m²          Physical Exam  Constitutional:       Appearance: Normal appearance.   HENT:    " "  Head: Normocephalic.      Nose: Nose normal.      Mouth/Throat:      Mouth: Mucous membranes are moist.   Eyes:      Pupils: Pupils are equal, round, and reactive to light.   Cardiovascular:      Rate and Rhythm: Regular rhythm.      Heart sounds: Normal heart sounds.   Pulmonary:      Breath sounds: Normal breath sounds.   Abdominal:      Palpations: Abdomen is soft.   Musculoskeletal:         General: No swelling.      Cervical back: Neck supple.   Skin:     General: Skin is warm.   Neurological:      General: No focal deficit present.      Mental Status: She is alert and oriented to person, place, and time.   Psychiatric:         Mood and Affect: Mood normal.       Normal  physical  exam      Blood work  all normal.  Continue same  meds. Fup  3 months.     Devaughn Richardson MD.FACP    Answers submitted by the patient for this visit:  Medicare Annual Wellness Visit (Submitted on 3/7/2025)  How would you rate your overall health?: very good  Compared to last year, how is your physical health?: slightly better  In general, how satisfied are you with your life?: very satisfied  Compared to last year, how is your eyesight?: same  Compared to last year, how is your hearing?: same  Compared to last year, how is your emotional/mental health?: slightly better  How often is anger a problem for you?: never, rarely  How often do you feel unusually tired/fatigued?: never, rarely  In the past 7 days, how much pain have you experienced?: some  If you answered \"some\" or \"a lot\", please rate the severity of your pain on a scale of 1 to 10 (1 being the least severe pain and 10 being the most intense pain).: 1/10  In the past 6 months, have you lost or gained 10 pounds without trying?: No  One or more falls in the last year: No  In the past 6 months, have you accidentally leaked urine?: Yes  Do you have trouble with the stairs inside or outside your home?: No  Does your home have working smoke alarms?: Yes  Does your home have a " carbon monoxide monitor?: Yes  Which safety hazards (if any) have you experienced in your home? Please select all that apply.: none  How would you describe your current diet? Please select all that apply.: Regular, Low Cholesterol, Low Saturated Fat, Limited junk food  In addition to prescription medications, are you taking any over-the-counter supplements?: Yes  If yes, what supplements are you taking?: Caltrate, Cosamin, Refresh eye drops, occasional Tylenol or Advil  Can you manage your medications?: Yes  Are you currently taking any opioid medications?: No  Can you walk and transfer into and out of your bed and chair?: Yes  Can you dress and groom yourself?: Yes  Can you bathe or shower yourself?: Yes  Can you feed yourself?: Yes  Can you do your laundry/ housekeeping?: Yes  Can you manage your money, pay your bills, and track your expenses?: Yes  Can you make your own meals?: Yes  Can you do your own shopping?: Yes  Please list your DME (Durable Medical Equipment) supplier, if you use one.: None  Within the last 12 months, have you had any hospitalizations or Emergency Department visits?: No  Do you have a living will?: Yes  Do you have a Durable POA (Power of ) for healthcare decisions?: Yes  Do you have an Advanced Directive for end of life decisions?: Yes  How often have you used an illegal drug (including marijuana) or a prescription medication for non-medical reasons in the past year?: never  What is the typical number of drinks you consume in a day?: 0  What is the typical number of drinks you consume in a week?: 0  How often did you have a drink containing alcohol in the past year?: monthly or less  How many drinks did you have on a typical day  when you were drinking in the past year?: 1 to 2  How often did you have 6 or more drinks on one occasion in the past year?: never

## 2025-03-14 NOTE — PROGRESS NOTES
Name: Mari Vilchis      : 1945      MRN: [id number]  Encounter Provider: Douglas Love PA-C  Encounter Date: 3/14/2025   Encounter department: [facility]  :  Assessment & Plan  Primary osteoarthritis of right knee  Bilateral intra-articular Euflexxa injections administered as noted above  Advised no significant activity or increased ambulation over the next 24-48 hours and warm compresses to the knee no greater 20 minutes 3-4 times 24 hours following the injection.  Patient advised should they develop any increasing pain, redness, drainage, numbness, tingling or swelling surrounding the injection sight, they are to contact our office or present to the emergency department.  Weightbearing as tolerated bilateral lower extremities  Follow-up in 6 months time repeat evaluation bilateral knees  Orders:    Large joint arthrocentesis: R knee    Large joint arthrocentesis: L knee    Primary osteoarthritis of left knee  Bilateral intra-articular Euflexxa injections administered as noted above  Advised no significant activity or increased ambulation over the next 24-48 hours and warm compresses to the knee no greater 20 minutes 3-4 times 24 hours following the injection.  Patient advised should they develop any increasing pain, redness, drainage, numbness, tingling or swelling surrounding the injection sight, they are to contact our office or present to the emergency department.  Weightbearing as tolerated bilateral lower extremities  Follow-up in 6 months time repeat evaluation bilateral knees  Orders:    Large joint arthrocentesis: R knee    Large joint arthrocentesis: L knee        History of Present Illness   HPI  Mari Vilchis is a 79 y.o. female who presents today regarding bilateral knee pain due to osteoarthritis.  Patient states she has no issues with the injections she denies any increase in pain numbness ting or swelling.  She rates her pain in her  "from a 2-5 out of 10.    Objective   Ht 5' 1\" (1.549 m)   Wt 59 kg (130 lb)   BMI 24.56 kg/m²                    Review Of Systems:   Skin: Normal  Neuro: See HPI  Musculoskeletal: See HPI  All other systems reviewed and are negative    Past Medical History:   Past Medical History:   Diagnosis Date    Aneurysm (HCC) 2/8/23    2.5 mg found on neck CAT scan    Arthritis 2005    Closed nondisplaced fracture of fifth metatarsal bone of right foot with routine healing     Difficulty walking 2/2/23    Broken Femur    Disease of thyroid gland     hypothyroid    Glaucoma, bilateral     Hyperlipidemia     Osteoporosis     Otitis media 11/4/14    Otitis Media    Scoliosis 12/12/12       Past Surgical History:   Past Surgical History:   Procedure Laterality Date    BUNIONECTOMY      COLONOSCOPY  06/04/2019    CORRECTION HAMMER TOE      FRACTURE SURGERY  2/3/23    Vamsi inserted for broken femur    MAMMO (HISTORICAL)  07/09/2018    NEUROMA EXCISION      OTHER SURGICAL HISTORY      Birth kwasi removed    OK OPTX FEM SHFT FX W/INSJ IMED IMPLT W/WO SCREW Right 02/03/2023    Procedure: INSERTION NAIL IM FEMUR ANTEGRADE (TROCHANTERIC);  Surgeon: Chai Chavez MD;  Location: [facility];  Service: Orthopedics    WISDOM TOOTH EXTRACTION         Family History:  Family history reviewed and non-contributory  Family History   Problem Relation Age of Onset    Hypertension Mother     Thyroid disease Mother     Arthritis Mother     Glaucoma Mother     Hypothyroidism Mother     Stomach cancer Father 51    Cancer Father     No Known Problems Maternal Grandmother     No Known Problems Maternal Grandfather     No Known Problems Paternal Grandmother     No Known Problems Paternal Grandfather     Dementia Maternal Aunt     No Known Problems Maternal Aunt     No Known Problems Paternal Aunt     Hypertension Brother     Osteoporosis Family     Hyperlipidemia Family     Hypertension Family     Dementia Maternal Aunt     Hypertension Maternal Aunt     " Osteoporosis Maternal Aunt          Social History:  Social History     Socioeconomic History    Marital status: Single     Spouse name: None    Number of children: None    Years of education: None    Highest education level: None   Occupational History    None   Tobacco Use    Smoking status: Never    Smokeless tobacco: Never   Vaping Use    Vaping status: Never Used   Substance and Sexual Activity    Alcohol use: Yes     Comment: Glass of wine a few times a year    Drug use: Never     Comment: Only drug use was for pain prescribed by a doctor    Sexual activity: Never   Other Topics Concern    None   Social History Narrative    None     Social Drivers of Health     Financial Resource Strain: Low Risk  (3/8/2024)    Overall Financial Resource Strain (CARDIA)     Difficulty of Paying Living Expenses: Not hard at all   Food Insecurity: No Food Insecurity (3/7/2025)    Hunger Vital Sign     Worried About Running Out of Food in the Last Year: Never true     Ran Out of Food in the Last Year: Never true   Transportation Needs: No Transportation Needs (3/7/2025)    PRAPARE - Transportation     Lack of Transportation (Medical): No     Lack of Transportation (Non-Medical): No   Physical Activity: Not on file   Stress: Not on file   Social Connections: Not on file   Intimate Partner Violence: Not on file   Housing Stability: Low Risk  (3/7/2025)    Housing Stability Vital Sign     Unable to Pay for Housing in the Last Year: No     Number of Times Moved in the Last Year: 0     Homeless in the Last Year: No       Allergies:   Allergies   Allergen Reactions    Fentanyl Vomiting    Indomethacin GI Intolerance, Dizziness and Nausea Only     Other reaction(s): GI upset    Oxycodone Dizziness, Nausea Only, GI Intolerance and Other (See Comments)     Other reaction(s): GI upset  Other reaction(s): GI upset       Labs:  0   Lab Value Date/Time    HCT 46.5 (H) 03/11/2025 0927    HCT 41.5 03/08/2023 1705    HCT 32.3 (L) 02/16/2023 0536     HGB 14.4 03/11/2025 0927    HGB 13.3 03/08/2023 1705    HGB 9.9 (L) 02/16/2023 0536    INR 1.05 02/03/2023 0550    WBC 9.54 03/11/2025 0927    WBC 10.83 (H) 03/08/2023 1705    WBC 7.57 02/16/2023 0536    ESR 33 (H) 11/06/2024 1000       Meds:    Current Outpatient Medications:     acetaminophen (TYLENOL) 325 mg tablet, Take 325 mg by mouth daily at bedtime Prn, Disp: , Rfl:     amLODIPine (NORVASC) 5 mg tablet, Take 1 tablet (5 mg total) by mouth daily (Patient not taking: Reported on 2/28/2025), Disp: 30 tablet, Rfl: 5    Calcium Carb-Cholecalciferol 600-20 MG-MCG TABS, Take 2 tablets by mouth in the morning, Disp: , Rfl:     denosumab (PROLIA) 60 mg/mL, Inject 60 mg under the skin once Twice a year, Disp: , Rfl:     Glucosamine-Chondroitin 500-400 MG CAPS, Take 1 tablet by mouth in the morning, Disp: , Rfl:     ibuprofen (Advil) 200 mg tablet, Take 200 mg by mouth every 4 (four) hours as needed for moderate pain, Disp: , Rfl:     levothyroxine 25 mcg tablet, TAKE 1 TABLET BY MOUTH EVERY DAY, Disp: 90 tablet, Rfl: 1    Naphazoline-Polyethyl Glycol 0.012-0.2 % SOLN, Apply 1-2 drops to eye 4 (four) times a day, Disp: , Rfl:     Polyvinyl Alcohol-Povidone (REFRESH OP), Apply to eye Refresh drops TID  Refresh drops + gel QHS, Disp: , Rfl:     simvastatin (ZOCOR) 40 mg tablet, TAKE 1 TABLET BY MOUTH DAILY AT BEDTIME, Disp: 90 tablet, Rfl: 3    White Petrolatum-Mineral Oil (artificial tears), Administer 1 Application to both eyes as needed for dry eyes, Disp: , Rfl:     Body mass index is 24.56 kg/m².  Wt Readings from Last 3 Encounters:   03/14/25 59 kg (130 lb)   03/07/25 59 kg (130 lb)   03/05/25 59 kg (130 lb)     Physical Exam:   There were no vitals filed for this visit.    General Appearance:    Alert, cooperative, no distress, appears stated age   Head:    Normocephalic, without obvious abnormality, atraumatic   Eyes:    conjunctiva/corneas clear, both eyes        Nose:   Nares normal, septum midline, no  drainage    Throat:   Lips normal; teeth and gums normal   Neck:    symmetrical, trachea midline, ;     thyroid:  no enlargement/   Back:     Symmetric, no curvature, ROM normal   Lungs:   No audible wheezing or labored breathing   Chest Wall:    No tenderness or deformity    Heart:    Regular rate and rhythm               Pulses:   2+ and symmetric all extremities   Skin:   Skin color, texture, turgor normal, no rashes or lesions   Neurologic:   normal strength, sensation and reflexes     throughout       Musculoskeletal: bilateral lower extremity  On examination of the right knee there is no effusion, no erythema.  Range of motion to full active extension and flexion to greater than 120°.  Pain on palpation medial and lateral joint lines.  There is crepitus with range of motion, no warmth to palpation, bony enlargement noted. No pain on palpation pes anserine bursa region or distal iliotibial band.  Stable to varus and valgus stress without pain or gapping.  Negative anterior and posterior drawer testing.  Sensation intact distal pulses present.  On examination of the left knee there is no effusion, no erythema.  Range of motion to full active extension and flexion to greater than 120°.  Pain on palpation medial and lateral joint lines.  There is crepitus with range of motion, no warmth to palpation, bony enlargement noted. No pain on palpation pes anserine bursa region or distal iliotibial band.  Stable to varus and valgus stress without pain or gapping.  Negative anterior and posterior drawer testing.  Sensation intact distal pulses present.    Large joint arthrocentesis: R knee  Universal Protocol:  Consent: Verbal consent obtained.  Consent given by: patient  Patient understanding: patient states understanding of the procedure being performed  Site marked: the operative site was marked  Patient identity confirmed: verbally with patient  Supporting Documentation  Indications: pain   Procedure Details  Location:  knee - R knee  Needle size: 22 G  Approach: superior  Medications administered: 20 mg Sodium Hyaluronate (Viscosup) 20 MG/2ML    Patient tolerance: patient tolerated the procedure well with no immediate complications      Large joint arthrocentesis: L knee  Universal Protocol:  Consent: Verbal consent obtained.  Consent given by: patient  Patient understanding: patient states understanding of the procedure being performed  Site marked: the operative site was marked  Patient identity confirmed: verbally with patient  Supporting Documentation  Indications: pain   Procedure Details  Location: knee - L knee  Needle size: 22 G  Approach: superior  Medications administered: 20 mg Sodium Hyaluronate (Viscosup) 20 MG/2ML    Patient tolerance: patient tolerated the procedure well with no immediate complications       .

## 2025-03-14 NOTE — ASSESSMENT & PLAN NOTE
Bilateral intra-articular Euflexxa injections administered as noted above  Advised no significant activity or increased ambulation over the next 24-48 hours and warm compresses to the knee no greater 20 minutes 3-4 times 24 hours following the injection.  Patient advised should they develop any increasing pain, redness, drainage, numbness, tingling or swelling surrounding the injection sight, they are to contact our office or present to the emergency department.  Weightbearing as tolerated bilateral lower extremities  Follow-up in 6 months time repeat evaluation bilateral knees  Orders:    Large joint arthrocentesis: R knee    Large joint arthrocentesis: L knee

## 2025-03-19 ENCOUNTER — HOSPITAL ENCOUNTER (OUTPATIENT)
Dept: RADIOLOGY | Facility: HOSPITAL | Age: 80
Discharge: HOME | End: 2025-03-19
Attending: INTERNAL MEDICINE
Payer: MEDICARE

## 2025-03-19 DIAGNOSIS — G56.01 CARPAL TUNNEL SYNDROME OF RIGHT WRIST: ICD-10-CM

## 2025-03-19 PROCEDURE — 76882 US LMTD JT/FCL EVL NVASC XTR: CPT

## 2025-03-24 ENCOUNTER — OFFICE VISIT (OUTPATIENT)
Dept: OBGYN CLINIC | Facility: HOSPITAL | Age: 80
End: 2025-03-24
Payer: MEDICARE

## 2025-03-24 VITALS — BODY MASS INDEX: 24.55 KG/M2 | WEIGHT: 130 LBS | HEIGHT: 61 IN

## 2025-03-24 DIAGNOSIS — G56.01 CARPAL TUNNEL SYNDROME OF RIGHT WRIST: ICD-10-CM

## 2025-03-24 PROCEDURE — 99203 OFFICE O/P NEW LOW 30 MIN: CPT | Performed by: SURGERY

## 2025-03-24 NOTE — PATIENT INSTRUCTIONS
What is it Carpal Tunnel Syndrome?    Carpal tunnel syndrome (CTS) is a condition brought on by increased pressure on the median nerve at the wrist. In effect, it is a pinched nerve at the wrist. Symptoms may include numbness, tingling, and pain in the arm, hand, and fingers. There is a space in the wrist called the carpal tunnel where the median nerve and nine tendons pass from the forearm into the hand (see Figure 1). Carpal tunnel syndrome happens when pressure builds up from swelling in this tunnel and puts pressure on the nerve. When the pressure from the swelling becomes great enough to disturb the way the nerve works, numbness, tingling, and pain may be felt in the hand and fingers (see Figure 2).    What causes it?  Usually the cause is unknown. Pressure on the nerve can happen several ways: swelling of the lining of the flexor tendons, called tenosynovitis; joint dislocations, fractures, and arthritis can narrow the tunnel; and keeping the wrist bent for long periods of time. Fluid retention during pregnancy can cause swelling in the tunnel and symptoms of carpal tunnel syndrome, which often go away after delivery. Thyroid conditions, rheumatoid arthritis, and diabetes also can be associated with carpal tunnel syndrome. There may be a combination of causes.    Signs and symptoms  Carpal tunnel syndrome symptoms usually include pain, numbness, tingling, or a combination of the three. The numbness or tingling most often takes place in the thumb, index, middle, and ring fingers. The symptoms usually are felt during the night but also may be noticed during daily activities such as driving or reading a newspaper. Patients may sometimes notice a weaker , occasional clumsiness, and a tendency to drop things. In severe cases, sensation may be permanently lost and the muscles at the base of the thumb slowly shrink (thenar atrophy), causing difficulty with pinch.    Diagnosis  A detailed history including medical  conditions, how the hands have been used, and whether there were any prior injuries is important. An x-ray may be taken to check for the other causes of the complaints such as arthritis or a fracture. In some cases, laboratory tests may be done if there is a suspected medical condition that is associated with CTS. Electrodiagnostic studies (NCV-nerve conduction velocities and EMG-electromyogram) may be done to confirm the diagnosis of carpal tunnel syndrome as well as to check for other possible nerve problems.    Treatment  Symptoms may often be relieved without surgery. Identifying and treating medical conditions, changing the patterns of hand use, or keeping the wrist splinted in a straight position may help reduce pressure on the nerve. Wearing wrist splints at night may relieve the symptoms that interfere with sleep. A steroid injection into the carpal tunnel may help relieve the symptoms by reducing swelling around the nerve.    When symptoms are severe or do not improve, surgery may be needed to make more room for the nerve. Pressure on the nerve is decreased by cutting the ligament that forms the roof (top) of the tunnel on the palm side of the hand (see Figure 3). Incisions for this surgery may vary, but the goal is the same: to enlarge the tunnel and decrease pressure on the nerve. Following surgery, soreness around the incision may last for several weeks or months. The numbness and tingling may disappear quickly or slowly. It may take several months for strength in the hand and wrist to return to normal. Carpal tunnel symptoms may not completely go away after surgery, especially in severe cases.        © 2012 [employer]  www.handcare.org

## 2025-03-24 NOTE — PROGRESS NOTES
Michelle Musa M.D.  Attending, Orthopaedic Surgery  Hand, Wrist, and Elbow Surgery  [facility]      ORTHOPAEDIC HAND, WRIST, AND ELBOW OFFICE  VISIT       ASSESSMENT/PLAN:        Assessment & Plan  Carpal tunnel syndrome of right wrist  US results were reviewed, suspicious for carpal tunnel syndrome.   Overall symptoms have mostly resolved with nighttime bracing.  Treatment options were discussed in the form of observation. She is no longer bracing at night and symptoms have no returned.   We discussed the possibility of a carpal tunnel CSI if symptoms worsen or fail to improve, such as break through symptoms with nighttime bracing.   We also discussed the possibility of a carpal tunnel release in the future as she does have some thenar atrophy.   We discussed the final end stage of carpal tunnel is loss of hot and cold sensation as well as function of the thumb.      I will see the patient back in the office as needed if symptoms worsen or fail to improve.   Orders:    Ambulatory Referral to Orthopedic Surgery      The patient verbalized understanding of exam findings and treatment plan. We engaged in the shared decision-making process and treatment options were discussed at length with the patient. Surgical and conservative management discussed today along with risks and benefits.    Diagnoses and all orders for this visit:    Carpal tunnel syndrome of right wrist  Comments:  Better with using brace.  Wrist ultrasound ordered and follow-up with hand orthopedic specialist advised  Orders:  -     Ambulatory Referral to Orthopedic Surgery      Follow Up:  Return if symptoms worsen or fail to improve.    To Do Next Visit:  Re-evaluation of current issue      General Discussions:  Carpal Tunnel Syndrome: The anatomy and physiology of carpal tunnel syndrome was discussed with the patient today.  Increase pressure localized under the transverse carpal ligament can cause pain, numbness,  tingling, or dysesthesias within the median nerve distribution as well as feelings of fatigue, clumsiness, or awkwardness.  These symptoms typically occur at night and worse in the morning upon waking.  Eventually, untreated carpal tunnel syndrome can result in weakness and permanent loss of muscle within the thenar compartment of the hand.  Treatment options were discussed with the patient.  Conservative treatment includes nocturnal resting splints to keep the nerve in a neutral position, ergonomic changes within the work or home environment, activity modification, and tendon gliding exercises. Vitamin B6 one tablet daily over the counter may helpful to reduce symptoms.   Steroid injections within the carpal canal can help a majority of patients, however this is often self-limited in a majority of patients.  Surgical intervention to divide the transverse carpal ligament typically results in a long-lasting relief of the patient's complaints, with the recurrence rate of less than 1%.                                                                                                                                                                                   ____________________________________________________________________________________________________________________________________________      CHIEF COMPLAINT:  Chief Complaint   Patient presents with    Right Hand - Pain     Patient is here to go over MSK        SUBJECTIVE:  Mari Vilchis is a 79 y.o. year old RHD female who presents to the office for right carpal tunnel syndrome. I am seeing Tammie in consultation at the request of Dr. Jose Mathis. She notes volar wrist pain as well as numbness and tingling to her thumb and index finger, which started around October/November. She started nighttime bracing and notes volar wrist pain has resolved as well as most of her numbness and tingling. She notes occasional numbness and tingling to the tip of her  index finger. She also was treating for cervical radiculopathy and did undergo a EMG at an outside facility. She underwent a right wrist US. Symptoms are not waking her from sleep at night.        Pain/symptom timing:  Worse during the day when active  Pain/symptom context:  Worse with activites and work  Pain/symptom modifying factors:  Rest makes better, activities make worse  Pain/symptom associated signs/symptoms: none    Prior treatment   NSAIDsNo   Injections No   Bracing/Orthotics Yes    Physical Therapy: for her neck     I have personally reviewed all the relevant PMH, PSH, SH, FH, Medications and allergies      PAST MEDICAL HISTORY:  Past Medical History:   Diagnosis Date    Aneurysm (HCC) 2/8/23    2.5 mg found on neck CAT scan    Arthritis 2005    Closed nondisplaced fracture of fifth metatarsal bone of right foot with routine healing     Difficulty walking 2/2/23    Broken Femur    Disease of thyroid gland     hypothyroid    Glaucoma, bilateral     Hyperlipidemia     Osteoporosis     Otitis media 11/4/14    Otitis Media    Scoliosis 12/12/12       PAST SURGICAL HISTORY:  Past Surgical History:   Procedure Laterality Date    BUNIONECTOMY      COLONOSCOPY  06/04/2019    CORRECTION HAMMER TOE      FRACTURE SURGERY  2/3/23    Vamsi inserted for broken femur    MAMMO (HISTORICAL)  07/09/2018    NEUROMA EXCISION      OTHER SURGICAL HISTORY      Birth kwasi removed    ID OPTX FEM SHFT FX W/INSJ IMED IMPLT W/WO SCREW Right 02/03/2023    Procedure: INSERTION NAIL IM FEMUR ANTEGRADE (TROCHANTERIC);  Surgeon: Chai Chavez MD;  Location: [facility];  Service: Orthopedics    WISDOM TOOTH EXTRACTION         FAMILY HISTORY:  Family History   Problem Relation Age of Onset    Hypertension Mother     Thyroid disease Mother     Arthritis Mother     Glaucoma Mother     Hypothyroidism Mother     Stomach cancer Father 51    Cancer Father     No Known Problems Maternal Grandmother     No Known Problems Maternal Grandfather      No Known Problems Paternal Grandmother     No Known Problems Paternal Grandfather     Dementia Maternal Aunt     No Known Problems Maternal Aunt     No Known Problems Paternal Aunt     Hypertension Brother     Osteoporosis Family     Hyperlipidemia Family     Hypertension Family     Dementia Maternal Aunt     Hypertension Maternal Aunt     Osteoporosis Maternal Aunt        SOCIAL HISTORY:  Social History     Tobacco Use    Smoking status: Never    Smokeless tobacco: Never   Vaping Use    Vaping status: Never Used   Substance Use Topics    Alcohol use: Yes     Comment: Glass of wine a few times a year    Drug use: Never     Comment: Only drug use was for pain prescribed by a doctor       MEDICATIONS:    Current Outpatient Medications:     acetaminophen (TYLENOL) 325 mg tablet, Take 325 mg by mouth daily at bedtime Prn, Disp: , Rfl:     Calcium Carb-Cholecalciferol 600-20 MG-MCG TABS, Take 2 tablets by mouth in the morning, Disp: , Rfl:     denosumab (PROLIA) 60 mg/mL, Inject 60 mg under the skin once Twice a year, Disp: , Rfl:     Glucosamine-Chondroitin 500-400 MG CAPS, Take 1 tablet by mouth in the morning, Disp: , Rfl:     ibuprofen (Advil) 200 mg tablet, Take 200 mg by mouth every 4 (four) hours as needed for moderate pain, Disp: , Rfl:     levothyroxine 25 mcg tablet, TAKE 1 TABLET BY MOUTH EVERY DAY, Disp: 90 tablet, Rfl: 1    Naphazoline-Polyethyl Glycol 0.012-0.2 % SOLN, Apply 1-2 drops to eye 4 (four) times a day, Disp: , Rfl:     Polyvinyl Alcohol-Povidone (REFRESH OP), Apply to eye Refresh drops TID  Refresh drops + gel QHS, Disp: , Rfl:     simvastatin (ZOCOR) 40 mg tablet, TAKE 1 TABLET BY MOUTH DAILY AT BEDTIME, Disp: 90 tablet, Rfl: 3    White Petrolatum-Mineral Oil (artificial tears), Administer 1 Application to both eyes as needed for dry eyes, Disp: , Rfl:     amLODIPine (NORVASC) 5 mg tablet, Take 1 tablet (5 mg total) by mouth daily (Patient not taking: Reported on 2/28/2025), Disp: 30 tablet,  Rfl: 5    ALLERGIES:  Allergies   Allergen Reactions    Fentanyl Vomiting    Indomethacin GI Intolerance, Dizziness and Nausea Only     Other reaction(s): GI upset    Oxycodone Dizziness, Nausea Only, GI Intolerance and Other (See Comments)     Other reaction(s): GI upset  Other reaction(s): GI upset           REVIEW OF SYSTEMS:  Review of Systems   Constitutional:  Negative for chills, fever and unexpected weight change.   HENT:  Negative for hearing loss, nosebleeds and sore throat.    Eyes:  Negative for pain, redness and visual disturbance.   Respiratory:  Negative for cough, shortness of breath and wheezing.    Cardiovascular:  Negative for chest pain, palpitations and leg swelling.   Gastrointestinal:  Negative for abdominal pain, nausea and vomiting.   Endocrine: Negative for polydipsia and polyuria.   Genitourinary:  Negative for difficulty urinating and hematuria.   Musculoskeletal:  Negative for arthralgias, joint swelling and myalgias.   Skin:  Negative for rash and wound.   Neurological:  Negative for dizziness, numbness and headaches.   Psychiatric/Behavioral:  Negative for decreased concentration, dysphoric mood and suicidal ideas. The patient is not nervous/anxious.        VITALS:  There were no vitals filed for this visit.    LABS:      _____________________________________________________  PHYSICAL EXAMINATION:  General: well developed and well nourished, alert, oriented times 3, and appears comfortable  Psychiatric: Normal  HEENT: Normocephalic, Atraumatic Trachea Midline, No torticollis  Pulmonary: No audible wheezing or respiratory distress   Abdomen/GI: Non tender, non distended   Cardiovascular: No pitting edema, 2+ radial pulse   Skin: No masses, erythema, lacerations, fluctation, ulcerations  Neurovascular: Sensation Intact to the Median, Ulnar, Radial Nerve, Motor Intact to the Median, Ulnar, Radial Nerve, and Pulses Intact  Musculoskeletal: Normal, except as noted in detailed exam and in  "HPI.      MUSCULOSKELETAL EXAMINATION:    Right Carpal Tunnel Exam:    Positive thenar atrophy. Positive phalen's test. Negative carpal tunnel compression. Negative tinels over median nerve at the wrist.  Opposition strength 5/5.  Abduction strength 5/5.      ___________________________________________________  STUDIES REVIEWED:  I have personally reviewed and interpreted  US of right carpal tunnel which demonstrate findings suspicious for carpal tunnel syndrome based on presence of hypervascularity within the median nerve.     LABS REVIEWED:    HgA1c: No results found for: \"HGBA1C\"  BMP:   Lab Results   Component Value Date    GLUCOSE 145 (H) 02/05/2023    CALCIUM 9.6 03/11/2025     04/16/2015    K 3.9 03/11/2025    CO2 27 03/11/2025     03/11/2025    BUN 22 03/11/2025    CREATININE 0.77 03/11/2025               PROCEDURES PERFORMED:  Procedures  No Procedures performed today    _____________________________________________________      Scribe Attestation      I,:  Reshma Rey MA am acting as a scribe while in the presence of the attending physician.:       I,:  Michelle Musa MD personally performed the services described in this documentation    as scribed in my presence.:                   "

## 2025-03-28 NOTE — PROGRESS NOTES
02/20/23 1230   Pain Assessment   Pain Assessment Tool 0-10   Pain Score No Pain   Restrictions/Precautions   Precautions Fall Risk;Pain   Weight Bearing Restrictions Yes   RLE Weight Bearing Per Order WBAT   ROM Restrictions No   Sit to Stand   Type of Assistance Needed Independent; Adaptive equipment   Physical Assistance Level No physical assistance   Comment RW   Sit to Stand CARE Score 6   Bed-Chair Transfer   Type of Assistance Needed Independent; Adaptive equipment   Physical Assistance Level No physical assistance   Comment IRP with RW  Chair/Bed-to-Chair Transfer CARE Score 6   Toileting Hygiene   Type of Assistance Needed Independent; Adaptive equipment   Physical Assistance Level No physical assistance   Comment IRP w RW   Toileting Hygiene CARE Score 6   Toilet Transfer   Type of Assistance Needed Independent   Physical Assistance Level No physical assistance   Comment IRP w RW   Toilet Transfer CARE Score 6   Cognition   Overall Cognitive Status WFL   Arousal/Participation Cooperative   Attention Within functional limits   Orientation Level Oriented X4   Memory Within functional limits   Following Commands Follows all commands and directions without difficulty   Activity Tolerance   Activity Tolerance Patient tolerated treatment well   Assessment   Treatment Assessment Pt participated in skilled OT that focused on ADL retraining, Functional mobility,SPC mobility,obstacle navigating with RW ,funcitonal trx without GB, Pt cont  To be limited by dec LBE strength, dec  mobility, impaired dynamic standing balance, fatigue and generalized R hip/groin pain  Discussed with pt on bathroom navigating with RW, pt stated brother believed standard RW "not" able to fit in doorway  Waiting for images and possible measurements to confirm  Trialed Lateral stepping within narrow confines to simulate bathroom layout with RW, pt completed without difficulty or complaint of pain   Demo G awareness of walker placement when Appears overall stable from cardiac perspective with reasonably well-controlled heart rate and blood pressure.  He is on good medical regimen.  He does have  several symptoms of right-sided and back chest pain and lower lower extremity pain.  Description of pain is atypical for angina and more suggestive of musculoskeletal pain.  He could have claudication symptoms in the leg as they are precipitated with walking.  Echocardiogram done recently is significant for low normal to borderline reduced left ventricular systolic function with extensive regional wall motion abnormalities involving the apical And surrounding region.  There was no evidence of left ventricular thrombus.    Current cardiac medications: Aspirin 81 mg daily + atorvastatin 80 mg daily + isosorbide mononitrate 30 mg daily + losartan 50 mg daily + metoprolol tartrate 50 mg twice daily + spironolactone 50 mg daily + ticagrelor 90 mg twice d    Recent blood work normal.  BNP slightly increased at 251.    Advising to continue current medications.  Will add Lasix 20 mg 3 times a week as needed if he develops lower extremity edema.  Advising to complete cardiac rehab.  He can begin light duty work as tolerated and gradually increase to full-time.  Will arrange follow-up in 4 months time with advanced practitioner.        lateral stepping within the narrow confine  After speaking with PT, Al- Edu pt on SPC placement when entering bathroom, (worse case scenario/ not being able to fit roller walker through door entry ) Demo SPC placement upon entry on Right side, with use of sink (left side) for support to have clear access to toilet  Exiting bathroom SPC on (left)  Pt demo F understanding, would benefit from repetition of task  Overall completed task without difficulty at [facility] using table top for upper body support to mimic sink  Pt demo G understanding of bathroom complications that is willing to resort to [facility] if needed  Pt has progress to IRP with RW  Pt will continue to benefit from skilled OT services with focus on above mentioned deficits to inc ADL/IADL ind, funtional trx with SPC/RW, UB strengthening to competed non-supported trx  OT Family training done with: Email correspondence with pt's friend Rosmery Bennett per pt request in regards to DME  Provided recommendations on tub txfer bench and folding walker tray which they will independently purchase  Prognosis Good   Problem List Decreased strength;Decreased range of motion;Decreased endurance;Decreased mobility;Pain   Plan   Treatment/Interventions ADL retraining;Functional transfer training; Therapeutic exercise; Endurance training;Equipment eval/education;Patient/family training;Bed mobility; Compensatory technique education   Progress Progressing toward goals   Recommendation   OT Discharge Recommendation No rehabilitation needs   OT Therapy Minutes   OT Time In 1230   OT Time Out 1400   OT Total Time (minutes) 90   OT Mode of treatment - Individual (minutes) 90   OT Mode of treatment - Concurrent (minutes) 0   OT Mode of treatment - Group (minutes) 0   OT Mode of treatment - Co-treat (minutes) 0   OT Mode of Treatment - Total time(minutes) 90 minutes   OT Cumulative Minutes 810   Therapy Time missed   Time missed?  No

## 2025-04-08 ENCOUNTER — TELEPHONE (OUTPATIENT)
Age: 80
End: 2025-04-08

## 2025-04-08 ENCOUNTER — OFFICE VISIT (OUTPATIENT)
Dept: INTERNAL MEDICINE CLINIC | Facility: CLINIC | Age: 80
End: 2025-04-08
Payer: MEDICARE

## 2025-04-08 VITALS
TEMPERATURE: 98.5 F | HEIGHT: 61 IN | WEIGHT: 130 LBS | OXYGEN SATURATION: 98 % | HEART RATE: 62 BPM | BODY MASS INDEX: 24.55 KG/M2 | DIASTOLIC BLOOD PRESSURE: 86 MMHG | SYSTOLIC BLOOD PRESSURE: 134 MMHG

## 2025-04-08 DIAGNOSIS — M25.531 RIGHT WRIST PAIN: ICD-10-CM

## 2025-04-08 DIAGNOSIS — I10 PRIMARY HYPERTENSION: ICD-10-CM

## 2025-04-08 DIAGNOSIS — L50.9 URTICARIA: Primary | ICD-10-CM

## 2025-04-08 PROCEDURE — G2211 COMPLEX E/M VISIT ADD ON: HCPCS | Performed by: INTERNAL MEDICINE

## 2025-04-08 PROCEDURE — 99214 OFFICE O/P EST MOD 30 MIN: CPT | Performed by: INTERNAL MEDICINE

## 2025-04-08 RX ORDER — AMLODIPINE BESYLATE 2.5 MG/1
2.5 TABLET ORAL DAILY
Qty: 30 TABLET | Refills: 3 | Status: SHIPPED | OUTPATIENT
Start: 2025-04-08 | End: 2025-04-08

## 2025-04-08 RX ORDER — LOSARTAN POTASSIUM 25 MG/1
25 TABLET ORAL DAILY
Qty: 30 TABLET | Refills: 3 | Status: SHIPPED | OUTPATIENT
Start: 2025-04-08

## 2025-04-08 RX ORDER — FAMOTIDINE 20 MG/1
20 TABLET, FILM COATED ORAL 2 TIMES DAILY
Qty: 60 TABLET | Refills: 2 | Status: SHIPPED | OUTPATIENT
Start: 2025-04-08

## 2025-04-08 NOTE — PROGRESS NOTES
Assessment/Plan:           1. Urticaria  Comments:  Continue Allegra and addition of famotidine advised.  Orders:  -     famotidine (PEPCID) 20 mg tablet; Take 1 tablet (20 mg total) by mouth 2 (two) times a day  2. Right wrist pain  Comments:  Following with hand surgery.  3. Primary hypertension  Comments:  Will start losartan 25 mg daily.  Orders:  -     losartan (COZAAR) 25 mg tablet; Take 1 tablet (25 mg total) by mouth daily  -     Basic metabolic panel; Future         1. Urticaria (Primary)      2. Right wrist pain      3. Primary hypertension    - losartan (COZAAR) 25 mg tablet; Take 1 tablet (25 mg total) by mouth daily  Dispense: 30 tablet; Refill: 3       No problem-specific Assessment & Plan notes found for this encounter.           Subjective:      Patient ID: Mari Vilchis is a 79 y.o. female.    HPI    The following portions of the patient's history were reviewed and updated as appropriate: She  has a past medical history of Aneurysm (HCC) (2/8/23), Arthritis (2005), Closed nondisplaced fracture of fifth metatarsal bone of right foot with routine healing, Difficulty walking (2/2/23), Disease of thyroid gland, Glaucoma, bilateral, History of transfusion (2/8/23), Hyperlipidemia, Low back pain, Osteoarthritis (9/12/05), Osteoporosis, Otitis media (11/4/14), Scoliosis (12/12/12), and Thyroid nodule (2001).  She   Patient Active Problem List    Diagnosis Date Noted    Left leg pain 05/30/2024    Vitamin D deficiency 05/28/2024    History of stroke 09/12/2023    Thyroid nodule 07/19/2023    Aneurysm (HCC) 03/08/2023    Nail abnormalities 02/17/2023    Osteoporosis 02/09/2023    HLD (hyperlipidemia) 02/06/2023    Acute blood loss anemia 02/05/2023    Fall 02/03/2023    Acute pain due to trauma 02/03/2023    Hypothyroidism 02/03/2023    Femur fracture (HCC) 02/02/2023    Chest pain 10/31/2022    Abnormal electrocardiogram (ECG) (EKG) 10/31/2022    Primary osteoarthritis of right knee 05/14/2019     Primary osteoarthritis of left knee 05/14/2019     She  has a past surgical history that includes Bunionectomy; Correction hammer toe; [place] tooth extraction; NEUROMA EXCISION; Other surgical history; Colonoscopy (06/04/2019); Mammo (historical) (07/09/2018); pr optx fem shft fx w/insj imed implt w/wo screw (Right, 02/03/2023); Fracture surgery (2/3/23); and Colonoscopy (6/4/19).  Her family history includes Arthritis in her mother; Cancer in her father; Dementia in her maternal aunt, maternal aunt, and maternal aunt; Glaucoma in her mother; Hyperlipidemia in her family; Hypertension in her brother, family, maternal aunt, maternal aunt, and mother; Hypothyroidism in her mother; No Known Problems in her maternal aunt, maternal grandfather, maternal grandmother, paternal aunt, paternal grandfather, and paternal grandmother; Osteoporosis in her family, maternal aunt, and maternal aunt; Stomach cancer (age of onset: 51) in her father; Thyroid disease in her mother.  She  reports that she has never smoked. She has never used smokeless tobacco. She reports current alcohol use. She reports that she does not use drugs.  Current Outpatient Medications   Medication Sig Dispense Refill    acetaminophen (TYLENOL) 325 mg tablet Take 325 mg by mouth daily at bedtime Prn      Calcium Carb-Cholecalciferol 600-20 MG-MCG TABS Take 2 tablets by mouth in the morning      denosumab (PROLIA) 60 mg/mL Inject 60 mg under the skin once Twice a year      famotidine (PEPCID) 20 mg tablet Take 1 tablet (20 mg total) by mouth 2 (two) times a day 60 tablet 2    Glucosamine-Chondroitin 500-400 MG CAPS Take 1 tablet by mouth in the morning      ibuprofen (Advil) 200 mg tablet Take 200 mg by mouth every 4 (four) hours as needed for moderate pain      levothyroxine 25 mcg tablet TAKE 1 TABLET BY MOUTH EVERY DAY 90 tablet 1    losartan (COZAAR) 25 mg tablet Take 1 tablet (25 mg total) by mouth daily 30 tablet 3    Naphazoline-Polyethyl Glycol  0.012-0.2 % SOLN Apply 1-2 drops to eye 4 (four) times a day      Polyvinyl Alcohol-Povidone (REFRESH OP) Apply to eye Refresh drops TID   Refresh drops + gel QHS      simvastatin (ZOCOR) 40 mg tablet TAKE 1 TABLET BY MOUTH DAILY AT BEDTIME 90 tablet 3    White Petrolatum-Mineral Oil (artificial tears) Administer 1 Application to both eyes as needed for dry eyes       No current facility-administered medications for this visit.     Current Outpatient Medications on File Prior to Visit   Medication Sig    acetaminophen (TYLENOL) 325 mg tablet Take 325 mg by mouth daily at bedtime Prn    Calcium Carb-Cholecalciferol 600-20 MG-MCG TABS Take 2 tablets by mouth in the morning    denosumab (PROLIA) 60 mg/mL Inject 60 mg under the skin once Twice a year    Glucosamine-Chondroitin 500-400 MG CAPS Take 1 tablet by mouth in the morning    ibuprofen (Advil) 200 mg tablet Take 200 mg by mouth every 4 (four) hours as needed for moderate pain    levothyroxine 25 mcg tablet TAKE 1 TABLET BY MOUTH EVERY DAY    Naphazoline-Polyethyl Glycol 0.012-0.2 % SOLN Apply 1-2 drops to eye 4 (four) times a day    Polyvinyl Alcohol-Povidone (REFRESH OP) Apply to eye Refresh drops TID   Refresh drops + gel QHS    simvastatin (ZOCOR) 40 mg tablet TAKE 1 TABLET BY MOUTH DAILY AT BEDTIME    White Petrolatum-Mineral Oil (artificial tears) Administer 1 Application to both eyes as needed for dry eyes    [DISCONTINUED] amLODIPine (NORVASC) 5 mg tablet Take 1 tablet (5 mg total) by mouth daily (Patient not taking: Reported on 2/28/2025)     No current facility-administered medications on file prior to visit.     Medications Discontinued During This Encounter   Medication Reason    amLODIPine (NORVASC) 5 mg tablet     amLODIPine (NORVASC) 2.5 mg tablet       She is allergic to fentanyl, indomethacin, and oxycodone..    Review of Systems   Constitutional:  Negative for appetite change, chills, fatigue and fever.   HENT:  Negative for sore throat and  "trouble swallowing.    Eyes:  Negative for redness.   Respiratory:  Negative for shortness of breath.    Cardiovascular:  Negative for chest pain and palpitations.   Gastrointestinal:  Negative for abdominal pain, constipation and diarrhea.   Genitourinary:  Negative for dysuria and hematuria.   Musculoskeletal:  Positive for arthralgias. Negative for back pain and neck pain.   Skin:  Positive for rash.   Neurological:  Negative for seizures, weakness and headaches.   Hematological:  Negative for adenopathy.   Psychiatric/Behavioral:  Negative for confusion. The patient is not nervous/anxious.          Objective:      /86 (BP Location: Left arm, Patient Position: Sitting, Cuff Size: Standard)   Pulse 62   Temp 98.5 °F (36.9 °C) (Temporal)   Ht 5' 1\" (1.549 m)   Wt 59 kg (130 lb)   SpO2 98%   BMI 24.56 kg/m²     Results Reviewed       None            Recent Results (from the past 8 weeks)   Lipid panel    Collection Time: 03/11/25  9:27 AM   Result Value Ref Range    Cholesterol 171 See Comment mg/dL    Triglycerides 98 See Comment mg/dL    HDL, Direct 70 >=50 mg/dL    LDL Calculated 81 0 - 100 mg/dL    Non-HDL-Chol (CHOL-HDL) 101 mg/dl   Comprehensive metabolic panel    Collection Time: 03/11/25  9:27 AM   Result Value Ref Range    Sodium 141 135 - 147 mmol/L    Potassium 3.9 3.5 - 5.3 mmol/L    Chloride 104 96 - 108 mmol/L    CO2 27 21 - 32 mmol/L    ANION GAP 10 4 - 13 mmol/L    BUN 22 5 - 25 mg/dL    Creatinine 0.77 0.60 - 1.30 mg/dL    Glucose, Fasting 104 (H) 65 - 99 mg/dL    Calcium 9.6 8.4 - 10.2 mg/dL    AST 18 13 - 39 U/L    ALT 14 7 - 52 U/L    Alkaline Phosphatase 68 34 - 104 U/L    Total Protein 6.8 6.4 - 8.4 g/dL    Albumin 4.3 3.5 - 5.0 g/dL    Total Bilirubin 0.50 0.20 - 1.00 mg/dL    eGFR 74 ml/min/1.73sq m   CBC and differential    Collection Time: 03/11/25  9:27 AM   Result Value Ref Range    WBC 9.54 4.31 - 10.16 Thousand/uL    RBC 5.05 3.81 - 5.12 Million/uL    Hemoglobin 14.4 11.5 - " 15.4 g/dL    Hematocrit 46.5 (H) 34.8 - 46.1 %    MCV 92 82 - 98 fL    MCH 28.5 26.8 - 34.3 pg    MCHC 31.0 (L) 31.4 - 37.4 g/dL    RDW 14.3 11.6 - 15.1 %    MPV 11.7 8.9 - 12.7 fL    Platelets 309 149 - 390 Thousands/uL    nRBC 0 /100 WBCs    Segmented % 58 43 - 75 %    Immature Grans % 0 0 - 2 %    Lymphocytes % 30 14 - 44 %    Monocytes % 11 4 - 12 %    Eosinophils Relative 1 0 - 6 %    Basophils Relative 0 0 - 1 %    Absolute Neutrophils 5.47 1.85 - 7.62 Thousands/µL    Absolute Immature Grans 0.03 0.00 - 0.20 Thousand/uL    Absolute Lymphocytes 2.85 0.60 - 4.47 Thousands/µL    Absolute Monocytes 1.03 0.17 - 1.22 Thousand/µL    Eosinophils Absolute 0.12 0.00 - 0.61 Thousand/µL    Basophils Absolute 0.04 0.00 - 0.10 Thousands/µL   TSH, 3rd generation    Collection Time: 03/11/25  9:27 AM   Result Value Ref Range    TSH 3RD GENERATON 3.421 0.450 - 4.500 uIU/mL   T4, free    Collection Time: 03/11/25  9:27 AM   Result Value Ref Range    Free T4 0.89 0.61 - 1.12 ng/dL        Physical Exam  Constitutional:       General: She is not in acute distress.     Appearance: Normal appearance.   HENT:      Head: Normocephalic and atraumatic.      Nose: Nose normal.      Mouth/Throat:      Mouth: Mucous membranes are moist.   Eyes:      Extraocular Movements: Extraocular movements intact.      Pupils: Pupils are equal, round, and reactive to light.   Cardiovascular:      Rate and Rhythm: Normal rate and regular rhythm.      Pulses: Normal pulses.      Heart sounds: Normal heart sounds. No murmur heard.     No friction rub.   Pulmonary:      Effort: Pulmonary effort is normal. No respiratory distress.      Breath sounds: Normal breath sounds. No wheezing.   Abdominal:      General: Abdomen is flat. Bowel sounds are normal. There is no distension.      Palpations: Abdomen is soft. There is no mass.      Tenderness: There is no abdominal tenderness. There is no guarding.   Musculoskeletal:         General: Normal range of motion.       Cervical back: Neck supple.   Skin:     Findings: Rash present.   Neurological:      General: No focal deficit present.      Mental Status: She is alert and oriented to person, place, and time. Mental status is at baseline.      Cranial Nerves: No cranial nerve deficit.   Psychiatric:         Mood and Affect: Mood normal.         Behavior: Behavior normal.

## 2025-04-08 NOTE — TELEPHONE ENCOUNTER
Pt called back as per Dr Mathis with OTC med name she was taking: Allegra hives 24 hr fexofenadine HCI tablets 180 mg antihistamine

## 2025-04-09 ENCOUNTER — APPOINTMENT (OUTPATIENT)
Dept: LAB | Facility: CLINIC | Age: 80
End: 2025-04-09
Payer: MEDICARE

## 2025-04-09 ENCOUNTER — RESULTS FOLLOW-UP (OUTPATIENT)
Dept: INTERNAL MEDICINE CLINIC | Facility: CLINIC | Age: 80
End: 2025-04-09

## 2025-04-09 DIAGNOSIS — I10 PRIMARY HYPERTENSION: ICD-10-CM

## 2025-04-09 LAB
ANION GAP SERPL CALCULATED.3IONS-SCNC: 10 MMOL/L (ref 4–13)
BUN SERPL-MCNC: 22 MG/DL (ref 5–25)
CALCIUM SERPL-MCNC: 9.7 MG/DL (ref 8.4–10.2)
CHLORIDE SERPL-SCNC: 104 MMOL/L (ref 96–108)
CO2 SERPL-SCNC: 29 MMOL/L (ref 21–32)
CREAT SERPL-MCNC: 0.81 MG/DL (ref 0.6–1.3)
GFR SERPL CREATININE-BSD FRML MDRD: 69 ML/MIN/1.73SQ M
GLUCOSE P FAST SERPL-MCNC: 91 MG/DL (ref 65–99)
POTASSIUM SERPL-SCNC: 4.2 MMOL/L (ref 3.5–5.3)
SODIUM SERPL-SCNC: 143 MMOL/L (ref 135–147)

## 2025-04-09 PROCEDURE — 36415 COLL VENOUS BLD VENIPUNCTURE: CPT

## 2025-04-09 PROCEDURE — 80048 BASIC METABOLIC PNL TOTAL CA: CPT

## 2025-04-10 NOTE — TELEPHONE ENCOUNTER
Patient called requesting inquiring how long should she be taking Allegra for. Pt was prescribed RX: Famotidine inquiring reasoning of her being prescribed medication as he is not having any issues with her stomach patient was treated for a rash. Pt states her rash has been on and off if allegra does not help does PCP recommend her seeing an allergist.        Please review and advise.  Thank you

## 2025-04-11 ENCOUNTER — TELEPHONE (OUTPATIENT)
Dept: INTERNAL MEDICINE CLINIC | Age: 80
End: 2025-04-11

## 2025-04-11 NOTE — TELEPHONE ENCOUNTER
Spoke to patient, she only takes Allergra when she has the hives.   She had not had hives in the last three days.     Does not want to start another histamine medication.

## 2025-04-11 NOTE — TELEPHONE ENCOUNTER
----- Message from Jose Mathis MD sent at 4/8/2025  5:41 PM EDT -----  Please tell her that she should take both Allegra 180 mg daily and also Pepcid generic is famotidine 20 mg twice daily.  I sent the prescription for the famotidine to [facility] as well.

## 2025-05-24 DIAGNOSIS — E03.9 ACQUIRED HYPOTHYROIDISM: ICD-10-CM

## 2025-05-25 RX ORDER — LEVOTHYROXINE SODIUM 25 UG/1
25 TABLET ORAL DAILY
Qty: 90 TABLET | Refills: 1 | Status: SHIPPED | OUTPATIENT
Start: 2025-05-25

## 2025-06-27 ENCOUNTER — OFFICE VISIT (OUTPATIENT)
Dept: INTERNAL MEDICINE CLINIC | Facility: CLINIC | Age: 80
End: 2025-06-27
Payer: MEDICARE

## 2025-06-27 VITALS
WEIGHT: 131 LBS | HEART RATE: 72 BPM | HEIGHT: 61 IN | DIASTOLIC BLOOD PRESSURE: 70 MMHG | BODY MASS INDEX: 24.73 KG/M2 | OXYGEN SATURATION: 98 % | TEMPERATURE: 98.3 F | SYSTOLIC BLOOD PRESSURE: 120 MMHG

## 2025-06-27 DIAGNOSIS — E03.9 HYPOTHYROIDISM: ICD-10-CM

## 2025-06-27 DIAGNOSIS — I10 PRIMARY HYPERTENSION: ICD-10-CM

## 2025-06-27 DIAGNOSIS — Z86.73 HISTORY OF STROKE: ICD-10-CM

## 2025-06-27 DIAGNOSIS — M81.0 OSTEOPOROSIS, UNSPECIFIED OSTEOPOROSIS TYPE, UNSPECIFIED PATHOLOGICAL FRACTURE PRESENCE: ICD-10-CM

## 2025-06-27 DIAGNOSIS — Z23 ENCOUNTER FOR IMMUNIZATION: ICD-10-CM

## 2025-06-27 DIAGNOSIS — E78.00 PURE HYPERCHOLESTEROLEMIA: ICD-10-CM

## 2025-06-27 DIAGNOSIS — I10 HYPERTENSION: Primary | ICD-10-CM

## 2025-06-27 PROCEDURE — G2211 COMPLEX E/M VISIT ADD ON: HCPCS | Performed by: INTERNAL MEDICINE

## 2025-06-27 PROCEDURE — G0009 ADMIN PNEUMOCOCCAL VACCINE: HCPCS

## 2025-06-27 PROCEDURE — 90677 PCV20 VACCINE IM: CPT

## 2025-06-27 PROCEDURE — 99213 OFFICE O/P EST LOW 20 MIN: CPT | Performed by: INTERNAL MEDICINE

## 2025-06-27 RX ORDER — LOSARTAN POTASSIUM 25 MG/1
25 TABLET ORAL DAILY
Qty: 90 TABLET | Refills: 0 | Status: SHIPPED | OUTPATIENT
Start: 2025-06-27 | End: 2025-09-25

## 2025-06-27 NOTE — PROGRESS NOTES
Assessment/Plan:    Follow up  Hypertension ,Hyperlipidemia, Hypothyroidis     Diagnoses and all orders for this visit:    Hypertension  -     Lipid panel; Future  -     Comprehensive metabolic panel    Hypothyroidism    History of stroke    Osteoporosis, unspecified osteoporosis type, unspecified pathological fracture presence    Pure hypercholesterolemia    Primary hypertension  Comments:  Will start losartan 25 mg daily.  Orders:  -     losartan (COZAAR) 25 mg tablet; Take 1 tablet (25 mg total) by mouth daily    Encounter for immunization  -     Pneumococcal Conjugate Vaccine 20-valent (Pcv20)          Subjective: Occasional  knee pain     Patient ID: Mari Vilchis is a 79 y.o. female.    HPI    The following portions of the patient's history were reviewed and updated as appropriate: allergies, current medications, past family history, past medical history, past social history, past surgical history, and problem list.    Review of Systems   Constitutional: Negative.    HENT:  Negative for dental problem, drooling, ear discharge and ear pain.    Eyes:  Negative for discharge, redness and itching.   Respiratory:  Negative for apnea, cough and wheezing.    Cardiovascular:  Negative for chest pain and palpitations.   Gastrointestinal:  Negative for abdominal pain, blood in stool, diarrhea and vomiting.   Endocrine: Negative for polydipsia, polyphagia and polyuria.   Genitourinary:  Negative for decreased urine volume, dysuria and frequency.   Musculoskeletal:  Positive for arthralgias. Negative for myalgias and neck stiffness.   Skin:  Negative for pallor and wound.   Allergic/Immunologic: Negative for environmental allergies and food allergies.   Neurological:  Negative for facial asymmetry, light-headedness, numbness and headaches.   Hematological:  Negative for adenopathy. Does not bruise/bleed easily.   Psychiatric/Behavioral:  Negative for agitation, behavioral problems and confusion.       "    Objective:      /70 (BP Location: Left arm, Patient Position: Sitting, Cuff Size: Standard)   Pulse 72   Temp 98.3 °F (36.8 °C) (Temporal)   Ht 5' 1\" (1.549 m)   Wt 59.4 kg (131 lb)   SpO2 98%   BMI 24.75 kg/m²          Physical Exam  Constitutional:       Appearance: Normal appearance.   HENT:      Head: Normocephalic.      Nose: Nose normal.      Mouth/Throat:      Mouth: Mucous membranes are moist.     Eyes:      Pupils: Pupils are equal, round, and reactive to light.       Cardiovascular:      Rate and Rhythm: Regular rhythm.      Heart sounds: Normal heart sounds.   Pulmonary:      Breath sounds: Normal breath sounds.   Abdominal:      Palpations: Abdomen is soft.     Musculoskeletal:         General: No swelling.      Cervical back: Neck supple.     Skin:     General: Skin is warm.     Neurological:      General: No focal deficit present.      Mental Status: She is alert and oriented to person, place, and time.     Psychiatric:         Mood and Affect: Mood normal.       Hypertension  Controlled   with Losartan  25mg one  daily  Hypothyroidism   controlled  on  synthroid    Hyperlipidemia  controlled    Fup  4 months.       Devaughn Richardson MD.FACP  "

## 2025-07-15 ENCOUNTER — OFFICE VISIT (OUTPATIENT)
Dept: SURGERY | Facility: CLINIC | Age: 80
End: 2025-07-15
Payer: MEDICARE

## 2025-07-15 VITALS
SYSTOLIC BLOOD PRESSURE: 122 MMHG | DIASTOLIC BLOOD PRESSURE: 86 MMHG | HEIGHT: 61 IN | HEART RATE: 73 BPM | BODY MASS INDEX: 24.84 KG/M2 | WEIGHT: 131.6 LBS | OXYGEN SATURATION: 96 % | TEMPERATURE: 98.7 F

## 2025-07-15 DIAGNOSIS — E04.1 THYROID NODULE: Primary | ICD-10-CM

## 2025-07-15 PROCEDURE — 99212 OFFICE O/P EST SF 10 MIN: CPT | Performed by: SURGERY

## 2025-07-17 ENCOUNTER — TELEPHONE (OUTPATIENT)
Dept: NEUROLOGY | Facility: CLINIC | Age: 80
End: 2025-07-17

## 2025-07-17 ENCOUNTER — HOSPITAL ENCOUNTER (OUTPATIENT)
Dept: RADIOLOGY | Facility: HOSPITAL | Age: 80
Discharge: HOME | End: 2025-07-17
Attending: SURGERY
Payer: MEDICARE

## 2025-07-17 DIAGNOSIS — E04.1 THYROID NODULE: ICD-10-CM

## 2025-07-17 PROCEDURE — 76536 US EXAM OF HEAD AND NECK: CPT

## 2025-07-17 NOTE — TELEPHONE ENCOUNTER
Spoke to pt to r/s 9/11 appt panfilo/ Zac Roberts due to provider leaving practice effective 9/5. Pt accepted 9/4 at 11am w/ Zac Roberts.

## 2025-07-25 ENCOUNTER — TELEPHONE (OUTPATIENT)
Age: 80
End: 2025-07-25

## 2025-07-25 NOTE — TELEPHONE ENCOUNTER
Delaney from radiology called, because Dr. Mejia was looking to s/w Dr. Poe about pt's US Thyroid. Transferred to [facility] for further assistance.

## 2025-07-29 ENCOUNTER — TELEPHONE (OUTPATIENT)
Dept: SURGERY | Facility: CLINIC | Age: 80
End: 2025-07-29

## 2025-07-29 DIAGNOSIS — E04.1 THYROID NODULE: Primary | ICD-10-CM

## 2025-08-13 ENCOUNTER — TELEPHONE (OUTPATIENT)
Dept: INTERNAL MEDICINE CLINIC | Facility: CLINIC | Age: 80
End: 2025-08-13

## 2025-08-13 ENCOUNTER — APPOINTMENT (OUTPATIENT)
Dept: LAB | Facility: CLINIC | Age: 80
End: 2025-08-13
Attending: INTERNAL MEDICINE
Payer: MEDICARE

## 2025-08-13 DIAGNOSIS — E55.9 VITAMIN D DEFICIENCY: ICD-10-CM

## 2025-08-13 DIAGNOSIS — M81.0 OSTEOPOROSIS, UNSPECIFIED OSTEOPOROSIS TYPE, UNSPECIFIED PATHOLOGICAL FRACTURE PRESENCE: ICD-10-CM

## 2025-08-13 LAB
25(OH)D3 SERPL-MCNC: 45.8 NG/ML (ref 30–100)
ANION GAP SERPL CALCULATED.3IONS-SCNC: 8 MMOL/L (ref 4–13)
BUN SERPL-MCNC: 20 MG/DL (ref 5–25)
CALCIUM SERPL-MCNC: 9.3 MG/DL (ref 8.4–10.2)
CHLORIDE SERPL-SCNC: 105 MMOL/L (ref 96–108)
CO2 SERPL-SCNC: 26 MMOL/L (ref 21–32)
CREAT SERPL-MCNC: 0.88 MG/DL (ref 0.6–1.3)
GFR SERPL CREATININE-BSD FRML MDRD: 62 ML/MIN/1.73SQ M
GLUCOSE P FAST SERPL-MCNC: 101 MG/DL (ref 65–99)
POTASSIUM SERPL-SCNC: 4 MMOL/L (ref 3.5–5.3)
SODIUM SERPL-SCNC: 139 MMOL/L (ref 135–147)

## 2025-08-13 PROCEDURE — 82306 VITAMIN D 25 HYDROXY: CPT

## 2025-08-13 PROCEDURE — 80048 BASIC METABOLIC PNL TOTAL CA: CPT

## 2025-08-13 PROCEDURE — 36415 COLL VENOUS BLD VENIPUNCTURE: CPT

## 2025-08-18 ENCOUNTER — OFFICE VISIT (OUTPATIENT)
Dept: ENDOCRINOLOGY | Facility: CLINIC | Age: 80
End: 2025-08-18
Payer: MEDICARE

## 2025-08-18 VITALS
HEART RATE: 74 BPM | HEIGHT: 61 IN | BODY MASS INDEX: 25.19 KG/M2 | WEIGHT: 133.4 LBS | DIASTOLIC BLOOD PRESSURE: 68 MMHG | SYSTOLIC BLOOD PRESSURE: 122 MMHG | OXYGEN SATURATION: 99 %

## 2025-08-18 DIAGNOSIS — E55.9 VITAMIN D DEFICIENCY: ICD-10-CM

## 2025-08-18 DIAGNOSIS — M81.0 OSTEOPOROSIS, UNSPECIFIED OSTEOPOROSIS TYPE, UNSPECIFIED PATHOLOGICAL FRACTURE PRESENCE: Primary | ICD-10-CM

## 2025-08-18 DIAGNOSIS — E03.9 ACQUIRED HYPOTHYROIDISM: ICD-10-CM

## 2025-08-18 PROCEDURE — 99214 OFFICE O/P EST MOD 30 MIN: CPT | Performed by: INTERNAL MEDICINE

## 2025-08-18 PROCEDURE — 96372 THER/PROPH/DIAG INJ SC/IM: CPT | Performed by: INTERNAL MEDICINE

## (undated) DEVICE — DRAPE EQUIPMENT RF WAND

## (undated) DEVICE — 3.2MM GUIDE WIRE 400MM

## (undated) DEVICE — ACE WRAP 6 IN UNSTERILE

## (undated) DEVICE — 5.0MM SELF-DRILLING SCHANZ SCREW 60MM THRD/150MM: Type: IMPLANTABLE DEVICE | Status: NON-FUNCTIONAL

## (undated) DEVICE — GLOVE INDICATOR PI UNDERGLOVE SZ 9 BLUE

## (undated) DEVICE — GLOVE SRG BIOGEL 9

## (undated) DEVICE — SUT VICRYL PLUS 2-0 CTB-1 27 IN VCPB259H

## (undated) DEVICE — PACK MAJOR ORTHO W/SPLITS PBDS

## (undated) DEVICE — CHLORAPREP HI-LITE 26ML ORANGE

## (undated) DEVICE — SUT VICRYL PLUS 1 CTB-1 36 IN VCPB947H

## (undated) DEVICE — 2.5MM REAMING ROD WITH BALL TIP/950MM-STERILE

## (undated) DEVICE — INTENDED FOR TISSUE SEPARATION, AND OTHER PROCEDURES THAT REQUIRE A SHARP SURGICAL BLADE TO PUNCTURE OR CUT.: Brand: BARD-PARKER SAFETY BLADES SIZE 10, STERILE

## (undated) DEVICE — ARTHROSCOPY FLOOR MAT

## (undated) DEVICE — SPONGE PVP SCRUB WING STERILE

## (undated) DEVICE — DRESSING MEPILEX AG BORDER 3 X 3 IN

## (undated) DEVICE — DRESSING MEPILEX AG BORDER 4 X 4 IN

## (undated) DEVICE — 4.2MM THREE-FLUTED DRILL BIT QC/NEEDLE POINT/145MM-STERILE